# Patient Record
Sex: MALE | Race: BLACK OR AFRICAN AMERICAN | NOT HISPANIC OR LATINO | Employment: OTHER | URBAN - METROPOLITAN AREA
[De-identification: names, ages, dates, MRNs, and addresses within clinical notes are randomized per-mention and may not be internally consistent; named-entity substitution may affect disease eponyms.]

---

## 2017-04-18 ENCOUNTER — TRANSCRIBE ORDERS (OUTPATIENT)
Dept: ADMINISTRATIVE | Facility: HOSPITAL | Age: 77
End: 2017-04-18

## 2017-04-18 ENCOUNTER — HOSPITAL ENCOUNTER (OUTPATIENT)
Dept: GENERAL RADIOLOGY | Facility: HOSPITAL | Age: 77
Discharge: HOME | End: 2017-04-18
Attending: INTERNAL MEDICINE | Admitting: INTERNAL MEDICINE

## 2017-04-18 DIAGNOSIS — I10 ESSENTIAL (PRIMARY) HYPERTENSION: Primary | ICD-10-CM

## 2017-04-18 PROCEDURE — 71020 HC CHEST PA AND LATERAL: CPT

## 2017-06-17 ENCOUNTER — HOSPITAL ENCOUNTER (OUTPATIENT)
Facility: HOSPITAL | Age: 77
Setting detail: OBSERVATION
Discharge: HOME | End: 2017-06-19
Attending: EMERGENCY MEDICINE | Admitting: INTERNAL MEDICINE

## 2017-06-17 DIAGNOSIS — I48.91 ATRIAL FIBRILLATION WITH RVR (HCC): Primary | ICD-10-CM

## 2017-06-17 DIAGNOSIS — Z78.9 IMPAIRED MOBILITY AND ADLS: ICD-10-CM

## 2017-06-17 DIAGNOSIS — Z74.09 IMPAIRED MOBILITY AND ADLS: ICD-10-CM

## 2017-06-17 DIAGNOSIS — D72.829 LEUKOCYTOSIS, UNSPECIFIED TYPE: ICD-10-CM

## 2017-06-17 DIAGNOSIS — Z74.09 IMPAIRED FUNCTIONAL MOBILITY, BALANCE, GAIT, AND ENDURANCE: ICD-10-CM

## 2017-06-17 PROCEDURE — 93005 ELECTROCARDIOGRAM TRACING: CPT | Performed by: EMERGENCY MEDICINE

## 2017-06-17 PROCEDURE — 96365 THER/PROPH/DIAG IV INF INIT: CPT

## 2017-06-17 PROCEDURE — 99284 EMERGENCY DEPT VISIT MOD MDM: CPT

## 2017-06-17 RX ORDER — SODIUM CHLORIDE 0.9 % (FLUSH) 0.9 %
10 SYRINGE (ML) INJECTION AS NEEDED
Status: DISCONTINUED | OUTPATIENT
Start: 2017-06-17 | End: 2017-06-19 | Stop reason: HOSPADM

## 2017-06-17 RX ORDER — DILTIAZEM HYDROCHLORIDE 5 MG/ML
10 INJECTION INTRAVENOUS ONCE
Status: COMPLETED | OUTPATIENT
Start: 2017-06-17 | End: 2017-06-18

## 2017-06-17 RX ORDER — DILTIAZEM HCL IN NACL,ISO-OSM 125 MG/125
5-15 PLASTIC BAG, INJECTION (ML) INTRAVENOUS
Status: DISCONTINUED | OUTPATIENT
Start: 2017-06-17 | End: 2017-06-18

## 2017-06-18 ENCOUNTER — APPOINTMENT (OUTPATIENT)
Dept: CARDIOLOGY | Facility: HOSPITAL | Age: 77
End: 2017-06-18
Attending: INTERNAL MEDICINE

## 2017-06-18 ENCOUNTER — APPOINTMENT (OUTPATIENT)
Dept: GENERAL RADIOLOGY | Facility: HOSPITAL | Age: 77
End: 2017-06-18

## 2017-06-18 PROBLEM — I48.91 ATRIAL FIBRILLATION WITH RVR (HCC): Status: ACTIVE | Noted: 2017-06-18

## 2017-06-18 LAB
ALBUMIN SERPL-MCNC: 3.4 G/DL (ref 3.2–4.8)
ALBUMIN/GLOB SERPL: 1 G/DL (ref 1.5–2.5)
ALP SERPL-CCNC: 89 U/L (ref 25–100)
ALT SERPL W P-5'-P-CCNC: 18 U/L (ref 7–40)
ANION GAP SERPL CALCULATED.3IONS-SCNC: 9 MMOL/L (ref 3–11)
AST SERPL-CCNC: 22 U/L (ref 0–33)
BASOPHILS # BLD AUTO: 0.01 10*3/MM3 (ref 0–0.2)
BASOPHILS NFR BLD AUTO: 0.1 % (ref 0–1)
BH CV ECHO MEAS - AO MAX PG (FULL): 8.1 MMHG
BH CV ECHO MEAS - AO MAX PG: 12.4 MMHG
BH CV ECHO MEAS - AO MEAN PG (FULL): 3 MMHG
BH CV ECHO MEAS - AO MEAN PG: 5.1 MMHG
BH CV ECHO MEAS - AO ROOT AREA (BSA CORRECTED): 1.6
BH CV ECHO MEAS - AO ROOT AREA: 7.4 CM^2
BH CV ECHO MEAS - AO ROOT DIAM: 3.1 CM
BH CV ECHO MEAS - AO V2 MAX: 175.9 CM/SEC
BH CV ECHO MEAS - AO V2 MEAN: 100.6 CM/SEC
BH CV ECHO MEAS - AO V2 VTI: 34.4 CM
BH CV ECHO MEAS - AVA(I,A): 2 CM^2
BH CV ECHO MEAS - AVA(I,D): 2 CM^2
BH CV ECHO MEAS - AVA(V,A): 1.9 CM^2
BH CV ECHO MEAS - AVA(V,D): 1.9 CM^2
BH CV ECHO MEAS - BSA(HAYCOCK): 1.9 M^2
BH CV ECHO MEAS - BSA: 1.9 M^2
BH CV ECHO MEAS - BZI_BMI: 23 KILOGRAMS/M^2
BH CV ECHO MEAS - BZI_METRIC_HEIGHT: 180.3 CM
BH CV ECHO MEAS - BZI_METRIC_WEIGHT: 74.8 KG
BH CV ECHO MEAS - EDV(CUBED): 119 ML
BH CV ECHO MEAS - EDV(TEICH): 113.9 ML
BH CV ECHO MEAS - EF(CUBED): 60.2 %
BH CV ECHO MEAS - EF(TEICH): 51.6 %
BH CV ECHO MEAS - ESV(CUBED): 47.4 ML
BH CV ECHO MEAS - ESV(TEICH): 55.2 ML
BH CV ECHO MEAS - FS: 26.4 %
BH CV ECHO MEAS - IVS/LVPW: 1.2
BH CV ECHO MEAS - IVSD: 1.1 CM
BH CV ECHO MEAS - LA DIMENSION: 3.6 CM
BH CV ECHO MEAS - LA/AO: 1.2
BH CV ECHO MEAS - LV MASS(C)D: 177.5 GRAMS
BH CV ECHO MEAS - LV MASS(C)DI: 91.3 GRAMS/M^2
BH CV ECHO MEAS - LV MAX PG: 4.3 MMHG
BH CV ECHO MEAS - LV MEAN PG: 2.1 MMHG
BH CV ECHO MEAS - LV V1 MAX: 103.5 CM/SEC
BH CV ECHO MEAS - LV V1 MEAN: 64.8 CM/SEC
BH CV ECHO MEAS - LV V1 VTI: 20.8 CM
BH CV ECHO MEAS - LVIDD: 4.9 CM
BH CV ECHO MEAS - LVIDS: 3.6 CM
BH CV ECHO MEAS - LVOT AREA (M): 3.1 CM^2
BH CV ECHO MEAS - LVOT AREA: 3.3 CM^2
BH CV ECHO MEAS - LVOT DIAM: 2 CM
BH CV ECHO MEAS - LVPWD: 1.1 CM
BH CV ECHO MEAS - MV A MAX VEL: 88.4 CM/SEC
BH CV ECHO MEAS - MV DEC TIME: 0.17 SEC
BH CV ECHO MEAS - MV E MAX VEL: 98 CM/SEC
BH CV ECHO MEAS - MV E/A: 1.1
BH CV ECHO MEAS - PA ACC SLOPE: 702 CM/SEC^2
BH CV ECHO MEAS - PA ACC TIME: 0.11 SEC
BH CV ECHO MEAS - PA MAX PG: 4.2 MMHG
BH CV ECHO MEAS - PA PR(ACCEL): 27.5 MMHG
BH CV ECHO MEAS - PA V2 MAX: 101.9 CM/SEC
BH CV ECHO MEAS - RAP SYSTOLE: 3 MMHG
BH CV ECHO MEAS - RVDD: 2.3 CM
BH CV ECHO MEAS - RVSP: 29 MMHG
BH CV ECHO MEAS - SI(AO): 130.3 ML/M^2
BH CV ECHO MEAS - SI(CUBED): 36.8 ML/M^2
BH CV ECHO MEAS - SI(LVOT): 34.8 ML/M^2
BH CV ECHO MEAS - SI(TEICH): 30.2 ML/M^2
BH CV ECHO MEAS - SV(AO): 253.3 ML
BH CV ECHO MEAS - SV(CUBED): 71.6 ML
BH CV ECHO MEAS - SV(LVOT): 67.6 ML
BH CV ECHO MEAS - SV(TEICH): 58.7 ML
BH CV ECHO MEAS - TAPSE (>1.6): 2.3 CM2
BH CV ECHO MEAS - TR MAX VEL: 241.3 CM/SEC
BH CV ECHO MEAS - TV MAX PG: 0.86 MMHG
BH CV ECHO MEAS - TV V2 MAX: 46.4 CM/SEC
BH CV XLRA - RV BASE: 2.7 CM
BH CV XLRA - RV LENGTH: 5.3 CM
BH CV XLRA - RV MID: 2.5 CM
BH CV XLRA - TDI S': 19.7 CM/SEC
BILIRUB SERPL-MCNC: 0.3 MG/DL (ref 0.3–1.2)
BILIRUB UR QL STRIP: NEGATIVE
BNP SERPL-MCNC: 77 PG/ML (ref 0–100)
BUN BLD-MCNC: 25 MG/DL (ref 9–23)
BUN/CREAT SERPL: 31.3 (ref 7–25)
CALCIUM SPEC-SCNC: 9.4 MG/DL (ref 8.7–10.4)
CHLORIDE SERPL-SCNC: 106 MMOL/L (ref 99–109)
CLARITY UR: CLEAR
CO2 SERPL-SCNC: 30 MMOL/L (ref 20–31)
COLOR UR: YELLOW
CREAT BLD-MCNC: 0.8 MG/DL (ref 0.6–1.3)
DEPRECATED RDW RBC AUTO: 53.8 FL (ref 37–54)
EOSINOPHIL # BLD AUTO: 0 10*3/MM3 (ref 0.1–0.3)
EOSINOPHIL NFR BLD AUTO: 0 % (ref 0–3)
ERYTHROCYTE [DISTWIDTH] IN BLOOD BY AUTOMATED COUNT: 16 % (ref 11.3–14.5)
GFR SERPL CREATININE-BSD FRML MDRD: 113 ML/MIN/1.73
GLOBULIN UR ELPH-MCNC: 3.3 GM/DL
GLUCOSE BLD-MCNC: 129 MG/DL (ref 70–100)
GLUCOSE BLDC GLUCOMTR-MCNC: 134 MG/DL (ref 70–130)
GLUCOSE UR STRIP-MCNC: NEGATIVE MG/DL
HCT VFR BLD AUTO: 38.3 % (ref 38.9–50.9)
HGB BLD-MCNC: 11.2 G/DL (ref 13.1–17.5)
HGB UR QL STRIP.AUTO: NEGATIVE
IMM GRANULOCYTES # BLD: 0.04 10*3/MM3 (ref 0–0.03)
IMM GRANULOCYTES NFR BLD: 0.3 % (ref 0–0.6)
KETONES UR QL STRIP: NEGATIVE
LEUKOCYTE ESTERASE UR QL STRIP.AUTO: NEGATIVE
LV EF 2D ECHO EST: 58 %
LYMPHOCYTES # BLD AUTO: 0.64 10*3/MM3 (ref 0.6–4.8)
LYMPHOCYTES NFR BLD AUTO: 4.3 % (ref 24–44)
MCH RBC QN AUTO: 26.7 PG (ref 27–31)
MCHC RBC AUTO-ENTMCNC: 29.2 G/DL (ref 32–36)
MCV RBC AUTO: 91.2 FL (ref 80–99)
MONOCYTES # BLD AUTO: 0.74 10*3/MM3 (ref 0–1)
MONOCYTES NFR BLD AUTO: 4.9 % (ref 0–12)
NEUTROPHILS # BLD AUTO: 13.54 10*3/MM3 (ref 1.5–8.3)
NEUTROPHILS NFR BLD AUTO: 90.4 % (ref 41–71)
NITRITE UR QL STRIP: NEGATIVE
PH UR STRIP.AUTO: 7 [PH] (ref 5–8)
PLAT MORPH BLD: NORMAL
PLATELET # BLD AUTO: 200 10*3/MM3 (ref 150–450)
PMV BLD AUTO: 10.9 FL (ref 6–12)
POTASSIUM BLD-SCNC: 3.7 MMOL/L (ref 3.5–5.5)
PROCALCITONIN SERPL-MCNC: <0.05 NG/ML
PROT SERPL-MCNC: 6.7 G/DL (ref 5.7–8.2)
PROT UR QL STRIP: NEGATIVE
RBC # BLD AUTO: 4.2 10*6/MM3 (ref 4.2–5.76)
RBC MORPH BLD: NORMAL
SODIUM BLD-SCNC: 145 MMOL/L (ref 132–146)
SP GR UR STRIP: 1.02 (ref 1–1.03)
TROPONIN I SERPL-MCNC: 0 NG/ML (ref 0–0.07)
TROPONIN I SERPL-MCNC: 0 NG/ML (ref 0–0.07)
TSH SERPL DL<=0.05 MIU/L-ACNC: 0.74 MIU/ML (ref 0.35–5.35)
UROBILINOGEN UR QL STRIP: NORMAL
WBC MORPH BLD: NORMAL
WBC NRBC COR # BLD: 14.97 10*3/MM3 (ref 3.5–10.8)

## 2017-06-18 PROCEDURE — 83880 ASSAY OF NATRIURETIC PEPTIDE: CPT | Performed by: EMERGENCY MEDICINE

## 2017-06-18 PROCEDURE — 63710000001 PREDNISONE PER 5 MG: Performed by: INTERNAL MEDICINE

## 2017-06-18 PROCEDURE — 25010000002 HEPARIN (PORCINE) PER 1000 UNITS: Performed by: INTERNAL MEDICINE

## 2017-06-18 PROCEDURE — 85025 COMPLETE CBC W/AUTO DIFF WBC: CPT | Performed by: EMERGENCY MEDICINE

## 2017-06-18 PROCEDURE — 80053 COMPREHEN METABOLIC PANEL: CPT | Performed by: EMERGENCY MEDICINE

## 2017-06-18 PROCEDURE — G0378 HOSPITAL OBSERVATION PER HR: HCPCS

## 2017-06-18 PROCEDURE — 96365 THER/PROPH/DIAG IV INF INIT: CPT

## 2017-06-18 PROCEDURE — 96372 THER/PROPH/DIAG INJ SC/IM: CPT

## 2017-06-18 PROCEDURE — 71010 HC CHEST PA OR AP: CPT

## 2017-06-18 PROCEDURE — 25010000002 DIGOXIN PER 500 MCG: Performed by: EMERGENCY MEDICINE

## 2017-06-18 PROCEDURE — 84484 ASSAY OF TROPONIN QUANT: CPT

## 2017-06-18 PROCEDURE — 85007 BL SMEAR W/DIFF WBC COUNT: CPT | Performed by: EMERGENCY MEDICINE

## 2017-06-18 PROCEDURE — 93005 ELECTROCARDIOGRAM TRACING: CPT | Performed by: NURSE PRACTITIONER

## 2017-06-18 PROCEDURE — 81003 URINALYSIS AUTO W/O SCOPE: CPT | Performed by: INTERNAL MEDICINE

## 2017-06-18 PROCEDURE — 84443 ASSAY THYROID STIM HORMONE: CPT | Performed by: INTERNAL MEDICINE

## 2017-06-18 PROCEDURE — 93306 TTE W/DOPPLER COMPLETE: CPT

## 2017-06-18 PROCEDURE — 96375 TX/PRO/DX INJ NEW DRUG ADDON: CPT

## 2017-06-18 PROCEDURE — 93306 TTE W/DOPPLER COMPLETE: CPT | Performed by: INTERNAL MEDICINE

## 2017-06-18 PROCEDURE — 82962 GLUCOSE BLOOD TEST: CPT

## 2017-06-18 PROCEDURE — 84145 PROCALCITONIN (PCT): CPT | Performed by: INTERNAL MEDICINE

## 2017-06-18 PROCEDURE — 93005 ELECTROCARDIOGRAM TRACING: CPT | Performed by: EMERGENCY MEDICINE

## 2017-06-18 PROCEDURE — 93010 ELECTROCARDIOGRAM REPORT: CPT | Performed by: INTERNAL MEDICINE

## 2017-06-18 PROCEDURE — 99220 PR INITIAL OBSERVATION CARE/DAY 70 MINUTES: CPT | Performed by: INTERNAL MEDICINE

## 2017-06-18 PROCEDURE — 99214 OFFICE O/P EST MOD 30 MIN: CPT | Performed by: INTERNAL MEDICINE

## 2017-06-18 RX ORDER — MELATONIN
2000 DAILY
Status: DISCONTINUED | OUTPATIENT
Start: 2017-06-18 | End: 2017-06-19 | Stop reason: HOSPADM

## 2017-06-18 RX ORDER — SODIUM CHLORIDE 0.9 % (FLUSH) 0.9 %
1-10 SYRINGE (ML) INJECTION AS NEEDED
Status: DISCONTINUED | OUTPATIENT
Start: 2017-06-18 | End: 2017-06-19 | Stop reason: HOSPADM

## 2017-06-18 RX ORDER — ONDANSETRON 2 MG/ML
4 INJECTION INTRAMUSCULAR; INTRAVENOUS EVERY 6 HOURS PRN
Status: DISCONTINUED | OUTPATIENT
Start: 2017-06-18 | End: 2017-06-19 | Stop reason: HOSPADM

## 2017-06-18 RX ORDER — PREDNISONE 1 MG/1
15 TABLET ORAL 3 TIMES DAILY
Status: ON HOLD | COMMUNITY
End: 2017-06-19

## 2017-06-18 RX ORDER — FLUTICASONE PROPIONATE 50 MCG
2 SPRAY, SUSPENSION (ML) NASAL 2 TIMES DAILY PRN
COMMUNITY

## 2017-06-18 RX ORDER — ONDANSETRON 4 MG/1
4 TABLET, FILM COATED ORAL EVERY 6 HOURS PRN
Status: DISCONTINUED | OUTPATIENT
Start: 2017-06-18 | End: 2017-06-19 | Stop reason: HOSPADM

## 2017-06-18 RX ORDER — FINASTERIDE 5 MG/1
5 TABLET, FILM COATED ORAL DAILY
Status: DISCONTINUED | OUTPATIENT
Start: 2017-06-18 | End: 2017-06-19 | Stop reason: HOSPADM

## 2017-06-18 RX ORDER — DOCUSATE SODIUM 100 MG/1
100 CAPSULE, LIQUID FILLED ORAL 2 TIMES DAILY
Status: DISCONTINUED | OUTPATIENT
Start: 2017-06-18 | End: 2017-06-19 | Stop reason: HOSPADM

## 2017-06-18 RX ORDER — HYDROCODONE BITARTRATE AND ACETAMINOPHEN 10; 325 MG/1; MG/1
1 TABLET ORAL EVERY 6 HOURS PRN
COMMUNITY
End: 2019-01-15

## 2017-06-18 RX ORDER — DIGOXIN 0.25 MG/ML
125 INJECTION INTRAMUSCULAR; INTRAVENOUS ONCE
Status: COMPLETED | OUTPATIENT
Start: 2017-06-18 | End: 2017-06-18

## 2017-06-18 RX ORDER — DILTIAZEM HCL IN NACL,ISO-OSM 125 MG/125
5-15 PLASTIC BAG, INJECTION (ML) INTRAVENOUS
Status: DISCONTINUED | OUTPATIENT
Start: 2017-06-18 | End: 2017-06-18

## 2017-06-18 RX ORDER — ACETAMINOPHEN 325 MG/1
650 TABLET ORAL EVERY 4 HOURS PRN
Status: DISCONTINUED | OUTPATIENT
Start: 2017-06-18 | End: 2017-06-19 | Stop reason: HOSPADM

## 2017-06-18 RX ORDER — BISOPROLOL FUMARATE 5 MG/1
2.5 TABLET, FILM COATED ORAL
Status: DISCONTINUED | OUTPATIENT
Start: 2017-06-18 | End: 2017-06-19 | Stop reason: HOSPADM

## 2017-06-18 RX ORDER — MELATONIN
2000 DAILY
COMMUNITY

## 2017-06-18 RX ORDER — HEPARIN SODIUM 5000 [USP'U]/ML
5000 INJECTION, SOLUTION INTRAVENOUS; SUBCUTANEOUS EVERY 8 HOURS SCHEDULED
Status: DISCONTINUED | OUTPATIENT
Start: 2017-06-18 | End: 2017-06-18

## 2017-06-18 RX ORDER — FLUTICASONE PROPIONATE 50 MCG
2 SPRAY, SUSPENSION (ML) NASAL DAILY
Status: DISCONTINUED | OUTPATIENT
Start: 2017-06-18 | End: 2017-06-19 | Stop reason: HOSPADM

## 2017-06-18 RX ORDER — FINASTERIDE 5 MG/1
5 TABLET, FILM COATED ORAL DAILY
COMMUNITY
End: 2019-03-14 | Stop reason: SDUPTHER

## 2017-06-18 RX ORDER — DILTIAZEM HCL IN NACL,ISO-OSM 125 MG/125
5-15 PLASTIC BAG, INJECTION (ML) INTRAVENOUS
Status: DISCONTINUED | OUTPATIENT
Start: 2017-06-18 | End: 2017-06-18 | Stop reason: SDUPTHER

## 2017-06-18 RX ORDER — HYDROCODONE BITARTRATE AND ACETAMINOPHEN 10; 325 MG/1; MG/1
1 TABLET ORAL EVERY 6 HOURS PRN
Status: DISCONTINUED | OUTPATIENT
Start: 2017-06-18 | End: 2017-06-19 | Stop reason: HOSPADM

## 2017-06-18 RX ADMIN — Medication 5 MG/HR: at 00:40

## 2017-06-18 RX ADMIN — HEPARIN SODIUM 5000 UNITS: 5000 INJECTION, SOLUTION INTRAVENOUS; SUBCUTANEOUS at 14:36

## 2017-06-18 RX ADMIN — HYDROCODONE BITARTRATE AND ACETAMINOPHEN 1 TABLET: 10; 325 TABLET ORAL at 16:45

## 2017-06-18 RX ADMIN — PREDNISONE 15 MG: 5 TABLET ORAL at 11:23

## 2017-06-18 RX ADMIN — VITAMIN D, TAB 1000IU (100/BT) 2000 UNITS: 25 TAB at 11:23

## 2017-06-18 RX ADMIN — FINASTERIDE 5 MG: 5 TABLET, FILM COATED ORAL at 11:23

## 2017-06-18 RX ADMIN — DILTIAZEM HYDROCHLORIDE 10 MG: 5 INJECTION INTRAVENOUS at 00:38

## 2017-06-18 RX ADMIN — BISOPROLOL FUMARATE 2.5 MG: 5 TABLET ORAL at 17:40

## 2017-06-18 RX ADMIN — DIGOXIN 125 MCG: 0.25 INJECTION INTRAMUSCULAR; INTRAVENOUS at 05:45

## 2017-06-18 RX ADMIN — FLUTICASONE PROPIONATE 2 SPRAY: 50 SPRAY, METERED NASAL at 11:22

## 2017-06-18 NOTE — PLAN OF CARE
Problem: Patient Care Overview (Adult)  Goal: Plan of Care Review  Outcome: Ongoing (interventions implemented as appropriate)    17 1811   Coping/Psychosocial Response Interventions   Plan Of Care Reviewed With patient   Patient Care Overview   Progress no change   Outcome Evaluation   Outcome Summary/Follow up Plan VSS. woc and cardiology consulted. ekg ordered for am. nsr on monitor. pain med given once.         Problem: Cardiac Output, Decreased (Adult)  Goal: Identify Related Risk Factors and Signs and Symptoms  Outcome: Outcome(s) achieved Date Met:  17  Goal: Adequate Cardiac Output/Effective Tissue Perfusion  Outcome: Outcome(s) achieved Date Met:  17    Problem: Health Knowledge, Opportunity for Enhanced (Adult,NICU,,Obstetrics,Pediatric)  Goal: Identify Related Risk Factors and Signs and Symptoms  Outcome: Outcome(s) achieved Date Met:  17  Goal: Knowledgeable about Health Subject/Topic  Outcome: Outcome(s) achieved Date Met:  17    Problem: Arrhythmia/Dysrhythmia (Symptomatic) (Adult)  Goal: Signs and Symptoms of Listed Potential Problems Will be Absent or Manageable (Arrhythmia/Dysrhythmia)  Outcome: Ongoing (interventions implemented as appropriate)

## 2017-06-18 NOTE — H&P
[facility] Medicine Services  HISTORY AND PHYSICAL    Primary Care Physician: Michael Chavez MD    Subjective     Chief Complaint:  Atrial fibrillation    History of Present Illness:     75 yo with h/o RA and chronic venous stasis ulcerations presents with new onset atrial fibrillation with RVR. Mr Rojas experienced palpitations about 8 PM last night. He denies associated chest pain or shortness of breath. No prior events. The patient's son called EMS, and on arrival to the ED and EKG showed afib.      Review of Systems   Constitutional: Positive for unexpected weight change. Negative for chills and fever.   HENT: Negative.    Eyes: Negative.    Respiratory: Negative for shortness of breath.    Cardiovascular: Positive for palpitations. Negative for chest pain.   Gastrointestinal: Negative.    Endocrine: Negative.    Genitourinary: Negative.    Musculoskeletal: Positive for arthralgias and gait problem.   Skin: Positive for rash and wound.   Allergic/Immunologic: Negative.    Neurological: Positive for weakness.   Hematological: Negative.    Psychiatric/Behavioral: Negative.            Past Medical History:   Diagnosis Date   • Chronic cutaneous venous stasis ulcer    • Hypertension    • RA (rheumatoid arthritis)    • Rheumatoid arthritis    • Vertigo    • Vertigo        Past Surgical History:   Procedure Laterality Date   • JOINT MANIPULATION      left hip repair       History reviewed. No pertinent family history.    Social History     Social History   • Marital status:      Spouse name: N/A   • Number of children: N/A   • Years of education: N/A     Occupational History   • Not on file.     Social History Main Topics   • Smoking status: Never Smoker   • Smokeless tobacco: Never Used   • Alcohol use No   • Drug use: No   • Sexual activity: Defer     Other Topics Concern   • Not on file     Social History Narrative       Medications:  Prescriptions Prior to Admission  "  Medication Sig Dispense Refill Last Dose   • cholecalciferol (VITAMIN D3) 1000 UNITS tablet Take 2,000 Units by mouth Daily.      • docusate sodium (COLACE) 50 MG capsule Take  by mouth 2 (Two) Times a Day.      • finasteride (PROSCAR) 5 MG tablet Take 5 mg by mouth Daily.      • fluticasone (FLONASE) 50 MCG/ACT nasal spray 2 sprays into each nostril Daily.      • HYDROcodone-acetaminophen (NORCO)  MG per tablet Take 1 tablet by mouth Every 6 (Six) Hours As Needed for Moderate Pain (4-6).      • predniSONE (DELTASONE) 5 MG tablet Take 15 mg by mouth 3 (Three) Times a Day.      • amLODIPine (NORVASC) 5 MG tablet Take 5 mg by mouth daily.      • VALSARTAN-HYDROCHLOROTHIAZIDE PO Take 12.5 mg by mouth. UNABLE TO FIND OUT DOSE.           Allergies:  Allergies   Allergen Reactions   • Other      Relates all oral abx cause intol nausea/vomiting.         Objective     Physical Exam:  Vital Signs: /76 (BP Location: Left arm, Patient Position: Lying)  Pulse 98  Temp 97.4 °F (36.3 °C) (Oral)   Resp 16  Ht 71\" (180.3 cm)  Wt 165 lb 1.6 oz (74.9 kg)  SpO2 97%  BMI 23.03 kg/m2  Physical Exam    Gen-no acute distress, non toxic, lying flat in bed, somewhat frail male  CV-RRR, S1 S2 normal, no m/r/g  Resp-CTAB, no wheezes, rhonchi or rales  Abd-soft, Non tender, Non distended, normo active bowel sounds  Ext-No lower extremity cyanosis clubbing or edema bilaterlly  Neuro-speech clear, alert, answers questions appropriately; patient has difficulty moving and bending his legs  Psych-normal affect   Skin- 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 Several shallow but fairly large and weeping Lower extremity venous stasis ulcerations (including left ankle and right lower leg). Difficulty visualizing ulcer behind right knee.           Results Reviewed:    Results from last 7 days  Lab Units 06/18/17  0021   WBC 10*3/mm3 14.97*   HEMOGLOBIN g/dL 11.2*   PLATELETS 10*3/mm3 200       Results from " last 7 days  Lab Units 06/18/17  0021   SODIUM mmol/L 145   POTASSIUM mmol/L 3.7   TOTAL CO2 mmol/L 30.0   CREATININE mg/dL 0.80   GLUCOSE mg/dL 129*   CALCIUM mg/dL 9.4       I have personally reviewed and interpreted available lab data, radiology studies and ECG obtained at time of admission.     Assessment / Plan     Assessment/Problem List:   Hospital Problem List     Atrial fibrillation with RVR          cholecalciferol 2,000 Units Oral Daily   docusate sodium 100 mg Oral BID   finasteride 5 mg Oral Daily   fluticasone 2 spray Nasal Daily   heparin (porcine) 5,000 Units Subcutaneous Q8H   predniSONE 15 mg Oral Daily With Breakfast         Plan:    Atrial fibrillation with RVR  - new onset  - appears to have spontaneously converted to sinus rhythm  - TSH wnl  - check echo  - will ask cardiology to evaluate further.    Leukocytosis  - secondary to chronic steroids? Infection? -- IF infection this could certainly help precipitate atrial fibrillation.  - CXR no infiltrate  - Check UA  - Check Procalcitonin, ESR, CRP  - Venous stasis ulcers could certainly be a source of infection -- although they are extensive, both patient and family say the skin lesions are slowly improving.    Venous Stasis ulcers  - follows with [facility] with home health and family care  - would care nurse consulted    RA  - continue chronic prednisone  - poor mobility -- will ask PT/OT to evaluate    Gouty tophi  - check uric acid level    HTN  - hold norvasc and valsartan/hctz today -- in case diltiazem IV needed again for rate control. Also, patient may be candidate for b-blockade.    DVT prophylaxis: heparin  Code Status: Full  Admission Status: Patient will be admitted to OBSERVATION status, however if further evaluation or treatment plans warrant, status may change.  Based upon current information, I predict patient's care encounter to be less than or equal to 2 midnights.       Jaime Bangura MD 06/18/17 9:58 AM

## 2017-06-18 NOTE — ED PROVIDER NOTES
Subjective   HPI Comments: Michoacano Rojas is a 76 y.o. male with a hx of RA who presents to the ED w/ c/o palpitations. 10 minutes before EMS arrival, he suddenly felt his heart racing. He denies any CP, SOA, dizziness, lightheadedness, fever, cough, hematochezia, melena, or any other symptoms, but he does state that he has had similar sx in the past. He is not on any blood thinners or home O2, and has no hx of PE/DVT. He has been on a nasal spray for 1 month for seasonal allergies. He reports nothing else acute at this time.     Patient is a 76 y.o. male presenting with palpitations.   History provided by:  Patient  Palpitations   Palpitations quality:  Fast  Onset quality:  Sudden  Timing:  Constant  Progression:  Unchanged  Chronicity:  New  Relieved by:  None tried  Worsened by:  Nothing  Ineffective treatments:  None tried  Associated symptoms: no chest pain, no cough, no diaphoresis, no dizziness, no nausea, no shortness of breath and no vomiting        Review of Systems   Constitutional: Negative for diaphoresis.   Respiratory: Negative for cough and shortness of breath.    Cardiovascular: Positive for palpitations and leg swelling (chronic in BLE). Negative for chest pain.   Gastrointestinal: Negative for nausea and vomiting.   Neurological: Negative for dizziness.   All other systems reviewed and are negative.      Past Medical History:   Diagnosis Date   • Chronic cutaneous venous stasis ulcer    • Hypertension    • RA (rheumatoid arthritis)    • Rheumatoid arthritis    • Vertigo    • Vertigo        Allergies   Allergen Reactions   • Other      Relates all oral abx cause intol nausea/vomiting.       History reviewed. No pertinent surgical history.    History reviewed. No pertinent family history.    Social History     Social History   • Marital status:      Spouse name: N/A   • Number of children: N/A   • Years of education: N/A     Social History Main Topics   • Smoking status: Never Smoker   •  Smokeless tobacco: None   • Alcohol use No   • Drug use: No   • Sexual activity: Defer     Other Topics Concern   • None     Social History Narrative         Objective   Physical Exam   Constitutional: He is oriented to person, place, and time. He appears well-developed and well-nourished. No distress.   HENT:   Head: Normocephalic and atraumatic.   Nose: Nose normal.   Eyes: Conjunctivae and EOM are normal. No scleral icterus.   Neck: Normal range of motion. Neck supple.   Cardiovascular: Normal heart sounds.  An irregularly irregular rhythm present. Tachycardia present.    Pulmonary/Chest: Effort normal and breath sounds normal. No respiratory distress. He has no wheezes.   Appears to be breathing comfortably.   Abdominal: Soft. Bowel sounds are normal. He exhibits no distension. There is no tenderness.   Musculoskeletal: Normal range of motion. He exhibits edema (4+BLE edema to the knees.).   Dressing over the right leg,   Neurological: He is alert and oriented to person, place, and time.   Skin: Skin is warm and dry. He is not diaphoretic.   Psychiatric: He has a normal mood and affect. His behavior is normal.   Nursing note and vitals reviewed.      Critical Care  Performed by: CALLIE LI  Authorized by: CALLIE LI   Total critical care time: 35 minutes  Critical care time was exclusive of separately billable procedures and treating other patients.  Critical care was necessary to treat or prevent imminent or life-threatening deterioration of the following conditions: cardiac failure.  Critical care was time spent personally by me on the following activities: evaluation of patient's response to treatment, ordering and performing treatments and interventions, pulse oximetry, re-evaluation of patient's condition, ordering and review of laboratory studies, examination of patient, development of treatment plan with patient or surrogate, discussions with consultants, obtaining history from patient  or surrogate and ordering and review of radiographic studies.               ED Course  ED Course                 Recent Results (from the past 24 hour(s))   Comprehensive Metabolic Panel    Collection Time: 06/18/17 12:21 AM   Result Value Ref Range    Glucose 129 (H) 70 - 100 mg/dL    BUN 25 (H) 9 - 23 mg/dL    Creatinine 0.80 0.60 - 1.30 mg/dL    Sodium 145 132 - 146 mmol/L    Potassium 3.7 3.5 - 5.5 mmol/L    Chloride 106 99 - 109 mmol/L    CO2 30.0 20.0 - 31.0 mmol/L    Calcium 9.4 8.7 - 10.4 mg/dL    Total Protein 6.7 5.7 - 8.2 g/dL    Albumin 3.40 3.20 - 4.80 g/dL    ALT (SGPT) 18 7 - 40 U/L    AST (SGOT) 22 0 - 33 U/L    Alkaline Phosphatase 89 25 - 100 U/L    Total Bilirubin 0.3 0.3 - 1.2 mg/dL    eGFR  African Amer 113 >60 mL/min/1.73    Globulin 3.3 gm/dL    A/G Ratio 1.0 (L) 1.5 - 2.5 g/dL    BUN/Creatinine Ratio 31.3 (H) 7.0 - 25.0    Anion Gap 9.0 3.0 - 11.0 mmol/L   BNP    Collection Time: 06/18/17 12:21 AM   Result Value Ref Range    BNP 77.0 0.0 - 100.0 pg/mL   CBC Auto Differential    Collection Time: 06/18/17 12:21 AM   Result Value Ref Range    WBC 14.97 (H) 3.50 - 10.80 10*3/mm3    RBC 4.20 4.20 - 5.76 10*6/mm3    Hemoglobin 11.2 (L) 13.1 - 17.5 g/dL    Hematocrit 38.3 (L) 38.9 - 50.9 %    MCV 91.2 80.0 - 99.0 fL    MCH 26.7 (L) 27.0 - 31.0 pg    MCHC 29.2 (L) 32.0 - 36.0 g/dL    RDW 16.0 (H) 11.3 - 14.5 %    RDW-SD 53.8 37.0 - 54.0 fl    MPV 10.9 6.0 - 12.0 fL    Platelets 200 150 - 450 10*3/mm3    Neutrophil % 90.4 (H) 41.0 - 71.0 %    Lymphocyte % 4.3 (L) 24.0 - 44.0 %    Monocyte % 4.9 0.0 - 12.0 %    Eosinophil % 0.0 0.0 - 3.0 %    Basophil % 0.1 0.0 - 1.0 %    Immature Grans % 0.3 0.0 - 0.6 %    Neutrophils, Absolute 13.54 (H) 1.50 - 8.30 10*3/mm3    Lymphocytes, Absolute 0.64 0.60 - 4.80 10*3/mm3    Monocytes, Absolute 0.74 0.00 - 1.00 10*3/mm3    Eosinophils, Absolute 0.00 (L) 0.10 - 0.30 10*3/mm3    Basophils, Absolute 0.01 0.00 - 0.20 10*3/mm3    Immature Grans, Absolute 0.04 (H) 0.00 -  0.03 10*3/mm3   Scan Slide    Collection Time: 06/18/17 12:21 AM   Result Value Ref Range    RBC Morphology Normal Normal    WBC Morphology Normal Normal    Platelet Morphology Normal Normal   POC Troponin, Rapid    Collection Time: 06/18/17 12:30 AM   Result Value Ref Range    Troponin I 0.00 0.00 - 0.07 ng/mL   POC Troponin, Rapid    Collection Time: 06/18/17  2:32 AM   Result Value Ref Range    Troponin I 0.00 0.00 - 0.07 ng/mL     Note: In addition to lab results from this visit, the labs listed above may include labs taken at another facility or during a different encounter within the last 24 hours. Please correlate lab times with ED admission and discharge times for further clarification of the services performed during this visit.    XR Chest 1 View   Final Result   Abnormal   1.  No acute abnormality.   2.  COPD.      THIS DOCUMENT HAS BEEN ELECTRONICALLY SIGNED BY DANIELA DUNN MD        Vitals:    06/18/17 0153 06/18/17 0240 06/18/17 0250 06/18/17 0300   BP: 99/73 92/79 100/68 94/70   Pulse: (!) 123 (!) 145 (!) 151 (!) 141   Resp:       Temp:       TempSrc:       SpO2:       Weight:       Height:         Medications   sodium chloride 0.9 % flush 10 mL (not administered)   diltiaZEM (CARDIZEM) 125mg/125 mL infusion (0 mg/hr Intravenous Stopped 6/18/17 0156)   digoxin (LANOXIN) injection 125 mcg (not administered)   diltiaZEM (CARDIZEM) injection 10 mg (10 mg Intravenous Given 6/18/17 0038)     ECG/EMG Results (last 24 hours)     Procedure Component Value Units Date/Time    ECG 12 Lead [[id number]] Collected:  06/17/17 2315     Updated:  06/17/17 2317            MDM  Number of Diagnoses or Management Options  Atrial fibrillation with RVR: new and requires workup  Leukocytosis, unspecified type: new and requires workup  Diagnosis management comments: Attempted to perform diltiazem bolus and drip for rate control, but the patient became hypotensive, therefore it was discontinued.    No acute abdomen maladies and  the chest x-ray.    Other lab evaluation shows elevated white blood cell count, but no infectious source, no fever.    Jodee with hospitalist, Dr. Mendez, who suggested digoxin.    125 µg of digoxin will be ordered.    Admit to telemetry.    Patient has remained stable.       Amount and/or Complexity of Data Reviewed  Clinical lab tests: ordered and reviewed  Tests in the radiology section of CPT®: ordered and reviewed  Decide to obtain previous medical records or to obtain history from someone other than the patient: yes  Obtain history from someone other than the patient: yes  Review and summarize past medical records: yes  Discuss the patient with other providers: yes  Independent visualization of images, tracings, or specimens: yes    Risk of Complications, Morbidity, and/or Mortality  Presenting problems: high  Diagnostic procedures: high  Management options: high    Critical Care  Total time providing critical care: 30-74 minutes    Patient Progress  Patient progress: stable      Final diagnoses:   Atrial fibrillation with RVR   Leukocytosis, unspecified type       Documentation assistance provided by cass Tavares.  Information recorded by the scribe was done at my direction and has been verified and validated by me.     Alla Tavares  06/18/17 0159       Angelic Boyer MD  06/18/17 0325

## 2017-06-18 NOTE — PLAN OF CARE
Problem: Patient Care Overview (Adult)  Goal: Plan of Care Review  Outcome: Ongoing (interventions implemented as appropriate)    17 0544   Coping/Psychosocial Response Interventions   Plan Of Care Reviewed With patient   Patient Care Overview   Progress no change   Outcome Evaluation   Outcome Summary/Follow up Plan pt. arrived on floor, no acute changes, md to see          Problem: Cardiac Output, Decreased (Adult)  Goal: Identify Related Risk Factors and Signs and Symptoms  Outcome: Ongoing (interventions implemented as appropriate)  Goal: Adequate Cardiac Output/Effective Tissue Perfusion  Outcome: Ongoing (interventions implemented as appropriate)    Problem: Health Knowledge, Opportunity for Enhanced (Adult,NICU,[place],Obstetrics,Pediatric)  Goal: Identify Related Risk Factors and Signs and Symptoms  Outcome: Ongoing (interventions implemented as appropriate)  Goal: Knowledgeable about Health Subject/Topic  Outcome: Ongoing (interventions implemented as appropriate)

## 2017-06-19 VITALS
HEIGHT: 71 IN | SYSTOLIC BLOOD PRESSURE: 121 MMHG | WEIGHT: 165.1 LBS | RESPIRATION RATE: 18 BRPM | TEMPERATURE: 97.9 F | DIASTOLIC BLOOD PRESSURE: 64 MMHG | OXYGEN SATURATION: 79 % | HEART RATE: 66 BPM | BODY MASS INDEX: 23.11 KG/M2

## 2017-06-19 LAB
BASOPHILS # BLD AUTO: 0.01 10*3/MM3 (ref 0–0.2)
BASOPHILS NFR BLD AUTO: 0.1 % (ref 0–1)
DEPRECATED RDW RBC AUTO: 52.9 FL (ref 37–54)
EOSINOPHIL # BLD AUTO: 0.06 10*3/MM3 (ref 0.1–0.3)
EOSINOPHIL NFR BLD AUTO: 0.5 % (ref 0–3)
ERYTHROCYTE [DISTWIDTH] IN BLOOD BY AUTOMATED COUNT: 15.7 % (ref 11.3–14.5)
HCT VFR BLD AUTO: 39.7 % (ref 38.9–50.9)
HGB BLD-MCNC: 11.6 G/DL (ref 13.1–17.5)
IMM GRANULOCYTES # BLD: 0.05 10*3/MM3 (ref 0–0.03)
IMM GRANULOCYTES NFR BLD: 0.4 % (ref 0–0.6)
LYMPHOCYTES # BLD AUTO: 1.35 10*3/MM3 (ref 0.6–4.8)
LYMPHOCYTES NFR BLD AUTO: 11.7 % (ref 24–44)
MCH RBC QN AUTO: 26.8 PG (ref 27–31)
MCHC RBC AUTO-ENTMCNC: 29.2 G/DL (ref 32–36)
MCV RBC AUTO: 91.7 FL (ref 80–99)
MONOCYTES # BLD AUTO: 0.78 10*3/MM3 (ref 0–1)
MONOCYTES NFR BLD AUTO: 6.7 % (ref 0–12)
NEUTROPHILS # BLD AUTO: 9.31 10*3/MM3 (ref 1.5–8.3)
NEUTROPHILS NFR BLD AUTO: 80.6 % (ref 41–71)
PLATELET # BLD AUTO: 166 10*3/MM3 (ref 150–450)
PMV BLD AUTO: 10.4 FL (ref 6–12)
RBC # BLD AUTO: 4.33 10*6/MM3 (ref 4.2–5.76)
WBC NRBC COR # BLD: 11.56 10*3/MM3 (ref 3.5–10.8)

## 2017-06-19 PROCEDURE — 99213 OFFICE O/P EST LOW 20 MIN: CPT | Performed by: INTERNAL MEDICINE

## 2017-06-19 PROCEDURE — 97116 GAIT TRAINING THERAPY: CPT

## 2017-06-19 PROCEDURE — 97162 PT EVAL MOD COMPLEX 30 MIN: CPT

## 2017-06-19 PROCEDURE — G8987 SELF CARE CURRENT STATUS: HCPCS

## 2017-06-19 PROCEDURE — G8978 MOBILITY CURRENT STATUS: HCPCS

## 2017-06-19 PROCEDURE — G0378 HOSPITAL OBSERVATION PER HR: HCPCS

## 2017-06-19 PROCEDURE — 63710000001 PREDNISONE PER 5 MG: Performed by: INTERNAL MEDICINE

## 2017-06-19 PROCEDURE — 99217 PR OBSERVATION CARE DISCHARGE MANAGEMENT: CPT | Performed by: INTERNAL MEDICINE

## 2017-06-19 PROCEDURE — 93005 ELECTROCARDIOGRAM TRACING: CPT | Performed by: INTERNAL MEDICINE

## 2017-06-19 PROCEDURE — 97165 OT EVAL LOW COMPLEX 30 MIN: CPT

## 2017-06-19 PROCEDURE — G8979 MOBILITY GOAL STATUS: HCPCS

## 2017-06-19 PROCEDURE — G8988 SELF CARE GOAL STATUS: HCPCS

## 2017-06-19 PROCEDURE — 93010 ELECTROCARDIOGRAM REPORT: CPT | Performed by: INTERNAL MEDICINE

## 2017-06-19 PROCEDURE — 85025 COMPLETE CBC W/AUTO DIFF WBC: CPT | Performed by: INTERNAL MEDICINE

## 2017-06-19 RX ORDER — BISOPROLOL FUMARATE 5 MG/1
2.5 TABLET, FILM COATED ORAL
Qty: 30 TABLET | Refills: 2 | Status: SHIPPED | OUTPATIENT
Start: 2017-06-19 | End: 2019-07-03 | Stop reason: SDUPTHER

## 2017-06-19 RX ORDER — PREDNISONE 1 MG/1
15 TABLET ORAL DAILY
Start: 2017-06-19 | End: 2018-06-04

## 2017-06-19 RX ADMIN — BISOPROLOL FUMARATE 2.5 MG: 5 TABLET ORAL at 08:49

## 2017-06-19 RX ADMIN — PREDNISONE 15 MG: 5 TABLET ORAL at 08:49

## 2017-06-19 RX ADMIN — APIXABAN 5 MG: 5 TABLET, FILM COATED ORAL at 08:49

## 2017-06-19 RX ADMIN — VITAMIN D, TAB 1000IU (100/BT) 2000 UNITS: 25 TAB at 08:49

## 2017-06-19 RX ADMIN — FINASTERIDE 5 MG: 5 TABLET, FILM COATED ORAL at 08:50

## 2017-06-19 RX ADMIN — FLUTICASONE PROPIONATE 2 SPRAY: 50 SPRAY, METERED NASAL at 08:49

## 2017-06-19 NOTE — DISCHARGE SUMMARY
[facility] Medicine Services  DISCHARGE SUMMARY       Date of Admission: 6/17/2017  Date of Discharge:  6/19/2017  Primary Care Physician: Michael Chavez MD  Consulting Physician(s)     Provider Relationship    Petar Friedman MD Consulting Physician          Discharge Diagnoses:  Active Hospital Problems (** Indicates Principal Problem)    Diagnosis Date Noted   • **Atrial fibrillation with RVR [I48.91] 06/18/2017     · Chads Vasc= 4, currently no anticoagulant     • Large joint arthralgia of multiple sites [M25.50] 06/19/2016   • Essential hypertension [I10] 06/13/2016   • Rheumatoid arthritis [M06.9] 06/13/2016   • Chronic cutaneous venous stasis ulcer [I83.009]       Resolved Hospital Problems    Diagnosis Date Noted Date Resolved   • Vertigo [R42]  06/19/2017       Presenting Problem/History of Present Illness  Atrial fibrillation with RVR [I48.91]     Chief Complaint on Day of Discharge: atrial fibrillation    History of Present Illness on Day of Discharge: denies chest pain or palpitations. Strength good and near baseline. Eager to go home!    Hospital Course  77 yo with h/o RA and chronic venous stasis ulcerations presents with new onset atrial fibrillation with RVR. Mr Rojas experienced palpitations about 8 PM int he evening. He denied associated chest pain or shortness of breath. No prior events. The patient's son called EMS, and on arrival to the ED and EKG showed afib.  Mr Rojas was admitted to the hospitalist service with cardiology consultation.    Atrial fibrillation with RVR  - patient spontaneously converted back to sinus rhythm. Echo with EF of 58% and mild valvular disease. Bystolic and Eliquis started by Cardiology Dr Friedman. I did discuss the risk of bleeding (both GI and fall associated), and patient agreeable to proceed with the NOAC for stroke prophylaxis.     HTN  - home BP meds held (norvasc and valsartan/hctz), bystolic started as above  - current  "systolic bp in 130's. Patient and family to keep daily home bp log for PCP followup. They will call PCP office for systolic BP greater than 160    RA  - on chronic prednisone    Gouty tophi  - over left 3rd MCP  - uric acid level pending at time of discharge    Venous stasis ulcers  - multiple on lower extremities  - patient and family say they have seen slow but steady improvement over the past year. Follows at  wound care, has appt this Thursday.    Leukocytosis  - 14k at admit, 11K at time of discharge. Patient afebrile, UA bland, no infiltrate on CXR. May be steroid induced vs from chronic leg wounds. As patient non-toxic, and has robust followup plan with both wound care and PCP, will hold off antibiotics at this time.     Generalized weakness  - PT recommends home with home health PT - patient and family agreeable with plan.        Procedures Performed         Consults:   Consults     Date and Time Order Name Status Description    6/18/2017 0946 Inpatient Consult to Cardiology Completed           Pertinent Test Results:    Condition on Discharge:  good    Physical Exam on Discharge:/75 (BP Location: Left arm, Patient Position: Lying)  Pulse 57  Temp 97.7 °F (36.5 °C) (Oral)   Resp 18  Ht 71\" (180.3 cm)  Wt 165 lb 1.6 oz (74.9 kg)  SpO2 (!) 79%  BMI 23.03 kg/m2  Physical Exam  Gen-no acute distress, non toxic, sitting up in chair finishing lunch; somewhat frail gentleman  CV-RRR, S1 S2 normal, no m/r/g  Resp-CTAB, no wheezes, rhonchi or rales  Abd-soft, Non tender, Non distended, normo active bowel sounds  Ext-some muscle wasting lower ext bilat, with pedal edema  Neuro-LE weakness, but patient did ambulate to doorway with PT this morning.  Psych-normal affect   Skin- multiple ulcerations LE bilaterally with clean dressings. Wound care waiting behind me to re-dress wounds.      Discharge Disposition  Home or Self Care    Discharge Medications   Michoacano Rojas   Home Medication Instructions " [facility]:[id number]    Printed on:06/19/17 1300   Medication Information                      apixaban (ELIQUIS) 5 MG tablet tablet  Take 1 tablet by mouth Every 12 (Twelve) Hours.             bisoprolol (ZEBeta) 5 MG tablet  Take 0.5 tablets by mouth Daily.             cholecalciferol (VITAMIN D3) 1000 UNITS tablet  Take 2,000 Units by mouth Daily.             diclofenac (VOLTAREN) 3 % gel gel  Apply  topically 2 (Two) Times a Day As Needed. for 60 days.             docusate sodium (COLACE) 50 MG capsule  Take  by mouth 2 (Two) Times a Day.             finasteride (PROSCAR) 5 MG tablet  Take 5 mg by mouth Daily.             fluticasone (FLONASE) 50 MCG/ACT nasal spray  2 sprays into each nostril Daily.             HYDROcodone-acetaminophen (NORCO)  MG per tablet  Take 1 tablet by mouth Every 6 (Six) Hours As Needed for Moderate Pain (4-6).             predniSONE (DELTASONE) 5 MG tablet  Take 3 tablets by mouth Daily.                 Discharge Diet:     Discharge Care Plan / Instructions:    Activity at Discharge:     Follow-up Appointments  No future appointments.  Additional Instructions for the Follow-ups that You Need to Schedule     Discharge Follow-up with PCP    As directed    Follow Up Details:  Dr Chavez one Week                 Test Results Pending at Discharge       Jaime Bangura MD 06/19/17 1:00 PM    Time: Discharge 40 min    Please note that portions of this note may have been completed with a voice recognition program. Efforts were made to edit the dictations, but occasionally words are mistranscribed.

## 2017-06-19 NOTE — CONSULTS
"Adult Nutrition  Assessment/PES    Patient Name:  Michoacano Rojas  YOB: 1940  MRN: [id number]  Admit Date:  6/17/2017    Assessment Date:  6/18/2017        Reason for Assessment       06/18/17 2030    Reason for Assessment    Reason For Assessment/Visit physician consult    Time Spent (min) 45    Diagnosis Diagnosis    Cardiac HTN, AFIB    Immune Rheumatoid arthritis    Neurological Vertigo    Skin BLE venous stasis ulcers        Patient Active Problem List   Diagnosis   • Hematuria   • Prostate hypertrophy   • Rheumatoid arthritis   • Essential hypertension   • Rheumatoid arthritis   • Vertigo   • Chronic cutaneous venous stasis ulcer   • Very poor mobility   • Large joint arthralgia of multiple sites   • Arthralgia of hands, bilateral   • Generalized stiffness   • Atrial fibrillation with RVR             Anthropometrics       06/18/17 2031    Anthropometrics (Special Considerations)    Height Used for Calculations 1.803 m (5' 11\")    Weight Used for Calculations 74.8 kg (165 lb)    RD Calculated BMI (kg/m2) 23    Usual Body Weight (UBW)    Weight Loss 9.072 kg (20 lb)    % Weight Loss  11 %    Weight Loss Time Frame 1 year            Labs/Tests/Procedures/Meds       06/18/17 2032    Labs/Tests/Procedures/Meds    Labs/Tests Review Reviewed    Medication Review Reviewed, pertinent;Steroid        Results from last 7 days  Lab Units 06/18/17  0021   SODIUM mmol/L 145   POTASSIUM mmol/L 3.7   CHLORIDE mmol/L 106   TOTAL CO2 mmol/L 30.0   BUN mg/dL 25*   CREATININE mg/dL 0.80   CALCIUM mg/dL 9.4   BILIRUBIN mg/dL 0.3   ALK PHOS U/L 89   ALT (SGPT) U/L 18   AST (SGOT) U/L 22   GLUCOSE mg/dL 129*           Physical Findings       06/18/17 2032    Physical Findings/Assessment    Additional Documentation Physical Appearance (Group)   pt reports estimated 20lb weight loss over past year 2nd poor appetite, has been eating ~70% of meals at home, lactose intolerant    Physical Appearance    Skin other (see " comments)   BLE venous stasis ulcers              Nutrition Prescription Ordered       06/18/17 2034    Nutrition Prescription PO    Current PO Diet Regular    Common Modifiers Cardiac            Evaluation of Received Nutrient/Fluid Intake       06/18/17 2034    PO Evaluation    Number of Days PO Intake Evaluated Insufficient Data              Malnutrition Severity Assessment       06/18/17 2034    Malnutrition Severity Assessment    Malnutrition Type Chronic Illness Malnutrition    Weight Status (Chronic)    Weight Loss Mild (>10% / 1 yr)    Energy Intake Status (Chronic)    Energy Intake Mod (<75% / > or equal to 1 mo)    Criteria Met (Must meet criteria for severity in at least 2 of these categories: M Wasting, Fat Loss, Fluid, Secondary Signs, Wt. Status, Intake)    Patient meets criteria for  Mild malnutrition          Problem/Interventions:        Problem 1       06/18/17 2029    Nutrition Diagnoses Problem 1    Problem 1 Inadequate Nutrient Intake    Etiology (related to) --   per clinical status    Signs/Symptoms (evidenced by) Unintended Weight Change    Unintended Weight Change Loss    Number of Pounds Lost 20lb    Weight loss time period 1 year                    Intervention Goal       06/18/17 2035    Intervention Goal    General Nutrition support treatment    PO Establish PO            Nutrition Intervention       06/18/17 2035    Nutrition Intervention    RD/Tech Action Interview for preference;Menu provided;Menu adjusted;Supplement provided            Nutrition Prescription       06/18/17 2035    Nutrition Prescription PO    PO Prescription Begin/change diet;Begin/change supplement    Supplement PRO liquid    Supplement Frequency 3 times a day    Common Modifiers Cardiac;Lactose Restricted            Education/Evaluation       06/18/17 2036    Monitor/Evaluation    Monitor Per protocol;PO intake;Pertinent labs;Weight;Skin status;Symptoms          Electronically signed by:  Amaya Whitfield  RD  06/18/17 8:36 PM

## 2017-06-19 NOTE — PLAN OF CARE
Problem: Patient Care Overview (Adult)  Goal: Plan of Care Review  Outcome: Outcome(s) achieved Date Met:  06/19/17 06/19/17 1447   Coping/Psychosocial Response Interventions   Plan Of Care Reviewed With patient;spouse   Outcome Evaluation   Outcome Summary/Follow up Plan pt. being discharged home       Goal: Discharge Needs Assessment  Outcome: Outcome(s) achieved Date Met:  06/19/17    Problem: Arrhythmia/Dysrhythmia (Symptomatic) (Adult)  Goal: Signs and Symptoms of Listed Potential Problems Will be Absent or Manageable (Arrhythmia/Dysrhythmia)  Outcome: Outcome(s) achieved Date Met:  06/19/17

## 2017-06-19 NOTE — PLAN OF CARE
Problem: Patient Care Overview (Adult)  Goal: Plan of Care Review  Outcome: Ongoing (interventions implemented as appropriate)    06/19/17 1138   Coping/Psychosocial Response Interventions   Plan Of Care Reviewed With patient   Outcome Evaluation   Outcome Summary/Follow up Plan Pt presents with decreased functional mobility from baseline, recommend home with HHPT at d/c         Problem: Inpatient Physical Therapy  Goal: Bed Mobility Goal LTG- PT  Outcome: Ongoing (interventions implemented as appropriate)    06/19/17 1138   Bed Mobility PT LTG   Bed Mobility PT LTG, Date Established 06/19/17   Bed Mobility PT LTG, Time to Achieve 2 wks   Bed Mobility PT LTG, Activity Type all bed mobility   Bed Mobility PT LTG, [place] Level conditional independence       Goal: Transfer Training Goal 1 LTG- PT  Outcome: Ongoing (interventions implemented as appropriate)    06/19/17 1138   Transfer Training PT LTG   Transfer Training PT LTG, Date Established 06/19/17   Transfer Training PT LTG, Time to Achieve 2 wks   Transfer Training PT LTG, Activity Type all transfers   Transfer Training PT LTG, [place] Level conditional independence   Transfer Training PT LTG, Assist Device cane, straight;crutches, forearm       Goal: Gait Training Goal LTG- PT  Outcome: Ongoing (interventions implemented as appropriate)    06/19/17 1138   Gait Training PT LTG   Gait Training Goal PT LTG, [place] Level conditional independence   Gait Training Goal PT LTG, Assist Device cane, straight;crutches, forearm   Gait Training Goal PT LTG, Distance to A       06/19/17 1138   Gait Training PT LTG   Gait Training Goal PT LTG, Time to Achieve 2 wks   Gait Training Goal PT LTG, [place] Level conditional independence   Gait Training Goal PT LTG, Assist Device cane, straight;crutches, forearm   Gait Training Goal PT LTG, Distance to Achieve 25   chieve 25

## 2017-06-19 NOTE — PLAN OF CARE
Problem: Patient Care Overview (Adult)  Goal: Plan of Care Review  Outcome: Ongoing (interventions implemented as appropriate)    06/19/17 1113   Coping/Psychosocial Response Interventions   Plan Of Care Reviewed With patient   Outcome Evaluation   Outcome Summary/Follow up Plan OT initial eval completed. Pt presents with decreased strength and ROM and would benefit from continued skilled OT services to assist in returning pt to his PLOF. Recommend home with HH at d/c.         Problem: Inpatient Occupational Therapy  Goal: Bed Mobility Goal LTG- OT  Outcome: Ongoing (interventions implemented as appropriate)    06/19/17 1113   Bed Mobility OT LTG   Bed Mobility OT LTG, Date Established 06/19/17   Bed Mobility OT LTG, Time to Achieve 1 wk   Bed Mobility OT LTG, Activity Type supine to sit/sit to supine   Bed Mobility OT LTG, [place] Level conditional independence   Bed Mobility OT LTG, Assist Device bed rails       Goal: Transfer Training Goal 1 LTG- OT  Outcome: Ongoing (interventions implemented as appropriate)    06/19/17 1113   Transfer Training OT LTG   Transfer Training OT LTG, Date Established 06/19/17   Transfer Training OT LTG, Time to Achieve 1 wk   Transfer Training OT LTG, Activity Type sit to stand/stand to sit   Transfer Training OT LTG, [place] Level conditional independence   Transfer Training OT LTG, Assist Device cane, straight;other (see comments)   Transfer Training OT LTG, Additional Goal loftstrand crutch       Goal: Strength Goal LTG- OT  Outcome: Ongoing (interventions implemented as appropriate)    06/19/17 1113   Strength OT LTG   Strength Goal OT LTG, Date Established 06/19/17   Strength Goal OT LTG, Time to Achieve 1 wk   Strength Goal OT LTG, Functional Goal Pt to complete AROM 10 reps to support ADL independence.       Goal: Range of Motion Goal LTG- OT  Outcome: Ongoing (interventions implemented as appropriate)    06/19/17 1113   Range of Motion OT LTG   Range of Motion Goal  OT LTG, AROM Fxnal Goal Pt to maintain or increase B UE ROM through all digits by 5degrees to assist with ADL tasks.

## 2017-06-19 NOTE — PROGRESS NOTES
Continued Stay Note   Louisa     Patient Name: Michoacano Rojas  MRN: [id number]  Today's Date: 6/19/2017    Admit Date: 6/17/2017          Discharge Plan       06/19/17 1322    Case Management/Social Work Plan    Plan Home with [facility]    Patient/Family In Agreement With Plan yes    Additional Comments Pt is current with [facility].  XIMENA called a referral to [facility] to notify them that the MD has added PT as pt is currently only being seen for wound care (SN).  XIMENA gave this new referral to Mony who stated they would get pt's information from [facility] and call SW if there were any questions.  XIMENA also gave pt a free 30 day Eliquis card as this will be a new medication for him.  XIMENA spoke to pt's son, Yosvany, who stated he would be at the hospital to pick his dad up whenever his d/c is complete.  [facility], [phone]              Discharge Codes     None        Expected Discharge Date and Time     Expected Discharge Date Expected Discharge Time    Jun 19, 2017             CHINA Shaw

## 2017-06-19 NOTE — PLAN OF CARE
Problem: Patient Care Overview (Adult)  Goal: Plan of Care Review  Outcome: Ongoing (interventions implemented as appropriate)    06/19/17 0511   Coping/Psychosocial Response Interventions   Plan Of Care Reviewed With patient   Patient Care Overview   Progress no change   Outcome Evaluation   Outcome Summary/Follow up Plan Pt. denied having CP or SOB; did complain of some congestion; elected not to get up and walk until the morning; sinus bradycardic on the monitor; VSS.

## 2017-06-19 NOTE — PROGRESS NOTES
Discharge Planning Assessment  [facility]     Patient Name: Michoacano Rojas  MRN: [id number]  Today's Date: 6/19/2017    Admit Date: 6/17/2017          Discharge Needs Assessment       06/19/17 0938    Living Environment    Lives With spouse    Living Arrangements house    Home Accessibility no concerns    Stair Railings at Home none    Type of Financial/Environmental Concern none    Transportation Available car;family or friend will provide    Living Environment    Provides Primary Care For no one    Quality Of Family Relationships supportive    Able to Return to Prior Living Arrangements yes    Discharge Needs Assessment    Concerns To Be Addressed other (see comments)   Pt stated if PT/OT recommends rehab he would definitely consider this.    Readmission Within The Last 30 Days no previous admission in last 30 days    Anticipated Changes Related to Illness none    Equipment Currently Used at Home cane, straight;bath bench;crutches;wheelchair    Equipment Needed After Discharge none            Discharge Plan       06/19/17 0965    Case Management/Social Work Plan    Plan TBD    Patient/Family In Agreement With Plan yes    Additional Comments SW spoke to pt at bedside.  Pt stated he lives in a home with his wife, Chantel.  Pt stated his wife provides the majority of his care.  Pt stated he has a wheelchair, cane, crutches, bedside commode and a bath bench.  Pt stated his bed and bath are on the same level and he never has to take any steps to get into his home or around his home.  Pt stated the only thing he can do independently is feed himself.  Pt stated he has four children and 2 of them live in [place] and help as much as they can but the burden of responsibility for caring for him is put on his wife.  Pt was very tearful when SW was in the room and stated he wishes he could get his wife some more help.  Pt stated he thinks he has HH but cannot remember the agency and told SW she can call his wife to find  out more information.  Pt stated he would definitely be interested in rehab if this was something that was recommended.  SW will await recommendations from PT/OT.  Currently, pt is observation status and would only be eligible to go to acute rehab at [facility] if he was eligible.  SW will continue to follow.  [facility], [phone]        Discharge Placement     No information found                Demographic Summary       06/19/17 0937    Referral Information    Admission Type observation    Arrived From home or self-care    Referral Source admission list    Reason For Consult discharge planning    Contact Information    Permission Granted to Share Information With     Behavorial Health Release Obtained no    Primary Care Physician Information    Name Dr. Michael Chavez            Functional Status       06/19/17 0938    Functional Status Current    Ambulation 3-->assistive equipment and person    Transferring 3-->assistive equipment and person    Toileting 3-->assistive equipment and person    Bathing 3-->assistive equipment and person    Dressing 3-->assistive equipment and person    Eating 0-->independent    Communication 0-->understands/communicates without difficulty    Change in Functional Status Since Onset of Current Illness/Injury no    Functional Status Prior    Ambulation 3-->assistive equipment and person    Transferring 3-->assistive equipment and person    Toileting 3-->assistive equipment and person    Bathing 3-->assistive equipment and person    Dressing 3-->assistive equipment and person    Eating 0-->independent    Communication 0-->understands/communicates without difficulty    Activity Tolerance    Usual Activity Tolerance fair    Current Activity Tolerance fair    Cognitive/Perceptual/Developmental    Current Mental Status/Cognitive Functioning no deficits noted    Recent Changes in Mental Status/Cognitive Functioning no changes    Developmental Stage (Eriksson's Stages of Development) Stage 8  (65 years-death/Late Adulthood) Integrity vs. Despair            Psychosocial     None            Abuse/Neglect     None            Legal     None            Substance Abuse     None            Patient Forms     None          Neha Lovelace MSW

## 2017-06-19 NOTE — PROGRESS NOTES
Michoacano Rojas  [id number]  1940   LOS: 1 day   Patient Care Team:  Michael Chavez MD as PCP - General  Michael Chavez MD as PCP - Family Medicine    Chief Complaint:  PAF    Subjective      Patient denies chest pain, increased SOB, edema, palpitations. He will have sputum production in the mornings. A little nauseous at times but overall feels fine.    Objective     Vital Sign Min/Max for last 24 hours  Temp  Min: 97.1 °F (36.2 °C)  Max: 98.3 °F (36.8 °C)   BP  Min: 102/69  Max: 129/75   Pulse  Min: 63  Max: 98   Resp  Min: 14  Max: 16   SpO2  Min: 79 %  Max: 97 %   Flow (L/min)  Min: 2  Max: 2        Last 2 weights    06/17/17  2316 06/18/17  0540   Weight: 165 lb (74.8 kg) 165 lb 1.6 oz (74.9 kg)         Intake/Output Summary (Last 24 hours) at 06/19/17 0729  Last data filed at 06/19/17 0225   Gross per 24 hour   Intake              720 ml   Output              200 ml   Net              520 ml       Physical Exam:     General Appearance:    Alert, cooperative, in no acute distress   Lungs:     Diffuse rhoonchi to auscultation,respirations regular, even and unlabored    Heart:    Irregular and normal rate , normal S1 and S2, no            murmur, no gallop, no rub, no click   Abdomen:  Extremities:   Soft, non-tender        1+ edema             Pulses:   Pulses palpable and equal bilaterally      Results Review:     Results from last 7 days  Lab Units 06/18/17  0021   SODIUM mmol/L 145   POTASSIUM mmol/L 3.7   CHLORIDE mmol/L 106   TOTAL CO2 mmol/L 30.0   BUN mg/dL 25*   CREATININE mg/dL 0.80   GLUCOSE mg/dL 129*   CALCIUM mg/dL 9.4       Results from last 7 days  Lab Units 06/19/17  0628 06/18/17  0021   WBC 10*3/mm3 11.56* 14.97*   HEMOGLOBIN g/dL 11.6* 11.2*   HEMATOCRIT % 39.7 38.3*   PLATELETS 10*3/mm3 166 200         ·   EKG 6/19/17;Atrial fibrillation with a competing junctional pacemaker with premature  ventricular or aberrantly conducted complexes  Left axis deviation  Inferior infarct  (cited on or before 06-MAY-2016)  Cannot rule out Anterior infarct , age undetermined  Abnormal ECG  When compared with ECG of 18-JUN-2017 16:50, (Unconfirmed)  Atrial fibrillation has replaced Sinus rhythm  Inverted T waves have replaced nonspecific T wave abnormality in Inferior  Leads    · Chest x-ray 6/18/17;No acute abnormality. COPD    Echocardiogram 6/18/17:    · Left ventricular systolic function is normal. Estimated EF = 58%.  · Mild mitral valve regurgitation is present.  · Mild tricuspid valve regurgitation is present.  · Calculated right ventricular systolic pressure from tricuspid regurgitation is 29 mmHg.  · There is no evidence of pericardial effusion.  · No evidence of pulmonary hypertension is present.  · The aortic valve exhibits sclerosis.  · Normal right ventricular cavity size, wall thickness, systolic function and septal motion noted.           Medication Review: Reviewed    Assessment/Plan      Patient with paroxysmal atrial fibrillation and was started on Eliquis today.  Acceptable echocardiogram yesterday.  Would defer MPS/LHC at this time.  [place] [facility] [place] for his stasis ulcer. Continue current cardiac medications; current electrocardiogram demonstrates intermittent sinus beats with PACs including occasional atrial bigeminy with aberrancy.  Stable anemia with less notable leukocytosis on CBC today with no additional laboratory studies to review.  Would defer formal antiarrhythmic therapy such as sotalol at this time.    Principal Problem:    Atrial fibrillation with RVR  Active Problems:    Rheumatoid arthritis    Essential hypertension    Vertigo    Chronic cutaneous venous stasis ulcer    Large joint arthralgia of multiple sites    Scribed for Petar Friedman MD by REEMA Lund. 6/19/2017  7:30 AM     IPetar MD, [facility], personally performed the services described in this documentation as scribed by the above named individual in my  presence, and it is both accurate and complete.       06/19/17  7:29 AM

## 2017-06-19 NOTE — PLAN OF CARE
Problem: Patient Care Overview (Adult)  Goal: Plan of Care Review  Outcome: Ongoing (interventions implemented as appropriate)    06/19/17 1443   Coping/Psychosocial Response Interventions   Plan Of Care Reviewed With patient;spouse   Outcome Evaluation   Outcome Summary/Follow up Plan WOCN consulted for BLE chronic ulcerations. PT goes to [facility] outpatient wound care. He is to be discharged today and has a follow up on Thursday for outpatient care. Placed new dressings. Used Hydrofera blue for ulceration care. Dressing will be good till outpatient follow-up at [facility]. Please contact WOCN if needs arise prior to discharge. Patient to be discharged today. Thanks

## 2017-06-19 NOTE — THERAPY EVALUATION
Acute Care - Occupational Therapy Initial Evaluation  [facility]     Patient Name: Michoacano Rojas  : 1940  MRN: [id number]  Today's Date: 2017  Onset of Illness/Injury or Date of Surgery Date: 17  Date of Referral to OT: 17  Referring Physician: Dr Bangura    Admit Date: 2017       ICD-10-CM ICD-9-CM   1. Atrial fibrillation with RVR I48.91 427.31   2. Leukocytosis, unspecified type D72.829 288.60   3. Impaired mobility and ADLs Z74.09 799.89   4. Impaired functional mobility, balance, gait, and endurance Z74.09 V49.89     Patient Active Problem List   Diagnosis   • Hematuria   • Prostate hypertrophy   • Rheumatoid arthritis   • Essential hypertension   • Rheumatoid arthritis   • Chronic cutaneous venous stasis ulcer   • Very poor mobility   • Large joint arthralgia of multiple sites   • Arthralgia of hands, bilateral   • Generalized stiffness   • Atrial fibrillation with RVR     Past Medical History:   Diagnosis Date   • Chronic cutaneous venous stasis ulcer    • Hypertension    • RA (rheumatoid arthritis)    • Rheumatoid arthritis    • Vertigo    • Vertigo      Past Surgical History:   Procedure Laterality Date   • JOINT MANIPULATION      left hip repair          OT ASSESSMENT FLOWSHEET (last 72 hours)      OT Evaluation       17 1031 17 1029 17 0938 17 1616 17 0551    Rehab Evaluation    Document Type evaluation  -KM evaluation  -JR       Subjective Information no complaints  -KM no complaints;agree to therapy  -JR       Evaluation Not Performed    patient/family declined evaluation   had just gotten back in bed and multp. visitors presents.  Request to defer until tomorrow  -LF     Patient Effort, Rehab Treatment excellent  -KM excellent  -JR       Symptoms Noted During/After Treatment none  -KM none  -JR       General Information    Patient Profile Review yes  -KM yes  -JR       Onset of Illness/Injury or Date of Surgery Date 17  -KM 17   -JR       Referring Physician Dr Bangura  -KM Dr. Bangura  -JR       Pertinent History Of Current Problem Pt admitted with a fib and RVR. H/O long standing R.A., gout and joint deformities/fusion  -KM Pt admitted with new onset a-fib with RVR. Pt with longstanding h/o RA and gout with multiple joint deformities.  -JR       Precautions/Limitations fall precautions  -KM fall precautions  -JR       Prior Level of Function max assist:;ADL's;independent:;gait;transfer;bed mobility  -KM max assist:;ADL's;independent:;gait;transfer;bed mobility  -JR       Equipment Currently Used at Home cane, straight;crutches, forearm;hospital bed   lift chair  -KM cane, straight;commode;hospital bed   loftstrand crutches  -JR cane, straight;bath bench;crutches;wheelchair  -MB      Plans/Goals Discussed With patient;spouse/S.O.;agreed upon  -KM patient and family;agreed upon  -JR       Risks Reviewed patient:;spouse/S.O.:;increased discomfort  -KM patient and family:;increased discomfort  -JR       Benefits Reviewed patient:;spouse/S.O.:;improve function  -KM patient and family:;improve function;increase independence  -JR       Barriers to Rehab medically complex  -KM medically complex;previous functional deficit  -JR       Living Environment    Lives With spouse  -KM spouse  -JR spouse  -MB  spouse  -MOLLY    Living Arrangements house  -KM house  -JR house  -MB  house  -MOLLY    Home Accessibility ramps present at home  -KM ramps present at home  -JR no concerns  -MB  no concerns  -MOLLY    Stair Railings at Home   none  -MB  none  -MOLLY    Type of Financial/Environmental Concern   none  -MB  none  -MOLLY    Transportation Available   car;family or friend will provide  -MB  car;family or friend will provide  -MOLLY    Clinical Impression    Date of Referral to OT  06/18/17  -JR       OT Diagnosis  Decreased independence with ADL's and mobility  -JR       Patient/Family Goals Statement  Return home  -JR       Criteria for Skilled Therapeutic Interventions  Met  yes;treatment indicated  -       Rehab Potential  fair, will monitor progress closely  -JR       Therapy Frequency  daily  -JR       Anticipated Equipment Needs At Discharge  --   tba further  -JR       Anticipated Discharge Disposition  home with assist;home with home health  -       Functional Level Prior    Ambulation   3-->assistive equipment and person  -MB      Transferring   3-->assistive equipment and person  -MB      Toileting   3-->assistive equipment and person  -MB      Bathing   3-->assistive equipment and person  -MB      Dressing   3-->assistive equipment and person  -MB      Eating   0-->independent  -MB      Communication   0-->understands/communicates without difficulty  -MB      Pain Assessment    Pain Assessment No/denies pain  -KM 0-10  -       Pain Score  0  -JR       Post Pain Score  0  -       Cognitive Assessment/Intervention    Current Cognitive/Communication Assessment functional  -KM functional  -       Orientation Status oriented x 4  -KM oriented x 4  -       Follows Commands/Answers Questions 100% of the time;able to follow single-step instructions  -% of the time;able to follow single-step instructions  -JR       Personal Safety WNL/WFL  -KM WNL/WFL  -JR       ROM (Range of Motion)    General ROM lower extremity range of motion deficits identified   R knee fused in extension, L knee 20-40 flexion  -KM upper extremity range of motion deficits identified  -       General ROM Detail  B shoulder flexion limited to approx 90 degrees, pt limited to approx 20 degrees R elbow flexion, Pt with B digit PIP flexion contractures.  -       MMT (Manual Muscle Testing)    General MMT Assessment lower extremity strength deficits identified   functionally 3/5 , MMT deferred  -KM upper extremity strength deficits identified  -       General MMT Assessment Detail  Did not assess strength due to multiple joint deformities  -       Bed Mobility, Assessment/Treatment    Bed  Mobility, Assistive Device head of bed elevated;bed rails  -KM head of bed elevated;bed rails  -JR       Bed Mob, Supine to Sit, [place] supervision required   with effort  -KM supervision required  -       Bed Mobility, Comment --   Requires increased time and effort to complete  -KM Pt able to complete bed mobility with increased time and effort.  -       Transfer Assessment/Treatment    Transfers, Sit-Stand [place] set up required;stand by assist  -KM stand by assist  -       Transfers, Stand-Sit [place] moderate assist (50% patient effort);2 person assist required   due to low surface, has lift chair at home  -KM moderate assist (50% patient effort);2 person assist required   due to low surface, pt uses lift chair at home  -       Transfers, Sit-Stand-Sit, Assist Device forearm crutches;straight cane;elevated surface  -KM straight cane   lofstrand crutch  -       Transfer, Safety Issues balance decreased during turns;sequencing ability decreased;weight-shifting ability decreased  -KM balance decreased during turns;step length decreased;weight-shifting ability decreased  -       Transfer, Impairments strength decreased;impaired balance  -KM strength decreased;impaired balance;ROM decreased;decreased flexibility  -JR       Transfer, Comment  Pt able to complete sit to stand with bed elevated and increased time and effort.  -       Functional Mobility    Functional Mobility- Ind. Level  contact guard assist  -JR       Functional Mobility- Device  straight cane   loftstrand crutch  -       Functional Mobility-Distance (Feet)  --   in room  -JR       Functional Mobility- Safety Issues  balance decreased during turns;step length decreased;weight-shifting ability decreased  -       Functional Mobility- Comment  Pt required increased time to complete.  -JR       Lower Body Dressing Assessment/Training    LB Dressing Assess/Train, Clothing Type  donning:;shoes  -JR       LB Dressing  Assess/Train, Position  sitting  -JR       LB Dressing Assess/Train, [place]  dependent (less than 25% patient effort)  -JR       LB Dressing Assess/Train, Impairments  ROM decreased;decreased flexibility;strength decreased  -JR       Motor Skills/Interventions    Additional Documentation  Balance Skills Training (Group)  -JR       Balance Skills Training    Sitting-Level of Assistance Contact guard  -KM Contact guard  -JR       Sitting-Balance Support Feet supported  -KM Feet supported  -JR       Standing-Level of Assistance Contact guard  -KM Contact guard  -JR       Static Standing Balance Support assistive device  -KM assistive device  -JR       Gait Balance-Level of Assistance Contact guard  -KM Contact guard  -JR       Gait Balance Support assistive device  -KM assistive device  -JR       General Therapy Interventions    Planned Therapy Interventions  balance training;bed mobility training;strengthening;transfer training;ROM (Range of Motion)  -JR       Positioning and Restraints    Pre-Treatment Position in bed  -KM in bed  -JR       Post Treatment Position chair  -KM chair  -JR       In Chair reclined;call light within reach;encouraged to call for assist;with family/caregiver  -KM notified nsg;reclined;call light within reach;encouraged to call for assist;with family/caregiver;LUE elevated;RLE elevated;LLE elevated  -         06/18/17 0549                General Information    Equipment Currently Used at Home crutches;cane, straight  -MOLLY        Functional Level Prior    Ambulation 3-->assistive equipment and person  -MOLLY        Transferring 3-->assistive equipment and person  -MOLLY        Toileting 3-->assistive equipment and person  -MOLLY        Bathing 3-->assistive equipment and person  -MOLLY        Dressing 3-->assistive equipment and person  -MOLLY        Eating 0-->independent  -MOLLY        Communication 0-->understands/communicates without difficulty  -MOLLY        Swallowing 0-->swallows foods/liquids  without difficulty  -MOLLY          User Key  (r) = Recorded By, (t) = Taken By, (c) = Cosigned By    Initials Name Effective Dates    LF Rachele Prieto, PT 06/19/15 -     DARYL Estevez, PT 06/19/15 -     JR Kiley Gaines, OT 06/22/15 -     MOLLY Joseph, RN 06/16/16 -     DUSTY Lovelace, MSW 03/14/16 -            Occupational Therapy Education     Title: PT OT SLP Therapies (Active)     Topic: Occupational Therapy (Active)     Point: ADL training (Active)    Description: Instruct learner(s) on proper safety adaptation and remediation techniques during self care or transfers.   Instruct in proper use of assistive devices.    Learning Progress Summary    Learner Readiness Method Response Comment Documented by Status   Patient Acceptance E NR Educated pt and family regarding role of therapy and benefits of activity  06/19/17 1111 Active                      User Key     Initials Effective Dates Name Provider Type Discipline     06/22/15 -  Kiley KIM Eder, OT Occupational Therapist OT                  OT Recommendation and Plan  Anticipated Equipment Needs At Discharge:  (tba further)  Anticipated Discharge Disposition: home with assist, home with home health  Planned Therapy Interventions: balance training, bed mobility training, strengthening, transfer training, ROM (Range of Motion)  Therapy Frequency: daily  Plan of Care Review  Plan Of Care Reviewed With: patient  Outcome Summary/Follow up Plan: OT initial eval completed. Pt presents with decreased strength and ROM and would benefit from continued skilled OT services to assist in returning pt to his PLOF. Recommend home with HH at d/c.          OT Goals       06/19/17 1113          Bed Mobility OT LTG    Bed Mobility OT LTG, Date Established 06/19/17  -JR      Bed Mobility OT LTG, Time to Achieve 1 wk  -JR      Bed Mobility OT LTG, Activity Type supine to sit/sit to supine  -      Bed Mobility OT LTG, [place] Level conditional  independence  -JR      Bed Mobility OT LTG, Assist Device bed rails  -JR      Transfer Training OT LTG    Transfer Training OT LTG, Date Established 06/19/17  -JR      Transfer Training OT LTG, Time to Achieve 1 wk  -JR      Transfer Training OT LTG, Activity Type sit to stand/stand to sit  -JR      Transfer Training OT LTG, [place] Level conditional independence  -JR      Transfer Training OT LTG, Assist Device cane, straight;other (see comments)  -JR      Transfer Training OT LTG, Additional Goal loftstrand crutch  -JR      Strength OT LTG    Strength Goal OT LTG, Date Established 06/19/17  -JR      Strength Goal OT LTG, Time to Achieve 1 wk  -JR      Strength Goal OT LTG, Functional Goal Pt to complete AROM 10 reps to support ADL independence.  -JR      Range of Motion OT LTG    Range of Motion Goal OT LTG, AROM Fxnal Goal Pt to maintain or increase B UE ROM through all digits by 5degrees to assist with ADL tasks.  -JR        User Key  (r) = Recorded By, (t) = Taken By, (c) = Cosigned By    Initials Name Provider Type    JR Kiley Gaines, OT Occupational Therapist                Outcome Measures       06/19/17 1031 06/19/17 1029       How much help from another person do you currently need...    Turning from your back to your side while in flat bed without using bedrails? 4  -KM      Moving from lying on back to sitting on the side of a flat bed without bedrails? 4  -KM      Moving to and from a bed to a chair (including a wheelchair)? 3  -KM      Standing up from a chair using your arms (e.g., wheelchair, bedside chair)? 2  -KM      Climbing 3-5 steps with a railing? 1  -KM      To walk in hospital room? 3  -KM      AM-PAC 6 Clicks Score 17  -KM      How much help from another is currently needed...    Putting on and taking off regular lower body clothing?  1  -JR     Bathing (including washing, rinsing, and drying)  1  -JR     Toileting (which includes using toilet bed pan or urinal)  1  -JR     Putting  on and taking off regular upper body clothing  2  -JR     Taking care of personal grooming (such as brushing teeth)  2  -JR     Eating meals  3  -JR     Score  10  -JR     Functional Assessment    Outcome Measure Options AM-PAC 6 Clicks Basic Mobility (PT)  -KM AM-PAC 6 Clicks Daily Activity (OT)  -JR       User Key  (r) = Recorded By, (t) = Taken By, (c) = Cosigned By    Initials Name Provider Type     Serena Estevez, PT Physical Therapist    JR Kiley Gaines, OT Occupational Therapist          Time Calculation:   OT Start Time: 1029    Therapy Charges for Today     Code Description Service Date Service Provider Modifiers Qty    16161624372  OT EVAL LOW COMPLEXITY 4 6/19/2017 Kiley Gaines, OT GO 1    [id number]  OT SELFCARE CURRENT 6/19/2017 Kiley Gaines, OT GO, CL 1    [id number]  OT SELFCARE PROJECTED 6/19/2017 Kiley Gaines, OT GO, CK 1          OT G-codes  OT Functional Scales Options: AM-PAC 6 Clicks Daily Activity (OT)  Functional Limitation: Self care  Self Care Current Status (): At least 60 percent but less than 80 percent impaired, limited or restricted  Self Care Goal Status (): At least 40 percent but less than 60 percent impaired, limited or restricted    Kiley Gaines OT  6/19/2017

## 2017-06-19 NOTE — PROGRESS NOTES
Malnutrition Severity Assessment    Patient Name:  Michoacano Rojas  YOB: 1940  MRN: [id number]  Admit Date:  6/17/2017    Patient meets criteria for : Mild malnutrition       pt meets criteria for mild to moderate chronic  malnutrition with weight loss of 20lb (11% loss) over 1 year, po intake 70%      Malnutrition Type: Chronic Illness Malnutrition     Malnutrition Type (last 8 hours)      Malnutrition Severity Assessment       06/18/17 2034    Malnutrition Severity Assessment    Malnutrition Type Chronic Illness Malnutrition          Weight Status         Most Recent Value    Weight Loss  Mild (>10% / 1 yr)      Energy Intake Status         Most Recent Value    Energy Intake  Mod (<75% / > or equal to 1 mo)              Electronically signed by:  Amaya Whitfield RD  06/18/17 8:39 PM

## 2017-06-21 NOTE — THERAPY DISCHARGE NOTE
Acute Care - Physical Therapy Discharge Summary  [facility]       Patient Name: Michoacano Rojas  : 1940  MRN: [id number]    Today's Date: 2017  Onset of Illness/Injury or Date of Surgery Date: 17    Date of Referral to PT: 17  Referring Physician: Dr Bangura      Admit Date: 2017      PT Recommendation and Plan    Visit Dx:    ICD-10-CM ICD-9-CM   1. Atrial fibrillation with RVR I48.91 427.31   2. Leukocytosis, unspecified type D72.829 288.60   3. Impaired mobility and ADLs Z74.09 799.89   4. Impaired functional mobility, balance, gait, and endurance Z74.09 V49.89             Outcome Measures       17 1031 17 1029       How much help from another person do you currently need...    Turning from your back to your side while in flat bed without using bedrails? 4  -KM      Moving from lying on back to sitting on the side of a flat bed without bedrails? 4  -KM      Moving to and from a bed to a chair (including a wheelchair)? 3  -KM      Standing up from a chair using your arms (e.g., wheelchair, bedside chair)? 2  -KM      Climbing 3-5 steps with a railing? 1  -KM      To walk in hospital room? 3  -KM      AM-PAC 6 Clicks Score 17  -KM      How much help from another is currently needed...    Putting on and taking off regular lower body clothing?  1  -JR     Bathing (including washing, rinsing, and drying)  1  -JR     Toileting (which includes using toilet bed pan or urinal)  1  -JR     Putting on and taking off regular upper body clothing  2  -JR     Taking care of personal grooming (such as brushing teeth)  2  -JR     Eating meals  3  -JR     Score  10  -JR     Functional Assessment    Outcome Measure Options AM-PAC 6 Clicks Basic Mobility (PT)  -KM AM-PAC 6 Clicks Daily Activity (OT)  -JR       User Key  (r) = Recorded By, (t) = Taken By, (c) = Cosigned By    Initials Name Provider Type    DARYL Estevez, PT Physical Therapist    JR Kiley Gaines, OT Occupational  Therapist                      IP PT Goals       06/19/17 1138          Bed Mobility PT LTG    Bed Mobility PT LTG, Date Established 06/19/17  -KM      Bed Mobility PT LTG, Time to Achieve 2 wks  -KM      Bed Mobility PT LTG, Activity Type all bed mobility  -KM      Bed Mobility PT LTG, [place] Level conditional independence  -KM      Transfer Training PT LTG    Transfer Training PT LTG, Date Established 06/19/17  -KM      Transfer Training PT LTG, Time to Achieve 2 wks  -KM      Transfer Training PT LTG, Activity Type all transfers  -KM      Transfer Training PT LTG, [place] Level conditional independence  -KM      Transfer Training PT LTG, Assist Device cane, straight;crutches, forearm  -KM      Gait Training PT LTG    Gait Training Goal PT LTG, Time to Achieve 2 wks  -KM      Gait Training Goal PT LTG, [place] Level conditional independence  -KM      Gait Training Goal PT LTG, Assist Device cane, straight;crutches, forearm  -KM      Gait Training Goal PT LTG, Distance to Achieve 25  -KM        User Key  (r) = Recorded By, (t) = Taken By, (c) = Cosigned By    Initials Name Provider Type    DARYL Estevez, PT Physical Therapist            Goals Status: Treatment plan discontinued secondary to discharge from acute facility.  PT Discharge Summary  Reason for Discharge: Discharge from facility  Outcomes Achieved: Refer to plan of care for updates on goals achieved  Discharge Destination: Home with home health      Marielle Bird, PT   6/21/2017

## 2017-06-22 NOTE — THERAPY DISCHARGE NOTE
Acute Care - Occupational Therapy Discharge Summary  [facility]     Patient Name: Michoacano Rojas  : 1940  MRN: [id number]    Today's Date: 2017  Onset of Illness/Injury or Date of Surgery Date: 17    Date of Referral to OT: 17  Referring Physician: Dr Bangura      Admit Date: 2017        OT Recommendation and Plan    Visit Dx:    ICD-10-CM ICD-9-CM   1. Atrial fibrillation with RVR I48.91 427.31   2. Leukocytosis, unspecified type D72.829 288.60   3. Impaired mobility and ADLs Z74.09 799.89   4. Impaired functional mobility, balance, gait, and endurance Z74.09 V49.89                     OT Goals       17 1113          Bed Mobility OT LTG    Bed Mobility OT LTG, Date Established 17  -JR      Bed Mobility OT LTG, Time to Achieve 1 wk  -JR      Bed Mobility OT LTG, Activity Type supine to sit/sit to supine  -JR      Bed Mobility OT LTG, [place] Level conditional independence  -JR      Bed Mobility OT LTG, Assist Device bed rails  -JR      Transfer Training OT LTG    Transfer Training OT LTG, Date Established 17  -JR      Transfer Training OT LTG, Time to Achieve 1 wk  -JR      Transfer Training OT LTG, Activity Type sit to stand/stand to sit  -JR      Transfer Training OT LTG, [place] Level conditional independence  -JR      Transfer Training OT LTG, Assist Device cane, straight;other (see comments)  -JR      Transfer Training OT LTG, Additional Goal loftstrand crutch  -JR      Strength OT LTG    Strength Goal OT LTG, Date Established 17  -JR      Strength Goal OT LTG, Time to Achieve 1 wk  -JR      Strength Goal OT LTG, Functional Goal Pt to complete AROM 10 reps to support ADL independence.  -JR      Range of Motion OT LTG    Range of Motion Goal OT LTG, AROM Fxnal Goal Pt to maintain or increase B UE ROM through all digits by 5degrees to assist with ADL tasks.  -JR        User Key  (r) = Recorded By, (t) = Taken By, (c) = Cosigned By    Initials Name  Provider Type    JR Kiley Gaines, OT Occupational Therapist                  OT Discharge Summary  Anticipated Discharge Disposition: home with assist, home with home health  Reason for Discharge: Discharge from facility  Outcomes Achieved: Refer to plan of care for updates on goals achieved  Discharge Destination: Home      Kiley Gaines, OT  6/22/2017

## 2017-09-08 ENCOUNTER — HOSPITAL ENCOUNTER (OUTPATIENT)
Facility: HOSPITAL | Age: 77
Setting detail: OBSERVATION
Discharge: HOME | End: 2017-09-10
Attending: HOSPITALIST | Admitting: HOSPITALIST

## 2017-09-08 ENCOUNTER — APPOINTMENT (OUTPATIENT)
Dept: CT IMAGING | Facility: HOSPITAL | Age: 77
End: 2017-09-08

## 2017-09-08 PROBLEM — R60.1 ANASARCA: Status: ACTIVE | Noted: 2017-09-08

## 2017-09-08 PROBLEM — I48.91 A-FIB (HCC): Status: ACTIVE | Noted: 2017-09-08

## 2017-09-08 LAB
ALBUMIN SERPL-MCNC: 2.9 G/DL (ref 3.2–4.8)
ALBUMIN/GLOB SERPL: 0.9 G/DL (ref 1.5–2.5)
ALP SERPL-CCNC: 65 U/L (ref 25–100)
ALT SERPL W P-5'-P-CCNC: 5 U/L (ref 7–40)
ANION GAP SERPL CALCULATED.3IONS-SCNC: 5 MMOL/L (ref 3–11)
ARTERIAL PATENCY WRIST A: ABNORMAL
AST SERPL-CCNC: 12 U/L (ref 0–33)
ATMOSPHERIC PRESS: ABNORMAL MMHG
BASE EXCESS BLDA CALC-SCNC: 1.1 MMOL/L (ref 0–2)
BASOPHILS # BLD AUTO: 0.01 10*3/MM3 (ref 0–0.2)
BASOPHILS NFR BLD AUTO: 0.1 % (ref 0–1)
BDY SITE: ABNORMAL
BILIRUB SERPL-MCNC: 0.5 MG/DL (ref 0.3–1.2)
BNP SERPL-MCNC: 304 PG/ML (ref 0–100)
BNP SERPL-MCNC: 401 PG/ML (ref 0–100)
BUN BLD-MCNC: 14 MG/DL (ref 9–23)
BUN/CREAT SERPL: 23.3 (ref 7–25)
CALCIUM SPEC-SCNC: 8.6 MG/DL (ref 8.7–10.4)
CHLORIDE SERPL-SCNC: 109 MMOL/L (ref 99–109)
CO2 BLDA-SCNC: 28 MMOL/L (ref 22–33)
CO2 SERPL-SCNC: 27 MMOL/L (ref 20–31)
COHGB MFR BLD: 1.4 % (ref 0–2)
CREAT BLD-MCNC: 0.6 MG/DL (ref 0.6–1.3)
DEPRECATED RDW RBC AUTO: 49.4 FL (ref 37–54)
EOSINOPHIL # BLD AUTO: 0.09 10*3/MM3 (ref 0–0.3)
EOSINOPHIL NFR BLD AUTO: 1.1 % (ref 0–3)
ERYTHROCYTE [DISTWIDTH] IN BLOOD BY AUTOMATED COUNT: 15.3 % (ref 11.3–14.5)
GFR SERPL CREATININE-BSD FRML MDRD: >150 ML/MIN/1.73
GLOBULIN UR ELPH-MCNC: 3.1 GM/DL
GLUCOSE BLD-MCNC: 67 MG/DL (ref 70–100)
HCO3 BLDA-SCNC: 26.6 MMOL/L (ref 20–26)
HCT VFR BLD AUTO: 31.8 % (ref 38.9–50.9)
HCT VFR BLD CALC: 33.6 %
HGB BLD-MCNC: 9.4 G/DL (ref 13.1–17.5)
HGB BLDA-MCNC: 11 G/DL (ref 13.5–17.5)
HOROWITZ INDEX BLD+IHG-RTO: 21 %
HYPOCHROMIA BLD QL: NORMAL
IMM GRANULOCYTES # BLD: 0.04 10*3/MM3 (ref 0–0.03)
IMM GRANULOCYTES NFR BLD: 0.5 % (ref 0–0.6)
LYMPHOCYTES # BLD AUTO: 1.54 10*3/MM3 (ref 0.6–4.8)
LYMPHOCYTES NFR BLD AUTO: 18 % (ref 24–44)
MCH RBC QN AUTO: 26.1 PG (ref 27–31)
MCHC RBC AUTO-ENTMCNC: 29.6 G/DL (ref 32–36)
MCV RBC AUTO: 88.3 FL (ref 80–99)
METHGB BLD QL: 1 % (ref 0–1.5)
MODALITY: ABNORMAL
MONOCYTES # BLD AUTO: 0.66 10*3/MM3 (ref 0–1)
MONOCYTES NFR BLD AUTO: 7.7 % (ref 0–12)
NEUTROPHILS # BLD AUTO: 6.21 10*3/MM3 (ref 1.5–8.3)
NEUTROPHILS NFR BLD AUTO: 72.6 % (ref 41–71)
OXYHGB MFR BLDV: 93.6 % (ref 94–99)
PCO2 BLDA: 44.9 MM HG (ref 35–48)
PH BLDA: 7.38 PH UNITS (ref 7.35–7.45)
PLAT MORPH BLD: NORMAL
PLATELET # BLD AUTO: 154 10*3/MM3 (ref 150–450)
PMV BLD AUTO: 11.5 FL (ref 6–12)
PO2 BLDA: 78.5 MM HG (ref 83–108)
POTASSIUM BLD-SCNC: 4.3 MMOL/L (ref 3.5–5.5)
PROT SERPL-MCNC: 6 G/DL (ref 5.7–8.2)
RBC # BLD AUTO: 3.6 10*6/MM3 (ref 4.2–5.76)
SODIUM BLD-SCNC: 141 MMOL/L (ref 132–146)
TSH SERPL DL<=0.05 MIU/L-ACNC: 1.25 MIU/ML (ref 0.35–5.35)
WBC MORPH BLD: NORMAL
WBC NRBC COR # BLD: 8.55 10*3/MM3 (ref 3.5–10.8)

## 2017-09-08 PROCEDURE — 71275 CT ANGIOGRAPHY CHEST: CPT

## 2017-09-08 PROCEDURE — 70496 CT ANGIOGRAPHY HEAD: CPT

## 2017-09-08 PROCEDURE — 36600 WITHDRAWAL OF ARTERIAL BLOOD: CPT | Performed by: INTERNAL MEDICINE

## 2017-09-08 PROCEDURE — 94640 AIRWAY INHALATION TREATMENT: CPT

## 2017-09-08 PROCEDURE — 94760 N-INVAS EAR/PLS OXIMETRY 1: CPT

## 2017-09-08 PROCEDURE — G0378 HOSPITAL OBSERVATION PER HR: HCPCS

## 2017-09-08 PROCEDURE — 70498 CT ANGIOGRAPHY NECK: CPT

## 2017-09-08 PROCEDURE — 99220 PR INITIAL OBSERVATION CARE/DAY 70 MINUTES: CPT | Performed by: NURSE PRACTITIONER

## 2017-09-08 PROCEDURE — 94799 UNLISTED PULMONARY SVC/PX: CPT

## 2017-09-08 PROCEDURE — 84443 ASSAY THYROID STIM HORMONE: CPT | Performed by: NURSE PRACTITIONER

## 2017-09-08 PROCEDURE — 0 IOPAMIDOL PER 1 ML: Performed by: INTERNAL MEDICINE

## 2017-09-08 PROCEDURE — 80053 COMPREHEN METABOLIC PANEL: CPT | Performed by: NURSE PRACTITIONER

## 2017-09-08 PROCEDURE — 83880 ASSAY OF NATRIURETIC PEPTIDE: CPT | Performed by: PHYSICIAN ASSISTANT

## 2017-09-08 PROCEDURE — 83880 ASSAY OF NATRIURETIC PEPTIDE: CPT | Performed by: NURSE PRACTITIONER

## 2017-09-08 PROCEDURE — 82805 BLOOD GASES W/O2 SATURATION: CPT | Performed by: INTERNAL MEDICINE

## 2017-09-08 PROCEDURE — 85007 BL SMEAR W/DIFF WBC COUNT: CPT | Performed by: NURSE PRACTITIONER

## 2017-09-08 PROCEDURE — 85025 COMPLETE CBC W/AUTO DIFF WBC: CPT | Performed by: NURSE PRACTITIONER

## 2017-09-08 PROCEDURE — 63710000001 PREDNISONE PER 5 MG: Performed by: NURSE PRACTITIONER

## 2017-09-08 RX ORDER — SODIUM CHLORIDE 0.9 % (FLUSH) 0.9 %
1-10 SYRINGE (ML) INJECTION AS NEEDED
Status: DISCONTINUED | OUTPATIENT
Start: 2017-09-08 | End: 2017-09-10 | Stop reason: HOSPADM

## 2017-09-08 RX ORDER — DOCUSATE SODIUM 100 MG/1
100 CAPSULE, LIQUID FILLED ORAL 2 TIMES DAILY
Status: DISCONTINUED | OUTPATIENT
Start: 2017-09-08 | End: 2017-09-10 | Stop reason: HOSPADM

## 2017-09-08 RX ORDER — BISOPROLOL FUMARATE 5 MG/1
2.5 TABLET, FILM COATED ORAL
Status: DISCONTINUED | OUTPATIENT
Start: 2017-09-08 | End: 2017-09-10 | Stop reason: HOSPADM

## 2017-09-08 RX ORDER — HYDROCODONE BITARTRATE AND ACETAMINOPHEN 5; 325 MG/1; MG/1
1 TABLET ORAL EVERY 6 HOURS PRN
Status: DISCONTINUED | OUTPATIENT
Start: 2017-09-08 | End: 2017-09-10 | Stop reason: HOSPADM

## 2017-09-08 RX ORDER — ONDANSETRON 4 MG/1
4 TABLET, FILM COATED ORAL EVERY 6 HOURS PRN
Status: DISCONTINUED | OUTPATIENT
Start: 2017-09-08 | End: 2017-09-10 | Stop reason: HOSPADM

## 2017-09-08 RX ORDER — FAMOTIDINE 20 MG/1
40 TABLET, FILM COATED ORAL DAILY
Status: DISCONTINUED | OUTPATIENT
Start: 2017-09-08 | End: 2017-09-10 | Stop reason: HOSPADM

## 2017-09-08 RX ORDER — ONDANSETRON 2 MG/ML
4 INJECTION INTRAMUSCULAR; INTRAVENOUS EVERY 6 HOURS PRN
Status: DISCONTINUED | OUTPATIENT
Start: 2017-09-08 | End: 2017-09-10 | Stop reason: HOSPADM

## 2017-09-08 RX ORDER — FINASTERIDE 5 MG/1
5 TABLET, FILM COATED ORAL DAILY
Status: DISCONTINUED | OUTPATIENT
Start: 2017-09-08 | End: 2017-09-10 | Stop reason: HOSPADM

## 2017-09-08 RX ORDER — FLUTICASONE PROPIONATE 50 MCG
2 SPRAY, SUSPENSION (ML) NASAL DAILY
Status: DISCONTINUED | OUTPATIENT
Start: 2017-09-08 | End: 2017-09-10 | Stop reason: HOSPADM

## 2017-09-08 RX ORDER — IPRATROPIUM BROMIDE AND ALBUTEROL SULFATE 2.5; .5 MG/3ML; MG/3ML
3 SOLUTION RESPIRATORY (INHALATION) EVERY 4 HOURS PRN
Status: DISCONTINUED | OUTPATIENT
Start: 2017-09-08 | End: 2017-09-10 | Stop reason: HOSPADM

## 2017-09-08 RX ADMIN — IOPAMIDOL 150 ML: 755 INJECTION, SOLUTION INTRAVENOUS at 22:04

## 2017-09-08 RX ADMIN — FAMOTIDINE 40 MG: 20 TABLET ORAL at 18:38

## 2017-09-08 RX ADMIN — APIXABAN 5 MG: 5 TABLET, FILM COATED ORAL at 20:16

## 2017-09-08 RX ADMIN — BISOPROLOL FUMARATE 2.5 MG: 5 TABLET ORAL at 18:37

## 2017-09-08 RX ADMIN — IPRATROPIUM BROMIDE AND ALBUTEROL SULFATE 3 ML: .5; 3 SOLUTION RESPIRATORY (INHALATION) at 23:07

## 2017-09-08 RX ADMIN — FINASTERIDE 5 MG: 5 TABLET, FILM COATED ORAL at 18:38

## 2017-09-08 RX ADMIN — HYDROCODONE BITARTATE AND ACETAMINOPHEN 1 TABLET: 5; 325 TABLET ORAL at 18:38

## 2017-09-08 RX ADMIN — FLUTICASONE PROPIONATE 2 SPRAY: 50 SPRAY, METERED NASAL at 20:16

## 2017-09-08 RX ADMIN — PREDNISONE 15 MG: 5 TABLET ORAL at 18:38

## 2017-09-08 NOTE — H&P
[facility] Medicine Services  HISTORY AND PHYSICAL    Primary Care Physician: Michael Chavez MD    Subjective     Chief Complaint:  Facial and upper extremity swelling     History of Present Illness:     77 y.o.  male with PMH of HTN, RA, and chronic venous stasis was a direct admit to [facility] today from the office of Dr Chavez for anasarca. Patient was recently admitted to [facility] in June for new onset atrial fibrillation and was discharged on NOAC. Patient endorses that he has been experiencing intermittent facial and upper extremity swelling since his last admission. Swelling increased again three days ago and has remained. States he has had difficulty with SOA at night lying down with c/o PND. No new medications, no known allergies to medications, no trauma or injury. No recent illnesses. Has not had any palpitations or CP.       Review of Systems   Constitutional: Positive for activity change. Negative for appetite change, chills, diaphoresis and fatigue.   HENT: Positive for postnasal drip and rhinorrhea. Negative for trouble swallowing and voice change.    Eyes: Negative for photophobia and visual disturbance.   Respiratory: Positive for cough, choking and shortness of breath. Negative for wheezing and stridor.    Cardiovascular: Positive for leg swelling. Negative for chest pain and palpitations.   Gastrointestinal: Negative for abdominal distention, abdominal pain, blood in stool, constipation, diarrhea, nausea and vomiting.   Endocrine: Negative for cold intolerance and heat intolerance.   Genitourinary: Negative for difficulty urinating, dysuria, flank pain and hematuria.   Musculoskeletal: Positive for arthralgias, gait problem and joint swelling. Negative for back pain.   Skin: Positive for color change and wound. Negative for pallor and rash.   Neurological: Negative for dizziness, facial asymmetry, light-headedness, numbness and headaches.    Hematological: Negative for adenopathy. Bruises/bleeds easily.   Psychiatric/Behavioral: Negative for agitation, behavioral problems and confusion.      Otherwise complete ROS performed and negative except as mentioned in the HPI.    Past Medical History:   Diagnosis Date   • Chronic cutaneous venous stasis ulcer    • Hypertension    • RA (rheumatoid arthritis)    • Rheumatoid arthritis    • Vertigo    • Vertigo        Past Surgical History:   Procedure Laterality Date   • JOINT MANIPULATION      left hip repair       Family history reviewed and noncontributory    Social History     Social History   • Marital status:      Spouse name: N/A   • Number of children: N/A   • Years of education: N/A     Occupational History   • Not on file.     Social History Main Topics   • Smoking status: Never Smoker   • Smokeless tobacco: Never Used   • Alcohol use No   • Drug use: No   • Sexual activity: Defer     Other Topics Concern   • Not on file     Social History Narrative       Medications:  Prescriptions Prior to Admission   Medication Sig Dispense Refill Last Dose   • apixaban (ELIQUIS) 5 MG tablet tablet Take 1 tablet by mouth Every 12 (Twelve) Hours. 60 tablet 2 9/8/2017 at Unknown time   • bisoprolol (ZEBeta) 5 MG tablet Take 0.5 tablets by mouth Daily. 30 tablet 2    • cholecalciferol (VITAMIN D3) 1000 UNITS tablet Take 2,000 Units by mouth Daily.      • diclofenac (VOLTAREN) 3 % gel gel Apply  topically 2 (Two) Times a Day As Needed. for 60 days.      • docusate sodium (COLACE) 50 MG capsule Take  by mouth 2 (Two) Times a Day.      • finasteride (PROSCAR) 5 MG tablet Take 5 mg by mouth Daily.      • fluticasone (FLONASE) 50 MCG/ACT nasal spray 2 sprays into each nostril Daily.      • HYDROcodone-acetaminophen (NORCO)  MG per tablet Take 1 tablet by mouth Every 6 (Six) Hours As Needed for Moderate Pain (4-6).      • predniSONE (DELTASONE) 5 MG tablet Take 3 tablets by mouth Daily.     "      Allergies:  Allergies   Allergen Reactions   • Other      Relates all oral abx cause intol nausea/vomiting.         Objective     Physical Exam:  Vital Signs: BP (!) 168/103 (BP Location: Left arm, Patient Position: Lying)  Pulse 85  Temp 97.6 °F (36.4 °C) (Oral)   Resp 18  Ht 67\" (170.2 cm)  Wt 162 lb 9.6 oz (73.8 kg)  SpO2 97%  BMI 25.47 kg/m2  Physical Exam   Constitutional: He is oriented to person, place, and time. He appears well-developed. He is cooperative. He appears ill. No distress.   HENT:   Head: Normocephalic and atraumatic.   Mouth/Throat: Mucous membranes are normal.   Facial and periorbital edema    Eyes: Conjunctivae are normal. Pupils are equal, round, and reactive to light. Right eye exhibits discharge (clear). Left eye exhibits discharge (clear). No scleral icterus.   Neck: Neck supple. No thyromegaly present.   Cardiovascular: Normal rate, regular rhythm and intact distal pulses.  Exam reveals no gallop and no friction rub.    No murmur heard.  Pulmonary/Chest: Effort normal and breath sounds normal. No respiratory distress. He has no wheezes. He has no rales.   Abdominal: Soft. Bowel sounds are normal. He exhibits no distension and no mass. There is no tenderness. There is no rebound and no guarding. No hernia.   Musculoskeletal: He exhibits edema (3+ pitting edema in BLE ), tenderness and deformity (contracted fingers bilateral hands ).   BUE with moderate amount of non pitting edema   Limited ROM in upper and lower extremities due to RA deformity and pain    Lymphadenopathy:     He has no cervical adenopathy.   Neurological: He is alert and oriented to person, place, and time. No cranial nerve deficit.   Skin: Skin is warm and dry. No rash noted. No erythema. No pallor.   Chronic venous stasis changes on BLE with chronic wounds- covered with vasoline guaze, non draining.   Left buttock pressure ulcer    Psychiatric: He has a normal mood and affect. His speech is normal and " behavior is normal.   Vitals reviewed.      Results Reviewed:    Results from last 7 days  Lab Units 09/08/17  1703   WBC 10*3/mm3 8.55   HEMOGLOBIN g/dL 9.4*   PLATELETS 10*3/mm3 154           I have personally reviewed and interpreted available lab data, radiology studies and ECG obtained at time of admission.     Assessment / Plan     Problem List:   Hospital Problem List     Rheumatoid arthritis (Chronic)    Essential hypertension    Chronic cutaneous venous stasis ulcer    A-fib    Anasarca          Assessment:    Plan:  CTA head/ neck/ chest stat  CBC/ CMP/ TSH/ BNP STAT  ECHO  Restart home medications   NPO for now   WOC for chronic wounds  PT/OT            DVT prophylaxis: Eliquis   Code Status: FULL  Admission Status: Patient will be admitted to (LEXOBS or LEXINPT)     Juana Morales, APRN 09/08/17 5:51 PM      Brief Attending Admission Note     I have seen and examined the patient, performing an independent face-to-face diagnostic evaluation with plan of care reviewed and developed with Ms. Carmen.      Brief Summary Statement/HPI:   Mr. Rojas is a 78 yo M w/ PMH of RA solely on steroids and narcotics, gout and as above who presents from his PCP's office with complaints of upper extremity and facial swelling that has been occurring intermittently over the last month.  Family reports that these symptoms usually last for a day but this episode has lasted for three to four days. Pt also reports intermittent SOA mainly at night- wakes him at night.        Attending Physical Exam:  General- AOx3, NAD  HEENT- PERRLA, periorbital edema  CVS- RRR, no M/R/G  Pulm- CTAB  Abd- soft, NT, ND, (+) BS  Extr- bilateral upper extremity edema mainly of the forearms (2+ pitting) but extends to shoulders, lower extremities with chronic venous stasis  Neuro- no focal deficits  Psych- flat affect      Brief Assessment/Plan :  Mr. Rojas is a 78 yo M w/ PMH of RA on steroids, gout, Afib on Eliquis who presents from his PCP's  office with anasarca of unclear etiology. Concern for CHF vs nephrotic syndrome vs ? SVC syndrome (although low likelihood in setting of chronic anticoagulation).    Plan:  --send labs including BMP, CBC, BNP, TSH. Urine protein  --check CTA, TTE    See above for further detailed assessment and plan developed with Ms. Morales which I have reviewed and/or edited.      I believe this patient meets OBSERVATION status, however if further evaluation or treatment plans warrant, status may change.  Based upon current information, I predict patient's care encounter to be less than or equal to 2 midnights.      Erika Garcia MD  09/08/17  5:59 PM

## 2017-09-09 ENCOUNTER — APPOINTMENT (OUTPATIENT)
Dept: CARDIOLOGY | Facility: HOSPITAL | Age: 77
End: 2017-09-09

## 2017-09-09 LAB
BILIRUB UR QL STRIP: NEGATIVE
CLARITY UR: CLEAR
COLOR UR: YELLOW
GLUCOSE UR STRIP-MCNC: NEGATIVE MG/DL
HGB UR QL STRIP.AUTO: NEGATIVE
KETONES UR QL STRIP: ABNORMAL
LEUKOCYTE ESTERASE UR QL STRIP.AUTO: NEGATIVE
NITRITE UR QL STRIP: NEGATIVE
PH UR STRIP.AUTO: 6 [PH] (ref 5–8)
PROT UR QL STRIP: NEGATIVE
SP GR UR STRIP: 1.04 (ref 1–1.03)
UROBILINOGEN UR QL STRIP: ABNORMAL

## 2017-09-09 PROCEDURE — 81003 URINALYSIS AUTO W/O SCOPE: CPT | Performed by: NURSE PRACTITIONER

## 2017-09-09 PROCEDURE — 94760 N-INVAS EAR/PLS OXIMETRY 1: CPT

## 2017-09-09 PROCEDURE — G0378 HOSPITAL OBSERVATION PER HR: HCPCS

## 2017-09-09 PROCEDURE — 94640 AIRWAY INHALATION TREATMENT: CPT

## 2017-09-09 PROCEDURE — 63710000001 PREDNISONE PER 5 MG: Performed by: NURSE PRACTITIONER

## 2017-09-09 PROCEDURE — 94660 CPAP INITIATION&MGMT: CPT

## 2017-09-09 PROCEDURE — 94799 UNLISTED PULMONARY SVC/PX: CPT

## 2017-09-09 PROCEDURE — 99226 PR SBSQ OBSERVATION CARE/DAY 35 MINUTES: CPT | Performed by: HOSPITALIST

## 2017-09-09 RX ORDER — GUAIFENESIN/DEXTROMETHORPHAN 100-10MG/5
5 SYRUP ORAL EVERY 4 HOURS PRN
Status: DISCONTINUED | OUTPATIENT
Start: 2017-09-09 | End: 2017-09-10 | Stop reason: HOSPADM

## 2017-09-09 RX ORDER — IPRATROPIUM BROMIDE AND ALBUTEROL SULFATE 2.5; .5 MG/3ML; MG/3ML
3 SOLUTION RESPIRATORY (INHALATION)
Status: DISCONTINUED | OUTPATIENT
Start: 2017-09-09 | End: 2017-09-10 | Stop reason: HOSPADM

## 2017-09-09 RX ADMIN — IPRATROPIUM BROMIDE AND ALBUTEROL SULFATE 3 ML: .5; 3 SOLUTION RESPIRATORY (INHALATION) at 14:08

## 2017-09-09 RX ADMIN — HYDROCODONE BITARTATE AND ACETAMINOPHEN 1 TABLET: 5; 325 TABLET ORAL at 23:33

## 2017-09-09 RX ADMIN — FINASTERIDE 5 MG: 5 TABLET, FILM COATED ORAL at 08:46

## 2017-09-09 RX ADMIN — DOCUSATE SODIUM 100 MG: 100 CAPSULE, LIQUID FILLED ORAL at 08:46

## 2017-09-09 RX ADMIN — HYDROCODONE BITARTATE AND ACETAMINOPHEN 1 TABLET: 5; 325 TABLET ORAL at 14:23

## 2017-09-09 RX ADMIN — IPRATROPIUM BROMIDE AND ALBUTEROL SULFATE 3 ML: .5; 3 SOLUTION RESPIRATORY (INHALATION) at 19:05

## 2017-09-09 RX ADMIN — APIXABAN 5 MG: 5 TABLET, FILM COATED ORAL at 08:46

## 2017-09-09 RX ADMIN — PREDNISONE 15 MG: 5 TABLET ORAL at 08:46

## 2017-09-09 RX ADMIN — APIXABAN 5 MG: 5 TABLET, FILM COATED ORAL at 20:24

## 2017-09-09 RX ADMIN — FAMOTIDINE 40 MG: 20 TABLET ORAL at 08:46

## 2017-09-09 NOTE — PLAN OF CARE
Problem: Pressure Ulcer Risk (Jaun Scale) (Adult,Obstetrics,Pediatric)  Goal: Identify Related Risk Factors and Signs and Symptoms  Outcome: Ongoing (interventions implemented as appropriate)  Goal: Skin Integrity  Outcome: Unable to achieve outcome(s) by discharge Date Met:  09/09/17

## 2017-09-09 NOTE — PLAN OF CARE
Problem: Skin Integrity Impairment, Risk/Actual (Adult)  Goal: Identify Related Risk Factors and Signs and Symptoms  Outcome: Outcome(s) achieved Date Met:  09/09/17  Goal: Skin Integrity/Wound Healing  Outcome: Unable to achieve outcome(s) by discharge Date Met:  09/09/17

## 2017-09-09 NOTE — CONSULTS
General Surgery Consultation Note    Date of Service:  Michoacano CARDOZA Bob  [id number]  1940      Referring Provider: Yobani Bruner MD    Location of Consult: [facility]  Reason for Consultation: thyroid enlargement      History of Present Illness:  I am seeing, Michoacano Rojas, in consultation for Yobani Bruner MD regarding large multinodular goiter.  Pt has been having swelling of his face and bilateral upper extremities for months.  He has been taking lasix with no improvement.   Pt did not know he had an enlarged thyroid.  Last night he had dyspnea but now is breathing well on room air.     Past Medical History:   Diagnosis Date   • Chronic cutaneous venous stasis ulcer    • Hypertension    • RA (rheumatoid arthritis)    • Rheumatoid arthritis    • Vertigo    • Vertigo        Allergies   Allergen Reactions   • Other      Relates all oral abx cause intol nausea/vomiting.       No current facility-administered medications on file prior to encounter.      Current Outpatient Prescriptions on File Prior to Encounter   Medication Sig Dispense Refill   • apixaban (ELIQUIS) 5 MG tablet tablet Take 1 tablet by mouth Every 12 (Twelve) Hours. 60 tablet 2   • bisoprolol (ZEBeta) 5 MG tablet Take 0.5 tablets by mouth Daily. 30 tablet 2   • cholecalciferol (VITAMIN D3) 1000 UNITS tablet Take 2,000 Units by mouth Daily.     • diclofenac (VOLTAREN) 3 % gel gel Apply  topically 2 (Two) Times a Day As Needed. for 60 days.     • docusate sodium (COLACE) 50 MG capsule Take  by mouth 2 (Two) Times a Day.     • finasteride (PROSCAR) 5 MG tablet Take 5 mg by mouth Daily.     • fluticasone (FLONASE) 50 MCG/ACT nasal spray 2 sprays into each nostril Daily.     • HYDROcodone-acetaminophen (NORCO)  MG per tablet Take 1 tablet by mouth Every 6 (Six) Hours As Needed for Moderate Pain (4-6).     • predniSONE (DELTASONE) 5 MG tablet Take 3 tablets by mouth Daily.           Current Facility-Administered Medications:   •  apixaban (ELIQUIS)  tablet 5 mg, 5 mg, Oral, Q12H, Juana Morales APRN, 5 mg at 09/09/17 0846  •  bisoprolol (ZEBeta) tablet 2.5 mg, 2.5 mg, Oral, Q24H, Juana Morales APRN, 2.5 mg at 09/08/17 1837  •  docusate sodium (COLACE) capsule 100 mg, 100 mg, Oral, BID, Juana Morales APRN, 100 mg at 09/09/17 0846  •  famotidine (PEPCID) tablet 40 mg, 40 mg, Oral, Daily, Juana Morales APRN, 40 mg at 09/09/17 0846  •  finasteride (PROSCAR) tablet 5 mg, 5 mg, Oral, Daily, Juana Morales APRN, 5 mg at 09/09/17 0846  •  fluticasone (FLONASE) 50 MCG/ACT nasal spray 2 spray, 2 spray, Nasal, Daily, REEMA Stephens, 2 spray at 09/08/17 2016  •  guaifenesin-dextromethorphan (ROBITUSSIN DM) 100-10 MG/5ML syrup 5 mL, 5 mL, Oral, Q4H PRN, Millicent Mondragon PA-C  •  HYDROcodone-acetaminophen (NORCO) 5-325 MG per tablet 1 tablet, 1 tablet, Oral, Q6H PRN, Erika Garcia MD, 1 tablet at 09/08/17 1838  •  ipratropium-albuterol (DUO-NEB) nebulizer solution 3 mL, 3 mL, Nebulization, Q4H PRN, Millicent Mondragon PA-C, 3 mL at 09/08/17 2307  •  ondansetron (ZOFRAN) tablet 4 mg, 4 mg, Oral, Q6H PRN **OR** ondansetron (ZOFRAN) injection 4 mg, 4 mg, Intravenous, Q6H PRN, REEMA Stephens  •  predniSONE (DELTASONE) tablet 15 mg, 15 mg, Oral, Daily, Juana Morales APRN, 15 mg at 09/09/17 0846  •  sodium chloride 0.9 % flush 1-10 mL, 1-10 mL, Intravenous, PRN, Juana Morales, APRN    Past Surgical History:   Procedure   • JOINT MANIPULATION    left hip repair       No family history on file.  Social History     Social History   • Marital status:      Spouse name: N/A   • Number of children: N/A   • Years of education: N/A     Occupational History   • Not on file.     Social History Main Topics   • Smoking status: Never Smoker   • Smokeless tobacco: Never Used   • Alcohol use No   • Drug use: No   • Sexual activity: Defer     Other Topics Concern   • Not on file     Social History Narrative       Review of Systems:  Review of  "Systems  Otherwise the 12 point review of systems is negative.    /79 (BP Location: Left arm)  Pulse 67  Temp 97.5 °F (36.4 °C) (Axillary)   Resp 16  Ht 67\" (170.2 cm)  Wt 163 lb 6.4 oz (74.1 kg)  SpO2 100%  BMI 25.59 kg/m2  Body mass index is 25.59 kg/(m^2).    General: alert  HEENT: PER, no icterus, normal sclera, eyelids swollen, neck large and full  Cardiac: regular rhythm,  no audible rubs  Pulmonary: bilateral breath sounds, non labored  Abdominal: soft  Neurologic: awake, alert, no obvious focal deficits  Extremities: warm, bilateral upper ext edema  Skin: no obvious rashes nor worrisome lesions seen  CBC    Results from last 7 days  Lab Units 09/08/17  1703   WBC 10*3/mm3 8.55   HEMOGLOBIN g/dL 9.4*   HEMATOCRIT % 31.8*   PLATELETS 10*3/mm3 154       CMP    Results from last 7 days  Lab Units 09/08/17  1703   SODIUM mmol/L 141   POTASSIUM mmol/L 4.3   CHLORIDE mmol/L 109   CO2 mmol/L 27.0   BUN mg/dL 14   CREATININE mg/dL 0.60   CALCIUM mg/dL 8.6*   BILIRUBIN mg/dL 0.5   ALK PHOS U/L 65   ALT (SGPT) U/L 5*   AST (SGOT) U/L 12   GLUCOSE mg/dL 67*       Radiology  Imaging Results (last 72 hours)     Procedure Component Value Units Date/Time    CT Angiogram Chest With & Without Contrast [[id number]]  (Abnormal) Collected:  09/08/17 1706     Updated:  09/08/17 2256    Narrative:       EXAM:    CT Angiography Chest With Intravenous Contrast    CLINICAL HISTORY:    77 years old, male; Signs and symptoms; Other: See notes; Additional info:   SOA and upper extremity swelling    TECHNIQUE:    Axial computed tomographic angiography images of the chest with intravenous   contrast using pulmonary embolism protocol.  All CT scans at this facility use   one or more dose reduction techniques, viz.: automated exposure control; ma/kV   adjustment per patient size (including targeted exams where dose is matched to   indication; i.e. head); or iterative reconstruction technique.    MIP reconstructed images were " created and reviewed.    CONTRAST:    75 mL of isovue 370 administered intravenously.    COMPARISON:    CR - XR CHEST 1 VW 6/18/2017 12:11:55 AM    FINDINGS:    Pulmonary arteries:  No pulmonary embolus is identified.    Aorta:  No acute findings.  No thoracic aortic aneurysm.    Lungs:  Bandlike densities in the left lower lobe suggestive of subsegmental   atelectasis.  Mild biapical scarring, greater on the left.  No mass or   consolidation.    Pleural space:  Small left, trace right pleural effusions.    Heart:  Unremarkable.    Thyroid:  Markedly enlarged thyroid, greater on the left with areas of   central hypoattenuation a few calcifications with lobular retrosternal mass   measuring up to 7.5 x 5.4 cm in transverse dimension and near 7 cm CC extending   inferiorly to the level of the celina left which appears in continuity with the   thyroid    Bones/joints:  No acute fracture.  No dislocation.    Soft tissues:  Dependent subcutaneous edema.    Lymph nodes:  Unremarkable. No enlarged lymph nodes.    Upper abdomen:  Cholelithiasis.  Renal cysts measuring up to 1.4 cm on the   right and 4 cm on the left.      Impression:       1.  Small left pleural effusion and left basilar subsegmental atelectasis.  2.  Markedly enlarged and heterogeneous thyroid, greater on the left with   retrosternal extension.    THIS DOCUMENT HAS BEEN ELECTRONICALLY SIGNED BY DEXTER DURAN MD    CT Angiogram Head With & Without Contrast [[id number]]  (Abnormal) Collected:  09/08/17 1742     Updated:  09/08/17 6109    Narrative:       EXAM:    CT Angiography Head With Intravenous Contrast    CLINICAL HISTORY:    77 years old, male; Signs and symptoms; Other: See notes; Additional info:   Facial edema    TECHNIQUE:    Axial computed tomographic angiography images of the head with intravenous   contrast using CT angiography protocol.  All CT scans at this facility use one   or more dose reduction techniques, viz.: automated exposure control;  ma/kV   adjustment per patient size (including targeted exams where dose is matched to   indication; i.e. head); or iterative reconstruction technique.    MIP reconstructed images were created and reviewed.    CONTRAST:    75 mL of isovue 370 administered intravenously.    COMPARISON:    CT HEAD WO CONTRAST 5/6/2016 8:56:46 AM    FINDINGS:    Right internal carotid artery:  No significant stenosis.  No dissection or   occlusion.    Right anterior cerebral artery:  No occlusion or significant stenosis.  No   aneurysm.    Right middle cerebral artery:  No occlusion or significant stenosis.  No   aneurysm.    Right posterior cerebral artery:  No occlusion or significant stenosis.  No   aneurysm.    Right vertebral artery:  No significant stenosis.  No dissection or occlusion.      Left internal carotid artery:  No significant stenosis.  No dissection or   occlusion.    Left anterior cerebral artery:  No occlusion or significant stenosis.  No   aneurysm.    Left middle cerebral artery:  No occlusion or significant stenosis.  No   aneurysm.    Left posterior cerebral artery:  No occlusion or significant stenosis.  No   aneurysm.    Left vertebral artery:  No significant stenosis.  No dissection or occlusion.      Basilar artery:  No occlusion or significant stenosis.  No aneurysm.    Brain:  Diffuse atrophy.  Left frontal white matter calcification, unchanged.    No evidence of hemorrhage, mass effect or edema.    Sinuses:  Moderate mucosal thickening in the left maxillary sinus.    Mastoid air cells:  Increased partial opacification of the left mastoid air   cells.  Diffuse opacification of the right mastoid air cells, middle ear, and   pneumatized features apex, slightly increased.  Apparent component of erosive   change in the right mastoid air cells, similar to previous exam.      Impression:         No acute findings.    THIS DOCUMENT HAS BEEN ELECTRONICALLY SIGNED BY DEXTER DURAN MD    CT Angiogram Neck With &  Without Contrast [[id number]]  (Abnormal) Collected:  09/08/17 1742     Updated:  09/08/17 2331    Narrative:       EXAM:    CT Angiography Neck Without and With Intravenous Contrast    CLINICAL HISTORY:    77 years old, male; Signs and symptoms; Other: Facial and upper extremity   edema    TECHNIQUE:    Axial computed tomographic angiography images of the neck without and with   intravenous contrast using CT angiography protocol.  All CT scans at this   facility use one or more dose reduction techniques, viz.: automated exposure   control; ma/kV adjustment per patient size (including targeted exams where dose   is matched to indication; i.e. head); or iterative reconstruction technique.    MIP reconstructed images were created and reviewed.    CONTRAST:    75 mL of Isovue 370 administered intravenously.    COMPARISON:    No relevant prior studies available.    FINDINGS:     VASCULATURE:    Right common carotid artery:  No significant stenosis.  No dissection or   occlusion.    Right internal carotid artery:  No significant stenosis.  No dissection or   occlusion.    Right external carotid artery:  Unremarkable.  No occlusion.    Right vertebral artery:  No significant stenosis.  No dissection or occlusion.      Left common carotid artery:  No significant stenosis.  No dissection or   occlusion.    Left internal carotid artery:  No significant stenosis.  No dissection or   occlusion.    Left external carotid artery:  Unremarkable.  No occlusion.    Left vertebral artery:  No significant stenosis.  No dissection or occlusion.    Other vasculature:  The right subclavian vein is opacified by injected   contrast and appears narrowed at the level of the first rib with contrast   passing distally into the superior vena cava which is a opacified and   unremarkable.  Some contrast refluxes into the left brachiocephalic pain which   appears narrowed as it crosses the great vessels over to the level of the first   rib;  opacification of the subclavian vein is limited by phase of enhancement.    There is limited opacification of the internal jugular veins on these phases,   although the inferior aspects appear opacified bilaterally on the chest exam     NECK:    Bones/joints:  No acute fracture.  No dislocation.    Soft tissues:  Subcutaneous edema about the neck bilaterally.    Thyroid:  Markedly enlarged thyroid gland, greater on the left with   retrosternal extension.  On the right, the thyroid appears to extend superiorly   around the level of the tongue base, and slightly more superiorly on the left.    There is heterogeneous retropharyngeal density at this level extending to   around the level of the soft palate which may be thyroid tissue, although there   appear to be some component of ill-defined fluid, evaluation somewhat limited   by phase of enhancement and degree of definition of structures.    Other findings:  Bovine arch configuration.      Impression:       1.  Large thyroid goiter, greater on the left, with retrosternal and   retropharyngeal extension.  There may be some ill-defined retropharyngeal fluid   superiorly, nonspecific.  2.  Both brachiocephalic veins are narrowed as they cross first ribs with the   left brachiocephalic vein also narrowed as it crosses the great vessels.  No   thrombus is appreciated on the right or within the partially opacified portion   on the left    THIS DOCUMENT HAS BEEN ELECTRONICALLY SIGNED BY DEXTER DURAN MD   Reviewed scan with Dr. Parnell       [facility] Problem List     * (Principal)Anasarca    Rheumatoid arthritis (Chronic)    Essential hypertension    Hypertension is unchanged.  Continue current treatment regimen.        Chronic cutaneous venous stasis ulcer    A-fib          Assessment/plan  Large multinodular goiter with substernal extension and compression of neck veins and deviation of trachea.  Pt's upper extremity swelling and dyspnea are all likely secondary to  compression. Pt is currently in no distress. I explained that total thyroidectomy in him would be a large procedure and a sternotomy may be necessary.  Pt is very debilitated with severe RA and he does not want surgery and does not feel he could tolerate it.  I discussed with him possible medical options and Radioactive Iodine therapy may be an option for him to offer some relief of the obstructive symptoms. I discussed this with Dr. Avitia.        Tod Pop MD  09/09/17  1:07 PM

## 2017-09-09 NOTE — CONSULTS
Palliative Care Initial Consult   Attending Physician: Yobani Bruner MD  Referring Provider: Dr. Bruner    Reason for Referral:  assistance with clarification of goals of care    Code Status: Conditional Code   Advanced Directives: Advance Directive: Patient does not have advance directive   Family/Support: multiple family members at bedside   Goals of Care: TBD.    HPI: 77-year-old male with past medical history significant for hypertension, rheumatoid arthritis on steroid therapy, chronic cutaneous venous stasis ulcers, and history of vertigo.  Patient presented to [facility] from his primary care provider's office related to intermittent facial and upper extremity swelling and shortness of air.  Of note patient was recently admitted to [facility] in June for new onset atrial fibrillation and has been on eloquent since that time.  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  Patient was evaluated by surgery and patient and family declined surgical option as they do not feel he could tolerate it.  Recommendation for radioactive iodine therapy as alternative has been provided.  Patient is a conditional code, and reports that he did not tolerate BiPAP and does not wish to go back on it at this time.  Palliative care was consulted to assist with goals of care and advance care planning the context of complex medical decision making.    ROS:  Patient reports mild pain in bilateral lower extremities associated with venous stasis ulcers, describes pain as ache and reports it comes and goes.  Patient denies nausea at this time, however does report intermittent nausea at times.  Patient denies constipation last bowel movement 9/8/17.  Patient reports appetite has been good except for the past couple days related to increased facial and neck swelling.  Patient reports intermittent dyspnea and had episode of dyspnea  "overnight requiring BiPAP, which patient did not tolerate.  Patient currently denies dyspnea.  Patient denies headache, chest pain, dizziness at this time, does have history of vertigo.      Past Medical History:   Diagnosis Date   • Chronic cutaneous venous stasis ulcer    • Hypertension    • RA (rheumatoid arthritis)    • Rheumatoid arthritis    • Vertigo    • Vertigo      Past Surgical History:   Procedure Laterality Date   • JOINT MANIPULATION      left hip repair     Social History     Social History   • Marital status:      Spouse name: N/A   • Number of children: N/A   • Years of education: N/A     Occupational History   • Not on file.     Social History Main Topics   • Smoking status: Never Smoker   • Smokeless tobacco: Never Used   • Alcohol use No   • Drug use: No   • Sexual activity: Defer     Other Topics Concern   • Not on file     Social History Narrative       Allergies   Allergen Reactions   • Other      Relates all oral abx cause intol nausea/vomiting.       Current medication reviewed for route, type, dose and frequency and are current per MAR at time of dictation.    Palliative Performance Scale Score:     /79 (BP Location: Left arm)  Pulse 67  Temp 97.5 °F (36.4 °C) (Axillary)   Resp 16  Ht 67\" (170.2 cm)  Wt 163 lb 6.4 oz (74.1 kg)  SpO2 100%  BMI 25.59 kg/m2  No intake or output data in the 24 hours ending 09/09/17 1305    Physical Exam:  General Appearance:    Alert, cooperative, NAD   HEENT:    NC/AT, EOMI, anicteric, MMM, facial and periorbital edema noted    Neck:   supple, trachea midline, no JVD   Lungs:     + global wheezes bilaterally, coarse breath sounds bilaterally, respirations regular, even and unlabored    Heart:    RRR, normal S1 and S2, no M/R/G   Abdomen:     Normal bowel sounds, soft, non-tender, non-distended   Extremities:   Moves all extremities, +2-3 plus ble pitting edema, dressings to bilateral lower extremity venous stasis ulcers; fingers in both " hands contracted, trace bilateral upper extremity edema    Pulses:   Pulses palpable and equal bilaterally   Skin:   Warm, dry   Neurologic:   A/Ox3, cooperative   Psych:   Calm, appropriate, smiling        Labs:     Results from last 7 days  Lab Units 09/08/17  1703   WBC 10*3/mm3 8.55   HEMOGLOBIN g/dL 9.4*   HEMATOCRIT % 31.8*   PLATELETS 10*3/mm3 154       Results from last 7 days  Lab Units 09/08/17  1703   SODIUM mmol/L 141   POTASSIUM mmol/L 4.3   CHLORIDE mmol/L 109   CO2 mmol/L 27.0   BUN mg/dL 14   CREATININE mg/dL 0.60   GLUCOSE mg/dL 67*   CALCIUM mg/dL 8.6*       Results from last 7 days  Lab Units 09/08/17  1703   SODIUM mmol/L 141   POTASSIUM mmol/L 4.3   CHLORIDE mmol/L 109   CO2 mmol/L 27.0   BUN mg/dL 14   CREATININE mg/dL 0.60   CALCIUM mg/dL 8.6*   BILIRUBIN mg/dL 0.5   ALK PHOS U/L 65   ALT (SGPT) U/L 5*   AST (SGOT) U/L 12   GLUCOSE mg/dL 67*     Imaging Results (last 72 hours)     Procedure Component Value Units Date/Time    CT Angiogram Chest With & Without Contrast [[id number]]  (Abnormal) Collected:  09/08/17 1706     Updated:  09/08/17 2256    Narrative:       EXAM:    CT Angiography Chest With Intravenous Contrast    CLINICAL HISTORY:    77 years old, male; Signs and symptoms; Other: See notes; Additional info:   SOA and upper extremity swelling    TECHNIQUE:    Axial computed tomographic angiography images of the chest with intravenous   contrast using pulmonary embolism protocol.  All CT scans at this facility use   one or more dose reduction techniques, viz.: automated exposure control; ma/kV   adjustment per patient size (including targeted exams where dose is matched to   indication; i.e. head); or iterative reconstruction technique.    MIP reconstructed images were created and reviewed.    CONTRAST:    75 mL of isovue 370 administered intravenously.    COMPARISON:    CR - XR CHEST 1 VW 6/18/2017 12:11:55 AM    FINDINGS:    Pulmonary arteries:  No pulmonary embolus is identified.     Aorta:  No acute findings.  No thoracic aortic aneurysm.    Lungs:  Bandlike densities in the left lower lobe suggestive of subsegmental   atelectasis.  Mild biapical scarring, greater on the left.  No mass or   consolidation.    Pleural space:  Small left, trace right pleural effusions.    Heart:  Unremarkable.    Thyroid:  Markedly enlarged thyroid, greater on the left with areas of   central hypoattenuation a few calcifications with lobular retrosternal mass   measuring up to 7.5 x 5.4 cm in transverse dimension and near 7 cm CC extending   inferiorly to the level of the celina left which appears in continuity with the   thyroid    Bones/joints:  No acute fracture.  No dislocation.    Soft tissues:  Dependent subcutaneous edema.    Lymph nodes:  Unremarkable. No enlarged lymph nodes.    Upper abdomen:  Cholelithiasis.  Renal cysts measuring up to 1.4 cm on the   right and 4 cm on the left.      Impression:       1.  Small left pleural effusion and left basilar subsegmental atelectasis.  2.  Markedly enlarged and heterogeneous thyroid, greater on the left with   retrosternal extension.    THIS DOCUMENT HAS BEEN ELECTRONICALLY SIGNED BY DEXTER DURAN MD    CT Angiogram Head With & Without Contrast [[id number]]  (Abnormal) Collected:  09/08/17 1742     Updated:  09/08/17 5767    Narrative:       EXAM:    CT Angiography Head With Intravenous Contrast    CLINICAL HISTORY:    77 years old, male; Signs and symptoms; Other: See notes; Additional info:   Facial edema    TECHNIQUE:    Axial computed tomographic angiography images of the head with intravenous   contrast using CT angiography protocol.  All CT scans at this facility use one   or more dose reduction techniques, viz.: automated exposure control; ma/kV   adjustment per patient size (including targeted exams where dose is matched to   indication; i.e. head); or iterative reconstruction technique.    MIP reconstructed images were created and  reviewed.    CONTRAST:    75 mL of isovue 370 administered intravenously.    COMPARISON:    CT HEAD WO CONTRAST 5/6/2016 8:56:46 AM    FINDINGS:    Right internal carotid artery:  No significant stenosis.  No dissection or   occlusion.    Right anterior cerebral artery:  No occlusion or significant stenosis.  No   aneurysm.    Right middle cerebral artery:  No occlusion or significant stenosis.  No   aneurysm.    Right posterior cerebral artery:  No occlusion or significant stenosis.  No   aneurysm.    Right vertebral artery:  No significant stenosis.  No dissection or occlusion.      Left internal carotid artery:  No significant stenosis.  No dissection or   occlusion.    Left anterior cerebral artery:  No occlusion or significant stenosis.  No   aneurysm.    Left middle cerebral artery:  No occlusion or significant stenosis.  No   aneurysm.    Left posterior cerebral artery:  No occlusion or significant stenosis.  No   aneurysm.    Left vertebral artery:  No significant stenosis.  No dissection or occlusion.      Basilar artery:  No occlusion or significant stenosis.  No aneurysm.    Brain:  Diffuse atrophy.  Left frontal white matter calcification, unchanged.    No evidence of hemorrhage, mass effect or edema.    Sinuses:  Moderate mucosal thickening in the left maxillary sinus.    Mastoid air cells:  Increased partial opacification of the left mastoid air   cells.  Diffuse opacification of the right mastoid air cells, middle ear, and   pneumatized features apex, slightly increased.  Apparent component of erosive   change in the right mastoid air cells, similar to previous exam.      Impression:         No acute findings.    THIS DOCUMENT HAS BEEN ELECTRONICALLY SIGNED BY DEXTER DURAN MD    CT Angiogram Neck With & Without Contrast [[id number]]  (Abnormal) Collected:  09/08/17 1742     Updated:  09/08/17 2331    Narrative:       EXAM:    CT Angiography Neck Without and With Intravenous Contrast    CLINICAL  HISTORY:    77 years old, male; Signs and symptoms; Other: Facial and upper extremity   edema    TECHNIQUE:    Axial computed tomographic angiography images of the neck without and with   intravenous contrast using CT angiography protocol.  All CT scans at this   facility use one or more dose reduction techniques, viz.: automated exposure   control; ma/kV adjustment per patient size (including targeted exams where dose   is matched to indication; i.e. head); or iterative reconstruction technique.    MIP reconstructed images were created and reviewed.    CONTRAST:    75 mL of Isovue 370 administered intravenously.    COMPARISON:    No relevant prior studies available.    FINDINGS:     VASCULATURE:    Right common carotid artery:  No significant stenosis.  No dissection or   occlusion.    Right internal carotid artery:  No significant stenosis.  No dissection or   occlusion.    Right external carotid artery:  Unremarkable.  No occlusion.    Right vertebral artery:  No significant stenosis.  No dissection or occlusion.      Left common carotid artery:  No significant stenosis.  No dissection or   occlusion.    Left internal carotid artery:  No significant stenosis.  No dissection or   occlusion.    Left external carotid artery:  Unremarkable.  No occlusion.    Left vertebral artery:  No significant stenosis.  No dissection or occlusion.    Other vasculature:  The right subclavian vein is opacified by injected   contrast and appears narrowed at the level of the first rib with contrast   passing distally into the superior vena cava which is a opacified and   unremarkable.  Some contrast refluxes into the left brachiocephalic pain which   appears narrowed as it crosses the great vessels over to the level of the first   rib; opacification of the subclavian vein is limited by phase of enhancement.    There is limited opacification of the internal jugular veins on these phases,   although the inferior aspects appear  opacified bilaterally on the chest exam     NECK:    Bones/joints:  No acute fracture.  No dislocation.    Soft tissues:  Subcutaneous edema about the neck bilaterally.    Thyroid:  Markedly enlarged thyroid gland, greater on the left with   retrosternal extension.  On the right, the thyroid appears to extend superiorly   around the level of the tongue base, and slightly more superiorly on the left.    There is heterogeneous retropharyngeal density at this level extending to   around the level of the soft palate which may be thyroid tissue, although there   appear to be some component of ill-defined fluid, evaluation somewhat limited   by phase of enhancement and degree of definition of structures.    Other findings:  Bovine arch configuration.      Impression:       1.  Large thyroid goiter, greater on the left, with retrosternal and   retropharyngeal extension.  There may be some ill-defined retropharyngeal fluid   superiorly, nonspecific.  2.  Both brachiocephalic veins are narrowed as they cross first ribs with the   left brachiocephalic vein also narrowed as it crosses the great vessels.  No   thrombus is appreciated on the right or within the partially opacified portion   on the left    THIS DOCUMENT HAS BEEN ELECTRONICALLY SIGNED BY DEXTER DURAN MD          Diagnostics: Reviewed    A:   Patient Active Problem List   Diagnosis   • Hematuria   • Prostate hypertrophy   • Rheumatoid arthritis   • Essential hypertension   • Chronic cutaneous venous stasis ulcer   • Very poor mobility   • Large joint arthralgia of multiple sites   • Arthralgia of hands, bilateral   • Generalized stiffness   • Atrial fibrillation with RVR   • A-fib   • Anasarca   Large thyroid goiter, greater on the left, with retrosternal and retropharyngeal extension      S/S:   1. Dyspnea- sched duoneb  2. Dysphagia- tolerating regular diet  3. MSK/BLE ulcer Pain- PRN Lortab  4. Lactose intolerance-dietary consult     P:  Discussed patient  current condition and patient and family report patient may undergo radioactive iodine therapy for relief of current obstructive symptoms.  Patient is a conditional code.  Patient was on BiPAP last night and did not tolerate it, patient family reports that patient expressed that he did not want to go back on BiPAP this a.m. As he did not tolerate this.  Discussed breathing treatments and patient is agreeable to these.  Patient has when necessary Lortab available for pain and reports this is effective at home.  Scheduled DuoNeb's for current wheezes and dyspnea. Thank you for this consult and allowing us to participate in patient's plan of care. Palliative Care Team will continue to follow patient.   Please call for needs over the weekend.  Palliative RN will follow-up tomorrow and provider will follow-up Monday unless called.    Time spent:30 minutes spent reviewing medical and medication records, assessing and examining patient, discussing with family, answering questions, providing some guidance about a plan and documentation of care, and coordinating care with other healthcare members, with > 50% time spent face to face.     Fouzia Gillespie, APRN  9/9/2017      EMR Dragon/Transcription disclaimer:  Much of this encounter note is an electronic transcription/translation of spoken language to printed text. Electronic translation of spoken language may permit erroneous, or at times, nonsensical words or phrases to be inadvertently transcribed. Although I have reviewed the note for such errors, some may still exist.

## 2017-09-09 NOTE — PLAN OF CARE
Problem: Patient Care Overview (Adult)  Goal: Plan of Care Review  Outcome: Ongoing (interventions implemented as appropriate)  Goal: Adult Individualization and Mutuality  Outcome: Ongoing (interventions implemented as appropriate)  Goal: Discharge Needs Assessment  Outcome: Ongoing (interventions implemented as appropriate)    Problem: Fluid Volume Excess (Adult,Obstetrics,Pediatric)  Goal: Identify Related Risk Factors and Signs and Symptoms  Outcome: Ongoing (interventions implemented as appropriate)  Goal: Stable Weight  Outcome: Ongoing (interventions implemented as appropriate)  Goal: Balanced Intake/Output  Outcome: Ongoing (interventions implemented as appropriate)    Problem: Pain, Acute (Adult)  Goal: Identify Related Risk Factors and Signs and Symptoms  Outcome: Ongoing (interventions implemented as appropriate)  Goal: Acceptable Pain Control/Comfort Level  Outcome: Ongoing (interventions implemented as appropriate)

## 2017-09-09 NOTE — TREATMENT PLAN
"CT neck demonstrating enlarged thyroid with heterogenicity. May need to consult inpatient for further investigation or defer for follow up as outpatient.    Nurse reporting pauses while sleeping. Requesting to try CPAP. Will order as needed and let respiratory do settings.    Update; patient bradycardic down to low 40's and fighting CPAP. Nurse feels patient will \"code without the cpap\". States he \"had a long pause\" on telemetry but returned to baseline after a sternal rub.    Went to see patient. He was sleeping comfortably on CPAP. Vitals stable. Patient's family had been kicked out by nurse. Discussed goals of care with patient's family. They state he \"wouldnt want anything done\". Wish to change him from full code to conditional.      "

## 2017-09-09 NOTE — PLAN OF CARE
Problem: Patient Care Overview (Adult)  Goal: Plan of Care Review  Outcome: Ongoing (interventions implemented as appropriate)    09/09/17 1508   Coping/Psychosocial Response Interventions   Plan Of Care Reviewed With patient;spouse;family   Patient Care Overview   Progress improving   Outcome Evaluation   Outcome Summary/Follow up Plan Resp unlabored on RA today. Encourage use of prn Lortab for pain and/or dyspnea. Nebs added. Palliative following for sx mgt and ongoing GOC .

## 2017-09-09 NOTE — PROGRESS NOTES
Hospitalist Daily Progress Note    Date of Admission: 9/8/2017   LOS: 0 days   PCP: Michael Chavez MD    Chief Complaint: sent in as a direct admission for swelling unable to control in outpatient setting    Subjective      Called by nursing first thing this morning about respiratory issues from overnight.  Patient has been having swelling in face / arms and lasix not working.  Sent in from PCP office yesterday for evaluation of medical condition refractory to medication.  Patient had respiratory events overnight noted.  Family says they did not know about thyroid gland.  Asking about options    History of Present Illness    Review of Systems  General: no fevers, chills  Respiratory: no cough, dyspnea  Cardiovascular: no chest pain, palpitations  Abdomen: No abdominal pain, nausea, vomiting, or diarrhea  Neurologic: generalized weakness    Objective   Physical Exam:  I have reviewed the vital signs.  Temp:  [97.3 °F (36.3 °C)-98.5 °F (36.9 °C)] 98.5 °F (36.9 °C)  Heart Rate:  [62-85] 62  Resp:  [18-20] 18  BP: (127-173)/() 148/90    Objective  General Appearance:  Swollen, facial swelling, exopthalmos, arm swelling, rheumatoid nodules  Head:    Swollen, plethoric face  Eyes:    exopthalmos  Neck:   Thick neck, no evidence of JVD  Lungs: Clear to auscultation bilaterally, poor inspiratory effort  Heart: Regular rate and rhythm, S1 and S2 normal  Abdomen:  Soft, non-tender, bowel sounds active, nondistended  Extremities: swollen upper extremities, rheumatoid nodules on hands, chronic wounds legs  Pulses:  2+ and symmetric in distal lower extremities  Skin: chronic skin changes lower ext  Neurologic: Oriented x3, generalized weakness    Results Review:    I have reviewed the labs, radiology results and diagnostic studies.      Results from last 7 days  Lab Units 09/08/17  1703   WBC 10*3/mm3 8.55   HEMOGLOBIN g/dL 9.4*   PLATELETS 10*3/mm3 154       Results from last 7 days  Lab Units 09/08/17  1703    SODIUM mmol/L 141   POTASSIUM mmol/L 4.3   CO2 mmol/L 27.0   CREATININE mg/dL 0.60   GLUCOSE mg/dL 67*       I have reviewed the medications.    ---------------------------------------------------------------------------------------------  Assessment/Plan   Assessment & Plan  Assessment/Problem List    Principal Problem:    Anasarca  Active Problems:    Rheumatoid arthritis    Essential hypertension    Chronic cutaneous venous stasis ulcer    A-fib    77 year old male sent in from Dr. Chavez's office as direct admission for inability to control symptoms as outpatient and further evaluation.     Plan    Sent in from PCPs office for evaluation - inability to improve in outpatient setting    Facial and Upper Extremity Plethora  - Markedly Enlarged Thyroid Gland with Retrosternal Extension and possible vascular encroachment impeding vascular return  - Surgery Evaluation today  Probable Undiagnosed IZABELA  - CPAP at night as tolerated  Chronic Rheumatoid Arthritis  - on steroids  Atrial Fibrillation  - BB + apixaban  Chronic Cutaneous Ulcers Legs  - WOC  Poor Mobility  - PT evaluation    Long Discussion with patient and family in room today including, patient, wife, son, daughter in law at bedside  Clearly medical treatments have not been effective at controlling his symptoms.  He appears to have anatomical issues.  Discussed thyroid issues and marked narrowing of venous return likely not being treatable with medication alone  Patient and family would like an opinion on what can be offered at this stage  Respiratory Issues overnight prompting discussion about palliative type treatment overnight    DVT prophylaxis: on anticoagulation with apixaban  Discharge Planning: I expect patient to be discharged to be determined    Yobani Bruner MD 09/09/17 10:52 AM

## 2017-09-10 VITALS
DIASTOLIC BLOOD PRESSURE: 75 MMHG | RESPIRATION RATE: 20 BRPM | HEIGHT: 67 IN | OXYGEN SATURATION: 96 % | HEART RATE: 80 BPM | TEMPERATURE: 97.9 F | BODY MASS INDEX: 25.85 KG/M2 | WEIGHT: 164.68 LBS | SYSTOLIC BLOOD PRESSURE: 152 MMHG

## 2017-09-10 PROCEDURE — 99217 PR OBSERVATION CARE DISCHARGE MANAGEMENT: CPT | Performed by: HOSPITALIST

## 2017-09-10 PROCEDURE — 63710000001 PREDNISONE PER 5 MG: Performed by: NURSE PRACTITIONER

## 2017-09-10 PROCEDURE — 94799 UNLISTED PULMONARY SVC/PX: CPT

## 2017-09-10 PROCEDURE — 94640 AIRWAY INHALATION TREATMENT: CPT

## 2017-09-10 PROCEDURE — G0378 HOSPITAL OBSERVATION PER HR: HCPCS

## 2017-09-10 RX ORDER — PURIFIED WATER 99.05 ML/100ML
SOLUTION OPHTHALMIC
Status: DISCONTINUED | OUTPATIENT
Start: 2017-09-10 | End: 2017-09-10 | Stop reason: HOSPADM

## 2017-09-10 RX ORDER — CASTOR OIL AND BALSAM, PERU 788; 87 MG/G; MG/G
OINTMENT TOPICAL EVERY 12 HOURS SCHEDULED
Status: DISCONTINUED | OUTPATIENT
Start: 2017-09-10 | End: 2017-09-10 | Stop reason: HOSPADM

## 2017-09-10 RX ADMIN — IPRATROPIUM BROMIDE AND ALBUTEROL SULFATE 3 ML: .5; 3 SOLUTION RESPIRATORY (INHALATION) at 14:41

## 2017-09-10 RX ADMIN — PREDNISONE 15 MG: 5 TABLET ORAL at 10:03

## 2017-09-10 RX ADMIN — HYDROCODONE BITARTATE AND ACETAMINOPHEN 1 TABLET: 5; 325 TABLET ORAL at 10:06

## 2017-09-10 RX ADMIN — BISOPROLOL FUMARATE 2.5 MG: 5 TABLET ORAL at 10:03

## 2017-09-10 RX ADMIN — FINASTERIDE 5 MG: 5 TABLET, FILM COATED ORAL at 10:06

## 2017-09-10 RX ADMIN — IPRATROPIUM BROMIDE AND ALBUTEROL SULFATE 3 ML: .5; 3 SOLUTION RESPIRATORY (INHALATION) at 06:55

## 2017-09-10 RX ADMIN — APIXABAN 5 MG: 5 TABLET, FILM COATED ORAL at 10:06

## 2017-09-10 RX ADMIN — FLUTICASONE PROPIONATE 2 SPRAY: 50 SPRAY, METERED NASAL at 10:03

## 2017-09-10 RX ADMIN — FAMOTIDINE 40 MG: 20 TABLET ORAL at 10:03

## 2017-09-10 NOTE — PROGRESS NOTES
" LOS: 0 days   Patient Care Team:  Michael Chavez MD as PCP - General  Michael Chavez MD as PCP - Family Medicine      Subjective: Pt had a better night.  No shortness of breath.  Still c/o swelling around eyes.    Objective     Vital Signs  Blood pressure 135/74, pulse 68, temperature 97.6 °F (36.4 °C), temperature source Oral, resp. rate 20, height 67\" (170.2 cm), weight 164 lb 9.6 oz (74.7 kg), SpO2 96 %.    Physical Exam:   Edema of face and upper extremities, appears comfortable      Assessment/Plan    Large multinodular goiter causing compression.  Pt is a poor surgical candidate and does not want surgery.  Radioactive iodine may cause shrinkage and improve symptoms.    Principal Problem:    Anasarca  Active Problems:    Rheumatoid arthritis    Essential hypertension    Chronic cutaneous venous stasis ulcer    ASAD-gilberto Pop MD  09/10/17  8:49 AM      "

## 2017-09-10 NOTE — DISCHARGE SUMMARY
[facility] Medicine Services  DISCHARGE SUMMARY       Date of Admission: 9/8/2017  Date of Discharge:  9/10/2017  Primary Care Physician: Michael Chavez MD  Consulting Physician(s)     Provider Relationship    Tod Pop MD Consulting Physician    Satya Garcia DO Consulting Physician      General Surgery - Tod Pop MD    Discharge Diagnoses:  Active Hospital Problems (** Indicates Principal Problem)    Diagnosis Date Noted   • **Anasarca [R60.1] 09/08/2017   • A-fib [I48.91] 09/08/2017   • Rheumatoid arthritis [M06.9] 06/13/2016   • Essential hypertension [I10] 06/13/2016   • Chronic cutaneous venous stasis ulcer [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 male presented as a direct admission from Dr. Chavez's office for worsening swelling in the face and upper extremities refractory to outpatient mgmt.  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  The patient was seen by General Surgery, but does not want surgery.  He would consider non-surgical solutions for symptom mgmt.  He may benefit from radioactive iodine therapy which may shrink tissue and improve his current symptoms.  He was given the name of Dr. Lyman as a possible consideration for an Endocrinologist who may be able to help him in his current state.  Plan is for discharge today and follow up next week about non-surgical options.    Procedures Performed    CT Angio Head / Neck / Chest    Consults:   Consults     Date and Time Order Name Status Description    9/9/2017 1051 Inpatient Consult to General Surgery  "Completed     9/9/2017 0853 Inpatient Consult to Palliative Care MD Completed           Pertinent Test Results: CT imaging    Condition on Discharge: stable    Physical Exam on Discharge:/75  Pulse 60  Temp 97.9 °F (36.6 °C) (Oral)   Resp 20  Ht 67\" (170.2 cm)  Wt 164 lb 10.9 oz (74.7 kg)  SpO2 96%  BMI 25.79 kg/m2  Physical Exam  Gen:  Plethoric Face and Upper Extremities, NAD  HEENT:  Thick neck, No JVD appreciated  CVS:  RRR, s1 and s2  Lungs:  Poor inspiratory capacity, clear  Abdomen:  Soft, ND, ND  Ext:  Chronic lower ext ulcerations    Discharge Disposition  Home or Self Care    Discharge Medications   Michoacano Rojas   Home Medication Instructions RIZWANA:[id number]    Printed on:09/10/17 1215   Medication Information                      apixaban (ELIQUIS) 5 MG tablet tablet  Take 1 tablet by mouth Every 12 (Twelve) Hours.             bisoprolol (ZEBeta) 5 MG tablet  Take 0.5 tablets by mouth Daily.             cholecalciferol (VITAMIN D3) 1000 UNITS tablet  Take 2,000 Units by mouth Daily.             diclofenac (VOLTAREN) 3 % gel gel  Apply  topically 2 (Two) Times a Day As Needed. for 60 days.             docusate sodium (COLACE) 50 MG capsule  Take  by mouth 2 (Two) Times a Day.             finasteride (PROSCAR) 5 MG tablet  Take 5 mg by mouth Daily.             fluticasone (FLONASE) 50 MCG/ACT nasal spray  2 sprays into each nostril Daily.             HYDROcodone-acetaminophen (NORCO)  MG per tablet  Take 1 tablet by mouth Every 6 (Six) Hours As Needed for Moderate Pain (4-6).             predniSONE (DELTASONE) 5 MG tablet  Take 3 tablets by mouth Daily.                 Discharge Diet: as tolerated    Discharge Care Plan / Instructions:  Follow up with Endocrinology in Clinic about possible treatment options for enlarging thyroid mass with compressive symptoms    Activity at Discharge: as tolerated    Follow-up Appointments  No future appointments.  Additional Instructions for the " Follow-ups that You Need to Schedule     Discharge Follow-up with PCP    As directed    Follow Up Details:  PCP - 1 week       Discharge Follow-up with Specialty    As directed    Specialty:  Derek Lyman - Endocrinology   Follow Up:  1 Week   Follow Up Details:  may benefit from BONILLA to shrink thyroid mass effect                 CT showing enlarged thyroid gland with retrosternal extension and compressive effects.  Patient does not want surgery and would prefer another treatment modality if possible.    Patient may benefit from radioactive iodine therapy which may induce shrinkage and improvement in his symptoms.  He needs to follow up with and endocrinologist of his choice.  I informed him of Dr. Lyman and provided address and phone number and advised follow up next week.       Yobani Bruner MD 09/10/17 12:15 PM    Time: Discharge 41 min    Please note that portions of this note may have been completed with a voice recognition program. Efforts were made to edit the dictations, but occasionally words are mistranscribed.

## 2017-09-10 NOTE — CONSULTS
"Adult Nutrition  Assessment/PES    Patient Name:  Michoacano Rojas  YOB: 1940  MRN: [id number]  Admit Date:  9/8/2017    Assessment Date:  9/10/2017        Reason for Assessment       09/10/17 1019    Reason for Assessment    Reason For Assessment/Visit nurse/nurse practitioner consult    Identified At Risk By Screening Criteria MST SCORE 2+    Time Spent (min) 20    Diagnosis --    Cardiac HTN;PAF   Essential HTN    Fluid Status Other (comment)   Anasarca    Gastrointestinal Other (comment)   Ulcer    Immune Rheumatoid arthritis    Other diagnosis chronic cutaneous venous stasis              Nutrition/Diet History       09/10/17 1020    Nutrition/Diet History    Reported/Observed By Patient;Family    Other Spoke w/patient and he stated that his appetite has been \"fair\", he does not c/o any n/v.  Patient does c/o having trouble with chewing and swallowing on occassion, pt and family was unable to give more detailed information on this.  Patient stated \"sometimes everything goes fine and sometimes it doesn't\" when talking about chewing and swallowing.  He stated that he has had a 15 pound weight loss over the last 6 months, he c/o decreased appetite and getting full quickly.  Patient declined a supplement.            Anthropometrics       09/10/17 1022    Anthropometrics    Height 170.2 cm (67\")    Weight 74.7 kg (164 lb 10.9 oz)    Ideal Body Weight (IBW)    Ideal Body Weight (IBW), Male (kg) 68.1    % Ideal Body Weight 109.92    Usual Body Weight (UBW)    Weight Loss 6.804 kg (15 lb)    % Weight Loss  8.4 %    Weight Loss Time Frame 6 months    Body Mass Index (BMI)    BMI (kg/m2) 25.85            Labs/Tests/Procedures/Meds       09/10/17 1026    Labs/Tests/Procedures/Meds    Labs/Tests Review Reviewed                Nutrition Prescription Ordered       09/10/17 1026    Nutrition Prescription PO    Current PO Diet Regular    Common Modifiers Lactose Restricted            Evaluation of Received " Nutrient/Fluid Intake       09/10/17 1026    PO Evaluation    Number of Days PO Intake Evaluated Insufficient Data              Problem/Interventions:        Problem 1       09/10/17 1023    Nutrition Diagnoses Problem 1    Problem 1 Unintended Weight Loss    Etiology (related to) Factors Affecting Nutrition    Appetite Fair;Early Satiety    Signs/Symptoms (evidenced by) Unintended Weight Change    Unintended Weight Change Loss    Number of Pounds Lost 15 pounds    Weight loss time period 6 months                    Intervention Goal       09/10/17 1027    Intervention Goal    General Nutrition support treatment    Weight Maintain weight            Nutrition Intervention       09/10/17 1027    Nutrition Intervention    RD/Tech Action Interview for preference;Encourage intake;Menu provided;Care plan reviewd;Follow Tx progress;Advise alternate selection;Supplement offered/refused              Education/Evaluation       09/10/17 1027    Monitor/Evaluation    Monitor Per protocol;PO intake;Pertinent labs;Weight;Skin status        Comments:      Electronically signed by:  Chantel Clements RD  09/10/17 10:27 AM

## 2017-09-10 NOTE — PROGRESS NOTES
Continued Stay Note   [facility]     Patient Name: Michoacano Rojas  MRN: [id number]  Today's Date: 9/10/2017    Admit Date: 9/8/2017          Discharge Plan       09/10/17 1347    Case Management/Social Work Plan    Plan Home with home heat    Patient/Family In Agreement With Plan yes    Additional Comments CM consulted to resume home health for patient. Met with patient and his wife in the room, and they state he is current with [facility]. Notifed Ene with [facility], [phone], and faxed resume order and DC summary to [phone]. Patient and wife deny any other discharge needs. CM will continue to follow.       09/10/17 1346    Case Management/Social Work Plan    Plan Home               Discharge Codes       09/10/17 1349    Discharge Codes    Discharge Codes 06  Discharged/transferred to home under care of organized home health service organization in anticipation of skilled care   [facility]        Expected Discharge Date and Time     Expected Discharge Date Expected Discharge Time    Sep 10, 2017             Mary Osorio

## 2017-09-10 NOTE — PLAN OF CARE
Problem: Patient Care Overview (Adult)  Goal: Plan of Care Review    09/09/17 1622 09/09/17 2333 09/10/17 0303   Coping/Psychosocial Response Interventions   Plan Of Care Reviewed With --  patient;family --    Patient Care Overview   Progress progress toward functional goals is gradual --  --    Outcome Evaluation   Outcome Summary/Follow up Plan --  --  Unlabored breathing on room air until bedtime then I had to put 2L O2 oon the patient       Goal: Adult Individualization and Mutuality  Outcome: Ongoing (interventions implemented as appropriate)  Goal: Discharge Needs Assessment  Outcome: Ongoing (interventions implemented as appropriate)    09/08/17 1600 09/09/17 1622   Discharge Needs Assessment   Concerns To Be Addressed --  adjustment to diagnosis/illness concerns   Living Environment   Transportation Available car;family or friend will provide --    Self-Care   Equipment Currently Used at Home cane, straight;crutches, forearm;hospital bed --          Problem: Fluid Volume Excess (Adult,Obstetrics,Pediatric)  Goal: Identify Related Risk Factors and Signs and Symptoms  Outcome: Ongoing (interventions implemented as appropriate)    09/09/17 1622   Fluid Volume Excess   Fluid Volume Excess: Related Risk Factors disease process   Signs and Symptoms (Fluid Volume Excess) anasarca       Goal: Stable Weight  Outcome: Ongoing (interventions implemented as appropriate)    09/09/17 1622   Fluid Volume Excess (Adult,Obstetrics,Pediatric)   Stable Weight making progress toward outcome       Goal: Balanced Intake/Output  Outcome: Ongoing (interventions implemented as appropriate)    09/10/17 0303   Fluid Volume Excess (Adult,Obstetrics,Pediatric)   Balanced Intake/Output making progress toward outcome         Problem: Pain, Acute (Adult)  Goal: Identify Related Risk Factors and Signs and Symptoms  Outcome: Ongoing (interventions implemented as appropriate)    09/09/17 1622   Pain, Acute   Related Risk Factors (Acute Pain)  disease process   Signs and Symptoms (Acute Pain) verbalization of pain descriptors       Goal: Acceptable Pain Control/Comfort Level  Outcome: Ongoing (interventions implemented as appropriate)    09/10/17 0303   Pain, Acute (Adult)   Acceptable Pain Control/Comfort Level making progress toward outcome         Problem: Functional Deficit (Adult,Obstetrics,Pediatric)  Goal: Signs and Symptoms of Listed Potential Problems Will be Absent or Manageable (Functional Deficit)  Outcome: Ongoing (interventions implemented as appropriate)    09/10/17 0303   Functional Deficit   Problems Assessed (Functional Deficit) pain   Problems Present (Functional Deficit) pain;range of motion impairment         Problem: Fall Risk (Adult)  Goal: Identify Related Risk Factors and Signs and Symptoms  Outcome: Ongoing (interventions implemented as appropriate)    09/10/17 0303   Fall Risk   Fall Risk: Related Risk Factors age-related changes;gait/mobility problems   Fall Risk: Signs and Symptoms presence of risk factors       Goal: Absence of Falls  Outcome: Ongoing (interventions implemented as appropriate)    09/10/17 0303   Fall Risk (Adult)   Absence of Falls making progress toward outcome         Problem: Pressure Ulcer Risk (Jaun Scale) (Adult,Obstetrics,Pediatric)  Goal: Identify Related Risk Factors and Signs and Symptoms  Outcome: Ongoing (interventions implemented as appropriate)    09/10/17 0303   Pressure Ulcer Risk (Jaun Scale)   Related Risk Factors (Pressure Ulcer Risk (Jaun Scale)) mechanical forces;tissue perfusion altered

## 2017-09-10 NOTE — DISCHARGE PLACEMENT REQUEST
"Candi Rojas (77 y.o. Male)     From [place],   [phone]      Date of Birth Social Security Number Address Home Phone MRN    1940  [address]  [place] [address] [phone] [id number]    [employer] Marital Status          Faith        Admission Date Admission Type Admitting Provider Attending Provider Department, Room/Bed    17 Elective Yobani Bruner MD Schnell, Aaron, MD [address], [address]/    Discharge Date Discharge Disposition Discharge Destination         Home or Self Care             Attending Provider: Yobani Bruner MD     Allergies:  Other    Isolation:  None   Infection:  None   Code Status:  Conditional    Ht:  67\" (170.2 cm)   Wt:  164 lb 10.9 oz (74.7 kg)    Admission Cmt:  None   Principal Problem:  Anasarca [R60.1]                 Active Insurance as of 2017     Primary Coverage     Payor Plan Insurance Group Employer/Plan Group    HUMANA MEDICARE REPLACEMENT HUMANA MEDICARE REPL [id number]     Payor Plan Address Payor Plan Phone Number Effective From Effective To    [address] [address] [phone] 2014     [place] [address]       Subscriber Name Subscriber Birth Date Member ID       CANDI ROJAS 1940 [id number]                 Emergency Contacts      (Rel.) Home Phone Work Phone Mobile Phone    Yosvany Rojas (Son) [phone] -- --    Chantel Rojas (Spouse) -- -- [phone]          [address] [address]  Phone:  [phone]  Fax:          Patient:     Candi Rojas MRN:  [id number]   [address]  [place] [address] :  1940  SSN:    Phone: [phone] Sex:  M      INSURANCE PAYOR PLAN GROUP # SUBSCRIBER ID   Primary: HUMANA MEDICARE REPLACEMENT [id number]   Admitting Diagnosis: Anasarca [R60.1]  Order Date:  Sep 10, 2017               Inpatient Consult to Case Management       "  (Order ID: [id number])     Diagnosis:         Priority:  Routine Expected Date:   Expiration Date:        Interval:   Count:    Reason for Consult? re-establish home health     Specimen Type:   Specimen Source:   Specimen Taken Date:   Specimen Taken Time:                         Authorizing Provider:Yobani Bruner MD  Authorizing Provider's NPI: [id number]  Order Entered By: Yobani Bruner MD 9/10/2017 11:25 AM     Electronically signed by: Yobani Bruner MD 9/10/2017 11:25 AM                  Discharge Summary      Yobani Bruner MD at 9/10/2017 12:15 PM              [facility] Medicine Services  DISCHARGE SUMMARY       Date of Admission: 9/8/2017  Date of Discharge:  9/10/2017  Primary Care Physician: Michael Chavez MD  Consulting Physician(s)     Provider Relationship    Tod Pop MD Consulting Physician    Satya Garcia DO Consulting Physician      General Surgery - Tod Pop MD    Discharge Diagnoses:  Active Hospital Problems (** Indicates Principal Problem)    Diagnosis Date Noted   • **Anasarca [R60.1] 09/08/2017   • A-fib [I48.91] 09/08/2017   • Rheumatoid arthritis [M06.9] 06/13/2016   • Essential hypertension [I10] 06/13/2016   • Chronic cutaneous venous stasis ulcer [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 male presented as a direct admission from Dr. Chavez's office for worsening swelling in the face and upper extremities refractory to outpatient mgmt.  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  The patient was seen by General Surgery, but does not want surgery.  " He would consider non-surgical solutions for symptom mgmt.  He may benefit from radioactive iodine therapy which may shrink tissue and improve his current symptoms.  He was given the name of Dr. Lyman as a possible consideration for an Endocrinologist who may be able to help him in his current state.  Plan is for discharge today and follow up next week about non-surgical options.    Procedures Performed    CT Angio Head / Neck / Chest    Consults:   Consults     Date and Time Order Name Status Description    9/9/2017 1051 Inpatient Consult to General Surgery Completed     9/9/2017 0853 Inpatient Consult to Palliative Care MD Completed           Pertinent Test Results: CT imaging    Condition on Discharge: stable    Physical Exam on Discharge:/75  Pulse 60  Temp 97.9 °F (36.6 °C) (Oral)   Resp 20  Ht 67\" (170.2 cm)  Wt 164 lb 10.9 oz (74.7 kg)  SpO2 96%  BMI 25.79 kg/m2  Physical Exam  Gen:  Plethoric Face and Upper Extremities, NAD  HEENT:  Thick neck, No JVD appreciated  CVS:  RRR, s1 and s2  Lungs:  Poor inspiratory capacity, clear  Abdomen:  Soft, ND, ND  Ext:  Chronic lower ext ulcerations    Discharge Disposition  Home or Self Care    Discharge Medications   Michoacano Rojas   Home Medication Instructions RIZWANA:[id number]    Printed on:09/10/17 1215   Medication Information                      apixaban (ELIQUIS) 5 MG tablet tablet  Take 1 tablet by mouth Every 12 (Twelve) Hours.             bisoprolol (ZEBeta) 5 MG tablet  Take 0.5 tablets by mouth Daily.             cholecalciferol (VITAMIN D3) 1000 UNITS tablet  Take 2,000 Units by mouth Daily.             diclofenac (VOLTAREN) 3 % gel gel  Apply  topically 2 (Two) Times a Day As Needed. for 60 days.             docusate sodium (COLACE) 50 MG capsule  Take  by mouth 2 (Two) Times a Day.             finasteride (PROSCAR) 5 MG tablet  Take 5 mg by mouth Daily.             fluticasone (FLONASE) 50 MCG/ACT nasal spray  2 sprays into each nostril " Daily.             HYDROcodone-acetaminophen (NORCO)  MG per tablet  Take 1 tablet by mouth Every 6 (Six) Hours As Needed for Moderate Pain (4-6).             predniSONE (DELTASONE) 5 MG tablet  Take 3 tablets by mouth Daily.                 Discharge Diet: as tolerated    Discharge Care Plan / Instructions:  Follow up with Endocrinology in Clinic about possible treatment options for enlarging thyroid mass with compressive symptoms    Activity at Discharge: as tolerated    Follow-up Appointments  No future appointments.  Additional Instructions for the Follow-ups that You Need to Schedule     Discharge Follow-up with PCP    As directed    Follow Up Details:  PCP - 1 week       Discharge Follow-up with Specialty    As directed    Specialty:  Derek Lyman - Endocrinology   Follow Up:  1 Week   Follow Up Details:  may benefit from BONILLA to shrink thyroid mass effect                 CT showing enlarged thyroid gland with retrosternal extension and compressive effects.  Patient does not want surgery and would prefer another treatment modality if possible.    Patient may benefit from radioactive iodine therapy which may induce shrinkage and improvement in his symptoms.  He needs to follow up with and endocrinologist of his choice.  I informed him of Dr. Lyman and provided address and phone number and advised follow up next week.       Yobani Bruner MD 09/10/17 12:15 PM    Time: Discharge 41 min    Please note that portions of this note may have been completed with a voice recognition program. Efforts were made to edit the dictations, but occasionally words are mistranscribed.       Electronically signed by Yobani Bruner MD at 9/10/2017 12:36 PM        Discharge Order     Start     Ordered    09/10/17 1214  Discharge patient  Once     Expected Discharge Date:  09/10/17    Expected Discharge Time:  Midday    Discharge Disposition:  Home or Self Care        09/10/17 1216

## 2017-09-10 NOTE — PLAN OF CARE
Problem: Patient Care Overview (Adult)  Goal: Plan of Care Review  Outcome: Ongoing (interventions implemented as appropriate)    09/10/17 0850   Coping/Psychosocial Response Interventions   Plan Of Care Reviewed With patient;son   Patient Care Overview   Progress progress toward functional goals is gradual   Outcome Evaluation   Outcome Summary/Follow up Plan Pleasant patient seen for wounds on BLE, left ischial tub, and index fingers. Wounds on fingers and BLE to be treated with therahoney and hydrofera, and wound on left ischial tuberosity to be treated with venelex cream. Pt ordered a MAI mattress and frame and WOCN will f/u. Call with any questions.

## 2017-11-17 ENCOUNTER — APPOINTMENT (OUTPATIENT)
Dept: CT IMAGING | Facility: HOSPITAL | Age: 77
End: 2017-11-17

## 2017-11-17 ENCOUNTER — APPOINTMENT (OUTPATIENT)
Dept: GENERAL RADIOLOGY | Facility: HOSPITAL | Age: 77
End: 2017-11-17

## 2017-11-17 ENCOUNTER — HOSPITAL ENCOUNTER (INPATIENT)
Facility: HOSPITAL | Age: 77
LOS: 7 days | Discharge: HOME | End: 2017-11-24
Attending: EMERGENCY MEDICINE | Admitting: HOSPITALIST

## 2017-11-17 DIAGNOSIS — Z79.52 CURRENT CHRONIC USE OF SYSTEMIC STEROIDS: ICD-10-CM

## 2017-11-17 DIAGNOSIS — I87.2: ICD-10-CM

## 2017-11-17 DIAGNOSIS — A41.9 SEPSIS, DUE TO UNSPECIFIED ORGANISM: Primary | ICD-10-CM

## 2017-11-17 DIAGNOSIS — J39.8 TRACHEAL STENOSIS: ICD-10-CM

## 2017-11-17 DIAGNOSIS — Z78.9 IMPAIRED MOBILITY AND ADLS: ICD-10-CM

## 2017-11-17 DIAGNOSIS — Z74.09 IMPAIRED FUNCTIONAL MOBILITY, BALANCE, GAIT, AND ENDURANCE: ICD-10-CM

## 2017-11-17 DIAGNOSIS — Z74.09 IMPAIRED MOBILITY AND ADLS: ICD-10-CM

## 2017-11-17 DIAGNOSIS — L97.303: ICD-10-CM

## 2017-11-17 PROBLEM — T38.0X5A IMMUNOCOMPROMISED DUE TO CORTICOSTEROIDS (HCC): Status: ACTIVE | Noted: 2017-11-17

## 2017-11-17 PROBLEM — D84.821 IMMUNOCOMPROMISED DUE TO CORTICOSTEROIDS (HCC): Status: ACTIVE | Noted: 2017-11-17

## 2017-11-17 PROBLEM — L03.90 CELLULITIS: Status: ACTIVE | Noted: 2017-11-17

## 2017-11-17 PROBLEM — D64.9 NORMOCYTIC ANEMIA: Status: ACTIVE | Noted: 2017-11-17

## 2017-11-17 PROBLEM — I50.32 DIASTOLIC DYSFUNCTION WITH CHRONIC HEART FAILURE (HCC): Status: ACTIVE | Noted: 2017-11-17

## 2017-11-17 LAB
ALBUMIN SERPL-MCNC: 3.2 G/DL (ref 3.2–4.8)
ALBUMIN/GLOB SERPL: 1 G/DL (ref 1.5–2.5)
ALP SERPL-CCNC: 63 U/L (ref 25–100)
ALT SERPL W P-5'-P-CCNC: 5 U/L (ref 7–40)
ANION GAP SERPL CALCULATED.3IONS-SCNC: 8 MMOL/L (ref 3–11)
AST SERPL-CCNC: 14 U/L (ref 0–33)
BACTERIA UR QL AUTO: ABNORMAL /HPF
BASOPHILS # BLD AUTO: 0.01 10*3/MM3 (ref 0–0.2)
BASOPHILS NFR BLD AUTO: 0.1 % (ref 0–1)
BILIRUB SERPL-MCNC: 0.7 MG/DL (ref 0.3–1.2)
BILIRUB UR QL STRIP: NEGATIVE
BNP SERPL-MCNC: 432 PG/ML (ref 0–100)
BUN BLD-MCNC: 18 MG/DL (ref 9–23)
BUN/CREAT SERPL: 25.7 (ref 7–25)
CALCIUM SPEC-SCNC: 8.9 MG/DL (ref 8.7–10.4)
CHLORIDE SERPL-SCNC: 108 MMOL/L (ref 99–109)
CLARITY UR: CLEAR
CO2 SERPL-SCNC: 27 MMOL/L (ref 20–31)
COLOR UR: YELLOW
CREAT BLD-MCNC: 0.7 MG/DL (ref 0.6–1.3)
CRP SERPL-MCNC: 4.9 MG/DL (ref 0–1)
D-LACTATE SERPL-SCNC: 0.9 MMOL/L (ref 0.5–2)
DEPRECATED RDW RBC AUTO: 46.5 FL (ref 37–54)
EOSINOPHIL # BLD AUTO: 0 10*3/MM3 (ref 0–0.3)
EOSINOPHIL NFR BLD AUTO: 0 % (ref 0–3)
ERYTHROCYTE [DISTWIDTH] IN BLOOD BY AUTOMATED COUNT: 15.4 % (ref 11.3–14.5)
ERYTHROCYTE [SEDIMENTATION RATE] IN BLOOD: 62 MM/HR (ref 0–20)
FLUAV AG NPH QL: NEGATIVE
FLUBV AG NPH QL IA: NEGATIVE
GFR SERPL CREATININE-BSD FRML MDRD: 132 ML/MIN/1.73
GLOBULIN UR ELPH-MCNC: 3.1 GM/DL
GLUCOSE BLD-MCNC: 88 MG/DL (ref 70–100)
GLUCOSE UR STRIP-MCNC: NEGATIVE MG/DL
HCT VFR BLD AUTO: 31.8 % (ref 38.9–50.9)
HGB BLD-MCNC: 9.3 G/DL (ref 13.1–17.5)
HGB UR QL STRIP.AUTO: ABNORMAL
HOLD SPECIMEN: NORMAL
HOLD SPECIMEN: NORMAL
HYALINE CASTS UR QL AUTO: ABNORMAL /LPF
IMM GRANULOCYTES # BLD: 0.05 10*3/MM3 (ref 0–0.03)
IMM GRANULOCYTES NFR BLD: 0.3 % (ref 0–0.6)
KETONES UR QL STRIP: NEGATIVE
LEUKOCYTE ESTERASE UR QL STRIP.AUTO: NEGATIVE
LYMPHOCYTES # BLD AUTO: 0.68 10*3/MM3 (ref 0.6–4.8)
LYMPHOCYTES NFR BLD AUTO: 4.5 % (ref 24–44)
MCH RBC QN AUTO: 24 PG (ref 27–31)
MCHC RBC AUTO-ENTMCNC: 29.2 G/DL (ref 32–36)
MCV RBC AUTO: 82 FL (ref 80–99)
MONOCYTES # BLD AUTO: 0.81 10*3/MM3 (ref 0–1)
MONOCYTES NFR BLD AUTO: 5.4 % (ref 0–12)
NEUTROPHILS # BLD AUTO: 13.41 10*3/MM3 (ref 1.5–8.3)
NEUTROPHILS NFR BLD AUTO: 89.7 % (ref 41–71)
NITRITE UR QL STRIP: NEGATIVE
PH UR STRIP.AUTO: <=5 [PH] (ref 5–8)
PLATELET # BLD AUTO: 192 10*3/MM3 (ref 150–450)
PMV BLD AUTO: 11.9 FL (ref 6–12)
POTASSIUM BLD-SCNC: 3.5 MMOL/L (ref 3.5–5.5)
PROCALCITONIN SERPL-MCNC: 0.83 NG/ML
PROT SERPL-MCNC: 6.3 G/DL (ref 5.7–8.2)
PROT UR QL STRIP: NEGATIVE
RBC # BLD AUTO: 3.88 10*6/MM3 (ref 4.2–5.76)
RBC # UR: ABNORMAL /HPF
REF LAB TEST METHOD: ABNORMAL
SODIUM BLD-SCNC: 143 MMOL/L (ref 132–146)
SP GR UR STRIP: 1.02 (ref 1–1.03)
SQUAMOUS #/AREA URNS HPF: ABNORMAL /HPF
TROPONIN I SERPL-MCNC: 0 NG/ML (ref 0–0.07)
TSH SERPL DL<=0.05 MIU/L-ACNC: 0.37 MIU/ML (ref 0.35–5.35)
UROBILINOGEN UR QL STRIP: ABNORMAL
WBC NRBC COR # BLD: 14.96 10*3/MM3 (ref 3.5–10.8)
WBC UR QL AUTO: ABNORMAL /HPF
WHOLE BLOOD HOLD SPECIMEN: NORMAL
WHOLE BLOOD HOLD SPECIMEN: NORMAL

## 2017-11-17 PROCEDURE — 25010000002 ONDANSETRON PER 1 MG: Performed by: EMERGENCY MEDICINE

## 2017-11-17 PROCEDURE — 63710000001 BISOPROLOL 5 MG TABLET

## 2017-11-17 PROCEDURE — 94640 AIRWAY INHALATION TREATMENT: CPT

## 2017-11-17 PROCEDURE — 63710000001 APIXABAN 5 MG TABLET: Performed by: PHYSICIAN ASSISTANT

## 2017-11-17 PROCEDURE — 25010000002 FENTANYL CITRATE (PF) 100 MCG/2ML SOLUTION: Performed by: EMERGENCY MEDICINE

## 2017-11-17 PROCEDURE — 85025 COMPLETE CBC W/AUTO DIFF WBC: CPT | Performed by: EMERGENCY MEDICINE

## 2017-11-17 PROCEDURE — 87040 BLOOD CULTURE FOR BACTERIA: CPT | Performed by: EMERGENCY MEDICINE

## 2017-11-17 PROCEDURE — 25010000002 VANCOMYCIN HCL IN NACL 1.5-0.9 GM/500ML-% SOLUTION: Performed by: EMERGENCY MEDICINE

## 2017-11-17 PROCEDURE — 99223 1ST HOSP IP/OBS HIGH 75: CPT | Performed by: PHYSICIAN ASSISTANT

## 2017-11-17 PROCEDURE — 87077 CULTURE AEROBIC IDENTIFY: CPT | Performed by: EMERGENCY MEDICINE

## 2017-11-17 PROCEDURE — 80053 COMPREHEN METABOLIC PANEL: CPT | Performed by: EMERGENCY MEDICINE

## 2017-11-17 PROCEDURE — 63710000001 FINASTERIDE 5 MG TABLET

## 2017-11-17 PROCEDURE — 86140 C-REACTIVE PROTEIN: CPT | Performed by: EMERGENCY MEDICINE

## 2017-11-17 PROCEDURE — 94799 UNLISTED PULMONARY SVC/PX: CPT

## 2017-11-17 PROCEDURE — 99285 EMERGENCY DEPT VISIT HI MDM: CPT

## 2017-11-17 PROCEDURE — 81001 URINALYSIS AUTO W/SCOPE: CPT | Performed by: EMERGENCY MEDICINE

## 2017-11-17 PROCEDURE — 84145 PROCALCITONIN (PCT): CPT | Performed by: EMERGENCY MEDICINE

## 2017-11-17 PROCEDURE — 25010000002 PIPERACILLIN SOD-TAZOBACTAM PER 1 G: Performed by: EMERGENCY MEDICINE

## 2017-11-17 PROCEDURE — 87804 INFLUENZA ASSAY W/OPTIC: CPT | Performed by: EMERGENCY MEDICINE

## 2017-11-17 PROCEDURE — A9270 NON-COVERED ITEM OR SERVICE: HCPCS

## 2017-11-17 PROCEDURE — 87186 SC STD MICRODIL/AGAR DIL: CPT | Performed by: EMERGENCY MEDICINE

## 2017-11-17 PROCEDURE — A9270 NON-COVERED ITEM OR SERVICE: HCPCS | Performed by: PHYSICIAN ASSISTANT

## 2017-11-17 PROCEDURE — 25010000002 METHYLPREDNISOLONE PER 40 MG: Performed by: PHYSICIAN ASSISTANT

## 2017-11-17 PROCEDURE — 71250 CT THORAX DX C-: CPT

## 2017-11-17 PROCEDURE — 25010000002 DEXAMETHASONE PER 1 MG: Performed by: EMERGENCY MEDICINE

## 2017-11-17 PROCEDURE — 74176 CT ABD & PELVIS W/O CONTRAST: CPT

## 2017-11-17 PROCEDURE — 84443 ASSAY THYROID STIM HORMONE: CPT | Performed by: EMERGENCY MEDICINE

## 2017-11-17 PROCEDURE — 83880 ASSAY OF NATRIURETIC PEPTIDE: CPT | Performed by: EMERGENCY MEDICINE

## 2017-11-17 PROCEDURE — 70490 CT SOFT TISSUE NECK W/O DYE: CPT

## 2017-11-17 PROCEDURE — 93005 ELECTROCARDIOGRAM TRACING: CPT | Performed by: EMERGENCY MEDICINE

## 2017-11-17 PROCEDURE — 85652 RBC SED RATE AUTOMATED: CPT | Performed by: EMERGENCY MEDICINE

## 2017-11-17 PROCEDURE — 84484 ASSAY OF TROPONIN QUANT: CPT

## 2017-11-17 PROCEDURE — 71010 HC CHEST PA OR AP: CPT

## 2017-11-17 PROCEDURE — 87205 SMEAR GRAM STAIN: CPT | Performed by: EMERGENCY MEDICINE

## 2017-11-17 PROCEDURE — 83605 ASSAY OF LACTIC ACID: CPT | Performed by: EMERGENCY MEDICINE

## 2017-11-17 PROCEDURE — 63710000001 FLUTICASONE 50 MCG/ACT SUSPENSION 16 G BOTTLE

## 2017-11-17 PROCEDURE — 87070 CULTURE OTHR SPECIMN AEROBIC: CPT | Performed by: EMERGENCY MEDICINE

## 2017-11-17 RX ORDER — VANCOMYCIN HYDROCHLORIDE 1 G/200ML
15 INJECTION, SOLUTION INTRAVENOUS EVERY 12 HOURS
Status: DISCONTINUED | OUTPATIENT
Start: 2017-11-18 | End: 2017-11-18

## 2017-11-17 RX ORDER — ALBUTEROL SULFATE 2.5 MG/3ML
2.5 SOLUTION RESPIRATORY (INHALATION) ONCE
Status: COMPLETED | OUTPATIENT
Start: 2017-11-17 | End: 2017-11-17

## 2017-11-17 RX ORDER — ACETAMINOPHEN 325 MG/1
650 TABLET ORAL EVERY 4 HOURS PRN
Status: DISCONTINUED | OUTPATIENT
Start: 2017-11-17 | End: 2017-11-24 | Stop reason: HOSPADM

## 2017-11-17 RX ORDER — BISOPROLOL FUMARATE 5 MG/1
2.5 TABLET, FILM COATED ORAL ONCE
Status: COMPLETED | OUTPATIENT
Start: 2017-11-17 | End: 2017-11-17

## 2017-11-17 RX ORDER — ACETAMINOPHEN 500 MG
1000 TABLET ORAL ONCE
Status: COMPLETED | OUTPATIENT
Start: 2017-11-17 | End: 2017-11-17

## 2017-11-17 RX ORDER — METHYLPREDNISOLONE SODIUM SUCCINATE 40 MG/ML
40 INJECTION, POWDER, LYOPHILIZED, FOR SOLUTION INTRAMUSCULAR; INTRAVENOUS EVERY 6 HOURS
Status: COMPLETED | OUTPATIENT
Start: 2017-11-17 | End: 2017-11-19

## 2017-11-17 RX ORDER — DEXAMETHASONE SODIUM PHOSPHATE 4 MG/ML
10 INJECTION, SOLUTION INTRA-ARTICULAR; INTRALESIONAL; INTRAMUSCULAR; INTRAVENOUS; SOFT TISSUE ONCE
Status: COMPLETED | OUTPATIENT
Start: 2017-11-17 | End: 2017-11-17

## 2017-11-17 RX ORDER — BISOPROLOL FUMARATE 5 MG/1
2.5 TABLET, FILM COATED ORAL
Status: DISCONTINUED | OUTPATIENT
Start: 2017-11-18 | End: 2017-11-22

## 2017-11-17 RX ORDER — FINASTERIDE 5 MG/1
5 TABLET, FILM COATED ORAL ONCE
Status: COMPLETED | OUTPATIENT
Start: 2017-11-17 | End: 2017-11-17

## 2017-11-17 RX ORDER — FINASTERIDE 5 MG/1
5 TABLET, FILM COATED ORAL DAILY
Status: DISCONTINUED | OUTPATIENT
Start: 2017-11-18 | End: 2017-11-24 | Stop reason: HOSPADM

## 2017-11-17 RX ORDER — SODIUM CHLORIDE 0.9 % (FLUSH) 0.9 %
1-10 SYRINGE (ML) INJECTION AS NEEDED
Status: DISCONTINUED | OUTPATIENT
Start: 2017-11-17 | End: 2017-11-18

## 2017-11-17 RX ORDER — FENTANYL CITRATE 50 UG/ML
25 INJECTION, SOLUTION INTRAMUSCULAR; INTRAVENOUS ONCE
Status: COMPLETED | OUTPATIENT
Start: 2017-11-17 | End: 2017-11-17

## 2017-11-17 RX ORDER — PREDNISONE 10 MG/1
15 TABLET ORAL DAILY
Status: CANCELLED | OUTPATIENT
Start: 2017-11-17

## 2017-11-17 RX ORDER — VANCOMYCIN/0.9 % SOD CHLORIDE 1.5G/250ML
20 PLASTIC BAG, INJECTION (ML) INTRAVENOUS ONCE
Status: COMPLETED | OUTPATIENT
Start: 2017-11-17 | End: 2017-11-17

## 2017-11-17 RX ORDER — FLUTICASONE PROPIONATE 50 MCG
2 SPRAY, SUSPENSION (ML) NASAL ONCE
Status: COMPLETED | OUTPATIENT
Start: 2017-11-17 | End: 2017-11-17

## 2017-11-17 RX ORDER — FLUTICASONE PROPIONATE 50 MCG
2 SPRAY, SUSPENSION (ML) NASAL DAILY
Status: DISCONTINUED | OUTPATIENT
Start: 2017-11-18 | End: 2017-11-21 | Stop reason: SDUPTHER

## 2017-11-17 RX ORDER — DOCUSATE SODIUM 100 MG/1
100 CAPSULE, LIQUID FILLED ORAL DAILY PRN
Status: DISCONTINUED | OUTPATIENT
Start: 2017-11-17 | End: 2017-11-24 | Stop reason: HOSPADM

## 2017-11-17 RX ORDER — ONDANSETRON 2 MG/ML
4 INJECTION INTRAMUSCULAR; INTRAVENOUS ONCE
Status: COMPLETED | OUTPATIENT
Start: 2017-11-17 | End: 2017-11-17

## 2017-11-17 RX ORDER — IPRATROPIUM BROMIDE AND ALBUTEROL SULFATE 2.5; .5 MG/3ML; MG/3ML
3 SOLUTION RESPIRATORY (INHALATION)
Status: DISCONTINUED | OUTPATIENT
Start: 2017-11-17 | End: 2017-11-19

## 2017-11-17 RX ORDER — SODIUM CHLORIDE 9 MG/ML
100 INJECTION, SOLUTION INTRAVENOUS CONTINUOUS
Status: DISCONTINUED | OUTPATIENT
Start: 2017-11-17 | End: 2017-11-18

## 2017-11-17 RX ORDER — IPRATROPIUM BROMIDE AND ALBUTEROL SULFATE 2.5; .5 MG/3ML; MG/3ML
3 SOLUTION RESPIRATORY (INHALATION) ONCE
Status: COMPLETED | OUTPATIENT
Start: 2017-11-17 | End: 2017-11-17

## 2017-11-17 RX ADMIN — IPRATROPIUM BROMIDE AND ALBUTEROL SULFATE 3 ML: .5; 3 SOLUTION RESPIRATORY (INHALATION) at 15:05

## 2017-11-17 RX ADMIN — DEXAMETHASONE SODIUM PHOSPHATE 10 MG: 4 INJECTION, SOLUTION INTRAMUSCULAR; INTRAVENOUS at 18:07

## 2017-11-17 RX ADMIN — ACETAMINOPHEN 1000 MG: 500 TABLET ORAL at 15:47

## 2017-11-17 RX ADMIN — TAZOBACTAM SODIUM AND PIPERACILLIN SODIUM 4.5 G: 500; 4 INJECTION, SOLUTION INTRAVENOUS at 15:51

## 2017-11-17 RX ADMIN — FLUTICASONE PROPIONATE 2 SPRAY: 50 SPRAY, METERED NASAL at 23:28

## 2017-11-17 RX ADMIN — ALBUTEROL SULFATE 2.5 MG: 2.5 SOLUTION RESPIRATORY (INHALATION) at 18:25

## 2017-11-17 RX ADMIN — BISOPROLOL FUMARATE 2.5 MG: 5 TABLET, COATED ORAL at 23:28

## 2017-11-17 RX ADMIN — FENTANYL CITRATE 25 MCG: 50 INJECTION INTRAMUSCULAR; INTRAVENOUS at 15:46

## 2017-11-17 RX ADMIN — VANCOMYCIN HCL-SODIUM CHLORIDE IV SOLN 1.5 GM/250ML-0.9% 1500 MG: 1.5-0.9/25 SOLUTION at 16:46

## 2017-11-17 RX ADMIN — APIXABAN 5 MG: 5 TABLET, FILM COATED ORAL at 23:42

## 2017-11-17 RX ADMIN — IPRATROPIUM BROMIDE AND ALBUTEROL SULFATE 3 ML: .5; 3 SOLUTION RESPIRATORY (INHALATION) at 20:23

## 2017-11-17 RX ADMIN — SODIUM CHLORIDE 2244 ML: 9 INJECTION, SOLUTION INTRAVENOUS at 15:48

## 2017-11-17 RX ADMIN — FINASTERIDE 5 MG: 5 TABLET, FILM COATED ORAL at 23:29

## 2017-11-17 RX ADMIN — ONDANSETRON 4 MG: 2 INJECTION INTRAMUSCULAR; INTRAVENOUS at 15:44

## 2017-11-17 RX ADMIN — SODIUM CHLORIDE 100 ML/HR: 9 INJECTION, SOLUTION INTRAVENOUS at 23:42

## 2017-11-17 RX ADMIN — METHYLPREDNISOLONE SODIUM SUCCINATE 40 MG: 40 INJECTION, POWDER, LYOPHILIZED, FOR SOLUTION INTRAMUSCULAR; INTRAVENOUS at 23:30

## 2017-11-18 LAB
ANION GAP SERPL CALCULATED.3IONS-SCNC: 9 MMOL/L (ref 3–11)
BASOPHILS # BLD AUTO: 0.01 10*3/MM3 (ref 0–0.2)
BASOPHILS NFR BLD AUTO: 0.1 % (ref 0–1)
BUN BLD-MCNC: 19 MG/DL (ref 9–23)
BUN/CREAT SERPL: 27.1 (ref 7–25)
CALCIUM SPEC-SCNC: 8.3 MG/DL (ref 8.7–10.4)
CHLORIDE SERPL-SCNC: 110 MMOL/L (ref 99–109)
CO2 SERPL-SCNC: 24 MMOL/L (ref 20–31)
CREAT BLD-MCNC: 0.7 MG/DL (ref 0.6–1.3)
DEPRECATED RDW RBC AUTO: 47.9 FL (ref 37–54)
EOSINOPHIL # BLD AUTO: 0 10*3/MM3 (ref 0–0.3)
EOSINOPHIL NFR BLD AUTO: 0 % (ref 0–3)
ERYTHROCYTE [DISTWIDTH] IN BLOOD BY AUTOMATED COUNT: 15.7 % (ref 11.3–14.5)
FERRITIN SERPL-MCNC: 115 NG/ML (ref 22–322)
GFR SERPL CREATININE-BSD FRML MDRD: 132 ML/MIN/1.73
GLUCOSE BLD-MCNC: 142 MG/DL (ref 70–100)
HCT VFR BLD AUTO: 33.4 % (ref 38.9–50.9)
HGB BLD-MCNC: 9.7 G/DL (ref 13.1–17.5)
HYPOCHROMIA BLD QL: NORMAL
IMM GRANULOCYTES # BLD: 0.02 10*3/MM3 (ref 0–0.03)
IMM GRANULOCYTES NFR BLD: 0.2 % (ref 0–0.6)
IRON 24H UR-MRATE: 10 MCG/DL (ref 50–175)
IRON SATN MFR SERPL: 4 % (ref 20–50)
LYMPHOCYTES # BLD AUTO: 0.37 10*3/MM3 (ref 0.6–4.8)
LYMPHOCYTES NFR BLD AUTO: 3.7 % (ref 24–44)
MCH RBC QN AUTO: 24.1 PG (ref 27–31)
MCHC RBC AUTO-ENTMCNC: 29 G/DL (ref 32–36)
MCV RBC AUTO: 83.1 FL (ref 80–99)
MONOCYTES # BLD AUTO: 0.05 10*3/MM3 (ref 0–1)
MONOCYTES NFR BLD AUTO: 0.5 % (ref 0–12)
NEUTROPHILS # BLD AUTO: 9.68 10*3/MM3 (ref 1.5–8.3)
NEUTROPHILS NFR BLD AUTO: 95.5 % (ref 41–71)
PLAT MORPH BLD: NORMAL
PLATELET # BLD AUTO: 148 10*3/MM3 (ref 150–450)
PMV BLD AUTO: 12.1 FL (ref 6–12)
POTASSIUM BLD-SCNC: 4 MMOL/L (ref 3.5–5.5)
RBC # BLD AUTO: 4.02 10*6/MM3 (ref 4.2–5.76)
SODIUM BLD-SCNC: 143 MMOL/L (ref 132–146)
TIBC SERPL-MCNC: 240 MCG/DL (ref 250–450)
WBC MORPH BLD: NORMAL
WBC NRBC COR # BLD: 10.13 10*3/MM3 (ref 3.5–10.8)

## 2017-11-18 PROCEDURE — 83540 ASSAY OF IRON: CPT | Performed by: PHYSICIAN ASSISTANT

## 2017-11-18 PROCEDURE — 63710000001 HYDROCODONE-ACETAMINOPHEN 10-325 MG TABLET: Performed by: NURSE PRACTITIONER

## 2017-11-18 PROCEDURE — 25010000002 PIPERACILLIN SOD-TAZOBACTAM PER 1 G: Performed by: PHYSICIAN ASSISTANT

## 2017-11-18 PROCEDURE — 99232 SBSQ HOSP IP/OBS MODERATE 35: CPT | Performed by: INTERNAL MEDICINE

## 2017-11-18 PROCEDURE — 94799 UNLISTED PULMONARY SVC/PX: CPT

## 2017-11-18 PROCEDURE — 63710000001 APIXABAN 5 MG TABLET: Performed by: PHYSICIAN ASSISTANT

## 2017-11-18 PROCEDURE — 25010000002 METHYLPREDNISOLONE PER 40 MG: Performed by: PHYSICIAN ASSISTANT

## 2017-11-18 PROCEDURE — 25010000002 CEFEPIME PER 500 MG: Performed by: INTERNAL MEDICINE

## 2017-11-18 PROCEDURE — A9270 NON-COVERED ITEM OR SERVICE: HCPCS | Performed by: PHYSICIAN ASSISTANT

## 2017-11-18 PROCEDURE — 83550 IRON BINDING TEST: CPT | Performed by: PHYSICIAN ASSISTANT

## 2017-11-18 PROCEDURE — 94760 N-INVAS EAR/PLS OXIMETRY 1: CPT

## 2017-11-18 PROCEDURE — 80048 BASIC METABOLIC PNL TOTAL CA: CPT | Performed by: PHYSICIAN ASSISTANT

## 2017-11-18 PROCEDURE — 85007 BL SMEAR W/DIFF WBC COUNT: CPT | Performed by: PHYSICIAN ASSISTANT

## 2017-11-18 PROCEDURE — 63710000001 FLUTICASONE 50 MCG/ACT SUSPENSION 16 G BOTTLE: Performed by: PHYSICIAN ASSISTANT

## 2017-11-18 PROCEDURE — A9270 NON-COVERED ITEM OR SERVICE: HCPCS | Performed by: NURSE PRACTITIONER

## 2017-11-18 PROCEDURE — 63710000001 BISOPROLOL 5 MG TABLET: Performed by: PHYSICIAN ASSISTANT

## 2017-11-18 PROCEDURE — 94640 AIRWAY INHALATION TREATMENT: CPT

## 2017-11-18 PROCEDURE — 85025 COMPLETE CBC W/AUTO DIFF WBC: CPT | Performed by: PHYSICIAN ASSISTANT

## 2017-11-18 PROCEDURE — 25010000002 VANCOMYCIN PER 500 MG

## 2017-11-18 PROCEDURE — 82728 ASSAY OF FERRITIN: CPT | Performed by: PHYSICIAN ASSISTANT

## 2017-11-18 PROCEDURE — 63710000001 FINASTERIDE 5 MG TABLET: Performed by: PHYSICIAN ASSISTANT

## 2017-11-18 RX ORDER — HYDROCODONE BITARTRATE AND ACETAMINOPHEN 10; 325 MG/1; MG/1
1 TABLET ORAL EVERY 6 HOURS PRN
Status: DISCONTINUED | OUTPATIENT
Start: 2017-11-18 | End: 2017-11-24 | Stop reason: HOSPADM

## 2017-11-18 RX ORDER — POLYVINYL ALCOHOL 14 MG/ML
2 SOLUTION/ DROPS OPHTHALMIC
Status: DISCONTINUED | OUTPATIENT
Start: 2017-11-18 | End: 2017-11-24 | Stop reason: HOSPADM

## 2017-11-18 RX ORDER — KETOTIFEN FUMARATE 0.35 MG/ML
1 SOLUTION/ DROPS OPHTHALMIC 2 TIMES DAILY PRN
Status: DISCONTINUED | OUTPATIENT
Start: 2017-11-18 | End: 2017-11-24 | Stop reason: HOSPADM

## 2017-11-18 RX ORDER — CETIRIZINE HYDROCHLORIDE 10 MG/1
10 TABLET ORAL NIGHTLY PRN
Status: DISCONTINUED | OUTPATIENT
Start: 2017-11-18 | End: 2017-11-24 | Stop reason: HOSPADM

## 2017-11-18 RX ADMIN — IPRATROPIUM BROMIDE AND ALBUTEROL SULFATE 3 ML: .5; 3 SOLUTION RESPIRATORY (INHALATION) at 13:30

## 2017-11-18 RX ADMIN — DOXYCYCLINE 100 MG: 100 INJECTION, POWDER, LYOPHILIZED, FOR SOLUTION INTRAVENOUS at 12:18

## 2017-11-18 RX ADMIN — BISOPROLOL FUMARATE 2.5 MG: 5 TABLET, COATED ORAL at 08:56

## 2017-11-18 RX ADMIN — METHYLPREDNISOLONE SODIUM SUCCINATE 40 MG: 40 INJECTION, POWDER, LYOPHILIZED, FOR SOLUTION INTRAMUSCULAR; INTRAVENOUS at 20:49

## 2017-11-18 RX ADMIN — APIXABAN 5 MG: 5 TABLET, FILM COATED ORAL at 08:56

## 2017-11-18 RX ADMIN — IPRATROPIUM BROMIDE AND ALBUTEROL SULFATE 3 ML: .5; 3 SOLUTION RESPIRATORY (INHALATION) at 07:26

## 2017-11-18 RX ADMIN — METHYLPREDNISOLONE SODIUM SUCCINATE 40 MG: 40 INJECTION, POWDER, LYOPHILIZED, FOR SOLUTION INTRAMUSCULAR; INTRAVENOUS at 10:27

## 2017-11-18 RX ADMIN — METHYLPREDNISOLONE SODIUM SUCCINATE 40 MG: 40 INJECTION, POWDER, LYOPHILIZED, FOR SOLUTION INTRAMUSCULAR; INTRAVENOUS at 05:10

## 2017-11-18 RX ADMIN — APIXABAN 5 MG: 5 TABLET, FILM COATED ORAL at 20:48

## 2017-11-18 RX ADMIN — DOXYCYCLINE 100 MG: 100 INJECTION, POWDER, LYOPHILIZED, FOR SOLUTION INTRAVENOUS at 20:48

## 2017-11-18 RX ADMIN — WATER 1 G: 1 INJECTION INTRAMUSCULAR; INTRAVENOUS; SUBCUTANEOUS at 12:13

## 2017-11-18 RX ADMIN — TAZOBACTAM SODIUM AND PIPERACILLIN SODIUM 3.38 G: 375; 3 INJECTION, SOLUTION INTRAVENOUS at 06:32

## 2017-11-18 RX ADMIN — IPRATROPIUM BROMIDE AND ALBUTEROL SULFATE 3 ML: .5; 3 SOLUTION RESPIRATORY (INHALATION) at 19:23

## 2017-11-18 RX ADMIN — FLUTICASONE PROPIONATE 2 SPRAY: 50 SPRAY, METERED NASAL at 08:56

## 2017-11-18 RX ADMIN — VANCOMYCIN HYDROCHLORIDE 1000 MG: 1 INJECTION, SOLUTION INTRAVENOUS at 05:14

## 2017-11-18 RX ADMIN — WATER 1 G: 1 INJECTION INTRAMUSCULAR; INTRAVENOUS; SUBCUTANEOUS at 20:49

## 2017-11-18 RX ADMIN — HYDROCODONE BITARTRATE AND ACETAMINOPHEN 1 TABLET: 10; 325 TABLET ORAL at 22:04

## 2017-11-18 RX ADMIN — METHYLPREDNISOLONE SODIUM SUCCINATE 40 MG: 40 INJECTION, POWDER, LYOPHILIZED, FOR SOLUTION INTRAMUSCULAR; INTRAVENOUS at 16:00

## 2017-11-18 RX ADMIN — FINASTERIDE 5 MG: 5 TABLET, FILM COATED ORAL at 08:55

## 2017-11-19 ENCOUNTER — APPOINTMENT (OUTPATIENT)
Dept: CARDIOLOGY | Facility: HOSPITAL | Age: 77
End: 2017-11-19
Attending: INTERNAL MEDICINE

## 2017-11-19 LAB
BH CV UPPER VENOUS LEFT AXILLARY AUGMENT: NORMAL
BH CV UPPER VENOUS LEFT AXILLARY COMPRESS: NORMAL
BH CV UPPER VENOUS LEFT AXILLARY PHASIC: NORMAL
BH CV UPPER VENOUS LEFT AXILLARY SPONT: NORMAL
BH CV UPPER VENOUS LEFT BRACHIAL COMPRESS: NORMAL
BH CV UPPER VENOUS LEFT INTERNAL JUGULAR AUGMENT: NORMAL
BH CV UPPER VENOUS LEFT INTERNAL JUGULAR COMPRESS: NORMAL
BH CV UPPER VENOUS LEFT INTERNAL JUGULAR PHASIC: NORMAL
BH CV UPPER VENOUS LEFT INTERNAL JUGULAR SPONT: NORMAL
BH CV UPPER VENOUS LEFT RADIAL COMPRESS: NORMAL
BH CV UPPER VENOUS LEFT SUBCLAVIAN AUGMENT: NORMAL
BH CV UPPER VENOUS LEFT SUBCLAVIAN COMPRESS: NORMAL
BH CV UPPER VENOUS LEFT SUBCLAVIAN PHASIC: NORMAL
BH CV UPPER VENOUS LEFT SUBCLAVIAN SPONT: NORMAL
BH CV UPPER VENOUS LEFT ULNAR COMPRESS: NORMAL
BH CV UPPER VENOUS RIGHT SUBCLAVIAN AUGMENT: NORMAL
BH CV UPPER VENOUS RIGHT SUBCLAVIAN COMPRESS: NORMAL
BH CV UPPER VENOUS RIGHT SUBCLAVIAN PHASIC: NORMAL
BH CV UPPER VENOUS RIGHT SUBCLAVIAN SPONT: NORMAL
VANCOMYCIN TROUGH SERPL-MCNC: 13.2 MCG/ML (ref 10–20)

## 2017-11-19 PROCEDURE — A9270 NON-COVERED ITEM OR SERVICE: HCPCS | Performed by: PHYSICIAN ASSISTANT

## 2017-11-19 PROCEDURE — 25010000002 CEFEPIME PER 500 MG: Performed by: INTERNAL MEDICINE

## 2017-11-19 PROCEDURE — 99232 SBSQ HOSP IP/OBS MODERATE 35: CPT | Performed by: INTERNAL MEDICINE

## 2017-11-19 PROCEDURE — 63710000001 FLUTICASONE 50 MCG/ACT SUSPENSION 16 G BOTTLE: Performed by: PHYSICIAN ASSISTANT

## 2017-11-19 PROCEDURE — 63710000001 BISOPROLOL 5 MG TABLET: Performed by: PHYSICIAN ASSISTANT

## 2017-11-19 PROCEDURE — 63710000001 APIXABAN 5 MG TABLET: Performed by: PHYSICIAN ASSISTANT

## 2017-11-19 PROCEDURE — 25010000002 METHYLPREDNISOLONE PER 40 MG: Performed by: PHYSICIAN ASSISTANT

## 2017-11-19 PROCEDURE — 97162 PT EVAL MOD COMPLEX 30 MIN: CPT

## 2017-11-19 PROCEDURE — 80202 ASSAY OF VANCOMYCIN: CPT

## 2017-11-19 PROCEDURE — A9270 NON-COVERED ITEM OR SERVICE: HCPCS | Performed by: NURSE PRACTITIONER

## 2017-11-19 PROCEDURE — 29580 STRAPPING UNNA BOOT: CPT

## 2017-11-19 PROCEDURE — 97597 DBRDMT OPN WND 1ST 20 CM/<: CPT

## 2017-11-19 PROCEDURE — 93971 EXTREMITY STUDY: CPT

## 2017-11-19 PROCEDURE — 63710000001 FINASTERIDE 5 MG TABLET: Performed by: PHYSICIAN ASSISTANT

## 2017-11-19 PROCEDURE — 93971 EXTREMITY STUDY: CPT | Performed by: INTERNAL MEDICINE

## 2017-11-19 PROCEDURE — 63710000001 HYDROCODONE-ACETAMINOPHEN 10-325 MG TABLET: Performed by: NURSE PRACTITIONER

## 2017-11-19 PROCEDURE — 25010000002 METHYLPREDNISOLONE PER 40 MG: Performed by: INTERNAL MEDICINE

## 2017-11-19 RX ORDER — IPRATROPIUM BROMIDE AND ALBUTEROL SULFATE 2.5; .5 MG/3ML; MG/3ML
3 SOLUTION RESPIRATORY (INHALATION) EVERY 6 HOURS PRN
Status: DISCONTINUED | OUTPATIENT
Start: 2017-11-19 | End: 2017-11-24 | Stop reason: HOSPADM

## 2017-11-19 RX ADMIN — METHYLPREDNISOLONE SODIUM SUCCINATE 40 MG: 40 INJECTION, POWDER, LYOPHILIZED, FOR SOLUTION INTRAMUSCULAR; INTRAVENOUS at 21:54

## 2017-11-19 RX ADMIN — APIXABAN 5 MG: 5 TABLET, FILM COATED ORAL at 21:53

## 2017-11-19 RX ADMIN — METHYLPREDNISOLONE SODIUM SUCCINATE 40 MG: 40 INJECTION, POWDER, LYOPHILIZED, FOR SOLUTION INTRAMUSCULAR; INTRAVENOUS at 15:59

## 2017-11-19 RX ADMIN — DOXYCYCLINE 100 MG: 100 INJECTION, POWDER, LYOPHILIZED, FOR SOLUTION INTRAVENOUS at 08:41

## 2017-11-19 RX ADMIN — BISOPROLOL FUMARATE 2.5 MG: 5 TABLET, COATED ORAL at 08:41

## 2017-11-19 RX ADMIN — DOXYCYCLINE 100 MG: 100 INJECTION, POWDER, LYOPHILIZED, FOR SOLUTION INTRAVENOUS at 21:56

## 2017-11-19 RX ADMIN — FINASTERIDE 5 MG: 5 TABLET, FILM COATED ORAL at 08:41

## 2017-11-19 RX ADMIN — HYDROCODONE BITARTRATE AND ACETAMINOPHEN 1 TABLET: 10; 325 TABLET ORAL at 18:10

## 2017-11-19 RX ADMIN — METHYLPREDNISOLONE SODIUM SUCCINATE 40 MG: 40 INJECTION, POWDER, LYOPHILIZED, FOR SOLUTION INTRAMUSCULAR; INTRAVENOUS at 04:15

## 2017-11-19 RX ADMIN — METHYLPREDNISOLONE SODIUM SUCCINATE 40 MG: 40 INJECTION, POWDER, LYOPHILIZED, FOR SOLUTION INTRAMUSCULAR; INTRAVENOUS at 09:02

## 2017-11-19 RX ADMIN — APIXABAN 5 MG: 5 TABLET, FILM COATED ORAL at 08:41

## 2017-11-19 RX ADMIN — WATER 1 G: 1 INJECTION INTRAMUSCULAR; INTRAVENOUS; SUBCUTANEOUS at 21:56

## 2017-11-19 RX ADMIN — KETOTIFEN FUMARATE 1 DROP: 0.35 SOLUTION/ DROPS OPHTHALMIC at 16:04

## 2017-11-19 RX ADMIN — WATER 1 G: 1 INJECTION INTRAMUSCULAR; INTRAVENOUS; SUBCUTANEOUS at 08:41

## 2017-11-19 RX ADMIN — HYDROCODONE BITARTRATE AND ACETAMINOPHEN 1 TABLET: 10; 325 TABLET ORAL at 11:12

## 2017-11-19 RX ADMIN — FLUTICASONE PROPIONATE 2 SPRAY: 50 SPRAY, METERED NASAL at 08:41

## 2017-11-20 LAB
BACTERIA SPEC AEROBE CULT: ABNORMAL
GRAM STN SPEC: ABNORMAL

## 2017-11-20 PROCEDURE — 94760 N-INVAS EAR/PLS OXIMETRY 1: CPT

## 2017-11-20 PROCEDURE — 94640 AIRWAY INHALATION TREATMENT: CPT

## 2017-11-20 PROCEDURE — 94799 UNLISTED PULMONARY SVC/PX: CPT

## 2017-11-20 PROCEDURE — 97165 OT EVAL LOW COMPLEX 30 MIN: CPT

## 2017-11-20 PROCEDURE — 99232 SBSQ HOSP IP/OBS MODERATE 35: CPT | Performed by: INTERNAL MEDICINE

## 2017-11-20 PROCEDURE — 97110 THERAPEUTIC EXERCISES: CPT

## 2017-11-20 PROCEDURE — 63710000001 PREDNISONE PER 5 MG: Performed by: INTERNAL MEDICINE

## 2017-11-20 PROCEDURE — 25010000002 CEFEPIME PER 500 MG: Performed by: INTERNAL MEDICINE

## 2017-11-20 PROCEDURE — 97162 PT EVAL MOD COMPLEX 30 MIN: CPT

## 2017-11-20 PROCEDURE — 97530 THERAPEUTIC ACTIVITIES: CPT

## 2017-11-20 RX ORDER — DOXYCYCLINE HYCLATE 100 MG/1
100 CAPSULE ORAL EVERY 12 HOURS SCHEDULED
Status: DISCONTINUED | OUTPATIENT
Start: 2017-11-20 | End: 2017-11-21

## 2017-11-20 RX ORDER — SACCHAROMYCES BOULARDII 250 MG
250 CAPSULE ORAL 2 TIMES DAILY
Status: DISCONTINUED | OUTPATIENT
Start: 2017-11-20 | End: 2017-11-24 | Stop reason: HOSPADM

## 2017-11-20 RX ADMIN — BISOPROLOL FUMARATE 2.5 MG: 5 TABLET, COATED ORAL at 08:47

## 2017-11-20 RX ADMIN — IPRATROPIUM BROMIDE AND ALBUTEROL SULFATE 3 ML: .5; 3 SOLUTION RESPIRATORY (INHALATION) at 23:12

## 2017-11-20 RX ADMIN — DOXYCYCLINE HYCLATE 100 MG: 100 CAPSULE ORAL at 20:49

## 2017-11-20 RX ADMIN — PREDNISONE 15 MG: 5 TABLET ORAL at 08:48

## 2017-11-20 RX ADMIN — Medication 250 MG: at 10:07

## 2017-11-20 RX ADMIN — KETOTIFEN FUMARATE 1 DROP: 0.35 SOLUTION/ DROPS OPHTHALMIC at 08:48

## 2017-11-20 RX ADMIN — APIXABAN 5 MG: 5 TABLET, FILM COATED ORAL at 20:49

## 2017-11-20 RX ADMIN — APIXABAN 5 MG: 5 TABLET, FILM COATED ORAL at 08:48

## 2017-11-20 RX ADMIN — HYDROCODONE BITARTRATE AND ACETAMINOPHEN 1 TABLET: 10; 325 TABLET ORAL at 08:48

## 2017-11-20 RX ADMIN — HYDROCODONE BITARTRATE AND ACETAMINOPHEN 1 TABLET: 10; 325 TABLET ORAL at 17:08

## 2017-11-20 RX ADMIN — Medication 250 MG: at 17:08

## 2017-11-20 RX ADMIN — DOXYCYCLINE 100 MG: 100 INJECTION, POWDER, LYOPHILIZED, FOR SOLUTION INTRAVENOUS at 08:48

## 2017-11-20 RX ADMIN — FINASTERIDE 5 MG: 5 TABLET, FILM COATED ORAL at 08:48

## 2017-11-20 RX ADMIN — WATER 1 G: 1 INJECTION INTRAMUSCULAR; INTRAVENOUS; SUBCUTANEOUS at 20:48

## 2017-11-20 RX ADMIN — WATER 1 G: 1 INJECTION INTRAMUSCULAR; INTRAVENOUS; SUBCUTANEOUS at 08:48

## 2017-11-20 RX ADMIN — FLUTICASONE PROPIONATE 2 SPRAY: 50 SPRAY, METERED NASAL at 08:47

## 2017-11-21 PROCEDURE — 99232 SBSQ HOSP IP/OBS MODERATE 35: CPT | Performed by: INTERNAL MEDICINE

## 2017-11-21 PROCEDURE — 25010000002 LEVOFLOXACIN PER 250 MG: Performed by: INTERNAL MEDICINE

## 2017-11-21 PROCEDURE — 63710000001 PREDNISONE PER 5 MG: Performed by: INTERNAL MEDICINE

## 2017-11-21 PROCEDURE — 97110 THERAPEUTIC EXERCISES: CPT

## 2017-11-21 PROCEDURE — 97530 THERAPEUTIC ACTIVITIES: CPT

## 2017-11-21 PROCEDURE — 94660 CPAP INITIATION&MGMT: CPT

## 2017-11-21 PROCEDURE — 94799 UNLISTED PULMONARY SVC/PX: CPT

## 2017-11-21 RX ORDER — FLUTICASONE PROPIONATE 50 MCG
2 SPRAY, SUSPENSION (ML) NASAL DAILY
Status: DISCONTINUED | OUTPATIENT
Start: 2017-11-21 | End: 2017-11-24 | Stop reason: HOSPADM

## 2017-11-21 RX ORDER — GUAIFENESIN 600 MG/1
600 TABLET, EXTENDED RELEASE ORAL EVERY 12 HOURS SCHEDULED
Status: DISCONTINUED | OUTPATIENT
Start: 2017-11-21 | End: 2017-11-24 | Stop reason: HOSPADM

## 2017-11-21 RX ORDER — LEVOFLOXACIN 5 MG/ML
500 INJECTION, SOLUTION INTRAVENOUS EVERY 24 HOURS
Status: DISCONTINUED | OUTPATIENT
Start: 2017-11-21 | End: 2017-11-22

## 2017-11-21 RX ADMIN — CETIRIZINE HYDROCHLORIDE 10 MG: 10 TABLET, FILM COATED ORAL at 00:20

## 2017-11-21 RX ADMIN — HYDROCODONE BITARTRATE AND ACETAMINOPHEN 1 TABLET: 10; 325 TABLET ORAL at 09:47

## 2017-11-21 RX ADMIN — GUAIFENESIN 600 MG: 600 TABLET, EXTENDED RELEASE ORAL at 20:36

## 2017-11-21 RX ADMIN — PREDNISONE 15 MG: 5 TABLET ORAL at 09:47

## 2017-11-21 RX ADMIN — FINASTERIDE 5 MG: 5 TABLET, FILM COATED ORAL at 09:46

## 2017-11-21 RX ADMIN — LEVOFLOXACIN 500 MG: 5 INJECTION, SOLUTION INTRAVENOUS at 09:47

## 2017-11-21 RX ADMIN — Medication 250 MG: at 09:45

## 2017-11-21 RX ADMIN — GUAIFENESIN 600 MG: 600 TABLET, EXTENDED RELEASE ORAL at 09:47

## 2017-11-21 RX ADMIN — APIXABAN 5 MG: 5 TABLET, FILM COATED ORAL at 20:36

## 2017-11-21 RX ADMIN — HYDROCODONE BITARTRATE AND ACETAMINOPHEN 1 TABLET: 10; 325 TABLET ORAL at 23:52

## 2017-11-21 RX ADMIN — APIXABAN 5 MG: 5 TABLET, FILM COATED ORAL at 09:47

## 2017-11-21 RX ADMIN — BISOPROLOL FUMARATE 2.5 MG: 5 TABLET, COATED ORAL at 09:45

## 2017-11-21 RX ADMIN — Medication 250 MG: at 18:50

## 2017-11-22 PROBLEM — R33.9 URINARY RETENTION: Status: ACTIVE | Noted: 2017-11-22

## 2017-11-22 LAB
BACTERIA SPEC AEROBE CULT: NORMAL
BACTERIA SPEC AEROBE CULT: NORMAL
C DIFF TOX GENS STL QL NAA+PROBE: NOT DETECTED

## 2017-11-22 PROCEDURE — 29580 STRAPPING UNNA BOOT: CPT

## 2017-11-22 PROCEDURE — 83735 ASSAY OF MAGNESIUM: CPT | Performed by: NURSE PRACTITIONER

## 2017-11-22 PROCEDURE — 63710000001 PREDNISONE PER 5 MG: Performed by: INTERNAL MEDICINE

## 2017-11-22 PROCEDURE — 97597 DBRDMT OPN WND 1ST 20 CM/<: CPT

## 2017-11-22 PROCEDURE — 99233 SBSQ HOSP IP/OBS HIGH 50: CPT | Performed by: HOSPITALIST

## 2017-11-22 PROCEDURE — 87493 C DIFF AMPLIFIED PROBE: CPT | Performed by: INTERNAL MEDICINE

## 2017-11-22 RX ORDER — LEVOFLOXACIN 500 MG/1
500 TABLET, FILM COATED ORAL EVERY 24 HOURS
Status: DISCONTINUED | OUTPATIENT
Start: 2017-11-22 | End: 2017-11-24 | Stop reason: HOSPADM

## 2017-11-22 RX ORDER — BISOPROLOL FUMARATE 5 MG/1
5 TABLET, FILM COATED ORAL
Status: DISCONTINUED | OUTPATIENT
Start: 2017-11-23 | End: 2017-11-24 | Stop reason: HOSPADM

## 2017-11-22 RX ORDER — ENALAPRILAT 2.5 MG/2ML
1.25 INJECTION INTRAVENOUS EVERY 6 HOURS PRN
Status: DISCONTINUED | OUTPATIENT
Start: 2017-11-22 | End: 2017-11-24 | Stop reason: HOSPADM

## 2017-11-22 RX ORDER — LISINOPRIL 5 MG/1
5 TABLET ORAL
Status: DISCONTINUED | OUTPATIENT
Start: 2017-11-22 | End: 2017-11-24 | Stop reason: HOSPADM

## 2017-11-22 RX ORDER — AMLODIPINE BESYLATE 10 MG/1
10 TABLET ORAL
Status: DISCONTINUED | OUTPATIENT
Start: 2017-11-22 | End: 2017-11-24 | Stop reason: HOSPADM

## 2017-11-22 RX ADMIN — LEVOFLOXACIN 500 MG: 500 TABLET, FILM COATED ORAL at 10:10

## 2017-11-22 RX ADMIN — DOCUSATE SODIUM 100 MG: 100 CAPSULE, LIQUID FILLED ORAL at 10:10

## 2017-11-22 RX ADMIN — APIXABAN 5 MG: 5 TABLET, FILM COATED ORAL at 20:41

## 2017-11-22 RX ADMIN — GUAIFENESIN 600 MG: 600 TABLET, EXTENDED RELEASE ORAL at 20:41

## 2017-11-22 RX ADMIN — FLUTICASONE PROPIONATE 2 SPRAY: 50 SPRAY, METERED NASAL at 08:40

## 2017-11-22 RX ADMIN — GUAIFENESIN 600 MG: 600 TABLET, EXTENDED RELEASE ORAL at 08:37

## 2017-11-22 RX ADMIN — FINASTERIDE 5 MG: 5 TABLET, FILM COATED ORAL at 08:37

## 2017-11-22 RX ADMIN — LISINOPRIL 5 MG: 5 TABLET ORAL at 10:10

## 2017-11-22 RX ADMIN — Medication 250 MG: at 08:37

## 2017-11-22 RX ADMIN — APIXABAN 5 MG: 5 TABLET, FILM COATED ORAL at 08:37

## 2017-11-22 RX ADMIN — PREDNISONE 15 MG: 5 TABLET ORAL at 08:37

## 2017-11-22 RX ADMIN — Medication 250 MG: at 18:06

## 2017-11-22 RX ADMIN — BISOPROLOL FUMARATE 2.5 MG: 5 TABLET, COATED ORAL at 08:37

## 2017-11-22 RX ADMIN — HYDROCODONE BITARTRATE AND ACETAMINOPHEN 1 TABLET: 10; 325 TABLET ORAL at 08:42

## 2017-11-22 RX ADMIN — AMLODIPINE BESYLATE 10 MG: 10 TABLET ORAL at 14:59

## 2017-11-23 LAB
ANION GAP SERPL CALCULATED.3IONS-SCNC: 5 MMOL/L (ref 3–11)
BASOPHILS # BLD AUTO: 0 10*3/MM3 (ref 0–0.2)
BASOPHILS NFR BLD AUTO: 0 % (ref 0–1)
BUN BLD-MCNC: 18 MG/DL (ref 9–23)
BUN/CREAT SERPL: 25.7 (ref 7–25)
CALCIUM SPEC-SCNC: 8.4 MG/DL (ref 8.7–10.4)
CHLORIDE SERPL-SCNC: 108 MMOL/L (ref 99–109)
CO2 SERPL-SCNC: 30 MMOL/L (ref 20–31)
CREAT BLD-MCNC: 0.7 MG/DL (ref 0.6–1.3)
DEPRECATED RDW RBC AUTO: 46 FL (ref 37–54)
EOSINOPHIL # BLD AUTO: 0.1 10*3/MM3 (ref 0–0.3)
EOSINOPHIL NFR BLD AUTO: 0.9 % (ref 0–3)
ERYTHROCYTE [DISTWIDTH] IN BLOOD BY AUTOMATED COUNT: 15.5 % (ref 11.3–14.5)
GFR SERPL CREATININE-BSD FRML MDRD: 132 ML/MIN/1.73
GLUCOSE BLD-MCNC: 78 MG/DL (ref 70–100)
HCT VFR BLD AUTO: 35.9 % (ref 38.9–50.9)
HGB BLD-MCNC: 10.4 G/DL (ref 13.1–17.5)
IMM GRANULOCYTES # BLD: 0.06 10*3/MM3 (ref 0–0.03)
IMM GRANULOCYTES NFR BLD: 0.5 % (ref 0–0.6)
LYMPHOCYTES # BLD AUTO: 1.64 10*3/MM3 (ref 0.6–4.8)
LYMPHOCYTES NFR BLD AUTO: 14.9 % (ref 24–44)
MAGNESIUM SERPL-MCNC: 2 MG/DL (ref 1.3–2.7)
MCH RBC QN AUTO: 23.5 PG (ref 27–31)
MCHC RBC AUTO-ENTMCNC: 29 G/DL (ref 32–36)
MCV RBC AUTO: 81 FL (ref 80–99)
MONOCYTES # BLD AUTO: 0.68 10*3/MM3 (ref 0–1)
MONOCYTES NFR BLD AUTO: 6.2 % (ref 0–12)
NEUTROPHILS # BLD AUTO: 8.52 10*3/MM3 (ref 1.5–8.3)
NEUTROPHILS NFR BLD AUTO: 77.5 % (ref 41–71)
PLATELET # BLD AUTO: 178 10*3/MM3 (ref 150–450)
PMV BLD AUTO: 11.2 FL (ref 6–12)
POTASSIUM BLD-SCNC: 3.6 MMOL/L (ref 3.5–5.5)
RBC # BLD AUTO: 4.43 10*6/MM3 (ref 4.2–5.76)
SODIUM BLD-SCNC: 143 MMOL/L (ref 132–146)
WBC NRBC COR # BLD: 11 10*3/MM3 (ref 3.5–10.8)

## 2017-11-23 PROCEDURE — 99233 SBSQ HOSP IP/OBS HIGH 50: CPT | Performed by: HOSPITALIST

## 2017-11-23 PROCEDURE — 63710000001 PREDNISONE PER 5 MG: Performed by: INTERNAL MEDICINE

## 2017-11-23 PROCEDURE — 85025 COMPLETE CBC W/AUTO DIFF WBC: CPT | Performed by: NURSE PRACTITIONER

## 2017-11-23 PROCEDURE — 80048 BASIC METABOLIC PNL TOTAL CA: CPT | Performed by: HOSPITALIST

## 2017-11-23 RX ORDER — TAMSULOSIN HYDROCHLORIDE 0.4 MG/1
0.4 CAPSULE ORAL NIGHTLY
Status: DISCONTINUED | OUTPATIENT
Start: 2017-11-23 | End: 2017-11-24 | Stop reason: HOSPADM

## 2017-11-23 RX ADMIN — AMLODIPINE BESYLATE 10 MG: 10 TABLET ORAL at 08:43

## 2017-11-23 RX ADMIN — GUAIFENESIN 600 MG: 600 TABLET, EXTENDED RELEASE ORAL at 20:47

## 2017-11-23 RX ADMIN — PREDNISONE 15 MG: 5 TABLET ORAL at 08:43

## 2017-11-23 RX ADMIN — KETOTIFEN FUMARATE 1 DROP: 0.35 SOLUTION/ DROPS OPHTHALMIC at 08:44

## 2017-11-23 RX ADMIN — LISINOPRIL 5 MG: 5 TABLET ORAL at 08:44

## 2017-11-23 RX ADMIN — GUAIFENESIN 600 MG: 600 TABLET, EXTENDED RELEASE ORAL at 08:43

## 2017-11-23 RX ADMIN — FLUTICASONE PROPIONATE 2 SPRAY: 50 SPRAY, METERED NASAL at 08:44

## 2017-11-23 RX ADMIN — Medication 250 MG: at 17:12

## 2017-11-23 RX ADMIN — HYDROCODONE BITARTRATE AND ACETAMINOPHEN 1 TABLET: 10; 325 TABLET ORAL at 08:43

## 2017-11-23 RX ADMIN — LEVOFLOXACIN 500 MG: 500 TABLET, FILM COATED ORAL at 12:09

## 2017-11-23 RX ADMIN — BISOPROLOL FUMARATE 5 MG: 5 TABLET ORAL at 08:44

## 2017-11-23 RX ADMIN — FINASTERIDE 5 MG: 5 TABLET, FILM COATED ORAL at 08:43

## 2017-11-23 RX ADMIN — Medication 250 MG: at 08:43

## 2017-11-24 VITALS
BODY MASS INDEX: 25.82 KG/M2 | DIASTOLIC BLOOD PRESSURE: 72 MMHG | SYSTOLIC BLOOD PRESSURE: 124 MMHG | TEMPERATURE: 97.5 F | WEIGHT: 164.5 LBS | HEART RATE: 60 BPM | HEIGHT: 67 IN | RESPIRATION RATE: 18 BRPM | OXYGEN SATURATION: 96 %

## 2017-11-24 LAB
ANION GAP SERPL CALCULATED.3IONS-SCNC: 4 MMOL/L (ref 3–11)
BASOPHILS # BLD AUTO: 0 10*3/MM3 (ref 0–0.2)
BASOPHILS NFR BLD AUTO: 0 % (ref 0–1)
BUN BLD-MCNC: 18 MG/DL (ref 9–23)
BUN/CREAT SERPL: 30 (ref 7–25)
CALCIUM SPEC-SCNC: 8.5 MG/DL (ref 8.7–10.4)
CHLORIDE SERPL-SCNC: 107 MMOL/L (ref 99–109)
CO2 SERPL-SCNC: 30 MMOL/L (ref 20–31)
CREAT BLD-MCNC: 0.6 MG/DL (ref 0.6–1.3)
DEPRECATED RDW RBC AUTO: 47.5 FL (ref 37–54)
EOSINOPHIL # BLD AUTO: 0.12 10*3/MM3 (ref 0–0.3)
EOSINOPHIL NFR BLD AUTO: 1.1 % (ref 0–3)
ERYTHROCYTE [DISTWIDTH] IN BLOOD BY AUTOMATED COUNT: 15.7 % (ref 11.3–14.5)
GFR SERPL CREATININE-BSD FRML MDRD: >150 ML/MIN/1.73
GLUCOSE BLD-MCNC: 80 MG/DL (ref 70–100)
HCT VFR BLD AUTO: 34.8 % (ref 38.9–50.9)
HGB BLD-MCNC: 10 G/DL (ref 13.1–17.5)
IMM GRANULOCYTES # BLD: 0.05 10*3/MM3 (ref 0–0.03)
IMM GRANULOCYTES NFR BLD: 0.5 % (ref 0–0.6)
LYMPHOCYTES # BLD AUTO: 1.87 10*3/MM3 (ref 0.6–4.8)
LYMPHOCYTES NFR BLD AUTO: 17.2 % (ref 24–44)
MCH RBC QN AUTO: 23.5 PG (ref 27–31)
MCHC RBC AUTO-ENTMCNC: 28.7 G/DL (ref 32–36)
MCV RBC AUTO: 81.9 FL (ref 80–99)
MONOCYTES # BLD AUTO: 0.75 10*3/MM3 (ref 0–1)
MONOCYTES NFR BLD AUTO: 6.9 % (ref 0–12)
NEUTROPHILS # BLD AUTO: 8.08 10*3/MM3 (ref 1.5–8.3)
NEUTROPHILS NFR BLD AUTO: 74.3 % (ref 41–71)
PLATELET # BLD AUTO: 182 10*3/MM3 (ref 150–450)
PMV BLD AUTO: 12.3 FL (ref 6–12)
POTASSIUM BLD-SCNC: 3.6 MMOL/L (ref 3.5–5.5)
RBC # BLD AUTO: 4.25 10*6/MM3 (ref 4.2–5.76)
SODIUM BLD-SCNC: 141 MMOL/L (ref 132–146)
WBC NRBC COR # BLD: 10.87 10*3/MM3 (ref 3.5–10.8)

## 2017-11-24 PROCEDURE — 80048 BASIC METABOLIC PNL TOTAL CA: CPT | Performed by: HOSPITALIST

## 2017-11-24 PROCEDURE — 63710000001 PREDNISONE PER 5 MG: Performed by: INTERNAL MEDICINE

## 2017-11-24 PROCEDURE — 99239 HOSP IP/OBS DSCHRG MGMT >30: CPT | Performed by: HOSPITALIST

## 2017-11-24 PROCEDURE — 85025 COMPLETE CBC W/AUTO DIFF WBC: CPT | Performed by: HOSPITALIST

## 2017-11-24 RX ORDER — AMLODIPINE BESYLATE 10 MG/1
10 TABLET ORAL
Qty: 30 TABLET | Refills: 0 | Status: SHIPPED | OUTPATIENT
Start: 2017-11-25 | End: 2019-01-04 | Stop reason: SDUPTHER

## 2017-11-24 RX ORDER — LISINOPRIL 5 MG/1
5 TABLET ORAL
Qty: 30 TABLET | Refills: 0 | Status: SHIPPED | OUTPATIENT
Start: 2017-11-25 | End: 2018-06-04

## 2017-11-24 RX ORDER — TAMSULOSIN HYDROCHLORIDE 0.4 MG/1
0.4 CAPSULE ORAL NIGHTLY
Qty: 30 CAPSULE | Refills: 0 | Status: SHIPPED | OUTPATIENT
Start: 2017-11-24 | End: 2019-01-15

## 2017-11-24 RX ORDER — GUAIFENESIN 600 MG/1
600 TABLET, EXTENDED RELEASE ORAL EVERY 12 HOURS SCHEDULED
Qty: 14 TABLET | Refills: 0 | Status: SHIPPED | OUTPATIENT
Start: 2017-11-24 | End: 2019-06-09

## 2017-11-24 RX ORDER — LEVOFLOXACIN 500 MG/1
500 TABLET, FILM COATED ORAL EVERY 24 HOURS
Qty: 6 TABLET | Refills: 0 | Status: SHIPPED | OUTPATIENT
Start: 2017-11-25 | End: 2017-12-01

## 2017-11-24 RX ORDER — SACCHAROMYCES BOULARDII 250 MG
250 CAPSULE ORAL 2 TIMES DAILY
Qty: 30 CAPSULE | Refills: 0 | Status: SHIPPED | OUTPATIENT
Start: 2017-11-24 | End: 2018-06-04

## 2017-11-24 RX ADMIN — AMLODIPINE BESYLATE 10 MG: 10 TABLET ORAL at 08:36

## 2017-11-24 RX ADMIN — GUAIFENESIN 600 MG: 600 TABLET, EXTENDED RELEASE ORAL at 08:37

## 2017-11-24 RX ADMIN — APIXABAN 5 MG: 5 TABLET, FILM COATED ORAL at 08:36

## 2017-11-24 RX ADMIN — FLUTICASONE PROPIONATE 2 SPRAY: 50 SPRAY, METERED NASAL at 08:40

## 2017-11-24 RX ADMIN — LISINOPRIL 5 MG: 5 TABLET ORAL at 08:37

## 2017-11-24 RX ADMIN — FINASTERIDE 5 MG: 5 TABLET, FILM COATED ORAL at 08:37

## 2017-11-24 RX ADMIN — Medication 250 MG: at 08:37

## 2017-11-24 RX ADMIN — PREDNISONE 15 MG: 5 TABLET ORAL at 08:36

## 2017-11-24 RX ADMIN — BISOPROLOL FUMARATE 5 MG: 5 TABLET ORAL at 08:37

## 2017-11-24 RX ADMIN — HYDROCODONE BITARTRATE AND ACETAMINOPHEN 1 TABLET: 10; 325 TABLET ORAL at 08:36

## 2017-11-24 RX ADMIN — LEVOFLOXACIN 500 MG: 500 TABLET, FILM COATED ORAL at 11:46

## 2017-11-25 ENCOUNTER — HOSPITAL ENCOUNTER (EMERGENCY)
Facility: HOSPITAL | Age: 77
Discharge: HOME | End: 2017-11-25
Attending: EMERGENCY MEDICINE | Admitting: EMERGENCY MEDICINE

## 2017-11-25 VITALS
WEIGHT: 160 LBS | HEIGHT: 67 IN | RESPIRATION RATE: 20 BRPM | OXYGEN SATURATION: 97 % | SYSTOLIC BLOOD PRESSURE: 126 MMHG | DIASTOLIC BLOOD PRESSURE: 78 MMHG | HEART RATE: 82 BPM | BODY MASS INDEX: 25.11 KG/M2 | TEMPERATURE: 97.8 F

## 2017-11-25 DIAGNOSIS — R33.9 URINARY RETENTION: Primary | ICD-10-CM

## 2017-11-25 DIAGNOSIS — T83.9XXA COMPLICATION OF FOLEY CATHETER, INITIAL ENCOUNTER (HCC): ICD-10-CM

## 2017-11-25 PROCEDURE — 99283 EMERGENCY DEPT VISIT LOW MDM: CPT

## 2017-11-25 PROCEDURE — 51702 INSERT TEMP BLADDER CATH: CPT

## 2017-11-25 NOTE — DISCHARGE INSTRUCTIONS
Please review the medications you are supposed to be taking according to prior physician recommendations. I have not changed your home medications during this visit. If your discharge instructions indicate that I have changed your home medications, this is not the case, and you should disregard. If you have any questions about the medication you should be taking at home, please call your physician.     Drink 2 quarts of water daily.    Return to the ED if you develop any further problems with you graves catheter.

## 2017-11-25 NOTE — ED PROVIDER NOTES
Subjective   HPI Comments: Michoacano Rojas is a 77 y.o.male who presents to the ED with c/o difficulty urinating. He recently had a graves catheter placed 4 days ago for similar symptoms. He felt the urge to void his bladder this morning at 0900 but was unable to do alton which prompted presentation to the ED. He states that his cath was working normally last night and he last emptied his bag last night. He notes that he has had a normal appetite and thirst. He denies any history of kidney failure or related issues. He has no other complaints at this time.      Patient is a 77 y.o. male presenting with difficulty urinating.   History provided by:  Patient  Difficulty Urinating   Presenting symptoms: no penile pain and no scrotal pain    Context comment:  Urinary catheter  Relieved by:  None tried  Worsened by:  Nothing  Ineffective treatments:  None tried  Associated symptoms: urinary retention    Risk factors: urinary catheter        Review of Systems   Genitourinary: Positive for difficulty urinating. Negative for penile pain.       Past Medical History:   Diagnosis Date   • A-fib    • Chronic cutaneous venous stasis ulcer    • Hypertension    • RA (rheumatoid arthritis)    • Rheumatoid arthritis    • Vertigo    • Vertigo        Allergies   Allergen Reactions   • Other      Relates all oral abx cause intol nausea/vomiting.       Past Surgical History:   Procedure Laterality Date   • JOINT MANIPULATION      left hip repair       History reviewed. No pertinent family history.    Social History     Social History   • Marital status:      Spouse name: N/A   • Number of children: N/A   • Years of education: N/A     Social History Main Topics   • Smoking status: Never Smoker   • Smokeless tobacco: Never Used   • Alcohol use No   • Drug use: No   • Sexual activity: Defer     Other Topics Concern   • None     Social History Narrative         Objective   Physical Exam   Constitutional: He is oriented to person, place,  "and time. He appears well-developed and well-nourished. No distress.   HENT:   Head: Normocephalic and atraumatic.   Nose: Nose normal.   Airway patent.   Eyes: Conjunctivae are normal. No scleral icterus.   Neck: Normal range of motion and phonation normal. Neck supple.   Pulmonary/Chest: Effort normal. No respiratory distress.   Abdominal: Soft. He exhibits no distension. There is no tenderness.   Neurological: He is alert and oriented to person, place, and time.   Skin: Skin is warm and dry.   Psychiatric: He has a normal mood and affect. His behavior is normal.   Nursing note and vitals reviewed.      Procedures         ED Course  ED Course   Comment By Time   He has had his Albright cath replaced with good drainage of blood tinged urine.  No blood clots seen.  Will send home with instructions to drink plenty of fluids and return of cath clogs up again. Vikram Huber MD 11/25 1227     No results found for this or any previous visit (from the past 24 hour(s)).  Note: In addition to lab results from this visit, the labs listed above may include labs taken at another facility or during a different encounter within the last 24 hours. Please correlate lab times with ED admission and discharge times for further clarification of the services performed during this visit.    No orders to display     Vitals:    11/25/17 1028 11/25/17 1031 11/25/17 1152   BP:  130/93 135/76   BP Location:   Right arm   Patient Position:  Lying Lying   Pulse:  71 77   Resp:  16 18   Temp:  97.8 °F (36.6 °C)    TempSrc:  Oral    SpO2:  98% 95%   Weight: 160 lb (72.6 kg)     Height: 67\" (170.2 cm)       Medications - No data to display  ECG/EMG Results (last 24 hours)     ** No results found for the last 24 hours. **                          MDM    Final diagnoses:   Urinary retention   Complication of Albright catheter, initial encounter       Documentation assistance provided by cass Chris.  Information recorded by the scrguicho was " done at my direction and has been verified and validated by me.     Prasanth Chris  11/25/17 1135       Salud Jasso  11/25/17 1140       Salud Jasso  11/25/17 1248       Vikram Huber MD  11/25/17 0153

## 2017-11-26 ENCOUNTER — HOSPITAL ENCOUNTER (OUTPATIENT)
Dept: PHYSICAL THERAPY | Facility: HOSPITAL | Age: 77
Setting detail: THERAPIES SERIES
Discharge: HOME | End: 2017-11-26

## 2017-11-26 DIAGNOSIS — S81.801D MULTIPLE OPEN WOUNDS OF LOWER EXTREMITY, RIGHT, SUBSEQUENT ENCOUNTER: ICD-10-CM

## 2017-11-26 DIAGNOSIS — S81.802D MULTIPLE OPEN WOUNDS OF LOWER EXTREMITY, LEFT, SUBSEQUENT ENCOUNTER: Primary | ICD-10-CM

## 2017-11-26 PROCEDURE — 97598 DBRDMT OPN WND ADDL 20CM/<: CPT

## 2017-11-26 PROCEDURE — 97162 PT EVAL MOD COMPLEX 30 MIN: CPT

## 2017-11-26 PROCEDURE — 97597 DBRDMT OPN WND 1ST 20 CM/<: CPT

## 2017-11-26 PROCEDURE — G8991 OTHER PT/OT GOAL STATUS: HCPCS

## 2017-11-26 PROCEDURE — G8990 OTHER PT/OT CURRENT STATUS: HCPCS

## 2017-11-26 PROCEDURE — 29580 STRAPPING UNNA BOOT: CPT

## 2017-11-26 NOTE — THERAPY EVALUATION
Outpatient Rehabilitation - Wound/Debridement Initial Eval   [place]     Patient Name: Michoacano Rojas  : 1940  MRN: [id number]  Today's Date: 2017            R lateral ankle, posterior ankle, respectively      R posterior knee      L ankle (medial, lateral, respectively)      Admit Date: 2017    Visit Dx:    ICD-10-CM ICD-9-CM   1. Multiple open wounds of lower extremity, left, subsequent encounter S81.802D V58.89     894.0   2. 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   • Tracheal stenosis   • Normocytic anemia   • Diastolic dysfunction with chronic heart failure   • Urinary retention        Past Medical History:   Diagnosis Date   • A-fib    • Chronic cutaneous venous stasis ulcer    • Hypertension    • RA (rheumatoid arthritis)    • Rheumatoid arthritis    • Vertigo    • Vertigo         Past Surgical History:   Procedure Laterality Date   • JOINT MANIPULATION      left hip repair             Patient History       17 1000          History    Chief Complaint Ulcer, wound or other skin condition  -      Brief Description of Current Complaint Pt with chronic venous stasis ulcerations to the BLE. He has been treated by PT wound care at [facility] several years ago for the same issues. Pt with recent admission for worsening BLE symptoms as well as fever, SOA, etc.  -      Previous treatment for THIS PROBLEM Medication;Other (comment)   wound care  -      Patient/Caregiver Goals Heal wound  -       Patient's Rating of General Health Fair  -      Patient seeing anyone else for problem(s)? No  -      How has patient tried to help current problem? Family has been changing the bandage to the R posterior knee and B index fingers  -      Related/Recent Hospitalizations Yes  -      Date of Hospitalization 11/17/17  -      Pain     Pain Location Leg   bilateral  -      Pain at Present 3  -      Pain at Best 3  -      Pain at Worst 7  -      Services    Are you currently receiving Home Health services No  -      Do you plan to receive Home Health services in the near future No  -      Daily Activities    Primary Language English  -      How does patient learn best? Listening;Demonstration  -      Teaching needs identified Management of Condition  -      Patient is concerned about/has problems with Difficulty with self care (i.e. bathing, dressing, toileting:;Bed Mobility;Grasping objects lifting;Performing home management (household chores, shopping, care of dependents);Performing job responsibilities/community activities (work, school,;Reaching over head;Repetitive movements of the hand, arm, shoulder;Sitting;Standing;Transfers (getting out of a chair, bed);Walking  -      Does patient have problems with the following? None  -      Barriers to learning None  -      Pt Participated in POC and Goals Yes  -      Safety    Are you being hurt, hit, or frightened by anyone at home or in your life? No  -      Are you being neglected by a caregiver No  -        User Key  (r) = Recorded By, (t) = Taken By, (c) = Cosigned By    Initials Name Provider Type     Mary De Paz, PT Physical Therapist          EVALUATION            LDA Wound       11/26/17 1000          Wound 11/17/17 2200 Right posterior thigh ulceration, venous    Wound - Properties Group Date first assessed: 11/17/17  -PE Time first assessed: 2200 -PE Side: Right  -PE Orientation: posterior  -PE Location: thigh  -PE  "Type: ulceration, venous  -PE    Wound WDL ex  -MC      Dressing Appearance moist drainage;intact  -MC      Base moist;pink;yellow;slough;reddened  -MC      Periwound Area intact;dry  -MC      Edges open  -MC      Length (cm) 2  -MC      Width (cm) 3.8  -MC      Depth (cm) 0.1  -MC      Drainage Characteristics/Odor serosanguineous;malodorous  -MC      Drainage Amount small  -MC      Picture taken yes  -MC      Wound Cleaning cleansed with;other (see comments)   phase one  -MC      Wound Interventions debrided  -MC      Dressing Dressing applied;low-adherent;foam   4\" optifoam gentle border  -MC      Periwound Care cleansed with pH balanced cleanser;dry periwound area maintained  -MC      Wound 11/17/17 2200 Right lower leg ulceration, venous    Wound - Properties Group Date first assessed: 11/17/17 -PE Time first assessed: 2200 -PE Side: Right  -PE Orientation: lower  -PE Location: leg  -PE Type: ulceration, venous  -PE    Wound WDL ex  -MC      Dressing Appearance moist drainage;intact  -MC      Base clean;moist;pink;reddened  -MC      Periwound Area intact;moist;macerated  -MC      Edges open  -MC      Length (cm) 4.7  -MC      Width (cm) 3.5  -MC      Depth (cm) 0.1  -MC      Drainage Characteristics/Odor serosanguineous;malodorous  -MC      Drainage Amount moderate  -MC      Picture taken yes  -MC      Wound Cleaning cleansed with;other (see comments)   phase one  -MC      Wound Interventions debrided  -MC      Dressing Dressing applied;other (see comments)   HFB ready, unna boot  -MC      Periwound Care cleansed with pH balanced cleanser;dry periwound area maintained  -MC      Wound 11/17/17 2200 Left lateral ankle ulceration, venous    Wound - Properties Group Date first assessed: 11/17/17 -PE Time first assessed: 2200 -PE Side: Left  -PE Orientation: lateral  -PE Location: ankle  -PE Type: ulceration, venous  -PE    Wound WDL ex  -MC      Dressing Appearance moist drainage;intact  -MC      Base " clean;moist;pink;reddened  -MC      Periwound Area intact;macerated;moist  -MC      Edges open  -MC      Length (cm) 9  -MC      Width (cm) 4.9  -MC      Depth (cm) 0.1  -MC      Drainage Characteristics/Odor serosanguineous;malodorous  -MC      Drainage Amount moderate  -MC      Picture taken yes  -MC      Wound Cleaning cleansed with;other (see comments)   phase one  -MC      Wound Interventions debrided  -MC      Dressing Dressing applied;other (see comments)   HFB ready, unna boot  -MC      Periwound Care cleansed with pH balanced cleanser;dry periwound area maintained  -MC      Wound 11/17/17 2200 Left medial ankle ulceration, venous    Wound - Properties Group Date first assessed: 11/17/17  -PE Time first assessed: 2200  -PE Side: Left  -PE Orientation: medial  -PE Location: ankle  -PE Type: ulceration, venous  -PE    Wound WDL ex  -MC      Dressing Appearance moist drainage;intact  -MC      Base clean;moist;pink;reddened  -MC      Periwound Area intact;moist;macerated  -MC      Edges open;irregular  -MC      Length (cm) 1.5   anterior. Midline: 1.5 x 0.6 x 0.1cm  -MC      Width (cm) 1.3   posterior: 1.9 x 1.4 x 0.1cm  -MC      Depth (cm) 0.1  -MC      Drainage Characteristics/Odor serosanguineous;malodorous  -MC      Drainage Amount small  -MC      Picture taken yes  -MC      Wound Cleaning cleansed with;other (see comments)   phase one  -MC      Wound Interventions debrided  -MC      Dressing Dressing applied;other (see comments)   HFB ready, unna boot  -MC      Periwound Care cleansed with pH balanced cleanser;dry periwound area maintained  -MC      Wound 11/26/17 1000 Right posterior leg ulceration, venous    Wound - Properties Group Date first assessed: 11/26/17  -MC Time first assessed: 1000  -MC Side: Right  -MC Orientation: posterior  -MC Location: leg  -MC Type: ulceration, venous  -MC    Wound WDL ex  -MC      Dressing Appearance moist drainage;intact  -MC      Base clean;moist;pink;reddened  -MC    "   Periwound Area intact;moist;macerated  -MC      Edges open;irregular  -MC      Length (cm) 8.3  -MC      Width (cm) 5.2  -MC      Depth (cm) 0.1  -MC      Drainage Characteristics/Odor serosanguineous;malodorous  -MC      Drainage Amount moderate  -MC      Picture taken yes  -MC      Wound Cleaning cleansed with;other (see comments)   phase one  -      Wound Interventions debrided  -      Dressing Dressing applied;other (see comments)   HFB ready, unna boot  -      Periwound Care cleansed with pH balanced cleanser;dry periwound area maintained  -        User Key  (r) = Recorded By, (t) = Taken By, (c) = Cosigned By    Initials Name Provider Type    JEANIE De Paz, JOSE MANUEL Physical Therapist    AGNIESZKA Hurley, RN Registered Nurse              Lymphedema       11/26/17 1000          Lymphedema Edema Assessment    Edema Assessment Comment No edema in lower legs at this time.  -      Skin Changes/Observations    Location/Assessment Lower Extremity  -      Lower Extremity Conditions bilateral:;dry;hairless;scaly;crust;fragile  -      Lower Extremity Color/Pigment bilateral:;hyperpigmented  -      Lymphedema Pulses/Capillary Refill    Lymphedema Pulses/Capillary Refill lower extremity pulses;capillary refill  -      Dorsalis Pedis Pulse right:;left:;+1 diminished  -      Posterior Tibialis Pulse right:;left:;+1 diminished  -      Capillary Refill lower extremity capillary refill  -      Lower Extremity Capillary Refill right:;left:;less than 3 seconds  -      Compression/Skin Care    Compression/Skin Care skin care;wrapping location;bandaging  -      Skin Care washed/dried  -      Wrapping Location lower extremity  -      Wrapping Location LE bilateral:;foot to knee  -      Wrapping Comments unna boot, 4\" coban, size 5 spandage  -        User Key  (r) = Recorded By, (t) = Taken By, (c) = Cosigned By    Initials Name Provider Type    JEANIE De Paz, JOSE MANUEL Physical " Therapist          WOUND DEBRIDEMENT  Debridement Site 1  Location- Site 1: B ankle wounds  Selective Debridement- Site 1: Wound Surface <20cmsq  Instruments- Site 1: tweezers  Excised Tissue Description- Site 1: minimum, slough  Bleeding- Site 1: seeping, held pressure, 1 minute   Debridement Site 2  Location- Site 2: R posterior knee  Selective Debridement- Site 2: Wound Surface <20cmsq  Instruments- Site 2: tweezers  Excised Tissue Description- Site 2: maximum, slough (biofilm)  Bleeding- Site 2: seeping, held pressure, 1 minute   Debridement Site 3  Location- Site 3: BLE  Selective Debridement- Site 3: Wound Surface >20cmsq  Instruments- Site 3: tweezers  Excised Tissue Description- Site 3: maximum, other (comment) (nonviable skin crusts/scales over intact, dry skin)  Bleeding- Site 3: none           Therapy Education       11/26/17 1000          Therapy Education    Education Details Reviewed s/sx of infection with pt and family. Reviewed current POC. Will attempt to keep dressings in place for about 1 week, and decide at his next appt if more than once a week is warranted at this time. Issued extra supplies in case these dressings are disrupted. Pt's family to maintain dressing to posterior R knee and B index fingers.   -MC      Given Symptoms/condition management;Bandaging/dressing change  -MC      Program New  -MC      How Provided Verbal;Demonstration  -MC      Provided to Patient;Caregiver  -MC      Level of Understanding Verbalized  -MC        User Key  (r) = Recorded By, (t) = Taken By, (c) = Cosigned By    Initials Name Provider Type    JEANIE De Paz, PT Physical Therapist          Recommendation and Plan        PT Assessment/Plan       11/26/17 1000       PT Assessment    Functional Limitations Decreased safety during functional activities;Impaired gait;Impaired locomotion;Limitation in home management;Limitations in community activities;Limitations in functional capacity and  performance;Performance in leisure activities;Performance in self-care ADL;Other (comment)   wound mgmt  -     Impairments Integumentary integrity   many impairments, only integumentary integrity related to this current POC.  -     Assessment Comments Pt presents with multiple, large, open wounds to the BLE that area chronic in nature, with a recent worsening. After debridement the wounds are clean, moist, and reddened, with min-mod exudate that is slightly odorous. Pt with fragile BLE/periwound skin and extensive hypertrophic changes. D/t the pt's evolving wound symptoms, pt will benefit from skilled PT wound care for debridement, light compression, and other appropriate interventions to promote healing.  -     Rehab Potential Fair  -     Patient/caregiver participated in establishment of treatment plan and goals Yes  -     Patient would benefit from skilled therapy intervention Yes  -     PT Plan    PT Frequency 1x/week;2x/week   pt/family prefers once/week if possible  -     Predicted Duration of Therapy Intervention (days/wks) 16 visits, 8 weeks  -     Planned CPT's? PT EVAL MOD COMPLELITY: 83894;PT HERNAN DEBRIDE OPEN WOUND UP TO 20 CM: 38335;PT HERNAN DEBRIDE OPEN WOUND EA ADD 20 CM: 64186;PT NONSELECT DEBRIDE 15 MIN: 28161;PT NLFU MIST: 09143;PT UNNA BOOT: 94239;PT MULTI LAYER COMP SYS LE;PT THER PROC EA 15 MIN: 28489  -     Physical Therapy Interventions (Optional Details) patient/family education;wound care  -     PT Plan Comments BLE ulcers - debridement, ?endoform since they're chronic, unna boots. R posterior knee wound debridement prn.  -       User Key  (r) = Recorded By, (t) = Taken By, (c) = Cosigned By    Initials Name Provider Type    JEANIE De Paz, PT Physical Therapist            Goals      First Last   PT Goal Re-Cert Due Date: 18  PT Goal Re-Cert Due Date: 18   PT Short Term Goals  STG 1: Pt and/or caregiver will verbalize s/sx of infection.  STG 2: Pt  will demonstrate 25% reduction in wound dimensions to indicate healing progress.  STG 3: Pt will demonstrate only minimal exudate to indicate progress. PT Short Term Goals  STG 1: Pt and/or caregiver will verbalize s/sx of infection.  STG 2: Pt will demonstrate 25% reduction in wound dimensions to indicate healing progress.  STG 3: Pt will demonstrate only minimal exudate to indicate progress.   Long Term Goals  LTG 1: Pt and/or caregivers will be independent with clean home dressing changes to continue progress upon d/c.  LTG 2: Pt will demonstrate 75% reduction in wound dimensions to indicate healing progress.  LTG 3: Pt will demonstrate scant wound exudate to indicate healing progress.  Long Term Goals  LTG 1: Pt and/or caregivers will be independent with clean home dressing changes to continue progress upon d/c.  LTG 2: Pt will demonstrate 75% reduction in wound dimensions to indicate healing progress.  LTG 3: Pt will demonstrate scant wound exudate to indicate healing progress.                   PT OP Goals       11/26/17 1000       PT Short Term Goals    STG 1 Pt and/or caregiver will verbalize s/sx of infection.  -     STG 2 Pt will demonstrate 25% reduction in wound dimensions to indicate healing progress.  -     STG 3 Pt will demonstrate only minimal exudate to indicate progress.  -     Long Term Goals    LTG 1 Pt and/or caregivers will be independent with clean home dressing changes to continue progress upon d/c.  -     LTG 2 Pt will demonstrate 75% reduction in wound dimensions to indicate healing progress.  -     LTG 3 Pt will demonstrate scant wound exudate to indicate healing progress.  -     Time Calculation    PT Goal Re-Cert Due Date 02/24/18  -       User Key  (r) = Recorded By, (t) = Taken By, (c) = Cosigned By    Initials Name Provider Type     Mary De Paz, PT Physical Therapist          Time Calculation: Start Time: 1000    Therapy Charges for Today     Code Description  Service Date Service Provider Modifiers Qty    18133137435 HC PT OTHER PRIME FUNCT CURRENT 11/26/2017 Mary De Paz, PT GP, CN 1    89582051236 HC PT OTHER PRIME FUNCT PROJECTED 11/26/2017 Mary De Pza, PT GP, CM 1    21538900815 HC PT EVAL MOD COMPLEXITY 4 11/26/2017 Mary De Paz, PT GP 1    75601687049 HC PT STAPPING UNNA BOOT 11/26/2017 Mary De Paz, PT GP 1    [id number] HC HERNAN DEBRIDE OPEN WOUND UP TO 20CM 11/26/2017 Mary De Paz, PT GP 1    [id number] HC PT HERNAN DEBRIDE OPEN WOUND EA ADD 20CM 11/26/2017 Mary De Paz, PT GP 3          PT G-Codes  PT Professional Judgement Used?: Yes  Outcome Measure Options: BWAT (Harmon-Dang Wound Assess Tool), Lower Extremity Functional Scale (LEFS)  Score: BWAT 35, LEFS 7. 100% limited with wound management.  Functional Limitation: Other PT primary  Other PT Primary Current Status (): 100 percent impaired, limited or restricted  Other PT Primary Goal Status (): At least 80 percent but less than 100 percent impaired, limited or restricted     Mary De Paz, PT  11/26/2017

## 2017-12-02 ENCOUNTER — HOSPITAL ENCOUNTER (OUTPATIENT)
Dept: PHYSICAL THERAPY | Facility: HOSPITAL | Age: 77
Setting detail: THERAPIES SERIES
End: 2017-12-02

## 2018-01-04 ENCOUNTER — DOCUMENTATION (OUTPATIENT)
Dept: PHYSICAL THERAPY | Facility: HOSPITAL | Age: 78
End: 2018-01-04

## 2018-01-04 NOTE — THERAPY DISCHARGE NOTE
Outpatient Rehabilitation - Wound/Debridement Discharge Summary        Patient Name: Michoacano Rojas  : 1940  MRN: [id number]  Today's Date: 2018                Admit Date: (Not on file)    Visit Dx:  No diagnosis found.    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   • Tracheal stenosis   • Normocytic anemia   • Diastolic dysfunction with chronic heart failure   • Urinary retention        Past Medical History:   Diagnosis Date   • A-fib    • Chronic cutaneous venous stasis ulcer    • Hypertension    • RA (rheumatoid arthritis)    • Rheumatoid arthritis    • Vertigo    • Vertigo         Past Surgical History:   Procedure Laterality Date   • JOINT MANIPULATION      left hip repair         EVALUATION                LDA Wound                  Wound 17 Right posterior thigh ulceration, venous    Wound - Properties Group Date first assessed: 17  -PE Time first assessed: 0  -PE Side: Right  -PE Orientation: posterior  -PE Location: thigh  -PE Type: ulceration, venous  -PE    Wound 17 Right lower leg ulceration, venous    Wound - Properties Group Date first assessed: 17  -PE Time first assessed: 2200  -PE Side: Right  -PE Orientation: lower  -PE Location: leg  -PE Type: ulceration, venous  -PE    Wound 17 Left lateral ankle ulceration, venous    Wound - Properties Group Date first assessed: 17  -PE Time first assessed: 0  -PE Side: Left  -PE Orientation: lateral  -PE Location: ankle  -PE Type: ulceration, venous  -PE    Wound 17 Left medial ankle ulceration,  venous    Wound - Properties Group Date first assessed: 11/17/17  -PE Time first assessed: 2200  -PE Side: Left  -PE Orientation: medial  -PE Location: ankle  -PE Type: ulceration, venous  -PE    Wound 11/26/17 1000 Right posterior leg ulceration, venous    Wound - Properties Group Date first assessed: 11/26/17  -MC Time first assessed: 1000  -MC Side: Right  -MC Orientation: posterior  -MC Location: leg  -MC Type: ulceration, venous  -MC      User Key  (r) = Recorded By, (t) = Taken By, (c) = Cosigned By    Initials Name Provider Type    JEANIE De Paz, PT Physical Therapist    PE Tana Hurley, RN Registered Nurse            WOUND DEBRIDEMENT                         Recommendation and Plan      Goals        PT OP Goals       01/04/18 1134       PT Short Term Goals    STG 1 Pt and/or caregiver will verbalize s/sx of infection.  -     STG 1 Progress Not Met  -     STG 2 Pt will demonstrate 25% reduction in wound dimensions to indicate healing progress.  -     STG 2 Progress Not Met  -     STG 3 Pt will demonstrate only minimal exudate to indicate progress.  -     STG 3 Progress Not Met  -     Long Term Goals    LTG 1 Pt and/or caregivers will be independent with clean home dressing changes to continue progress upon d/c.  -     LTG 1 Progress Not Met  -     LTG 2 Pt will demonstrate 75% reduction in wound dimensions to indicate healing progress.  -     LTG 2 Progress Not Met  -     LTG 3 Pt will demonstrate scant wound exudate to indicate healing progress.  -     LTG 3 Progress Not Met  -       User Key  (r) = Recorded By, (t) = Taken By, (c) = Cosigned By    Initials Name Provider Type    JEANIE De Paz, PT Physical Therapist          Time Calculation:          PT G-Codes  PT Professional Judgement Used?: Yes (based on initial assessment only)  Functional Limitation: Other PT primary  Other PT Primary Goal Status (): At least 80 percent but less than 100 percent  "impaired, limited or restricted  Other PT Primary Discharge Status (): 100 percent impaired, limited or restricted           OP Discharge Summary       01/04/18 1135          OP PT Discharge Summary    Date of Discharge 01/04/18  -MC      Reason for Discharge other (comment)   pt canceled his follow up appt after his initial evaluation, and has not followed up for additional appointments. Pt wound now require a new MD order to continue with PT wound care.  -MC      Outcomes Achieved Unable to make functional progress toward goals at this time   assume all goals \"not met,\" pt did not return for goals reassessment.  -MC      Discharge Destination Unknown  -        User Key  (r) = Recorded By, (t) = Taken By, (c) = Cosigned By    Initials Name Provider Type    JEANIE De Paz, PT Physical Therapist          Mary De Paz, PT  1/4/2018       "

## 2018-06-04 ENCOUNTER — APPOINTMENT (OUTPATIENT)
Dept: GENERAL RADIOLOGY | Facility: HOSPITAL | Age: 78
End: 2018-06-04

## 2018-06-04 ENCOUNTER — APPOINTMENT (OUTPATIENT)
Dept: CT IMAGING | Facility: HOSPITAL | Age: 78
End: 2018-06-04

## 2018-06-04 ENCOUNTER — APPOINTMENT (OUTPATIENT)
Dept: CARDIOLOGY | Facility: HOSPITAL | Age: 78
End: 2018-06-04
Attending: INTERNAL MEDICINE

## 2018-06-04 ENCOUNTER — HOSPITAL ENCOUNTER (INPATIENT)
Facility: HOSPITAL | Age: 78
LOS: 2 days | Discharge: HOME HEALTH | End: 2018-06-06
Attending: EMERGENCY MEDICINE | Admitting: HOSPITALIST

## 2018-06-04 DIAGNOSIS — Z78.9 IMPAIRED MOBILITY AND ADLS: ICD-10-CM

## 2018-06-04 DIAGNOSIS — G47.8: ICD-10-CM

## 2018-06-04 DIAGNOSIS — J96.22 ACUTE ON CHRONIC RESPIRATORY FAILURE WITH HYPOXIA AND HYPERCAPNIA (HCC): Primary | ICD-10-CM

## 2018-06-04 DIAGNOSIS — M06.9 RHEUMATOID ARTHRITIS INVOLVING BOTH HIPS, UNSPECIFIED RHEUMATOID FACTOR PRESENCE: Chronic | ICD-10-CM

## 2018-06-04 DIAGNOSIS — R41.0 DELIRIUM: ICD-10-CM

## 2018-06-04 DIAGNOSIS — J96.21 ACUTE ON CHRONIC RESPIRATORY FAILURE WITH HYPOXIA AND HYPERCAPNIA (HCC): Primary | ICD-10-CM

## 2018-06-04 DIAGNOSIS — Z74.09 IMPAIRED MOBILITY AND ADLS: ICD-10-CM

## 2018-06-04 DIAGNOSIS — I87.2 VENOUS STASIS DERMATITIS OF BOTH LOWER EXTREMITIES: ICD-10-CM

## 2018-06-04 DIAGNOSIS — Z74.09 IMPAIRED FUNCTIONAL MOBILITY, BALANCE, GAIT, AND ENDURANCE: ICD-10-CM

## 2018-06-04 LAB
ALBUMIN SERPL-MCNC: 3.5 G/DL (ref 3.2–4.8)
ALBUMIN/GLOB SERPL: 1.1 G/DL (ref 1.5–2.5)
ALP SERPL-CCNC: 67 U/L (ref 25–100)
ALT SERPL W P-5'-P-CCNC: 11 U/L (ref 7–40)
ANION GAP SERPL CALCULATED.3IONS-SCNC: 8 MMOL/L (ref 3–11)
ANION GAP SERPL CALCULATED.3IONS-SCNC: 8 MMOL/L (ref 3–11)
ARTERIAL PATENCY WRIST A: ABNORMAL
AST SERPL-CCNC: 14 U/L (ref 0–33)
ATMOSPHERIC PRESS: ABNORMAL MMHG
BACTERIA UR QL AUTO: ABNORMAL /HPF
BASE EXCESS BLDA CALC-SCNC: 6.6 MMOL/L (ref 0–2)
BASOPHILS # BLD AUTO: 0.02 10*3/MM3 (ref 0–0.2)
BASOPHILS NFR BLD AUTO: 0.2 % (ref 0–1)
BDY SITE: ABNORMAL
BILIRUB SERPL-MCNC: 0.6 MG/DL (ref 0.3–1.2)
BILIRUB UR QL STRIP: NEGATIVE
BNP SERPL-MCNC: 196 PG/ML (ref 0–100)
BUN BLD-MCNC: 25 MG/DL (ref 9–23)
BUN BLD-MCNC: 26 MG/DL (ref 9–23)
BUN/CREAT SERPL: 28.9 (ref 7–25)
BUN/CREAT SERPL: 31.3 (ref 7–25)
CALCIUM SPEC-SCNC: 8.8 MG/DL (ref 8.7–10.4)
CALCIUM SPEC-SCNC: 8.9 MG/DL (ref 8.7–10.4)
CHLORIDE SERPL-SCNC: 104 MMOL/L (ref 99–109)
CHLORIDE SERPL-SCNC: 106 MMOL/L (ref 99–109)
CLARITY UR: CLEAR
CO2 BLDA-SCNC: 33.2 MMOL/L (ref 22–33)
CO2 SERPL-SCNC: 26 MMOL/L (ref 20–31)
CO2 SERPL-SCNC: 30 MMOL/L (ref 20–31)
COARSE GRAN CASTS URNS QL MICRO: ABNORMAL /LPF
COHGB MFR BLD: 1.6 % (ref 0–2)
COLOR UR: YELLOW
CREAT BLD-MCNC: 0.8 MG/DL (ref 0.6–1.3)
CREAT BLD-MCNC: 0.9 MG/DL (ref 0.6–1.3)
DEPRECATED RDW RBC AUTO: 51.5 FL (ref 37–54)
DEPRECATED RDW RBC AUTO: 51.9 FL (ref 37–54)
EOSINOPHIL # BLD AUTO: 0.12 10*3/MM3 (ref 0–0.3)
EOSINOPHIL NFR BLD AUTO: 1.2 % (ref 0–3)
ERYTHROCYTE [DISTWIDTH] IN BLOOD BY AUTOMATED COUNT: 16.1 % (ref 11.3–14.5)
ERYTHROCYTE [DISTWIDTH] IN BLOOD BY AUTOMATED COUNT: 16.2 % (ref 11.3–14.5)
GFR SERPL CREATININE-BSD FRML MDRD: 113 ML/MIN/1.73
GFR SERPL CREATININE-BSD FRML MDRD: 99 ML/MIN/1.73
GLOBULIN UR ELPH-MCNC: 3.2 GM/DL
GLUCOSE BLD-MCNC: 83 MG/DL (ref 70–100)
GLUCOSE BLD-MCNC: 84 MG/DL (ref 70–100)
GLUCOSE UR STRIP-MCNC: NEGATIVE MG/DL
HCO3 BLDA-SCNC: 31.8 MMOL/L (ref 20–26)
HCT VFR BLD AUTO: 34.8 % (ref 38.9–50.9)
HCT VFR BLD AUTO: 36.1 % (ref 38.9–50.9)
HCT VFR BLD CALC: 30.1 %
HGB BLD-MCNC: 10.1 G/DL (ref 13.1–17.5)
HGB BLD-MCNC: 10.5 G/DL (ref 13.1–17.5)
HGB BLDA-MCNC: 9.8 G/DL (ref 13.5–17.5)
HGB UR QL STRIP.AUTO: NEGATIVE
HOLD SPECIMEN: NORMAL
HOLD SPECIMEN: NORMAL
HOROWITZ INDEX BLD+IHG-RTO: 21 %
HYALINE CASTS UR QL AUTO: ABNORMAL /LPF
IMM GRANULOCYTES # BLD: 0.02 10*3/MM3 (ref 0–0.03)
IMM GRANULOCYTES NFR BLD: 0.2 % (ref 0–0.6)
KETONES UR QL STRIP: NEGATIVE
LEUKOCYTE ESTERASE UR QL STRIP.AUTO: ABNORMAL
LYMPHOCYTES # BLD AUTO: 1.68 10*3/MM3 (ref 0.6–4.8)
LYMPHOCYTES NFR BLD AUTO: 16.8 % (ref 24–44)
MCH RBC QN AUTO: 25.4 PG (ref 27–31)
MCH RBC QN AUTO: 25.4 PG (ref 27–31)
MCHC RBC AUTO-ENTMCNC: 29 G/DL (ref 32–36)
MCHC RBC AUTO-ENTMCNC: 29.1 G/DL (ref 32–36)
MCV RBC AUTO: 87.4 FL (ref 80–99)
MCV RBC AUTO: 87.7 FL (ref 80–99)
METHGB BLD QL: 1 % (ref 0–1.5)
MODALITY: ABNORMAL
MONOCYTES # BLD AUTO: 1.06 10*3/MM3 (ref 0–1)
MONOCYTES NFR BLD AUTO: 10.6 % (ref 0–12)
NEUTROPHILS # BLD AUTO: 7.1 10*3/MM3 (ref 1.5–8.3)
NEUTROPHILS NFR BLD AUTO: 71.2 % (ref 41–71)
NITRITE UR QL STRIP: NEGATIVE
OXYHGB MFR BLDV: 76.5 % (ref 94–99)
PCO2 BLDA: 48.1 MM HG (ref 35–48)
PH BLDA: 7.43 PH UNITS (ref 7.35–7.45)
PH UR STRIP.AUTO: 6.5 [PH] (ref 5–8)
PLATELET # BLD AUTO: 188 10*3/MM3 (ref 150–450)
PLATELET # BLD AUTO: 193 10*3/MM3 (ref 150–450)
PMV BLD AUTO: 10.3 FL (ref 6–12)
PMV BLD AUTO: 10.4 FL (ref 6–12)
PO2 BLDA: 44.3 MM HG (ref 83–108)
POTASSIUM BLD-SCNC: 3.8 MMOL/L (ref 3.5–5.5)
POTASSIUM BLD-SCNC: 3.9 MMOL/L (ref 3.5–5.5)
PROT SERPL-MCNC: 6.7 G/DL (ref 5.7–8.2)
PROT UR QL STRIP: NEGATIVE
RBC # BLD AUTO: 3.97 10*6/MM3 (ref 4.2–5.76)
RBC # BLD AUTO: 4.13 10*6/MM3 (ref 4.2–5.76)
RBC # UR: ABNORMAL /HPF
REF LAB TEST METHOD: ABNORMAL
SODIUM BLD-SCNC: 140 MMOL/L (ref 132–146)
SODIUM BLD-SCNC: 142 MMOL/L (ref 132–146)
SP GR UR STRIP: 1.02 (ref 1–1.03)
SQUAMOUS #/AREA URNS HPF: ABNORMAL /HPF
TROPONIN I SERPL-MCNC: 0.01 NG/ML
TROPONIN I SERPL-MCNC: <0.006 NG/ML
UROBILINOGEN UR QL STRIP: ABNORMAL
WBC NRBC COR # BLD: 8.65 10*3/MM3 (ref 3.5–10.8)
WBC NRBC COR # BLD: 9.98 10*3/MM3 (ref 3.5–10.8)
WBC UR QL AUTO: ABNORMAL /HPF
WHOLE BLOOD HOLD SPECIMEN: NORMAL
WHOLE BLOOD HOLD SPECIMEN: NORMAL
YEAST URNS QL MICRO: ABNORMAL /HPF

## 2018-06-04 PROCEDURE — 99285 EMERGENCY DEPT VISIT HI MDM: CPT

## 2018-06-04 PROCEDURE — 71045 X-RAY EXAM CHEST 1 VIEW: CPT

## 2018-06-04 PROCEDURE — 25010000002 HEPARIN (PORCINE) PER 1000 UNITS: Performed by: INTERNAL MEDICINE

## 2018-06-04 PROCEDURE — 84484 ASSAY OF TROPONIN QUANT: CPT | Performed by: EMERGENCY MEDICINE

## 2018-06-04 PROCEDURE — 80053 COMPREHEN METABOLIC PANEL: CPT | Performed by: EMERGENCY MEDICINE

## 2018-06-04 PROCEDURE — 70450 CT HEAD/BRAIN W/O DYE: CPT

## 2018-06-04 PROCEDURE — 63710000001 PREDNISONE PER 5 MG: Performed by: INTERNAL MEDICINE

## 2018-06-04 PROCEDURE — 83880 ASSAY OF NATRIURETIC PEPTIDE: CPT | Performed by: EMERGENCY MEDICINE

## 2018-06-04 PROCEDURE — 99223 1ST HOSP IP/OBS HIGH 75: CPT | Performed by: INTERNAL MEDICINE

## 2018-06-04 PROCEDURE — 93306 TTE W/DOPPLER COMPLETE: CPT

## 2018-06-04 PROCEDURE — 93306 TTE W/DOPPLER COMPLETE: CPT | Performed by: INTERNAL MEDICINE

## 2018-06-04 PROCEDURE — P9612 CATHETERIZE FOR URINE SPEC: HCPCS

## 2018-06-04 PROCEDURE — 82805 BLOOD GASES W/O2 SATURATION: CPT | Performed by: EMERGENCY MEDICINE

## 2018-06-04 PROCEDURE — 93005 ELECTROCARDIOGRAM TRACING: CPT | Performed by: EMERGENCY MEDICINE

## 2018-06-04 PROCEDURE — 81001 URINALYSIS AUTO W/SCOPE: CPT | Performed by: EMERGENCY MEDICINE

## 2018-06-04 PROCEDURE — 36600 WITHDRAWAL OF ARTERIAL BLOOD: CPT | Performed by: EMERGENCY MEDICINE

## 2018-06-04 PROCEDURE — 25010000002 FUROSEMIDE PER 20 MG: Performed by: INTERNAL MEDICINE

## 2018-06-04 PROCEDURE — 85025 COMPLETE CBC W/AUTO DIFF WBC: CPT | Performed by: EMERGENCY MEDICINE

## 2018-06-04 PROCEDURE — 93005 ELECTROCARDIOGRAM TRACING: CPT

## 2018-06-04 PROCEDURE — 85027 COMPLETE CBC AUTOMATED: CPT | Performed by: INTERNAL MEDICINE

## 2018-06-04 RX ORDER — FINASTERIDE 5 MG/1
5 TABLET, FILM COATED ORAL DAILY
Status: DISCONTINUED | OUTPATIENT
Start: 2018-06-04 | End: 2018-06-06 | Stop reason: HOSPADM

## 2018-06-04 RX ORDER — HEPARIN SODIUM 5000 [USP'U]/ML
5000 INJECTION, SOLUTION INTRAVENOUS; SUBCUTANEOUS EVERY 8 HOURS SCHEDULED
Status: DISCONTINUED | OUTPATIENT
Start: 2018-06-04 | End: 2018-06-06 | Stop reason: HOSPADM

## 2018-06-04 RX ORDER — FLUTICASONE PROPIONATE 50 MCG
2 SPRAY, SUSPENSION (ML) NASAL DAILY
Status: DISCONTINUED | OUTPATIENT
Start: 2018-06-04 | End: 2018-06-06 | Stop reason: HOSPADM

## 2018-06-04 RX ORDER — SODIUM CHLORIDE 0.9 % (FLUSH) 0.9 %
10 SYRINGE (ML) INJECTION AS NEEDED
Status: DISCONTINUED | OUTPATIENT
Start: 2018-06-04 | End: 2018-06-06 | Stop reason: HOSPADM

## 2018-06-04 RX ORDER — ONDANSETRON 4 MG/1
4 TABLET, FILM COATED ORAL EVERY 6 HOURS PRN
Status: DISCONTINUED | OUTPATIENT
Start: 2018-06-04 | End: 2018-06-06 | Stop reason: HOSPADM

## 2018-06-04 RX ORDER — AMLODIPINE BESYLATE 10 MG/1
10 TABLET ORAL
Status: DISCONTINUED | OUTPATIENT
Start: 2018-06-04 | End: 2018-06-06 | Stop reason: HOSPADM

## 2018-06-04 RX ORDER — HYDROCODONE BITARTRATE AND ACETAMINOPHEN 10; 325 MG/1; MG/1
1 TABLET ORAL EVERY 6 HOURS PRN
Status: DISCONTINUED | OUTPATIENT
Start: 2018-06-04 | End: 2018-06-06 | Stop reason: HOSPADM

## 2018-06-04 RX ORDER — MELATONIN
2000 DAILY
Status: DISCONTINUED | OUTPATIENT
Start: 2018-06-04 | End: 2018-06-06 | Stop reason: HOSPADM

## 2018-06-04 RX ORDER — PREDNISONE 10 MG/1
10 TABLET ORAL DAILY
COMMUNITY
End: 2019-06-20 | Stop reason: SDUPTHER

## 2018-06-04 RX ORDER — SACCHAROMYCES BOULARDII 250 MG
250 CAPSULE ORAL 2 TIMES DAILY
Status: DISCONTINUED | OUTPATIENT
Start: 2018-06-04 | End: 2018-06-06 | Stop reason: HOSPADM

## 2018-06-04 RX ORDER — ACETAMINOPHEN 325 MG/1
650 TABLET ORAL EVERY 4 HOURS PRN
Status: DISCONTINUED | OUTPATIENT
Start: 2018-06-04 | End: 2018-06-06 | Stop reason: HOSPADM

## 2018-06-04 RX ORDER — TAMSULOSIN HYDROCHLORIDE 0.4 MG/1
0.4 CAPSULE ORAL NIGHTLY
Status: DISCONTINUED | OUTPATIENT
Start: 2018-06-04 | End: 2018-06-06 | Stop reason: HOSPADM

## 2018-06-04 RX ORDER — FUROSEMIDE 10 MG/ML
40 INJECTION INTRAMUSCULAR; INTRAVENOUS ONCE
Status: COMPLETED | OUTPATIENT
Start: 2018-06-04 | End: 2018-06-04

## 2018-06-04 RX ORDER — BISACODYL 5 MG/1
5 TABLET, DELAYED RELEASE ORAL DAILY PRN
Status: DISCONTINUED | OUTPATIENT
Start: 2018-06-04 | End: 2018-06-06 | Stop reason: HOSPADM

## 2018-06-04 RX ORDER — LISINOPRIL 5 MG/1
5 TABLET ORAL
Status: DISCONTINUED | OUTPATIENT
Start: 2018-06-04 | End: 2018-06-06 | Stop reason: HOSPADM

## 2018-06-04 RX ORDER — TORSEMIDE 10 MG/1
10 TABLET ORAL DAILY
COMMUNITY
End: 2019-01-15

## 2018-06-04 RX ORDER — SODIUM CHLORIDE 0.9 % (FLUSH) 0.9 %
1-10 SYRINGE (ML) INJECTION AS NEEDED
Status: DISCONTINUED | OUTPATIENT
Start: 2018-06-04 | End: 2018-06-06 | Stop reason: HOSPADM

## 2018-06-04 RX ORDER — ONDANSETRON 2 MG/ML
4 INJECTION INTRAMUSCULAR; INTRAVENOUS EVERY 6 HOURS PRN
Status: DISCONTINUED | OUTPATIENT
Start: 2018-06-04 | End: 2018-06-06 | Stop reason: HOSPADM

## 2018-06-04 RX ORDER — BISOPROLOL FUMARATE 5 MG/1
2.5 TABLET, FILM COATED ORAL
Status: DISCONTINUED | OUTPATIENT
Start: 2018-06-04 | End: 2018-06-06 | Stop reason: HOSPADM

## 2018-06-04 RX ADMIN — VITAMIN D, TAB 1000IU (100/BT) 2000 UNITS: 25 TAB at 18:52

## 2018-06-04 RX ADMIN — DICLOFENAC SODIUM 2 G: 10 GEL TOPICAL at 21:00

## 2018-06-04 RX ADMIN — HYDROCODONE BITARTRATE AND ACETAMINOPHEN 1 TABLET: 10; 325 TABLET ORAL at 19:05

## 2018-06-04 RX ADMIN — HEPARIN SODIUM 5000 UNITS: 5000 INJECTION, SOLUTION INTRAVENOUS; SUBCUTANEOUS at 21:00

## 2018-06-04 RX ADMIN — FUROSEMIDE 40 MG: 10 INJECTION, SOLUTION INTRAMUSCULAR; INTRAVENOUS at 18:52

## 2018-06-04 RX ADMIN — Medication 250 MG: at 21:00

## 2018-06-04 RX ADMIN — BISOPROLOL FUMARATE 2.5 MG: 5 TABLET ORAL at 18:53

## 2018-06-04 RX ADMIN — FINASTERIDE 5 MG: 5 TABLET, FILM COATED ORAL at 18:52

## 2018-06-04 RX ADMIN — TAMSULOSIN HYDROCHLORIDE 0.4 MG: 0.4 CAPSULE ORAL at 21:00

## 2018-06-04 RX ADMIN — FLUTICASONE PROPIONATE 2 SPRAY: 50 SPRAY, METERED NASAL at 18:52

## 2018-06-04 RX ADMIN — PREDNISONE 15 MG: 5 TABLET ORAL at 18:52

## 2018-06-04 NOTE — ED NOTES
Got patient a pillow and placed it between patient and railing, patient is leaning for comfort     Claude Shell  06/04/18 2442

## 2018-06-04 NOTE — ED PROVIDER NOTES
"Subjective   Michoacano Rojas is a 77 y.o. male with PMHx of recent URI and A-fib who presents to the ED with his family who c/o shortness of breath (onset around 0200 today) along with AMS. The patient describes his shortness of breath as a \"choking feeling\" and his wife notes that his symptoms are exacerbated when lying down. She mentions that he can typically resolve these symptoms through sitting up.     As for the complaint of AMS, Mr. Rojas himself speaks of having some sort of bad dream, an occurrence he states is unusual for him despite PMHx of sleep apnea. The patient's wife states that this morning upon waking the patient was displaying symptoms of confusion and hallucinations. She notes that while he will experience hallucinations on a day to day basis, they have never been as severe or behavior altering as those this morning.     The patient also complains of congestion but denies dysuria or any other acute complaints at this time. It is noted that the patient is not on home oxygen and does not have a CPAP machine. Mr. Rojas mentions that he has been taking Robitussin for his URI symptoms. The patient is generally ambulatory in his home with the assistance of a cane.          History provided by:  Patient  Shortness of Breath   Severity:  Moderate  Onset quality:  Sudden  Duration:  9 hours  Progression:  Unable to specify  Chronicity:  Chronic  Context: URI (Recent)    Exacerbated by: Lying down.      Review of Systems   HENT: Positive for congestion.    Respiratory: Positive for shortness of breath.    Genitourinary: Negative for dysuria.   Psychiatric/Behavioral: Positive for confusion and hallucinations.   All other systems reviewed and are negative.      Date of Admission: 11/17/2017  Date of Discharge:    Length of Stay: 7  Primary Care Physician: Michael Chavez MD     Consults      Date and Time Order Name Status Description     11/23/2017 0131 Inpatient Consult to Urology Completed   "     11/17/2017 2147 Inpatient Consult to Infectious Diseases Completed            Hospital Course      Presenting Problem:   Sepsis, due to unspecified organism [A41.9]           Active Hospital Problems (** Indicates Principal Problem)     Diagnosis Date Noted   • **Sepsis [A41.9] 11/17/2017   • Urinary retention [R33.9] 11/22/2017   • Cellulitis [L03.90] 11/17/2017   • 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            Hospital Course:  Michoacano Rojas is a 77 y.o. male with extensive past medical history including chronic afib, anticoagulated on Eliquis, relative immunosuppression secondary to rheumatoid arthritis on chronic steroids, hypertension, tracheal stenosis secondary to recent diagnosis of thyroid goiter, and chronic diastolic dysfunction who presents with 2-3 days of spiking fevers, T-max at home 102.0, as well as bilateral LE edema. Workup in the edema revealed concerns for sepsis with LE cellulitis as source, as well as noted to be in afib with RVR. Patient was admitted and broad spectrum abx initiated, as well as ID consultation. He also had diarrhea that resolved after only 1 days, but it was tested for C diff toxins and found to be positive. This was thought to be colonization and noted treated. His LE cellulitis/edema was treated by WOC and PT wound with compression wrapping. During the hospital stay, he cont to complain of uncontrolled pain in his LLQ, found to have urinary retention(600-700cc), so Albright was placed  without any difficulties. The following day he was noted to have gross hematuria, so his Eliquis was held. Urology was consulted and pt was evaluated by Dr Pope, who recommended keeping Albright intact until he can F/U in office for voiding trial. H/H remained relatively stable and his gross hematuria resolved. He was advised to cont to hold Eliquis until further instructed by PCP/urologist after re-evaluation in OP. He will be following with [facility] for his LE cellulitis/edema. He will also F/U with Dr Young from endocrinology for a thyroid goiter, diagnosed recently and declined surgery.He's otherwise relatively well and stable at time of discharge.    Interpretation Summary     · Left ventricular systolic function is normal. Estimated EF = 58%.  · Mild mitral valve regurgitation is present.  · Mild tricuspid valve regurgitation is present.  · Calculated right ventricular systolic pressure from tricuspid regurgitation is 29 mmHg.  · There is no evidence of pericardial effusion.  · No evidence of pulmonary hypertension is present.  · The aortic valve exhibits sclerosis.  · Normal right ventricular cavity size, wall thickness, systolic function and septal motion noted.            Past Medical History:   Diagnosis Date   • A-fib    • Atrial fibrillation    • Chronic cutaneous venous stasis ulcer    • Hypertension    • RA (rheumatoid arthritis)    • Rheumatoid arthritis    • Vertigo    • Vertigo        Allergies   Allergen Reactions   • Other      Relates all oral abx cause intol nausea/vomiting.       Past Surgical History:   Procedure Laterality Date   • JOINT MANIPULATION      left hip repair       History reviewed. No pertinent family history.    Social History     Social History   • Marital status:      Social History Main Topics   • Smoking status: Never Smoker   • Smokeless tobacco: Never Used   • Alcohol use No   • Drug use: No   • Sexual activity: Defer     Other Topics Concern   • Not on  file         Objective   Physical Exam   Constitutional: He is oriented to person, place, and time. He appears well-developed and well-nourished. No distress.   Patient is alert and oriented, in no acute distress. Able to give a fairly coherent history.    HENT:   Head: Normocephalic and atraumatic.   Right Ear: External ear normal.   Left Ear: External ear normal.   Nose: Nose normal.   Mouth/Throat: Oropharynx is clear and moist.   Cushingoid face with orbital edema.  Chemosis which is better than baseline per family. Nasopharynx unremarkable.   Eyes: Conjunctivae and EOM are normal. Pupils are equal, round, and reactive to light. No scleral icterus.   Neck: Normal range of motion. Neck supple. No JVD present. No thyromegaly present.   Neck supple with a large goiter with no stridor. No bruits.   Cardiovascular: Normal rate, regular rhythm and normal heart sounds.  Exam reveals no gallop and no friction rub.    No murmur heard.  Heart is regular but distant. No heaves. 1/4 pulses in the feet.    Pulmonary/Chest: Effort normal. No respiratory distress. He has decreased breath sounds (Bilaterally). He has no wheezes. He has no rales.   Abdominal: Soft. Bowel sounds are normal. He exhibits no mass. There is no tenderness. There is no rebound and no guarding.   Musculoskeletal: Normal range of motion. He exhibits edema. He exhibits no deformity.   Severe edema of both upper extremities to the elbows which was chronic with severe RA changes. Left index finger partial amputation. BLE severe vena stases changes.    Lymphadenopathy:     He has no cervical adenopathy.   Neurological: He is alert and oriented to person, place, and time.   Skin: Skin is warm and dry. No rash noted.   BLE with ulcers which are wrapped.   Psychiatric: He has a normal mood and affect. His behavior is normal.   Nursing note and vitals reviewed.      Procedures         ED Course       Recent Results (from the past 24 hour(s))   Urinalysis With /  Microscopic If Indicated (No Culture) - Urine, Catheter    Collection Time: 06/04/18  1:03 PM   Result Value Ref Range    Color, UA Yellow Yellow, Straw    Appearance, UA Clear Clear    pH, UA 6.5 5.0 - 8.0    Specific Gravity, UA 1.020 1.001 - 1.030    Glucose, UA Negative Negative    Ketones, UA Negative Negative    Bilirubin, UA Negative Negative    Blood, UA Negative Negative    Protein, UA Negative Negative    Leuk Esterase, UA Trace (A) Negative    Nitrite, UA Negative Negative    Urobilinogen, UA 1.0 E.U./dL 0.2 - 1.0 E.U./dL   Urinalysis, Microscopic Only - Urine, Clean Catch    Collection Time: 06/04/18  1:03 PM   Result Value Ref Range    RBC, UA 0-2 None Seen, 0-2 /HPF    WBC, UA 6-12 (A) None Seen, 0-2 /HPF    Bacteria, UA None Seen None Seen, Trace /HPF    Squamous Epithelial Cells, UA 0-2 None Seen, 0-2 /HPF    Yeast, UA Large/3+ Yeast None Seen /HPF    Hyaline Casts, UA 0-6 0 - 6 /LPF    Coarse Granular Casts, UA 0-2 None Seen /LPF    Methodology Manual Light Microscopy    Troponin    Collection Time: 06/04/18  1:12 PM   Result Value Ref Range    Troponin I 0.006 <=0.039 ng/mL   Blood Gas, Arterial    Collection Time: 06/04/18  1:12 PM   Result Value Ref Range    Site Arterial: right radial     Albert's Test N/A     pH, Arterial 7.429 7.350 - 7.450 pH units    pCO2, Arterial 48.1 (H) 35.0 - 48.0 mm Hg    pO2, Arterial 44.3 (L) 83.0 - 108.0 mm Hg    HCO3, Arterial 31.8 (H) 20.0 - 26.0 mmol/L    Base Excess, Arterial 6.6 (H) 0.0 - 2.0 mmol/L    Hemoglobin, Blood Gas 9.8 (L) 13.5 - 17.5 g/dL    Hematocrit, Blood Gas 30.1 %    Oxyhemoglobin 76.5 (L) 94 - 99 %    Methemoglobin 1.00 0.00 - 1.50 %    Carboxyhemoglobin 1.6 0 - 2 %    CO2 Content 33.2 (H) 22 - 33    Barometric Pressure for Blood Gas  mmHg    Modality Room Air     FIO2 21 %   Basic Metabolic Panel    Collection Time: 06/04/18  1:12 PM   Result Value Ref Range    Glucose 83 70 - 100 mg/dL    BUN 26 (H) 9 - 23 mg/dL    Creatinine 0.90 0.60 - 1.30  mg/dL    Sodium 140 132 - 146 mmol/L    Potassium 3.8 3.5 - 5.5 mmol/L    Chloride 106 99 - 109 mmol/L    CO2 26.0 20.0 - 31.0 mmol/L    Calcium 8.8 8.7 - 10.4 mg/dL    eGFR  African Amer 99 >60 mL/min/1.73    BUN/Creatinine Ratio 28.9 (H) 7.0 - 25.0    Anion Gap 8.0 3.0 - 11.0 mmol/L   CBC (No Diff)    Collection Time: 06/04/18  6:29 PM   Result Value Ref Range    WBC 8.65 3.50 - 10.80 10*3/mm3    RBC 3.97 (L) 4.20 - 5.76 10*6/mm3    Hemoglobin 10.1 (L) 13.1 - 17.5 g/dL    Hematocrit 34.8 (L) 38.9 - 50.9 %    MCV 87.7 80.0 - 99.0 fL    MCH 25.4 (L) 27.0 - 31.0 pg    MCHC 29.0 (L) 32.0 - 36.0 g/dL    RDW 16.1 (H) 11.3 - 14.5 %    RDW-SD 51.9 37.0 - 54.0 fl    MPV 10.3 6.0 - 12.0 fL    Platelets 188 150 - 450 10*3/mm3   Adult Transthoracic Echo Complete W/ Cont if Necessary Per Protocol    Collection Time: 06/04/18  7:27 PM   Result Value Ref Range    BSA 1.9 m^2    IVSd 0.94 cm    LVIDd 4.9 cm    LVIDs 3.4 cm    LVPWd 0.97 cm    IVS/LVPW 0.97     FS 32.2 %    EDV(Teich) 115.2 ml    ESV(Teich) 45.8 ml    EF(Teich) 60.3 %    EDV(cubed) 120.8 ml    ESV(cubed) 37.6 ml    EF(cubed) 68.9 %    LV mass(C)d 167.7 grams    LV mass(C)dI 87.9 grams/m^2    SV(Teich) 69.4 ml    SI(Teich) 36.4 ml/m^2    SV(cubed) 83.2 ml    SI(cubed) 43.6 ml/m^2    Ao root diam 3.0 cm    Ao root area 7.0 cm^2    asc Aorta Diam 2.5 cm    LVOT diam 2.0 cm    LVOT area 3.3 cm^2    LVOT area(traced) 3.1 cm^2    LVLd ap4 8.3 cm    EDV(MOD-sp4) 85.0 ml    LVLs ap4 5.8 cm    ESV(MOD-sp4) 21.0 ml    EF(MOD-sp4) 75.3 %    LVLd ap2 8.1 cm    EDV(MOD-sp2) 73.0 ml    LVLs ap2 6.9 cm    ESV(MOD-sp2) 35.0 ml    EF(MOD-sp2) 52.1 %    SV(MOD-sp4) 64.0 ml    SI(MOD-sp4) 33.6 ml/m^2    SV(MOD-sp2) 38.0 ml    SI(MOD-sp2) 19.9 ml/m^2    Ao root area (BSA corrected) 1.6     Ao root area (BSA corrected) 52.1 ml/m^2    CONTRAST EF 4CH 75.3 ml/m^2    LV Diastolic corrected for BSA 44.6 ml/m^2    LV Systolic corrected for BSA 11.0 ml/m^2    MV E max slick 138.8 cm/sec    MV  A max loki 99.3 cm/sec    MV E/A 1.4     MV dec time 0.22 sec    PA V2 max 110.4 cm/sec    PA max PG 4.9 mmHg    PA acc slope 591.8 cm/sec^2    PA acc time 0.14 sec    TR max loki 279.7 cm/sec    RVSP(TR) 37.0 mmHg    RAP systole 5.0 mmHg    PA pr(Accel) 15.4 mmHg     CV ECHO TRUE - BZI_BMI 25.8 kilograms/m^2     CV ECHO TRUE - BSA(HAYCOCK) 1.9 m^2     CV ECHO TRUE - BZI_METRIC_WEIGHT 77.1 kg     CV ECHO TRUE - BZI_METRIC_HEIGHT 172.7 cm    TDI S' 17.00 cm/sec    RV Base 4.20 cm    RV Length 6.20 cm    RV Mid 3.30 cm    LA Volume Index 29.8 mL/m2    Avg E/e' ratio 10.84     Lat Peak E' Loki 14.4 cm/sec    Med Peak E' Loki 11.20 cm/sec    TAPSE (>1.6) 2.66 cm2    Target HR (85%) 120 bpm    Max. Pred. HR (100%) 141 bpm   Hemoglobin A1c    Collection Time: 06/05/18  5:02 AM   Result Value Ref Range    Hemoglobin A1C 5.50 4.80 - 5.60 %   TSH    Collection Time: 06/05/18  5:02 AM   Result Value Ref Range    TSH 0.590 0.350 - 5.350 mIU/mL   Basic Metabolic Panel    Collection Time: 06/05/18  5:02 AM   Result Value Ref Range    Glucose 109 (H) 70 - 100 mg/dL    BUN 20 9 - 23 mg/dL    Creatinine 0.60 0.60 - 1.30 mg/dL    Sodium 141 132 - 146 mmol/L    Potassium 3.6 3.5 - 5.5 mmol/L    Chloride 108 99 - 109 mmol/L    CO2 31.0 20.0 - 31.0 mmol/L    Calcium 8.6 (L) 8.7 - 10.4 mg/dL    eGFR  African Amer >150 >60 mL/min/1.73    BUN/Creatinine Ratio 33.3 (H) 7.0 - 25.0    Anion Gap 2.0 (L) 3.0 - 11.0 mmol/L     Note: In addition to lab results from this visit, the labs listed above may include labs taken at another facility or during a different encounter within the last 24 hours. Please correlate lab times with ED admission and discharge times for further clarification of the services performed during this visit.    CT Head Without Contrast   Final Result   Fluid in the mastoid air cells bilaterally with complete   opacification seen of the left maxillary sinus. Atrophy identified of   the brain with chronic changes and no  evidence of acute intracranial   abnormality.       D:  06/04/2018   E:  06/04/2018           This report was finalized on 6/4/2018 4:17 PM by Dr. Janell Mariee MD.          XR Chest 1 View   Final Result   Chronic and emphysematous changes seen within the lung   fields with extensive degenerative changes seen within the spine and   shoulders bilaterally. No evidence of acute parenchymal disease. There   is prominence seen in the mediastinum which is stable and unchanged in   the interval.       D:  06/04/2018   E:  06/04/2018       This report was finalized on 6/4/2018 3:25 PM by Dr. Janell Mariee MD.          CT Angiogram Chest With Contrast    (Results Pending)   CT Soft Tissue Neck With Contrast    (Results Pending)     Vitals:    06/05/18 0424 06/05/18 0500 06/05/18 0735 06/05/18 0810   BP: 129/69  132/71    BP Location: Left arm  Left arm    Patient Position: Lying  Lying    Pulse: 65  66    Resp: 20  17    Temp: 97.5 °F (36.4 °C)  97.6 °F (36.4 °C)    TempSrc: Oral  Oral    SpO2:   100% 96%   Weight:  80.1 kg (176 lb 9.6 oz)     Height:         Medications   sodium chloride 0.9 % flush 10 mL (not administered)   amLODIPine (NORVASC) tablet 10 mg (10 mg Oral Given 6/5/18 0801)   bisoprolol (ZEBeta) tablet 2.5 mg (2.5 mg Oral Not Given 6/5/18 0806)   cholecalciferol (VITAMIN D3) tablet 2,000 Units (2,000 Units Oral Not Given 6/5/18 0806)   finasteride (PROSCAR) tablet 5 mg (5 mg Oral Given 6/5/18 0801)   fluticasone (FLONASE) 50 MCG/ACT nasal spray 2 spray (2 sprays Nasal Given 6/5/18 0802)   HYDROcodone-acetaminophen (NORCO)  MG per tablet 1 tablet (1 tablet Oral Given 6/4/18 1905)   lisinopril (PRINIVIL,ZESTRIL) tablet 5 mg (5 mg Oral Given 6/5/18 0809)   predniSONE (DELTASONE) tablet 15 mg (15 mg Oral Given 6/5/18 0801)   saccharomyces boulardii (FLORASTOR) capsule 250 mg (250 mg Oral Given 6/5/18 0801)   tamsulosin (FLOMAX) 24 hr capsule 0.4 mg (0.4 mg Oral Given 6/4/18 2100)   sodium  chloride 0.9 % flush 1-10 mL (not administered)   acetaminophen (TYLENOL) tablet 650 mg (not administered)   bisacodyl (DULCOLAX) EC tablet 5 mg (not administered)   heparin (porcine) 5000 UNIT/ML injection 5,000 Units (5,000 Units Subcutaneous Given 6/5/18 0615)   ondansetron (ZOFRAN) tablet 4 mg (not administered)     Or   ondansetron (ZOFRAN) injection 4 mg (not administered)   diclofenac (VOLTAREN) 1 % gel 2 g (2 g Topical Given 6/4/18 2100)   furosemide (LASIX) injection 40 mg (40 mg Intravenous Given 6/4/18 1852)     ECG/EMG Results (last 24 hours)     Procedure Component Value Units Date/Time    ECG 12 Lead [[id number]] Collected:  06/04/18 1029     Updated:  06/04/18 1234    ECG 12 Lead [[id number]] Collected:  06/04/18 1309     Updated:  06/04/18 1308                      MDM  Number of Diagnoses or Management Options  Acute on chronic respiratory failure with hypoxia and hypercapnia:   Delirium:   Terrifying hypnagogic hallucinations:   Venous stasis dermatitis of both lower extremities:   Diagnosis management comments:       I reviewed all available studies at the bedside with the patient and his family.    This gentleman is significantly hypoxic.  I think this is multifactorial probably from both goiter, rheumatoid lung, and probably some form of obstructive sleep apnea as well.  He is not on home oxygen or positive pressure ventilation his PaO2 was 44 and his PCO2 48.  In speaking with the rest for therapist she believe this to be an accurate blood gas.    I've started the patient on supplemental oxygen is sat is 94% on 4 L.    I wanted do a CTA of his chest to rule out pulmonary embolus also CT of the neck to evaluate for goiter causing tracheal stenosis.    He also had these hypnagogic hallucinations good been due to hypoxia and perhaps hypercarbia and perhaps disorder sleep cycle.  Probably needs be referred for sleep study.    He has severe venous stasis dermatitis and wounds on his hands and legs.   Think he needs to be admitted the hospital for serial exams and ongoing evaluation.    I spoke Dr. Baig, on-call hospital medicine, she will admit the patient.     all are agreeable with the plan       Amount and/or Complexity of Data Reviewed  Clinical lab tests: reviewed  Tests in the radiology section of CPT®: reviewed  Tests in the medicine section of CPT®: reviewed  Decide to obtain previous medical records or to obtain history from someone other than the patient: yes        Final diagnoses:   Acute on chronic respiratory failure with hypoxia and hypercapnia   Delirium   Terrifying hypnagogic hallucinations   Venous stasis dermatitis of both lower extremities       Documentation assistance provided by cass Lin.  Information recorded by the cass was done at my direction and has been verified and validated by me.     Jaun Lin  06/04/18 1108       Jaun Lin  06/04/18 1217       Jaun Lin  06/04/18 1317       Laci Lopez MD  06/05/18 1104

## 2018-06-04 NOTE — ED NOTES
Patient is sitting on the edge of the bed eating.  Gave patient three pillows to lean back against.     Claude Shell  06/04/18 1524

## 2018-06-04 NOTE — PROGRESS NOTES
Discharge Planning Assessment  [facility]     Patient Name: Michoacano Rojas  MRN: [id number]  Today's Date: 6/4/2018    Admit Date: 6/4/2018          Discharge Needs Assessment     Row Name 06/04/18 0783       Living Environment    Lives With spouse    Name(s) of Who Lives With Patient Chantel, spouse, [phone]    Current Living Arrangements home/apartment/condo    Primary Care Provided by spouse/significant other    Provides Primary Care For no one    Quality of Family Relationships helpful;involved;supportive    Able to Return to Prior Arrangements yes       Resource/Environmental Concerns    Resource/Environmental Concerns none       Transition Planning    Patient/Family Anticipates Transition to home with family    Transportation Anticipated family or friend will provide       Discharge Needs Assessment    Readmission Within the Last 30 Days no previous admission in last 30 days    Concerns to be Addressed denies needs/concerns at this time    Equipment Currently Used at Home crutches, axillary    Anticipated Changes Related to Illness inability to care for self    Offered/Gave Vendor List no            Discharge Plan     Row Name 06/04/18 6181       Plan    Plan initial    Plan Comments Pt lives with his wife in [place]. His legs are wrapped and swollen. He uses crutches for ambulation- CM inquired if he would be interested in walker and he adamantly answered no. He has no O2 or CPAP. There is a handicapped van for transport home PCP is Michael Ramoster        Destination     No service coordination in this encounter.      Durable Medical Equipment     No service coordination in this encounter.      Dialysis/Infusion     No service coordination in this encounter.      Home Medical Care     No service coordination in this encounter.      Social Care     No service coordination in this encounter.                Demographic Summary    No documentation.           Functional Status     Row Name 06/04/18  1557       Functional Status    Usual Activity Tolerance moderate    Current Activity Tolerance fair       Functional Status, IADL    Medications assistive person    Meal Preparation assistive person    Housekeeping assistive person    Laundry assistive person    Shopping assistive person       Mental Status    General Appearance WDL WDL       Mental Status Summary    Recent Changes in Mental Status/Cognitive Functioning no changes       Employment/    Employment Status retired            Psychosocial    No documentation.           Abuse/Neglect    No documentation.           Legal    No documentation.           Substance Abuse    No documentation.           Patient Forms    No documentation.         Leticia Gallagher

## 2018-06-05 ENCOUNTER — APPOINTMENT (OUTPATIENT)
Dept: CARDIOLOGY | Facility: HOSPITAL | Age: 78
End: 2018-06-05
Attending: HOSPITALIST

## 2018-06-05 LAB
ANION GAP SERPL CALCULATED.3IONS-SCNC: 2 MMOL/L (ref 3–11)
BH CV ECHO MEAS - AO ROOT AREA (BSA CORRECTED): 1.6
BH CV ECHO MEAS - AO ROOT AREA: 7 CM^2
BH CV ECHO MEAS - AO ROOT DIAM: 3 CM
BH CV ECHO MEAS - ASC AORTA: 2.5 CM
BH CV ECHO MEAS - BSA(HAYCOCK): 1.9 M^2
BH CV ECHO MEAS - BSA: 1.9 M^2
BH CV ECHO MEAS - BZI_BMI: 25.8 KILOGRAMS/M^2
BH CV ECHO MEAS - BZI_METRIC_HEIGHT: 172.7 CM
BH CV ECHO MEAS - BZI_METRIC_WEIGHT: 77.1 KG
BH CV ECHO MEAS - CONTRAST EF (2CH): 52.1 ML/M^2
BH CV ECHO MEAS - CONTRAST EF 4CH: 75.3 ML/M^2
BH CV ECHO MEAS - EDV(CUBED): 120.8 ML
BH CV ECHO MEAS - EDV(MOD-SP2): 73 ML
BH CV ECHO MEAS - EDV(MOD-SP4): 85 ML
BH CV ECHO MEAS - EDV(TEICH): 115.2 ML
BH CV ECHO MEAS - EF(CUBED): 68.9 %
BH CV ECHO MEAS - EF(MOD-SP2): 52.1 %
BH CV ECHO MEAS - EF(MOD-SP4): 75.3 %
BH CV ECHO MEAS - EF(TEICH): 60.3 %
BH CV ECHO MEAS - ESV(CUBED): 37.6 ML
BH CV ECHO MEAS - ESV(MOD-SP2): 35 ML
BH CV ECHO MEAS - ESV(MOD-SP4): 21 ML
BH CV ECHO MEAS - ESV(TEICH): 45.8 ML
BH CV ECHO MEAS - FS: 32.2 %
BH CV ECHO MEAS - IVS/LVPW: 0.97
BH CV ECHO MEAS - IVSD: 0.94 CM
BH CV ECHO MEAS - LAT PEAK E' VEL: 14.4 CM/SEC
BH CV ECHO MEAS - LV DIASTOLIC VOL/BSA (35-75): 44.6 ML/M^2
BH CV ECHO MEAS - LV MASS(C)D: 167.7 GRAMS
BH CV ECHO MEAS - LV MASS(C)DI: 87.9 GRAMS/M^2
BH CV ECHO MEAS - LV SYSTOLIC VOL/BSA (12-30): 11 ML/M^2
BH CV ECHO MEAS - LVIDD: 4.9 CM
BH CV ECHO MEAS - LVIDS: 3.4 CM
BH CV ECHO MEAS - LVLD AP2: 8.1 CM
BH CV ECHO MEAS - LVLD AP4: 8.3 CM
BH CV ECHO MEAS - LVLS AP2: 6.9 CM
BH CV ECHO MEAS - LVLS AP4: 5.8 CM
BH CV ECHO MEAS - LVOT AREA (M): 3.1 CM^2
BH CV ECHO MEAS - LVOT AREA: 3.3 CM^2
BH CV ECHO MEAS - LVOT DIAM: 2 CM
BH CV ECHO MEAS - LVPWD: 0.97 CM
BH CV ECHO MEAS - MED PEAK E' VEL: 11.2 CM/SEC
BH CV ECHO MEAS - MV A MAX VEL: 99.3 CM/SEC
BH CV ECHO MEAS - MV DEC TIME: 0.22 SEC
BH CV ECHO MEAS - MV E MAX VEL: 138.8 CM/SEC
BH CV ECHO MEAS - MV E/A: 1.4
BH CV ECHO MEAS - PA ACC SLOPE: 591.8 CM/SEC^2
BH CV ECHO MEAS - PA ACC TIME: 0.14 SEC
BH CV ECHO MEAS - PA MAX PG: 4.9 MMHG
BH CV ECHO MEAS - PA PR(ACCEL): 15.4 MMHG
BH CV ECHO MEAS - PA V2 MAX: 110.4 CM/SEC
BH CV ECHO MEAS - RAP SYSTOLE: 5 MMHG
BH CV ECHO MEAS - RVSP: 37 MMHG
BH CV ECHO MEAS - SI(CUBED): 43.6 ML/M^2
BH CV ECHO MEAS - SI(MOD-SP2): 19.9 ML/M^2
BH CV ECHO MEAS - SI(MOD-SP4): 33.6 ML/M^2
BH CV ECHO MEAS - SI(TEICH): 36.4 ML/M^2
BH CV ECHO MEAS - SV(CUBED): 83.2 ML
BH CV ECHO MEAS - SV(MOD-SP2): 38 ML
BH CV ECHO MEAS - SV(MOD-SP4): 64 ML
BH CV ECHO MEAS - SV(TEICH): 69.4 ML
BH CV ECHO MEAS - TAPSE (>1.6): 2.66 CM2
BH CV ECHO MEAS - TR MAX VEL: 279.7 CM/SEC
BH CV ECHO MEASUREMENTS AVERAGE E/E' RATIO: 10.84
BH CV LOWER VASCULAR LEFT COMMON FEMORAL AUGMENT: NORMAL
BH CV LOWER VASCULAR LEFT COMMON FEMORAL COMPRESS: NORMAL
BH CV LOWER VASCULAR LEFT COMMON FEMORAL PHASIC: NORMAL
BH CV LOWER VASCULAR LEFT COMMON FEMORAL SPONT: NORMAL
BH CV LOWER VASCULAR LEFT DISTAL FEMORAL AUGMENT: NORMAL
BH CV LOWER VASCULAR LEFT DISTAL FEMORAL COMPRESS: NORMAL
BH CV LOWER VASCULAR LEFT GASTRONEMIUS COMPRESS: NORMAL
BH CV LOWER VASCULAR LEFT GREATER SAPH AK COMPRESS: NORMAL
BH CV LOWER VASCULAR LEFT GREATER SAPH BK COMPRESS: NORMAL
BH CV LOWER VASCULAR LEFT LESSER SAPH COMPRESS: NORMAL
BH CV LOWER VASCULAR LEFT MID FEMORAL AUGMENT: NORMAL
BH CV LOWER VASCULAR LEFT MID FEMORAL COMPRESS: NORMAL
BH CV LOWER VASCULAR LEFT MID FEMORAL PHASIC: NORMAL
BH CV LOWER VASCULAR LEFT MID FEMORAL SPONT: NORMAL
BH CV LOWER VASCULAR LEFT PERONEAL COMPRESS: NORMAL
BH CV LOWER VASCULAR LEFT POPLITEAL AUGMENT: NORMAL
BH CV LOWER VASCULAR LEFT POPLITEAL COMPRESS: NORMAL
BH CV LOWER VASCULAR LEFT POPLITEAL PHASIC: NORMAL
BH CV LOWER VASCULAR LEFT POPLITEAL SPONT: NORMAL
BH CV LOWER VASCULAR LEFT POSTERIOR TIBIAL COMPRESS: NORMAL
BH CV LOWER VASCULAR LEFT PROFUNDA FEMORAL AUGMENT: NORMAL
BH CV LOWER VASCULAR LEFT PROFUNDA FEMORAL COMPRESS: NORMAL
BH CV LOWER VASCULAR LEFT PROFUNDA FEMORAL PHASIC: NORMAL
BH CV LOWER VASCULAR LEFT PROFUNDA FEMORAL SPONT: NORMAL
BH CV LOWER VASCULAR LEFT PROXIMAL FEMORAL AUGMENT: NORMAL
BH CV LOWER VASCULAR LEFT PROXIMAL FEMORAL COMPRESS: NORMAL
BH CV LOWER VASCULAR LEFT SAPHENOFEMORAL JUNCTION AUGMENT: NORMAL
BH CV LOWER VASCULAR LEFT SAPHENOFEMORAL JUNCTION COMPRESS: NORMAL
BH CV LOWER VASCULAR LEFT SAPHENOFEMORAL JUNCTION PHASIC: NORMAL
BH CV LOWER VASCULAR LEFT SAPHENOFEMORAL JUNCTION SPONT: NORMAL
BH CV LOWER VASCULAR RIGHT COMMON FEMORAL AUGMENT: NORMAL
BH CV LOWER VASCULAR RIGHT COMMON FEMORAL COMPRESS: NORMAL
BH CV LOWER VASCULAR RIGHT COMMON FEMORAL PHASIC: NORMAL
BH CV LOWER VASCULAR RIGHT COMMON FEMORAL SPONT: NORMAL
BH CV LOWER VASCULAR RIGHT DISTAL FEMORAL AUGMENT: NORMAL
BH CV LOWER VASCULAR RIGHT DISTAL FEMORAL COMPRESS: NORMAL
BH CV LOWER VASCULAR RIGHT GASTRONEMIUS COMPRESS: NORMAL
BH CV LOWER VASCULAR RIGHT GREATER SAPH AK COMPRESS: NORMAL
BH CV LOWER VASCULAR RIGHT GREATER SAPH BK COMPRESS: NORMAL
BH CV LOWER VASCULAR RIGHT LESSER SAPH COMPRESS: NORMAL
BH CV LOWER VASCULAR RIGHT MID FEMORAL AUGMENT: NORMAL
BH CV LOWER VASCULAR RIGHT MID FEMORAL COMPRESS: NORMAL
BH CV LOWER VASCULAR RIGHT MID FEMORAL PHASIC: NORMAL
BH CV LOWER VASCULAR RIGHT MID FEMORAL SPONT: NORMAL
BH CV LOWER VASCULAR RIGHT PERONEAL COMPRESS: NORMAL
BH CV LOWER VASCULAR RIGHT POPLITEAL AUGMENT: NORMAL
BH CV LOWER VASCULAR RIGHT POPLITEAL COMPRESS: NORMAL
BH CV LOWER VASCULAR RIGHT POPLITEAL PHASIC: NORMAL
BH CV LOWER VASCULAR RIGHT POPLITEAL SPONT: NORMAL
BH CV LOWER VASCULAR RIGHT POSTERIOR TIBIAL COMPRESS: NORMAL
BH CV LOWER VASCULAR RIGHT PROFUNDA FEMORAL COMPRESS: NORMAL
BH CV LOWER VASCULAR RIGHT PROXIMAL FEMORAL AUGMENT: NORMAL
BH CV LOWER VASCULAR RIGHT PROXIMAL FEMORAL COMPRESS: NORMAL
BH CV LOWER VASCULAR RIGHT SAPHENOFEMORAL JUNCTION AUGMENT: NORMAL
BH CV LOWER VASCULAR RIGHT SAPHENOFEMORAL JUNCTION COMPRESS: NORMAL
BH CV LOWER VASCULAR RIGHT SAPHENOFEMORAL JUNCTION PHASIC: NORMAL
BH CV LOWER VASCULAR RIGHT SAPHENOFEMORAL JUNCTION SPONT: NORMAL
BH CV XLRA - RV BASE: 4.2 CM
BH CV XLRA - RV LENGTH: 6.2 CM
BH CV XLRA - RV MID: 3.3 CM
BH CV XLRA - TDI S': 17 CM/SEC
BUN BLD-MCNC: 20 MG/DL (ref 9–23)
BUN/CREAT SERPL: 33.3 (ref 7–25)
CALCIUM SPEC-SCNC: 8.6 MG/DL (ref 8.7–10.4)
CHLORIDE SERPL-SCNC: 108 MMOL/L (ref 99–109)
CO2 SERPL-SCNC: 31 MMOL/L (ref 20–31)
CREAT BLD-MCNC: 0.6 MG/DL (ref 0.6–1.3)
GFR SERPL CREATININE-BSD FRML MDRD: >150 ML/MIN/1.73
GLUCOSE BLD-MCNC: 109 MG/DL (ref 70–100)
HBA1C MFR BLD: 5.5 % (ref 4.8–5.6)
LEFT ATRIUM VOLUME INDEX: 29.8 ML/M2
MAXIMAL PREDICTED HEART RATE: 141 BPM
POTASSIUM BLD-SCNC: 3.6 MMOL/L (ref 3.5–5.5)
SODIUM BLD-SCNC: 141 MMOL/L (ref 132–146)
STRESS TARGET HR: 120 BPM
TSH SERPL DL<=0.05 MIU/L-ACNC: 0.59 MIU/ML (ref 0.35–5.35)

## 2018-06-05 PROCEDURE — 97166 OT EVAL MOD COMPLEX 45 MIN: CPT

## 2018-06-05 PROCEDURE — 83036 HEMOGLOBIN GLYCOSYLATED A1C: CPT | Performed by: INTERNAL MEDICINE

## 2018-06-05 PROCEDURE — 25010000002 HEPARIN (PORCINE) PER 1000 UNITS: Performed by: INTERNAL MEDICINE

## 2018-06-05 PROCEDURE — 93970 EXTREMITY STUDY: CPT

## 2018-06-05 PROCEDURE — 80048 BASIC METABOLIC PNL TOTAL CA: CPT | Performed by: INTERNAL MEDICINE

## 2018-06-05 PROCEDURE — 63710000001 PREDNISONE PER 5 MG: Performed by: INTERNAL MEDICINE

## 2018-06-05 PROCEDURE — 84443 ASSAY THYROID STIM HORMONE: CPT | Performed by: INTERNAL MEDICINE

## 2018-06-05 PROCEDURE — 93970 EXTREMITY STUDY: CPT | Performed by: INTERNAL MEDICINE

## 2018-06-05 PROCEDURE — 99232 SBSQ HOSP IP/OBS MODERATE 35: CPT | Performed by: HOSPITALIST

## 2018-06-05 RX ORDER — POLYVINYL ALCOHOL 14 MG/ML
2 SOLUTION/ DROPS OPHTHALMIC
Status: DISCONTINUED | OUTPATIENT
Start: 2018-06-05 | End: 2018-06-06 | Stop reason: HOSPADM

## 2018-06-05 RX ORDER — MINERAL OIL AND WHITE PETROLATUM 150; 830 MG/G; MG/G
OINTMENT OPHTHALMIC
Status: DISCONTINUED | OUTPATIENT
Start: 2018-06-05 | End: 2018-06-06 | Stop reason: HOSPADM

## 2018-06-05 RX ORDER — CASTOR OIL AND BALSAM, PERU 788; 87 MG/G; MG/G
OINTMENT TOPICAL EVERY 12 HOURS SCHEDULED
Status: DISCONTINUED | OUTPATIENT
Start: 2018-06-05 | End: 2018-06-06 | Stop reason: HOSPADM

## 2018-06-05 RX ADMIN — Medication 250 MG: at 20:42

## 2018-06-05 RX ADMIN — VITAMIN D, TAB 1000IU (100/BT) 2000 UNITS: 25 TAB at 07:56

## 2018-06-05 RX ADMIN — HEPARIN SODIUM 5000 UNITS: 5000 INJECTION, SOLUTION INTRAVENOUS; SUBCUTANEOUS at 06:15

## 2018-06-05 RX ADMIN — PREDNISONE 15 MG: 5 TABLET ORAL at 08:01

## 2018-06-05 RX ADMIN — TAMSULOSIN HYDROCHLORIDE 0.4 MG: 0.4 CAPSULE ORAL at 20:42

## 2018-06-05 RX ADMIN — LISINOPRIL 5 MG: 5 TABLET ORAL at 08:09

## 2018-06-05 RX ADMIN — FLUTICASONE PROPIONATE 2 SPRAY: 50 SPRAY, METERED NASAL at 08:02

## 2018-06-05 RX ADMIN — CASTOR OIL AND BALSAM, PERU: 788; 87 OINTMENT TOPICAL at 20:42

## 2018-06-05 RX ADMIN — FINASTERIDE 5 MG: 5 TABLET, FILM COATED ORAL at 08:01

## 2018-06-05 RX ADMIN — DICLOFENAC SODIUM 2 G: 10 GEL TOPICAL at 20:42

## 2018-06-05 RX ADMIN — HEPARIN SODIUM 5000 UNITS: 5000 INJECTION, SOLUTION INTRAVENOUS; SUBCUTANEOUS at 20:42

## 2018-06-05 RX ADMIN — BISOPROLOL FUMARATE 2.5 MG: 5 TABLET ORAL at 08:00

## 2018-06-05 RX ADMIN — AMLODIPINE BESYLATE 10 MG: 10 TABLET ORAL at 08:01

## 2018-06-05 RX ADMIN — Medication 250 MG: at 08:01

## 2018-06-05 RX ADMIN — DICLOFENAC SODIUM 2 G: 10 GEL TOPICAL at 17:34

## 2018-06-05 RX ADMIN — HEPARIN SODIUM 5000 UNITS: 5000 INJECTION, SOLUTION INTRAVENOUS; SUBCUTANEOUS at 14:51

## 2018-06-05 NOTE — THERAPY EVALUATION
Acute Care - Occupational Therapy Initial Evaluation  [facility]     Patient Name: Michoacano Rojas  : 1940  MRN: [id number]  Today's Date: 2018  Onset of Illness/Injury or Date of Surgery: 18  Date of Referral to OT: 18  Referring Physician: MD Brian     Admit Date: 2018       ICD-10-CM ICD-9-CM   1. Acute on chronic respiratory failure with hypoxia and hypercapnia J96.21 518.84    J96.22 786.09     799.02   2. Delirium R41.0 780.09   3. Terrifying hypnagogic hallucinations G47.8 307.47   4. Venous stasis dermatitis of both lower extremities I87.2 454.1   5. 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   • Tracheal stenosis   • Normocytic anemia   • Diastolic dysfunction with chronic heart failure   • Urinary retention   • Acute on chronic respiratory failure with hypoxia and hypercapnia     Past Medical History:   Diagnosis Date   • A-fib    • Atrial fibrillation    • Chronic cutaneous venous stasis ulcer    • Hypertension    • RA (rheumatoid arthritis)    • Rheumatoid arthritis    • Vertigo    • Vertigo      Past Surgical History:   Procedure Laterality Date   • JOINT MANIPULATION      left hip repair          OT ASSESSMENT FLOWSHEET (last 72 hours)      Occupational Therapy Evaluation     Row Name 18 0825                   OT Evaluation Time/Intention    Subjective Information complains of;weakness;fatigue  -HK        Document Type evaluation  -HK        Mode of Treatment individual therapy;occupational therapy  -HK        Patient Effort good  -HK         Comment Pt and family have specific ways they complete transfers and assist the pt.   -HK           General Information    Patient Profile Reviewed? yes  -HK        Onset of Illness/Injury or Date of Surgery 06/04/18  -HK        Referring Physician MD Brian   -HK        Patient Observations alert;cooperative;agree to therapy  -HK        Patient/Family Observations Son at bedside; wife and other son enterted during session.   -HK        General Observations of Patient Pt received sitting EOB.   -HK        Prior Level of Function min assist:;all household mobility;community mobility;gait;transfer;bed mobility;mod assist:;ADL's;dependent:;shopping;driving;cleaning;cooking  -HK        Equipment Currently Used at Home crutches, forearm;hospital bed;commode, bedside;cane, straight;walker, rolling;ramp;raised toilet;shower chair  -HK        Pertinent History of Current Functional Problem Pt is a 77 YOM who presents with complaints of confusion and several weeks of progressive SOA and orthopnea. Family reports he has been getting up at night with SOA and feels better when sitting up.    -HK        Existing Precautions/Restrictions fall  -HK        Risks Reviewed patient:;LOB;nausea/vomiting;dizziness;change in vital signs;increased drainage;lines disloged;increased discomfort  -HK        Benefits Reviewed patient:;improve function;increase independence;increase strength;increase balance;decrease risk of DVT;decrease pain;improve skin integrity;increase knowledge  -HK        Barriers to Rehab medically complex;previous functional deficit  -HK           Relationship/Environment    Primary Source of Support/Comfort child(gema);spouse  -HK        Lives With spouse   sons are present   -HK        Family Caregiver if Needed spouse;child(gema), adult  -HK           Resource/Environmental Concerns    Current Living Arrangements home/apartment/condo  -HK           Cognitive Assessment/Intervention- PT/OT    Orientation Status  (Cognition) oriented x 4  -HK        Follows Commands (Cognition) WFL  -HK           Safety Issues, Functional Mobility    Impairments Affecting Function (Mobility) strength;balance  -HK           Bed Mobility Assessment/Treatment    Bed Mobility Assessment/Treatment sit-supine;scooting/bridging  -HK        Scooting/Bridging [place] (Bed Mobility) maximum assist (25% patient effort)  -HK        Sit-Supine [place] (Bed Mobility) maximum assist (25% patient effort)  -HK        Bed Mobility, Safety Issues decreased use of arms for pushing/pulling;decreased use of legs for bridging/pushing  -HK        Assistive Device (Bed Mobility) draw sheet  -HK           Functional Mobility    Functional Mobility- Comment deffered. Pt does not have forearm crutches here and is not comfortable using platform walker   -HK           Transfer Assessment/Treatment    Transfer Assessment/Treatment sit-stand transfer;stand-sit transfer  -HK        Comment (Transfers) Pts son assists to get feet in correct position and stand patient up with use of SPC    -HK        Sit-Stand [place] (Transfers) moderate assist (50% patient effort);2 person assist;verbal cues  -HK        Stand-Sit [place] (Transfers) moderate assist (50% patient effort);2 person assist;verbal cues  -HK           Sit-Stand Transfer    Assistive Device (Sit-Stand Transfers) cane, straight  -HK           Stand-Sit Transfer    Assistive Device (Stand-Sit Transfers) cane, straight  -HK           ADL Assessment/Intervention    BADL Assessment/Intervention lower body dressing;upper body dressing;feeding  -HK           Lower Body Dressing Assessment/Training    Comment (Lower Body Dressing) OT assisted pt to pull pants up upon standing. Pt required maxA.   -HK           Self-Feeding Assessment/Training    [place] Level (Feeding) feeding skills  -HK        Assistive Devices (Feeding) built-up handle utensils  -HK        Comment (Feeding) OT issued built up  utensils for feeding due to pts decreased use of both hands.   -HK           BADL Safety/Performance    Impairments, BADL Safety/Performance strength;balance;endurance/activity tolerance  -HK           General ROM    RT Upper Ext Rt Shoulder Flexion  -HK        LT Upper Ext Lt Shoulder Flexion  -HK           Right Upper Ext    Rt Shoulder Flexion AROM approximately 90 degrees AROM  -HK           Left Upper Ext    Lt Shoulder Flexion AROM approximately 90 degrees AROM  -HK           MMT (Manual Muscle Testing)    Additional Documentation General Assessment (Manual Muscle Testing) (Group)  -HK           General Assessment (Manual Muscle Testing)    General Manual Muscle Testing (MMT) Assessment upper extremity strength deficits identified  -HK           Upper Extremity (Manual Muscle Testing)    Upper Extremity: Manual Muscle Testing (MMT) left shoulder strength deficit;right shoulder strength deficit  -HK           Left Shoulder (Manual Muscle Testing)    Left Shoulder Manual Muscle Testing (MMT) flexion  -HK        MMT: Flexion, Left Shoulder flexion  -HK           Right Shoulder (Manual Muscle Testing)    Right Shoulder Manual Muscle Testing (MMT) flexion  -HK        MMT: Flexion, Right Shoulder flexion  -HK        Comment, MMT: Right Shoulder Pts arms strength is 4+/5 however he is unable to compelte full ROM   -HK           Sensory Assessment/Intervention    Sensory General Assessment no sensation deficits identified  -HK           Positioning and Restraints    Pre-Treatment Position in bed  -HK        Post Treatment Position bed  -HK        In Bed notified nsg;side lying right;call light within reach;encouraged to call for assist;patient within staff view;with family/caregiver;with nsg  -HK           [REMOVED] Wound 06/04/18 1700 Left hip pressure injury    Wound - Properties Group Date first assessed: 06/04/18  -AB Time first assessed: 1700  -AB Present On Admission : yes  -AB Side: Left  -AB Location: hip  -AB  Type: pressure injury  -AB Stage, Pressure Injury: Stage 2  -AB Resolution Date: 06/05/18  -CP Resolution Time: 0915  -CP       [REMOVED] Wound 06/04/18 1700 Left lower gluteal pressure injury    Wound - Properties Group Date first assessed: 06/04/18  -AB Time first assessed: 1700  -AB Present On Admission : yes  -AB Side: Left  -AB Orientation: lower  -AB Location: gluteal  -AB Type: pressure injury  -AB Stage, Pressure Injury: Stage 2  -AB Resolution Date: 06/05/18  -CP Resolution Time: 0915  -CP       [REMOVED] Wound 06/04/18 1700 Left lower;anterior;lateral leg ulceration, venous    Wound - Properties Group Date first assessed: 06/04/18  -AB Time first assessed: 1700  -AB Present On Admission : yes  -AB Side: Left  -AB Orientation: lower;anterior;lateral  -AB Location: leg  -AB Type: ulceration, venous  -AB Stage, Pressure Injury: Stage 3  -AB Resolution Date: 06/05/18  -CP Resolution Time: 0915  -CP       [REMOVED] Wound 06/04/18 1700 Right lower;anterior;posterior;lateral leg ulceration, venous    Wound - Properties Group Date first assessed: 06/04/18  -AB Time first assessed: 1700  -AB Present On Admission : yes  -AB Side: Right  -AB Orientation: lower;anterior;posterior;lateral  -AB Location: leg  -AB Type: ulceration, venous  -AB Stage, Pressure Injury: Stage 3  -AB Resolution Date: 06/05/18  -CP Resolution Time: 0915  -CP       Wound 06/05/18 0915 Left lateral greater trochanter other (see comments)    Wound - Properties Group Date first assessed: 06/05/18  -CP Time first assessed: 0915  -CP Present On Admission : yes;picture taken  -CP Side: Left  -CP Orientation: lateral  -CP Location: greater trochanter  -CP Type: other (see comments)  -CP Additional Comments: friction  -CP       Wound 06/05/18 0915 Left upper thigh skin tear    Wound - Properties Group Date first assessed: 06/05/18  -CP Time first assessed: 0915  -CP Present On Admission : yes;picture taken  -CP Side: Left  -CP Orientation: upper  -CP  Location: thigh  -CP Type: skin tear  -CP Additional Comments: shearing; possible healing PI  -CP       Wound 06/05/18 0915 Left medial;lower leg ulceration, venous    Wound - Properties Group Date first assessed: 06/05/18  -CP Time first assessed: 0915  -CP Present On Admission : yes;picture taken  -CP Side: Left  -CP Orientation: medial;lower  -CP Location: leg  -CP Type: ulceration, venous  -CP       Wound 06/05/18 0915 Left lateral;lower leg ulceration, venous    Wound - Properties Group Date first assessed: 06/05/18  -CP Time first assessed: 0915  -CP Present On Admission : yes;picture taken  -CP Side: Left  -CP Orientation: lateral;lower  -CP Location: leg  -CP Type: ulceration, venous  -CP       Wound 06/05/18 0915 Right lower;anterior leg ulceration, venous    Wound - Properties Group Date first assessed: 06/05/18  -CP Time first assessed: 0915  -CP Present On Admission : yes;picture taken  -CP Side: Right  -CP Orientation: lower;anterior  -CP Location: leg  -CP Type: ulceration, venous  -CP       Wound 06/05/18 0915 Right lateral;lower leg ulceration, venous    Wound - Properties Group Date first assessed: 06/05/18  -CP Time first assessed: 0915  -CP Present On Admission : yes;picture taken  -CP Side: Right  -CP Orientation: lateral;lower  -CP Location: leg  -CP Type: ulceration, venous  -CP       Wound 06/05/18 0915 Right posterior;lower leg ulceration, venous    Wound - Properties Group Date first assessed: 06/05/18  -CP Time first assessed: 0915  -CP Present On Admission : yes  -CP Side: Right  -CP Orientation: posterior;lower  -CP Location: leg  -CP Type: ulceration, venous  -CP       Wound 06/05/18 0915 Right medial heel    Wound - Properties Group Date first assessed: 06/05/18  -CP Time first assessed: 0915  -CP Present On Admission : yes;picture taken  -CP Side: Right  -CP Orientation: medial  -CP Location: heel  -CP Stage, Pressure Injury: unstageable  -CP       Plan of Care Review    Plan of Care  Reviewed With patient;son;spouse;family  -HK           Clinical Impression (OT)    Date of Referral to OT 06/04/18  -HK        OT Diagnosis Decreased independence with ADLs and Mobility   -HK        Patient/Family Goals Statement (OT Eval) Pt would like to improve and return home   -HK        Criteria for Skilled Therapeutic Interventions Met (OT Eval) yes;treatment indicated  -HK        Rehab Potential (OT Eval) good, to achieve stated therapy goals  -HK        Therapy Frequency (OT Eval) daily  -HK        Care Plan Review (OT) evaluation/treatment results reviewed;care plan/treatment goals reviewed;patient/other agree to care plan  -HK        Care Plan Review, Other Participant (OT Eval) spouse;son;family  -HK        Anticipated Discharge Disposition (OT) inpatient rehabilitation facility   pending progress pt may need IPR prior to returning home   -HK           Vital Signs    Pre Systolic BP Rehab --   RN cleared for tx  -HK        Pre Patient Position Sitting  -HK        Intra Patient Position Standing  -HK        Post Patient Position Side Lying  -HK           OT Goals    Bed Mobility Goal Selection (OT) bed mobility, OT goal 1  -HK        Transfer Goal Selection (OT) transfer, OT goal 1;transfer, OT goal 2  -HK           Bed Mobility Goal 1 (OT)    Activity/Assistive Device (Bed Mobility Goal 1, OT) scooting;sit to supine/supine to sit  -HK        [place] Level/Cues Needed (Bed Mobility Goal 1, OT) minimum assist (75% or more patient effort);verbal cues required  -HK        Time Frame (Bed Mobility Goal 1, OT) 5 days  -HK        Progress/Outcomes (Bed Mobility Goal 1, OT) goal ongoing  -HK           Transfer Goal 1 (OT)    Activity/Assistive Device (Transfer Goal 1, OT) sit-to-stand/stand-to-sit  -HK        [place] Level/Cues Needed (Transfer Goal 1, OT) minimum assist (75% or more patient effort);verbal cues required  -HK        Time Frame (Transfer Goal 1, OT) 5 days  -HK        Progress/Outcome  (Transfer Goal 1, OT) goal ongoing  -HK           Transfer Goal 2 (OT)    Activity/Assistive Device (Transfer Goal 2, OT) toilet  -HK        [place] Level/Cues Needed (Transfer Goal 2, OT) moderate assist (50-74% patient effort);verbal cues required  -HK        Time Frame (Transfer Goal 2, OT) 5 days  -HK        Progress/Outcome (Transfer Goal 2, OT) goal ongoing  -HK           Living Environment    Home Accessibility --   pt has ramp and walk in shower   -HK          User Key  (r) = Recorded By, (t) = Taken By, (c) = Cosigned By    Initials Name Effective Dates    CP Angeline Aguirre, APRN 04/06/17 -     AB Kathie Lindsay, RN 04/23/18 -     HK Maribeth Keenan, OT 03/07/18 -            Occupational Therapy Education     Title: PT OT SLP Therapies (Active)     Topic: Occupational Therapy (Active)     Point: ADL training (Done)     Description: Instruct learner(s) on proper safety adaptation and remediation techniques during self care or transfers.   Instruct in proper use of assistive devices.   Learning Progress Summary     Learner Status Readiness Method Response Comment Documented by    Patient Done Acceptance E VU Pt and family educated on ADL retraining with transfers and self feeding, safety precautions, and appropriate body mechanics.  06/05/18 1244          Point: Precautions (Done)     Description: Instruct learner(s) on prescribed precautions during self-care and functional transfers.   Learning Progress Summary     Learner Status Readiness Method Response Comment Documented by    Patient Done Acceptance E VU Pt and family educated on ADL retraining with transfers and self feeding, safety precautions, and appropriate body mechanics.  06/05/18 1244          Point: Body mechanics (Done)     Description: Instruct learner(s) on proper positioning and spine alignment during self-care, functional mobility activities and/or exercises.   Learning Progress Summary     Learner Status Readiness Method  Response Comment Documented by    Patient Done Acceptance E VU Pt and family educated on ADL retraining with transfers and self feeding, safety precautions, and appropriate body mechanics.  06/05/18 1244                      User Key     Initials Effective Dates Name Provider Type Discipline     03/07/18 -  Maribeth Keenan OT Occupational Therapist OT                  OT Recommendation and Plan  Outcome Summary/Treatment Plan (OT)  Anticipated Discharge Disposition (OT): inpatient rehabilitation facility (pending progress pt may need IPR prior to returning home )  Therapy Frequency (OT Eval): daily  Plan of Care Review  Plan of Care Reviewed With: patient  Plan of Care Reviewed With: patient  Outcome Summary: Pt completed sit to stand with modAx2 and declined to take steps due to no having his forearm crutches. Pt and family report pt requires modA for all ADLS at baseline. OT issued built up utensils to increase independence with self feeding. Recommend discharge to IPR pending progress.           Outcome Measures     Row Name 06/05/18 0825             How much help from another is currently needed...    Putting on and taking off regular lower body clothing? 2  -HK      Bathing (including washing, rinsing, and drying) 2  -HK      Toileting (which includes using toilet bed pan or urinal) 2  -HK      Putting on and taking off regular upper body clothing 2  -HK      Taking care of personal grooming (such as brushing teeth) 3  -HK      Eating meals 3  -HK      Score 14  -HK         Functional Assessment    Outcome Measure Options AM-PAC 6 Clicks Daily Activity (OT)  -HK        User Key  (r) = Recorded By, (t) = Taken By, (c) = Cosigned By    Initials Name Provider Type     Maribeth Keenan OT Occupational Therapist          Time Calculation:   OT Start Time: 0825    Therapy Charges for Today     Code Description Service Date Service Provider Modifiers Qty    28420641244  OT EVAL MOD COMPLEXITY 4 6/5/2018 Maribeth ALLEN  CHAVO Keenan GO 1    [id number]  OT THER SUPP EA 15 MIN 6/5/2018 Maribeth Keenan OT GO 2               Maribeth Keenan OT  6/5/2018

## 2018-06-05 NOTE — PROGRESS NOTES
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   PSxH: He  has a past surgical history that includes Joint Manipulation.             Reported/Observed/Food/Nutrition Related History:     Patient was sleeping. Talked with pt's wife via phone. She stated his appetite at home waxes and wanes. She stated he need soft foods and tender meats. He dislikes broccoli. She is receptive to chocolate boost at meals.      Anthropometrics     Height: 172.7 cm (67.99\")  Last filed wt: Weight: 80.1 kg (176 lb 9.6 oz) (06/05/18 0500)  Weight Method: Bed scale    BMI: BMI (Calculated): 25.9  Overweight: 25.0-29.9kg/m2     Ideal Body Weight (IBW) (kg): 70.87        Weight Change   UBW:155-160 lbs stated per pt.'s wife via phone:     Labs reviewed     )  Results from last 7 days  Lab Units 06/05/18  0502  06/04/18  1058   SODIUM mmol/L 141  < > 142   POTASSIUM mmol/L 3.6  < > 3.9   CHLORIDE mmol/L 108  < > 104   CO2 mmol/L 31.0  < > 30.0   BUN mg/dL 20  < > 25*   CREATININE mg/dL 0.60  < > 0.80   CALCIUM mg/dL 8.6*  < > 8.9   BILIRUBIN mg/dL  --   --  0.6   ALK PHOS U/L  --   --  67   ALT (SGPT) U/L  --   --  11   AST (SGOT) U/L  --   --  14 "   GLUCOSE mg/dL 109*  < > 84   < > = values in this interval not displayed.          Lab Results  Lab Value Date/Time   HGBA1C 5.50 06/05/2018 0502         Medications reviewed     Intake/Ouptut 24 hrs (7:00AM - 6:59 AM)     Intake & Output (last day)       06/04 0701 - 06/05 0700 06/05 0701 - 06/06 0700    Urine (mL/kg/hr) 1450 250 (0.4)    Stool 0     Total Output 1450 250    Net -1450 -250          Unmeasured Stool Occurrence 3 x               Current Nutrition Prescription     PO: Diet Regular; Cardiac, Consistent Carbohydrate    Active Supplement Orders      Dietary Nutrition Supplements Ensure HP      DIET MESSAGE For dinner tonight offer #1 with green beans in place of broccoli and cauliflower, other items and lemonade to drink. No milk or yogurt. For breakfast tomorrow offer standard options and add turkey sausage, decaf. Hot tea.sawyer.      DIET MESSAGE Patient needs soft foods, easy to chew foods and tender meats.    Intake:Insufficient p.o. intake per nursing flowsheet.           Nutrition Diagnosis       Problem No nutrition diagnosis at this time     Nutrition Intervention   1.  Follow treatment progress, Care plan reviewed, Advise alternate selection, Interview for preferences, Menu provided, Menu adjusted, Supplement provided   All discussed with pt's wife via phone.Initiate chocolate ensure HP every meal.        Goal:   General: Nutrition support treatment  PO: Establish PO      Monitoring/Evaluation:   Per protocol      Will Continue to follow per protocol      Bettie Stovall RD  Time Spent: 20 minutes

## 2018-06-05 NOTE — PLAN OF CARE
Problem: Fall Risk (Adult)  Goal: Identify Related Risk Factors and Signs and Symptoms  Outcome: Ongoing (interventions implemented as appropriate)   06/04/18 1959   Fall Risk (Adult)   Related Risk Factors (Fall Risk) fatigue/slow reaction;gait/mobility problems   Signs and Symptoms (Fall Risk) presence of risk factors     Goal: Absence of Fall  Outcome: Ongoing (interventions implemented as appropriate)      Problem: Skin Injury Risk (Adult)  Goal: Identify Related Risk Factors and Signs and Symptoms  Outcome: Ongoing (interventions implemented as appropriate)    Goal: Skin Health and Integrity  Outcome: Ongoing (interventions implemented as appropriate)

## 2018-06-05 NOTE — PROGRESS NOTES
[facility] Medicine Services  PROGRESS NOTE    Patient Name: Michoacano Rojas  : 1940  MRN: [id number]    Date of Admission: 2018  Length of Stay: 1  Primary Care Physician: Michael Chavez MD    Subjective   Subjective     CC:  Confusion    HPI:  Lying in bed, son at bedside. Denies f/c. Denies cough/congestion. No n/v. Denies confusion. Knows he's at hospital and why he is here. No dysuria. Chronic RA with stiffness/limited mobility.    Review of Systems    Otherwise ROS is negative except as mentioned in the HPI.    Objective   Objective     Vital Signs:   Temp:  [97.5 °F (36.4 °C)-97.8 °F (36.6 °C)] 97.6 °F (36.4 °C)  Heart Rate:  [57-90] 66  Resp:  [14-20] 17  BP: (110-132)/(59-88) 132/71        Physical Exam:  NAD, alert and oriented, but chronically ill appearing  OP clear, dry MM  No conjunctival injection  Neck supple, No LAD  RRR with 1/6 systolic murmur  CTAB  +BS, ND, NT  ARMANDO, but weak  B LE edema, R greater than L with chronic venous stasis ulcers with no increased warmth/redness  B hand deformities c/w RA  Normal affect, alert and oriented x 3 and answers appropriately    Results Reviewed:  I have personally reviewed current lab, radiology, and data and agree.      Results from last 7 days  Lab Units 18  1829 18  1058   WBC 10*3/mm3 8.65 9.98   HEMOGLOBIN g/dL 10.1* 10.5*   HEMATOCRIT % 34.8* 36.1*   PLATELETS 10*3/mm3 188 193       Results from last 7 days  Lab Units 18  0502 18  1312 18  1058   SODIUM mmol/L 141 140 142   POTASSIUM mmol/L 3.6 3.8 3.9   CHLORIDE mmol/L 108 106 104   CO2 mmol/L 31.0 26.0 30.0   BUN mg/dL 20 26* 25*   CREATININE mg/dL 0.60 0.90 0.80   GLUCOSE mg/dL 109* 83 84   CALCIUM mg/dL 8.6* 8.8 8.9   ALT (SGPT) U/L  --   --  11   AST (SGOT) U/L  --   --  14   TROPONIN I ng/mL  --  0.006 <0.006     Estimated Creatinine Clearance: 84.8 mL/min (by C-G formula based on SCr of 0.6 mg/dL).  BNP   Date Value Ref  Range Status   06/04/2018 196.0 (H) 0.0 - 100.0 pg/mL Final     Comment:     Results may be falsely decreased if patient taking Biotin.     pH, Arterial   Date Value Ref Range Status   06/04/2018 7.429 7.350 - 7.450 pH units Final       Microbiology Results Abnormal     None          Imaging Results (last 24 hours)     Procedure Component Value Units Date/Time    CT Angiogram Chest With Contrast [[id number]] Updated:  06/04/18 1952    CT Head Without Contrast [[id number]] Collected:  06/04/18 1559     Updated:  06/04/18 1619    Narrative:       EXAMINATION: CT HEAD WO CONTRAST- 06/04/2018     INDICATION: mental status change     TECHNIQUE: Multiple axial CT imaging was obtained of the head from skull  base to skull vertex without the administration of intravenous contrast.     The radiation dose reduction device was turned on for each scan per the  ALARA (As Low as Reasonably Achievable) protocol.     COMPARISON: NONE     FINDINGS: There is atrophy identified of the brain with some low-density  area seen in the periventricular white matter suggesting chronic small  vessel ischemic change. No hemorrhage or hydrocephalus. No mass, mass  effect, or midline shift. No abnormal extra-axial fluid collections  identified. Bony structures reveal no evidence of osseous abnormality.  There is complete opacification seen of the left maxillary sinus with  some wall thickening identified of the ethmoid air cells. The globes and  orbits are intact. Fluid seen within the mastoid air cells bilaterally.       Impression:       Fluid in the mastoid air cells bilaterally with complete  opacification seen of the left maxillary sinus. Atrophy identified of  the brain with chronic changes and no evidence of acute intracranial  abnormality.     D:  06/04/2018  E:  06/04/2018        This report was finalized on 6/4/2018 4:17 PM by Dr. Janell Mariee MD.       XR Chest 1 View [[id number]] Collected:  06/04/18 1300     Updated:  06/04/18  1527    Narrative:       EXAMINATION: XR CHEST 1 VW- 06/04/2018     INDICATION: SOA triage protocol      COMPARISON: 11/17/2017     FINDINGS: Portable chest reveals heart to be enlarged. Degenerative  changes seen within the spine with slight curvature with convexity to  the right. Degenerative changes seen within the shoulders bilaterally.  Vascular calcifications seen within the thoracic aorta. No pleural  effusion or pneumothorax. Pulmonary vascularity is within normal limits.            Impression:       Chronic and emphysematous changes seen within the lung  fields with extensive degenerative changes seen within the spine and  shoulders bilaterally. No evidence of acute parenchymal disease. There  is prominence seen in the mediastinum which is stable and unchanged in  the interval.     D:  06/04/2018  E:  06/04/2018     This report was finalized on 6/4/2018 3:25 PM by Dr. Janell Mariee MD.       CT Soft Tissue Neck With Contrast [[id number]] Updated:  06/04/18 1434        Results for orders placed during the hospital encounter of 06/17/17   Adult Transthoracic Echo Complete    Narrative · Left ventricular systolic function is normal. Estimated EF = 58%.  · Mild mitral valve regurgitation is present.  · Mild tricuspid valve regurgitation is present.  · Calculated right ventricular systolic pressure from tricuspid   regurgitation is 29 mmHg.  · There is no evidence of pericardial effusion.  · No evidence of pulmonary hypertension is present.  · The aortic valve exhibits sclerosis.  · Normal right ventricular cavity size, wall thickness, systolic function   and septal motion noted.          I have reviewed the medications.    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 male here with confusion.    Assessment & Plan:    Confusion  --resolved, monitor  LE edema/asymmetric  --prior duplex negative  --CT angiography chest pending  Hx of RA  --on chronic steroids  HTN  B Chronic venous stasis ulcers  Debilitated  Hx of DHF/VHD  --s/p lasix      DVT Prophylaxis:  KANCHAN    CODE STATUS: Conditional Code    Disposition: I expect the patient to be discharged today or tomorrow.      Electronically signed by Jem Torres MD, 06/05/18, 7:54 AM.

## 2018-06-05 NOTE — PLAN OF CARE
Problem: Patient Care Overview  Goal: Plan of Care Review  Outcome: Ongoing (interventions implemented as appropriate)   06/05/18 5579   Coping/Psychosocial   Plan of Care Reviewed With patient   Plan of Care Review   Progress improving   OTHER   Outcome Summary Pt completed sit to stand with modAx2 and declined to take steps due to no having his forearm crutches. Pt and family report pt requires modA for all ADLS at baseline. OT issued built up utensils to increase independence with self feeding. Recommend discharge to [facility] pending progress.

## 2018-06-05 NOTE — PLAN OF CARE
Problem: Fall Risk (Adult)  Intervention: Monitor/Assist with Self Care   18   Activity   Activity Assistance Provided assistance, 2 people   Daily Care Interventions   Self-Care Promotion independence encouraged;BADL personal objects within reach     Intervention: Reduce Risk/Promote Restraint Free Environment   18 1500   Safety Management   Environmental Safety Modification assistive device/personal items within reach;clutter free environment maintained;room organization consistent   Safety Promotion/Fall Prevention nonskid shoes/slippers when out of bed;safety round/check completed   Prevent [place] Drop/Fall   Safety/Security Measures chair alarm set;family to remain at bedside     Intervention: Review Medications/Identify Contributors to Fall Risk   18   Safety Management   Medication Review/Management medications reviewed       Goal: Identify Related Risk Factors and Signs and Symptoms  Outcome: Ongoing (interventions implemented as appropriate)   18   Fall Risk (Adult)   Related Risk Factors (Fall Risk) fatigue/slow reaction;gait/mobility problems   Signs and Symptoms (Fall Risk) presence of risk factors     Goal: Absence of Fall  Outcome: Ongoing (interventions implemented as appropriate)   18   Fall Risk (Adult)   Absence of Fall making progress toward outcome       Problem: Patient Care Overview  Goal: Plan of Care Review  Outcome: Ongoing (interventions implemented as appropriate)   18   Coping/Psychosocial   Plan of Care Reviewed With patient   Plan of Care Review   Progress improving   OTHER   Outcome Summary Pt able to work with therapy today. Pt able to be in chair today with assist from staffx2. Alert and oriented. with some confusion at times. Patient able to rest during shift. VSS. No new concerns at this time.     Goal: Individualization and Mutuality  Outcome: Ongoing (interventions implemented as appropriate)   18    Individualization   Patient Specific Preferences door open, family at bedside, oob to chair   Patient Specific Goals (Include Timeframe) enc oob, possible rehab at d/c    Patient Specific Interventions enc oob, enc to chair, enc therapy   Mutuality/Individual Preferences   What Anxieties, Fears, Concerns, or Questions Do You Have About Your Care? none   What Information Would Help Us Give You More Personalized Care? none   How Would You and/or Your Support Person Like to Participate in Your Care? none   Mutuality/Individual Preferences   How to Address Anxieties/Fears none       Problem: Skin Injury Risk (Adult)  Intervention: Promote/Optimize Nutrition   06/05/18 1647   Nutrition Interventions   Oral Nutrition Promotion physical activity promoted;rest periods promoted     Intervention: Prevent/Manage Excess Moisture   06/05/18 1200 06/05/18 1647   Hygiene Care   Perineal Care --  absorbent pad changed   Skin Interventions   Skin Protection adhesive use limited;incontinence pads utilized;tubing/devices free from skin contact --      Intervention: Maintain Head of Bed Elevation Less Than 30 Degrees as Tolerated   06/05/18 1647   Positioning   Head of Bed (HOB) HOB elevated     Intervention: Prevent/Minimize Shear/Friction Injuries   06/05/18 1030 06/05/18 1200   Positioning   Positioning/Transfer Devices pillows;in use --    Skin Interventions   Pressure Reduction Devices --  pressure-redistributing mattress utilized;other (see comments)  (awaiting specialty bed)     Intervention: Prevent or Minimize Pressure   06/05/18 1200   Positioning   Body Position legs elevated;other (see comments)  (up in chair)   Skin Interventions   Pressure Reduction Techniques frequent weight shift encouraged;heels elevated off bed;weight shift assistance provided       Goal: Identify Related Risk Factors and Signs and Symptoms  Outcome: Ongoing (interventions implemented as appropriate)   06/04/18 1959   Skin Injury Risk (Adult)    Related Risk Factors (Skin Injury Risk) edema;tissue perfusion altered;mobility impaired     Goal: Skin Health and Integrity  Outcome: Ongoing (interventions implemented as appropriate)   06/05/18 4016   Skin Injury Risk (Adult)   Skin Health and Integrity making progress toward outcome

## 2018-06-06 VITALS
RESPIRATION RATE: 20 BRPM | HEART RATE: 61 BPM | WEIGHT: 180.78 LBS | HEIGHT: 68 IN | DIASTOLIC BLOOD PRESSURE: 63 MMHG | TEMPERATURE: 97.7 F | BODY MASS INDEX: 27.4 KG/M2 | SYSTOLIC BLOOD PRESSURE: 109 MMHG | OXYGEN SATURATION: 96 %

## 2018-06-06 PROCEDURE — 25010000002 HEPARIN (PORCINE) PER 1000 UNITS: Performed by: INTERNAL MEDICINE

## 2018-06-06 PROCEDURE — 97110 THERAPEUTIC EXERCISES: CPT

## 2018-06-06 PROCEDURE — 63710000001 PREDNISONE PER 5 MG: Performed by: INTERNAL MEDICINE

## 2018-06-06 PROCEDURE — 97163 PT EVAL HIGH COMPLEX 45 MIN: CPT

## 2018-06-06 PROCEDURE — 97116 GAIT TRAINING THERAPY: CPT

## 2018-06-06 PROCEDURE — 99239 HOSP IP/OBS DSCHRG MGMT >30: CPT | Performed by: HOSPITALIST

## 2018-06-06 RX ADMIN — PREDNISONE 15 MG: 5 TABLET ORAL at 09:29

## 2018-06-06 RX ADMIN — BISOPROLOL FUMARATE 2.5 MG: 5 TABLET ORAL at 09:29

## 2018-06-06 RX ADMIN — CASTOR OIL AND BALSAM, PERU: 788; 87 OINTMENT TOPICAL at 09:38

## 2018-06-06 RX ADMIN — FINASTERIDE 5 MG: 5 TABLET, FILM COATED ORAL at 09:33

## 2018-06-06 RX ADMIN — VITAMIN D, TAB 1000IU (100/BT) 2000 UNITS: 25 TAB at 09:28

## 2018-06-06 RX ADMIN — POLYVINYL ALCOHOL 2 DROP: 14 SOLUTION/ DROPS OPHTHALMIC at 00:26

## 2018-06-06 RX ADMIN — AMLODIPINE BESYLATE 10 MG: 10 TABLET ORAL at 09:29

## 2018-06-06 RX ADMIN — LISINOPRIL 5 MG: 5 TABLET ORAL at 09:28

## 2018-06-06 RX ADMIN — HYDROCODONE BITARTRATE AND ACETAMINOPHEN 1 TABLET: 10; 325 TABLET ORAL at 00:26

## 2018-06-06 RX ADMIN — HEPARIN SODIUM 5000 UNITS: 5000 INJECTION, SOLUTION INTRAVENOUS; SUBCUTANEOUS at 06:30

## 2018-06-06 RX ADMIN — Medication 250 MG: at 09:28

## 2018-06-06 RX ADMIN — FLUTICASONE PROPIONATE 2 SPRAY: 50 SPRAY, METERED NASAL at 09:37

## 2018-06-06 NOTE — PROGRESS NOTES
Case Management Discharge Note    Final Note: I spoke with the pt. Arranged HH thru [facility] per pt choice. I called the referral to Mraie at [facility]. No other needs per pt.    Destination     No service coordination in this encounter.      Durable Medical Equipment     No service coordination in this encounter.      Dialysis/Infusion     No service coordination in this encounter.      Home Medical Care - Selection Complete     Service Request Status Selected Specialties Address Phone Number Fax Number    [facility] [address] [address] [phone]      Social Care     No service coordination in this encounter.             Final Discharge Disposition Code: 06 - home with home health care

## 2018-06-06 NOTE — PLAN OF CARE
Problem: Patient Care Overview  Goal: Plan of Care Review  Outcome: Ongoing (interventions implemented as appropriate)   06/06/18 2253   Coping/Psychosocial   Plan of Care Reviewed With patient   OTHER   Outcome Summary PT eval completed. Pt presents with decreased independence with mobility per PLOF. Pt req CGA to Naina for all bed mobility and t/f's and has preferred method of performing mobility tasks. Pt dem good safety awareness and is able to talk staff through mobility preferences. Pt ambulated 30ft in room with forearm crutches and CGA, no LOB. PT recommends d/c home with family assist.

## 2018-06-06 NOTE — DISCHARGE SUMMARY
[facility] Medicine Services  DISCHARGE SUMMARY    Patient Name: Michoacano Rojas  : 1940  MRN: [id number]    Date of Admission: 2018  Date of Discharge:  2018  Primary Care Physician: Michael Chavez MD    Consults     No orders found from 2018 to 2018.        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          Hospital Course:  Michoacano Rojas is a 77 y.o. male that presented here with confusion and dyspnea. He was treated for A/C DHF with diuretic and his dyspnea improved. His hypoxia improved. He is no longer requiring oxygen therapy. He did have increased B LE edema that was worse on the right, but duplex testing revealed no DVT.     He does have severe venous stasis ulcers, however these do not appear infected. He has no fevers. He has no increased LE warmth or redness.    His confusion quickly resolved. No etiology was ascertained. He remained at baseline during his hospitalization per his family.     He will need follow up with his PCP.  He denies/declines any rehab or HH needs at this time.         Day of Discharge     HPI:   He  has no complaints. He denies dyspnea or chest pain. He denies pleurisy. Wife states that his LE edema is better.    Review of Systems      Otherwise ROS is negative except as mentioned in the HPI.    Vital Signs:   Temp:  [97.7 °F (36.5 °C)] 97.7 °F (36.5 °C)  Heart Rate:  [39-74] (P) 61  Resp:  [17-20] 20  BP: (108-116)/(54-64) (P) 109/63     Physical Exam:  NAD, alert and oriented  OP Clear, MMM  PERRL  Neck supple  RRR 1/6 systolic murmur  CTAB  +BS, ND, NT  B LE edema, worse on R, chronic per patient, better per wife  B LE ulcer/venous stasis changes, wrapped, no redness, or increased warmth  Normal affect    Pertinent  and/or Most Recent Results       Results from last 7 days  Lab Units 06/05/18  0502 06/04/18  1829 06/04/18  1312 06/04/18  1058   WBC 10*3/mm3  --  8.65  --  9.98   HEMOGLOBIN g/dL  --  10.1*  --  10.5*   HEMATOCRIT %  --  34.8*  --  36.1*   PLATELETS 10*3/mm3  --  188  --  193   SODIUM mmol/L 141  --  140 142   POTASSIUM mmol/L 3.6  --  3.8 3.9   CHLORIDE mmol/L 108  --  106 104   CO2 mmol/L 31.0  --  26.0 30.0   BUN mg/dL 20  --  26* 25*   CREATININE mg/dL 0.60  --  0.90 0.80   GLUCOSE mg/dL 109*  --  83 84   CALCIUM mg/dL 8.6*  --  8.8 8.9       Results from last 7 days  Lab Units 06/04/18  1058   BILIRUBIN mg/dL 0.6   ALK PHOS U/L 67   ALT (SGPT) U/L 11   AST (SGOT) U/L 14           Invalid input(s): TG, LDLCALC, LDLREALC    Results from last 7 days  Lab Units 06/05/18  0502 06/04/18  1312 06/04/18  1058   TSH mIU/mL 0.590  --   --    HEMOGLOBIN A1C % 5.50  --   --    BNP pg/mL  --   --  196.0*   TROPONIN I ng/mL  --  0.006 <0.006     Brief Urine Lab Results  (Last result in the past 365 days)      Color   Clarity   Blood   Leuk Est   Nitrite   Protein   CREAT   Urine HCG        06/04/18 1303 Yellow Clear Negative Trace(A) Negative Negative               Microbiology Results Abnormal     None          Imaging Results (all)     Procedure Component Value Units Date/Time    CT Angiogram Chest  With Contrast [[id number]] Updated:  06/04/18 1952    CT Head Without Contrast [[id number]] Collected:  06/04/18 1559     Updated:  06/04/18 1619    Narrative:       EXAMINATION: CT HEAD WO CONTRAST- 06/04/2018     INDICATION: mental status change     TECHNIQUE: Multiple axial CT imaging was obtained of the head from skull  base to skull vertex without the administration of intravenous contrast.     The radiation dose reduction device was turned on for each scan per the  ALARA (As Low as Reasonably Achievable) protocol.     COMPARISON: NONE     FINDINGS: There is atrophy identified of the brain with some low-density  area seen in the periventricular white matter suggesting chronic small  vessel ischemic change. No hemorrhage or hydrocephalus. No mass, mass  effect, or midline shift. No abnormal extra-axial fluid collections  identified. Bony structures reveal no evidence of osseous abnormality.  There is complete opacification seen of the left maxillary sinus with  some wall thickening identified of the ethmoid air cells. The globes and  orbits are intact. Fluid seen within the mastoid air cells bilaterally.       Impression:       Fluid in the mastoid air cells bilaterally with complete  opacification seen of the left maxillary sinus. Atrophy identified of  the brain with chronic changes and no evidence of acute intracranial  abnormality.     D:  06/04/2018  E:  06/04/2018        This report was finalized on 6/4/2018 4:17 PM by Dr. Janell Mariee MD.       XR Chest 1 View [[id number]] Collected:  06/04/18 1300     Updated:  06/04/18 1527    Narrative:       EXAMINATION: XR CHEST 1 VW- 06/04/2018     INDICATION: SOA triage protocol      COMPARISON: 11/17/2017     FINDINGS: Portable chest reveals heart to be enlarged. Degenerative  changes seen within the spine with slight curvature with convexity to  the right. Degenerative changes seen within the shoulders bilaterally.  Vascular calcifications seen within the  thoracic aorta. No pleural  effusion or pneumothorax. Pulmonary vascularity is within normal limits.            Impression:       Chronic and emphysematous changes seen within the lung  fields with extensive degenerative changes seen within the spine and  shoulders bilaterally. No evidence of acute parenchymal disease. There  is prominence seen in the mediastinum which is stable and unchanged in  the interval.     D:  06/04/2018  E:  06/04/2018     This report was finalized on 6/4/2018 3:25 PM by Dr. Janell Mariee MD.       CT Soft Tissue Neck With Contrast [[id number]] Updated:  06/04/18 1434          Results for orders placed during the hospital encounter of 06/04/18   Duplex Venous Lower Extremity - Bilateral CAR    Narrative · Normal bilateral lower extremity venous duplex scan.     All veins are compressible that are visualized.          Results for orders placed during the hospital encounter of 06/04/18   Duplex Venous Lower Extremity - Bilateral CAR    Narrative · Normal bilateral lower extremity venous duplex scan.     All veins are compressible that are visualized.          Results for orders placed during the hospital encounter of 06/04/18   Adult Transthoracic Echo Complete W/ Cont if Necessary Per Protocol    Narrative · Left ventricular systolic function is normal.  · Estimated EF appears to be in the range of 56 - 60%.  · Mild tricuspid valve regurgitation is present.  · Estimated right ventricular systolic pressure from tricuspid   regurgitation is mildly elevated (35-45 mmHg).            Discharge Details      Michoacano Rojas   Home Medication Instructions RIZWANA:[id number]    Printed on:06/06/18 1009   Medication Information                      amLODIPine (NORVASC) 10 MG tablet  Take 1 tablet by mouth Daily.             bisoprolol (ZEBeta) 5 MG tablet  Take 0.5 tablets by mouth Daily.             cholecalciferol (VITAMIN D3) 1000 UNITS tablet  Take 2,000 Units by mouth Daily.              diclofenac (VOLTAREN) 3 % gel gel  Apply 2 g topically 2 (Two) Times a Day As Needed. for 60 days.              docusate sodium (COLACE) 50 MG capsule  Take 100 mg by mouth Daily As Needed.             finasteride (PROSCAR) 5 MG tablet  Take 5 mg by mouth Daily.             fluticasone (FLONASE) 50 MCG/ACT nasal spray  2 sprays into each nostril Daily.             guaiFENesin (MUCINEX) 600 MG 12 hr tablet  Take 1 tablet by mouth Every 12 (Twelve) Hours.             HYDROcodone-acetaminophen (NORCO)  MG per tablet  Take 1 tablet by mouth Every 6 (Six) Hours As Needed for Moderate Pain (4-6).             predniSONE (DELTASONE) 10 MG tablet  Take 10 mg by mouth Daily.             tamsulosin (FLOMAX) 0.4 MG capsule 24 hr capsule  Take 1 capsule by mouth Every Night.             torsemide (DEMADEX) 10 MG tablet  Take 10 mg by mouth Daily.                   Discharge Disposition:  Home or Self Care    Discharge Diet:  Diet Instructions     Advance Diet As Tolerated             Discharge Activity:   Activity Instructions     Activity as Tolerated                 No future appointments.    Additional Instructions for the Follow-ups that You Need to Schedule     Discharge Follow-up with PCP    As directed      Follow Up Details:  Uriel Savage in 3-4 days         Referral to Home Health    As directed      Face to Face Visit Date:  6/6/2018    Follow-up Provider for Plan of Care?:  I treated the patient in an acute care facility and will not continue treatment after discharge.    Follow-up Provider:  SETH SAVAGE [[id number]]    Reason/Clinical Findings:  RA, debility, weakness    Describe mobility limitations that make leaving home difficult:  Severe RA    Nursing/Therapeutic Services Requested:  Physical Therapy Skilled Nursing    Skilled nursing orders:  Other (Wound monitoring)    PT orders:  Strengthening    Frequency:  1 Week 1               Time Spent on Discharge:  32 minutes    Electronically signed by  Jem Torres MD, 06/06/18, 10:09 AM.

## 2018-06-06 NOTE — PLAN OF CARE
Problem: Fall Risk (Adult)  Goal: Identify Related Risk Factors and Signs and Symptoms  Outcome: Outcome(s) achieved Date Met: 06/06/18 06/06/18 0401   Fall Risk (Adult)   Related Risk Factors (Fall Risk) age-related changes;bladder function altered;fatigue/slow reaction;gait/mobility problems;sensory deficits;environment unfamiliar   Signs and Symptoms (Fall Risk) presence of risk factors     Goal: Absence of Fall  Outcome: Ongoing (interventions implemented as appropriate)   06/06/18 0401   Fall Risk (Adult)   Absence of Fall making progress toward outcome       Problem: Patient Care Overview  Goal: Plan of Care Review  Outcome: Ongoing (interventions implemented as appropriate)   06/06/18 0401   Coping/Psychosocial   Plan of Care Reviewed With patient   Plan of Care Review   Progress improving   OTHER   Outcome Summary had two episodes of loose stool, received order for eye drops, VSS. No other complaints voiced. Received one dose of Flom at HS.     Goal: Individualization and Mutuality  Outcome: Outcome(s) achieved Date Met: 06/06/18 06/05/18 1647   Individualization   Patient Specific Preferences door open, family at bedside, oob to chair   Patient Specific Goals (Include Timeframe) enc oob, possible rehab at d/c    Patient Specific Interventions enc oob, enc to chair, enc therapy     Goal: Discharge Needs Assessment  Outcome: Ongoing (interventions implemented as appropriate)   06/04/18 1557 06/04/18 1700 06/06/18 0401   Discharge Needs Assessment   Readmission Within the Last 30 Days no previous admission in last 30 days --  --    Concerns to be Addressed denies needs/concerns at this time --  --    Patient/Family Anticipates Transition to --  home with family --    Patient/Family Anticipated Services at Transition --  home health care --    Transportation Concerns --  car, none --    Transportation Anticipated --  family or friend will provide --    Anticipated Changes Related to Illness inability to  care for self --  --    Equipment Needed After Discharge --  --  bipap/cpap;oxygen   Discharge Facility/Level of Care Needs --  --  other (see comments)  (home or rehab?)   Offered/Gave Vendor List no --  --    Current Discharge Risk --  --  other (see comments)  (family provides 24/7 care for pt)   Disability   Equipment Currently Used at Home --  crutches, axillary --        Problem: Skin Injury Risk (Adult)  Goal: Identify Related Risk Factors and Signs and Symptoms  Outcome: Outcome(s) achieved Date Met: 06/06/18 06/06/18 0401   Skin Injury Risk (Adult)   Related Risk Factors (Skin Injury Risk) advanced age;edema;mobility impaired;tissue perfusion altered;other (see comments)  (pressure ulcers and chronic venous stasis)     Goal: Skin Health and Integrity  Outcome: Ongoing (interventions implemented as appropriate)   06/06/18 0401   Skin Injury Risk (Adult)   Skin Health and Integrity making progress toward outcome       Problem: Fluid Volume Excess (Adult)  Goal: Identify Related Risk Factors and Signs and Symptoms  Outcome: Outcome(s) achieved Date Met: 06/06/18 06/06/18 0401   Fluid Volume Excess (Adult)   Related Risk Factors (Fluid Volume Excess) organ failure   Signs and Symptoms (Fluid Volume Excess) edema;blood pressure/heart rate changes;lab value changes;respiratory pattern changes;acute weight gain     Goal: Optimal Fluid Balance  Outcome: Ongoing (interventions implemented as appropriate)   06/06/18 0401   Fluid Volume Excess (Adult)   Optimal Fluid Balance making progress toward outcome

## 2018-06-06 NOTE — THERAPY EVALUATION
Acute Care - Physical Therapy Initial Evaluation  [facility]     Patient Name: Michoacano Rojas  : 1940  MRN: [id number]  Today's Date: 2018   Onset of Illness/Injury or Date of Surgery: 18  Date of Referral to PT: 08/15/18  Referring Physician: Jose      Admit Date: 2018    Visit Dx:     ICD-10-CM ICD-9-CM   1. Acute on chronic respiratory failure with hypoxia and hypercapnia J96.21 518.84    J96.22 786.09     799.02   2. Delirium R41.0 780.09   3. Terrifying hypnagogic hallucinations G47.8 307.47   4. Venous stasis dermatitis of both lower extremities I87.2 454.1   5. Impaired mobility and ADLs Z74.09 799.89   6. 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   • Tracheal stenosis   • Normocytic anemia   • Diastolic dysfunction with chronic heart failure   • Urinary retention   • Acute on chronic respiratory failure with hypoxia and hypercapnia     Past Medical History:   Diagnosis Date   • A-fib    • Atrial fibrillation    • Chronic cutaneous venous stasis ulcer    • Hypertension    • RA (rheumatoid arthritis)    • Rheumatoid arthritis    • Vertigo    • Vertigo      Past Surgical History:   Procedure Laterality Date   • JOINT MANIPULATION      left hip repair        PT ASSESSMENT (last 12 hours)      Physical Therapy Evaluation     Row Name 18 0815          PT Evaluation Time/Intention    Subjective Information no complaints  -SJ     Document Type evaluation  -SJ     Mode of Treatment individual therapy  -SJ     Patient Effort  excellent  -SJ     Symptoms Noted During/After Treatment none  -SJ     Comment Pt requires extra time for mobility. Pt and family have specific way they prefer to perform t/f's   -SJ     Row Name 06/06/18 0815          General Information    Patient Profile Reviewed? yes  -SJ     Onset of Illness/Injury or Date of Surgery 06/04/18  -SJ     Referring Physician Jose  -     Patient Observations alert;cooperative;agree to therapy  -SJ     Patient/Family Observations wife present  -SJ     General Observations of Patient Pt supine in bed  -SJ     Prior Level of Function independent:;all household mobility;gait;transfer;min assist:  -SJ     Equipment Currently Used at Home crutches, forearm;hospital bed;commode, bedside;cane, straight;walker, rolling;ramp;raised toilet;shower chair  -SJ     Pertinent History of Current Functional Problem 77 y.o.M presented 6/4 with c/o confusion, SOA, orthopnea  -SJ     Existing Precautions/Restrictions fall  -SJ     Limitations/Impairments other (see comments)   decreased ROM  -SJ     Risks Reviewed patient:;spouse/S.O.:;LOB;increased discomfort  -SJ     Benefits Reviewed patient:;spouse/S.O.:;improve function;increase independence;increase strength;increase balance;decrease pain;increase knowledge;improve skin integrity  -SJ     Barriers to Rehab medically complex;previous functional deficit;contractures;environmental barriers  -SJ     Row Name 06/06/18 0815          Relationship/Environment    Primary Source of Support/Comfort child(gema);significant other  -SJ     Lives With spouse  -SJ     Row Name 06/06/18 0815          Resource/Environmental Concerns    Current Living Arrangements home/apartment/condo  -SJ     Row Name 06/06/18 0815          Cognitive Assessment/Intervention- PT/OT    Orientation Status (Cognition) oriented x 4  -SJ     Follows Commands (Cognition) WFL  -SJ     Row Name 06/06/18 0815          Safety Issues, Functional Mobility    Impairments Affecting Function  (Mobility) balance;range of motion (ROM);strength  -SJ     Comment, Safety Issues/Impairments (Mobility) joint deformities impacting mobility  -SJ     Row Name 06/06/18 0815          Bed Mobility Assessment/Treatment    Sit-Supine [place] (Bed Mobility) contact guard;verbal cues  -SJ     Bed Mobility, Safety Issues decreased use of arms for pushing/pulling;decreased use of legs for bridging/pushing  -     Assistive Device (Bed Mobility) bed rails  -     Comment (Bed Mobility) specialty mattress side bolsters deflated and mattress max inflated to aid in bed mobility  -SJ     Row Name 06/06/18 0815          Transfer Assessment/Treatment    Transfer Assessment/Treatment stand pivot/stand step transfer  -     Comment (Transfers) Pt has particular way he prefers to t/f and requires extra time to complete. Bed surface elevated very high to compensate for almost no R knee flex. Pt t/f with use of SPC and forearm crutches and is able to walk staff through his preferred method.  -SJ     Sit-Stand [place] (Transfers) contact guard;verbal cues  -SJ     Stand-Sit [place] (Transfers) minimum assist (75% patient effort);2 person assist;verbal cues  -SJ     Row Name 06/06/18 0815          Sit-Stand Transfer    Assistive Device (Sit-Stand Transfers) cane, straight  -SJ     Row Name 06/06/18 0815          Stand-Sit Transfer    Assistive Device (Stand-Sit Transfers) crutches, forearm  -     Row Name 06/06/18 0815          Stand Pivot/Stand Step Transfer    Stand Pivot/Stand Step [place] contact guard;2 person assist;verbal cues  -SJ     Assistive Device (Stand Pivot Stand Step Transfer) crutches, forearm;cane, straight  -SJ     Row Name 06/06/18 0815          Gait/Stairs Assessment/Training    Gait/Stairs Assessment/Training gait/ambulation independence  -     [place] Level (Gait) contact guard  -     Assistive Device (Gait) crutches, forearm  -SJ     Distance in Feet (Gait) 30  -SJ     Pattern  (Gait) step-to  -SJ     Deviations/Abnormal Patterns (Gait) base of support, narrow;avril decreased;gait speed decreased  -     Comment (Gait/Stairs) Pt requires extra time for mobility, but had no LOB amb with forearm crutches   -     Row Name 06/06/18 0815          General ROM    RT Lower Ext Rt Knee Extension/Flexion  -     GENERAL ROM COMMENTS LLE WFL, RLE significantly limted due to arthritis joint deformities  -     Row Name 06/06/18 0815          Right Lower Ext    Rt Knee Extension/Flexion AROM 0 degrees flex  -[facility] Name 06/06/18 0815          Motor Assessment/Intervention    Additional Documentation Balance (Group)  -[facility] Name 06/06/18 0815          Balance    Balance static sitting balance;static standing balance  -[facility] Name 06/06/18 0815          Static Sitting Balance    Level of [place] (Unsupported Sitting, Static Balance) supervision  -     Sitting Position (Unsupported Sitting, Static Balance) sitting on edge of bed   with bed significantly elevated  -     Time Able to Maintain Position (Unsupported Sitting, Static Balance) more than 5 minutes  -SJ     Comment (Unsupported Sitting, Static Balance) bed elevated to allow feet to touch to compensate for decreased LE ROM  -[facility] Name 06/06/18 0815          Static Standing Balance    Level of [place] (Supported Standing, Static Balance) contact guard assist  -     Time Able to Maintain Position (Supported Standing, Static Balance) 3 to 4 minutes  -     Assistive Device Utilized (Supported Standing, Static Balance) forearm crutches  -SJ     Row Name 06/06/18 0815          Pain Assessment    Additional Documentation Pain Scale: Numbers Pre/Post-Treatment (Group)  -SJ     Row Name 06/06/18 0815          Pain Scale: Numbers Pre/Post-Treatment    Pain Scale: Numbers, Pretreatment 0/10 - no pain  -     Pain Scale: Numbers, Post-Treatment 0/10 - no pain  -     Row Name             Wound 06/05/18 0915 Left  lateral greater trochanter other (see comments)    Wound - Properties Group Date first assessed: 06/05/18  -CP Time first assessed: 0915  -CP Present On Admission : yes;picture taken  -CP Side: Left  -CP Orientation: lateral  -CP Location: greater trochanter  -CP Type: other (see comments)  -CP Additional Comments: friction  -CP    Row Name             Wound 06/05/18 0915 Left upper thigh skin tear    Wound - Properties Group Date first assessed: 06/05/18  -CP Time first assessed: 0915  -CP Present On Admission : yes;picture taken  -CP Side: Left  -CP Orientation: upper  -CP Location: thigh  -CP Type: skin tear  -CP Additional Comments: shearing; possible healing PI  -CP    Row Name             Wound 06/05/18 0915 Left medial;lower leg ulceration, venous    Wound - Properties Group Date first assessed: 06/05/18  -CP Time first assessed: 0915  -CP Present On Admission : yes;picture taken  -CP Side: Left  -CP Orientation: medial;lower  -CP Location: leg  -CP Type: ulceration, venous  -CP    Row Name             Wound 06/05/18 0915 Left lateral;lower leg ulceration, venous    Wound - Properties Group Date first assessed: 06/05/18  -CP Time first assessed: 0915  -CP Present On Admission : yes;picture taken  -CP Side: Left  -CP Orientation: lateral;lower  -CP Location: leg  -CP Type: ulceration, venous  -CP    Row Name             Wound 06/05/18 0915 Right lower;anterior leg ulceration, venous    Wound - Properties Group Date first assessed: 06/05/18  -CP Time first assessed: 0915  -CP Present On Admission : yes;picture taken  -CP Side: Right  -CP Orientation: lower;anterior  -CP Location: leg  -CP Type: ulceration, venous  -CP    Row Name             Wound 06/05/18 0915 Right lateral;lower leg ulceration, venous    Wound - Properties Group Date first assessed: 06/05/18  -CP Time first assessed: 0915  -CP Present On Admission : yes;picture taken  -CP Side: Right  -CP Orientation: lateral;lower  -CP Location: leg  -CP  Type: ulceration, venous  -CP    Row Name             Wound 06/05/18 0915 Right posterior;lower leg ulceration, venous    Wound - Properties Group Date first assessed: 06/05/18  -CP Time first assessed: 0915  -CP Present On Admission : yes  -CP Side: Right  -CP Orientation: posterior;lower  -CP Location: leg  -CP Type: ulceration, venous  -CP    Row Name             Wound 06/05/18 0915 Right medial heel    Wound - Properties Group Date first assessed: 06/05/18  -CP Time first assessed: 0915  -CP Present On Admission : yes;picture taken  -CP Side: Right  -CP Orientation: medial  -CP Location: heel  -CP Stage, Pressure Injury: unstageable  -CP    Row Name 06/06/18 0815          Coping    Observed Emotional State calm;cooperative;happy;pleasant  -SJ     Verbalized Emotional State acceptance  -SJ     Row Name 06/06/18 0815          Plan of Care Review    Plan of Care Reviewed With patient;spouse  -SJ     Row Name 06/06/18 0815          Physical Therapy Clinical Impression    Date of Referral to PT 08/15/18  -SJ     PT Diagnosis (PT Clinical Impression) impaired mobility  -SJ     Patient/Family Goals Statement (PT Clinical Impression) return to home  -SJ     Criteria for Skilled Interventions Met (PT Clinical Impression) yes;treatment indicated  -SJ     Pathology/Pathophysiology Noted (Describe Specifically for Each System) musculoskeletal;integumentary  -SJ     Impairments Found (describe specific impairments) gait, locomotion, and balance;joint integrity and mobility;ROM  -SJ     Functional Limitations in Following Categories (Describe Specific Limitations) self-care;home management;community/leisure  -SJ     Rehab Potential (PT Clinical Summary) good, to achieve stated therapy goals  -SJ     Predicted Duration of Therapy (PT) 2wks  -SJ     Care Plan Review (PT) evaluation/treatment results reviewed;care plan/treatment goals reviewed;risks/benefits reviewed;current/potential barriers reviewed;patient/other agree to  care plan  -     Care Plan Review, Other Participant (PT Clinical Impression) spouse  -     Row Name 06/06/18 0815          Vital Signs    Pre Systolic BP Rehab 116  -SJ     Pre Treatment Diastolic BP 64  -SJ     Pretreatment Heart Rate (beats/min) 82  -SJ     Pre SpO2 (%) 94  -SJ     O2 Delivery Pre Treatment room air  -     Row Name 06/06/18 0815          Physical Therapy Goals    Bed Mobility Goal Selection (PT) bed mobility, PT goal 1  -SJ     Transfer Goal Selection (PT) transfer, PT goal 1  -SJ     Gait Training Goal Selection (PT) gait training, PT goal 1  -[facility] Name 06/06/18 0815          Bed Mobility Goal 1 (PT)    Activity/Assistive Device (Bed Mobility Goal 1, PT) bed mobility activities, all  -SJ     Roberts Level/Cues Needed (Bed Mobility Goal 1, PT) conditional independence  -SJ     Time Frame (Bed Mobility Goal 1, PT) 2 weeks;long term goal (LTG)  -[facility] Name 06/06/18 0815          Transfer Goal 1 (PT)    Activity/Assistive Device (Transfer Goal 1, PT) sit-to-stand/stand-to-sit;bed-to-chair/chair-to-bed;crutches, forearm  -SJ     [place]ts Level/Cues Needed (Transfer Goal 1, PT) minimum assist (75% or more patient effort);set-up required  -SJ     Time Frame (Transfer Goal 1, PT) 2 weeks;long term goal (LTG)  -[facility] Name 06/06/18 0815          Gait Training Goal 1 (PT)    Activity/Assistive Device (Gait Training Goal 1, PT) crutches, forearm  -SJ     [place]ts Level (Gait Training Goal 1, PT) supervision required  -SJ     Distance (Gait Goal 1, PT) 100  -SJ     Time Frame (Gait Training Goal 1, PT) 2 weeks;long term goal (LTG)  -     Row Name 06/06/18 0815          Positioning and Restraints    Pre-Treatment Position in bed  -SJ     Post Treatment Position chair  -SJ     In Chair notified nsg;reclined;call light within reach;encouraged to call for assist;exit alarm on;with family/caregiver;waffle cushion;heels elevated  -     Row Name 06/06/18 0815          Living  Environment    Home Accessibility --   pt has ramp  -       User Key  (r) = Recorded By, (t) = Taken By, (c) = Cosigned By    Initials Name Provider Type    CAYETANO Thompson PT Physical Therapist    REEMA Aleman Nurse Practitioner          Physical Therapy Education     Title: PT OT SLP Therapies (Active)     Topic: Physical Therapy (Active)     Point: Mobility training (Done)    Learning Progress Summary     Learner Status Readiness Method Response Comment Documented by    Patient Done Acceptance TIFFANY WATKINS DU   06/06/18 0944    Significant Other Done Acceptance TIFFANY WATKINS DU   06/06/18 0944          Point: Body mechanics (Done)    Learning Progress Summary     Learner Status Readiness Method Response Comment Documented by    Patient Done Acceptance TIFFANY WATKINS DU   06/06/18 0944    Significant Other Done Acceptance TIFFANY WATKINS DU   06/06/18 0944                      User Key     Initials Effective Dates Name Provider Type [facility] 06/19/15 -  Noris Thompson, PT Physical Therapist PT                PT Recommendation and Plan  Anticipated Discharge Disposition (PT): home with assist  Planned Therapy Interventions (PT Eval): bed mobility training, balance training, gait training, home exercise program, patient/family education, strengthening, transfer training  Therapy Frequency (PT Clinical Impression): daily  Outcome Summary/Treatment Plan (PT)  Anticipated Discharge Disposition (PT): home with assist  Plan of Care Reviewed With: patient  Outcome Summary: PT eval completed. Pt presents with decreased independence with mobility per PLOF. Pt req CGA to Naina for all bed mobility and t/f's and has preferred method of performing mobility tasks. Pt dem good safety awareness and is able to talk staff through mobility preferences. Pt ambulated 30ft in room with forearm crutches and CGA, no LOB. PT recommends d/c home with family assist.          Outcome Measures     Row Name 06/06/18 0815 06/05/18 4561           How much help from another person do you currently need...    Turning from your back to your side while in flat bed without using bedrails? 3  -SJ  --     Moving from lying on back to sitting on the side of a flat bed without bedrails? 4  -SJ  --     Moving to and from a bed to a chair (including a wheelchair)? 3  -SJ  --     Standing up from a chair using your arms (e.g., wheelchair, bedside chair)? 3  -SJ  --     Climbing 3-5 steps with a railing? 1  -SJ  --     To walk in hospital room? 3  -SJ  --     AM-PAC 6 Clicks Score 17  -SJ  --        How much help from another is currently needed...    Putting on and taking off regular lower body clothing?  -- 2  -HK     Bathing (including washing, rinsing, and drying)  -- 2  -HK     Toileting (which includes using toilet bed pan or urinal)  -- 2  -HK     Putting on and taking off regular upper body clothing  -- 2  -HK     Taking care of personal grooming (such as brushing teeth)  -- 3  -HK     Eating meals  -- 3  -HK     Score  -- 14  -HK        Functional Assessment    Outcome Measure Options AM-PAC 6 Clicks Basic Mobility (PT)  - AM-PAC 6 Clicks Daily Activity (OT)  -       User Key  (r) = Recorded By, (t) = Taken By, (c) = Cosigned By    Initials Name Provider Type    CAYETANO Thompson PT Physical Therapist    HK Maribeth Keenan OT Occupational Therapist           Time Calculation:         PT Charges     Row Name 06/06/18 0944             Time Calculation    Start Time 0815  -      PT Received On 06/06/18  [facility]      PT Goal Re-Cert Due Date 06/16/18  -         Time Calculation- PT    Total Timed Code Minutes- PT 23 minute(s)  -        User Key  (r) = Recorded By, (t) = Taken By, (c) = Cosigned By    Initials Name Provider Type     Noris Thompson PT Physical Therapist          Therapy Charges for Today     Code Description Service Date Service Provider Modifiers Qty    48761777859  PT EVAL HIGH COMPLEXITY 4 6/6/2018 Noris Thompson PT GP 1     [id number]  GAIT TRAINING EA 15 MIN 6/6/2018 Noris Thompson, PT GP 1    18375959369 HC PT THER PROC EA 15 MIN 6/6/2018 Noris Thompson, PT GP 1    60808661258 HC PT THER SUPP EA 15 MIN 6/6/2018 Noris Thompson, PT GP 2          PT G-Codes  Outcome Measure Options: AM-PAC 6 Clicks Basic Mobility (PT)      Noris Thompson, PT  6/6/2018

## 2019-01-04 RX ORDER — AMLODIPINE BESYLATE 5 MG/1
5 TABLET ORAL
Qty: 90 TABLET | Refills: 3 | Status: SHIPPED | OUTPATIENT
Start: 2019-01-04 | End: 2019-01-09 | Stop reason: SDUPTHER

## 2019-01-09 RX ORDER — AMLODIPINE BESYLATE 5 MG/1
5 TABLET ORAL
Qty: 90 TABLET | Refills: 3 | Status: SHIPPED | OUTPATIENT
Start: 2019-01-09 | End: 2019-10-29 | Stop reason: HOSPADM

## 2019-01-10 ENCOUNTER — TELEPHONE (OUTPATIENT)
Dept: INTERNAL MEDICINE | Facility: CLINIC | Age: 79
End: 2019-01-10

## 2019-01-14 ENCOUNTER — TELEPHONE (OUTPATIENT)
Dept: INTERNAL MEDICINE | Facility: CLINIC | Age: 79
End: 2019-01-14

## 2019-01-14 NOTE — TELEPHONE ENCOUNTER
Pt verbalized understanding and wants to know if it can be increased. Stated it hurts so bad he can hardly sit. Since it has been examined it has hurt and has been swollen like a knot.

## 2019-01-15 ENCOUNTER — APPOINTMENT (OUTPATIENT)
Dept: CT IMAGING | Facility: HOSPITAL | Age: 79
End: 2019-01-15

## 2019-01-15 ENCOUNTER — HOSPITAL ENCOUNTER (OUTPATIENT)
Facility: HOSPITAL | Age: 79
Setting detail: OBSERVATION
Discharge: HOME | End: 2019-01-16
Attending: EMERGENCY MEDICINE | Admitting: EMERGENCY MEDICINE

## 2019-01-15 DIAGNOSIS — K40.30 INCARCERATED RIGHT INGUINAL HERNIA: Primary | ICD-10-CM

## 2019-01-15 DIAGNOSIS — Z86.79 HISTORY OF HYPERTENSION: ICD-10-CM

## 2019-01-15 DIAGNOSIS — M06.9 RHEUMATOID ARTHRITIS INVOLVING MULTIPLE SITES, UNSPECIFIED RHEUMATOID FACTOR PRESENCE: ICD-10-CM

## 2019-01-15 DIAGNOSIS — Z86.79 HISTORY OF ATRIAL FIBRILLATION: ICD-10-CM

## 2019-01-15 LAB
ALBUMIN SERPL-MCNC: 3.62 G/DL (ref 3.2–4.8)
ALBUMIN/GLOB SERPL: 1.3 G/DL (ref 1.5–2.5)
ALP SERPL-CCNC: 73 U/L (ref 25–100)
ALT SERPL W P-5'-P-CCNC: 10 U/L (ref 7–40)
ANION GAP SERPL CALCULATED.3IONS-SCNC: 7 MMOL/L (ref 3–11)
APTT PPP: 26.6 SECONDS (ref 24–37)
AST SERPL-CCNC: 18 U/L (ref 0–33)
BASOPHILS # BLD AUTO: 0.01 10*3/MM3 (ref 0–0.2)
BASOPHILS NFR BLD AUTO: 0.1 % (ref 0–1)
BILIRUB SERPL-MCNC: 0.4 MG/DL (ref 0.3–1.2)
BUN BLD-MCNC: 25 MG/DL (ref 9–23)
BUN/CREAT SERPL: 37.9 (ref 7–25)
CALCIUM SPEC-SCNC: 9 MG/DL (ref 8.7–10.4)
CHLORIDE SERPL-SCNC: 108 MMOL/L (ref 99–109)
CO2 SERPL-SCNC: 28 MMOL/L (ref 20–31)
CREAT BLD-MCNC: 0.66 MG/DL (ref 0.6–1.3)
D-LACTATE SERPL-SCNC: 1.6 MMOL/L (ref 0.5–2)
DEPRECATED RDW RBC AUTO: 52 FL (ref 37–54)
EOSINOPHIL # BLD AUTO: 0.01 10*3/MM3 (ref 0–0.3)
EOSINOPHIL NFR BLD AUTO: 0.1 % (ref 0–3)
ERYTHROCYTE [DISTWIDTH] IN BLOOD BY AUTOMATED COUNT: 15.6 % (ref 11.3–14.5)
GFR SERPL CREATININE-BSD FRML MDRD: 141 ML/MIN/1.73
GLOBULIN UR ELPH-MCNC: 2.8 GM/DL
GLUCOSE BLD-MCNC: 98 MG/DL (ref 70–100)
HCT VFR BLD AUTO: 40.1 % (ref 38.9–50.9)
HGB BLD-MCNC: 11.5 G/DL (ref 13.1–17.5)
IMM GRANULOCYTES # BLD AUTO: 0.03 10*3/MM3 (ref 0–0.03)
IMM GRANULOCYTES NFR BLD AUTO: 0.4 % (ref 0–0.6)
INR PPP: 1.08 (ref 0.85–1.16)
LYMPHOCYTES # BLD AUTO: 0.68 10*3/MM3 (ref 0.6–4.8)
LYMPHOCYTES NFR BLD AUTO: 8.3 % (ref 24–44)
MCH RBC QN AUTO: 26.1 PG (ref 27–31)
MCHC RBC AUTO-ENTMCNC: 28.7 G/DL (ref 32–36)
MCV RBC AUTO: 91.1 FL (ref 80–99)
MONOCYTES # BLD AUTO: 0.48 10*3/MM3 (ref 0–1)
MONOCYTES NFR BLD AUTO: 5.8 % (ref 0–12)
NEUTROPHILS # BLD AUTO: 7.04 10*3/MM3 (ref 1.5–8.3)
NEUTROPHILS NFR BLD AUTO: 85.7 % (ref 41–71)
PLATELET # BLD AUTO: 191 10*3/MM3 (ref 150–450)
PMV BLD AUTO: 11.8 FL (ref 6–12)
POTASSIUM BLD-SCNC: 4.6 MMOL/L (ref 3.5–5.5)
PROT SERPL-MCNC: 6.4 G/DL (ref 5.7–8.2)
PROTHROMBIN TIME: 13.5 SECONDS (ref 11.2–14.3)
RBC # BLD AUTO: 4.4 10*6/MM3 (ref 4.2–5.76)
SODIUM BLD-SCNC: 143 MMOL/L (ref 132–146)
WBC NRBC COR # BLD: 8.22 10*3/MM3 (ref 3.5–10.8)

## 2019-01-15 PROCEDURE — 81001 URINALYSIS AUTO W/SCOPE: CPT | Performed by: EMERGENCY MEDICINE

## 2019-01-15 PROCEDURE — 74176 CT ABD & PELVIS W/O CONTRAST: CPT

## 2019-01-15 PROCEDURE — 85730 THROMBOPLASTIN TIME PARTIAL: CPT | Performed by: EMERGENCY MEDICINE

## 2019-01-15 PROCEDURE — 85025 COMPLETE CBC W/AUTO DIFF WBC: CPT | Performed by: EMERGENCY MEDICINE

## 2019-01-15 PROCEDURE — 85610 PROTHROMBIN TIME: CPT | Performed by: EMERGENCY MEDICINE

## 2019-01-15 PROCEDURE — 83880 ASSAY OF NATRIURETIC PEPTIDE: CPT | Performed by: PHYSICIAN ASSISTANT

## 2019-01-15 PROCEDURE — 80053 COMPREHEN METABOLIC PANEL: CPT | Performed by: EMERGENCY MEDICINE

## 2019-01-15 PROCEDURE — 83605 ASSAY OF LACTIC ACID: CPT | Performed by: EMERGENCY MEDICINE

## 2019-01-15 PROCEDURE — 99285 EMERGENCY DEPT VISIT HI MDM: CPT

## 2019-01-15 RX ORDER — SODIUM CHLORIDE 9 MG/ML
125 INJECTION, SOLUTION INTRAVENOUS CONTINUOUS
Status: DISCONTINUED | OUTPATIENT
Start: 2019-01-16 | End: 2019-01-16

## 2019-01-15 RX ORDER — HYDROCODONE BITARTRATE AND ACETAMINOPHEN 10; 325 MG/1; MG/1
1 TABLET ORAL ONCE
Status: COMPLETED | OUTPATIENT
Start: 2019-01-15 | End: 2019-01-16

## 2019-01-15 RX ORDER — SODIUM CHLORIDE 0.9 % (FLUSH) 0.9 %
10 SYRINGE (ML) INJECTION AS NEEDED
Status: DISCONTINUED | OUTPATIENT
Start: 2019-01-15 | End: 2019-01-16 | Stop reason: HOSPADM

## 2019-01-15 NOTE — TELEPHONE ENCOUNTER
Called and spoke with patient. He said he is having a hard time understanding me will have wife call back.

## 2019-01-15 NOTE — TELEPHONE ENCOUNTER
Patients wife called back. Advised her of below. She stated at this time they do not want to schedule an appointment

## 2019-01-15 NOTE — TELEPHONE ENCOUNTER
No, 5 mg is the dose.  Usually the problem caused by a swollen prostate is slow urine flow, not pain when sitting, so I'm not sure what he's describing.  Does he want me to look at him?

## 2019-01-16 ENCOUNTER — TELEPHONE (OUTPATIENT)
Dept: INTERNAL MEDICINE | Facility: CLINIC | Age: 79
End: 2019-01-16

## 2019-01-16 VITALS
OXYGEN SATURATION: 98 % | RESPIRATION RATE: 18 BRPM | WEIGHT: 168.13 LBS | BODY MASS INDEX: 24.07 KG/M2 | HEIGHT: 70 IN | SYSTOLIC BLOOD PRESSURE: 144 MMHG | HEART RATE: 63 BPM | DIASTOLIC BLOOD PRESSURE: 75 MMHG | TEMPERATURE: 97.6 F

## 2019-01-16 PROBLEM — B37.49 YEAST UTI: Status: ACTIVE | Noted: 2019-01-16

## 2019-01-16 PROBLEM — N39.0 UTI (URINARY TRACT INFECTION) DUE TO URINARY INDWELLING CATHETER (HCC): Status: ACTIVE | Noted: 2019-01-16

## 2019-01-16 PROBLEM — K40.30 INCARCERATED RIGHT INGUINAL HERNIA: Status: ACTIVE | Noted: 2019-01-16

## 2019-01-16 PROBLEM — T83.511A UTI (URINARY TRACT INFECTION) DUE TO URINARY INDWELLING CATHETER (HCC): Status: ACTIVE | Noted: 2019-01-16

## 2019-01-16 LAB
ANION GAP SERPL CALCULATED.3IONS-SCNC: 2.8 MMOL/L (ref 3–11)
BACTERIA UR QL AUTO: ABNORMAL /HPF
BILIRUB UR QL STRIP: NEGATIVE
BNP SERPL-MCNC: 157 PG/ML (ref 0–100)
BUN BLD-MCNC: 23 MG/DL (ref 9–23)
BUN/CREAT SERPL: 43.4 (ref 7–25)
CALCIUM SPEC-SCNC: 8.8 MG/DL (ref 8.7–10.4)
CHLORIDE SERPL-SCNC: 108 MMOL/L (ref 99–109)
CLARITY UR: ABNORMAL
CO2 SERPL-SCNC: 30.2 MMOL/L (ref 20–31)
COLOR UR: YELLOW
CREAT BLD-MCNC: 0.53 MG/DL (ref 0.6–1.3)
DEPRECATED RDW RBC AUTO: 52.2 FL (ref 37–54)
ERYTHROCYTE [DISTWIDTH] IN BLOOD BY AUTOMATED COUNT: 15.6 % (ref 11.3–14.5)
GFR SERPL CREATININE-BSD FRML MDRD: >150 ML/MIN/1.73
GLUCOSE BLD-MCNC: 77 MG/DL (ref 70–100)
GLUCOSE UR STRIP-MCNC: NEGATIVE MG/DL
HCT VFR BLD AUTO: 37.1 % (ref 38.9–50.9)
HGB BLD-MCNC: 10.7 G/DL (ref 13.1–17.5)
HGB UR QL STRIP.AUTO: NEGATIVE
HYALINE CASTS UR QL AUTO: ABNORMAL /LPF
KETONES UR QL STRIP: NEGATIVE
LEUKOCYTE ESTERASE UR QL STRIP.AUTO: ABNORMAL
MCH RBC QN AUTO: 26.2 PG (ref 27–31)
MCHC RBC AUTO-ENTMCNC: 28.8 G/DL (ref 32–36)
MCV RBC AUTO: 90.9 FL (ref 80–99)
NITRITE UR QL STRIP: NEGATIVE
PH UR STRIP.AUTO: 7.5 [PH] (ref 5–8)
PLATELET # BLD AUTO: 162 10*3/MM3 (ref 150–450)
PMV BLD AUTO: 11.9 FL (ref 6–12)
POTASSIUM BLD-SCNC: 3.6 MMOL/L (ref 3.5–5.5)
PROT UR QL STRIP: NEGATIVE
RBC # BLD AUTO: 4.08 10*6/MM3 (ref 4.2–5.76)
RBC # UR: ABNORMAL /HPF
REF LAB TEST METHOD: ABNORMAL
SODIUM BLD-SCNC: 141 MMOL/L (ref 132–146)
SP GR UR STRIP: 1.02 (ref 1–1.03)
SQUAMOUS #/AREA URNS HPF: ABNORMAL /HPF
UROBILINOGEN UR QL STRIP: ABNORMAL
WBC NRBC COR # BLD: 7.39 10*3/MM3 (ref 3.5–10.8)
WBC UR QL AUTO: ABNORMAL /HPF
YEAST URNS QL MICRO: ABNORMAL /HPF

## 2019-01-16 PROCEDURE — 97162 PT EVAL MOD COMPLEX 30 MIN: CPT

## 2019-01-16 PROCEDURE — 29580 STRAPPING UNNA BOOT: CPT

## 2019-01-16 PROCEDURE — G0378 HOSPITAL OBSERVATION PER HR: HCPCS

## 2019-01-16 PROCEDURE — 85027 COMPLETE CBC AUTOMATED: CPT | Performed by: PHYSICIAN ASSISTANT

## 2019-01-16 PROCEDURE — 99220 PR INITIAL OBSERVATION CARE/DAY 70 MINUTES: CPT | Performed by: FAMILY MEDICINE

## 2019-01-16 PROCEDURE — 63710000001 PREDNISONE PER 5 MG: Performed by: PHYSICIAN ASSISTANT

## 2019-01-16 PROCEDURE — 97602 WOUND(S) CARE NON-SELECTIVE: CPT

## 2019-01-16 PROCEDURE — 80048 BASIC METABOLIC PNL TOTAL CA: CPT | Performed by: PHYSICIAN ASSISTANT

## 2019-01-16 RX ORDER — SODIUM CHLORIDE 0.9 % (FLUSH) 0.9 %
3-10 SYRINGE (ML) INJECTION AS NEEDED
Status: DISCONTINUED | OUTPATIENT
Start: 2019-01-16 | End: 2019-01-16 | Stop reason: HOSPADM

## 2019-01-16 RX ORDER — FLUCONAZOLE, SODIUM CHLORIDE 2 MG/ML
100 INJECTION INTRAVENOUS DAILY
Status: DISCONTINUED | OUTPATIENT
Start: 2019-01-16 | End: 2019-01-16 | Stop reason: HOSPADM

## 2019-01-16 RX ORDER — PREDNISONE 10 MG/1
10 TABLET ORAL DAILY
Status: DISCONTINUED | OUTPATIENT
Start: 2019-01-16 | End: 2019-01-16 | Stop reason: HOSPADM

## 2019-01-16 RX ORDER — POLYETHYLENE GLYCOL 3350 17 G/17G
17 POWDER, FOR SOLUTION ORAL DAILY
Start: 2019-01-16 | End: 2019-06-09

## 2019-01-16 RX ORDER — HYDROCODONE BITARTRATE AND ACETAMINOPHEN 10; 325 MG/1; MG/1
TABLET ORAL
Status: COMPLETED
Start: 2019-01-16 | End: 2019-01-16

## 2019-01-16 RX ORDER — SODIUM CHLORIDE 0.9 % (FLUSH) 0.9 %
3 SYRINGE (ML) INJECTION EVERY 12 HOURS SCHEDULED
Status: DISCONTINUED | OUTPATIENT
Start: 2019-01-16 | End: 2019-01-16 | Stop reason: HOSPADM

## 2019-01-16 RX ORDER — FINASTERIDE 5 MG/1
5 TABLET, FILM COATED ORAL DAILY
Status: DISCONTINUED | OUTPATIENT
Start: 2019-01-16 | End: 2019-01-16 | Stop reason: HOSPADM

## 2019-01-16 RX ORDER — CEFTRIAXONE SODIUM 1 G/50ML
1 INJECTION, SOLUTION INTRAVENOUS EVERY 24 HOURS
Status: DISCONTINUED | OUTPATIENT
Start: 2019-01-16 | End: 2019-01-16 | Stop reason: HOSPADM

## 2019-01-16 RX ORDER — ACETAMINOPHEN 325 MG/1
650 TABLET ORAL EVERY 4 HOURS PRN
Status: DISCONTINUED | OUTPATIENT
Start: 2019-01-16 | End: 2019-01-16 | Stop reason: HOSPADM

## 2019-01-16 RX ORDER — AMLODIPINE BESYLATE 5 MG/1
5 TABLET ORAL
Status: DISCONTINUED | OUTPATIENT
Start: 2019-01-16 | End: 2019-01-16 | Stop reason: HOSPADM

## 2019-01-16 RX ORDER — BISOPROLOL FUMARATE 5 MG/1
2.5 TABLET, FILM COATED ORAL
Status: DISCONTINUED | OUTPATIENT
Start: 2019-01-16 | End: 2019-01-16 | Stop reason: HOSPADM

## 2019-01-16 RX ADMIN — HYDROCODONE BITARTRATE AND ACETAMINOPHEN 1 TABLET: 10; 325 TABLET ORAL at 00:03

## 2019-01-16 RX ADMIN — PREDNISONE 10 MG: 10 TABLET ORAL at 08:51

## 2019-01-16 RX ADMIN — BISOPROLOL FUMARATE 2.5 MG: 5 TABLET ORAL at 08:51

## 2019-01-16 RX ADMIN — FINASTERIDE 5 MG: 5 TABLET, FILM COATED ORAL at 08:51

## 2019-01-16 RX ADMIN — AMLODIPINE BESYLATE 5 MG: 5 TABLET ORAL at 08:51

## 2019-01-16 NOTE — ED PROVIDER NOTES
Subjective   Mr. Michoacano Rojas is an 78-year-old male presenting to the emergency department with complaints of groin swelling. The patient states that he first noticed an abnormally enlarged bump to the right inguinal region, and it has continued to swell since that time. Today, the pain associated with this region significantly worsened, prompting a visit to the ED. His pain is stabbing, intermittent, and worsened by movement and palpation. He denies dysuria, hematuria, frequency, urgency, nausea, vomiting, fevers, or SOA. He denies a history of similar symptoms. He notes that he was diagnosed with a right eye infection three weeks ago and that it has not entirely healed. He has had watery discharged from the bilateral eyes, as well as rhinorrhea. He has a history of rheumatoid arthritis, afib, and HTN. There are no other acute complaints at this time.        History provided by:  Patient  Male  Problem   Presenting symptoms: swelling    Context: spontaneously    Relieved by:  None tried  Worsened by:  Nothing  Ineffective treatments:  None tried  Associated symptoms: abdominal pain and groin pain    Associated symptoms: no fever, no hematuria, no nausea, no urinary frequency and no vomiting        Review of Systems   Constitutional: Negative.  Negative for fever.   HENT: Positive for rhinorrhea.    Eyes: Positive for discharge.   Respiratory: Negative.  Negative for shortness of breath.    Cardiovascular: Negative.  Negative for chest pain.   Gastrointestinal: Positive for abdominal pain. Negative for nausea and vomiting.   Genitourinary: Negative for difficulty urinating, frequency, hematuria and urgency.        Groin swelling   Musculoskeletal: Negative.    Skin: Negative.    Neurological: Negative.    Psychiatric/Behavioral: Negative.    All other systems reviewed and are negative.      Past Medical History:   Diagnosis Date   • A-fib (CMS/HCC)    • Atrial fibrillation (CMS/HCC)    • Chronic cutaneous  "venous stasis ulcer (CMS/HCC)    • Hypertension    • RA (rheumatoid arthritis) (CMS/HCC)    • Rheumatoid arthritis (CMS/HCC)    • Vertigo    • Vertigo        Allergies   Allergen Reactions   • Other GI Intolerance     Relates all oral abx cause intol nausea/vomiting. 1/2019 PT STATES \"LATELY THIS HASN'T HAPPENED\"       Past Surgical History:   Procedure Laterality Date   • JOINT MANIPULATION      left hip repair       No family history on file.    Social History     Socioeconomic History   • Marital status:      Spouse name: Not on file   • Number of children: Not on file   • Years of education: Not on file   • Highest education level: Not on file   Tobacco Use   • Smoking status: Never Smoker   • Smokeless tobacco: Never Used   Substance and Sexual Activity   • Alcohol use: No   • Drug use: No   • Sexual activity: Defer         Objective   Physical Exam   Constitutional: He is oriented to person, place, and time. He appears well-developed and well-nourished. No distress.   Chronically ill-appearing.   HENT:   Head: Normocephalic and atraumatic.   Nose: Nose normal.   Eyes: Conjunctivae are normal. No scleral icterus.   Neck: Normal range of motion. Neck supple.   Cardiovascular: Normal rate, regular rhythm, normal heart sounds and intact distal pulses.   No murmur heard.  Pulmonary/Chest: Effort normal and breath sounds normal. No respiratory distress.   Abdominal: Soft. Bowel sounds are normal. There is tenderness in the right lower quadrant. A hernia is present. Hernia confirmed positive in the right inguinal area (Marked swelling and tenderness c/w incarcerated right inguinal hernia. ).   Musculoskeletal: Normal range of motion.   Chronic changes consistent with rheumatoid arthritis especially to the hands. Large and TTP mass to the right inguinal region consistent with incarcerated inguinal hernia. Skin is intact, no skin changes to the area.   Neurological: He is alert and oriented to person, place, and " time.   Skin: Skin is warm and dry.   Psychiatric: He has a normal mood and affect. His behavior is normal.   Nursing note and vitals reviewed.      Procedures         ED Course  ED Course as of Phil 16 0010   Tue Phil 15, 2019   2246 Patient has evidence of a large right inguinal hernia.  I spent approximately 15 minutes attempting to reduce the hernia.  Patient is a combination of pressure and traction with no resolution.  At this point, symptoms represent an incarcerated inguinal hernia.  [RS]   2315 After reviewing the CT scan it is clear that this was an inguinal hernia that was incarcerated from the lateral aspect of the inguinal canal which appears to be containing areas of the cecum.  I went back in and placed the patient in Trendelenburg position and used traction and some pressure to successfully reduce the inguinal hernia.  Patient had significant relief in his symptoms.  We will give the patient standing and see if the hernia returns at this point.  [RS]   2340 The patient was able to stand up and sit on the side of the bed without recurrence of the hernia.  At this point, the patient is not a good candidate for surgical intervention.  We will attempt conservative management with follow-up with general surgery with return to the ER for any concerns.  He voices understanding and agrees.  [RS]   2351 The patient continues to feel well.  The CT scan per radiology shows some edema associated with a bowel that was incarcerated.  I advised the patient on the concern that this could demonstrate enough ischemia that could perforate or require surgical intervention.  Patient reports he wants to avoid that if at all possible.  I suggested admission to the hospital for observation and recheck.  I discussed the case with Dr. Polk with general surgery who agrees with the plan for overnight observation and will see the patient in the morning.  Hospitalist page for admission.  [RS]   Wed Jan 16, 2019   0006 Case  discussed with Dr. Beard who agrees with the plan for admission.  [RS]      ED Course User Index  [RS] Fransico Fontenot MD     Recent Results (from the past 24 hour(s))   Comprehensive Metabolic Panel    Collection Time: 01/15/19 10:53 PM   Result Value Ref Range    Glucose 98 70 - 100 mg/dL    BUN 25 (H) 9 - 23 mg/dL    Creatinine 0.66 0.60 - 1.30 mg/dL    Sodium 143 132 - 146 mmol/L    Potassium 4.6 3.5 - 5.5 mmol/L    Chloride 108 99 - 109 mmol/L    CO2 28.0 20.0 - 31.0 mmol/L    Calcium 9.0 8.7 - 10.4 mg/dL    Total Protein 6.4 5.7 - 8.2 g/dL    Albumin 3.62 3.20 - 4.80 g/dL    ALT (SGPT) 10 7 - 40 U/L    AST (SGOT) 18 0 - 33 U/L    Alkaline Phosphatase 73 25 - 100 U/L    Total Bilirubin 0.4 0.3 - 1.2 mg/dL    eGFR  African Amer 141 >60 mL/min/1.73    Globulin 2.8 gm/dL    A/G Ratio 1.3 (L) 1.5 - 2.5 g/dL    BUN/Creatinine Ratio 37.9 (H) 7.0 - 25.0    Anion Gap 7.0 3.0 - 11.0 mmol/L   Protime-INR    Collection Time: 01/15/19 10:53 PM   Result Value Ref Range    Protime 13.5 11.2 - 14.3 Seconds    INR 1.08 0.85 - 1.16   aPTT    Collection Time: 01/15/19 10:53 PM   Result Value Ref Range    PTT 26.6 24.0 - 37.0 seconds   Lactic Acid, Plasma    Collection Time: 01/15/19 10:53 PM   Result Value Ref Range    Lactate 1.6 0.5 - 2.0 mmol/L   CBC Auto Differential    Collection Time: 01/15/19 10:53 PM   Result Value Ref Range    WBC 8.22 3.50 - 10.80 10*3/mm3    RBC 4.40 4.20 - 5.76 10*6/mm3    Hemoglobin 11.5 (L) 13.1 - 17.5 g/dL    Hematocrit 40.1 38.9 - 50.9 %    MCV 91.1 80.0 - 99.0 fL    MCH 26.1 (L) 27.0 - 31.0 pg    MCHC 28.7 (L) 32.0 - 36.0 g/dL    RDW 15.6 (H) 11.3 - 14.5 %    RDW-SD 52.0 37.0 - 54.0 fl    MPV 11.8 6.0 - 12.0 fL    Platelets 191 150 - 450 10*3/mm3    Neutrophil % 85.7 (H) 41.0 - 71.0 %    Lymphocyte % 8.3 (L) 24.0 - 44.0 %    Monocyte % 5.8 0.0 - 12.0 %    Eosinophil % 0.1 0.0 - 3.0 %    Basophil % 0.1 0.0 - 1.0 %    Immature Grans % 0.4 0.0 - 0.6 %    Neutrophils, Absolute 7.04 1.50 - 8.30  "10*3/mm3    Lymphocytes, Absolute 0.68 0.60 - 4.80 10*3/mm3    Monocytes, Absolute 0.48 0.00 - 1.00 10*3/mm3    Eosinophils, Absolute 0.01 0.00 - 0.30 10*3/mm3    Basophils, Absolute 0.01 0.00 - 0.20 10*3/mm3    Immature Grans, Absolute 0.03 0.00 - 0.03 10*3/mm3     Note: In addition to lab results from this visit, the labs listed above may include labs taken at another facility or during a different encounter within the last 24 hours. Please correlate lab times with ED admission and discharge times for further clarification of the services performed during this visit.    CT Abdomen Pelvis Without Contrast   Final Result   Small fat and bowel containing right inguinal hernia, with minimal wall    thickening of the herniated bowel loop and adjacent fat stranding, suggesting    edema/strangulation. No pneumatosis. No associated bowel obstruction. Recommend    surgical evaluation.       THIS DOCUMENT HAS BEEN ELECTRONICALLY SIGNED BY PETTY NGUYEN MD        Vitals:    01/15/19 2159 01/15/19 2230   BP: 162/73 114/64   BP Location: Left arm    Patient Position: Lying    Pulse: 66 68   Resp: 14 14   Temp: 98.7 °F (37.1 °C)    TempSrc: Oral    SpO2: 92% 96%   Weight: 77.1 kg (170 lb)    Height: 177.8 cm (70\")      Medications   sodium chloride 0.9 % flush 10 mL (not administered)   Sodium chloride 0.9 % infusion (not administered)   HYDROcodone-acetaminophen (NORCO)  MG per tablet 1 tablet (1 tablet Oral Given 1/16/19 0003)     ECG/EMG Results (last 24 hours)     ** No results found for the last 24 hours. **                        MDM  Number of Diagnoses or Management Options  History of atrial fibrillation:   History of hypertension:   Incarcerated right inguinal hernia:   Rheumatoid arthritis involving multiple sites, unspecified rheumatoid factor presence (CMS/[facility]):      Amount and/or Complexity of Data Reviewed  Clinical lab tests: reviewed  Tests in the radiology section of CPT®: reviewed  Discuss the patient " with other providers: yes  Independent visualization of images, tracings, or specimens: yes        Final diagnoses:   Incarcerated right inguinal hernia   Rheumatoid arthritis involving multiple sites, unspecified rheumatoid factor presence (CMS/HCC)   History of hypertension   History of atrial fibrillation       Documentation assistance provided by cass Cedeño.  Information recorded by the scribe was done at my direction and has been verified and validated by me.     Eros Cedeño  01/15/19 8409       Fransico Fontenot MD  01/16/19 0010

## 2019-01-16 NOTE — PROGRESS NOTES
Case Management Discharge Note    Final Note: Pt. wants to be discharged home today.  Discharge orders have been written.  Pt.'s wife, son and sister are present and very supportive.  Family will provide transportation home.  Pt. has sufficient DME.  Marie with [employer] was notified that pt was returning home today.   has arranged a hospital follow-up appointment with Dr. Chavez on 1/30/19 at 12:45 pm.  No further needs were reported prior to discharge.      Destination      No service has been selected for the patient.      Durable Medical Equipment      No service has been selected for the patient.      Dialysis/Infusion      No service has been selected for the patient.      Home Medical Care - Selection Complete      Service Provider Request Status Selected Services Address Phone Number Fax Number    [facility] Health Services [address] [address] [phone]      Community Resources      No service has been selected for the patient.             Final Discharge Disposition Code: 06 - home with home health care

## 2019-01-16 NOTE — THERAPY DISCHARGE NOTE
Acute Care - Wound/Debridement Initial Eval  [facility]     Patient Name: Michoacano Rojas  : 1940  MRN: [id number]  Today's Date: 2019   Onset of Illness/Injury or Date of Surgery: 19  Date of Referral to PT: 19  Referring Physician: Glenys Beard DO          Admit Date: 1/15/2019    Visit Dx:    ICD-10-CM ICD-9-CM   1. Incarcerated right inguinal hernia K40.30 550.10   2. Rheumatoid arthritis involving multiple sites, unspecified rheumatoid factor presence (CMS/HCC) M06.9 714.0   3. History of hypertension Z86.79 V12.59   4. 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)   • Tracheal stenosis   • Normocytic anemia   • Diastolic dysfunction with chronic heart failure (CMS/HCC)   • Urinary retention   • Acute on chronic respiratory failure with hypoxia and hypercapnia (CMS/HCC)   • Incarcerated right inguinal hernia   • UTI (urinary tract infection) due to urinary indwelling catheter (CMS/HCC)   • Yeast UTI        Past Medical History:   Diagnosis Date   • A-fib (CMS/HCC)    • Atrial fibrillation (CMS/HCC)    • Chronic cutaneous venous stasis ulcer (CMS/HCC)    • Hypertension    • RA (rheumatoid arthritis) (CMS/HCC)    • Rheumatoid arthritis (CMS/HCC)    • Vertigo    • Vertigo         Past Surgical History:   Procedure Laterality Date   • JOINT MANIPULATION      left hip repair           Lymphedema     Row Name 19 1330             Skin Changes/Observations     "Location/Assessment  Lower Extremity  -      Lower Extremity Conditions  bilateral:;scaly;crust;fragile  -      Lower Extremity Color/Pigment  bilateral:;erythema;hyperpigmented;brawny;woody  -         Lymphedema Measurements    Measurement Type(s)  Quick Girth  -      Quick Girth Areas  Lower extremities  -         LLE Quick Girth (cm)    Met-heads  24.7 cm  -JM      Mid foot  24.8 cm  -JM      Smallest ankle  26 cm  -JM      Largest calf  31.5 cm  -JM         RLE Quick Girth (cm)    Met-heads  24.5 cm  -JM      Mid foot  24.5 cm  -JM      Smallest ankle  25.2 cm  -JM      Largest calf  31.5 cm  -JM      RLE Quick Girth Total  105.7  -JM         Compression/Skin Care    Compression/Skin Care  skin care;wrapping location;bandaging  -      Skin Care  washed/dried  -      Wrapping Location  lower extremity  -JM      Wrapping Location LE  bilateral:;foot to knee  -      Wrapping Comments  unna boot, 4\" coban, size 5 spandage  -      Bandaging Technique  figure-eight;light compression  -        User Key  (r) = Recorded By, (t) = Taken By, (c) = Cosigned By    Initials Name Provider Type    Kiley Damon, PT Physical Therapist          WOUND DEBRIDEMENT  Total area of Debridement: nonselective  Debridement Site 1  Location- Site 1: BLE  Instruments- Site 1: other (comment)(blue wipes)  Excised Tissue Description- Site 1: moderate, other (comment)(hypertrohpic crusts, slough)  Bleeding- Site 1: none                  PT ASSESSMENT (last 12 hours)      Physical Therapy Evaluation     Row Name 01/16/19 1330          PT Evaluation Time/Intention    Subjective Information  complains of;weakness;fatigue;pain  -     Document Type  wound care;discharge evaluation/summary  -     Mode of Treatment  individual therapy  -     Patient Effort  good  -     Symptoms Noted During/After Treatment  none  -     Row Name 01/16/19 1330          General Information    Patient Profile Reviewed?  yes  -JM     " Onset of Illness/Injury or Date of Surgery  01/16/19  -     Referring Physician  Glenys Beard DO  -     Patient Observations  cooperative;agree to therapy;lethargic  -     Patient/Family Observations  Spouse and son present at bedside  -     General Observations of Patient  Pt supine, no wraps/unna boots in place, still with opticell dressings on ankle ulcers, posterior R calf ulcer DWAINE  -     Prior Level of Function  dependent:;ADL's;all household mobility  -     Pertinent History of Current Functional Problem  Pt with h/o chronic venous stasis, currently receiving HH for unna boots to BLE.  Pt admitted for observation after presenting to ED with incarcerated hernia reduced at bedside.  Plan to continue with HH at d/c.  -     Existing Precautions/Restrictions  fall  -     Risks Reviewed  patient and family:;increased discomfort  -     Benefits Reviewed  patient and family:;improve skin integrity  -     Barriers to Rehab  medically complex;previous functional deficit  -     Row Name 01/16/19 1330          Cognitive Assessment/Interventions    Additional Documentation  Cognitive Assessment/Intervention (Group)  -     Row Name 01/16/19 1330          Cognitive Assessment/Intervention- PT/OT    Orientation Status (Cognition)  oriented x 4  -     Follows Commands (Cognition)  WFL  -     Row Name 01/16/19 1330          Pain Assessment    Additional Documentation  Pain Scale: FACES Pre/Post-Treatment (Group)  -     Row Name 01/16/19 1330          Pain Scale: Numbers Pre/Post-Treatment    Pain Location - Side  Bilateral  -     Pain Location - Orientation  lower  -     Pain Location  extremity  -     Pain Intervention(s)  Repositioned  -     Row Name 01/16/19 1330          Pain Scale: FACES Pre/Post-Treatment    Pain: FACES Scale, Pretreatment  2-->hurts little bit  -     Pain: FACES Scale, Post-Treatment  2-->hurts little bit  -     Row Name             Wound 01/16/19 0400 Left  gluteal laceration    Wound - Properties Group Date first assessed: 01/16/19  - Time first assessed: 0400  -MC Present On Admission : yes  -MC Side: Left  -MC Location: gluteal  -MC Type: laceration  -MC    Row Name             Wound 06/05/18 0915 Right lower;anterior leg ulceration, venous    Wound - Properties Group Date first assessed: 06/05/18  -CP Time first assessed: 0915  -CP Present On Admission : yes;picture taken  -CP Side: Right  -CP Orientation: lower;anterior  -CP Location: leg  -CP Type: ulceration, venous  -CP    Row Name 01/16/19 1330          Wound 06/05/18 0915 Left medial;lower leg ulceration, venous    Wound - Properties Group Date first assessed: 06/05/18  -CP Time first assessed: 0915  -CP Present On Admission : yes;picture taken  -CP Side: Left  -CP Orientation: medial;lower  -CP Location: leg  -CP Type: ulceration, venous  -CP    Wound Image  Images linked: 3  -JM     Dressing Appearance  other (see comments);dried drainage opticell ag  -JM     Base  moist;red/granulating  -     Edges  irregular  -JM     Wound Length (cm)  2 cm  -JM     Wound Width (cm)  2 cm  -JM     Wound Depth (cm)  0.1 cm  -JM     Drainage Characteristics/Odor  serosanguineous;malodorous  -JM     Drainage Amount  scant  -JM     Care, Wound  cleansed with;wound cleanser;ivy boot  -     Dressing Care, Wound  dressing applied;silver impregnated;hydrofiber  -     Periwound Care, Wound  cleansed with pH balanced cleanser;dry periwound area maintained  -     Row Name 01/16/19 1330          Wound 06/05/18 0915 Right posterior;lower leg ulceration, venous    Wound - Properties Group Date first assessed: 06/05/18  -CP Time first assessed: 0915  -CP Present On Admission : yes  -CP Side: Right  -CP Orientation: posterior;lower  -CP Location: leg  -CP Type: ulceration, venous  -CP    Dressing Appearance  open to air  -JM     Base  dry;red/granulating;pink  -JM     Periwound Temperature  warm  -JM     Periwound Skin Turgor   soft  -     Edges  irregular  -     Wound Length (cm)  2.5 cm  -     Wound Width (cm)  2.5 cm  -     Wound Depth (cm)  0.1 cm  -     Drainage Characteristics/Odor  serosanguineous  -     Drainage Amount  scant  -     Care, Wound  cleansed with;wound cleanser  -     Dressing Care, Wound  dressing applied;silver impregnated;hydrofiber;low-adherent;foam;border dressing  -     Row Name             Wound 06/05/18 0915 Right medial heel    Wound - Properties Group Date first assessed: 06/05/18  -CP Time first assessed: 0915  -CP Present On Admission : yes;picture taken  -CP Side: Right  -CP Orientation: medial  -CP Location: heel  - Stage, Pressure Injury: unstageable  -    Row Name 01/16/19 5260          Plan of Care Review    Plan of Care Reviewed With  patient;spouse;family  -     Row Name 01/16/19 7924          Physical Therapy Clinical Impression    Date of Referral to PT  01/16/19  -     PT Diagnosis (PT Clinical Impression)  open wounds BLE, impaired skin integrity  -     Criteria for Skilled Interventions Met (PT Clinical Impression)  yes;treatment indicated  -     Care Plan Review (PT)  evaluation/treatment results reviewed;care plan/treatment goals reviewed;risks/benefits reviewed;current/potential barriers reviewed;patient/other agree to care plan  -     Patient/Family Concerns, Anticipated Discharge Disposition (PT)  Family reports HH is to resume on Friday or Saturday.  -     Row Name 01/16/19 2590          Positioning and Restraints    Pre-Treatment Position  in bed  -     Post Treatment Position  bed  -     In Bed  notified nsg;supine;call light within reach;encouraged to call for assist;with family/caregiver;heels elevated  -     Row Name 01/16/19 5228          Physical Therapy Discharge Summary    Additional Documentation  Discharge Summary, PT Eval (Group)  -     Row Name 01/16/19 8110          Discharge Summary, PT Eval    Reason for Discharge (PT Discharge  Summary)  patient discharged from this facility  -     Outcomes Achieved Upon Discharge (PT Discharge Summary)  evaluation only  -     Transfer to Another Level of Care or Facility (PT Discharge Summary)  patient to receive therapy via home health  -       User Key  (r) = Recorded By, (t) = Taken By, (c) = Cosigned By    Initials Name Provider Type    Angeline Patel APRN Nurse Practitioner    Kiley Damon, PT Physical Therapist    Neha Nelson RN Registered Nurse              Recommendation and Plan  Anticipated Discharge Disposition (PT): home with home health    Plan of Care Reviewed With: patient, spouse, family       Outcome Summary/Treatment Plan (PT)  Anticipated Discharge Disposition (PT): home with home health  Patient/Family Concerns, Anticipated Discharge Disposition (PT): Family reports HH is to resume on Friday or Saturday.  Reason for Discharge (PT Discharge Summary): patient discharged from this facility              Time Calculation  PT Charges     Row Name 01/16/19 1330             Time Calculation    Start Time  1330  -JM      PT Received On  01/16/19  -        User Key  (r) = Recorded By, (t) = Taken By, (c) = Cosigned By    Initials Name Provider Type    Kiley Damon, PT Physical Therapist          Therapy Suggested Charges     Code   Minutes Charges    None           Therapy Charges for Today     Code Description Service Date Service Provider Modifiers Qty    90910767328 HC PT STAPPING UNNA BOOT 1/16/2019 Kiley Chinchilla, PT GP 1    29586289285 HC NONSELECTIVE DEBRIDEMENT 1/16/2019 Kiley Chinchilla, PT GP 1    [id number] HC PT EVAL MOD COMPLEXITY 3 1/16/2019 Kiley Chinchilla, PT GP 1            PT G-Codes  AM-PAC 6 Clicks Score: 11       PT Discharge Summary  Anticipated Discharge Disposition (PT): home with home health        Kiley Chinchilla PT  1/16/2019

## 2019-01-16 NOTE — CONSULTS
"General Surgery Consultation Note    Date of Service: 1/16/2019    Michoacano Rojas  [id number]  1940      Referring Provider: Cristela Marin II, DO    Location of Consult: [id number]     Reason for Consultation: Right inguinal hernia       History of Present Illness:  I am seeing, Michoacano Rojas, in consultation for Cristela Marin II, DO regarding right inguinal hernia.  The patient is an 78-year-old male with a history of congestive heart failure, atrial fibrillation with RVR (not on anticoagulation), and rheumatoid arthritis on steroids who presented to the emergency department last evening with complaints of right groin pain.  The patient states that he first developed groin pain and swelling over one month ago.  He has had mild pain since that time but over the past few days, the pain became more intense prompting his presentation to the emergency department last evening.  He has no prior history of inguinal or abdominal hernia.  He denies any recent nausea, emesis, or other obstructive symptoms.  In the emergency department, the hernia was reduced with an immediate improvement in the patient's pain.  Given the finding of inflammatory stranding in the hernia sac, the patient was admitted overnight for observation due to a concern for potential ischemic bowel.        Past Medical History:   Diagnosis Date   • A-fib (CMS/HCC)    • Atrial fibrillation (CMS/HCC)    • Chronic cutaneous venous stasis ulcer (CMS/HCC)    • Hypertension    • RA (rheumatoid arthritis) (CMS/HCC)    • Rheumatoid arthritis (CMS/HCC)    • Vertigo    • Vertigo        Past Surgical History:   Procedure Laterality Date   • JOINT MANIPULATION      left hip repair       Allergies   Allergen Reactions   • Other GI Intolerance     Relates all oral abx cause intol nausea/vomiting. 1/2019 PT STATES \"LATELY THIS HASN'T HAPPENED\"       No current facility-administered medications on file prior to encounter.      Current Outpatient Medications on " File Prior to Encounter   Medication Sig Dispense Refill   • amLODIPine (NORVASC) 5 MG tablet Take 1 tablet by mouth Daily. 90 tablet 3   • bisoprolol (ZEBeta) 5 MG tablet Take 0.5 tablets by mouth Daily. 30 tablet 2   • cholecalciferol (VITAMIN D3) 1000 UNITS tablet Take 2,000 Units by mouth Daily.     • diclofenac (VOLTAREN) 3 % gel gel Apply 2 g topically 2 (Two) Times a Day As Needed. for 60 days.      • docusate sodium (COLACE) 50 MG capsule Take 100 mg by mouth Daily As Needed.     • finasteride (PROSCAR) 5 MG tablet Take 5 mg by mouth Daily.     • fluticasone (FLONASE) 50 MCG/ACT nasal spray 2 sprays into each nostril Daily.     • guaiFENesin (MUCINEX) 600 MG 12 hr tablet Take 1 tablet by mouth Every 12 (Twelve) Hours. (Patient taking differently: Take 600 mg by mouth As Needed.) 14 tablet 0   • predniSONE (DELTASONE) 10 MG tablet Take 10 mg by mouth Daily.           Current Facility-Administered Medications:   •  acetaminophen (TYLENOL) tablet 650 mg, 650 mg, Oral, Q4H PRN, Millicent Mondragon PA-C  •  amLODIPine (NORVASC) tablet 5 mg, 5 mg, Oral, Q24H, Millicent Mondragon PA-C  •  bisoprolol (ZEBeta) tablet 2.5 mg, 2.5 mg, Oral, Q24H, Millicent Mondragon PA-C  •  cefTRIAXone (ROCEPHIN) IVPB 1 g, 1 g, Intravenous, Q24H, Glenys Beard, DO  •  finasteride (PROSCAR) tablet 5 mg, 5 mg, Oral, Daily, Millicent Mondragon PA-C  •  fluconazole (DIFLUCAN) in sodium chloride 0.9% IVBP 100 mg, 100 mg, Intravenous, Daily, Glenys Beard, DO  •  predniSONE (DELTASONE) tablet 10 mg, 10 mg, Oral, Daily, Millicent Mondragon PA-C  •  [COMPLETED] Insert peripheral IV, , , Once **AND** sodium chloride 0.9 % flush 10 mL, 10 mL, Intravenous, PRN, Fransico Fontenot MD  •  sodium chloride 0.9 % flush 3 mL, 3 mL, Intravenous, Q12H, Millicent Mondragon PA-C  •  sodium chloride 0.9 % flush 3-10 mL, 3-10 mL, Intravenous, PRN, Millicent Mondragon PA-C  •  Sodium chloride 0.9 % infusion, 125 mL/hr, Intravenous, Continuous, Fransico Fontenot  "MD Kathie    Family History  History reviewed. No pertinent family history.    Social History     Socioeconomic History   • Marital status:      Spouse name: Not on file   • Number of children: Not on file   • Years of education: Not on file   • Highest education level: Not on file   Social Needs   • Financial resource strain: Not on file   • Food insecurity - worry: Not on file   • Food insecurity - inability: Not on file   • Transportation needs - medical: Not on file   • Transportation needs - non-medical: Not on file   Occupational History   • Not on file   Tobacco Use   • Smoking status: Never Smoker   • Smokeless tobacco: Never Used   Substance and Sexual Activity   • Alcohol use: No   • Drug use: No   • Sexual activity: Defer   Other Topics Concern   • Not on file   Social History Narrative   • Not on file       Review of Systems:  Review of Systems   Constitutional: Negative for chills and fever.   HENT: Positive for congestion.    Eyes: Positive for discharge.   Respiratory: Positive for apnea and shortness of breath. Negative for chest tightness.    Cardiovascular: Positive for palpitations and leg swelling.   Gastrointestinal: Negative for abdominal distention, abdominal pain, nausea and vomiting.   Endocrine: Negative.    Genitourinary: Negative.    Musculoskeletal: Positive for arthralgias, gait problem and joint swelling.   Skin: Positive for color change and wound.   Hematological: Negative.    Psychiatric/Behavioral: Negative.        /75 (BP Location: Left arm, Patient Position: Lying)   Pulse 66   Temp 97.6 °F (36.4 °C) (Oral)   Resp 18   Ht 177.8 cm (70\")   Wt 76.3 kg (168 lb 2 oz)   SpO2 98%   BMI 24.12 kg/m²   Body mass index is 24.12 kg/m².    General: NAD, AAO x 3   Eyes:  PER, no icterus, and normal sclera.  Ears, Nose, Mouth, & Throat:  Normal appearance of ears.  Nares patent.  Nasal mucosa, septum, and turbinates normal.  Mucous membranes moist without exudate.  Neck " supple without adenopathy.   Cardiovascular: Regular rate and irregular rhythm without murmurs, rubs, or gallops.  Extremities warm and well perfused.  There is bilateral lower extremity edema with brawny discoloration of the lower extremities and open wounds consistent with venous stasis ulcers.  Respiratory: Coarse breath sounds bilaterally.   Gastrointestinal: Soft, NT, ND.  Neurologic: CN II - XII grossly intact.  No focal neuro deficits.  :  Normal appearing external genitalia.  There is a right inguinal hernia that is completely reducible.  There is no left inguinal hernia appreciated.  Skin: There is bilateral lower extremity edema with brawny discoloration of the lower extremities and open wounds consistent with venous stasis ulcers.    CBC  Results from last 7 days   Lab Units 01/16/19  0506   WBC 10*3/mm3 7.39   HEMOGLOBIN g/dL 10.7*   HEMATOCRIT % 37.1*   PLATELETS 10*3/mm3 162       CMP  Results from last 7 days   Lab Units 01/16/19  0506 01/15/19  2253   SODIUM mmol/L 141 143   POTASSIUM mmol/L 3.6 4.6   CHLORIDE mmol/L 108 108   CO2 mmol/L 30.2 28.0   BUN mg/dL 23 25*   CREATININE mg/dL 0.53* 0.66   CALCIUM mg/dL 8.8 9.0   BILIRUBIN mg/dL  --  0.4   ALK PHOS U/L  --  73   ALT (SGPT) U/L  --  10   AST (SGOT) U/L  --  18   GLUCOSE mg/dL 77 98       Radiology  Imaging Results (last 72 hours)     Procedure Component Value Units Date/Time    CT Abdomen Pelvis Without Contrast [[id number]] Collected:  01/15/19 2258     Updated:  01/15/19 2343    Narrative:       EXAM:    CT Abdomen and Pelvis Without Contrast     EXAM DATE/TIME:    1/15/2019 10:58 PM     CLINICAL HISTORY:    78 years old, male; Pain; Abdominal pain; Localized; Right lower quadrant   (rlq); Additional info: Incarcerated right inguinal hernia- worsening x2 weeks     TECHNIQUE:    Axial computed tomography images of the abdomen and pelvis without contrast.    All CT scans at this facility use at least one of these dose optimization    techniques: automated exposure control; mA and/or kV adjustment per patient   size (includes targeted exams where dose is matched to clinical indication); or   iterative reconstruction.    Coronal reformatted images were created and reviewed.     COMPARISON:    CT ABDOMEN PELVIS WO CONTRAST 11/17/2017 4:19 PM     FINDINGS:    Lower thorax:  Minimal bibasilar atelectasis and/or scarring.     ABDOMEN:    Liver:  Mild hepatomegaly.    Gallbladder and bile ducts:  Cholelithiasis, without CT evidence of acute   cholecystitis.    Pancreas: Normal.    Spleen: Normal.     Adrenals: Normal.     Kidneys and ureters:  Bilateral nonobstructive nephrolithiasis. 4.2 cm simple   left renal cyst, for which a further evaluation is necessary.    Stomach and bowel:  Colonic diverticulosis. Small fat and bowel containing   right inguinal hernia, with minimal wall thickening of the herniated bowel loop   and adjacent fat stranding, suggesting edema/strangulation. No pneumatosis. No   associated bowel obstruction.    Appendix: No evidence of appendicitis.     PELVIS:    Bladder:  Small amount of nonobstructive calculi within the posterior left   urinary bladder.    Reproductive:  Prostate gland is enlarged, measuring 4.3 cm in AP diameter by   5.2 cm in transverse dimension.     ABDOMEN and PELVIS:    Intraperitoneal space: Normal. No free air. No significant fluid collection.    Bones/joints:  Degenerative changes of the hips and sacroiliac joints.   Multilevel thoracolumbar spine degenerative changes.    Soft tissues: Normal.    Vasculature:  Moderate three-vessel coronary artery atherosclerotic   calcification. Phleboliths within the pelvis.    Lymph nodes: Normal. No enlarged lymph nodes.       Impression:       Small fat and bowel containing right inguinal hernia, with minimal wall   thickening of the herniated bowel loop and adjacent fat stranding, suggesting   edema/strangulation. No pneumatosis. No associated bowel obstruction.  Recommend   surgical evaluation.     THIS DOCUMENT HAS BEEN ELECTRONICALLY SIGNED BY PETTY NGUYEN MD          Assessment:  Mr. Rojas is a 78-year-old male with a history of congestive heart failure, atrial fibrillation, and rheumatoid arthritis with chronic steroid use who presented to the emergency department last evening with worsening pain and a right inguinal hernia.  The hernia was reduced in the emergency department.  The patient was admitted, however, as there was a concern for inflammatory stranding in the hernia sac which may have suggested of ischemic of intestinal contents.  The patient has remained afebrile with a normal white blood cell count.  On examination, there is a reducible right inguinal hernia.  I have reviewed the patient's CT scan which demonstrates anasarca, including of the scrotum, along with incarcerated colon within a right inguinal hernia.  I do not believe there is any concern for ongoing intestinal ischemia given the patient's physical exam.    I discussed the natural history of inguinal hernias along with the treatment options.  We discussed surgery including the attendant benefits and risks of the procedure along with observation.  I informed the patient that I would recommend surgical repair given that his hernia is symptomatic; however, he is at a markedly high risk of complications due to his underlying comorbidities.  These complications include cardiac, respiratory, and wound complications.  I did suggest cardiology consultation prior to proceeding with surgical repair to assess his risk for general anesthesia, the need for alternate anesthetic options, and the safety of proceeding with surgical repair.  After this discussion, the patient decided that he would like to forego surgical repair, as he is not comfortable with incurring any additional complications from a surgical procedure given his concomitant medical issues.  For this reason, surgical repair will not be pursued  at this time.  The patient may have a diet.  I will order him a hernia belt.  I have counseled both the patient and his wife on the reasons to return to the emergency department, including worsening pain related to the hernia with an inability to reduce the hernia.  The patient and his wife are in agreement with this plan.  If the patient tolerates a diet, he may be discharged today with surgical follow-up at his discretion.    Plan:  - Will forego surgery due to patient's wishes related to high risk of complications  - Diet today  - Hernia belt  - May be discharged if tolerates a diet      Yvonne Polk MD  01/16/19  7:47 AM

## 2019-01-16 NOTE — DISCHARGE SUMMARY
[facility] Medicine Services  DISCHARGE SUMMARY    Patient Name: Michoacano Rojas  : 1940  MRN: [id number]    Date of Admission: 1/15/2019  Date of Discharge: 2019  Primary Care Physician: Michael Chavez MD    Consults     Date and Time Order Name Status Description    2019 0442 Inpatient General Surgery Consult Completed     2019 0030 Inpatient General Surgery Consult Completed           Hospital Course     Presenting Problem:   Incarcerated right inguinal hernia [K40.30]    Active Hospital Problems    Diagnosis Date Noted   • **Incarcerated right inguinal hernia [K40.30] 2019   • UTI (urinary tract infection) due to urinary indwelling catheter (CMS/[facility]) [T83.511A, N39.0] 2019   • Yeast UTI [B37.49] 2019   • Diastolic dysfunction with chronic heart failure (CMS/[facility]) [I50.32] 2017   • Normocytic anemia [D64.9] 2017   • 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 (CMS/[facility]) [D84.8, T38.0X5A] 2017   • Atrial fibrillation with RVR (CMS/[facility]) [I48.91] 2017   • Very poor mobility [Z74.09] 2016   • Large joint arthralgia of multiple sites [M25.50] 2016   • Essential hypertension [I10] 2016   • Rheumatoid arthritis (CMS/[facility]) [M06.9] 2016      Resolved Hospital Problems   No resolved problems to display.          Hospital Course:  Michoacano Rojas is a 78 y.o. male admitted with incarcerated right inguinal hernia which was easily reduced by ED physician.    Inguinal hernia, reduced: 77 y/o male admitted with above. Hernia was reduced by ED physician and patient was admitted for unclear reason as the patient adamantly refused any surgical intervention. Patient was seen by general surgery who recommended hernia belt and d/c if patient tolerates diet. He will continue bowel regimen at home and can follow up with general surgery if needed. Otherwise can f/u with his pcp in 1 week.    Discharge Follow Up  Recommendations for labs/diagnostics:   PCP in 1-2 weeks   General surgery prn    Day of Discharge     HPI: Doing better. Pain free. Wants to go home.    Review of Systems  Gen- No fevers, chills  CV- No chest pain, palpitations  Resp- No cough, dyspnea  GI- No N/V/D, abd pain    Otherwise ROS is negative except as mentioned in the HPI.    Vital Signs:   Temp:  [97.6 °F (36.4 °C)-98.7 °F (37.1 °C)] 97.6 °F (36.4 °C)  Heart Rate:  [63-73] 63  Resp:  [14-20] 18  BP: (114-162)/(64-91) 144/75     Physical Exam:  Constitutional: No acute distress, awake, alert  HENT: NCAT, mucous membranes moist  Respiratory: Clear to auscultation bilaterally, respiratory effort normal   Cardiovascular: RRR, no murmurs, rubs, or gallops, palpable pedal pulses bilaterally  Gastrointestinal: Positive bowel sounds, soft, nontender, nondistended  Musculoskeletal: Upper extremity hand deformity c/w known RA  Psychiatric: Appropriate affect, cooperative  Neurologic: Oriented x 3, strength symmetric in all extremities, Cranial Nerves grossly intact to confrontation, speech clear  Skin: Bilateral LE changes c/w chronic venous stasis.    Pertinent  and/or Most Recent Results     Results from last 7 days   Lab Units 01/16/19  0506 01/15/19  2253   WBC 10*3/mm3 7.39 8.22   HEMOGLOBIN g/dL 10.7* 11.5*   HEMATOCRIT % 37.1* 40.1   PLATELETS 10*3/mm3 162 191   SODIUM mmol/L 141 143   POTASSIUM mmol/L 3.6 4.6   CHLORIDE mmol/L 108 108   CO2 mmol/L 30.2 28.0   BUN mg/dL 23 25*   CREATININE mg/dL 0.53* 0.66   GLUCOSE mg/dL 77 98   CALCIUM mg/dL 8.8 9.0     Results from last 7 days   Lab Units 01/15/19  2253   BILIRUBIN mg/dL 0.4   ALK PHOS U/L 73   ALT (SGPT) U/L 10   AST (SGOT) U/L 18   PROTIME Seconds 13.5   INR  1.08   APTT seconds 26.6           Invalid input(s): TG, LDLCALC, LDLREALC  Results from last 7 days   Lab Units 01/15/19  2253   BNP pg/mL 157.0*       Brief Urine Lab Results  (Last result in the past 365 days)      Color   Clarity   Blood    Leuk Est   Nitrite   Protein   CREAT   Urine HCG        01/15/19 2347 Yellow Cloudy Negative Moderate (2+) Negative Negative               Microbiology Results Abnormal     None          Imaging Results (all)     Procedure Component Value Units Date/Time    CT Abdomen Pelvis Without Contrast [[id number]] Collected:  01/15/19 2258     Updated:  01/15/19 2343    Narrative:       EXAM:    CT Abdomen and Pelvis Without Contrast     EXAM DATE/TIME:    1/15/2019 10:58 PM     CLINICAL HISTORY:    78 years old, male; Pain; Abdominal pain; Localized; Right lower quadrant   (rlq); Additional info: Incarcerated right inguinal hernia- worsening x2 weeks     TECHNIQUE:    Axial computed tomography images of the abdomen and pelvis without contrast.    All CT scans at this facility use at least one of these dose optimization   techniques: automated exposure control; mA and/or kV adjustment per patient   size (includes targeted exams where dose is matched to clinical indication); or   iterative reconstruction.    Coronal reformatted images were created and reviewed.     COMPARISON:    CT ABDOMEN PELVIS WO CONTRAST 11/17/2017 4:19 PM     FINDINGS:    Lower thorax:  Minimal bibasilar atelectasis and/or scarring.     ABDOMEN:    Liver:  Mild hepatomegaly.    Gallbladder and bile ducts:  Cholelithiasis, without CT evidence of acute   cholecystitis.    Pancreas: Normal.    Spleen: Normal.     Adrenals: Normal.     Kidneys and ureters:  Bilateral nonobstructive nephrolithiasis. 4.2 cm simple   left renal cyst, for which a further evaluation is necessary.    Stomach and bowel:  Colonic diverticulosis. Small fat and bowel containing   right inguinal hernia, with minimal wall thickening of the herniated bowel loop   and adjacent fat stranding, suggesting edema/strangulation. No pneumatosis. No   associated bowel obstruction.    Appendix: No evidence of appendicitis.     PELVIS:    Bladder:  Small amount of nonobstructive calculi within the  posterior left   urinary bladder.    Reproductive:  Prostate gland is enlarged, measuring 4.3 cm in AP diameter by   5.2 cm in transverse dimension.     ABDOMEN and PELVIS:    Intraperitoneal space: Normal. No free air. No significant fluid collection.    Bones/joints:  Degenerative changes of the hips and sacroiliac joints.   Multilevel thoracolumbar spine degenerative changes.    Soft tissues: Normal.    Vasculature:  Moderate three-vessel coronary artery atherosclerotic   calcification. Phleboliths within the pelvis.    Lymph nodes: Normal. No enlarged lymph nodes.       Impression:       Small fat and bowel containing right inguinal hernia, with minimal wall   thickening of the herniated bowel loop and adjacent fat stranding, suggesting   edema/strangulation. No pneumatosis. No associated bowel obstruction. Recommend   surgical evaluation.     THIS DOCUMENT HAS BEEN ELECTRONICALLY SIGNED BY PETTY NGUYEN MD          Results for orders placed during the hospital encounter of 06/04/18   Duplex Venous Lower Extremity - Bilateral CAR    Narrative · Normal bilateral lower extremity venous duplex scan.     All veins are compressible that are visualized.          Results for orders placed during the hospital encounter of 06/04/18   Duplex Venous Lower Extremity - Bilateral CAR    Narrative · Normal bilateral lower extremity venous duplex scan.     All veins are compressible that are visualized.          Results for orders placed during the hospital encounter of 06/04/18   Adult Transthoracic Echo Complete W/ Cont if Necessary Per Protocol    Narrative · Left ventricular systolic function is normal.  · Estimated EF appears to be in the range of 56 - 60%.  · Mild tricuspid valve regurgitation is present.  · Estimated right ventricular systolic pressure from tricuspid   regurgitation is mildly elevated (35-45 mmHg).            Discharge Details        Discharge Medications      New Medications      Instructions Start Date  "  polyethylene glycol packet  Commonly known as:  MIRALAX   17 g, Oral, Daily         Changes to Medications      Instructions Start Date   MUCINEX 600 MG 12 hr tablet  Generic drug:  guaiFENesin  What changed:    · when to take this  · reasons to take this   600 mg, Oral, Every 12 Hours Scheduled         Continue These Medications      Instructions Start Date   amLODIPine 5 MG tablet  Commonly known as:  NORVASC   5 mg, Oral, Every 24 Hours Scheduled      bisoprolol 5 MG tablet  Commonly known as:  ZEBeta   2.5 mg, Oral, Every 24 Hours Scheduled      cholecalciferol 1000 units tablet  Commonly known as:  VITAMIN D3   2,000 Units, Oral, Daily      diclofenac 3 % gel gel  Commonly known as:  VOLTAREN   2 g, Topical, 2 Times Daily PRN, for 60 days.      docusate sodium 50 MG capsule  Commonly known as:  COLACE   100 mg, Oral, Daily PRN      finasteride 5 MG tablet  Commonly known as:  PROSCAR   5 mg, Oral, Daily      fluticasone 50 MCG/ACT nasal spray  Commonly known as:  FLONASE   2 sprays, Nasal, Daily      predniSONE 10 MG tablet  Commonly known as:  DELTASONE   10 mg, Oral, Daily         Stop These Medications    HYDROcodone-acetaminophen  MG per tablet  Commonly known as:  NORCO     tamsulosin 0.4 MG capsule 24 hr capsule  Commonly known as:  FLOMAX     torsemide 10 MG tablet  Commonly known as:  DEMADEX            Allergies   Allergen Reactions   • Other GI Intolerance     Relates all oral abx cause intol nausea/vomiting. 1/2019 PT STATES \"LATELY THIS HASN'T HAPPENED\"         Discharge Disposition:  Home or Self Care    Discharge Diet:  Diet Order   Procedures   • Diet Regular; Cardiac         Discharge Activity: GI soft, low residue      CODE STATUS:    Code Status and Medical Interventions:   Ordered at: 01/16/19 0107     Limited Support to NOT Include:    Intubation     Level Of Support Discussed With:    Patient     Code Status:    No CPR     Medical Interventions (Level of Support Prior to Arrest):    " Limited         Future Appointments   Date Time Provider Department Center   1/30/2019 12:45 PM Michael Chavez MD [facility]       Additional Instructions for the Follow-ups that You Need to Schedule     Discharge Follow-up with PCP   As directed       Currently Documented PCP:    Michael Chavez MD    PCP Phone Number:    [phone]     Follow Up Details:  1 week hospital follow up               Time Spent on Discharge:  37 minutes    Electronically signed by Cristela Marin II, DO, 01/16/19, 12:30 PM.

## 2019-01-16 NOTE — H&P
[facility] Medicine Services  HISTORY AND PHYSICAL    Patient Name: Michoacano Rojas  : 1940  MRN: [id number]  Primary Care Physician: Michael Chavez MD  Date of admission: 1/15/2019      Subjective   Subjective     Chief Complaint:  Right groin pain    HPI:  Michoacano Rojas is an 78 y.o. male with a past medical history significant for PAF, HTN, RA on chronic steroids, and chronic diastolic dysfunction who presents with complaints of right groin pain and swelling. States this has been going on for at least two months. Now to the point where it is interfering with already limited mobility. Patient volunteers that he first noticed a lump in the right groin, and symptoms progressed from then on. Pain worse with movement and palpation. There has been associated urinary retention and difficulty with bowel movements. No other complaints at this time. No dysuria, constipation, fever, cough, congestion, or chest pain. Will admit for further evaluation and treatment.    Emergency Department Evaluation; chemistry and hematology favorable. UA negative. CT abdomen demonstrates an inguinal hernia that was incarcerated from the lateral aspect of the inguinal canal which appears to be containing areas of the cecum.    Review of Systems   Constitutional: Negative for chills and fever.   HENT: Negative for congestion and trouble swallowing.    Eyes: Negative for photophobia and visual disturbance.   Respiratory: Negative for cough and shortness of breath.    Cardiovascular: Negative for chest pain and leg swelling.   Gastrointestinal: Negative for abdominal pain, diarrhea, nausea and vomiting.   Endocrine: Negative for cold intolerance.   Genitourinary: Positive for difficulty urinating. Negative for dysuria, flank pain and hematuria.   Musculoskeletal: Positive for arthralgias and joint swelling. Negative for back pain.   Skin: Negative for pallor and rash.   Allergic/Immunologic:  Negative for immunocompromised state.   Neurological: Negative for dizziness, weakness and headaches.   Psychiatric/Behavioral: Negative for agitation and confusion.          Otherwise 10-system ROS reviewed and is negative except as mentioned in the HPI.    Personal History     Past Medical History:   Diagnosis Date   • A-fib (CMS/HCC)    • Atrial fibrillation (CMS/HCC)    • Chronic cutaneous venous stasis ulcer (CMS/HCC)    • Hypertension    • RA (rheumatoid arthritis) (CMS/HCC)    • Rheumatoid arthritis (CMS/HCC)    • Vertigo    • Vertigo        Past Surgical History:   Procedure Laterality Date   • JOINT MANIPULATION      left hip repair       Family History: family history is not on file. Otherwise pertinent FHx was reviewed and unremarkable.     Social History:  reports that  has never smoked. he has never used smokeless tobacco. He reports that he does not drink alcohol or use drugs.  Social History     Social History Narrative   • Not on file       Medications:    Available home medication information reviewed.  Medications Prior to Admission   Medication Sig Dispense Refill Last Dose   • amLODIPine (NORVASC) 5 MG tablet Take 1 tablet by mouth Daily. 90 tablet 3    • bisoprolol (ZEBeta) 5 MG tablet Take 0.5 tablets by mouth Daily. 30 tablet 2    • cholecalciferol (VITAMIN D3) 1000 UNITS tablet Take 2,000 Units by mouth Daily.      • diclofenac (VOLTAREN) 3 % gel gel Apply 2 g topically 2 (Two) Times a Day As Needed. for 60 days.       • docusate sodium (COLACE) 50 MG capsule Take 100 mg by mouth Daily As Needed.      • finasteride (PROSCAR) 5 MG tablet Take 5 mg by mouth Daily.      • fluticasone (FLONASE) 50 MCG/ACT nasal spray 2 sprays into each nostril Daily.      • guaiFENesin (MUCINEX) 600 MG 12 hr tablet Take 1 tablet by mouth Every 12 (Twelve) Hours. (Patient taking differently: Take 600 mg by mouth As Needed.) 14 tablet 0    • predniSONE (DELTASONE) 10 MG tablet Take 10 mg by mouth Daily.     "      Allergies   Allergen Reactions   • Other GI Intolerance     Relates all oral abx cause intol nausea/vomiting. 1/2019 PT STATES \"LATELY THIS HASN'T HAPPENED\"       Objective   Objective     Vital Signs:   Temp:  [98.7 °F (37.1 °C)] 98.7 °F (37.1 °C)  Heart Rate:  [66-68] 66  Resp:  [14-20] 20  BP: (114-162)/(64-80) 153/80        Physical Exam   Constitutional: No acute distress, awake, alert  Eyes: PERRLA, sclerae anicteric, no conjunctival injection  HENT: NCAT, mucous membranes moist  Neck: Supple, no thyromegaly, no lymphadenopathy, trachea midline  Respiratory: Clear to auscultation bilaterally, nonlabored respirations   Cardiovascular: RRR, no murmurs, rubs, or gallops, palpable pedal pulses bilaterally  Gastrointestinal: Positive bowel sounds, soft, nontender, nondistended  Hernia reduced  Musculoskeletal: no clubbing or cyanosis to extremities  BLE edema with venous stasis ulcers, compression wrapping  joint swelling of multiple joints bilaterally  Psychiatric: Appropriate affect, cooperative  Neurologic: Oriented x 3, strength symmetric in all extremities, Cranial Nerves grossly intact to confrontation, speech clear  Skin: lower extremity ulcers      Results Reviewed:  I have personally reviewed current lab, radiology, and data and agree.    Results from last 7 days   Lab Units  01/15/19   2253   WBC 10*3/mm3  8.22   HEMOGLOBIN g/dL  11.5*   HEMATOCRIT %  40.1   PLATELETS 10*3/mm3  191   INR   1.08     Results from last 7 days   Lab Units  01/15/19   2253   SODIUM mmol/L  143   POTASSIUM mmol/L  4.6   CHLORIDE mmol/L  108   CO2 mmol/L  28.0   BUN mg/dL  25*   CREATININE mg/dL  0.66   GLUCOSE mg/dL  98   CALCIUM mg/dL  9.0   ALT (SGPT) U/L  10   AST (SGOT) U/L  18     Estimated Creatinine Clearance: 80.3 mL/min (by C-G formula based on SCr of 0.66 mg/dL).  Brief Urine Lab Results  (Last result in the past 365 days)      Color   Clarity   Blood   Leuk Est   Nitrite   Protein   CREAT   Urine HCG        " 01/15/19 2347 Yellow Cloudy Negative Moderate (2+) Negative Negative             No results found for: BNP  Imaging Results (last 24 hours)     Procedure Component Value Units Date/Time    CT Abdomen Pelvis Without Contrast [[id number]] Collected:  01/15/19 2258     Updated:  01/15/19 2343    Narrative:       EXAM:    CT Abdomen and Pelvis Without Contrast     EXAM DATE/TIME:    1/15/2019 10:58 PM     CLINICAL HISTORY:    78 years old, male; Pain; Abdominal pain; Localized; Right lower quadrant   (rlq); Additional info: Incarcerated right inguinal hernia- worsening x2 weeks     TECHNIQUE:    Axial computed tomography images of the abdomen and pelvis without contrast.    All CT scans at this facility use at least one of these dose optimization   techniques: automated exposure control; mA and/or kV adjustment per patient   size (includes targeted exams where dose is matched to clinical indication); or   iterative reconstruction.    Coronal reformatted images were created and reviewed.     COMPARISON:    CT ABDOMEN PELVIS WO CONTRAST 11/17/2017 4:19 PM     FINDINGS:    Lower thorax:  Minimal bibasilar atelectasis and/or scarring.     ABDOMEN:    Liver:  Mild hepatomegaly.    Gallbladder and bile ducts:  Cholelithiasis, without CT evidence of acute   cholecystitis.    Pancreas: Normal.    Spleen: Normal.     Adrenals: Normal.     Kidneys and ureters:  Bilateral nonobstructive nephrolithiasis. 4.2 cm simple   left renal cyst, for which a further evaluation is necessary.    Stomach and bowel:  Colonic diverticulosis. Small fat and bowel containing   right inguinal hernia, with minimal wall thickening of the herniated bowel loop   and adjacent fat stranding, suggesting edema/strangulation. No pneumatosis. No   associated bowel obstruction.    Appendix: No evidence of appendicitis.     PELVIS:    Bladder:  Small amount of nonobstructive calculi within the posterior left   urinary bladder.    Reproductive:  Prostate gland is  enlarged, measuring 4.3 cm in AP diameter by   5.2 cm in transverse dimension.     ABDOMEN and PELVIS:    Intraperitoneal space: Normal. No free air. No significant fluid collection.    Bones/joints:  Degenerative changes of the hips and sacroiliac joints.   Multilevel thoracolumbar spine degenerative changes.    Soft tissues: Normal.    Vasculature:  Moderate three-vessel coronary artery atherosclerotic   calcification. Phleboliths within the pelvis.    Lymph nodes: Normal. No enlarged lymph nodes.       Impression:       Small fat and bowel containing right inguinal hernia, with minimal wall   thickening of the herniated bowel loop and adjacent fat stranding, suggesting   edema/strangulation. No pneumatosis. No associated bowel obstruction. Recommend   surgical evaluation.     THIS DOCUMENT HAS BEEN ELECTRONICALLY SIGNED BY PETTY NGUYEN MD        Results for orders placed during the hospital encounter of 06/04/18   Adult Transthoracic Echo Complete W/ Cont if Necessary Per Protocol    Narrative · Left ventricular systolic function is normal.  · Estimated EF appears to be in the range of 56 - 60%.  · Mild tricuspid valve regurgitation is present.  · Estimated right ventricular systolic pressure from tricuspid   regurgitation is mildly elevated (35-45 mmHg).          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 Incarcerated Right inguinal hernia:  - successfully reduced, manually in ED  - paint like to avoid surgical intervention. Discussed with Dr. Polk. She will see in consult  - pain control with additional supportive care as needed    2. UTI, and YEAST IN UTI   - start rocephin and give dose of diflucan. Send urine for culture    3. Atrial fibrillation:  - stable and rate controlled  - Was on Eliquis. [id number] = 4. Unclear why patient current not on anticoagulation  - followed by Dr. Friedman.     4. RA, immunocompromised on steroids:  - on prednisone 10 mg daily    5. Chronic diastolic dysfunction:  - appears stable and compensated. Monitor. Continue torsemide    6. Venous stasis ulcers   - multiple on lower extremities  - patient and family say they have seen slow but steady improvement over the past year. Follows with [facility] continue wound care as inpatient for compression wraps, debridement as needed  - add PT wound care, uses unna boots    DVT prophylaxis:  mechanical    CODE STATUS:  DNR  Code Status and Medical Interventions:   Ordered at: 01/16/19 0107     Limited Support to NOT Include:    Intubation     Level Of Support Discussed With:    Patient     Code Status:    No CPR     Medical Interventions (Level of Support Prior to Arrest):    Limited       Admission Status:  I believe this patient meets OBSERVATION status, however if further evaluation or treatment plans warrant, status may change.  Based upon current information, I predict patient's care encounter to be less than or equal to 2 midnights.        Electronically signed by Millicent Mondragon PA-C, 01/16/19, 1:10 AM.        Brief Attending Admission Attestation     I have seen and examined the patient, performing an independent face-to-face diagnostic  "evaluation with plan of care reviewed and developed with the advanced practice clinician (APC).      Brief Summary Statement/HPI:   Michoacano Rojas is a 78 y.o. male with hx of rheumatoid arthritis with severe joint damage, hx venous stasis ulcers, followed by wound care as an outpt. Presents to [facility] ED with severe right inguinal region. He states he was trying to turn over and pulling on side of bed a few weeks ago and noted increase pain in the right groin. Pt states he noted a knot or and \"egg\" in his groin and it was getting larger. He noted increased gas as well. The pain became more severe so he came to [facility] ED. Denies blood in stools. No N/V/C/D. No dysuria, no hematuria. Pt will be admitted and Gen surgery to see in the am. We will also consult wound care.     Attending Physical Exam:  Vitals reviewed  Gen-NAD  HEENT-atraumatic, normocephalic, PERRLA, EOMI, moist mucous membranes   CV-irregular irregularly, No Murmur  Resp-CTAB, no rales, no rhonchi, no wheezing  Abd-normal BS, soft, ND, NT   Ext-1 plus edema, with plaques and excoriation and multiple venous stasis ulcer, extensive joint deformity   Skin-decubitus on left buttock  Neuro-A&Ox3, CN II-XII grossly intact  Psych-appropriate mood and behavior, pleasant          Brief Assessment/Plan :  See above for further detailed assessment and plan developed with APC which I have reviewed and/or edited.      Electronically signed by Glenys Beard DO, 01/16/19, 1:52 AM.         "

## 2019-01-16 NOTE — PROGRESS NOTES
Discharge Planning Assessment  [facility]     Patient Name: Michoacano Rojas  MRN: [id number]  Today's Date: 1/16/2019    Admit Date: 1/15/2019    Discharge Needs Assessment     Row Name 01/16/19 1201       Living Environment    Lives With  spouse    Name(s) of Who Lives With Patient  Wife- Chantel Rojas    Current Living Arrangements  home/apartment/condo    Primary Care Provided by  spouse/significant other    Provides Primary Care For  no one, unable/limited ability to care for self    Family Caregiver if Needed  spouse;child(gema), adult    Quality of Family Relationships  helpful;involved;supportive    Able to Return to Prior Arrangements  yes       Transition Planning    Patient/Family Anticipates Transition to  home with family;home with help/services    Patient/Family Anticipated Services at Transition  home health care    Transportation Anticipated  family or friend will provide       Discharge Needs Assessment    Readmission Within the Last 30 Days  no previous admission in last 30 days    Concerns to be Addressed  discharge planning    Equipment Currently Used at Home  crutches, forarm;wheelchair;ramp;other (see comments) urinals    Equipment Needed After Discharge  none    Outpatient/Agency/Support Group Needs  homecare agency    Discharge Facility/Level of Care Needs  home with home health    Offered/Gave Vendor List  no    Patient's Choice of Community Agency(s)  Pt. was active with [facility] prior to admission.    Current Discharge Risk  physical impairment;dependent with mobility/activities of daily living    Discharge Coordination/Progress  Pt. lives with his wife in [place]. Pt. has supportive extended family as well.  Pt. has DME at that he uses at home and also is active with [facility].  Pt's family will provide transportation at discharge.        Discharge Plan     Row Name 01/16/19 6197       Plan    Plan  home with family and home health    Patient/Family in Agreement with  Plan  yes    Plan Comments  SW met with pt, as well as pt's spouse, son, and sister.  Pt. has lots of family support and plans to be discharged home.  Pt. is mobile and ambulatory in his home and uses a wheelchair for longer distances.  Pt. requested SW contact [facility]  as well as Dr. Chavez's office to notify them of pt's overnight observation at the hospital.    Final Discharge Disposition Code  06 - home with home health care        Destination      No service coordination in this encounter.      Durable Medical Equipment      No service coordination in this encounter.      Dialysis/Infusion      No service coordination in this encounter.      Home Medical Care      No service coordination in this encounter.      Community Resources      No service coordination in this encounter.          Demographic Summary     Row Name 01/16/19 1158       General Information    Admission Type  observation    Arrived From  home    Referral Source  physician    Reason for Consult  discharge planning    Preferred Language  English     Used During This Interaction  no    General Information Comments  PCP is Michael Chavez MD        Functional Status     Row Name 01/16/19 1200       Employment/    Employment/ Comments  Pt. has insurance through [employer].        Psychosocial    No documentation.       Abuse/Neglect    No documentation.       Legal    No documentation.       Substance Abuse    No documentation.       Patient Forms    No documentation.           CHINA Talley

## 2019-01-17 ENCOUNTER — READMISSION MANAGEMENT (OUTPATIENT)
Dept: CALL CENTER | Facility: HOSPITAL | Age: 79
End: 2019-01-17

## 2019-01-17 ENCOUNTER — TRANSITIONAL CARE MANAGEMENT TELEPHONE ENCOUNTER (OUTPATIENT)
Dept: INTERNAL MEDICINE | Facility: CLINIC | Age: 79
End: 2019-01-17

## 2019-01-17 NOTE — OUTREACH NOTE
Prep Survey      Responses   Facility patient discharged from?  [place]   Is patient eligible?  Yes   Discharge diagnosis  Incarcerated right inguinal hernia, UTI due to indwelling catheter, Yeast UTI, diastolic dysfunction with chronic HF, normocytic anemia, immunocompromised due to steroids, Afib with RVR, very poor mobility, large joint arthalgia of multiple sites, essential HTN, rheumatoid arthritis   Does the patient have one of the following disease processes/diagnoses(primary or secondary)?  CHF   Does the patient have Home health ordered?  Yes   What is the Home health agency?   [facility]    Is there a DME ordered?  No   What DME was ordered?  Pt. has wheelchair at home   Comments regarding appointments  See AVS   Prep survey completed?  Yes          Serenity Hernandez RN

## 2019-01-18 ENCOUNTER — READMISSION MANAGEMENT (OUTPATIENT)
Dept: CALL CENTER | Facility: HOSPITAL | Age: 79
End: 2019-01-18

## 2019-01-18 NOTE — OUTREACH NOTE
CHF Week 1 Survey      Responses   Facility patient discharged from?  [place]   Does the patient have one of the following disease processes/diagnoses(primary or secondary)?  CHF   Is there a successful TCM telephone encounter documented?  Yes          Kiley Jones RN

## 2019-01-23 ENCOUNTER — READMISSION MANAGEMENT (OUTPATIENT)
Dept: CALL CENTER | Facility: HOSPITAL | Age: 79
End: 2019-01-23

## 2019-01-23 NOTE — OUTREACH NOTE
CHF Week 2 Survey      Responses   Facility patient discharged from?  [facility]   Does the patient have one of the following disease processes/diagnoses(primary or secondary)?  CHF   Week 2 attempt successful?  Yes   Call start time  1535   Call end time  1546   Discharge diagnosis  Incarcerated right inguinal hernia, UTI due to indwelling catheter, Yeast UTI, diastolic dysfunction with chronic HF, normocytic anemia, immunocompromised due to steroids, Afib with RVR, very poor mobility, large joint arthalgia of multiple sites, essential HTN, rheumatoid arthritis   Is patient permission given to speak with other caregiver?  Yes   List who call center can speak with  wife or son   Meds reviewed with patient/caregiver?  Yes   Is the patient having any side effects they believe may be caused by any medication additions or changes?  No   Is the patient taking all medications as directed (includes completed medication regime)?  Yes   Does the patient have a primary care provider?   Yes   Does the patient have an appointment with their PCP within 7 days of discharge?  No   What is preventing the patient from scheduling follow up appointments within 7 days of discharge?  Unsure of when or with whom   Nursing Interventions  Educated patient on importance of making appointment   Has the patient kept scheduled appointments due by today?  N/A   What is the Home health agency?   [facility]    Has home health visited the patient within 72 hours of discharge?  Yes   What DME was ordered?  Pt. has wheelchair at home   Psychosocial issues?  No   Comments  Pt having difficulty finding hernia belt that was recommended by surgeon. Gave a couple of DME # to try to find and told him to have son look online to order.    Did the patient receive a copy of their discharge instructions?  Yes   Nursing interventions  Reviewed instructions with patient   What is the patient's perception of their health status since discharge?  Improving   Is  the patient weighing daily?  No   Does the patient have scales?  Yes   Daily weight interventions  Education provided on importance of daily weight   Is the patient able to teach back Heart Failure Zones?  Yes   Is the patient able to teach back signs and symptoms of worsening condition? (i.e. weight gain, shortness of air, etc.)  Yes   Is the patient/caregiver able to teach back the hierarchy of who to call/visit for symptoms/problems? PCP, Specialist, Home health nurse, Urgent Care, ED, 911  Yes   CHF Week 2 call completed?  Yes          Lorna Domínguez RN

## 2019-01-28 ENCOUNTER — TELEPHONE (OUTPATIENT)
Dept: INTERNAL MEDICINE | Facility: CLINIC | Age: 79
End: 2019-01-28

## 2019-01-28 NOTE — TELEPHONE ENCOUNTER
I don't know about coconut oil, but it's ok for him to try hemp oil.  I have several patients who use it and say it helps their pain.

## 2019-01-29 PROBLEM — R60.0 LOWER EXTREMITY EDEMA: Status: ACTIVE | Noted: 2019-01-29

## 2019-01-29 PROBLEM — N40.0 BPH WITHOUT OBSTRUCTION/LOWER URINARY TRACT SYMPTOMS: Status: ACTIVE | Noted: 2019-01-29

## 2019-01-29 PROBLEM — L97.929 NON-PRESSURE CHRONIC ULCER OF LEFT LOWER LEG, WITH UNSPECIFIED SEVERITY (HCC): Status: ACTIVE | Noted: 2019-01-29

## 2019-01-29 PROBLEM — I87.2 VENOUS INSUFFICIENCY (CHRONIC) (PERIPHERAL): Status: ACTIVE | Noted: 2019-01-29

## 2019-01-29 PROBLEM — I50.32 CHRONIC DIASTOLIC HEART FAILURE (HCC): Status: ACTIVE | Noted: 2019-01-29

## 2019-01-29 PROBLEM — I10 BENIGN ESSENTIAL HYPERTENSION: Status: ACTIVE | Noted: 2019-01-29

## 2019-01-29 PROBLEM — F09 COGNITIVE DISORDER: Status: ACTIVE | Noted: 2019-01-29

## 2019-01-30 ENCOUNTER — READMISSION MANAGEMENT (OUTPATIENT)
Dept: CALL CENTER | Facility: HOSPITAL | Age: 79
End: 2019-01-30

## 2019-01-30 NOTE — OUTREACH NOTE
CHF Week 3 Survey      Responses   Facility patient discharged from?  [place]   Does the patient have one of the following disease processes/diagnoses(primary or secondary)?  CHF   Week 3 attempt successful?  No   Unsuccessful attempts  Attempt 1          Serenity Abad RN

## 2019-01-31 ENCOUNTER — READMISSION MANAGEMENT (OUTPATIENT)
Dept: CALL CENTER | Facility: HOSPITAL | Age: 79
End: 2019-01-31

## 2019-01-31 NOTE — OUTREACH NOTE
CHF Week 3 Survey      Responses   Facility patient discharged from?  [place]   Does the patient have one of the following disease processes/diagnoses(primary or secondary)?  CHF   Week 3 attempt successful?  Yes   Call start time  1250   Call end time  1252   Discharge diagnosis  Incarcerated right inguinal hernia, UTI due to indwelling catheter, Yeast UTI, diastolic dysfunction with chronic HF, normocytic anemia, immunocompromised due to steroids, Afib with RVR, very poor mobility, large joint arthalgia of multiple sites, essential HTN, rheumatoid arthritis   Is patient permission given to speak with other caregiver?  Yes   List who call center can speak with  wife or son   Meds reviewed with patient/caregiver?  Yes   Is the patient having any side effects they believe may be caused by any medication additions or changes?  No   Does the patient have all medications ordered at discharge?  Yes   Is the patient taking all medications as directed (includes completed medication regime)?  Yes   Does the patient have a primary care provider?   Yes   Has the patient kept scheduled appointments due by today?  Yes   What is the Home health agency?   [facility]   Psychosocial issues?  No   Comments  Pt having difficulty finding hernia belt that was recommended by surgeon. Gave a couple of DME # to try to find and told him to have son look online to order.    Did the patient receive a copy of their discharge instructions?  Yes   Nursing interventions  Reviewed instructions with patient   What is the patient's perception of their health status since discharge?  Improving   Is the patient weighing daily?  No   Does the patient have scales?  Yes   Is the patient able to teach back Heart Failure Zones?  Yes   Is the patient able to teach back signs and symptoms of worsening condition? (i.e. weight gain, shortness of air, etc.)  Yes   Is the patient/caregiver able to teach back the hierarchy of who to call/visit for  symptoms/problems? PCP, Specialist, Home health nurse, Urgent Care, ED, 911  Yes   CHF Week 3 call completed?  Yes          Laila Jones RN

## 2019-02-01 ENCOUNTER — OFFICE VISIT (OUTPATIENT)
Dept: INTERNAL MEDICINE | Facility: CLINIC | Age: 79
End: 2019-02-01

## 2019-02-01 VITALS
DIASTOLIC BLOOD PRESSURE: 72 MMHG | HEART RATE: 68 BPM | SYSTOLIC BLOOD PRESSURE: 148 MMHG | HEIGHT: 70 IN | TEMPERATURE: 99.3 F | BODY MASS INDEX: 24.12 KG/M2

## 2019-02-01 DIAGNOSIS — I87.2 VENOUS INSUFFICIENCY (CHRONIC) (PERIPHERAL): ICD-10-CM

## 2019-02-01 DIAGNOSIS — I10 BENIGN ESSENTIAL HYPERTENSION: ICD-10-CM

## 2019-02-01 DIAGNOSIS — I50.32 CHRONIC DIASTOLIC HEART FAILURE (HCC): ICD-10-CM

## 2019-02-01 DIAGNOSIS — I50.32 DIASTOLIC DYSFUNCTION WITH CHRONIC HEART FAILURE (HCC): ICD-10-CM

## 2019-02-01 DIAGNOSIS — M05.742 RHEUMATOID ARTHRITIS INVOLVING BOTH HANDS WITH POSITIVE RHEUMATOID FACTOR (HCC): Chronic | ICD-10-CM

## 2019-02-01 DIAGNOSIS — I10 ESSENTIAL HYPERTENSION: Primary | ICD-10-CM

## 2019-02-01 DIAGNOSIS — M05.741 RHEUMATOID ARTHRITIS INVOLVING BOTH HANDS WITH POSITIVE RHEUMATOID FACTOR (HCC): Chronic | ICD-10-CM

## 2019-02-01 PROCEDURE — 99214 OFFICE O/P EST MOD 30 MIN: CPT | Performed by: INTERNAL MEDICINE

## 2019-02-01 RX ORDER — HYDROCODONE BITARTRATE AND ACETAMINOPHEN 10; 325 MG/1; MG/1
1 TABLET ORAL
COMMUNITY
End: 2021-01-01 | Stop reason: HOSPADM

## 2019-02-01 RX ORDER — AZELASTINE HYDROCHLORIDE 137 UG/1
2 SPRAY, METERED NASAL EVERY 12 HOURS SCHEDULED
COMMUNITY
Start: 2018-11-14 | End: 2020-01-01 | Stop reason: SDUPTHER

## 2019-02-01 NOTE — PROGRESS NOTES
[facility]     Michoacano Rojas  1940   [id number]      Patient Care Team:  Michael Chavez MD as PCP - General  Michael Chavez MD as PCP - Family Medicine    Chief Complaint::   Chief Complaint   Patient presents with   • Hernia     hospital f/u         HPI  Mister Rojas presented to the hospital acutely on the 15th, and was found to have an incarcerated inguinal hernia.  He was admitted, but refused surgery.  He is diet was advanced and he was discharged home on the 16th.  He is wearing a hernia support belt which has helped.  Earlier this week he began using CBD oil twice a day.  His wife and son state that since then, he has slept better, he has less swelling and inflammation in his hands, and his overall pain is a bit less.  They were instructed to increase the dose to 4 drops twice a day after one week and then 5 drops twice a day after 2 weeks.  He continues to take hydrocodone which does give him 4-5 hours of relief.  His leg ulcers are almost healed.  Currently he has Unna boots on both lower extremities.  His arthritic pain persists and multiple joints.    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)   • Tracheal stenosis   • Normocytic anemia   • Diastolic dysfunction with chronic heart failure (CMS/HCC)   • Urinary retention   • Acute on chronic respiratory failure with hypoxia and hypercapnia (CMS/HCC)   • Incarcerated right inguinal hernia   • UTI (urinary tract infection) due to urinary indwelling catheter  (CMS/HCC)   • Yeast UTI   • Venous insufficiency (chronic) (peripheral)   • Benign essential hypertension   • Non-pressure chronic ulcer of left lower leg, with unspecified severity (CMS/HCC)   • Lower extremity edema   • Chronic diastolic heart failure (CMS/HCC)   • Cognitive disorder   • BPH without obstruction/lower urinary tract symptoms        Past Medical History:   Diagnosis Date   • A-fib (CMS/HCC)    • Atrial fibrillation (CMS/HCC)    • Chronic cutaneous venous stasis ulcer (CMS/HCC)    • Hypertension    • Peptic ulcer disease    • RA (rheumatoid arthritis) (CMS/HCC)    • Rheumatic fever    • Rheumatoid arthritis (CMS/HCC)    • Sepsis (CMS/HCC) 11/17/2017    from cellulitis due to leg ulcer, hospitalization, antibiotics, wound care   • Vertigo    • Vertigo        Past Surgical History:   Procedure Laterality Date   • JOINT MANIPULATION      left hip repair       Family History   Problem Relation Age of Onset   • Hypertension Mother    • Alzheimer's disease Mother    • Alzheimer's disease Father    • Hypertension Sister    • Lung cancer Brother    • Diabetes Brother        Social History     Socioeconomic History   • Marital status:      Spouse name: Not on file   • Number of children: Not on file   • Years of education: Not on file   • Highest education level: Not on file   Social Needs   • Financial resource strain: Not on file   • Food insecurity - worry: Not on file   • Food insecurity - inability: Not on file   • Transportation needs - medical: Not on file   • Transportation needs - non-medical: Not on file   Occupational History   • Not on file   Tobacco Use   • Smoking status: Never Smoker   • Smokeless tobacco: Never Used   Substance and Sexual Activity   • Alcohol use: No   • Drug use: No   • Sexual activity: Defer   Other Topics Concern   • Not on file   Social History Narrative   • Not on file       Allergies   Allergen Reactions   • Diclofenac Other (See Comments)     Skin peels off   •  "Naproxen Sodium Other (See Comments)     Increased heart rate   • Other GI Intolerance     Relates all oral abx cause intol nausea/vomiting. 1/2019 PT STATES \"LATELY THIS HASN'T HAPPENED\"         Current Outpatient Medications:   •  amLODIPine (NORVASC) 5 MG tablet, Take 1 tablet by mouth Daily., Disp: 90 tablet, Rfl: 3  •  Azelastine HCl 137 MCG/SPRAY solution, 2 sprays into the nostril(s) as directed by provider Every 12 (Twelve) Hours., Disp: , Rfl:   •  bisoprolol (ZEBeta) 5 MG tablet, Take 0.5 tablets by mouth Daily., Disp: 30 tablet, Rfl: 2  •  Camphor-Menthol-Methyl Sal (HM SALONPAS PAIN RELIEF) 1.2-5.7-6.3 % patch, Apply  topically to the appropriate area as directed As Needed., Disp: , Rfl:   •  CBD (cannabidiol) oral oil, Take  by mouth. 1 dropper full  BID, Disp: , Rfl:   •  cholecalciferol (VITAMIN D3) 1000 UNITS tablet, Take 2,000 Units by mouth Daily., Disp: , Rfl:   •  diclofenac (VOLTAREN) 1 % gel gel, As Needed., Disp: , Rfl:   •  diclofenac (VOLTAREN) 3 % gel gel, Apply 2 g topically 2 (Two) Times a Day As Needed. for 60 days. , Disp: , Rfl:   •  docusate sodium (COLACE) 50 MG capsule, Take 100 mg by mouth Daily As Needed., Disp: , Rfl:   •  finasteride (PROSCAR) 5 MG tablet, Take 5 mg by mouth Daily., Disp: , Rfl:   •  fluticasone (FLONASE) 50 MCG/ACT nasal spray, 2 sprays into each nostril Daily., Disp: , Rfl:   •  guaiFENesin (MUCINEX) 600 MG 12 hr tablet, Take 1 tablet by mouth Every 12 (Twelve) Hours. (Patient taking differently: Take 600 mg by mouth As Needed.), Disp: 14 tablet, Rfl: 0  •  HYDROcodone-acetaminophen (NORCO)  MG per tablet, 1 tablet. Can take up to 4 daily, Disp: , Rfl:   •  polyethylene glycol (MIRALAX) packet, Take 17 g by mouth Daily., Disp: , Rfl:   •  predniSONE (DELTASONE) 10 MG tablet, Take 10 mg by mouth Daily., Disp: , Rfl:     Review of Systems   Constitutional: Negative.  Negative for chills, fatigue and fever.   HENT: Negative for congestion, ear pain and " "sinus pressure.    Respiratory: Negative.  Negative for cough, chest tightness, shortness of breath and wheezing.    Cardiovascular: Negative.  Negative for chest pain and palpitations.   Gastrointestinal: Negative for abdominal pain, blood in stool, constipation and diarrhea.   Musculoskeletal: Positive for arthralgias and joint swelling.   Skin: Negative for color change.   Allergic/Immunologic: Negative for environmental allergies.   Neurological: Positive for dizziness. Negative for speech difficulty and headache.   Psychiatric/Behavioral: Positive for decreased concentration. The patient is nervous/anxious.         Vital Signs  Vitals:    02/01/19 0958   BP: 148/72   BP Location: Left arm   Patient Position: Sitting   Cuff Size: Adult   Pulse: 68   Temp: 99.3 °F (37.4 °C)   TempSrc: Temporal   Weight: Comment: unobtainable   Height: 177.8 cm (70\")   PainSc:   5   PainLoc: Groin  Comment: all over       Physical Exam   Constitutional: He is oriented to person, place, and time. He appears well-nourished.   Chronically ill appearing, in wheelchair.    HENT:   Head: Normocephalic and atraumatic.   Cardiovascular: Normal rate, regular rhythm and normal heart sounds.   No murmur heard.  Pulmonary/Chest: Effort normal and breath sounds normal.   Musculoskeletal:   Severe changes of RA of both hands, with rheumatoid nodules bilaterally. LE's wrapped with Unna boots, no edema of left.     Neurological: He is alert and oriented to person, place, and time.   Psychiatric: He has a normal mood and affect.   Vitals reviewed.     Procedures      Assessment/Plan:    #1 right inguinal hernia on the managed successfully with hernia belt.  No further vibration or treatment.  #2 rheumatoid arthritis, with chronic joint pain in the hands feet back and neck.  The addition of CBD oil has apparently made a big difference in some of the swelling and also his insomnia.  As planned, he will increase to 4 drops twice a day in 1 week, and " in 2 weeks to 5 drops twice a day.  # 3 hypertension controlled  #4 venous insufficiency with venous stasis ulcers per home health nursing.  #5 chronic diastolic, heart failure compensated  #6 atrial fibrillation, rate controlled, anticoagulation risk outweighs benefit in this patient.    Plan of care reviewed with patient at the conclusion of today's visit. Education was provided regarding diagnosis, management, and any prescribed or recommended OTC medications.Patient verbalizes understanding of and agreement with management plan.         Michael Chavez MD

## 2019-02-08 ENCOUNTER — READMISSION MANAGEMENT (OUTPATIENT)
Dept: CALL CENTER | Facility: HOSPITAL | Age: 79
End: 2019-02-08

## 2019-02-08 NOTE — OUTREACH NOTE
CHF Week 4 Survey      Responses   Facility patient discharged from?  [facility]   Does the patient have one of the following disease processes/diagnoses(primary or secondary)?  CHF   Week 4 attempt successful?  No          Danae Cruz RN

## 2019-02-14 ENCOUNTER — TELEPHONE (OUTPATIENT)
Dept: INTERNAL MEDICINE | Facility: CLINIC | Age: 79
End: 2019-02-14

## 2019-02-14 NOTE — TELEPHONE ENCOUNTER
Patient called and wanted to see any eye specialist that sees patient's on Saturdays because he has transportation problems.  Complains of eyes runny and blurred vision. Needs ASAP

## 2019-02-14 NOTE — TELEPHONE ENCOUNTER
Unless he sees an optometrist somewhere like [facility], I don't know of any eye doctors open on saturdays.

## 2019-02-15 RX ORDER — PROMETHAZINE HYDROCHLORIDE 25 MG/1
TABLET ORAL
Qty: 30 TABLET | Refills: 0 | Status: SHIPPED | OUTPATIENT
Start: 2019-02-15 | End: 2019-06-09

## 2019-02-19 ENCOUNTER — TELEPHONE (OUTPATIENT)
Dept: INTERNAL MEDICINE | Facility: CLINIC | Age: 79
End: 2019-02-19

## 2019-02-19 NOTE — TELEPHONE ENCOUNTER
Patient reverification for wound care.   He has a total of 7 wounds  Katie boots to bilateral extremities and wrapping and applying cream to other wounds.

## 2019-02-20 NOTE — TELEPHONE ENCOUNTER
Rec'd fax from [facility] for clarification on rx for diclofenac gel. Correct dose 3% [facility]  [phone]

## 2019-02-21 ENCOUNTER — TELEPHONE (OUTPATIENT)
Dept: INTERNAL MEDICINE | Facility: CLINIC | Age: 79
End: 2019-02-21

## 2019-02-28 ENCOUNTER — OUTSIDE FACILITY SERVICE (OUTPATIENT)
Dept: INTERNAL MEDICINE | Facility: CLINIC | Age: 79
End: 2019-02-28

## 2019-02-28 PROCEDURE — G0179 MD RECERTIFICATION HHA PT: HCPCS | Performed by: INTERNAL MEDICINE

## 2019-03-07 ENCOUNTER — TELEPHONE (OUTPATIENT)
Dept: INTERNAL MEDICINE | Facility: CLINIC | Age: 79
End: 2019-03-07

## 2019-03-07 NOTE — TELEPHONE ENCOUNTER
CALLED ABOUT A REFERRAL THEY RECEIVED FOR PT AND THEY NEED TO KNOW IF YOU WANT TO FOLLOW AS ATTENDING PHYSICIAN ALONG WITH HOSPICE AND CERTIFY THAT HE IS TERMALLY ILL    HE STATED HE WAS CALLING ABOUT A DR. RODRIGUEZ PT  I CALLED BACK TO LET THEM KNOW HE WAS YOUR PT  AND THAT I WAS SENDING THIS TO YOU

## 2019-03-11 ENCOUNTER — TELEPHONE (OUTPATIENT)
Dept: INTERNAL MEDICINE | Facility: CLINIC | Age: 79
End: 2019-03-11

## 2019-03-11 NOTE — TELEPHONE ENCOUNTER
Current dose: Prednisone 5mg  2 tablets daily    Patient wants to know if he can increase the dose.  His eyes are watery and red.

## 2019-03-11 NOTE — TELEPHONE ENCOUNTER
Called and advise patient.  He will try to OTC eye drop and call back if he needs further assistance.

## 2019-03-14 RX ORDER — FINASTERIDE 5 MG/1
TABLET, FILM COATED ORAL
Qty: 90 TABLET | Refills: 1 | Status: SHIPPED | OUTPATIENT
Start: 2019-03-14 | End: 2019-08-07 | Stop reason: SDUPTHER

## 2019-03-19 ENCOUNTER — TELEPHONE (OUTPATIENT)
Dept: INTERNAL MEDICINE | Facility: CLINIC | Age: 79
End: 2019-03-19

## 2019-04-01 ENCOUNTER — TELEPHONE (OUTPATIENT)
Dept: INTERNAL MEDICINE | Facility: CLINIC | Age: 79
End: 2019-04-01

## 2019-04-01 NOTE — TELEPHONE ENCOUNTER
Reason for Call:        PT IS USING MORE OF THE VOLTERAN GEL THAN 4 TIMES A DAY, WHICH IS  WHAT  LABEL STATES.   PT HAS MULTIPULE SITES AND USING MORE THAN EXPECTED.   INSURANCE DENIED COVERAGE AND WILL NEED AN APPEAL FROM DR. SAVAGE .   DAUGHTER IN LAW, BRY OSMAN CALLED ON [employer], PLEASE CONTACT HER [phone]

## 2019-04-01 NOTE — TELEPHONE ENCOUNTER
Called and spoke to Ofelia, daughter-in-law. Advised we had a letter 12/22/18 stating it did not require a PA but was available to pt in the amt listed in his plan. She states pt got a letter today that we could appeal a denial that was rendered recently. She will bring letter to office

## 2019-04-03 NOTE — TELEPHONE ENCOUNTER
Ofelia dropped off letter. Still denied. Per Dr. Chavez we have samples of Pennsaid. Called Ofelia and she will  samples.

## 2019-04-18 ENCOUNTER — TELEPHONE (OUTPATIENT)
Dept: INTERNAL MEDICINE | Facility: CLINIC | Age: 79
End: 2019-04-18

## 2019-04-18 NOTE — TELEPHONE ENCOUNTER
[facility] called to notify office of recertification of weekly nursing for lower extremities for a total of 7 wounds using the ivy boots.

## 2019-04-22 PROBLEM — E04.9 GOITER: Status: ACTIVE | Noted: 2019-04-22

## 2019-04-22 PROBLEM — L97.511 SKIN ULCER OF RIGHT FOOT, LIMITED TO BREAKDOWN OF SKIN (HCC): Status: ACTIVE | Noted: 2019-04-22

## 2019-04-22 PROBLEM — F32.0 MAJOR DEPRESSIVE DISORDER, SINGLE EPISODE, MILD (HCC): Status: ACTIVE | Noted: 2019-04-22

## 2019-04-22 PROBLEM — H10.403 CHRONIC CONJUNCTIVITIS OF BOTH EYES: Status: ACTIVE | Noted: 2019-04-22

## 2019-04-22 PROBLEM — E66.01 MORBID OBESITY (HCC): Status: ACTIVE | Noted: 2019-04-22

## 2019-04-22 PROBLEM — R22.0 FACIAL SWELLING: Status: ACTIVE | Noted: 2019-04-22

## 2019-04-22 PROBLEM — M1A.09X1 IDIOPATHIC CHRONIC GOUT OF MULTIPLE SITES WITH TOPHUS: Status: ACTIVE | Noted: 2019-04-22

## 2019-04-22 PROBLEM — Z00.00 MEDICARE ANNUAL WELLNESS VISIT, SUBSEQUENT: Status: ACTIVE | Noted: 2019-04-22

## 2019-04-22 PROBLEM — C34.90: Status: ACTIVE | Noted: 2019-04-22

## 2019-04-22 PROBLEM — R31.0 GROSS HEMATURIA: Status: ACTIVE | Noted: 2019-04-22

## 2019-06-04 ENCOUNTER — TELEPHONE (OUTPATIENT)
Dept: INTERNAL MEDICINE | Facility: CLINIC | Age: 79
End: 2019-06-04

## 2019-06-04 NOTE — TELEPHONE ENCOUNTER
Patient complains of extreme weakness all the time.  Offered appointment but has no transportation.    Please advise.  Says he is in pain all the time.

## 2019-06-05 NOTE — TELEPHONE ENCOUNTER
Called and talked to pt. He wants to know if increasing prednisone would help - he is currently taking 10 mg qd

## 2019-06-05 NOTE — TELEPHONE ENCOUNTER
Called and discussed with pt - he voices understanding. He is also going to try a multiple vitamin

## 2019-06-05 NOTE — TELEPHONE ENCOUNTER
If it does it would be temporary, so I don't think it is a good idea. A lot of my patients who have chronic pain have found that CBD, or hemp oil helps, he could try that.

## 2019-06-05 NOTE — TELEPHONE ENCOUNTER
I think he is doing what can be done for the pain.  Weakness could be due to pain, pain meds, muscle weakness, poor diet.

## 2019-06-09 ENCOUNTER — APPOINTMENT (OUTPATIENT)
Dept: GENERAL RADIOLOGY | Facility: HOSPITAL | Age: 79
End: 2019-06-09

## 2019-06-09 ENCOUNTER — HOSPITAL ENCOUNTER (INPATIENT)
Facility: HOSPITAL | Age: 79
LOS: 4 days | Discharge: HOME HEALTH | End: 2019-06-13
Attending: EMERGENCY MEDICINE | Admitting: INTERNAL MEDICINE

## 2019-06-09 DIAGNOSIS — I83.019 VENOUS ULCERS OF BOTH LOWER EXTREMITIES (HCC): ICD-10-CM

## 2019-06-09 DIAGNOSIS — L97.929 VENOUS ULCERS OF BOTH LOWER EXTREMITIES (HCC): ICD-10-CM

## 2019-06-09 DIAGNOSIS — E46 MALNUTRITION, UNSPECIFIED TYPE (HCC): ICD-10-CM

## 2019-06-09 DIAGNOSIS — Z74.09 IMPAIRED MOBILITY AND ADLS: ICD-10-CM

## 2019-06-09 DIAGNOSIS — Z78.9 IMPAIRED MOBILITY AND ADLS: ICD-10-CM

## 2019-06-09 DIAGNOSIS — R50.9 ACUTE FEBRILE ILLNESS: Primary | ICD-10-CM

## 2019-06-09 DIAGNOSIS — I83.029 VENOUS ULCERS OF BOTH LOWER EXTREMITIES (HCC): ICD-10-CM

## 2019-06-09 DIAGNOSIS — D72.829 LEUKOCYTOSIS, UNSPECIFIED TYPE: ICD-10-CM

## 2019-06-09 DIAGNOSIS — L97.919 VENOUS ULCERS OF BOTH LOWER EXTREMITIES (HCC): ICD-10-CM

## 2019-06-09 DIAGNOSIS — N39.0 URINARY TRACT INFECTION WITH HEMATURIA, SITE UNSPECIFIED: ICD-10-CM

## 2019-06-09 DIAGNOSIS — R31.9 URINARY TRACT INFECTION WITH HEMATURIA, SITE UNSPECIFIED: ICD-10-CM

## 2019-06-09 DIAGNOSIS — Z74.09 IMPAIRED FUNCTIONAL MOBILITY, BALANCE, GAIT, AND ENDURANCE: ICD-10-CM

## 2019-06-09 PROBLEM — G93.41 ACUTE METABOLIC ENCEPHALOPATHY: Status: ACTIVE | Noted: 2019-06-09

## 2019-06-09 PROBLEM — J44.9 COPD (CHRONIC OBSTRUCTIVE PULMONARY DISEASE) (HCC): Status: ACTIVE | Noted: 2019-06-09

## 2019-06-09 LAB
ALBUMIN SERPL-MCNC: 3.4 G/DL (ref 3.5–5.2)
ALBUMIN/GLOB SERPL: 0.9 G/DL
ALP SERPL-CCNC: 89 U/L (ref 39–117)
ALT SERPL W P-5'-P-CCNC: 7 U/L (ref 1–41)
ANION GAP SERPL CALCULATED.3IONS-SCNC: 14 MMOL/L
AST SERPL-CCNC: 14 U/L (ref 1–40)
BACTERIA UR QL AUTO: ABNORMAL /HPF
BASOPHILS # BLD AUTO: 0.04 10*3/MM3 (ref 0–0.2)
BASOPHILS NFR BLD AUTO: 0.2 % (ref 0–1.5)
BILIRUB SERPL-MCNC: 0.7 MG/DL (ref 0.2–1.2)
BILIRUB UR QL STRIP: NEGATIVE
BUN BLD-MCNC: 25 MG/DL (ref 8–23)
BUN/CREAT SERPL: 37.3 (ref 7–25)
CALCIUM SPEC-SCNC: 9.2 MG/DL (ref 8.6–10.5)
CHLORIDE SERPL-SCNC: 103 MMOL/L (ref 98–107)
CLARITY UR: CLEAR
CO2 SERPL-SCNC: 27 MMOL/L (ref 22–29)
COLOR UR: YELLOW
CREAT BLD-MCNC: 0.67 MG/DL (ref 0.76–1.27)
D-LACTATE SERPL-SCNC: 2.6 MMOL/L (ref 0.5–2)
DEPRECATED RDW RBC AUTO: 48.8 FL (ref 37–54)
EOSINOPHIL # BLD AUTO: 0.01 10*3/MM3 (ref 0–0.4)
EOSINOPHIL NFR BLD AUTO: 0.1 % (ref 0.3–6.2)
ERYTHROCYTE [DISTWIDTH] IN BLOOD BY AUTOMATED COUNT: 14.4 % (ref 12.3–15.4)
GFR SERPL CREATININE-BSD FRML MDRD: 138 ML/MIN/1.73
GLOBULIN UR ELPH-MCNC: 3.8 GM/DL
GLUCOSE BLD-MCNC: 87 MG/DL (ref 65–99)
GLUCOSE UR STRIP-MCNC: NEGATIVE MG/DL
HCT VFR BLD AUTO: 40.7 % (ref 37.5–51)
HGB BLD-MCNC: 12 G/DL (ref 13–17.7)
HGB UR QL STRIP.AUTO: ABNORMAL
HOLD SPECIMEN: NORMAL
HOLD SPECIMEN: NORMAL
HYALINE CASTS UR QL AUTO: ABNORMAL /LPF
IMM GRANULOCYTES # BLD AUTO: 0.11 10*3/MM3 (ref 0–0.05)
IMM GRANULOCYTES NFR BLD AUTO: 0.6 % (ref 0–0.5)
KETONES UR QL STRIP: NEGATIVE
LEUKOCYTE ESTERASE UR QL STRIP.AUTO: ABNORMAL
LYMPHOCYTES # BLD AUTO: 0.43 10*3/MM3 (ref 0.7–3.1)
LYMPHOCYTES NFR BLD AUTO: 2.2 % (ref 19.6–45.3)
MCH RBC QN AUTO: 27.3 PG (ref 26.6–33)
MCHC RBC AUTO-ENTMCNC: 29.5 G/DL (ref 31.5–35.7)
MCV RBC AUTO: 92.5 FL (ref 79–97)
MONOCYTES # BLD AUTO: 0.76 10*3/MM3 (ref 0.1–0.9)
MONOCYTES NFR BLD AUTO: 3.8 % (ref 5–12)
NEUTROPHILS # BLD AUTO: 18.53 10*3/MM3 (ref 1.7–7)
NEUTROPHILS NFR BLD AUTO: 93.1 % (ref 42.7–76)
NITRITE UR QL STRIP: NEGATIVE
NRBC BLD AUTO-RTO: 0 /100 WBC (ref 0–0.2)
PH UR STRIP.AUTO: <=5 [PH] (ref 5–8)
PLATELET # BLD AUTO: 186 10*3/MM3 (ref 140–450)
PMV BLD AUTO: 10.7 FL (ref 6–12)
POTASSIUM BLD-SCNC: 3.8 MMOL/L (ref 3.5–5.2)
PROT SERPL-MCNC: 7.2 G/DL (ref 6–8.5)
PROT UR QL STRIP: NEGATIVE
RBC # BLD AUTO: 4.4 10*6/MM3 (ref 4.14–5.8)
RBC # UR: ABNORMAL /HPF
REF LAB TEST METHOD: ABNORMAL
SODIUM BLD-SCNC: 144 MMOL/L (ref 136–145)
SP GR UR STRIP: 1.02 (ref 1–1.03)
SQUAMOUS #/AREA URNS HPF: ABNORMAL /HPF
UROBILINOGEN UR QL STRIP: ABNORMAL
WBC NRBC COR # BLD: 19.88 10*3/MM3 (ref 3.4–10.8)
WBC UR QL AUTO: ABNORMAL /HPF
WHOLE BLOOD HOLD SPECIMEN: NORMAL
WHOLE BLOOD HOLD SPECIMEN: NORMAL
YEAST URNS QL MICRO: ABNORMAL /HPF

## 2019-06-09 PROCEDURE — 87040 BLOOD CULTURE FOR BACTERIA: CPT | Performed by: EMERGENCY MEDICINE

## 2019-06-09 PROCEDURE — 25010000002 VANCOMYCIN 10 G RECONSTITUTED SOLUTION: Performed by: EMERGENCY MEDICINE

## 2019-06-09 PROCEDURE — 87070 CULTURE OTHR SPECIMN AEROBIC: CPT | Performed by: EMERGENCY MEDICINE

## 2019-06-09 PROCEDURE — 87086 URINE CULTURE/COLONY COUNT: CPT

## 2019-06-09 PROCEDURE — 80053 COMPREHEN METABOLIC PANEL: CPT | Performed by: EMERGENCY MEDICINE

## 2019-06-09 PROCEDURE — 87077 CULTURE AEROBIC IDENTIFY: CPT | Performed by: EMERGENCY MEDICINE

## 2019-06-09 PROCEDURE — 93005 ELECTROCARDIOGRAM TRACING: CPT | Performed by: PHYSICIAN ASSISTANT

## 2019-06-09 PROCEDURE — 85025 COMPLETE CBC W/AUTO DIFF WBC: CPT | Performed by: EMERGENCY MEDICINE

## 2019-06-09 PROCEDURE — 25010000002 PIPERACILLIN SOD-TAZOBACTAM PER 1 G: Performed by: EMERGENCY MEDICINE

## 2019-06-09 PROCEDURE — 71045 X-RAY EXAM CHEST 1 VIEW: CPT

## 2019-06-09 PROCEDURE — 87205 SMEAR GRAM STAIN: CPT | Performed by: EMERGENCY MEDICINE

## 2019-06-09 PROCEDURE — 81001 URINALYSIS AUTO W/SCOPE: CPT

## 2019-06-09 PROCEDURE — 93010 ELECTROCARDIOGRAM REPORT: CPT | Performed by: INTERNAL MEDICINE

## 2019-06-09 PROCEDURE — 87147 CULTURE TYPE IMMUNOLOGIC: CPT | Performed by: EMERGENCY MEDICINE

## 2019-06-09 PROCEDURE — 83605 ASSAY OF LACTIC ACID: CPT | Performed by: EMERGENCY MEDICINE

## 2019-06-09 PROCEDURE — 99223 1ST HOSP IP/OBS HIGH 75: CPT | Performed by: INTERNAL MEDICINE

## 2019-06-09 PROCEDURE — 99285 EMERGENCY DEPT VISIT HI MDM: CPT

## 2019-06-09 PROCEDURE — 87186 SC STD MICRODIL/AGAR DIL: CPT | Performed by: EMERGENCY MEDICINE

## 2019-06-09 RX ORDER — SODIUM CHLORIDE 9 MG/ML
125 INJECTION, SOLUTION INTRAVENOUS CONTINUOUS
Status: DISCONTINUED | OUTPATIENT
Start: 2019-06-09 | End: 2019-06-10 | Stop reason: HOSPADM

## 2019-06-09 RX ORDER — ACETAMINOPHEN 500 MG
1000 TABLET ORAL ONCE
Status: COMPLETED | OUTPATIENT
Start: 2019-06-09 | End: 2019-06-09

## 2019-06-09 RX ORDER — SODIUM CHLORIDE 0.9 % (FLUSH) 0.9 %
10 SYRINGE (ML) INJECTION AS NEEDED
Status: DISCONTINUED | OUTPATIENT
Start: 2019-06-09 | End: 2019-06-13 | Stop reason: HOSPADM

## 2019-06-09 RX ADMIN — TAZOBACTAM SODIUM AND PIPERACILLIN SODIUM 4.5 G: 500; 4 INJECTION, SOLUTION INTRAVENOUS at 21:44

## 2019-06-09 RX ADMIN — SODIUM CHLORIDE 2040 ML: 9 INJECTION, SOLUTION INTRAVENOUS at 21:07

## 2019-06-09 RX ADMIN — SODIUM CHLORIDE 125 ML/HR: 9 INJECTION, SOLUTION INTRAVENOUS at 21:08

## 2019-06-09 RX ADMIN — ACETAMINOPHEN 1000 MG: 500 TABLET, FILM COATED ORAL at 21:00

## 2019-06-09 RX ADMIN — VANCOMYCIN HYDROCHLORIDE 1250 MG: 10 INJECTION, POWDER, LYOPHILIZED, FOR SOLUTION INTRAVENOUS at 22:23

## 2019-06-10 LAB
ANION GAP SERPL CALCULATED.3IONS-SCNC: 12 MMOL/L
BACTERIA SPEC AEROBE CULT: ABNORMAL
BUN BLD-MCNC: 22 MG/DL (ref 8–23)
BUN/CREAT SERPL: 40.7 (ref 7–25)
CALCIUM SPEC-SCNC: 8.1 MG/DL (ref 8.6–10.5)
CHLORIDE SERPL-SCNC: 106 MMOL/L (ref 98–107)
CO2 SERPL-SCNC: 25 MMOL/L (ref 22–29)
CREAT BLD-MCNC: 0.54 MG/DL (ref 0.76–1.27)
D-LACTATE SERPL-SCNC: 1.1 MMOL/L (ref 0.5–2)
DEPRECATED RDW RBC AUTO: 49.9 FL (ref 37–54)
ERYTHROCYTE [DISTWIDTH] IN BLOOD BY AUTOMATED COUNT: 14.4 % (ref 12.3–15.4)
GFR SERPL CREATININE-BSD FRML MDRD: >150 ML/MIN/1.73
GLUCOSE BLD-MCNC: 87 MG/DL (ref 65–99)
HCT VFR BLD AUTO: 35.6 % (ref 37.5–51)
HGB BLD-MCNC: 10.2 G/DL (ref 13–17.7)
HOLD SPECIMEN: NORMAL
MCH RBC QN AUTO: 27.1 PG (ref 26.6–33)
MCHC RBC AUTO-ENTMCNC: 28.7 G/DL (ref 31.5–35.7)
MCV RBC AUTO: 94.7 FL (ref 79–97)
PLATELET # BLD AUTO: 128 10*3/MM3 (ref 140–450)
PMV BLD AUTO: 11.4 FL (ref 6–12)
POTASSIUM BLD-SCNC: 3.8 MMOL/L (ref 3.5–5.2)
RBC # BLD AUTO: 3.76 10*6/MM3 (ref 4.14–5.8)
SODIUM BLD-SCNC: 143 MMOL/L (ref 136–145)
WBC NRBC COR # BLD: 18.65 10*3/MM3 (ref 3.4–10.8)

## 2019-06-10 PROCEDURE — 94640 AIRWAY INHALATION TREATMENT: CPT

## 2019-06-10 PROCEDURE — 85027 COMPLETE CBC AUTOMATED: CPT | Performed by: PHYSICIAN ASSISTANT

## 2019-06-10 PROCEDURE — 97162 PT EVAL MOD COMPLEX 30 MIN: CPT

## 2019-06-10 PROCEDURE — 99233 SBSQ HOSP IP/OBS HIGH 50: CPT | Performed by: HOSPITALIST

## 2019-06-10 PROCEDURE — 25010000002 PIPERACILLIN SOD-TAZOBACTAM PER 1 G: Performed by: PHYSICIAN ASSISTANT

## 2019-06-10 PROCEDURE — 25010000002 VANCOMYCIN PER 500 MG

## 2019-06-10 PROCEDURE — 94799 UNLISTED PULMONARY SVC/PX: CPT

## 2019-06-10 PROCEDURE — 25010000002 HEPARIN (PORCINE) PER 1000 UNITS: Performed by: PHYSICIAN ASSISTANT

## 2019-06-10 PROCEDURE — 63710000001 PREDNISONE PER 5 MG: Performed by: PHYSICIAN ASSISTANT

## 2019-06-10 PROCEDURE — 80048 BASIC METABOLIC PNL TOTAL CA: CPT | Performed by: PHYSICIAN ASSISTANT

## 2019-06-10 PROCEDURE — 83605 ASSAY OF LACTIC ACID: CPT | Performed by: EMERGENCY MEDICINE

## 2019-06-10 RX ORDER — SODIUM CHLORIDE 0.9 % (FLUSH) 0.9 %
3 SYRINGE (ML) INJECTION EVERY 12 HOURS SCHEDULED
Status: DISCONTINUED | OUTPATIENT
Start: 2019-06-10 | End: 2019-06-13 | Stop reason: HOSPADM

## 2019-06-10 RX ORDER — VANCOMYCIN HYDROCHLORIDE 1 G/200ML
15 INJECTION, SOLUTION INTRAVENOUS EVERY 12 HOURS
Status: DISCONTINUED | OUTPATIENT
Start: 2019-06-10 | End: 2019-06-11

## 2019-06-10 RX ORDER — DOCUSATE SODIUM 100 MG/1
100 CAPSULE, LIQUID FILLED ORAL DAILY PRN
Status: DISCONTINUED | OUTPATIENT
Start: 2019-06-10 | End: 2019-06-13 | Stop reason: HOSPADM

## 2019-06-10 RX ORDER — PREDNISONE 10 MG/1
10 TABLET ORAL DAILY
Status: DISCONTINUED | OUTPATIENT
Start: 2019-06-10 | End: 2019-06-13 | Stop reason: HOSPADM

## 2019-06-10 RX ORDER — ACETAMINOPHEN 500 MG
500 TABLET ORAL EVERY 6 HOURS PRN
Status: DISCONTINUED | OUTPATIENT
Start: 2019-06-10 | End: 2019-06-13 | Stop reason: HOSPADM

## 2019-06-10 RX ORDER — SODIUM CHLORIDE 9 MG/ML
100 INJECTION, SOLUTION INTRAVENOUS CONTINUOUS
Status: DISCONTINUED | OUTPATIENT
Start: 2019-06-10 | End: 2019-06-10

## 2019-06-10 RX ORDER — VANCOMYCIN HYDROCHLORIDE 1 G/200ML
15 INJECTION, SOLUTION INTRAVENOUS EVERY 12 HOURS
Status: DISCONTINUED | OUTPATIENT
Start: 2019-06-10 | End: 2019-06-10

## 2019-06-10 RX ORDER — FINASTERIDE 5 MG/1
5 TABLET, FILM COATED ORAL DAILY
Status: DISCONTINUED | OUTPATIENT
Start: 2019-06-10 | End: 2019-06-13 | Stop reason: HOSPADM

## 2019-06-10 RX ORDER — ONDANSETRON 2 MG/ML
4 INJECTION INTRAMUSCULAR; INTRAVENOUS EVERY 6 HOURS PRN
Status: DISCONTINUED | OUTPATIENT
Start: 2019-06-10 | End: 2019-06-13 | Stop reason: HOSPADM

## 2019-06-10 RX ORDER — SODIUM CHLORIDE 450 MG/100ML
75 INJECTION, SOLUTION INTRAVENOUS CONTINUOUS
Status: DISCONTINUED | OUTPATIENT
Start: 2019-06-10 | End: 2019-06-13 | Stop reason: HOSPADM

## 2019-06-10 RX ORDER — HYDROCODONE BITARTRATE AND ACETAMINOPHEN 10; 325 MG/1; MG/1
1 TABLET ORAL ONCE AS NEEDED
Status: COMPLETED | OUTPATIENT
Start: 2019-06-10 | End: 2019-06-10

## 2019-06-10 RX ORDER — CHOLECALCIFEROL (VITAMIN D3) 125 MCG
5 CAPSULE ORAL NIGHTLY PRN
Status: DISCONTINUED | OUTPATIENT
Start: 2019-06-10 | End: 2019-06-13 | Stop reason: HOSPADM

## 2019-06-10 RX ORDER — L.ACID,PARA/B.BIFIDUM/S.THERM 8B CELL
1 CAPSULE ORAL DAILY
Status: DISCONTINUED | OUTPATIENT
Start: 2019-06-10 | End: 2019-06-13 | Stop reason: HOSPADM

## 2019-06-10 RX ORDER — IPRATROPIUM BROMIDE AND ALBUTEROL SULFATE 2.5; .5 MG/3ML; MG/3ML
3 SOLUTION RESPIRATORY (INHALATION) EVERY 4 HOURS PRN
Status: DISCONTINUED | OUTPATIENT
Start: 2019-06-10 | End: 2019-06-13 | Stop reason: HOSPADM

## 2019-06-10 RX ORDER — ACETAMINOPHEN 325 MG/1
650 TABLET ORAL EVERY 4 HOURS PRN
Status: DISCONTINUED | OUTPATIENT
Start: 2019-06-10 | End: 2019-06-10

## 2019-06-10 RX ORDER — SODIUM CHLORIDE 0.9 % (FLUSH) 0.9 %
3-10 SYRINGE (ML) INJECTION AS NEEDED
Status: DISCONTINUED | OUTPATIENT
Start: 2019-06-10 | End: 2019-06-13 | Stop reason: HOSPADM

## 2019-06-10 RX ORDER — HEPARIN SODIUM 5000 [USP'U]/ML
5000 INJECTION, SOLUTION INTRAVENOUS; SUBCUTANEOUS EVERY 8 HOURS SCHEDULED
Status: DISCONTINUED | OUTPATIENT
Start: 2019-06-10 | End: 2019-06-11

## 2019-06-10 RX ORDER — AMLODIPINE BESYLATE 5 MG/1
5 TABLET ORAL
Status: DISCONTINUED | OUTPATIENT
Start: 2019-06-10 | End: 2019-06-13 | Stop reason: HOSPADM

## 2019-06-10 RX ORDER — BISOPROLOL FUMARATE 5 MG/1
2.5 TABLET, FILM COATED ORAL
Status: DISCONTINUED | OUTPATIENT
Start: 2019-06-10 | End: 2019-06-13 | Stop reason: HOSPADM

## 2019-06-10 RX ORDER — FLUTICASONE PROPIONATE 50 MCG
2 SPRAY, SUSPENSION (ML) NASAL DAILY
Status: DISCONTINUED | OUTPATIENT
Start: 2019-06-10 | End: 2019-06-13 | Stop reason: HOSPADM

## 2019-06-10 RX ORDER — HYDROCODONE BITARTRATE AND ACETAMINOPHEN 5; 325 MG/1; MG/1
1 TABLET ORAL EVERY 6 HOURS PRN
Status: DISCONTINUED | OUTPATIENT
Start: 2019-06-10 | End: 2019-06-13 | Stop reason: HOSPADM

## 2019-06-10 RX ADMIN — HYDROCODONE BITARTRATE AND ACETAMINOPHEN 1 TABLET: 10; 325 TABLET ORAL at 08:09

## 2019-06-10 RX ADMIN — VANCOMYCIN HYDROCHLORIDE 1000 MG: 1 INJECTION, SOLUTION INTRAVENOUS at 20:15

## 2019-06-10 RX ADMIN — TAZOBACTAM SODIUM AND PIPERACILLIN SODIUM 3.38 G: 375; 3 INJECTION, SOLUTION INTRAVENOUS at 05:32

## 2019-06-10 RX ADMIN — HEPARIN SODIUM 5000 UNITS: 5000 INJECTION INTRAVENOUS; SUBCUTANEOUS at 20:16

## 2019-06-10 RX ADMIN — PREDNISONE 10 MG: 10 TABLET ORAL at 08:09

## 2019-06-10 RX ADMIN — SODIUM CHLORIDE 100 ML/HR: 9 INJECTION, SOLUTION INTRAVENOUS at 00:14

## 2019-06-10 RX ADMIN — DICLOFENAC SODIUM 2 G: 10 GEL TOPICAL at 02:38

## 2019-06-10 RX ADMIN — POLYETHYLENE GLYCOL 3350 17 G: 17 POWDER, FOR SOLUTION ORAL at 08:07

## 2019-06-10 RX ADMIN — AMLODIPINE BESYLATE 5 MG: 5 TABLET ORAL at 08:09

## 2019-06-10 RX ADMIN — SODIUM CHLORIDE, PRESERVATIVE FREE 3 ML: 5 INJECTION INTRAVENOUS at 00:14

## 2019-06-10 RX ADMIN — FINASTERIDE 5 MG: 5 TABLET, FILM COATED ORAL at 08:30

## 2019-06-10 RX ADMIN — BISOPROLOL FUMARATE 2.5 MG: 5 TABLET ORAL at 08:09

## 2019-06-10 RX ADMIN — DICLOFENAC SODIUM 2 G: 10 GEL TOPICAL at 08:10

## 2019-06-10 RX ADMIN — HYDROCODONE BITARTRATE AND ACETAMINOPHEN 1 TABLET: 5; 325 TABLET ORAL at 17:48

## 2019-06-10 RX ADMIN — TAZOBACTAM SODIUM AND PIPERACILLIN SODIUM 3.38 G: 375; 3 INJECTION, SOLUTION INTRAVENOUS at 11:59

## 2019-06-10 RX ADMIN — DICLOFENAC SODIUM 2 G: 10 GEL TOPICAL at 20:17

## 2019-06-10 RX ADMIN — SODIUM CHLORIDE 75 ML/HR: 4.5 INJECTION, SOLUTION INTRAVENOUS at 17:00

## 2019-06-10 RX ADMIN — VANCOMYCIN HYDROCHLORIDE 1000 MG: 1 INJECTION, SOLUTION INTRAVENOUS at 08:09

## 2019-06-10 RX ADMIN — FLUTICASONE PROPIONATE 2 SPRAY: 50 SPRAY, METERED NASAL at 08:30

## 2019-06-10 RX ADMIN — IPRATROPIUM BROMIDE AND ALBUTEROL SULFATE 3 ML: 2.5; .5 SOLUTION RESPIRATORY (INHALATION) at 01:17

## 2019-06-10 RX ADMIN — SODIUM CHLORIDE, PRESERVATIVE FREE 3 ML: 5 INJECTION INTRAVENOUS at 10:08

## 2019-06-10 RX ADMIN — Medication 1 CAPSULE: at 08:09

## 2019-06-10 RX ADMIN — TAZOBACTAM SODIUM AND PIPERACILLIN SODIUM 3.38 G: 375; 3 INJECTION, SOLUTION INTRAVENOUS at 21:28

## 2019-06-10 NOTE — CONSULTS
Michoacano Rojas  1940  [id number]  6/10/2019      Referring Provider: Kareen Bruner MD hospital medicine   reason for Consultation: Sepsis with bilateral lower extremity soft tissue infections      Subjective   History of present illness: 78-year-old gentleman.  Long-standing rheumatoid arthritis with deformans joint changes.  Immunocompromised host on chronic corticosteroids.  History of chronic obstructive pulmonary disease, paroxysmal atrial fibrillation, and diastolic dysfunction.  Venous stasis disease with long-standing lower extremity lymphedema and associated ulcerations.  Previous episodes of cellulitis/soft tissue infection.  Brought to the emergency department with altered mental status.  Lower extremities noted by family to have increased warmth and expanding zone of erythema.  Febrile in the emergency department 102.5 degrees.  Resting tachycardia at 112 bpm.  Lactic acidosis at 2.6 with a peripheral leukocyte count of 19,900.  Started on vancomycin and piperacillin tazobactam.  Wound Gram stain with gram-positive cocci in pairs and gram-negative rods.  6/9 blood cultures x2- at 24 hours of incubation.  Chest x-ray with cardiomegaly, scoliotic changes, and interstitial edema.  There is a prominent right paratracheal stripe; however, this may represent rotation.  Asked to assist with antimicrobial therapy in this context.    Past Medical History:   Diagnosis Date   • A-fib (CMS/HCC)    • Atrial fibrillation (CMS/HCC)    • Chronic cutaneous venous stasis ulcer (CMS/HCC)    • Hypertension    • Penile abnormality    • Peptic ulcer disease    • Primary malignant neoplasm of bronchus with metastasisto other site, unspecified laterality (CMS/HCC) 4/22/2019   • RA (rheumatoid arthritis) (CMS/HCC)    • Rheumatic fever    • Rheumatoid arthritis (CMS/HCC)    • Sepsis (CMS/HCC) 11/17/2017    from cellulitis due to leg ulcer, hospitalization, antibiotics, wound care   • Vertigo    • Vertigo        Past Surgical  "History:   Procedure Laterality Date   • JOINT MANIPULATION      left hip repair       Pediatric History   Patient Guardian Status   • Not on file     Other Topics Concern   • Not on file   Social History Narrative   • Not on file       family history includes Alzheimer's disease in his father and mother; Diabetes in his brother; Hypertension in his mother and sister; Lung cancer in his brother.    Allergies   Allergen Reactions   • Naproxen Sodium Other (See Comments)     Increased heart rate  Aleve   • Other GI Intolerance     Relates all oral abx cause intol nausea/vomiting. 1/2019 PT STATES \"LATELY THIS HASN'T HAPPENED\"       Medication: MAR reviewed.  Antibiotics: See below.  Anti-Infectives (From admission, onward)    Ordered     Dose/Rate Route Frequency Start Stop    06/10/19 0023  vancomycin (VANCOCIN) in iso-osmotic dextrose IVPB 1 g (premix) 200 mL     Ordering Provider:  Arsalan Good RPH    15 mg/kg × 67.6 kg  over 60 Minutes Intravenous Every 12 Hours 06/10/19 1000 06/17/19 0959    06/10/19 0003  piperacillin-tazobactam (ZOSYN) 3.375 g in iso-osmotic dextrose 50 ml (premix)     Ordering Provider:  Millicent Mondragon PA-C    3.375 g  over 4 Hours Intravenous Every 8 Hours 06/10/19 0400 06/17/19 0359    06/10/19 0003  Pharmacy to dose vancomycin     Ordering Provider:  Millicent Mondragon PA-C     Does not apply Continuous PRN 06/10/19 0003 06/17/19 0002    06/09/19 2040  vancomycin 1250 mg/250 mL 0.9% NS IVPB ([facility])     Ordering Provider:  Laci Lopez MD    20 mg/kg × 68 kg Intravenous Once 06/09/19 2055 06/09/19 2223    06/09/19 2040  piperacillin-tazobactam (ZOSYN) 4.5 g in iso-osmotic dextrose 100 mL IVPB (premix)     Ordering Provider:  Laci Lopez MD    4.5 g Intravenous Once 06/09/19 2050 06/09/19 2217          Please refer to the medical record for a full medication list    Review of Systems  Pertinent items are noted in HPI, all other systems reviewed and negative    Objective " "    Physical Exam: See below  Vital Signs   Temp:  [98.5 °F (36.9 °C)-102.5 °F (39.2 °C)] 99.8 °F (37.7 °C)  Heart Rate:  [] 82  Resp:  [17-24] 17  BP: (102-141)/(49-82) 127/77    Blood pressure 127/77, pulse 82, temperature 99.8 °F (37.7 °C), temperature source Oral, resp. rate 17, height 170.2 cm (67\"), weight 67.6 kg (149 lb), SpO2 96 %.  GENERAL: Elderly gentleman.  Awake and alert, in no acute distress.  Mental status reasonably lucid.  3 family members at bedside.  HEENT: Oropharynx without thrush. Sinuses nontender. No cervical adenopathy. No carotid bruits/ jugular venous distention.  Periodontal disease.  EYES: PERRL.  No icterus. EOMI. Exophthalmic globes, right greater than left.  Purulent conjunctivitis O.  D.  LYMPHATICS: No lymphadenopathy of the neck or axillary or inguinal regions.   HEART: No murmur, gallop, or pericardial friction rub.   LUNGS: Clear to auscultation anteriorly. No percussion dullness.   ABDOMEN: Soft, nontender, nondistended. No appreciable HSM.    SKIN: Warm and dry without cutaneous eruptions. No embolic stigmata.  Deformans RA changes in the hands with prominent synovitis involving the MCP and PIP joints.  Ulnar deviation noted.  Multiple subcutaneous nodules/ulcerations evident.  Bilateral lower extremities with severe venous stasis changes.  Acute cellulitis w/ blanching erythema and palpable warmth.  PSYCHIATRIC: Mental status improved over emergency department presentation.. Cranial nerve function intact.       Results Review:   I reviewed the patient's new clinical results.  I reviewed the patient's new imaging results and agree with the interpretation.    Lab Results   Component Value Date    WBC 18.65 (H) 06/10/2019    HGB 10.2 (L) 06/10/2019    HCT 35.6 (L) 06/10/2019    MCV 94.7 06/10/2019     (L) 06/10/2019       Lab Results   Component Value Date    GLUCOSE 87 06/10/2019    BUN 22 06/10/2019    CREATININE 0.54 (L) 06/10/2019    EGFRIFAFRI >150 06/10/2019 "    BCR 40.7 (H) 06/10/2019    CO2 25.0 06/10/2019    CALCIUM 8.1 (L) 06/10/2019    ALBUMIN 3.40 (L) 06/09/2019    AST 14 06/09/2019    ALT 7 06/09/2019       Estimated Creatinine Clearance: 70.4 mL/min (A) (by C-G formula based on SCr of 0.54 mg/dL (L)).      Microbiology: Blood cultures x2 -24 hours of incubation.  Urine culture likewise negative at 24 hours.  Wound Gram stain bilateral lower extremity stasis ulcers with gram-positive cocci in pairs and gram-negative rods.  Pending speciation and susceptibility data.      Radiology:  Imaging Results (last 72 hours)     Procedure Component Value Units Date/Time    XR Chest 1 View [[id number]] Collected:  06/09/19 2150     Updated:  06/09/19 2152    Narrative:       CR Chest 1 Vw 6/9/2019    INDICATION:   Fever of unknown origin today. Hematuria. Elevated white blood cell count. Body and chills.     COMPARISON:    6/4/2018    FINDINGS:  Single portable AP view of the chest.    No visible support lines.    The heart is enlarged but stable. There is dextroscoliosis of the thoracic spine. The lungs are hyperinflated with emphysematous and fibrotic changes characteristic of COPD. No airspace consolidation is seen. There are no pleural effusions.       Impression:       Cardiomegaly and COPD. No active pulmonary disease.    Signer Name: Petar Hayes MD   Signed: 6/9/2019 9:50 PM   Workstation Name: [email or web address]             ASSESSMENT AND PLAN: Altered mental status.  Septic encephalopathy.  Chronic venous stasis disease.  Partial-thickness ulcerations in bilateral lower extremities.  Bilateral lower extremity cellulitis.  Hectic fever to 102.5.  Resting tachycardia.  Lactic acidosis.  Leukocytosis to 19,900.  Maximum temperature over 24 hours 102.5.  Currently 99.6.  Pulse 82, respiratory 17, blood pressure 127/77 mmHg.  O2 saturation 96% on room air.  Blood and urine cultures negative to date.  Wound cultures are incubating.  Polymicrobial Gram stain with streptococcal and  gram-negative enteric morphology.  Continue vancomycin and piperacillin-tazobactam.  De-escalate when cultures allow.  May require PICC catheter and outpatient infusion therapy.       I discussed the patients findings and my recommendations with patient    Thank you for this consult.  Our group would be pleased to follow this patient over the course of their hospitalization and assist with outpatient antimicrobial therapy, as indicated.     Dale Jones MD  6/10/2019

## 2019-06-10 NOTE — ED PROVIDER NOTES
"Subjective   Mr. Michoacano Rojas is an 78 year old male who presents to the ED with c/o chills and confusion.  Patient is quiet and mumbling, and although he attempts to speak, it is difficult to understand him.  His family supplements all information.  They state that he was doing well until today, when he suddenly became acutely confused, started sweating and chilling, and began shaking.  Given his history of sepsis, they immediately brought him to the ED for evaluation.  In the emergency room, patient has a considerable fever.  Family states he's had a runny nose recently but denies any other recent symptoms.  No bowel or bladder changes.  No cough.  No known chest pain or abdominal pain.  No nausea or vomiting.  Patient's family cannot recall any recent sick contacts.  Past medical history includes atrial fibrillation, chronic venostasis ulcerations of the bilateral lower extremities, malignant cancer, and rheumatoid arthritis.        History provided by:  Patient, relative and spouse  Chills   Location:  Generalized  Quality:  Chills, diaphoresis, rigors, and confusion  Severity:  Severe  Onset quality:  Sudden  Duration: \"since today\"  Associated symptoms: rhinorrhea    Associated symptoms: no abdominal pain, no chest pain, no cough, no diarrhea, no nausea and no vomiting        Review of Systems   Constitutional: Positive for appetite change (decreased) and chills.   HENT: Positive for rhinorrhea.    Respiratory: Negative for cough.    Cardiovascular: Negative for chest pain.   Gastrointestinal: Negative for abdominal pain, blood in stool, constipation, diarrhea, nausea and vomiting.   Genitourinary: Negative.  Negative for decreased urine volume, difficulty urinating, dysuria, frequency, hematuria and urgency.   Psychiatric/Behavioral: Positive for confusion.   All other systems reviewed and are negative.      Past Medical History:   Diagnosis Date   • A-fib (CMS/HCC)    • Atrial fibrillation (CMS/HCC)    • " Chronic cutaneous venous stasis ulcer (CMS/[facility])    • Hypertension    • Penile abnormality    • Peptic ulcer disease    • Primary malignant neoplasm of bronchus with metastasisto other site, unspecified laterality (CMS/[facility]) 4/22/2019   • RA (rheumatoid arthritis) (CMS/[facility])    • Rheumatic fever    • Rheumatoid arthritis (CMS/[facility])    • Sepsis (CMS/[facility]) 11/17/2017    from cellulitis due to leg ulcer, hospitalization, antibiotics, wound care   • Vertigo    • Vertigo      · I reviewed the Discharge Summary from 1/16/2019 by Cristela Marin II, D.O.:    Date of Admission: 1/15/2019  Date of Discharge: 1/16/2019  Primary Care Physician: Michael Chavez MD            Consults      Date and Time Order Name Status Description     1/16/2019 0442 Inpatient General Surgery Consult Completed       1/16/2019 0030 Inpatient General Surgery Consult Completed               Hospital Course      Presenting Problem:   Incarcerated right inguinal hernia [K40.30]          Active Hospital Problems     Diagnosis Date Noted   • **Incarcerated right inguinal hernia [K40.30] 01/16/2019   • UTI (urinary tract infection) due to urinary indwelling catheter (CMS/[facility]) [T83.511A, N39.0] 01/16/2019   • Yeast UTI [B37.49] 01/16/2019   • Diastolic dysfunction with chronic heart failure (CMS/[facility]) [I50.32] 11/17/2017   • Normocytic anemia [D64.9] 11/17/2017   • 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 (CMS/[facility]) [D84.8, T38.0X5A] 11/17/2017   • Atrial fibrillation with RVR (CMS/[facility]) [I48.91] 06/18/2017   • Very poor mobility [Z74.09] 06/19/2016   • Large joint arthralgia of multiple sites [M25.50] 06/19/2016   • Essential hypertension [I10] 06/13/2016   • Rheumatoid arthritis (CMS/[facility]) [M06.9] 06/13/2016       Resolved Hospital Problems   No resolved problems to display.            Hospital Course:  Michoacano Rojas is a 78 y.o. male admitted with incarcerated right inguinal hernia which was easily reduced by ED physician.     Inguinal hernia,  "reduced: 77 y/o male admitted with above. Hernia was reduced by ED physician and patient was admitted for unclear reason as the patient adamantly refused any surgical intervention. Patient was seen by general surgery who recommended hernia belt and d/c if patient tolerates diet. He will continue bowel regimen at home and can follow up with general surgery if needed. Otherwise can f/u with his pcp in 1 week.     Discharge Follow Up Recommendations for labs/diagnostics:   PCP in 1-2 weeks   General surgery prn        Allergies   Allergen Reactions   • Naproxen Sodium Other (See Comments)     Increased heart rate  Aleve   • Other GI Intolerance     Relates all oral abx cause intol nausea/vomiting. 1/2019 PT STATES \"LATELY THIS HASN'T HAPPENED\"       Past Surgical History:   Procedure Laterality Date   • JOINT MANIPULATION      left hip repair       Family History   Problem Relation Age of Onset   • Hypertension Mother    • Alzheimer's disease Mother    • Alzheimer's disease Father    • Hypertension Sister    • Lung cancer Brother    • Diabetes Brother        Social History     Socioeconomic History   • Marital status:      Spouse name: Not on file   • Number of children: Not on file   • Years of education: Not on file   • Highest education level: Not on file   Tobacco Use   • Smoking status: Never Smoker   • Smokeless tobacco: Never Used   • Tobacco comment: Quit 01/01/1968   Substance and Sexual Activity   • Alcohol use: No   • Drug use: No   • Sexual activity: Defer         Objective   Physical Exam   Constitutional: He appears well-developed and well-nourished. No distress.   Patient is alert but mumbling and difficult to understand.  He does attempt to interact.   HENT:   Head: Normocephalic and atraumatic.   Nose: Nose normal.   Mouth/Throat: Oropharynx is clear and moist.   Nasopharynx and oropharynx benign. MM are moist.   Eyes: Conjunctivae and EOM are normal. Pupils are equal, round, and reactive to " light.   Periorbital edema mildly worse than baseline per family.   Neck: Normal range of motion. Neck supple. No JVD present. Carotid bruit is not present. No thyromegaly present.   Neck is supple with normal ROM. No thyromegaly, JVD, or bruits.   Cardiovascular: Regular rhythm and intact distal pulses. Tachycardia present. Exam reveals distant heart sounds. Exam reveals no gallop and no friction rub.   No murmur heard.  Tachycardic rate. Distant heart sounds but no murmurs, rubs, gallops, or heaves.   Pulmonary/Chest: Effort normal. No respiratory distress. He has decreased breath sounds. He has no wheezes. He has no rales.   No increased work of breathing. Decreased breath sounds bilaterally.   Abdominal: Soft. Bowel sounds are normal. There is no tenderness. There is no guarding.   Active bowel sounds. Abdomen is soft and non-tender. No organomegaly.   Musculoskeletal: He exhibits edema and deformity.   Upper extremities: Severe deformity with chronic rheumatoid arthritic changes.  Chronic-appearing synovitis in the hands and nodules in the elbows.  Chronic joint stiffness.  The skin is warm but without redness or signs of acute infection.  Lower extremities: There is edema in the bilateral lower extremities (esentially anasarca from the waist down) with the right side being worse than the left.  He has wraps on his lower legs which I cut away.  This reveals chronic venostasis changes with several open sores on both legs.  Per the family, these actually look relatively pretty good and indeed there was no malodor or signs of acute infection.  I swabbed behind the right popliteal fossa and over the left ankle, both representing the largest areas of breakdown.   Lymphadenopathy:     He has no cervical adenopathy.   Neurological: He is alert. He has normal reflexes. He displays normal reflexes. No sensory deficit.   Modest generalized weakness.   Skin: Skin is warm and dry. Capillary refill takes less than 2  seconds. No rash noted. He is not diaphoretic.   Psychiatric: He has a normal mood and affect. His behavior is normal.   Nursing note and vitals reviewed.      Procedures         ED Course  ED Course as of Zachary 11 0023   Sun Jun 09, 2019   2124 Paged on-call hospitalist, who will admit. -ER  [MU]   2136 Spoke with Dr. Lima, on-rowan hospitalist, who will admit. -ER  [MU]      ED Course User Index  [MU] Prasanth Miranda       Recent Results (from the past 24 hour(s))   Lactic Acid, Reflex    Collection Time: 06/10/19  4:25 AM   Result Value Ref Range    Lactate 1.1 0.5 - 2.0 mmol/L   Basic Metabolic Panel    Collection Time: 06/10/19  4:25 AM   Result Value Ref Range    Glucose 87 65 - 99 mg/dL    BUN 22 8 - 23 mg/dL    Creatinine 0.54 (L) 0.76 - 1.27 mg/dL    Sodium 143 136 - 145 mmol/L    Potassium 3.8 3.5 - 5.2 mmol/L    Chloride 106 98 - 107 mmol/L    CO2 25.0 22.0 - 29.0 mmol/L    Calcium 8.1 (L) 8.6 - 10.5 mg/dL    eGFR  African Amer >150 >60 mL/min/1.73    BUN/Creatinine Ratio 40.7 (H) 7.0 - 25.0    Anion Gap 12.0 mmol/L   CBC (No Diff)    Collection Time: 06/10/19  4:25 AM   Result Value Ref Range    WBC 18.65 (H) 3.40 - 10.80 10*3/mm3    RBC 3.76 (L) 4.14 - 5.80 10*6/mm3    Hemoglobin 10.2 (L) 13.0 - 17.7 g/dL    Hematocrit 35.6 (L) 37.5 - 51.0 %    MCV 94.7 79.0 - 97.0 fL    MCH 27.1 26.6 - 33.0 pg    MCHC 28.7 (L) 31.5 - 35.7 g/dL    RDW 14.4 12.3 - 15.4 %    RDW-SD 49.9 37.0 - 54.0 fl    MPV 11.4 6.0 - 12.0 fL    Platelets 128 (L) 140 - 450 10*3/mm3     Note: In addition to lab results from this visit, the labs listed above may include labs taken at another facility or during a different encounter within the last 24 hours. Please correlate lab times with ED admission and discharge times for further clarification of the services performed during this visit.    XR Chest 1 View   Final Result   Cardiomegaly and COPD. No active pulmonary disease.      Signer Name: Petar Hayes MD    Signed: 6/9/2019 9:50 PM     Workstation Name: [id number]            Vitals:    06/10/19 0117 06/10/19 0325 06/10/19 0831 06/10/19 1916   BP:  111/54 127/77 119/60   BP Location:  Left arm Left arm    Patient Position:  Lying Lying    Pulse: 81 82  65   Resp: 22 20 17 18   Temp:  98.5 °F (36.9 °C) 99.8 °F (37.7 °C) 98.1 °F (36.7 °C)   TempSrc:  Oral Oral Oral   SpO2: 94% 96%     Weight:       Height:         Medications   sodium chloride 0.9 % flush 10 mL (not administered)   amLODIPine (NORVASC) tablet 5 mg (5 mg Oral Given 6/10/19 0809)   bisoprolol (ZEBeta) tablet 2.5 mg (2.5 mg Oral Given 6/10/19 0809)   docusate sodium (COLACE) capsule 100 mg (not administered)   finasteride (PROSCAR) tablet 5 mg (5 mg Oral Given 6/10/19 0830)   polyethylene glycol 3350 powder (packet) (17 g Oral Given 6/10/19 0807)   predniSONE (DELTASONE) tablet 10 mg (10 mg Oral Given 6/10/19 0809)   fluticasone (FLONASE) 50 MCG/ACT nasal spray 2 spray (2 sprays Each Nare Given 6/10/19 0830)   sodium chloride 0.9 % flush 3 mL ( Intravenous Canceled Entry 6/10/19 2018)   sodium chloride 0.9 % flush 3-10 mL (not administered)   melatonin tablet 5 mg (not administered)   ondansetron (ZOFRAN) injection 4 mg (not administered)   lactobacillus acidophilus (RISAQUAD) capsule 1 capsule (1 capsule Oral Given 6/10/19 0809)   Pharmacy to dose vancomycin (not administered)   piperacillin-tazobactam (ZOSYN) 3.375 g in iso-osmotic dextrose 50 ml (premix) (3.375 g Intravenous New Bag 6/10/19 2128)   ipratropium-albuterol (DUO-NEB) nebulizer solution 3 mL (3 mL Nebulization Given 6/10/19 0117)   ! Vancomycin Trough before 10:00 dose on Tuesday 6/11/19; Hold dose until level evaluated (not administered)   vancomycin (VANCOCIN) in iso-osmotic dextrose IVPB 1 g (premix) 200 mL ( Intravenous Canceled Entry 6/10/19 2200)   diclofenac (VOLTAREN) 1 % gel 2 g (2 g Topical Given 6/10/19 2017)   HYDROcodone-acetaminophen (NORCO) 5-325 MG per tablet 1 tablet (1 tablet Oral Given 6/10/19 1138)    acetaminophen (TYLENOL) tablet 500 mg (not administered)   sodium chloride 0.45 % infusion (75 mL/hr Intravenous New Bag 6/10/19 1700)   sodium chloride 0.9 % bolus 2,040 mL (0 mL/kg × 68 kg Intravenous Stopped 6/9/19 2224)   vancomycin 1250 mg/250 mL 0.9% NS IVPB ([facility]) (1,250 mg Intravenous New Bag 6/9/19 2223)   piperacillin-tazobactam (ZOSYN) 4.5 g in iso-osmotic dextrose 100 mL IVPB (premix) (0 g Intravenous Stopped 6/9/19 2217)   acetaminophen (TYLENOL) tablet 1,000 mg (1,000 mg Oral Given 6/9/19 2100)   HYDROcodone-acetaminophen (NORCO)  MG per tablet 1 tablet (1 tablet Oral Given 6/10/19 0809)     ECG/EMG Results (last 24 hours)     ** No results found for the last 24 hours. **        ECG 12 Lead   Final Result   Test Reason : tachycardia   Blood Pressure : **/** mmHG   Vent. Rate : 104 BPM     Atrial Rate : 105 BPM      P-R Int : 216 ms          QRS Dur : 102 ms       QT Int : 362 ms       P-R-T Axes : 000 -50 006 degrees      QTc Int : 476 ms      Atrial fibrillation with rapid ventricular response   Left axis deviation   Inferior infarct , age undetermined   Abnormal ECG   When compared with ECG of 04-JUN-2018 13:09,   Vent. rate has increased BY  38 BPM   Inferior infarct is now present   Confirmed by SIRISHA SIDHU MD ([id number]) on 6/10/2019 4:46:52 PM      Referred By:  EDMD           Confirmed By:SIRISHA SIDHU MD                        MDM  Number of Diagnoses or Management Options  Acute febrile illness:   Leukocytosis, unspecified type:   Malnutrition, unspecified type (CMS/HCC):   Urinary tract infection with hematuria, site unspecified:   Venous ulcers of both lower extremities (CMS/HCC):   Diagnosis management comments:       I reviewed all available studies at the bedside with the patient and his family.  This is a gentleman who is actually quite tough and resilient.  The fact that he can walk at all given his deformities of rheumatoid arthritis is nothing short of miraculous.  He presents with  acute febrile illness.  His legs are a possible source and I have done cultures of both legs and they are pending.  His urine is also a possible source and he does have some white blood cells in the urine.  We have cultured it and his blood.  Last time I admitted him in 2017 the source of his febrile illness and sepsis was unclear and it may remain a bit cryptic again.  He does not have a heart murmur.    I started the patient on IV fluid bolus and broad-spectrum antibiotics.  I spoke with Dr. Lima, on-call hospital medicine doctor, and he will admit the patient.    The patient also has some degree of malnutrition.  He will also need wound care I believe for his legs.      All are agreeable with the plan.       Amount and/or Complexity of Data Reviewed  Clinical lab tests: reviewed  Tests in the radiology section of CPT®: reviewed        Final diagnoses:   Acute febrile illness   Urinary tract infection with hematuria, site unspecified   Venous ulcers of both lower extremities (CMS/HCC)   Leukocytosis, unspecified type   Malnutrition, unspecified type (CMS/HCC)       Documentation assistance provided by cass Miranda.  Information recorded by the scribe was done at my direction and has been verified and validated by me.     Prasanth Miranda  06/09/19 2208       Prasanth Miranda  06/09/19 2242       Laci Lopez MD  06/11/19 0023

## 2019-06-10 NOTE — PROGRESS NOTES
[facility] Medicine Services  PROGRESS NOTE    Patient Name: Michoacano Rojas  : 1940  MRN: [id number]    Date of Admission: 2019  Length of Stay: 1  Primary Care Physician: Michael Chavez MD    Subjective   Subjective     CC:  ams / encephalopathy    HPI:   Wife in room today.  Says he has sleep apnea.  Son in room.  Says he has been on pain medication for at least 2 yrs.  Patient looks good and feels good today.  Says he is receptive to sleep machine tonight    Review of Systems    Gen- No fevers, chills  CV- No chest pain, palpitations  Resp- No cough, dyspnea  GI- No N/V/D, abd pain    Otherwise ROS is negative except as mentioned in the HPI.    Objective   Objective     Vital Signs:   Temp:  [98.5 °F (36.9 °C)-102.5 °F (39.2 °C)] 99.8 °F (37.7 °C)  Heart Rate:  [] 82  Resp:  [17-24] 17  BP: (102-141)/(49-82) 127/77        Physical Exam:    Constitutional: No acute distress, awake, alert  HENT: NCAT, mucous membranes moist  Respiratory: Clear to auscultation bilaterally, respiratory effort normal   Cardiovascular: RRR, no murmurs, rubs, or gallops, palpable pedal pulses bilaterally  Gastrointestinal: Positive bowel sounds, soft, nontender, nondistended  Musculoskeletal: rheumatoid arthritis on both upper and lower extremities.  Multiple rheumatoid nodules at various joints.  Both lower extremities are warm and very tender to touch.  There are foul-smelling ulcers in the distal part of both legs/feet.  Psychiatric: Appropriate affect, cooperative  Neurologic: Oriented x 3, strength symmetric in all extremities, Cranial Nerves grossly intact to confrontation, speech clear  Skin: chronic venous stasis ulcers    Results Reviewed:  I have personally reviewed current lab, radiology, and data and agree.    Results from last 7 days   Lab Units 06/10/19  0425 06/09/19  2019   WBC 10*3/mm3 18.65* 19.88*   HEMOGLOBIN g/dL 10.2* 12.0*   HEMATOCRIT % 35.6* 40.7   PLATELETS  10*3/mm3 128* 186     Results from last 7 days   Lab Units 06/10/19  0425 06/09/19 2019   SODIUM mmol/L 143 144   POTASSIUM mmol/L 3.8 3.8   CHLORIDE mmol/L 106 103   CO2 mmol/L 25.0 27.0   BUN mg/dL 22 25*   CREATININE mg/dL 0.54* 0.67*   GLUCOSE mg/dL 87 87   CALCIUM mg/dL 8.1* 9.2   ALT (SGPT) U/L  --  7   AST (SGOT) U/L  --  14     Estimated Creatinine Clearance: 70.4 mL/min (A) (by C-G formula based on SCr of 0.54 mg/dL (L)).    Microbiology Results Abnormal     Procedure Component Value - Date/Time    Urine Culture - Urine, Urine, Catheter In/Out [[id number]]  (Normal) Collected:  06/09/19 2008    Lab Status:  Preliminary result Specimen:  Urine, Catheter In/Out Updated:  06/10/19 1213     Urine Culture Culture in progress    Wound Culture - Wound, Leg, Right [[id number]] Collected:  06/09/19 2042    Lab Status:  Preliminary result Specimen:  Wound from Leg, Right Updated:  06/10/19 0947     Wound Culture Growth present, too young to evaluate     Gram Stain Many (4+) WBCs seen      Few (2+) Gram positive cocci in pairs      Occasional Gram negative bacilli    Wound Culture - Wound, Leg, Left [[id number]] Collected:  06/09/19 2042    Lab Status:  Preliminary result Specimen:  Wound from Leg, Left Updated:  06/10/19 0944     Wound Culture Growth present, too young to evaluate     Gram Stain Few (2+) Epithelial cells seen      No WBCs or organisms seen        Imaging Results (last 24 hours)     Procedure Component Value Units Date/Time    XR Chest 1 View [[id number]] Collected:  06/09/19 2150     Updated:  06/09/19 2152    Narrative:       CR Chest 1 Vw 6/9/2019    INDICATION:   Fever of unknown origin today. Hematuria. Elevated white blood cell count. Body and chills.     COMPARISON:    6/4/2018    FINDINGS:  Single portable AP view of the chest.    No visible support lines.    The heart is enlarged but stable. There is dextroscoliosis of the thoracic spine. The lungs are hyperinflated with emphysematous and  fibrotic changes characteristic of COPD. No airspace consolidation is seen. There are no pleural effusions.       Impression:       Cardiomegaly and COPD. No active pulmonary disease.    Signer Name: Petar Hayes MD   Signed: 6/9/2019 9:50 PM   Workstation Name: [id number]           Results for orders placed during the hospital encounter of 06/04/18   Adult Transthoracic Echo Complete W/ Cont if Necessary Per Protocol    Narrative · Left ventricular systolic function is normal.  · Estimated EF appears to be in the range of 56 - 60%.  · Mild tricuspid valve regurgitation is present.  · Estimated right ventricular systolic pressure from tricuspid   regurgitation is mildly elevated (35-45 mmHg).        I have reviewed the medications:  Scheduled Meds:  [START ON 6/11/2019] Pharmacy Consult  Does not apply Once   amLODIPine 5 mg Oral Q24H   bisoprolol 2.5 mg Oral Q24H   diclofenac 2 g Topical BID   finasteride 5 mg Oral Daily   fluticasone 2 spray Each Nare Daily   heparin (porcine) 5,000 Units Subcutaneous Q8H   lactobacillus acidophilus 1 capsule Oral Daily   piperacillin-tazobactam 3.375 g Intravenous Q8H   polyethylene glycol 17 g Oral Daily   predniSONE 10 mg Oral Daily   sodium chloride 3 mL Intravenous Q12H   vancomycin 15 mg/kg Intravenous Q12H     Continuous Infusions:  Pharmacy to dose vancomycin    sodium chloride 75 mL/hr     PRN Meds:.acetaminophen  •  docusate sodium  •  HYDROcodone-acetaminophen  •  ipratropium-albuterol  •  melatonin  •  ondansetron  •  Pharmacy to dose vancomycin  •  sodium chloride  •  sodium chloride    Assessment/Plan   Assessment / Plan     Active Hospital Problems    Diagnosis POA   • **Acute metabolic encephalopathy [G93.41] Yes   • Acute UTI (urinary tract infection) [N39.0] Yes   • COPD (chronic obstructive pulmonary disease) (CMS/HCC) [J44.9] Yes   • BPH without obstruction/lower urinary tract symptoms [N40.0] Yes   • Diastolic dysfunction with chronic heart failure (CMS/HCC)  [I50.32] Yes   • 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 (CMS/HCC) [D84.8, T38.0X5A] Yes   • Normocytic anemia [D64.9] Yes   • Cellulitis [L03.90] Yes   • Sepsis (CMS/HCC) [A41.9] Yes   • Atrial fibrillation with RVR (CMS/HCC) [I48.91] Yes     · Chads Vasc= 4, currently no anticoagulant     • Essential hypertension [I10] Yes   • Rheumatoid arthritis (CMS/HCC) [M06.9] Yes   • Chronic cutaneous venous stasis ulcer (CMS/HCC) [I83.009, L97.909] Yes     Brief Hospital Course to date:  Michoacano Rojas is a 78 y.o. male who presented with AMS and Sepsis POA.    Suspected Early Sepsis POA  - probable source cellulitis  - immunosuppressed  - on prednisone for RA and not sleeping well  Encephalopathy  - likely multifactorial issue  - complicated by untreated sleep apnea and narcotics  Untreated Sleep Apnea  - on chronic pain medications  - AutoCPAP at night with nasal interface  Chronic Atrial Fibrillation  - on Eliquis  - a fib associated with sleep apnea  RA  - possible immunosuppression on prednisone  Chronic Pain  - has been on Norco for several years  - complicates and worsens apnea by adding central component    Called by nursing to restart chronic pain medication today  Continue broad spectrum IV abx  Trial of CPAP at night    DVT Prophylaxis:  Heparin SC    Disposition: I expect the patient to be discharged TBD    CODE STATUS:   Code Status and Medical Interventions:   Ordered at: 06/09/19 2156     Limited Support to NOT Include:    Intubation     Level Of Support Discussed With:    Patient     Code Status:    No CPR     Medical Interventions (Level of Support Prior to Arrest):    Limited         Electronically signed by Yobani Bruner MD, 06/10/19, 4:30 PM.

## 2019-06-10 NOTE — PROGRESS NOTES
"Pharmacy Consult-Vancomycin Dosing  Michoacano Rojas is a  78 y.o. male receiving vancomycin therapy.     Indication: Sepsis  Consulting Provider: Dr. Janie TORRES Consult: Yes    Goal Trough: 15 - 20 mcg/mL    Current Antimicrobial Therapy  Anti-Infectives (From admission, onward)      Ordered     Dose/Rate Route Frequency Start Stop    06/10/19 0017  vancomycin (VANCOCIN) in iso-osmotic dextrose IVPB 1 g (premix) 200 mL     Ordering Provider:  Arsalan Good RP    15 mg/kg × 67.6 kg  over 60 Minutes Intravenous Every 12 Hours 06/10/19 1000 06/13/19 2159    06/10/19 0003  piperacillin-tazobactam (ZOSYN) 3.375 g in iso-osmotic dextrose 50 ml (premix)     Ordering Provider:  Millicent Mondragon PA-C    3.375 g  over 4 Hours Intravenous Every 8 Hours 06/10/19 0400 06/17/19 0359    06/10/19 0003  Pharmacy to dose vancomycin     Ordering Provider:  Millicent Mondragon PA-C     Does not apply Continuous PRN 06/10/19 0003 06/17/19 0002    06/09/19 2040  vancomycin 1250 mg/250 mL 0.9% NS IVPB ([facility])     Ordering Provider:  Laci Lopez MD    20 mg/kg × 68 kg Intravenous Once 06/09/19 2055 06/09/19 2223 06/09/19 2040  piperacillin-tazobactam (ZOSYN) 4.5 g in iso-osmotic dextrose 100 mL IVPB (premix)     Ordering Provider:  Laci Lopez MD    4.5 g Intravenous Once 06/09/19 2050 06/09/19 2217            Allergies  Allergies as of 06/09/2019 - Reviewed 06/09/2019   Allergen Reaction Noted   • Diclofenac Other (See Comments) 01/29/2019   • Naproxen sodium Other (See Comments) 01/29/2019   • Other GI Intolerance 06/13/2016       Labs    Results from last 7 days   Lab Units 06/09/19 2019   BUN mg/dL 25*   CREATININE mg/dL 0.67*       Results from last 7 days   Lab Units 06/09/19 2019   WBC 10*3/mm3 19.88*       Evaluation of Dosing     Last Dose Received in the ED/Outside Facility: Vancomycin 1250 mg IV x 1 (22:23 6/9/19)  Is Patient on Dialysis or Renal Replacement:     Ht - 170.2 cm (67\")  Wt - 67.6 kg (149 " lb)    Estimated Creatinine Clearance: 70.4 mL/min (A) (by C-G formula based on SCr of 0.67 mg/dL (L)).    Intake & Output (last 3 days)         06/07 0701 - 06/08 0700 06/08 0701 - 06/09 0700 06/09 0701 - 06/10 0700    IV Piggyback   2140    Total Intake(mL/kg)   2140 (31.7)    Net   +2140                   Microbiology and Radiology  Microbiology Results (last 10 days)       Procedure Component Value - Date/Time    Wound Culture - Wound, Leg, Right [[id number]] Collected:  06/09/19 2042    Lab Status:  Preliminary result Specimen:  Wound from Leg, Right Updated:  06/09/19 2128     Gram Stain Many (4+) WBCs seen      Few (2+) Gram positive cocci in pairs      Occasional Gram negative bacilli    Wound Culture - Wound, Leg, Left [[id number]] Collected:  06/09/19 2042    Lab Status:  Preliminary result Specimen:  Wound from Leg, Left Updated:  06/09/19 2136     Gram Stain Few (2+) Epithelial cells seen      No WBCs or organisms seen            Evaluation of Level                           Assessment/Plan:  Vancomycin 1250 mg IV x 1 (given), then Vancomycin 1000 mg IV Q12H (15 mg/kg/dose)  Vancomycin trough level before 4th dose (10:00 dose on Tuesday 6/11/19)  Pharmacy to follow; ID Consult ordered    Arsalan Good [facility]  6/10/19 00:20

## 2019-06-10 NOTE — PROGRESS NOTES
Discharge Planning Assessment  [facility]     Patient Name: Michoacano Rojas  MRN: [id number]  Today's Date: 6/10/2019    Admit Date: 6/9/2019    Discharge Needs Assessment     Row Name 06/10/19 1103       Living Environment    Lives With  spouse;child(gema), adult    Current Living Arrangements  home/apartment/condo    Primary Care Provided by  spouse/significant other;homecare agency;child(gema)    Provides Primary Care For  no one, unable/limited ability to care for self    Family Caregiver if Needed  child(gema), adult;spouse    Quality of Family Relationships  involved;helpful    Able to Return to Prior Arrangements  yes       Transition Planning    Patient/Family Anticipates Transition to  home with help/services    Patient/Family Anticipated Services at Transition  none    Transportation Anticipated  family or friend will provide       Discharge Needs Assessment    Readmission Within the Last 30 Days  no previous admission in last 30 days    Concerns to be Addressed  discharge planning    Equipment Currently Used at Home  crutches;ramp;wheelchair    Anticipated Changes Related to Illness  none    Equipment Needed After Discharge  none    Outpatient/Agency/Support Group Needs  homecare agency    Discharge Facility/Level of Care Needs  home with home health    Patient's Choice of Community Agency(s)  [facility]         Discharge Plan     Row Name 06/10/19 1108       Plan    Plan  home with     Patient/Family in Agreement with Plan  yes    Plan Comments  Pt lives with wife and adult son in [place] [facility]. He reports he can do some of his ADLs but his family is there to assist him in whatever he can't do. He has crutches but currently uses a wheelchair for mobility. Pt is followed by [facility] for skilled nursing with a dx of right leg skin breakdown and ulcer on right heel. He is followed by his PCP and his medications are covered by insurance. At this time his plan for discharge is to return home, he will  need a resume HH order at discharge.         Destination      No service coordination in this encounter.      Durable Medical Equipment      No service coordination in this encounter.      Dialysis/Infusion      No service coordination in this encounter.      Home Medical Care      Service Provider Request Status Selected Services Address Phone Number Fax Number    [facility] Pending - No Request Sent N/A [address] [facility] [id number] [phone]      Therapy      No service coordination in this encounter.      Community Resources      No service coordination in this encounter.          Demographic Summary     Row Name 06/10/19 1106       General Information    Admission Type  inpatient    Referral Source  physician    Reason for Consult  discharge planning    General Information Comments  PCP Michael Chavez        Contact Information    Permission Granted to Share Info With  ;family/designee    Contact Information Comments  Chantel Bob  [phone]        Functional Status     Row Name 06/10/19 1106       Functional Status, IADL    Medications  assistive person    Meal Preparation  assistive person    Housekeeping  assistive person    Laundry  assistive person    Shopping  assistive person       Mental Status    General Appearance WDL  WDL       Mental Status Summary    Recent Changes in Mental Status/Cognitive Functioning  no changes        Psychosocial    No documentation.       Abuse/Neglect    No documentation.       Legal    No documentation.       Substance Abuse    No documentation.       Patient Forms    No documentation.           Dina Palmer, RN

## 2019-06-10 NOTE — PLAN OF CARE
Problem: Patient Care Overview  Goal: Plan of Care Review  Outcome: Ongoing (interventions implemented as appropriate)   06/10/19 0245   OTHER   Outcome Summary PT APPEARS CLOSE TO BASELINE WITH FUNCTIONAL MOBILITY BUT WILL BENEFIT FROM P.T. TO ASSURE RETURN TO PLOF PRIOR TO D/C HOME. PT REQUIRES MIN ASSIST SUPINE TO SIT, MOD ASSIST X2 SIT TO SUPINE AND CGA OF 2 FOR GAIT X 70 FEET WITH FOREARM CRUTCHES. PT IS LIMITED BY MULTIPLE RA DEFORMITIES BUT IS HIGHLY MOTIVATED TO MAINTAIN FUNCTIONAL MOBILITY. PT NEEDS EXTRA TIME TO COMPLETE TASKS AND DOES WELL GIVING CUES TO THOSE WHO ARE ASSISTING HIM. RECOMMEND D/C HOME WITH FAMILY ASSIST AND HH FOR WOUND CARE. PT/FAMILY DECLINE HHPT AT THIS TIME. PT HAS A VERY SUPPORTIVE FAMILY.    Coping/Psychosocial   Plan of Care Reviewed With patient

## 2019-06-10 NOTE — H&P
"    [facility] Medicine Services  HISTORY AND PHYSICAL    Patient Name: Michoacano Rojas  : 1940  MRN: [id number]  Primary Care Physician: Michael Chavez MD  Date of admission: 2019      Subjective   Subjective     Chief Complaint:  AMS    HPI:  Michoacano Rojas is an 78 y.o. male with a past medical history significant for PAF, diastolic dysfunction, RA on steroids, COPD, BPH, and chronic venous stasis ulcers bilaterally for which he is followed by wound care outpatient. Presents today acute change in mental status at around 1300 this afternoon. Last known normal earlier this morning. Family member at bedside reports the patient \"acting confused and talking out of his head\". She looked at his chronic wounds and noticed they were warm and tender to palpation. She notes a history of chronic recurrent infectious with lower extremity ulcers. Was concerned and brought him in for evaluation. There are no complaints of cough, congestion, chest pain, SOB, dysuria, or N/V/D. No headache or focal weakness/parathesias. Will admit for further evaluation and treatment.    Emergency Department evaluation; febrile to 102.5. . RR 24. /73. satting 97% on room air. Lactic acid 2.6. WBC 19.88. Urine possible infected. CXR clear. EKG without acute changes    Review of Systems   Unable to perform ROS: Mental status change          Otherwise complete ROS reviewed and is negative except as mentioned in the HPI.    Personal History     Past Medical History:   Diagnosis Date   • A-fib (CMS/HCC)    • Atrial fibrillation (CMS/HCC)    • Chronic cutaneous venous stasis ulcer (CMS/HCC)    • Hypertension    • Penile abnormality    • Peptic ulcer disease    • Primary malignant neoplasm of bronchus with metastasisto other site, unspecified laterality (CMS/HCC) 2019   • RA (rheumatoid arthritis) (CMS/HCC)    • Rheumatic fever    • Rheumatoid arthritis (CMS/HCC)    • Sepsis (CMS/HCC) " "11/17/2017    from cellulitis due to leg ulcer, hospitalization, antibiotics, wound care   • Vertigo    • Vertigo        Past Surgical History:   Procedure Laterality Date   • JOINT MANIPULATION      left hip repair       Family History: family history includes Alzheimer's disease in his father and mother; Diabetes in his brother; Hypertension in his mother and sister; Lung cancer in his brother. Otherwise pertinent FHx was reviewed and unremarkable.     Social History:  reports that he has never smoked. He has never used smokeless tobacco. He reports that he does not drink alcohol or use drugs.  Social History     Social History Narrative   • Not on file       Medications:    Available home medication information reviewed.    (Not in a hospital admission)    Allergies   Allergen Reactions   • Diclofenac Other (See Comments)     Skin peels off   • Naproxen Sodium Other (See Comments)     Increased heart rate  Aleve   • Other GI Intolerance     Relates all oral abx cause intol nausea/vomiting. 1/2019 PT STATES \"LATELY THIS HASN'T HAPPENED\"       Objective   Objective     Vital Signs:   Temp:  [101.8 °F (38.8 °C)-102.5 °F (39.2 °C)] 101.8 °F (38.8 °C)  Heart Rate:  [100-112] 112  Resp:  [24] 24  BP: (129-141)/(73-82) 138/73        Physical Exam   Constitutional: Awake, alert, pleasantly confused but improving  Eyes: PERRLA, sclerae anicteric, no conjunctival injection  HENT: NCAT, mucous membranes moist  Neck: Supple, no thyromegaly, no lymphadenopathy, trachea midline  Respiratory: Clear to auscultation bilaterally, nonlabored respirations   Cardiovascular: RRR, no murmurs, rubs, or gallops, palpable pedal pulses bilaterally  Gastrointestinal: Positive bowel sounds, soft, nontender, nondistended  Musculoskeletal: No bilateral ankle edema, no clubbing or cyanosis to extremities  severely deformed joints related to RA  Psychiatric: Appropriate affect, cooperative  Neurologic: Oriented x 3, strength symmetric in all " extremities, Cranial Nerves grossly intact to confrontation, speech clear  Skin: extensive venous stasis changes to lower extremities bilaterally. Warm and tender to palpation. Foul smelling.      Results Reviewed:  I have personally reviewed current lab, radiology, and data and agree.    Results from last 7 days   Lab Units 06/09/19 2019   WBC 10*3/mm3 19.88*   HEMOGLOBIN g/dL 12.0*   HEMATOCRIT % 40.7   PLATELETS 10*3/mm3 186     Results from last 7 days   Lab Units 06/09/19 2019   SODIUM mmol/L 144   POTASSIUM mmol/L 3.8   CHLORIDE mmol/L 103   CO2 mmol/L 27.0   BUN mg/dL 25*   CREATININE mg/dL 0.67*   GLUCOSE mg/dL 87   CALCIUM mg/dL 9.2   ALT (SGPT) U/L 7   AST (SGOT) U/L 14   LACTATE mmol/L 2.6*     Estimated Creatinine Clearance: 70.8 mL/min (A) (by C-G formula based on SCr of 0.67 mg/dL (L)).  Brief Urine Lab Results  (Last result in the past 365 days)      Color   Clarity   Blood   Leuk Est   Nitrite   Protein   CREAT   Urine HCG        06/09/19 2008 Yellow Clear Moderate (2+) Moderate (2+) Negative Negative             Imaging Results (last 24 hours)     Procedure Component Value Units Date/Time    XR Chest 1 View [[id number]] Collected:  06/09/19 2150     Updated:  06/09/19 2152    Narrative:       CR Chest 1 Vw 6/9/2019    INDICATION:   Fever of unknown origin today. Hematuria. Elevated white blood cell count. Body and chills.     COMPARISON:    6/4/2018    FINDINGS:  Single portable AP view of the chest.    No visible support lines.    The heart is enlarged but stable. There is dextroscoliosis of the thoracic spine. The lungs are hyperinflated with emphysematous and fibrotic changes characteristic of COPD. No airspace consolidation is seen. There are no pleural effusions.       Impression:       Cardiomegaly and COPD. No active pulmonary disease.    Signer Name: Petar Hayes MD   Signed: 6/9/2019 9:50 PM   Workstation Name: [email or web address]           Results for orders placed during the hospital encounter  of 06/04/18   Adult Transthoracic Echo Complete W/ Cont if Necessary Per Protocol    Narrative · Left ventricular systolic function is normal.  · Estimated EF appears to be in the range of 56 - 60%.  · Mild tricuspid valve regurgitation is present.  · Estimated right ventricular systolic pressure from tricuspid   regurgitation is mildly elevated (35-45 mmHg).          Assessment/Plan   Assessment / Plan     Active Hospital Problems    Diagnosis POA   • **Acute metabolic encephalopathy [G93.41] Yes     Priority: High   • Acute UTI (urinary tract infection) [N39.0] Yes     Priority: High   • Cellulitis [L03.90] Yes     Priority: High   • Sepsis (CMS/HCC) [A41.9] Yes     Priority: High   • Atrial fibrillation with RVR (CMS/HCC) [I48.91] Yes     Priority: High     · Chads Vasc= 4, currently no anticoagulant     • Chronic cutaneous venous stasis ulcer (CMS/HCC) [I83.009, L97.909] Yes     Priority: High   • COPD (chronic obstructive pulmonary disease) (CMS/HCC) [J44.9] Yes     Priority: Medium   • Diastolic dysfunction with chronic heart failure (CMS/HCC) [I50.32] Yes     Priority: Medium   • 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 Acute metabolic encephalopathy secondary to sepsis related to cellulitis and possible UTI:  - mentation improving. Stable and non toxic appearing. Will obtain urine culture. Blood culture pending.  - immunocompromised on chronic steroids  - administered vancomycin and zosyn in ED. Continue for now. ID consult in am   - saline 100 cc/hr for now. De-escalate as tolerated  - wound care consult, additional supportive care as needed      2. Chronic atrial fibrillation:  -  stable and rate controlled. Enrhk0fjul = 4.  - was on Eliquis. No longer anticoagulated due to falls and briusing  - followed by Dr. Friedman in the past    3. COPD:  - non smoker. Not on supplemental oxygen  - stable. Demonstrated on CXR  - duo nebs with additional pulmonary toilet as needed    4. Diastolic Dysfunction:  - appears stable and compensated. Monitor in the setting of IVF  - last echocardiogram 6/2018 with EF 56 to 60%    5. RA:  - continue PO prednisone  - PT/OT    6. BPH:  - continue proscar      DVT prophylaxis: KANCHAN    CODE STATUS:  No CPR  Code Status and Medical Interventions:   Ordered at: 06/09/19 2159     Limited Support to NOT Include:    Intubation     Level Of Support Discussed With:    Patient     Code Status:    No CPR     Medical Interventions (Level of Support Prior to Arrest):    Limited       Admission Status:  I believe this patient meets INPATIENT status due to the need for IV ABX and IV fluids which can only be reasonably provided in an hospital setting.  In such, I feel patient’s risk for adverse outcomes and need for care warrant INPATIENT evaluation and predict the patient’s care encounter to likely last beyond 2 midnights.    Electronically signed by Millicent Mondragon PA-C, 06/09/19, 10:01 PM.      Brief Attending Admission Attestation     I have seen and examined the patient, performing an independent face-to-face diagnostic evaluation with plan of care reviewed and developed with the advanced practice clinician (APC).      Brief Summary Statement:   Michoacano Rojas is a 78 y.o. male with history of chronic diastolic congestive heart failure, debilitating rheumatoid arthritis on chronic steroids therapy, COPD, paroxysmal atrial fibrillation not anticoagulated, chronic stasis dermatitis and chronic lower extremity ulcers.  Patient is bedbound due to his debilitating rheumatoid arthritis.  He lives at home with his family and was brought to the ER with change in mental status,  fever and lethargy.  Symptoms reportedly started earlier today.  Family reported that patient was talking out of his head and acting confused.  In the ER, patient was febrile with temperature of 102.5 °F, tachycardic at 1 1 2/min and tachypneic with respiratory rate of 24.  Blood pressure is normal and oxygen saturation is normal.  However lactic acid level was 2.6 with leukocytosis of 20,000.  There are chronic foul-smelling ulcers in both lower extremities and legs are warm to the touch and very tender.  Patient has ongoing wound care at home according to family.  No report of chest pain, shortness of breath, abdominal pain, nausea, vomiting or diarrhea.  Patient started on IV antibiotics for sepsis.  Will consult ID and wound care in the morning.  Further evaluation and treatment plan as noted above.    Remainder of detailed HPI is as noted above and has been reviewed and/or edited by me for completeness.      Attending Physical Exam:  Constitutional: Awake, alert  Eyes: PERRLA, sclerae anicteric, no conjunctival injection  HENT: NCAT, mucous membranes moist  Neck: Supple, no thyromegaly, no lymphadenopathy, trachea midline  Respiratory: Clear to auscultation bilaterally, nonlabored respirations   Cardiovascular: RRR, no murmurs, rubs, or gallops, palpable pedal pulses bilaterally  Gastrointestinal: Positive bowel sounds, soft, nontender, nondistended  Musculoskeletal: Evidence of chronic debilitating/destructive rheumatoid arthritis on both upper and lower extremities.  Multiple rheumatoid nodules at various joints.  Both lower extremities are warm and very tender to touch.  There are foul-smelling ulcers in the distal part of both legs/feet.  Psychiatric: Appropriate affect, cooperative  Neurologic: Oriented x 3, no acute focal neurologic deficits.  Speech is clear.  Skin: Chronic venous ulcers and stasis dermatitis.    Brief Assessment/Plan :  See above for further detailed assessment and plan developed with  APC which I have reviewed and/or edited for completeness.      Electronically signed by Silas Lima MD, 06/09/19, 11:42 PM.

## 2019-06-10 NOTE — NURSING NOTE
Patient presents with chronic BLE venous ulcerations, edema, and cellulitis. Xeroform and Kerlix wraps in place. Patient has seen outpatient wound care in the past. Will consult PT wound care for compression wrap therapy evaluation. Thanks

## 2019-06-10 NOTE — PLAN OF CARE
Problem: Patient Care Overview  Goal: Plan of Care Review  Outcome: Ongoing (interventions implemented as appropriate)   06/09/19 2310 06/10/19 0628   Plan of Care Review   Progress --  no change   OTHER   Outcome Summary --  pt up from ED last night with sepsis. Awaiting results of patient cultures. pt has no c/o pain or nausea. SOA complaints with no oxygenization decreases. PRN duoneb as ordered, pt states he feels better. pt able to rest after breathing treatment. VSS. No new concerns at this time.    Coping/Psychosocial   Plan of Care Reviewed With patient;spouse;son --        Problem: Pain, Chronic (Adult)  Goal: Acceptable Pain/Comfort Level and Functional Ability  Outcome: Ongoing (interventions implemented as appropriate)   06/10/19 0628   Pain, Chronic (Adult)   Acceptable Pain/Comfort Level and Functional Ability making progress toward outcome       Problem: Skin Injury Risk (Adult)  Goal: Identify Related Risk Factors and Signs and Symptoms  Outcome: Ongoing (interventions implemented as appropriate)   06/10/19 0628   Skin Injury Risk (Adult)   Related Risk Factors (Skin Injury Risk) advanced age;mobility impaired;skeletal deformities     Goal: Skin Health and Integrity  Outcome: Ongoing (interventions implemented as appropriate)   06/10/19 0628   Skin Injury Risk (Adult)   Skin Health and Integrity making progress toward outcome

## 2019-06-10 NOTE — THERAPY EVALUATION
Acute Care - Physical Therapy Initial Evaluation  [facility]     Patient Name: Michoacano Rojas  : 1940  MRN: [id number]  Today's Date: 6/10/2019   Onset of Illness/Injury or Date of Surgery: 19  Date of Referral to PT: 19  Referring Physician: EARL STOVALL       Admit Date: 2019    Visit Dx:     ICD-10-CM ICD-9-CM   1. Acute febrile illness R50.9 780.60   2. Urinary tract infection with hematuria, site unspecified N39.0 599.0    R31.9 599.70   3. Venous ulcers of both lower extremities (CMS/[facility]) I87.2 459.81    L97.919 707.10    L97.929    4. Leukocytosis, unspecified type D72.829 288.60   5. Malnutrition, unspecified type (CMS/[facility]) E46 263.9   6. Impaired functional mobility, balance, gait, and endurance Z74.09 V49.89     Patient Active Problem List   Diagnosis   • Hematuria   • Prostate hypertrophy   • Rheumatoid arthritis (CMS/[facility])   • Essential hypertension   • Chronic cutaneous venous stasis ulcer (CMS/[facility])   • Very poor mobility   • Large joint arthralgia of multiple sites   • Arthralgia of hands, bilateral   • Generalized stiffness   • Atrial fibrillation with RVR (CMS/[facility])   • A-fib (CMS/[facility])   • Anasarca   • Sepsis (CMS/[facility])   • Cellulitis   • 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 (CMS/[facility])   • Tracheal stenosis   • Normocytic anemia   • Diastolic dysfunction with chronic heart failure (CMS/[facility])   • Urinary retention   • Acute on chronic respiratory failure with hypoxia and hypercapnia (CMS/[facility])   • Incarcerated right inguinal hernia   • UTI (urinary tract infection) due to urinary indwelling catheter (CMS/[facility])   • Yeast UTI   • Venous insufficiency (chronic) (peripheral)   • Benign essential hypertension   • Non-pressure chronic ulcer of left lower leg, with unspecified severity (CMS/[facility])   • Lower extremity edema   • Chronic diastolic heart failure (CMS/[facility])   • Cognitive disorder   • BPH without obstruction/lower urinary tract symptoms   • Gross hematuria   • Chronic  conjunctivitis of both eyes   • Facial swelling   • Goiter   • Idiopathic chronic gout of multiple sites with tophus   • Medicare annual wellness visit, subsequent   • Morbid obesity (CMS/[facility])   • Major depressive disorder, single episode, mild (CMS/[facility])   • Skin ulcer of right foot, limited to breakdown of skin (CMS/[facility])   • Acute metabolic encephalopathy   • Acute UTI (urinary tract infection)   • COPD (chronic obstructive pulmonary disease) (CMS/[facility])     Past Medical History:   Diagnosis Date   • A-fib (CMS/[facility])    • Atrial fibrillation (CMS/[facility])    • Chronic cutaneous venous stasis ulcer (CMS/[facility])    • Hypertension    • Penile abnormality    • Peptic ulcer disease    • Primary malignant neoplasm of bronchus with metastasisto other site, unspecified laterality (CMS/[facility]) 4/22/2019   • RA (rheumatoid arthritis) (CMS/[facility])    • Rheumatic fever    • Rheumatoid arthritis (CMS/[facility])    • Sepsis (CMS/[facility]) 11/17/2017    from cellulitis due to leg ulcer, hospitalization, antibiotics, wound care   • Vertigo    • Vertigo      Past Surgical History:   Procedure Laterality Date   • JOINT MANIPULATION      left hip repair        PT ASSESSMENT (last 12 hours)      Physical Therapy Evaluation     Row Name 06/10/19 1542          PT Evaluation Time/Intention    Subjective Information  complains of EYES AND NOSE RUNNING. WANTS TO GET UP AND WALK.   -CD     Document Type  evaluation  -CD     Mode of Treatment  physical therapy  -CD     Patient Effort  excellent  -CD     Row Name 06/10/19 1549          General Information    Patient Profile Reviewed?  yes  -CD     Onset of Illness/Injury or Date of Surgery  06/09/19  -CD     Referring Physician  EARL STOVALL   -CD     Patient Observations  alert;cooperative;agree to therapy  -CD     Patient/Family Observations  WIFE AND SON PRESENT AND VERY SUPPORTIVE.   -CD     General Observations of Patient  PT SUPINE UPON ARRIVAL WITH IV AND ON RA. EYES RED AND RUNNING, B LE WRAPS WITH DRAINAGE  THROUGH BANDAGES. PT HAS MULTIPLE DEFORMITIES FROM RA.   -CD     Prior Level of Function  min assist:;transfer;independent:;gait;max assist:;ADL's;dependent:;home management USES LOFSTRAND CRUTCHES, SITTS ON TALL KITCHEN CHAIR ONLY.   -CD     Equipment Currently Used at Home  crutches, forearm;hospital bed;ramp HAS A W/C, UNABLE TO USE REG R WALKER DUE TO DEFORMITIES.   -CD     Pertinent History of Current Functional Problem  PT TO ED WITH CONFUSION, FEVER. PT HAS CHRONIC VENOUS STASUS ULCERS. PT DIAGNOSED WITH B LE CELLULITIS, LACTIC ACIDOSIS, LEUKOCYTOSIS. HAS ORDER FOR WOUND CARE P.T.   -CD     Existing Precautions/Restrictions  fall SEVERE RA- MULTIPLE DEFORMITIES.   -CD     Risks Reviewed  patient and family:;LOB;increased discomfort;lines disloged  -CD     Benefits Reviewed  patient and family:;improve function;increase independence;increase strength;increase balance;increase knowledge  -CD     Barriers to Rehab  previous functional deficit;medically complex;physical barrier PT IS VERY MOTIVATED TO WORK WITH THERAPY.   -CD     Row Name 06/10/19 1542          Relationship/Environment    Primary Source of Support/Comfort  child(gema);spouse  -CD     Lives With  spouse WIFE ASSISTS WITH BATHING/DRESSING.   -CD     Name(s) of Who Lives With Patient  --  -CD     Family Caregiver if Needed  child(gema), adult SON VISITS DAILY- ASSISTS PT IN/OUT OF BED.   -CD     Row Name 06/10/19 1542          Resource/Environmental Concerns    Current Living Arrangements  home/apartment/condo  -CD     Row Name 06/10/19 1542          Cognitive Assessment/Intervention- PT/OT    Orientation Status (Cognition)  oriented x 3  -CD     Follows Commands (Cognition)  follows one step commands;over 90% accuracy;repetition of directions required;verbal cues/prompting required  -CD     Row Name 06/10/19 1542          Safety Issues, Functional Mobility    Safety Issues Affecting Function (Mobility)  other (see comments) PT HAS OWN WAY OF MOVING  THAT WORKS FOR HIM. SON ASSISTS.   -CD     Impairments Affecting Function (Mobility)  balance;range of motion (ROM);strength SEVERE RA DEFORMITIES. PT NEEDS TO SEQUENCE HIS MOBILITY.   -CD     Row Name 06/10/19 1542          Bed Mobility Assessment/Treatment    Bed Mobility Assessment/Treatment  supine-sit;sit-supine  -CD     Supine-Sit [place] (Bed Mobility)  minimum assist (75% patient effort)  -CD     Sit-Supine [place] (Bed Mobility)  moderate assist (50% patient effort);2 person assist  -CD     Bed Mobility, Safety Issues  decreased use of arms for pushing/pulling;decreased use of legs for bridging/pushing  -CD     Assistive Device (Bed Mobility)  bed rails FROM FLAT BED. SON ASSISTED WITH LE'S   -CD     Row Name 06/10/19 1542          Transfer Assessment/Treatment    Transfer Assessment/Treatment  sit-stand transfer;stand-sit transfer  -CD     Comment (Transfers)  PT PERFORMS FROM ELEVATED SURFACE. AT BASELINE, PT  UNABLE TO BEND AT R KNEE, AND UNABLE TO STRAIGHTEN L KNEE.   -CD     Sit-Stand [place] (Transfers)  contact guard;verbal cues ASSIST FOR PLACEMENT OF FEET. PT GUIDES CARE.  -CD     Stand-Sit [place] (Transfers)  contact guard;verbal cues  -CD     Row Name 06/10/19 1542          Sit-Stand Transfer    Assistive Device (Sit-Stand Transfers)  crutches, forearm ELEVATED BED.   -CD     Row Name 06/10/19 1542          Stand-Sit Transfer    Assistive Device (Stand-Sit Transfers)  crutches, forearm ELEVATED BED.   -CD     Row Name 06/10/19 1542          Gait/Stairs Assessment/Training    Gait/Stairs Assessment/Training  gait/ambulation independence;gait/ambulation assistive device;gait pattern;gait deviations  -CD     [place] Level (Gait)  contact guard;2 person assist  -CD     Assistive Device (Gait)  crutches, forearm  -CD     Distance in Feet (Gait)  70  -CD     Pattern (Gait)  4-point  -CD     Deviations/Abnormal Patterns (Gait)  bilateral deviations;base of support,  narrow;avril decreased;gait speed decreased;stride length decreased  -CD     Bilateral Gait Deviations  heel strike decreased;weight shift ability decreased;forward flexed posture  -CD     Comment (Gait/Stairs)  VERY SLOW AND GUARDED, SEVERAL STANDING REST BREAKS.   -CD     Row Name 06/10/19 1542          General ROM    GENERAL ROM COMMENTS  MULTIPLE RA DEFORMITIES: FEET, KNEES, HANDS.   -CD     Row Name 06/10/19 1542          MMT (Manual Muscle Testing)    General MMT Comments  GROSSLY 2-3/5 WITHIN LIMITED ROM.   -CD     Row Name 06/10/19 1542          Vision Assessment/Intervention    Vision Assessment Comment  WEARS GLASSES.   -CD     Row Name 06/10/19 1542          Pain Assessment    Additional Documentation  Pain Scale: FACES Pre/Post-Treatment (Group)  -CD     Row Name 06/10/19 1542          Pain Scale: Numbers Pre/Post-Treatment    Pain Location - Orientation  generalized CHRONIC   -CD     Row Name 06/10/19 1542          Pain Scale: FACES Pre/Post-Treatment    Pain: FACES Scale, Pretreatment  2-->hurts little bit  -CD     Pain: FACES Scale, Post-Treatment  2-->hurts little bit  -CD     Pre/Post Treatment Pain Comment  TOLERATED   -CD     Row Name             Wound 06/05/18 0915 Left medial;lower leg ulceration, venous    Wound - Properties Group Date first assessed: 06/05/18  -CP Time first assessed: 0915  -CP Present On Admission : yes;picture taken  -CP Side: Left  -CP Orientation: medial;lower  -CP Location: leg  -CP Type: ulceration, venous  -CP    Row Name             Wound 06/05/18 0915 Left lateral;lower leg ulceration, venous    Wound - Properties Group Date first assessed: 06/05/18  -CP Time first assessed: 0915  -CP Present On Admission : yes;picture taken  -CP Side: Left  -CP Orientation: lateral;lower  -CP Location: leg  -CP Type: ulceration, venous  -CP    Row Name             Wound 06/05/18 0915 Right lower;anterior leg ulceration, venous    Wound - Properties Group Date first assessed:  06/05/18  -CP Time first assessed: 0915  -CP Present On Admission : yes;picture taken  -CP Side: Right  -CP Orientation: lower;anterior  -CP Location: leg  -CP Type: ulceration, venous  -CP    Row Name             Wound 06/05/18 0915 Right lateral;lower leg ulceration, venous    Wound - Properties Group Date first assessed: 06/05/18  -CP Time first assessed: 0915  -CP Present On Admission : yes;picture taken  -CP Side: Right  -CP Orientation: lateral;lower  -CP Location: leg  -CP Type: ulceration, venous  -CP    Row Name             Wound 01/16/19 0400 Left gluteal laceration    Wound - Properties Group Date first assessed: 01/16/19  -MC Time first assessed: 0400  -MC Present On Admission : yes  -MC Side: Left  -MC Location: gluteal  -MC Type: laceration  -MC    Row Name             Wound 06/05/18 0915 Right posterior;lower leg ulceration, venous    Wound - Properties Group Date first assessed: 06/05/18  -CP Time first assessed: 0915  -CP Present On Admission : yes  -CP Side: Right  -CP Orientation: posterior;lower  -CP Location: leg  -CP Type: ulceration, venous  -CP    Row Name             Wound 06/05/18 0915 Right medial heel    Wound - Properties Group Date first assessed: 06/05/18  -CP Time first assessed: 0915  -CP Present On Admission : yes;picture taken  -CP Side: Right  -CP Orientation: medial  -CP Location: heel  -CP Stage, Pressure Injury: unstageable  -CP    Row Name 06/10/19 1542          Plan of Care Review    Plan of Care Reviewed With  patient;spouse;son  -CD     Row Name 06/10/19 1542          Physical Therapy Clinical Impression    Date of Referral to PT  06/09/19  -CD     PT Diagnosis (PT Clinical Impression)  IMPAIRED FUNCTIONAL MOBILITY.   -CD     Patient/Family Goals Statement (PT Clinical Impression)  TO GO HOME AND RESUME HOME HEALTH FOR WOUND CARE. DECLINES HHPT FOR MOBILITY. PER PT/SON/WIFE IS VERY CLOSE TO BASELINE WITH FUNCTIONAL MOBILITY.   -CD     Criteria for Skilled Interventions Met  (PT Clinical Impression)  yes  -CD     Rehab Potential (PT Clinical Summary)  good, to achieve stated therapy goals  -CD     Care Plan Review (PT)  evaluation/treatment results reviewed;care plan/treatment goals reviewed;risks/benefits reviewed;current/potential barriers reviewed;patient/other agree to care plan  -CD     Care Plan Review, Other Participant (PT Clinical Impression)  son;spouse  -CD     Row Name 06/10/19 1542          Vital Signs    Pre Systolic BP Rehab  -- VSS. NSG CLEARED FOR TREATMENT.   -CD     Row Name 06/10/19 1542          Physical Therapy Goals    Bed Mobility Goal Selection (PT)  bed mobility, PT goal 1  -CD     Transfer Goal Selection (PT)  transfer, PT goal 1  -CD     Gait Training Goal Selection (PT)  gait training, PT goal 1  -CD     Row Name 06/10/19 1542          Bed Mobility Goal 1 (PT)    Activity/Assistive Device (Bed Mobility Goal 1, PT)  supine to sit  -CD     [place] Level/Cues Needed (Bed Mobility Goal 1, PT)  contact guard assist  -CD     Time Frame (Bed Mobility Goal 1, PT)  long term goal (LTG);2 weeks  -CD     Row Name 06/10/19 1542          Transfer Goal 1 (PT)    Activity/Assistive Device (Transfer Goal 1, PT)  sit-to-stand/stand-to-sit  -CD     [place] Level/Cues Needed (Transfer Goal 1, PT)  contact guard assist  -CD     Time Frame (Transfer Goal 1, PT)  long term goal (LTG);2 weeks  -CD     Row Name 06/10/19 1542          Gait Training Goal 1 (PT)    Activity/Assistive Device (Gait Training Goal 1, PT)  gait (walking locomotion);assistive device use;crutches, forearm  -CD     [place] Level (Gait Training Goal 1, PT)  contact guard assist  -CD     Distance (Gait Goal 1, PT)  150  -CD     Time Frame (Gait Training Goal 1, PT)  long term goal (LTG);2 weeks  -CD     Row Name 06/10/19 1544          Positioning and Restraints    Pre-Treatment Position  in bed  -CD     Post Treatment Position  bed  -CD     In Bed  supine;call light within reach;encouraged to  call for assist;with family/caregiver;notified nsg HOB ELEVATED.   -CD     Row Name 06/10/19 1542          Living Environment    Home Accessibility  other (see comments)  SITS 90%OF THE TIME IN TALL CHAIR WITH FEET DOWN DUE TO RA.  -CD       User Key  (r) = Recorded By, (t) = Taken By, (c) = Cosigned By    Initials Name Provider Type    CD Katrin Espinal, PT Physical Therapist    Angeline Patel APRN Nurse Practitioner    Neha Nelson RN Registered Nurse        Physical Therapy Education     Title: PT OT SLP Therapies (Done)     Topic: Physical Therapy (Done)     Point: Mobility training (Done)     Learning Progress Summary           Patient Acceptance, E, VU,NR by CD at 6/10/2019  5:38 PM    Comment:  BENEFITS OF OOB ACTIVITY, SAFETY WITH MOBILITY, PROGRESSION OF POC, D/C PLANNING,   Family Acceptance, E, VU,NR by CD at 6/10/2019  5:38 PM    Comment:  BENEFITS OF OOB ACTIVITY, SAFETY WITH MOBILITY, PROGRESSION OF POC, D/C PLANNING,                   Point: Home exercise program (Done)     Learning Progress Summary           Patient Acceptance, E, VU,NR by CD at 6/10/2019  5:38 PM    Comment:  BENEFITS OF OOB ACTIVITY, SAFETY WITH MOBILITY, PROGRESSION OF POC, D/C PLANNING,   Family Acceptance, E, VU,NR by CD at 6/10/2019  5:38 PM    Comment:  BENEFITS OF OOB ACTIVITY, SAFETY WITH MOBILITY, PROGRESSION OF POC, D/C PLANNING,                   Point: Body mechanics (Done)     Learning Progress Summary           Patient Acceptance, E, VU,NR by CD at 6/10/2019  5:38 PM    Comment:  BENEFITS OF OOB ACTIVITY, SAFETY WITH MOBILITY, PROGRESSION OF POC, D/C PLANNING,   Family Acceptance, E, VU,NR by CD at 6/10/2019  5:38 PM    Comment:  BENEFITS OF OOB ACTIVITY, SAFETY WITH MOBILITY, PROGRESSION OF POC, D/C PLANNING,                   Point: Precautions (Done)     Learning Progress Summary           Patient Acceptance, E, VU,NR by CD at 6/10/2019  5:38 PM    Comment:  BENEFITS OF OOB ACTIVITY, SAFETY WITH  MOBILITY, PROGRESSION OF POC, D/C PLANNING,   Family Acceptance, E, [place],NR by CD at 6/10/2019  5:38 PM    Comment:  BENEFITS OF OOB ACTIVITY, SAFETY WITH MOBILITY, PROGRESSION OF POC, D/C PLANNING,                               User Key     Initials Effective Dates Name Provider Type Discipline    CD 06/19/15 -  Katrin Espinal, PT Physical Therapist PT              PT Recommendation and Plan  Planned Therapy Interventions (PT Eval): balance training, bed mobility training, gait training, home exercise program, patient/family education, strengthening, transfer training  Therapy Frequency (PT Clinical Impression): daily  Plan of Care Reviewed With: patient  Outcome Summary: PT APPEARS CLOSE TO BASELINE WITH FUNCTIONAL MOBILITY BUT WILL BENEFIT FROM P.T. TO ASSURE RETURN TO PLOF PRIOR TO D/C HOME. PT REQUIRES MIN ASSIST SUPINE TO SIT, MOD ASSIST X2 SIT TO SUPINE AND CGA OF 2 FOR GAIT X 70 FEET WITH FOREARM CRUTCHES. PT IS LIMITED BY MULTIPLE RA DEFORMITIES BUT IS HIGHLY MOTIVATED TO MAINTAIN FUNCTIONAL MOBILITY. PT NEEDS EXTRA TIME TO COMPLETE TASKS AND DOES WELL GIVING CUES THOSE WHO ARE ASSISTING HIM. RECOMMEND D/C HOME WITH FAMILY ASSIST AND HH FOR WOUND CARE. PT/FAMILY DECLINE HHPT AT THIS TIME.   Outcome Measures     Row Name 06/10/19 1556             How much help from another person do you currently need...    Turning from your back to your side while in flat bed without using bedrails?  2  -CD      Moving from lying on back to sitting on the side of a flat bed without bedrails?  3  -CD      Moving to and from a bed to a chair (including a wheelchair)?  2  -CD      Standing up from a chair using your arms (e.g., wheelchair, bedside chair)?  3  -CD      Climbing 3-5 steps with a railing?  1  -CD      To walk in hospital room?  3  -CD      AM-PAC 6 Clicks Score  14  -CD         Functional Assessment    Outcome Measure Options  AM-PAC 6 Clicks Basic Mobility (PT)  -CD        User Key  (r) = Recorded By, (t) = Taken  By, (c) = Cosigned By    Initials Name Provider Type    Katrin Younger, PT Physical Therapist         Time Calculation:   PT Charges     Row Name 06/10/19 1744             Time Calculation    Start Time  1552  -CD      PT Received On  06/10/19  -CD      PT Goal Re-Cert Due Date  06/20/19  -CD         Timed Charges    05004 - PT Therapeutic Exercise Minutes  30  -CD      25741 - PT Therapeutic Activity Minutes  30  -CD        User Key  (r) = Recorded By, (t) = Taken By, (c) = Cosigned By    Initials Name Provider Type    Katrin Younger, PT Physical Therapist        Therapy Charges for Today     Code Description Service Date Service Provider Modifiers Qty    15242809427 HC PT EVAL MOD COMPLEXITY 4 6/10/2019 Katrin Espinal, PT GP 1          PT G-Codes  Outcome Measure Options: AM-PAC 6 Clicks Basic Mobility (PT)  AM-PAC 6 Clicks Score: 14      Katrin Espinal, PT  6/10/2019

## 2019-06-11 LAB — VANCOMYCIN TROUGH SERPL-MCNC: 19.4 MCG/ML (ref 5–20)

## 2019-06-11 PROCEDURE — 25010000002 PIPERACILLIN SOD-TAZOBACTAM PER 1 G: Performed by: PHYSICIAN ASSISTANT

## 2019-06-11 PROCEDURE — 29580 STRAPPING UNNA BOOT: CPT

## 2019-06-11 PROCEDURE — 80202 ASSAY OF VANCOMYCIN: CPT

## 2019-06-11 PROCEDURE — 25010000002 VANCOMYCIN 10 G RECONSTITUTED SOLUTION

## 2019-06-11 PROCEDURE — 99232 SBSQ HOSP IP/OBS MODERATE 35: CPT | Performed by: HOSPITALIST

## 2019-06-11 PROCEDURE — 97597 DBRDMT OPN WND 1ST 20 CM/<: CPT

## 2019-06-11 PROCEDURE — 97164 PT RE-EVAL EST PLAN CARE: CPT

## 2019-06-11 PROCEDURE — 97166 OT EVAL MOD COMPLEX 45 MIN: CPT

## 2019-06-11 PROCEDURE — 63710000001 PREDNISONE PER 5 MG: Performed by: PHYSICIAN ASSISTANT

## 2019-06-11 RX ORDER — FLUCONAZOLE 200 MG/1
200 TABLET ORAL EVERY 24 HOURS
Status: DISCONTINUED | OUTPATIENT
Start: 2019-06-11 | End: 2019-06-13 | Stop reason: HOSPADM

## 2019-06-11 RX ORDER — POLYVINYL ALCOHOL 14 MG/ML
2 SOLUTION/ DROPS OPHTHALMIC
Status: DISCONTINUED | OUTPATIENT
Start: 2019-06-11 | End: 2019-06-13 | Stop reason: HOSPADM

## 2019-06-11 RX ADMIN — SODIUM CHLORIDE, PRESERVATIVE FREE 3 ML: 5 INJECTION INTRAVENOUS at 08:35

## 2019-06-11 RX ADMIN — SODIUM CHLORIDE, PRESERVATIVE FREE 3 ML: 5 INJECTION INTRAVENOUS at 20:12

## 2019-06-11 RX ADMIN — Medication 1 CAPSULE: at 08:34

## 2019-06-11 RX ADMIN — POLYVINYL ALCOHOL 2 DROP: 14 SOLUTION/ DROPS OPHTHALMIC at 19:10

## 2019-06-11 RX ADMIN — BISOPROLOL FUMARATE 2.5 MG: 5 TABLET ORAL at 08:34

## 2019-06-11 RX ADMIN — FINASTERIDE 5 MG: 5 TABLET, FILM COATED ORAL at 08:34

## 2019-06-11 RX ADMIN — TAZOBACTAM SODIUM AND PIPERACILLIN SODIUM 3.38 G: 375; 3 INJECTION, SOLUTION INTRAVENOUS at 20:11

## 2019-06-11 RX ADMIN — Medication: at 08:36

## 2019-06-11 RX ADMIN — TAZOBACTAM SODIUM AND PIPERACILLIN SODIUM 3.38 G: 375; 3 INJECTION, SOLUTION INTRAVENOUS at 13:12

## 2019-06-11 RX ADMIN — VANCOMYCIN HYDROCHLORIDE 1500 MG: 10 INJECTION, POWDER, LYOPHILIZED, FOR SOLUTION INTRAVENOUS at 13:12

## 2019-06-11 RX ADMIN — AMLODIPINE BESYLATE 5 MG: 5 TABLET ORAL at 08:35

## 2019-06-11 RX ADMIN — FLUTICASONE PROPIONATE 2 SPRAY: 50 SPRAY, METERED NASAL at 08:34

## 2019-06-11 RX ADMIN — TAZOBACTAM SODIUM AND PIPERACILLIN SODIUM 3.38 G: 375; 3 INJECTION, SOLUTION INTRAVENOUS at 05:10

## 2019-06-11 RX ADMIN — HYDROCODONE BITARTRATE AND ACETAMINOPHEN 1 TABLET: 5; 325 TABLET ORAL at 15:00

## 2019-06-11 RX ADMIN — HYDROCODONE BITARTRATE AND ACETAMINOPHEN 1 TABLET: 5; 325 TABLET ORAL at 22:51

## 2019-06-11 RX ADMIN — DICLOFENAC SODIUM 2 G: 10 GEL TOPICAL at 20:12

## 2019-06-11 RX ADMIN — FLUCONAZOLE 200 MG: 200 TABLET ORAL at 11:32

## 2019-06-11 RX ADMIN — DICLOFENAC SODIUM 2 G: 10 GEL TOPICAL at 08:34

## 2019-06-11 RX ADMIN — PREDNISONE 10 MG: 10 TABLET ORAL at 08:34

## 2019-06-11 NOTE — PROGRESS NOTES
[facility] Medicine Services  PROGRESS NOTE    Patient Name: Michoacano Rojas  : 1940  MRN: [id number]    Date of Admission: 2019  Length of Stay: 2  Primary Care Physician: Michael Chavez MD    Subjective   Subjective     CC:  ams / encephalopathy    HPI:   Wife in room again today.  Seems better.  Afebrile in the last 24 hours but temp of 99.8 F at 8:30am yesterday.   Was not able to use machine for sleep even though needed.    Review of Systems    Gen- No fevers, chills  CV- No chest pain, palpitations  Resp- No cough, dyspnea  GI- No N/V/D, abd pain    Otherwise ROS is negative except as mentioned in the HPI.    Objective   Objective     Vital Signs:   Temp:  [98 °F (36.7 °C)-98.1 °F (36.7 °C)] 98 °F (36.7 °C)  Heart Rate:  [62-65] 62  Resp:  [16-18] 16  BP: (119-133)/(60-65) 133/65        Physical Exam:    Constitutional: No acute distress, awake, alert  HENT: NCAT, mucous membranes moist  Respiratory: Clear to auscultation bilaterally, respiratory effort normal   Cardiovascular: RRR, no murmurs, rubs, or gallops, palpable pedal pulses bilaterally  Gastrointestinal: Positive bowel sounds, soft, nontender, nondistended  Musculoskeletal: rheumatoid arthritis on both upper and lower extremities.  Multiple rheumatoid nodules at various joints.  Both lower extremities are warm and very tender to touch.  There are foul-smelling ulcers in the distal part of both legs/feet.  Psychiatric: Appropriate affect, cooperative  Neurologic: Oriented x 3, strength symmetric in all extremities, Cranial Nerves grossly intact to confrontation, speech clear  Skin: chronic venous stasis ulcers    Results Reviewed:  I have personally reviewed current lab, radiology, and data and agree.    Results from last 7 days   Lab Units 06/10/19  0425 19   WBC 10*3/mm3 18.65* 19.88*   HEMOGLOBIN g/dL 10.2* 12.0*   HEMATOCRIT % 35.6* 40.7   PLATELETS 10*3/mm3 128* 186     Results from last  7 days   Lab Units 06/10/19  0425 06/09/19 2019   SODIUM mmol/L 143 144   POTASSIUM mmol/L 3.8 3.8   CHLORIDE mmol/L 106 103   CO2 mmol/L 25.0 27.0   BUN mg/dL 22 25*   CREATININE mg/dL 0.54* 0.67*   GLUCOSE mg/dL 87 87   CALCIUM mg/dL 8.1* 9.2   ALT (SGPT) U/L  --  7   AST (SGOT) U/L  --  14     Estimated Creatinine Clearance: 70.4 mL/min (A) (by C-G formula based on SCr of 0.54 mg/dL (L)).    Microbiology Results Abnormal     Procedure Component Value - Date/Time    Wound Culture - Wound, Leg, Right [[id number]]  (Abnormal) Collected:  06/09/19 2042    Lab Status:  Preliminary result Specimen:  Wound from Leg, Right Updated:  06/11/19 0938     Wound Culture Heavy growth (4+) Pseudomonas species      Scant growth (1+) Gram Positive Cocci     Gram Stain Many (4+) WBCs seen      Few (2+) Gram positive cocci in pairs      Occasional Gram negative bacilli    Wound Culture - Wound, Leg, Left [[id number]]  (Abnormal) Collected:  06/09/19 2042    Lab Status:  Preliminary result Specimen:  Wound from Leg, Left Updated:  06/11/19 0929     Wound Culture Moderate growth (3+) Staphylococcus aureus      Rare Gram Negative Bacilli      Moderate growth (3+) Normal Skin Sigrid     Gram Stain Few (2+) Epithelial cells seen      No WBCs or organisms seen    Blood Culture - Blood, Hand, Right [[id number]] Collected:  06/09/19 2012    Lab Status:  Preliminary result Specimen:  Blood from Hand, Right Updated:  06/10/19 2031     Blood Culture No growth at 24 hours    Blood Culture - Blood, Hand, Left [[id number]] Collected:  06/09/19 2018    Lab Status:  Preliminary result Specimen:  Blood from Hand, Left Updated:  06/10/19 2031     Blood Culture No growth at 24 hours    Urine Culture - Urine, Urine, Catheter In/Out [[id number]]  (Abnormal) Collected:  06/09/19 2008    Lab Status:  Final result Specimen:  Urine, Catheter In/Out Updated:  06/10/19 1821     Urine Culture >100,000 CFU/mL Candida albicans        Imaging Results (last 24 hours)      ** No results found for the last 24 hours. **        Results for orders placed during the hospital encounter of 06/04/18   Adult Transthoracic Echo Complete W/ Cont if Necessary Per Protocol    Narrative · Left ventricular systolic function is normal.  · Estimated EF appears to be in the range of 56 - 60%.  · Mild tricuspid valve regurgitation is present.  · Estimated right ventricular systolic pressure from tricuspid   regurgitation is mildly elevated (35-45 mmHg).        I have reviewed the medications:  Scheduled Meds:    [START ON 6/13/2019] Pharmacy Consult  Does not apply Once   amLODIPine 5 mg Oral Q24H   bisoprolol 2.5 mg Oral Q24H   diclofenac 2 g Topical BID   finasteride 5 mg Oral Daily   fluconazole 200 mg Oral Q24H   fluticasone 2 spray Each Nare Daily   lactobacillus acidophilus 1 capsule Oral Daily   piperacillin-tazobactam 3.375 g Intravenous Q8H   polyethylene glycol 17 g Oral Daily   predniSONE 10 mg Oral Daily   sodium chloride 3 mL Intravenous Q12H   vancomycin 1,500 mg Intravenous Q24H     Continuous Infusions:    Pharmacy to dose vancomycin     sodium chloride 75 mL/hr Last Rate: 75 mL/hr (06/10/19 1700)     PRN Meds:.acetaminophen  •  docusate sodium  •  HYDROcodone-acetaminophen  •  ipratropium-albuterol  •  melatonin  •  ondansetron  •  Pharmacy to dose vancomycin  •  polyvinyl alcohol  •  sodium chloride  •  sodium chloride    Assessment/Plan   Assessment / Plan     Active Hospital Problems    Diagnosis POA   • **Acute metabolic encephalopathy [G93.41] Yes   • Acute UTI (urinary tract infection) [N39.0] Yes   • COPD (chronic obstructive pulmonary disease) (CMS/HCC) [J44.9] Yes   • BPH without obstruction/lower urinary tract symptoms [N40.0] Yes   • Diastolic dysfunction with chronic heart failure (CMS/HCC) [I50.32] Yes   • 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 male who presented with AMS and Sepsis POA.    Suspected Early Sepsis POA  - probable source cellulitis  - immunosuppressed  - on prednisone for RA and not sleeping well  Encephalopathy  - likely multifactorial issue  - complicated by untreated sleep apnea and narcotics  Untreated Sleep Apnea  - on chronic pain medications  - AutoCPAP at night with nasal interface  Chronic Atrial Fibrillation  - on Eliquis  - a fib associated with sleep apnea  RA  - possible immunosuppression on prednisone  Chronic Pain  - has been on Norco for several years  - complicates and worsens apnea by adding central component    Started on oral diflucan today  Add eye drops today    DVT Prophylaxis:  Heparin SC    Disposition: I expect the patient to be discharged TBD    CODE STATUS:   Code Status and Medical Interventions:   Ordered at: 06/09/19 5343     Limited Support to NOT Include:    Intubation     Level Of Support Discussed With:    Patient     Code Status:    No CPR     Medical Interventions (Level of Support Prior to Arrest):    Limited         Electronically signed by Yobani Bruner MD, 06/11/19, 6:03 PM.

## 2019-06-11 NOTE — PLAN OF CARE
Problem: Patient Care Overview  Goal: Plan of Care Review  Outcome: Ongoing (interventions implemented as appropriate)   06/11/19 0252   Plan of Care Review   Progress no change   Coping/Psychosocial   Plan of Care Reviewed With patient       Problem: Pain, Chronic (Adult)  Goal: Acceptable Pain/Comfort Level and Functional Ability  Outcome: Ongoing (interventions implemented as appropriate)   06/11/19 0252   Pain, Chronic (Adult)   Acceptable Pain/Comfort Level and Functional Ability making progress toward outcome       Problem: Skin Injury Risk (Adult)  Goal: Skin Health and Integrity  Outcome: Ongoing (interventions implemented as appropriate)   06/11/19 0252   Skin Injury Risk (Adult)   Skin Health and Integrity making progress toward outcome

## 2019-06-11 NOTE — PLAN OF CARE
Problem: Patient Care Overview  Goal: Plan of Care Review  Outcome: Ongoing (interventions implemented as appropriate)   06/11/19 2123   Plan of Care Review   Progress no change   OTHER   Outcome Summary OT eval completed Pt presents with pain, limited AROM, and activity tolerance for ADL completion recom IPOT d/c HHOT and home with 24/7 assist pending progress   Coping/Psychosocial   Plan of Care Reviewed With patient

## 2019-06-11 NOTE — THERAPY EVALUATION
Acute Care - Occupational Therapy Initial Evaluation  [facility]     Patient Name: Michoacano Rojas  : 1940  MRN: [id number]  Today's Date: 2019  Onset of Illness/Injury or Date of Surgery: 19  Date of Referral to OT: 06/10/19  Referring Physician: EARL Mondragon    Admit Date: 2019       ICD-10-CM ICD-9-CM   1. Acute febrile illness R50.9 780.60   2. Urinary tract infection with hematuria, site unspecified N39.0 599.0    R31.9 599.70   3. Venous ulcers of both lower extremities (CMS/[facility]) I87.2 459.81    L97.919 707.10    L97.929    4. Leukocytosis, unspecified type D72.829 288.60   5. Malnutrition, unspecified type (CMS/[facility]) E46 263.9   6. Impaired functional mobility, balance, gait, and endurance Z74.09 V49.89   7. Impaired mobility and ADLs Z74.09 799.89     Patient Active Problem List   Diagnosis   • Hematuria   • Prostate hypertrophy   • Rheumatoid arthritis (CMS/[facility])   • Essential hypertension   • Chronic cutaneous venous stasis ulcer (CMS/[facility])   • Very poor mobility   • Large joint arthralgia of multiple sites   • Arthralgia of hands, bilateral   • Generalized stiffness   • Atrial fibrillation with RVR (CMS/[facility])   • A-fib (CMS/[facility])   • Anasarca   • Sepsis (CMS/[facility])   • Cellulitis   • 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 (CMS/[facility])   • Tracheal stenosis   • Normocytic anemia   • Diastolic dysfunction with chronic heart failure (CMS/[facility])   • Urinary retention   • Acute on chronic respiratory failure with hypoxia and hypercapnia (CMS/[facility])   • Incarcerated right inguinal hernia   • UTI (urinary tract infection) due to urinary indwelling catheter (CMS/[facility])   • Yeast UTI   • Venous insufficiency (chronic) (peripheral)   • Benign essential hypertension   • Non-pressure chronic ulcer of left lower leg, with unspecified severity (CMS/[facility])   • Lower extremity edema   • Chronic diastolic heart failure (CMS/[facility])   • Cognitive disorder   • BPH without obstruction/lower urinary tract symptoms   •  Gross hematuria   • Chronic conjunctivitis of both eyes   • Facial swelling   • Goiter   • Idiopathic chronic gout of multiple sites with tophus   • Medicare annual wellness visit, subsequent   • Morbid obesity (CMS/[facility])   • Major depressive disorder, single episode, mild (CMS/[facility])   • Skin ulcer of right foot, limited to breakdown of skin (CMS/[facility])   • Acute metabolic encephalopathy   • Acute UTI (urinary tract infection)   • COPD (chronic obstructive pulmonary disease) (CMS/[facility])     Past Medical History:   Diagnosis Date   • A-fib (CMS/[facility])    • Atrial fibrillation (CMS/[facility])    • Chronic cutaneous venous stasis ulcer (CMS/[facility])    • Hypertension    • Penile abnormality    • Peptic ulcer disease    • Primary malignant neoplasm of bronchus with metastasisto other site, unspecified laterality (CMS/[facility]) 4/22/2019   • RA (rheumatoid arthritis) (CMS/[facility])    • Rheumatic fever    • Rheumatoid arthritis (CMS/[facility])    • Sepsis (CMS/[facility]) 11/17/2017    from cellulitis due to leg ulcer, hospitalization, antibiotics, wound care   • Vertigo    • Vertigo      Past Surgical History:   Procedure Laterality Date   • JOINT MANIPULATION      left hip repair          OT ASSESSMENT FLOWSHEET (last 12 hours)      Occupational Therapy Evaluation     Row Name 06/11/19 1358                   OT Evaluation Time/Intention    Subjective Information  complains of;weakness;fatigue;pain  -KF        Document Type  evaluation  -KF        Mode of Treatment  individual therapy;occupational therapy  -KF        Patient Effort  adequate  -KF        Symptoms Noted During/After Treatment  none  -KF           General Information    Patient Profile Reviewed?  yes  -KF        Onset of Illness/Injury or Date of Surgery  06/09/19  -KF        Referring Physician  EARL Mondragon  -KF        Patient Observations  alert;cooperative;agree to therapy  -KF        Patient/Family Observations  family present  -KF        General Observations of Patient  supine, tele, RA,  declined OOB or EOB stating just returned to supine  -KF        Prior Level of Function  min assist:;ADL's;independent:;bed mobility;transfer;all household mobility  -KF        Equipment Currently Used at Home  crutches, forearm;hospital bed;ramp;wheelchair;shower chair;raised toilet  -KF        Pertinent History of Current Functional Problem  Pt presents with AMS hx chronic LE wounds, RA, COPD and PAF  -KF        Existing Precautions/Restrictions  fall;other (see comments) RA, severe hand deformities  -KF        Equipment Issued to Patient  adaptive cup  -KF        Risks Reviewed  patient and family:;LOB;nausea/vomiting;dizziness;increased discomfort;change in vital signs  -KF        Benefits Reviewed  patient and family:;improve function;increase independence;increase strength;increase balance;decrease pain;improve skin integrity;increase knowledge  -KF        Barriers to Rehab  previous functional deficit;medically complex  -KF           Relationship/Environment    Primary Source of Support/Comfort  spouse  -KF        Lives With  spouse  -KF        Family Caregiver if Needed  spouse  -KF           Resource/Environmental Concerns    Current Living Arrangements  home/apartment/condo  -KF           Cognitive Assessment/Interventions    Additional Documentation  Cognitive Assessment/Intervention (Group)  -KF           Cognitive Assessment/Intervention- PT/OT    Affect/Mental Status (Cognitive)  WFL  -KF        Orientation Status (Cognition)  oriented x 3  -KF        Follows Commands (Cognition)  WNL;follows one step commands;over 90% accuracy  -KF        Cognitive Function (Cognitive)  safety deficit;executive function deficit  -KF        Executive Function Deficit (Cognition)  mild deficit;insight/awareness of deficits  -KF        Safety Deficit (Cognitive)  moderate deficit;awareness of need for assistance;insight into deficits/self awareness;judgment;safety precautions awareness  -KF        Cognitive Interventions  (Cognitive)  occupation/activity based interventions;process/task specific training  -        Personal Safety Interventions  fall prevention program maintained;muscle strengthening facilitated;nonskid shoes/slippers when out of bed  -KF           Safety Issues, Functional Mobility    Safety Issues Affecting Function (Mobility)  friction/shear risk;awareness of need for assistance;judgment;insight into deficits/self awareness;safety precaution awareness  -        Impairments Affecting Function (Mobility)  balance;range of motion (ROM);strength;grasp;pain  -KF        Comment, Safety Issues/Impairments (Mobility)  skin integrity concerns   -KF           Bed Mobility Assessment/Treatment    Bed Mobility Assessment/Treatment  rolling left;rolling right  -KF        Rolling Left [place] (Bed Mobility)  verbal cues;moderate assist (50% patient effort)  -KF        Rolling Right [place] (Bed Mobility)  moderate assist (50% patient effort);verbal cues  -        Bed Mobility, Safety Issues  decreased use of arms for pushing/pulling;decreased use of legs for bridging/pushing  -        Assistive Device (Bed Mobility)  bed rails;head of bed elevated  -           Functional Mobility    Functional Mobility- Ind. Level  not tested  -KF        Functional Mobility- Comment  deferred to PT  -KF           Transfer Assessment/Treatment    Comment (Transfers)  Pt declined  -           ADL Assessment/Intervention    BADL Assessment/Intervention  grooming  -           Grooming Assessment/Training    [place] Level (Grooming)  hair care, combing/brushing;dependent (less than 25% patient effort);oral care regimen;moderate assist (50% patient effort);wash face, hands;minimum assist (75% patient effort)  -        Grooming Position  sitting up in bed  -        Comment (Grooming)  washed hands for completion req min A, unable to reach behind head BUE for grooming tasks unable to bring RUE to mouth, uses L UE  primarily now as dominant   -KF           BADL Safety/Performance    Impairments, BADL Safety/Performance  endurance/activity tolerance;strength;range of motion;pain  -KF        Skilled BADL Treatment/Intervention  adaptive equipment training  -KF           General ROM    GENERAL ROM COMMENTS  severe AROM deficts B hand contractures and wrist in flexion, functional use with digits 3-4 to provide comp pinch; BUE limited shoulder rotation approx  50%, shoulder flexion beyond 90 degrees B  -KF           MMT (Manual Muscle Testing)    General MMT Comments  B pinch digit 3-4 4/5; B elbow flex/ext 4/5  -KF           Motor Assessment/Interventions    Additional Documentation  Fine Motor Testing & Training (Group);Gross Motor Coordination (Group)  -KF           Gross Motor Coordination    Gross Motor Impairments  AROM (active range of motion)  -KF        Gross Motor Skill, Impairments Detail  severe deficts due to limited AROM, BUE  -KF           Fine Motor Testing & Training    Training Activity, Fine Motor Coordination  manipulation of eating utensils  -KF        Comment, Fine Motor Coordination  severe deficits B hands  -KF           Sensory Assessment/Intervention    Sensory General Assessment  no sensation deficits identified  -KF           Positioning and Restraints    Pre-Treatment Position  in bed  -KF        Post Treatment Position  bed  -KF        In Bed  notified nsg;supine;fowlers;call light within reach;encouraged to call for assist;exit alarm on;with family/caregiver;side rails up x2;legs elevated  -KF           Pain Assessment    Additional Documentation  Pain Scale: Numbers Pre/Post-Treatment (Group)  -KF           Pain Scale: Numbers Pre/Post-Treatment    Pain Scale: Numbers, Pretreatment  7/10  -KF        Pain Scale: Numbers, Post-Treatment  7/10  -KF        Pain Location - Side  Left  -KF        Pain Location - Orientation  generalized  -KF        Pain Location  knee  -KF        Pre/Post Treatment Pain  Comment  tolerated  -KF        Pain Intervention(s)  Repositioned  -KF           Wound 06/05/18 0915 Left medial;lower leg ulceration, venous    Wound - Properties Group Date first assessed: 06/05/18  -CP Time first assessed: 0915  -CP Present On Admission : yes;picture taken  -CP Side: Left  -CP Orientation: medial;lower  -CP Location: leg  -CP Type: ulceration, venous  -CP       Wound 06/05/18 0915 Left lateral;lower leg ulceration, venous    Wound - Properties Group Date first assessed: 06/05/18  -CP Time first assessed: 0915  -CP Present On Admission : yes;picture taken  -CP Side: Left  -CP Orientation: lateral;lower  -CP Location: leg  -CP Type: ulceration, venous  -CP       Wound 06/05/18 0915 Right lower;anterior leg ulceration, venous    Wound - Properties Group Date first assessed: 06/05/18  -CP Time first assessed: 0915  -CP Present On Admission : yes;picture taken  -CP Side: Right  -CP Orientation: lower;anterior  -CP Location: leg  -CP Type: ulceration, venous  -CP       Wound 06/05/18 0915 Right lateral;lower leg ulceration, venous    Wound - Properties Group Date first assessed: 06/05/18  -CP Time first assessed: 0915  -CP Present On Admission : yes;picture taken  -CP Side: Right  -CP Orientation: lateral;lower  -CP Location: leg  -CP Type: ulceration, venous  -CP       Wound 01/16/19 0400 Left gluteal laceration    Wound - Properties Group Date first assessed: 01/16/19  -MC Time first assessed: 0400  -MC Present On Admission : yes  -MC Side: Left  -MC Location: gluteal  -MC Type: laceration  -MC       Wound 06/05/18 0915 Right posterior;lower leg ulceration, venous    Wound - Properties Group Date first assessed: 06/05/18  -CP Time first assessed: 0915  -CP Present On Admission : yes  -CP Side: Right  -CP Orientation: posterior;lower  -CP Location: leg  -CP Type: ulceration, venous  -CP       Wound 06/05/18 0915 Right medial heel    Wound - Properties Group Date first assessed: 06/05/18  -CP Time  first assessed: 0915  -CP Present On Admission : yes;picture taken  -CP Side: Right  -CP Orientation: medial  -CP Location: heel  -CP Stage, Pressure Injury: unstageable  -CP       Plan of Care Review    Plan of Care Reviewed With  patient;spouse;sibling  -KF           Clinical Impression (OT)    Date of Referral to OT  06/10/19  -KF        OT Diagnosis  ADL decline  -KF        Patient/Family Goals Statement (OT Eval)  PLOF  -KF        Criteria for Skilled Therapeutic Interventions Met (OT Eval)  yes;treatment indicated  -KF        Rehab Potential (OT Eval)  fair, will monitor progress closely  -KF        Therapy Frequency (OT Eval)  3 times/wk  -KF        Care Plan Review (OT)  evaluation/treatment results reviewed;care plan/treatment goals reviewed;risks/benefits reviewed;current/potential barriers reviewed;patient/other agree to care plan  -KF        Care Plan Review, Other Participant (OT Eval)  spouse  -KF        Anticipated Equipment Needs at Discharge (OT)  -- TBD  -KF        Anticipated Discharge Disposition (OT)  home with home health;home with 24/7 care  -KF           Vital Signs    Pre Systolic BP Rehab  -- RN cleared vitals stable  -KF        Pre SpO2 (%)  96  -KF        O2 Delivery Pre Treatment  room air  -KF        Pre Patient Position  Supine  -KF        Intra Patient Position  Side Lying  -KF        Post Patient Position  Supine  -KF           Planned OT Interventions    Planned Therapy Interventions (OT Eval)  activity tolerance training;adaptive equipment training;BADL retraining;cognitive/visual perception retraining;edema control/reduction;functional balance retraining;neuromuscular control/coordination retraining;occupation/activity based interventions;passive ROM/stretching;patient/caregiver education/training;ROM/therapeutic exercise;strengthening exercise;transfer/mobility retraining  -KF           OT Goals    Bed Mobility Goal Selection (OT)  bed mobility, OT goal 1  -KF        Transfer  Goal Selection (OT)  transfer, OT goal 1  -KF        Dressing Goal Selection (OT)  dressing, OT goal 1  -KF        Balance Goal Selection (OT)  balance, OT goal 1  -KF        Additional Documentation  Balance Goal Selection (OT) (Row)  -KF           Bed Mobility Goal 1 (OT)    Activity/Assistive Device (Bed Mobility Goal 1, OT)  sit to supine/supine to sit  -KF        [place] Level/Cues Needed (Bed Mobility Goal 1, OT)  minimum assist (75% or more patient effort);verbal cues required  -KF        Time Frame (Bed Mobility Goal 1, OT)  long term goal (LTG);by discharge  -KF        Progress/Outcomes (Bed Mobility Goal 1, OT)  goal ongoing  -KF           Transfer Goal 1 (OT)    Activity/Assistive Device (Transfer Goal 1, OT)  bed-to-chair/chair-to-bed;crutches, forearm  -KF        [place] Level/Cues Needed (Transfer Goal 1, OT)  verbal cues required;contact guard assist  -KF        Time Frame (Transfer Goal 1, OT)  long term goal (LTG);by discharge  -KF        Progress/Outcome (Transfer Goal 1, OT)  goal ongoing  -KF           Dressing Goal 1 (OT)    Activity/Assistive Device (Dressing Goal 1, OT)  upper body dressing;lower body dressing AE prn  -KF        [place]/Cues Needed (Dressing Goal 1, OT)  minimum assist (75% or more patient effort);verbal cues required  -KF        Time Frame (Dressing Goal 1, OT)  long term goal (LTG);by discharge  -KF        Progress/Outcome (Dressing Goal 1, OT)  goal ongoing  -KF           Balance Goal 1 (OT)    Activity/Assistive Device (Balance Goal 1, OT)  sitting, dynamic  -KF        [place] Level/Cues Needed (Balance Goal 1, OT)  supervision required  -KF        Time Frame (Balance Goal 1, OT)  long term goal (LTG);by discharge  -KF        Progress/Outcomes (Balance Goal 1, OT)  goal ongoing  -KF           Living Environment    Home Accessibility  wheelchair accessible walk in shower  -KF          User Key  (r) = Recorded By, (t) = Taken By, (c) = Cosigned By     Initials Name Effective Dates    CP TimothyAngeline khalil SONY APRN 04/06/17 - 06/07/18    Neha Nelson RN 08/22/16 -     KF Lilliam Redd, OT 04/03/18 -          Occupational Therapy Education     Title: PT OT SLP Therapies (In Progress)     Topic: Occupational Therapy (In Progress)     Point: ADL training (Done)     Description: Instruct learner(s) on proper safety adaptation and remediation techniques during self care or transfers.   Instruct in proper use of assistive devices.    Learning Progress Summary           Patient Acceptance, E,TB,D, VU,DU by  at 6/11/2019  1:58 PM    Comment:  Purpose of skilled OT services, AE of adapted cup to prevent spills   Family Acceptance, E,TB,D, VU,DU by  at 6/11/2019  1:58 PM    Comment:  Purpose of skilled OT services, AE of adapted cup to prevent spills                   Point: Precautions (Done)     Description: Instruct learner(s) on prescribed precautions during self-care and functional transfers.    Learning Progress Summary           Patient Acceptance, E,TB,D, VU,DU by  at 6/11/2019  1:58 PM    Comment:  Purpose of skilled OT services, AE of adapted cup to prevent spills   Family Acceptance, E,TB,D, VU,DU by KF at 6/11/2019  1:58 PM    Comment:  Purpose of skilled OT services, AE of adapted cup to prevent spills                   Point: Body mechanics (Done)     Description: Instruct learner(s) on proper positioning and spine alignment during self-care, functional mobility activities and/or exercises.    Learning Progress Summary           Patient Acceptance, E,TB,D, VU,DU by  at 6/11/2019  1:58 PM    Comment:  Purpose of skilled OT services, AE of adapted cup to prevent spills   Family Acceptance, E,TB,D, VU,DU by  at 6/11/2019  1:58 PM    Comment:  Purpose of skilled OT services, AE of adapted cup to prevent spills                               User Key     Initials Effective Dates Name Provider Type Discipline     04/03/18 -  Lilliam Redd, OT  Occupational Therapist OT                  OT Recommendation and Plan  Outcome Summary/Treatment Plan (OT)  Anticipated Equipment Needs at Discharge (OT): (TBD)  Anticipated Discharge Disposition (OT): home with home health, home with 24/7 care  Planned Therapy Interventions (OT Eval): activity tolerance training, adaptive equipment training, BADL retraining, cognitive/visual perception retraining, edema control/reduction, functional balance retraining, neuromuscular control/coordination retraining, occupation/activity based interventions, passive ROM/stretching, patient/caregiver education/training, ROM/therapeutic exercise, strengthening exercise, transfer/mobility retraining  Therapy Frequency (OT Eval): 3 times/wk  Plan of Care Review  Plan of Care Reviewed With: patient  Plan of Care Reviewed With: patient  Outcome Summary: OT eval completed Pt presents with pain, limited AROM, and activity tolerance for ADL completion recom IPOT d/c HHOT and home with 24/7 assist pending progress    Outcome Measures     Row Name 06/11/19 1358 06/10/19 4895          How much help from another person do you currently need...    Turning from your back to your side while in flat bed without using bedrails?  --  2  -CD     Moving from lying on back to sitting on the side of a flat bed without bedrails?  --  3  -CD     Moving to and from a bed to a chair (including a wheelchair)?  --  2  -CD     Standing up from a chair using your arms (e.g., wheelchair, bedside chair)?  --  3  -CD     Climbing 3-5 steps with a railing?  --  1  -CD     To walk in hospital room?  --  3  -CD     AM-PAC 6 Clicks Score  --  14  -CD        How much help from another is currently needed...    Putting on and taking off regular lower body clothing?  1  -KF  --     Bathing (including washing, rinsing, and drying)  2  -KF  --     Toileting (which includes using toilet bed pan or urinal)  2  -KF  --     Putting on and taking off regular upper body clothing  2   -KF  --     Taking care of personal grooming (such as brushing teeth)  2  -KF  --     Eating meals  2  -KF  --     Score  11  -KF  --        Functional Assessment    Outcome Measure Options  AM-PAC 6 Clicks Daily Activity (OT)  -KF  AM-PAC 6 Clicks Basic Mobility (PT)  -CD       User Key  (r) = Recorded By, (t) = Taken By, (c) = Cosigned By    Initials Name Provider Type     Katrin Espinal, PT Physical Therapist    Lilliam Breaux, OT Occupational Therapist          Time Calculation:   Time Calculation- OT     Row Name 06/11/19 1358             Time Calculation- OT    OT Start Time  1358  -KF      Total Timed Code Minutes- OT  0 minute(s)  -KF      OT Received On  06/11/19  -KF      OT Goal Re-Cert Due Date  06/21/19  -KF        User Key  (r) = Recorded By, (t) = Taken By, (c) = Cosigned By    Initials Name Provider Type    Lilliam Breaux, OT Occupational Therapist        Therapy Charges for Today     Code Description Service Date Service Provider Modifiers Qty    [id number]  OT EVAL MOD COMPLEXITY 4 6/11/2019 Lilliam Redd OT GO 1               Lilliam Redd OT  6/11/2019

## 2019-06-11 NOTE — THERAPY EVALUATION
Acute Care - Wound/Debridement Initial Evaluation  [facility]     Patient Name: Michoacano Rojas  : 1940  MRN: [id number]  Today's Date: 2019  Onset of Illness/Injury or Date of Surgery: 19  Date of Referral to PT: 06/10/19   Referring Physician: hospitalist      Admit Date: 2019    Visit Dx:    ICD-10-CM ICD-9-CM   1. Acute febrile illness R50.9 780.60   2. Urinary tract infection with hematuria, site unspecified N39.0 599.0    R31.9 599.70   3. Venous ulcers of both lower extremities (CMS/[facility]) I87.2 459.81    L97.919 707.10    L97.929    4. Leukocytosis, unspecified type D72.829 288.60   5. Malnutrition, unspecified type (CMS/[facility]) E46 263.9   6. Impaired functional mobility, balance, gait, and endurance Z74.09 V49.89       Patient Active Problem List   Diagnosis   • Hematuria   • Prostate hypertrophy   • Rheumatoid arthritis (CMS/[facility])   • Essential hypertension   • Chronic cutaneous venous stasis ulcer (CMS/[facility])   • Very poor mobility   • Large joint arthralgia of multiple sites   • Arthralgia of hands, bilateral   • Generalized stiffness   • Atrial fibrillation with RVR (CMS/[facility])   • A-fib (CMS/[facility])   • Anasarca   • Sepsis (CMS/[facility])   • Cellulitis   • 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 (CMS/[facility])   • Tracheal stenosis   • Normocytic anemia   • Diastolic dysfunction with chronic heart failure (CMS/[facility])   • Urinary retention   • Acute on chronic respiratory failure with hypoxia and hypercapnia (CMS/[facility])   • Incarcerated right inguinal hernia   • UTI (urinary tract infection) due to urinary indwelling catheter (CMS/[facility])   • Yeast UTI   • Venous insufficiency (chronic) (peripheral)   • Benign essential hypertension   • Non-pressure chronic ulcer of left lower leg, with unspecified severity (CMS/[facility])   • Lower extremity edema   • Chronic diastolic heart failure (CMS/[facility])   • Cognitive disorder   • BPH without obstruction/lower urinary tract symptoms   • Gross hematuria   • Chronic  conjunctivitis of both eyes   • Facial swelling   • Goiter   • Idiopathic chronic gout of multiple sites with tophus   • Medicare annual wellness visit, subsequent   • Morbid obesity (CMS/[facility])   • Major depressive disorder, single episode, mild (CMS/[facility])   • Skin ulcer of right foot, limited to breakdown of skin (CMS/[facility])   • Acute metabolic encephalopathy   • Acute UTI (urinary tract infection)   • COPD (chronic obstructive pulmonary disease) (CMS/[facility])        Past Medical History:   Diagnosis Date   • A-fib (CMS/[facility])    • Atrial fibrillation (CMS/[facility])    • Chronic cutaneous venous stasis ulcer (CMS/[facility])    • Hypertension    • Penile abnormality    • Peptic ulcer disease    • Primary malignant neoplasm of bronchus with metastasisto other site, unspecified laterality (CMS/HCC) 4/22/2019   • RA (rheumatoid arthritis) (CMS/[facility])    • Rheumatic fever    • Rheumatoid arthritis (CMS/[facility])    • Sepsis (CMS/HCC) 11/17/2017    from cellulitis due to leg ulcer, hospitalization, antibiotics, wound care   • Vertigo    • Vertigo         Past Surgical History:   Procedure Laterality Date   • JOINT MANIPULATION      left hip repair           Wound 06/05/18 0915 Left medial;lower leg ulceration, venous (Active)   Wound Image   6/11/2019  8:15 AM   Dressing Appearance dry;intact 6/11/2019  8:15 AM   Closure None 6/11/2019  7:44 AM   Base red;slough;yellow 6/11/2019  8:15 AM   Periwound moist;pink 6/11/2019  8:15 AM   Periwound Temperature warm 6/11/2019  8:15 AM   Periwound Skin Turgor soft 6/11/2019  8:15 AM   Edges open 6/11/2019  8:15 AM   Wound Length (cm) 5 cm 6/11/2019  8:15 AM   Wound Width (cm) 3 cm 6/11/2019  8:15 AM   Wound Depth (cm) 0.1 cm 6/11/2019  8:15 AM   Drainage Characteristics/Odor serosanguineous 6/11/2019  8:15 AM   Drainage Amount moderate 6/11/2019  8:15 AM   Care, Wound irrigated with;sterile normal saline;debrided 6/11/2019  8:15 AM   Dressing Care, Wound foam;low-adherent;silver impregnated 6/11/2019  8:15  AM   Periwound Care, Wound dry periwound area maintained 6/11/2019  7:44 AM       Wound 06/05/18 0915 Left lateral;lower leg ulceration, venous (Active)   Wound Image   6/11/2019  8:15 AM   Dressing Appearance dry;intact 6/11/2019  8:15 AM   Closure None 6/11/2019  7:44 AM   Base moist;pink;yellow 6/11/2019  8:15 AM   Periwound intact;pink 6/11/2019  8:15 AM   Periwound Temperature cool 6/11/2019  8:15 AM   Periwound Skin Turgor soft 6/11/2019  8:15 AM   Edges open 6/11/2019  8:15 AM   Wound Length (cm) 5 cm 6/11/2019  8:15 AM   Wound Width (cm) 3 cm 6/11/2019  8:15 AM   Wound Depth (cm) 0.1 cm 6/11/2019  8:15 AM   Drainage Characteristics/Odor serosanguineous 6/11/2019  8:15 AM   Drainage Amount moderate 6/11/2019  8:15 AM   Care, Wound irrigated with;sterile normal saline;debrided 6/11/2019  8:15 AM   Dressing Care, Wound foam;low-adherent;silver impregnated 6/11/2019  8:15 AM   Periwound Care, Wound dry periwound area maintained 6/10/2019  8:00 PM       Wound 06/05/18 0915 Right lower;anterior leg ulceration, venous (Active)   Dressing Appearance dry;intact 6/11/2019  8:15 AM   Closure None 6/11/2019  7:44 AM   Base moist;pink 6/11/2019  8:15 AM   Periwound intact;moist 6/11/2019  8:15 AM   Drainage Amount scant 6/11/2019  7:44 AM   Periwound Care, Wound dry periwound area maintained 6/11/2019  7:44 AM       Wound 06/05/18 0915 Right lateral;lower leg ulceration, venous (Active)   Dressing Appearance dry;intact 6/11/2019  7:44 AM   Closure None 6/11/2019  7:44 AM   Periwound intact;pink 6/11/2019  7:44 AM   Periwound Temperature warm 6/11/2019  7:44 AM   Drainage Characteristics/Odor serosanguineous 6/11/2019  7:44 AM   Drainage Amount none 6/11/2019  7:44 AM   Periwound Care, Wound dry periwound area maintained 6/10/2019  8:00 PM       Wound 06/05/18 0915 Right posterior;lower leg ulceration, venous (Active)   Wound Image   6/11/2019  8:15 AM   Dressing Appearance dry;intact 6/11/2019  8:15 AM   Closure None  6/11/2019  7:44 AM   Base moist;red;pink;slough 6/11/2019  8:15 AM   Periwound pink;moist 6/11/2019  8:15 AM   Periwound Temperature warm 6/11/2019  8:15 AM   Periwound Skin Turgor soft 6/11/2019  8:15 AM   Edges open 6/11/2019  8:15 AM   Wound Length (cm) 4 cm 6/11/2019  8:15 AM   Wound Width (cm) 3.5 cm 6/11/2019  8:15 AM   Wound Depth (cm) 0.1 cm 6/11/2019  8:15 AM   Care, Wound irrigated with;sterile normal saline;debrided 6/11/2019  8:15 AM   Dressing Care, Wound foam;low-adherent;silver impregnated 6/11/2019  8:15 AM   Periwound Care, Wound dry periwound area maintained 6/11/2019  7:44 AM       Wound 06/05/18 0915 Right medial heel (Active)   Dressing Appearance dry 6/11/2019  7:44 AM   Closure Adhesive bandage 6/11/2019  7:44 AM   Base moist 6/10/2019  8:00 PM   Periwound dry 6/11/2019  7:44 AM   Periwound Temperature warm 6/11/2019  7:44 AM   Periwound Skin Turgor firm 6/11/2019  7:44 AM   Drainage Amount scant 6/11/2019  7:44 AM   Periwound Care, Wound dry periwound area maintained 6/11/2019  7:44 AM       Wound 01/16/19 0400 Left gluteal laceration (Active)   Dressing Appearance intact;dry 6/11/2019  7:44 AM   Closure None 6/11/2019  7:44 AM   Periwound pink;moist 6/11/2019  7:44 AM   Drainage Amount scant 6/11/2019  7:44 AM   Care, Wound cleansed with;soap and water 6/11/2019  7:44 AM   Periwound Care, Wound dry periwound area maintained 6/11/2019  7:44 AM       Wound Left heel pressure injury (Active)   Dressing Appearance intact;dry 6/11/2019  7:44 AM   Closure None 6/11/2019  7:44 AM   Periwound intact;pink;moist 6/11/2019  7:44 AM   Drainage Amount none 6/11/2019  7:44 AM   Periwound Care, Wound dry periwound area maintained 6/10/2019  8:00 PM     Lymphedema     Row Name 06/11/19 0815             Lymphedema Edema Assessment    Ptting Edema Category  By severity  -MF      Pitting Edema  Moderate  -MF         Skin Changes/Observations    Location/Assessment  Lower Extremity  -MF      Lower Extremity  Conditions  bilateral:;scaly;scab(s);inflamed  -      Lower Extremity Color/Pigment  bilateral:;erythema;brawny;hyperpigmented significant hypertrophic crust to BLEs.   -         Lymphedema Pulses/Capillary Refill    Lymphedema Pulses/Capillary Refill  -- unable to assess due to deformities and hypertrophic crust  -         Compression/Skin Care    Compression/Skin Care  skin care;wrapping location;bandaging;compression garment  -      Skin Care  washed/dried  -      Wrapping Location  lower extremity  -      Wrapping Location LE  bilateral:;foot to knee  -      Wrapping Comments  unna boot with coban to secure  -        User Key  (r) = Recorded By, (t) = Taken By, (c) = Cosigned By    Initials Name Provider Type     Arsalan Langford, PT Physical Therapist          WOUND DEBRIDEMENT  Total area of Debridement: ~15cm2  Debridement Site 1  Location- Site 1: BLE ulcerations and periwound areas  Selective Debridement- Site 1: Wound Surface <20cmsq  Instruments- Site 1: tweezers  Excised Tissue Description- Site 1: moderate, slough, other (comment)(periwound hypertrophic crust and nonviable tissue)  Bleeding- Site 1: none                  PT ASSESSMENT (last 12 hours)      Physical Therapy Evaluation     Row Name 06/11/19 0815          PT Evaluation Time/Intention    Subjective Information  complains of;weakness;fatigue;pain  -     Document Type  evaluation;therapy note (daily note);wound care  -     Mode of Treatment  physical therapy  -     Row Name 06/11/19 0815          General Information    Patient Profile Reviewed?  yes  -     Onset of Illness/Injury or Date of Surgery  06/09/19  -     Referring Physician  hospitalist  -     Patient Observations  alert;cooperative;agree to therapy  -     Pertinent History of Current Functional Problem  Pt with chronic BLE ulcerations and LE edema with increased drainage and worsening of wounds.   -     Risks Reviewed  patient:;increased  discomfort  -MF     Benefits Reviewed  patient:;improve skin integrity  -     Barriers to Rehab  medically complex;previous functional deficit;physical barrier  -     Row Name 06/11/19 0815          Cognitive Assessment/Intervention- PT/OT    Orientation Status (Cognition)  oriented x 3  -     Row Name 06/11/19 0815          Pain Assessment    Additional Documentation  Pain Scale: FACES Pre/Post-Treatment (Group)  -     Row Name 06/11/19 0815          Pain Scale: Numbers Pre/Post-Treatment    Pain Location - Orientation  generalized  -MF     Pain Intervention(s)  Repositioned  -     Row Name 06/11/19 0815          Pain Scale: FACES Pre/Post-Treatment    Pain: FACES Scale, Pretreatment  2-->hurts little bit  -MF     Pain: FACES Scale, Post-Treatment  2-->hurts little bit  -     Row Name 06/11/19 0815          Wound 06/05/18 0915 Left medial;lower leg ulceration, venous    Wound - Properties Group Date first assessed: 06/05/18  -CP Time first assessed: 0915  -CP Present On Admission : yes;picture taken  -CP Side: Left  -CP Orientation: medial;lower  -CP Location: leg  -CP Type: ulceration, venous  -CP    Wound Image  Images linked: 1  -MF     Dressing Appearance  dry;intact  -MF     Base  red;slough;yellow  -MF     Periwound  moist;pink  -MF     Periwound Temperature  warm  -MF     Periwound Skin Turgor  soft  -MF     Edges  open  -MF     Wound Length (cm)  5 cm several scattered areas  -MF     Wound Width (cm)  3 cm  -MF     Wound Depth (cm)  0.1 cm  -MF     Drainage Characteristics/Odor  serosanguineous  -MF     Drainage Amount  moderate  -MF     Care, Wound  irrigated with;sterile normal saline;debrided  -MF     Dressing Care, Wound  foam;low-adherent;silver impregnated endoform and Ag foam  -     Row Name 06/11/19 0815          Wound 06/05/18 0915 Left lateral;lower leg ulceration, venous    Wound - Properties Group Date first assessed: 06/05/18  -CP Time first assessed: 0915  -CP Present On  Admission : yes;picture taken  -CP Side: Left  -CP Orientation: lateral;lower  -CP Location: leg  -CP Type: ulceration, venous  -CP    Wound Image  Images linked: 1  -MF     Dressing Appearance  dry;intact  -MF     Base  moist;pink;yellow  -MF     Periwound  intact;pink  -MF     Periwound Temperature  cool  -MF     Periwound Skin Turgor  soft  -MF     Edges  open  -MF     Wound Length (cm)  5 cm  -MF     Wound Width (cm)  3 cm  -MF     Wound Depth (cm)  0.1 cm  -MF     Drainage Characteristics/Odor  serosanguineous  -MF     Drainage Amount  moderate  -MF     Care, Wound  irrigated with;sterile normal saline;debrided  -MF     Dressing Care, Wound  foam;low-adherent;silver impregnated endoform and Ag foam  -MF     Row Name 06/11/19 0815          Wound 06/05/18 0915 Right lower;anterior leg ulceration, venous    Wound - Properties Group Date first assessed: 06/05/18  -CP Time first assessed: 0915  -CP Present On Admission : yes;picture taken  -CP Side: Right  -CP Orientation: lower;anterior  -CP Location: leg  -CP Type: ulceration, venous  -CP    Dressing Appearance  dry;intact  -MF     Base  moist;pink  -MF     Periwound  intact;moist  -MF     Row Name             Wound 06/05/18 0915 Right lateral;lower leg ulceration, venous    Wound - Properties Group Date first assessed: 06/05/18  -CP Time first assessed: 0915  -CP Present On Admission : yes;picture taken  -CP Side: Right  -CP Orientation: lateral;lower  -CP Location: leg  -CP Type: ulceration, venous  -CP    Row Name             Wound 01/16/19 0400 Left gluteal laceration    Wound - Properties Group Date first assessed: 01/16/19  - Time first assessed: 0400  -MC Present On Admission : yes  -MC Side: Left  -MC Location: gluteal  -MC Type: laceration  -MC    Row Name 06/11/19 0815          Wound 06/05/18 0915 Right posterior;lower leg ulceration, venous    Wound - Properties Group Date first assessed: 06/05/18  -CP Time first assessed: 0915  -CP Present On  Admission : yes  -CP Side: Right  -CP Orientation: posterior;lower  -CP Location: leg  -CP Type: ulceration, venous  -CP    Wound Image  Images linked: 1  -MF     Dressing Appearance  dry;intact  -MF     Base  moist;red;pink;slough  -MF     Periwound  pink;moist  -MF     Periwound Temperature  warm  -MF     Periwound Skin Turgor  soft  -MF     Edges  open  -MF     Wound Length (cm)  4 cm  -MF     Wound Width (cm)  3.5 cm  -MF     Wound Depth (cm)  0.1 cm  -MF     Care, Wound  irrigated with;sterile normal saline;debrided  -MF     Dressing Care, Wound  foam;low-adherent;silver impregnated endoform and Ag foam  -     Row Name             Wound 06/05/18 0915 Right medial heel    Wound - Properties Group Date first assessed: 06/05/18  -CP Time first assessed: 0915  -CP Present On Admission : yes;picture taken  -CP Side: Right  -CP Orientation: medial  -CP Location: heel  -CP Stage, Pressure Injury: unstageable  -CP    Row Name 06/11/19 0815          Coping    Observed Emotional State  accepting;calm;cooperative  -     Verbalized Emotional State  acceptance  -     Row Name 06/11/19 0815          Plan of Care Review    Plan of Care Reviewed With  patient;family  -     Row Name 06/11/19 0815          Physical Therapy Clinical Impression    Date of Referral to PT  06/10/19  -     PT Diagnosis (PT Clinical Impression)  BLE ulcerations with increased s/s of infection   -     Patient/Family Goals Statement (PT Clinical Impression)  go home wiht family.   -     Criteria for Skilled Interventions Met (PT Clinical Impression)  yes;treatment indicated  -     Pathology/Pathophysiology Noted (Describe Specifically for Each System)  integumentary  -     Rehab Potential (PT Clinical Summary)  good, to achieve stated therapy goals  -     Care Plan Review (PT)  evaluation/treatment results reviewed;care plan/treatment goals reviewed;risks/benefits reviewed;current/potential barriers reviewed;patient/other agree to  care plan  -     Row Name 06/11/19 0815          Physical Therapy Goals    Wound Care Goal Selection (PT)  wound care, PT goal 1  -     Additional Documentation  Wound Care Goal Selection (PT) (Row)  -     Row Name 06/11/19 0815          Wound Care Goal 1 (PT)    Wound Care Goal 1 (PT)  Decrease drainage and s/s of infection to none.   -     Time Frame (Wound Care Goal 1, PT)  10 days  -     Row Name 06/11/19 0815          Positioning and Restraints    Pre-Treatment Position  in bed  -     Post Treatment Position  bed  -MF     In Bed  supine;call light within reach;with family/caregiver  -       User Key  (r) = Recorded By, (t) = Taken By, (c) = Cosigned By    Initials Name Provider Type    MF Arsalan Langford, PT Physical Therapist    Angeline Patel, APRN Nurse Practitioner    Neha Nelson, RN Registered Nurse        Physical Therapy Education     Title: PT OT SLP Therapies (Done)     Topic: Physical Therapy (Done)     Point: Mobility training (Done)     Learning Progress Summary           Patient Acceptance, E, VU,NR by CD at 6/10/2019  5:38 PM    Comment:  BENEFITS OF OOB ACTIVITY, SAFETY WITH MOBILITY, PROGRESSION OF POC, D/C PLANNING,   Family Acceptance, E, VU,NR by CD at 6/10/2019  5:38 PM    Comment:  BENEFITS OF OOB ACTIVITY, SAFETY WITH MOBILITY, PROGRESSION OF POC, D/C PLANNING,                   Point: Home exercise program (Done)     Learning Progress Summary           Patient Acceptance, E, VU,NR by CD at 6/10/2019  5:38 PM    Comment:  BENEFITS OF OOB ACTIVITY, SAFETY WITH MOBILITY, PROGRESSION OF POC, D/C PLANNING,   Family Acceptance, E, VU,NR by CD at 6/10/2019  5:38 PM    Comment:  BENEFITS OF OOB ACTIVITY, SAFETY WITH MOBILITY, PROGRESSION OF POC, D/C PLANNING,                   Point: Body mechanics (Done)     Learning Progress Summary           Patient Acceptance, E, VU,NR by CD at 6/10/2019  5:38 PM    Comment:  BENEFITS OF OOB ACTIVITY, SAFETY WITH MOBILITY,  PROGRESSION OF POC, D/C PLANNING,   Family Acceptance, E, VU,NR by CD at 6/10/2019  5:38 PM    Comment:  BENEFITS OF OOB ACTIVITY, SAFETY WITH MOBILITY, PROGRESSION OF POC, D/C PLANNING,                   Point: Precautions (Done)     Learning Progress Summary           Patient Acceptance, E, VU,NR by CD at 6/10/2019  5:38 PM    Comment:  BENEFITS OF OOB ACTIVITY, SAFETY WITH MOBILITY, PROGRESSION OF POC, D/C PLANNING,   Family Acceptance, E, VU,NR by CD at 6/10/2019  5:38 PM    Comment:  BENEFITS OF OOB ACTIVITY, SAFETY WITH MOBILITY, PROGRESSION OF POC, D/C PLANNING,                               User Key     Initials Effective Dates Name Provider Type Discipline    CD 06/19/15 -  Katrin Espinal, PT Physical Therapist PT                Recommendation and Plan  Planned Therapy Interventions (PT Eval): wound care, patient/family education  Therapy Frequency (PT Clinical Impression): daily  Plan of Care Reviewed With: patient, family           Outcome Summary: Pt presents with chronic BLE ulcerations with worsening of wounds noted by family.  PT placed endoform and Ag foam to wounds to help manage exudate and improve healing potential.  Unna boots applied to increase venous return to decrease edema and improve skin integrity.   Plan of Care Reviewed With: patient, family      Outcome Measures     Row Name 06/10/19 6514             How much help from another person do you currently need...    Turning from your back to your side while in flat bed without using bedrails?  2  -CD      Moving from lying on back to sitting on the side of a flat bed without bedrails?  3  -CD      Moving to and from a bed to a chair (including a wheelchair)?  2  -CD      Standing up from a chair using your arms (e.g., wheelchair, bedside chair)?  3  -CD      Climbing 3-5 steps with a railing?  1  -CD      To walk in hospital room?  3  -CD      AM-PAC 6 Clicks Score  14  -CD         Functional Assessment    Outcome Measure Options  AM-PAC 6  Clicks Basic Mobility (PT)  -CD        User Key  (r) = Recorded By, (t) = Taken By, (c) = Cosigned By    Initials Name Provider Type    Katrin Younger, PT Physical Therapist            Time Calculation  PT Charges     Row Name 06/11/19 0815             Time Calculation    Start Time  0815  -      PT Goal Re-Cert Due Date  06/20/19  -        User Key  (r) = Recorded By, (t) = Taken By, (c) = Cosigned By    Initials Name Provider Type     Arsalan Langford, PT Physical Therapist            Therapy Charges for Today     Code Description Service Date Service Provider Modifiers Qty    [id number]  PT STAPPING UNNA BOOT 6/11/2019 Arsalan Langford, PT GP 1    [id number] HC HERNAN DEBRIDE OPEN WOUND UP TO 20CM 6/11/2019 Arsalan Langford, PT GP 1    [id number]  PT RE-EVAL ESTABLISHED PLAN 2 6/11/2019 Arsalan Langford, PT GP 1            PT G-Codes  Outcome Measure Options: AM-PAC 6 Clicks Basic Mobility (PT)  AM-PAC 6 Clicks Score: 14       Arsalan Langford, PT  6/11/2019

## 2019-06-11 NOTE — PROGRESS NOTES
1       Michoacano Rojas  1940  [id number]  6/11/2019    CC: Sepsis    Michoacano Rojas is a 78 y.o. male here for venous stasis disease with bilateral lower extremity ulcerations and cellulitis.  Immunocompromised host with advanced RA.      Past medical history:  Past Medical History:   Diagnosis Date   • A-fib (CMS/[facility])    • Atrial fibrillation (CMS/HCC)    • Chronic cutaneous venous stasis ulcer (CMS/[facility])    • Hypertension    • Penile abnormality    • Peptic ulcer disease    • Primary malignant neoplasm of bronchus with metastasisto other site, unspecified laterality (CMS/HCC) 4/22/2019   • RA (rheumatoid arthritis) (CMS/[facility])    • Rheumatic fever    • Rheumatoid arthritis (CMS/HCC)    • Sepsis (CMS/[facility]) 11/17/2017    from cellulitis due to leg ulcer, hospitalization, antibiotics, wound care   • Vertigo    • Vertigo        Medications:   Current Facility-Administered Medications:   •  ! Vancomycin Trough before 10:00 dose on Tuesday 6/11/19; Hold dose until level evaluated, , Does not apply, Once, Arsalan Good [facility]  •  acetaminophen (TYLENOL) tablet 500 mg, 500 mg, Oral, Q6H PRN, Yobani Bruner MD  •  amLODIPine (NORVASC) tablet 5 mg, 5 mg, Oral, Q24H, Millicent Mondragon PA-C, 5 mg at 06/10/19 0809  •  bisoprolol (ZEBeta) tablet 2.5 mg, 2.5 mg, Oral, Q24H, Millicent Mondragon PA-C, 2.5 mg at 06/10/19 0809  •  diclofenac (VOLTAREN) 1 % gel 2 g, 2 g, Topical, BID, Millicent Mondragon PA-C, 2 g at 06/10/19 2017  •  docusate sodium (COLACE) capsule 100 mg, 100 mg, Oral, Daily PRN, Millicent Mondragon PA-C  •  finasteride (PROSCAR) tablet 5 mg, 5 mg, Oral, Daily, Millicent Mondragon PA-KATHERINE, 5 mg at 06/10/19 0830  •  fluticasone (FLONASE) 50 MCG/ACT nasal spray 2 spray, 2 spray, Each Nare, Daily, Millicent Mondragon PA-C, 2 spray at 06/10/19 0830  •  HYDROcodone-acetaminophen (NORCO) 5-325 MG per tablet 1 tablet, 1 tablet, Oral, Q6H PRN, Yobani Bruner MD, 1 tablet at 06/10/19 1372  •  ipratropium-albuterol (DUO-NEB)  nebulizer solution 3 mL, 3 mL, Nebulization, Q4H PRN, Giancarlo, Johnia L, PA-C, 3 mL at 06/10/19 0117  •  lactobacillus acidophilus (RISAQUAD) capsule 1 capsule, 1 capsule, Oral, Daily, John Mondragonia L, PA-C, 1 capsule at 06/10/19 0809  •  melatonin tablet 5 mg, 5 mg, Oral, Nightly PRN, John Mondragonia SONY, PA-C  •  ondansetron (ZOFRAN) injection 4 mg, 4 mg, Intravenous, Q6H PRN, John Mondragonia SONY, PA-C  •  Pharmacy to dose vancomycin, , Does not apply, Continuous PRN, Millicent Mondragon PA-C  •  piperacillin-tazobactam (ZOSYN) 3.375 g in iso-osmotic dextrose 50 ml (premix), 3.375 g, Intravenous, Q8H, John Mondragonia SONY, PA-C, 3.375 g at 06/11/19 0510  •  polyethylene glycol 3350 powder (packet), 17 g, Oral, Daily, Wander Mondragonnicia L, PA-C, 17 g at 06/10/19 0807  •  predniSONE (DELTASONE) tablet 10 mg, 10 mg, Oral, Daily, Millicent Mondragon, PA-C, 10 mg at 06/10/19 0809  •  sodium chloride 0.45 % infusion, 75 mL/hr, Intravenous, Continuous, Yobani Bruner MD, Last Rate: 75 mL/hr at 06/10/19 1700, 75 mL/hr at 06/10/19 1700  •  sodium chloride 0.9 % flush 10 mL, 10 mL, Intravenous, PRN, Laci Lopez MD  •  sodium chloride 0.9 % flush 3 mL, 3 mL, Intravenous, Q12H, John Mondragonia SONY, PA-C, 3 mL at 06/10/19 1008  •  sodium chloride 0.9 % flush 3-10 mL, 3-10 mL, Intravenous, PRN, Millicent Mondragon, PA-C  •  vancomycin (VANCOCIN) in iso-osmotic dextrose IVPB 1 g (premix) 200 mL, 15 mg/kg, Intravenous, Q12H, BoxArsalan RPH, 1,000 mg at 06/10/19 2015  Antibiotics:  Anti-Infectives (From admission, onward)    Ordered     Dose/Rate Route Frequency Start Stop    06/10/19 0023  vancomycin (VANCOCIN) in iso-osmotic dextrose IVPB 1 g (premix) 200 mL     Ordering Provider:  Arsalan Good RPH    15 mg/kg × 67.6 kg  over 60 Minutes Intravenous Every 12 Hours 06/10/19 1000 06/17/19 0959    06/10/19 0003  piperacillin-tazobactam (ZOSYN) 3.375 g in iso-osmotic dextrose 50 ml (premix)     Ordering Provider:  Millicent Mondragon  "EARL    3.375 g  over 4 Hours Intravenous Every 8 Hours 06/10/19 0400 06/17/19 0359    06/10/19 0003  Pharmacy to dose vancomycin     Ordering Provider:  Millicent Mondragon PA-C     Does not apply Continuous PRN 06/10/19 0003 06/17/19 0002    06/09/19 2040  vancomycin 1250 mg/250 mL 0.9% NS IVPB ([facility])     Ordering Provider:  Laci Lopez MD    20 mg/kg × 68 kg Intravenous Once 06/09/19 2055 06/09/19 2223 06/09/19 2040  piperacillin-tazobactam (ZOSYN) 4.5 g in iso-osmotic dextrose 100 mL IVPB (premix)     Ordering Provider:  Laci Lopez MD    4.5 g Intravenous Once 06/09/19 2050 06/09/19 2217          Allergies:  is allergic to naproxen sodium and other.    Review of Systems: All other reviewed and negative except as per HPI    Blood pressure 133/65, pulse 62, temperature 98 °F (36.7 °C), temperature source Oral, resp. rate 16, height 170.2 cm (67\"), weight 67.6 kg (149 lb), SpO2 96 %.  GENERAL: Awakens to name.  Denies pain.  Requests \"pray for me\".  HEENT: Oropharynx without thrush. Sinuses nontender. Dentition in modest repair. No cervical adenopathy. No carotid bruits/ jugular venous distention.   EYES: PERRL. No conjunctival injection. No icterus. EOMI.  LYMPHATICS: No lymphadenopathy of the neck or axillary or inguinal regions.   HEART: No murmur, gallop, or pericardial friction rub.   LUNGS: Clear to auscultation anteriorly. No percussion dullness.   ABDOMEN: Soft, nontender, nondistended. No appreciable HSM.    SKIN: Warm and dry without cutaneous eruptions. No embolic stigmata.  Lower extremity lymphedema.  Partial-thickness ulcerations with cellulitis.  No gross purulence.  No malodorous changes.  Hands with extensive changes of deformans RA.  Rheumatoid nodules noted.  PSYCHIATRIC: Mental status lucid. Cranial nerve function intact.       DIAGNOSTICS:  Lab Results   Component Value Date    WBC 18.65 (H) 06/10/2019    HGB 10.2 (L) 06/10/2019    HCT 35.6 (L) 06/10/2019     (L) 06/10/2019 "     Lab Results   Component Value Date    CRP 4.90 (H) 11/17/2017     Lab Results   Component Value Date    SEDRATE 62 (H) 11/17/2017     Lab Results   Component Value Date    GLUCOSE 87 06/10/2019    BUN 22 06/10/2019    CREATININE 0.54 (L) 06/10/2019    EGFRIFAFRI >150 06/10/2019    BCR 40.7 (H) 06/10/2019    CO2 25.0 06/10/2019    CALCIUM 8.1 (L) 06/10/2019    ALBUMIN 3.40 (L) 06/09/2019    AST 14 06/09/2019    ALT 7 06/09/2019       Microbiology: Admission blood cultures x2-.  Urine culture with significant colony counts of Candida albicans.  Wound cultures pending.  4+ WBCs with gram-positive cocci and occasional gram-negative rods from right lower extremity ulceration.    RADIOLOGY:  Imaging Results (last 72 hours)     Procedure Component Value Units Date/Time    XR Chest 1 View [[id number]] Collected:  06/09/19 2150     Updated:  06/09/19 2152    Narrative:       CR Chest 1 Vw 6/9/2019    INDICATION:   Fever of unknown origin today. Hematuria. Elevated white blood cell count. Body and chills.     COMPARISON:    6/4/2018    FINDINGS:  Single portable AP view of the chest.    No visible support lines.    The heart is enlarged but stable. There is dextroscoliosis of the thoracic spine. The lungs are hyperinflated with emphysematous and fibrotic changes characteristic of COPD. No airspace consolidation is seen. There are no pleural effusions.       Impression:       Cardiomegaly and COPD. No active pulmonary disease.    Signer Name: Petar Hayes MD   Signed: 6/9/2019 9:50 PM   Workstation Name: [id number]             Assessment and Plan: Sepsis.  Marked leukocytosis.  Venous stasis disease.  Bilateral lower extremity ulcerations.  Polymicrobial soft tissue infection with gram-positive cocci and gram-negative bacilli.  Candida urinary tract infection.  Deformans rheumatoid arthritis.  Immunocompromised host.  Maximum temperature 99.8.  Currently 98.0.  Pulse 62 respiratory rate 16 blood pressure 133/65.  Last  peripheral leukocyte count 18.6.  Candida albicans at significant colony counts and urine culture.  Admission blood cultures x2-.  Await wound cultures with gram-positive cocci and gram-negative rods on Gram stain.  Continues on vancomycin and piperacillin tazobactam.  Add oral Diflucan 200 mg p.o. every 24 hours.    Dale Jones MD  6/11/2019

## 2019-06-11 NOTE — PROGRESS NOTES
Pharmacy Consult-Vancomycin Dosing  Michoacano Rojas is a  78 y.o. male receiving vancomycin therapy.     Indication: Sepsis  Consulting Provider: Dr. Janie TORRES Consult: Yes    Goal Trough: 15 - 20 mcg/mL    Current Antimicrobial Therapy  Anti-Infectives (From admission, onward)      Ordered     Dose/Rate Route Frequency Start Stop    06/11/19 1140  vancomycin 1500 mg/500 mL 0.9% NS IVPB ([facility])     Ordering Provider:  Maryse Robison RPH    1,500 mg  over 90 Minutes Intravenous Every 24 Hours 06/11/19 1400 06/16/19 1359    06/11/19 0738  fluconazole (DIFLUCAN) tablet 200 mg     Maryse Robison RPH reviewed the order on 06/11/19 1141.   Ordering Provider:  Dale Jones MD    200 mg Oral Every 24 Hours 06/11/19 0900 06/18/19 0859    06/10/19 0003  piperacillin-tazobactam (ZOSYN) 3.375 g in iso-osmotic dextrose 50 ml (premix)     Ordering Provider:  Millicent Mondragon PA-C    3.375 g  over 4 Hours Intravenous Every 8 Hours 06/10/19 0400 06/17/19 0359    06/10/19 0003  Pharmacy to dose vancomycin     Maryse Robison RPH reviewed the order on 06/11/19 1141.   Ordering Provider:  Millicent Mondragon PA-C     Does not apply Continuous PRN 06/10/19 0003 06/17/19 0002    06/09/19 2040  vancomycin 1250 mg/250 mL 0.9% NS IVPB ([facility])     Ordering Provider:  Laci Lopez MD    20 mg/kg × 68 kg Intravenous Once 06/09/19 2055 06/09/19 2223    06/09/19 2040  piperacillin-tazobactam (ZOSYN) 4.5 g in iso-osmotic dextrose 100 mL IVPB (premix)     Ordering Provider:  Laci Lopez MD    4.5 g Intravenous Once 06/09/19 2050 06/09/19 2217            Allergies  Allergies as of 06/09/2019 - Reviewed 06/09/2019   Allergen Reaction Noted   • Diclofenac Other (See Comments) 01/29/2019   • Naproxen sodium Other (See Comments) 01/29/2019   • Other GI Intolerance 06/13/2016       Labs    Results from last 7 days   Lab Units 06/10/19  0425 06/09/19 2019   BUN mg/dL 22 25*   CREATININE mg/dL 0.54* 0.67*       Results from last 7  "days   Lab Units 06/10/19  0425 06/09/19 2019   WBC 10*3/mm3 18.65* 19.88*       Evaluation of Dosing     Last Dose Received in the ED/Outside Facility: Vancomycin 1250 mg IV x 1 (22:23 6/9/19)  Is Patient on Dialysis or Renal Replacement:     Ht - 170.2 cm (67\")  Wt - 67.6 kg (149 lb)    Estimated Creatinine Clearance: 70.4 mL/min (A) (by C-G formula based on SCr of 0.54 mg/dL (L)).    Intake & Output (last 3 days)         06/08 0701 - 06/09 0700 06/09 0701 - 06/10 0700 06/10 0701 - 06/11 0700 06/11 0701 - 06/12 0700    P.O.   120     I.V. (mL/kg)   2978 (44.1)     IV Piggyback  2190 300     Total Intake(mL/kg)  2190 (32.4) 3398 (50.3)     Urine (mL/kg/hr)  450 1100 (0.7)     Stool   1     Total Output  450 1101     Net  +1740 +2297             Urine Unmeasured Occurrence   3 x             Microbiology and Radiology  Microbiology Results (last 10 days)       Procedure Component Value - Date/Time    Wound Culture - Wound, Leg, Right [[id number]]  (Abnormal) Collected:  06/09/19 2042    Lab Status:  Preliminary result Specimen:  Wound from Leg, Right Updated:  06/11/19 0938     Wound Culture Heavy growth (4+) Pseudomonas species      Scant growth (1+) Gram Positive Cocci     Gram Stain Many (4+) WBCs seen      Few (2+) Gram positive cocci in pairs      Occasional Gram negative bacilli    Wound Culture - Wound, Leg, Left [[id number]]  (Abnormal) Collected:  06/09/19 2042    Lab Status:  Preliminary result Specimen:  Wound from Leg, Left Updated:  06/11/19 0929     Wound Culture Moderate growth (3+) Staphylococcus aureus      Rare Gram Negative Bacilli      Moderate growth (3+) Normal Skin Sigrid     Gram Stain Few (2+) Epithelial cells seen      No WBCs or organisms seen    Blood Culture - Blood, Hand, Left [[id number]] Collected:  06/09/19 2018    Lab Status:  Preliminary result Specimen:  Blood from Hand, Left Updated:  06/10/19 2031     Blood Culture No growth at 24 hours    Blood Culture - Blood, Hand, Right " [[id number]] Collected:  06/09/19 2012    Lab Status:  Preliminary result Specimen:  Blood from Hand, Right Updated:  06/10/19 2031     Blood Culture No growth at 24 hours    Urine Culture - Urine, Urine, Catheter In/Out [[id number]]  (Abnormal) Collected:  06/09/19 2008    Lab Status:  Final result Specimen:  Urine, Catheter In/Out Updated:  06/10/19 1821     Urine Culture >100,000 CFU/mL Candida albicans            Evaluation of Level                  Results from last 7 days   Lab Units 06/11/19  0902   VANCOMYCIN TR mcg/mL 19.40   This is ~13 hour level, drawn ~ 1 hour late       Assessment/Plan:  Patient received vancomycin 1250 mg IV x 1 dose, then was started on vancomycin 1000 mg IV q12h.  A trough was obtained today prior to 4th total dose of vancomycin.    This level resulted therapeutic, but likely would be closer to ~21 mcg/ml if true 11-12 hour level.  Also expect that vancomycin will continue to accumulate as approaches steady state.  For these reasons, will change to vancomycin 1500 mg IV q24h and reassess level on 6/13 if remains on therapy.    Pharmacy will continue to follow and adjust plan as needed.    Thank you for this consult,  Maryse Robison, PharmD  06/11/19  12:45 PM

## 2019-06-11 NOTE — PLAN OF CARE
Problem: Patient Care Overview  Goal: Plan of Care Review  Outcome: Ongoing (interventions implemented as appropriate)   06/11/19 0337   OTHER   Outcome Summary Pt presents with chronic BLE ulcerations with worsening of wounds noted by family. PT placed endoform and Ag foam to wounds to help manage exudate and improve healing potential. Unna boots applied to increase venous return to decrease edema and improve skin integrity.    Coping/Psychosocial   Plan of Care Reviewed With patient;family

## 2019-06-12 LAB
ANION GAP SERPL CALCULATED.3IONS-SCNC: 12 MMOL/L
BACTERIA SPEC AEROBE CULT: ABNORMAL
BASOPHILS # BLD AUTO: 0.02 10*3/MM3 (ref 0–0.2)
BASOPHILS NFR BLD AUTO: 0.2 % (ref 0–1.5)
BUN BLD-MCNC: 13 MG/DL (ref 8–23)
BUN/CREAT SERPL: 22.8 (ref 7–25)
C DIFF TOX GENS STL QL NAA+PROBE: NOT DETECTED
CALCIUM SPEC-SCNC: 8.2 MG/DL (ref 8.6–10.5)
CHLORIDE SERPL-SCNC: 108 MMOL/L (ref 98–107)
CO2 SERPL-SCNC: 22 MMOL/L (ref 22–29)
CREAT BLD-MCNC: 0.57 MG/DL (ref 0.76–1.27)
D-LACTATE SERPL-SCNC: 1 MMOL/L (ref 0.5–2)
DEPRECATED RDW RBC AUTO: 47.8 FL (ref 37–54)
EOSINOPHIL # BLD AUTO: 0.07 10*3/MM3 (ref 0–0.4)
EOSINOPHIL NFR BLD AUTO: 0.8 % (ref 0.3–6.2)
ERYTHROCYTE [DISTWIDTH] IN BLOOD BY AUTOMATED COUNT: 14.3 % (ref 12.3–15.4)
GFR SERPL CREATININE-BSD FRML MDRD: >150 ML/MIN/1.73
GLUCOSE BLD-MCNC: 71 MG/DL (ref 65–99)
GRAM STN SPEC: ABNORMAL
HCT VFR BLD AUTO: 35.7 % (ref 37.5–51)
HGB BLD-MCNC: 10.5 G/DL (ref 13–17.7)
IMM GRANULOCYTES # BLD AUTO: 0.03 10*3/MM3 (ref 0–0.05)
IMM GRANULOCYTES NFR BLD AUTO: 0.4 % (ref 0–0.5)
LYMPHOCYTES # BLD AUTO: 1.25 10*3/MM3 (ref 0.7–3.1)
LYMPHOCYTES NFR BLD AUTO: 15.1 % (ref 19.6–45.3)
MCH RBC QN AUTO: 26.8 PG (ref 26.6–33)
MCHC RBC AUTO-ENTMCNC: 29.4 G/DL (ref 31.5–35.7)
MCV RBC AUTO: 91.1 FL (ref 79–97)
MONOCYTES # BLD AUTO: 0.52 10*3/MM3 (ref 0.1–0.9)
MONOCYTES NFR BLD AUTO: 6.3 % (ref 5–12)
NEUTROPHILS # BLD AUTO: 6.39 10*3/MM3 (ref 1.7–7)
NEUTROPHILS NFR BLD AUTO: 77.2 % (ref 42.7–76)
NRBC BLD AUTO-RTO: 0 /100 WBC (ref 0–0.2)
PLATELET # BLD AUTO: 126 10*3/MM3 (ref 140–450)
PMV BLD AUTO: 11.5 FL (ref 6–12)
POTASSIUM BLD-SCNC: 3.3 MMOL/L (ref 3.5–5.2)
RBC # BLD AUTO: 3.92 10*6/MM3 (ref 4.14–5.8)
SODIUM BLD-SCNC: 142 MMOL/L (ref 136–145)
WBC NRBC COR # BLD: 8.28 10*3/MM3 (ref 3.4–10.8)

## 2019-06-12 PROCEDURE — 25010000002 VANCOMYCIN 10 G RECONSTITUTED SOLUTION

## 2019-06-12 PROCEDURE — 85025 COMPLETE CBC W/AUTO DIFF WBC: CPT | Performed by: HOSPITALIST

## 2019-06-12 PROCEDURE — 63710000001 PREDNISONE PER 5 MG: Performed by: PHYSICIAN ASSISTANT

## 2019-06-12 PROCEDURE — 80048 BASIC METABOLIC PNL TOTAL CA: CPT | Performed by: HOSPITALIST

## 2019-06-12 PROCEDURE — 87493 C DIFF AMPLIFIED PROBE: CPT | Performed by: INTERNAL MEDICINE

## 2019-06-12 PROCEDURE — 25010000002 PIPERACILLIN SOD-TAZOBACTAM PER 1 G: Performed by: PHYSICIAN ASSISTANT

## 2019-06-12 PROCEDURE — 99233 SBSQ HOSP IP/OBS HIGH 50: CPT | Performed by: INTERNAL MEDICINE

## 2019-06-12 PROCEDURE — 83605 ASSAY OF LACTIC ACID: CPT | Performed by: HOSPITALIST

## 2019-06-12 RX ORDER — POTASSIUM CHLORIDE 750 MG/1
40 CAPSULE, EXTENDED RELEASE ORAL AS NEEDED
Status: DISCONTINUED | OUTPATIENT
Start: 2019-06-12 | End: 2019-06-13 | Stop reason: HOSPADM

## 2019-06-12 RX ORDER — POTASSIUM CHLORIDE 7.45 MG/ML
10 INJECTION INTRAVENOUS
Status: DISCONTINUED | OUTPATIENT
Start: 2019-06-12 | End: 2019-06-13 | Stop reason: HOSPADM

## 2019-06-12 RX ORDER — POTASSIUM CHLORIDE 1.5 G/1.77G
40 POWDER, FOR SOLUTION ORAL AS NEEDED
Status: DISCONTINUED | OUTPATIENT
Start: 2019-06-12 | End: 2019-06-13 | Stop reason: HOSPADM

## 2019-06-12 RX ORDER — CIPROFLOXACIN HYDROCHLORIDE 3.5 MG/ML
1 SOLUTION/ DROPS TOPICAL
Status: DISCONTINUED | OUTPATIENT
Start: 2019-06-12 | End: 2019-06-13 | Stop reason: HOSPADM

## 2019-06-12 RX ADMIN — AMLODIPINE BESYLATE 5 MG: 5 TABLET ORAL at 10:01

## 2019-06-12 RX ADMIN — DICLOFENAC SODIUM 2 G: 10 GEL TOPICAL at 10:01

## 2019-06-12 RX ADMIN — BISOPROLOL FUMARATE 2.5 MG: 5 TABLET ORAL at 10:00

## 2019-06-12 RX ADMIN — CIPROFLOXACIN HYDROCHLORIDE 1 DROP: 3 SOLUTION/ DROPS OPHTHALMIC at 14:28

## 2019-06-12 RX ADMIN — FINASTERIDE 5 MG: 5 TABLET, FILM COATED ORAL at 10:01

## 2019-06-12 RX ADMIN — TAZOBACTAM SODIUM AND PIPERACILLIN SODIUM 3.38 G: 375; 3 INJECTION, SOLUTION INTRAVENOUS at 20:37

## 2019-06-12 RX ADMIN — CIPROFLOXACIN HYDROCHLORIDE 1 DROP: 3 SOLUTION/ DROPS OPHTHALMIC at 16:57

## 2019-06-12 RX ADMIN — PREDNISONE 10 MG: 10 TABLET ORAL at 10:08

## 2019-06-12 RX ADMIN — VANCOMYCIN HYDROCHLORIDE 1500 MG: 10 INJECTION, POWDER, LYOPHILIZED, FOR SOLUTION INTRAVENOUS at 17:00

## 2019-06-12 RX ADMIN — POTASSIUM CHLORIDE 40 MEQ: 750 CAPSULE, EXTENDED RELEASE ORAL at 20:35

## 2019-06-12 RX ADMIN — Medication 1 CAPSULE: at 10:01

## 2019-06-12 RX ADMIN — FLUCONAZOLE 200 MG: 200 TABLET ORAL at 10:01

## 2019-06-12 RX ADMIN — CIPROFLOXACIN HYDROCHLORIDE 1 DROP: 3 SOLUTION/ DROPS OPHTHALMIC at 20:39

## 2019-06-12 RX ADMIN — POLYVINYL ALCOHOL 2 DROP: 14 SOLUTION/ DROPS OPHTHALMIC at 10:02

## 2019-06-12 RX ADMIN — FLUTICASONE PROPIONATE 2 SPRAY: 50 SPRAY, METERED NASAL at 10:01

## 2019-06-12 RX ADMIN — TAZOBACTAM SODIUM AND PIPERACILLIN SODIUM 3.38 G: 375; 3 INJECTION, SOLUTION INTRAVENOUS at 12:59

## 2019-06-12 RX ADMIN — DICLOFENAC SODIUM 2 G: 10 GEL TOPICAL at 20:39

## 2019-06-12 RX ADMIN — TAZOBACTAM SODIUM AND PIPERACILLIN SODIUM 3.38 G: 375; 3 INJECTION, SOLUTION INTRAVENOUS at 03:49

## 2019-06-12 RX ADMIN — HYDROCODONE BITARTRATE AND ACETAMINOPHEN 1 TABLET: 5; 325 TABLET ORAL at 13:01

## 2019-06-12 RX ADMIN — SODIUM CHLORIDE 75 ML/HR: 4.5 INJECTION, SOLUTION INTRAVENOUS at 00:25

## 2019-06-12 NOTE — PLAN OF CARE
Problem: Pain, Chronic (Adult)  Goal: Acceptable Pain/Comfort Level and Functional Ability  Outcome: Ongoing (interventions implemented as appropriate)   06/12/19 0503   Pain, Chronic (Adult)   Acceptable Pain/Comfort Level and Functional Ability making progress toward outcome       Problem: Skin Injury Risk (Adult)  Goal: Skin Health and Integrity  Outcome: Ongoing (interventions implemented as appropriate)   06/12/19 0503   Skin Injury Risk (Adult)   Skin Health and Integrity making progress toward outcome

## 2019-06-12 NOTE — PROGRESS NOTES
[facility] Medicine Services  PROGRESS NOTE    Patient Name: Michoacano Rojas  : 1940  MRN: [id number]    Date of Admission: 2019  Length of Stay: 3  Primary Care Physician: Michael Chavez MD    Subjective   Subjective     CC:  ams / encephalopathy    HPI:   Pt c/o diarrhea/ loose stool every time he feels like voiding.  Also c/o right eye irritation.   Review of Systems    Gen- No fevers, chills  CV- No chest pain, palpitations  Resp- No cough, dyspnea  GI- as above    Otherwise ROS is negative except as mentioned in the HPI.    Objective   Objective     Vital Signs:   Temp:  [97.7 °F (36.5 °C)-98.7 °F (37.1 °C)] 98.7 °F (37.1 °C)  Heart Rate:  [63] 63  Resp:  [16] 16  BP: (130-156)/(75-97) 156/97        Physical Exam:    Constitutional: No acute distress, awake, alert  HENT: NCAT, mucous membranes moist, right eye conjunctival irritation, no purulent drainage noted  Respiratory: Clear to auscultation bilaterally, respiratory effort normal   Cardiovascular: RRR, no murmurs, rubs, or gallops, palpable pedal pulses bilaterally  Gastrointestinal: Positive bowel sounds, soft, nontender, nondistended  Musculoskeletal: severe joint deformities c/w RA, bilateral LE ulcers covered with dressings c/d/i  Psychiatric: Appropriate affect, cooperative  Neurologic: Oriented x 3, strength symmetric in all extremities, Cranial Nerves grossly intact to confrontation, speech clear  Skin: chronic venous stasis ulcers    Results Reviewed:  I have personally reviewed current lab, radiology, and data and agree.    Results from last 7 days   Lab Units 06/12/19  0403 06/10/19  0425 06/09/19  2019   WBC 10*3/mm3 8.28 18.65* 19.88*   HEMOGLOBIN g/dL 10.5* 10.2* 12.0*   HEMATOCRIT % 35.7* 35.6* 40.7   PLATELETS 10*3/mm3 126* 128* 186     Results from last 7 days   Lab Units 06/12/19  0403 06/10/19  0425 06/09/19  2019   SODIUM mmol/L 142 143 144   POTASSIUM mmol/L 3.3* 3.8 3.8   CHLORIDE mmol/L  108* 106 103   CO2 mmol/L 22.0 25.0 27.0   BUN mg/dL 13 22 25*   CREATININE mg/dL 0.57* 0.54* 0.67*   GLUCOSE mg/dL 71 87 87   CALCIUM mg/dL 8.2* 8.1* 9.2   ALT (SGPT) U/L  --   --  7   AST (SGOT) U/L  --   --  14     Estimated Creatinine Clearance: 71 mL/min (A) (by C-G formula based on SCr of 0.57 mg/dL (L)).    Microbiology Results Abnormal     Procedure Component Value - Date/Time    Wound Culture - Wound, Leg, Left [[id number]]  (Abnormal)  (Susceptibility) Collected:  06/09/19 2042    Lab Status:  Final result Specimen:  Wound from Leg, Left Updated:  06/12/19 1153     Wound Culture Moderate growth (3+) Staphylococcus aureus      Rare Pseudomonas aeruginosa      Moderate growth (3+) Normal Skin Sigrid     Gram Stain Few (2+) Epithelial cells seen      No WBCs or organisms seen    Susceptibility      Staphylococcus aureus     ARACELI     Clindamycin Susceptible     Erythromycin Susceptible     Inducible Clindamycin Resistance Negative     Oxacillin Susceptible     Rifampin Susceptible     Tetracycline Resistant     Trimethoprim + Sulfamethoxazole Susceptible     Vancomycin Susceptible                Susceptibility      Pseudomonas aeruginosa     ARACELI     Cefepime Intermediate     Ceftazidime Intermediate     Ciprofloxacin Susceptible     Gentamicin Susceptible     Levofloxacin Susceptible     Piperacillin + Tazobactam Susceptible                Susceptibility Comments     Staphylococcus aureus    This isolate does not demonstrate inducible clindamycin resistance in vitro.    This isolate does not demonstrate inducible clindamycin resistance in vitro.               Blood Culture - Blood, Hand, Right [[id number]] Collected:  06/09/19 2012    Lab Status:  Preliminary result Specimen:  Blood from Hand, Right Updated:  06/11/19 2031     Blood Culture No growth at 2 days    Blood Culture - Blood, Hand, Left [[id number]] Collected:  06/09/19 2018    Lab Status:  Preliminary result Specimen:  Blood from Hand, Left Updated:   06/11/19 2031     Blood Culture No growth at 2 days    Wound Culture - Wound, Leg, Right [[id number]]  (Abnormal) Collected:  06/09/19 2042    Lab Status:  Preliminary result Specimen:  Wound from Leg, Right Updated:  06/11/19 0938     Wound Culture Heavy growth (4+) Pseudomonas species      Scant growth (1+) Gram Positive Cocci     Gram Stain Many (4+) WBCs seen      Few (2+) Gram positive cocci in pairs      Occasional Gram negative bacilli    Urine Culture - Urine, Urine, Catheter In/Out [[id number]]  (Abnormal) Collected:  06/09/19 2008    Lab Status:  Final result Specimen:  Urine, Catheter In/Out Updated:  06/10/19 1821     Urine Culture >100,000 CFU/mL Candida albicans        Imaging Results (last 24 hours)     ** No results found for the last 24 hours. **        Results for orders placed during the hospital encounter of 06/04/18   Adult Transthoracic Echo Complete W/ Cont if Necessary Per Protocol    Narrative · Left ventricular systolic function is normal.  · Estimated EF appears to be in the range of 56 - 60%.  · Mild tricuspid valve regurgitation is present.  · Estimated right ventricular systolic pressure from tricuspid   regurgitation is mildly elevated (35-45 mmHg).        I have reviewed the medications:  Scheduled Meds:    [START ON 6/13/2019] Pharmacy Consult  Does not apply Once   amLODIPine 5 mg Oral Q24H   bisoprolol 2.5 mg Oral Q24H   ciprofloxacin 1 drop Right Eye Q4H While Awake   diclofenac 2 g Topical BID   finasteride 5 mg Oral Daily   fluconazole 200 mg Oral Q24H   fluticasone 2 spray Each Nare Daily   lactobacillus acidophilus 1 capsule Oral Daily   piperacillin-tazobactam 3.375 g Intravenous Q8H   polyethylene glycol 17 g Oral Daily   predniSONE 10 mg Oral Daily   sodium chloride 3 mL Intravenous Q12H   vancomycin 1,500 mg Intravenous Q24H     Continuous Infusions:    Pharmacy to dose vancomycin     sodium chloride 75 mL/hr Last Rate: 75 mL/hr (06/12/19 0025)     PRN Meds:.acetaminophen  •   docusate sodium  •  HYDROcodone-acetaminophen  •  ipratropium-albuterol  •  melatonin  •  ondansetron  •  Pharmacy to dose vancomycin  •  polyvinyl alcohol  •  potassium chloride **OR** potassium chloride **OR** potassium chloride  •  sodium chloride  •  sodium chloride    Assessment/Plan   Assessment / Plan     Active Hospital Problems    Diagnosis POA   • **Acute metabolic encephalopathy [G93.41] Yes   • Diastolic dysfunction with chronic heart failure (CMS/[facility]) [I50.32] Yes     Priority: Medium   • 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 (CMS/[facility]) [D84.8, T38.0X5A] Yes     Priority: Medium   • Rheumatoid arthritis (CMS/[facility]) [M06.9] Yes     Priority: Medium   • Atrial fibrillation with RVR (CMS/[facility]) [I48.91] Yes     Priority: Low     · Chads Vasc= 4, currently no anticoagulant     • Essential hypertension [I10] Yes     Priority: Low   • Chronic cutaneous venous stasis ulcer (CMS/[facility]) [I83.009, L97.909] Yes     Priority: Low   • Acute UTI (urinary tract infection) [N39.0] Yes   • COPD (chronic obstructive pulmonary disease) (CMS/[facility]) [J44.9] Yes   • BPH without obstruction/lower urinary tract symptoms [N40.0] Yes   • Normocytic anemia [D64.9] Yes   • Cellulitis [L03.90] Yes   • Sepsis (CMS/[facility]) [A41.9] Yes     Brief Hospital Course to date:  Michoacano Rojas is a 78 y.o. male who presented with AMS and Sepsis POA.    Sepsis (leukocytosis, fever of 102.5F) due to cellulitis and Candida UTI  - probable source cellulitis  - immunosuppressed  -- ABX as per Dr. Jones   --Oral Diflucan added d2    Encephalopathy  - likely due to above, improving    Chronic Atrial Fibrillation  - no longer on Eliquis due to falls/bruising in the past. Continue rate control.     RA  -on chronic PDS    Chronic Pain  - has been on Norco for several years        DVT Prophylaxis:  Heparin SC    Disposition: I expect the patient to be discharged TBD    CODE STATUS:   Code Status and Medical Interventions:   Ordered at: 06/09/19 9618      Limited Support to NOT Include:    Intubation     Level Of Support Discussed With:    Patient     Code Status:    No CPR     Medical Interventions (Level of Support Prior to Arrest):    Limited         Electronically signed by Erika Garcia MD, 06/12/19, 11:59 AM.

## 2019-06-12 NOTE — PLAN OF CARE
Problem: Patient Care Overview  Goal: Plan of Care Review  Outcome: Ongoing (interventions implemented as appropriate)   06/12/19 1508   Plan of Care Review   Progress no change   Coping/Psychosocial   Plan of Care Reviewed With patient;spouse       Problem: Pain, Chronic (Adult)  Goal: Identify Related Risk Factors and Signs and Symptoms  Outcome: Ongoing (interventions implemented as appropriate)    Goal: Acceptable Pain/Comfort Level and Functional Ability  Outcome: Ongoing (interventions implemented as appropriate)   06/12/19 1508   Pain, Chronic (Adult)   Acceptable Pain/Comfort Level and Functional Ability making progress toward outcome       Problem: Skin Injury Risk (Adult)  Goal: Identify Related Risk Factors and Signs and Symptoms  Outcome: Ongoing (interventions implemented as appropriate)   06/12/19 1508   Skin Injury Risk (Adult)   Related Risk Factors (Skin Injury Risk) advanced age;mobility impaired;moisture;skeletal deformities     Goal: Skin Health and Integrity  Outcome: Ongoing (interventions implemented as appropriate)   06/12/19 1508   Skin Injury Risk (Adult)   Skin Health and Integrity making progress toward outcome

## 2019-06-12 NOTE — PROGRESS NOTES
Continued Stay Note  [facility]     Patient Name: Michoacano Rojas  MRN: [id number]  Today's Date: 6/12/2019    Admit Date: 6/9/2019    Discharge Plan     Row Name 06/12/19 1534       Plan    Plan  discharge plan    Patient/Family in Agreement with Plan  yes    Plan Comments  Spoke with pt and pt's spouse in room regarding discharge plan. Plan is home with spouse and resume HH with [facility].  CM will notify [facility] at discharge. Pt may need transportation home. CM will cont to follow        Discharge Codes    No documentation.       Expected Discharge Date and Time     Expected Discharge Date Expected Discharge Time    Zachary 15, 2019             Danae Mg RN

## 2019-06-12 NOTE — PROGRESS NOTES
1       Michoacano Rojas  1940  [id number]  6/12/2019    CC: Altered mental status and fever    Michoacano Rojas is a 78 y.o. male here for sepsis, stasis ulcerations in bilateral lower extremities, severe cellulitis, and marked leukocytosis.      Past medical history:  Past Medical History:   Diagnosis Date   • A-fib (CMS/HCC)    • Atrial fibrillation (CMS/HCC)    • Chronic cutaneous venous stasis ulcer (CMS/HCC)    • Hypertension    • Penile abnormality    • Peptic ulcer disease    • Primary malignant neoplasm of bronchus with metastasisto other site, unspecified laterality (CMS/HCC) 4/22/2019   • RA (rheumatoid arthritis) (CMS/HCC)    • Rheumatic fever    • Rheumatoid arthritis (CMS/HCC)    • Sepsis (CMS/HCC) 11/17/2017    from cellulitis due to leg ulcer, hospitalization, antibiotics, wound care   • Vertigo    • Vertigo        Medications:   Current Facility-Administered Medications:   •  [START ON 6/13/2019] ! vancomycin trough 6/13 @ 1330 - hold 1400 dose for level > 20 mcg/ml, , Does not apply, Once, Maryse Robison, [facility]  •  acetaminophen (TYLENOL) tablet 500 mg, 500 mg, Oral, Q6H PRN, Yobani Bruner MD  •  amLODIPine (NORVASC) tablet 5 mg, 5 mg, Oral, Q24H, Millicent Mondragon PA-C, 5 mg at 06/11/19 0835  •  bisoprolol (ZEBeta) tablet 2.5 mg, 2.5 mg, Oral, Q24H, Millicent Mondragon PA-C, 2.5 mg at 06/11/19 0834  •  diclofenac (VOLTAREN) 1 % gel 2 g, 2 g, Topical, BID, Millicent Mondragon PA-C, 2 g at 06/11/19 2012  •  docusate sodium (COLACE) capsule 100 mg, 100 mg, Oral, Daily PRN, Millicent Mondragon PA-C  •  finasteride (PROSCAR) tablet 5 mg, 5 mg, Oral, Daily, Millicent Mondragon PA-C, 5 mg at 06/11/19 0834  •  fluconazole (DIFLUCAN) tablet 200 mg, 200 mg, Oral, Q24H, Dale Jones MD, 200 mg at 06/11/19 1132  •  fluticasone (FLONASE) 50 MCG/ACT nasal spray 2 spray, 2 spray, Each Nare, Daily, Millicent Mondragon PA-C, 2 spray at 06/11/19 0834  •  HYDROcodone-acetaminophen (NORCO) 5-325 MG per  tablet 1 tablet, 1 tablet, Oral, Q6H PRN, Yobani Bruner MD, 1 tablet at 06/11/19 2251  •  ipratropium-albuterol (DUO-NEB) nebulizer solution 3 mL, 3 mL, Nebulization, Q4H PRN, Millicent Mondragon PA-KATHERINE, 3 mL at 06/10/19 0117  •  lactobacillus acidophilus (RISAQUAD) capsule 1 capsule, 1 capsule, Oral, Daily, Millicent Mondragon PA-KATHERINE, 1 capsule at 06/11/19 0834  •  melatonin tablet 5 mg, 5 mg, Oral, Nightly PRN, Millicent Mondragon PA-KATHERINE  •  ondansetron (ZOFRAN) injection 4 mg, 4 mg, Intravenous, Q6H PRN, Millicent Mondragon PA-C  •  Pharmacy to dose vancomycin, , Does not apply, Continuous PRN, Millicent Mondragon PA-C  •  piperacillin-tazobactam (ZOSYN) 3.375 g in iso-osmotic dextrose 50 ml (premix), 3.375 g, Intravenous, Q8H, Millicent Mondragon PA-C, 3.375 g at 06/12/19 0349  •  polyethylene glycol 3350 powder (packet), 17 g, Oral, Daily, Millicent Mondragon PA-C, 17 g at 06/10/19 0807  •  polyvinyl alcohol (LIQUIFILM) 1.4 % ophthalmic solution 2 drop, 2 drop, Both Eyes, Q1H PRN, Yobani Bruner MD, 2 drop at 06/11/19 1910  •  predniSONE (DELTASONE) tablet 10 mg, 10 mg, Oral, Daily, Millicent Mondragon PA-C, 10 mg at 06/11/19 0834  •  sodium chloride 0.45 % infusion, 75 mL/hr, Intravenous, Continuous, Yobani Bruner MD, Last Rate: 75 mL/hr at 06/12/19 0025, 75 mL/hr at 06/12/19 0025  •  sodium chloride 0.9 % flush 10 mL, 10 mL, Intravenous, PRN, Laci Lopez MD  •  sodium chloride 0.9 % flush 3 mL, 3 mL, Intravenous, Q12H, Millicent Mondragon PA-C, 3 mL at 06/11/19 2012  •  sodium chloride 0.9 % flush 3-10 mL, 3-10 mL, Intravenous, PRN, Millicent Mondragon PA-C  •  vancomycin 1500 mg/500 mL 0.9% NS IVPB ([facility]), 1,500 mg, Intravenous, Q24H, Maryse Robison RPH, 1,500 mg at 06/11/19 1312  Antibiotics:  Anti-Infectives (From admission, onward)    Ordered     Dose/Rate Route Frequency Start Stop    06/11/19 1140  vancomycin 1500 mg/500 mL 0.9% NS IVPB ([facility])     Ordering Provider:  Maryse Robison RPH    1,500 mg  over  "90 Minutes Intravenous Every 24 Hours 06/11/19 1400 06/16/19 1359    06/11/19 0738  fluconazole (DIFLUCAN) tablet 200 mg     Maryse Robison [facility] reviewed the order on 06/11/19 1141.   Ordering Provider:  Dale Jones MD    200 mg Oral Every 24 Hours 06/11/19 0900 06/18/19 0859    06/10/19 0003  piperacillin-tazobactam (ZOSYN) 3.375 g in iso-osmotic dextrose 50 ml (premix)     Ordering Provider:  Millicent Mondragon PA-C    3.375 g  over 4 Hours Intravenous Every 8 Hours 06/10/19 0400 06/17/19 0359    06/10/19 0003  Pharmacy to dose vancomycin     Maryse Robison RPH reviewed the order on 06/11/19 1141.   Ordering Provider:  Millicent Mondragon PA-C     Does not apply Continuous PRN 06/10/19 0003 06/17/19 0002    06/09/19 2040  vancomycin 1250 mg/250 mL 0.9% NS IVPB ([facility])     Ordering Provider:  Laci Lopez MD    20 mg/kg × 68 kg Intravenous Once 06/09/19 2055 06/09/19 2223    06/09/19 2040  piperacillin-tazobactam (ZOSYN) 4.5 g in iso-osmotic dextrose 100 mL IVPB (premix)     Ordering Provider:  Laci Lopez MD    4.5 g Intravenous Once 06/09/19 2050 06/09/19 2217          Allergies:  is allergic to naproxen sodium and other.    Review of Systems: All other reviewed and negative except as per HPI    Blood pressure 130/75, pulse 62, temperature 97.7 °F (36.5 °C), temperature source Oral, resp. rate 16, height 170.2 cm (67\"), weight 68.2 kg (150 lb 6.4 oz), SpO2 96 %.  GENERAL: Awake and alert, in no acute distress.  Jovial.  Does not appear to be clinically toxic.  Mental status lucid.  He describes a \"bad night\".  He apparently has experienced some gross hematuria in association with diarrhea.  HEENT: Oropharynx without thrush. Sinuses nontender. Dentition in poor repair. No cervical adenopathy. No carotid bruits/ jugular venous distention.   EYES: PERRL.  Worsening conjunctival injection.  Sub-conjunctival hemorrhage noted with some suffusion. No icterus. EOMI.  LYMPHATICS: No lymphadenopathy of " the neck or axillary or inguinal regions.   HEART: No murmur, gallop, or pericardial friction rub.   LUNGS: Clear to auscultation anteriorly. No percussion dullness.   ABDOMEN: Soft, nontender, nondistended. No appreciable HSM.    SKIN: Warm and dry without cutaneous eruptions. No embolic stigmata.  Lower extremity stasis ulcerations noted.  Currently without purulent drainage or malodorous changes.  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  PSYCHIATRIC: Mental status lucid. Cranial nerve function intact.       DIAGNOSTICS:  Lab Results   Component Value Date    WBC 8.28 06/12/2019    HGB 10.5 (L) 06/12/2019    HCT 35.7 (L) 06/12/2019     (L) 06/12/2019     Lab Results   Component Value Date    CRP 4.90 (H) 11/17/2017     Lab Results   Component Value Date    SEDRATE 62 (H) 11/17/2017     Lab Results   Component Value Date    GLUCOSE 71 06/12/2019    BUN 13 06/12/2019    CREATININE 0.57 (L) 06/12/2019    EGFRIFAFRI >150 06/12/2019    BCR 22.8 06/12/2019    CO2 22.0 06/12/2019    CALCIUM 8.2 (L) 06/12/2019    ALBUMIN 3.40 (L) 06/09/2019    AST 14 06/09/2019    ALT 7 06/09/2019       Microbiology: Staph aureus and pseudomonas aeruginosa in wound cultures.  10 to the fifth colony-forming units of Candida albicans in urine.    RADIOLOGY:  Imaging Results (last 72 hours)     Procedure Component Value Units Date/Time    XR Chest 1 View [[id number]] Collected:  06/09/19 2150     Updated:  06/09/19 2152    Narrative:       CR Chest 1 Vw 6/9/2019    INDICATION:   Fever of unknown origin today. Hematuria. Elevated white blood cell count. Body and chills.     COMPARISON:    6/4/2018    FINDINGS:  Single portable AP view of the chest.    No visible support lines.    The heart is enlarged but stable. There is dextroscoliosis of the thoracic spine. The lungs are hyperinflated with emphysematous and fibrotic changes characteristic of  COPD. No airspace consolidation is seen. There are no pleural effusions.       Impression:       Cardiomegaly and COPD. No active pulmonary disease.    Signer Name: Petar Hayes MD   Signed: 6/9/2019 9:50 PM   Workstation Name: [id number]PC             Assessment and Plan: Sepsis syndrome.  Hectic fever.  Altered mental status.  Candida urinary tract infection.  Polymicrobial soft tissue infection with staph aureus and pseudomonas aeruginosa pending susceptibility data.  Long-standing rheumatoid arthritis/deformans changes/immunocompromised host.  Maximum temperature over 24 hours 97.7.  Currently 98.0.  Pulse 62 respiratory rate 16 blood pressure 133/65.  Staph aureus and Pseudomonas recovered from soft tissue cultures.  We will check a C. difficile toxin assay on stool and begin ophthalmic Ciloxan for the sub-conjunctival hemorrhage with suffusion.  Utilization management: Not acceptable for discharge home.  Suggest home health follow-up for dressing changes to bilateral lower extremity ulcerations.  Plan doxycycline 100 mg p.o. twice daily and levofloxacin 500 mg 1 p.o. daily for a 10-day treatment course.  Follow-up with me in 10 days.  Likely to remain in the hospital for a couple of more days.      Dale Jones MD  6/12/2019

## 2019-06-13 VITALS
RESPIRATION RATE: 16 BRPM | DIASTOLIC BLOOD PRESSURE: 86 MMHG | HEIGHT: 67 IN | WEIGHT: 151.6 LBS | OXYGEN SATURATION: 96 % | TEMPERATURE: 97.8 F | HEART RATE: 67 BPM | BODY MASS INDEX: 23.79 KG/M2 | SYSTOLIC BLOOD PRESSURE: 150 MMHG

## 2019-06-13 PROBLEM — N39.0 ACUTE UTI (URINARY TRACT INFECTION): Status: RESOLVED | Noted: 2019-06-09 | Resolved: 2019-06-13

## 2019-06-13 PROBLEM — G93.41 ACUTE METABOLIC ENCEPHALOPATHY: Status: RESOLVED | Noted: 2019-06-09 | Resolved: 2019-06-13

## 2019-06-13 LAB
ANION GAP SERPL CALCULATED.3IONS-SCNC: 10 MMOL/L
BUN BLD-MCNC: 10 MG/DL (ref 8–23)
BUN/CREAT SERPL: 17.9 (ref 7–25)
CALCIUM SPEC-SCNC: 8.2 MG/DL (ref 8.6–10.5)
CHLORIDE SERPL-SCNC: 108 MMOL/L (ref 98–107)
CO2 SERPL-SCNC: 23 MMOL/L (ref 22–29)
CREAT BLD-MCNC: 0.56 MG/DL (ref 0.76–1.27)
DEPRECATED RDW RBC AUTO: 48.5 FL (ref 37–54)
ERYTHROCYTE [DISTWIDTH] IN BLOOD BY AUTOMATED COUNT: 14.3 % (ref 12.3–15.4)
GFR SERPL CREATININE-BSD FRML MDRD: >150 ML/MIN/1.73
GLUCOSE BLD-MCNC: 71 MG/DL (ref 65–99)
HCT VFR BLD AUTO: 37.7 % (ref 37.5–51)
HGB BLD-MCNC: 10.9 G/DL (ref 13–17.7)
MCH RBC QN AUTO: 26.5 PG (ref 26.6–33)
MCHC RBC AUTO-ENTMCNC: 28.9 G/DL (ref 31.5–35.7)
MCV RBC AUTO: 91.7 FL (ref 79–97)
PLATELET # BLD AUTO: 142 10*3/MM3 (ref 140–450)
PMV BLD AUTO: 10.7 FL (ref 6–12)
POTASSIUM BLD-SCNC: 4.1 MMOL/L (ref 3.5–5.2)
RBC # BLD AUTO: 4.11 10*6/MM3 (ref 4.14–5.8)
SODIUM BLD-SCNC: 141 MMOL/L (ref 136–145)
WBC NRBC COR # BLD: 9.03 10*3/MM3 (ref 3.4–10.8)

## 2019-06-13 PROCEDURE — 25010000002 PIPERACILLIN SOD-TAZOBACTAM PER 1 G: Performed by: PHYSICIAN ASSISTANT

## 2019-06-13 PROCEDURE — 85027 COMPLETE CBC AUTOMATED: CPT | Performed by: INTERNAL MEDICINE

## 2019-06-13 PROCEDURE — 63710000001 PREDNISONE PER 5 MG: Performed by: PHYSICIAN ASSISTANT

## 2019-06-13 PROCEDURE — 80048 BASIC METABOLIC PNL TOTAL CA: CPT | Performed by: INTERNAL MEDICINE

## 2019-06-13 PROCEDURE — 99239 HOSP IP/OBS DSCHRG MGMT >30: CPT | Performed by: INTERNAL MEDICINE

## 2019-06-13 RX ORDER — FLUCONAZOLE 200 MG/1
200 TABLET ORAL EVERY 24 HOURS
Qty: 4 TABLET | Refills: 0 | Status: SHIPPED | OUTPATIENT
Start: 2019-06-14 | End: 2019-06-18

## 2019-06-13 RX ORDER — CIPROFLOXACIN 500 MG/1
500 TABLET, FILM COATED ORAL EVERY 12 HOURS SCHEDULED
Qty: 14 TABLET | Refills: 0 | Status: SHIPPED | OUTPATIENT
Start: 2019-06-13 | End: 2019-06-20

## 2019-06-13 RX ORDER — SACCHAROMYCES BOULARDII 250 MG
250 CAPSULE ORAL 2 TIMES DAILY
Qty: 20 CAPSULE | Refills: 0 | Status: SHIPPED | OUTPATIENT
Start: 2019-06-13 | End: 2019-06-23

## 2019-06-13 RX ORDER — CEPHALEXIN 250 MG/1
500 CAPSULE ORAL 3 TIMES DAILY
Status: DISCONTINUED | OUTPATIENT
Start: 2019-06-13 | End: 2019-06-13 | Stop reason: HOSPADM

## 2019-06-13 RX ORDER — SACCHAROMYCES BOULARDII 250 MG
250 CAPSULE ORAL 2 TIMES DAILY
Status: DISCONTINUED | OUTPATIENT
Start: 2019-06-13 | End: 2019-06-13 | Stop reason: HOSPADM

## 2019-06-13 RX ORDER — CEPHALEXIN 500 MG/1
500 CAPSULE ORAL 3 TIMES DAILY
Qty: 21 CAPSULE | Refills: 0 | Status: SHIPPED | OUTPATIENT
Start: 2019-06-13 | End: 2019-06-20

## 2019-06-13 RX ORDER — CIPROFLOXACIN HYDROCHLORIDE 3.5 MG/ML
1 SOLUTION/ DROPS TOPICAL
Qty: 2.5 ML | Refills: 0 | Status: SHIPPED | OUTPATIENT
Start: 2019-06-13 | End: 2019-10-29 | Stop reason: HOSPADM

## 2019-06-13 RX ORDER — CIPROFLOXACIN 250 MG/1
500 TABLET, FILM COATED ORAL EVERY 12 HOURS SCHEDULED
Status: DISCONTINUED | OUTPATIENT
Start: 2019-06-13 | End: 2019-06-13 | Stop reason: HOSPADM

## 2019-06-13 RX ORDER — CHOLESTYRAMINE LIGHT 4 G/5.7G
1 POWDER, FOR SUSPENSION ORAL DAILY
Status: DISCONTINUED | OUTPATIENT
Start: 2019-06-13 | End: 2019-06-13 | Stop reason: HOSPADM

## 2019-06-13 RX ORDER — CHOLESTYRAMINE LIGHT 4 G/5.7G
1 POWDER, FOR SUSPENSION ORAL DAILY
Qty: 42 EACH | Refills: 0 | Status: ON HOLD | OUTPATIENT
Start: 2019-06-14 | End: 2019-10-23

## 2019-06-13 RX ORDER — POLYVINYL ALCOHOL 14 MG/ML
2 SOLUTION/ DROPS OPHTHALMIC
Qty: 15 ML | Refills: 0 | Status: ON HOLD | OUTPATIENT
Start: 2019-06-13 | End: 2019-10-23

## 2019-06-13 RX ADMIN — SODIUM CHLORIDE 75 ML/HR: 4.5 INJECTION, SOLUTION INTRAVENOUS at 04:02

## 2019-06-13 RX ADMIN — CIPROFLOXACIN HYDROCHLORIDE 500 MG: 250 TABLET, FILM COATED ORAL at 12:06

## 2019-06-13 RX ADMIN — FLUTICASONE PROPIONATE 2 SPRAY: 50 SPRAY, METERED NASAL at 08:05

## 2019-06-13 RX ADMIN — CIPROFLOXACIN HYDROCHLORIDE 1 DROP: 3 SOLUTION/ DROPS OPHTHALMIC at 09:08

## 2019-06-13 RX ADMIN — TAZOBACTAM SODIUM AND PIPERACILLIN SODIUM 3.38 G: 375; 3 INJECTION, SOLUTION INTRAVENOUS at 04:01

## 2019-06-13 RX ADMIN — BISOPROLOL FUMARATE 2.5 MG: 5 TABLET ORAL at 08:05

## 2019-06-13 RX ADMIN — CHOLESTYRAMINE 4 G: 4 POWDER, FOR SUSPENSION ORAL at 09:07

## 2019-06-13 RX ADMIN — PREDNISONE 10 MG: 10 TABLET ORAL at 08:04

## 2019-06-13 RX ADMIN — Medication 1 CAPSULE: at 08:04

## 2019-06-13 RX ADMIN — Medication 250 MG: at 12:06

## 2019-06-13 RX ADMIN — POTASSIUM CHLORIDE 40 MEQ: 750 CAPSULE, EXTENDED RELEASE ORAL at 00:32

## 2019-06-13 RX ADMIN — SODIUM CHLORIDE, PRESERVATIVE FREE 3 ML: 5 INJECTION INTRAVENOUS at 08:05

## 2019-06-13 RX ADMIN — AMLODIPINE BESYLATE 5 MG: 5 TABLET ORAL at 08:04

## 2019-06-13 RX ADMIN — DICLOFENAC SODIUM 2 G: 10 GEL TOPICAL at 08:05

## 2019-06-13 RX ADMIN — FLUCONAZOLE 200 MG: 200 TABLET ORAL at 08:05

## 2019-06-13 RX ADMIN — CIPROFLOXACIN HYDROCHLORIDE 1 DROP: 3 SOLUTION/ DROPS OPHTHALMIC at 04:09

## 2019-06-13 RX ADMIN — FINASTERIDE 5 MG: 5 TABLET, FILM COATED ORAL at 08:05

## 2019-06-13 NOTE — PLAN OF CARE
Problem: Patient Care Overview  Goal: Plan of Care Review  Outcome: Ongoing (interventions implemented as appropriate)   06/13/19 5068   Plan of Care Review   Progress improving   OTHER   Outcome Summary VSS, no complaints of pain noted through the night. No concerns noted, son at BS.    Coping/Psychosocial   Plan of Care Reviewed With patient

## 2019-06-13 NOTE — PROGRESS NOTES
1       Michoacano Rojas  1940  [id number]  6/13/2019    CC: Altered mental status and fever    Michoacano Rojas is a 78 y.o. male here for deformans rheumatoid arthritis/immunocompromised host, polymicrobial soft tissue ulcerations with MRSA and Pseudomonas.      Past medical history:  Past Medical History:   Diagnosis Date   • A-fib (CMS/[facility])    • Atrial fibrillation (CMS/HCC)    • Chronic cutaneous venous stasis ulcer (CMS/[facility])    • Hypertension    • Penile abnormality    • Peptic ulcer disease    • Primary malignant neoplasm of bronchus with metastasisto other site, unspecified laterality (CMS/HCC) 4/22/2019   • RA (rheumatoid arthritis) (CMS/HCC)    • Rheumatic fever    • Rheumatoid arthritis (CMS/HCC)    • Sepsis (CMS/[facility]) 11/17/2017    from cellulitis due to leg ulcer, hospitalization, antibiotics, wound care   • Vertigo    • Vertigo        Medications:   Current Facility-Administered Medications:   •  ! vancomycin trough 6/13 @ 1330 - hold 1400 dose for level > 20 mcg/ml, , Does not apply, Once, Maryse Robison, [facility]  •  acetaminophen (TYLENOL) tablet 500 mg, 500 mg, Oral, Q6H PRN, Yobani Bruner MD  •  amLODIPine (NORVASC) tablet 5 mg, 5 mg, Oral, Q24H, Millicent Mondragon PA-C, 5 mg at 06/12/19 1001  •  bisoprolol (ZEBeta) tablet 2.5 mg, 2.5 mg, Oral, Q24H, Millicent Mondraogn PA-C, 2.5 mg at 06/12/19 1000  •  ciprofloxacin (CILOXAN) 0.3 % ophthalmic solution 1 drop, 1 drop, Right Eye, Q4H While Awake, Dale Jones MD, 1 drop at 06/13/19 0409  •  diclofenac (VOLTAREN) 1 % gel 2 g, 2 g, Topical, BID, Millicent Mondragon PA-C, 2 g at 06/12/19 2039  •  docusate sodium (COLACE) capsule 100 mg, 100 mg, Oral, Daily PRN, Millicent Mondragon PA-C  •  finasteride (PROSCAR) tablet 5 mg, 5 mg, Oral, Daily, Millicent Mondragon PA-C, 5 mg at 06/12/19 1001  •  fluconazole (DIFLUCAN) tablet 200 mg, 200 mg, Oral, Q24H, Dale Jones MD, 200 mg at 06/12/19 1001  •  fluticasone (FLONASE) 50 MCG/ACT nasal spray  2 spray, 2 spray, Each Nare, Daily, Millicent Mondragon PA-C, 2 spray at 06/12/19 1001  •  HYDROcodone-acetaminophen (NORCO) 5-325 MG per tablet 1 tablet, 1 tablet, Oral, Q6H PRN, Yobani Bruner MD, 1 tablet at 06/12/19 1301  •  ipratropium-albuterol (DUO-NEB) nebulizer solution 3 mL, 3 mL, Nebulization, Q4H PRN, Millicent Mondragon PA-C, 3 mL at 06/10/19 0117  •  lactobacillus acidophilus (RISAQUAD) capsule 1 capsule, 1 capsule, Oral, Daily, Millicent Mondragon PA-C, 1 capsule at 06/12/19 1001  •  melatonin tablet 5 mg, 5 mg, Oral, Nightly PRN, Millicent Mondragon PA-C  •  ondansetron (ZOFRAN) injection 4 mg, 4 mg, Intravenous, Q6H PRN, Millicent Mondragon PA-C  •  Pharmacy to dose vancomycin, , Does not apply, Continuous PRN, Millicent Mondragon PA-C  •  piperacillin-tazobactam (ZOSYN) 3.375 g in iso-osmotic dextrose 50 ml (premix), 3.375 g, Intravenous, Q8H, Millicent Mondragon PA-C, 3.375 g at 06/13/19 0401  •  polyethylene glycol 3350 powder (packet), 17 g, Oral, Daily, Millicent Mondragon PA-C, 17 g at 06/10/19 0807  •  polyvinyl alcohol (LIQUIFILM) 1.4 % ophthalmic solution 2 drop, 2 drop, Both Eyes, Q1H PRN, Yobani Bruner MD, 2 drop at 06/12/19 1002  •  potassium chloride (MICRO-K) CR capsule 40 mEq, 40 mEq, Oral, PRN, 40 mEq at 06/13/19 0032 **OR** potassium chloride (KLOR-CON) packet 40 mEq, 40 mEq, Oral, PRN **OR** potassium chloride 10 mEq in 100 mL IVPB, 10 mEq, Intravenous, Q1H PRN, Erika Garcia MD  •  predniSONE (DELTASONE) tablet 10 mg, 10 mg, Oral, Daily, Millicent Mondragon PA-C, 10 mg at 06/12/19 1008  •  sodium chloride 0.45 % infusion, 75 mL/hr, Intravenous, Continuous, Yobani Bruner MD, Last Rate: 75 mL/hr at 06/13/19 0402, 75 mL/hr at 06/13/19 0402  •  sodium chloride 0.9 % flush 10 mL, 10 mL, Intravenous, PRN, Laci Lopez MD  •  sodium chloride 0.9 % flush 3 mL, 3 mL, Intravenous, Q12H, Millicent Mondragon PA-C, 3 mL at 06/11/19 2012  •  sodium chloride 0.9 % flush 3-10 mL, 3-10  "mL, Intravenous, PRN, Millicent Mondragon PA-C  •  vancomycin 1500 mg/500 mL 0.9% NS IVPB ([facility]), 1,500 mg, Intravenous, Q24H, Maryse Robison RPH, 1,500 mg at 06/12/19 1700  Antibiotics:  Anti-Infectives (From admission, onward)    Ordered     Dose/Rate Route Frequency Start Stop    06/11/19 1140  vancomycin 1500 mg/500 mL 0.9% NS IVPB ([facility])     Ordering Provider:  Maryse Robison RPH    1,500 mg  over 90 Minutes Intravenous Every 24 Hours 06/11/19 1400 06/16/19 1359    06/11/19 0738  fluconazole (DIFLUCAN) tablet 200 mg     Maryse Robison RPH reviewed the order on 06/11/19 1141.   Ordering Provider:  Dale Jones MD    200 mg Oral Every 24 Hours 06/11/19 0900 06/18/19 0859    06/10/19 0003  piperacillin-tazobactam (ZOSYN) 3.375 g in iso-osmotic dextrose 50 ml (premix)     Ordering Provider:  Millicent Mondragon PA-C    3.375 g  over 4 Hours Intravenous Every 8 Hours 06/10/19 0400 06/17/19 0359    06/10/19 0003  Pharmacy to dose vancomycin     Maryse Robison RPH reviewed the order on 06/11/19 1141.   Ordering Provider:  Millicent Mondragon PA-C     Does not apply Continuous PRN 06/10/19 0003 06/17/19 0002    06/09/19 2040  vancomycin 1250 mg/250 mL 0.9% NS IVPB ([facility])     Ordering Provider:  Laci Lopez MD    20 mg/kg × 68 kg Intravenous Once 06/09/19 2055 06/09/19 2223    06/09/19 2040  piperacillin-tazobactam (ZOSYN) 4.5 g in iso-osmotic dextrose 100 mL IVPB (premix)     Ordering Provider:  Laci Lopez MD    4.5 g Intravenous Once 06/09/19 2050 06/09/19 2217          Allergies:  is allergic to naproxen sodium and other.    Review of Systems: All other reviewed and negative except as per HPI    Blood pressure 137/74, pulse 65, temperature 97.8 °F (36.6 °C), temperature source Oral, resp. rate 16, height 170.2 cm (67\"), weight 68.8 kg (151 lb 9.6 oz), SpO2 96 %.  GENERAL: Awake and alert, in no acute distress.  Complaints of diarrhea.  When he goes to urinate, and increases his " intra-abdominal pressure, he is experiencing some fecal incontinence.  This is troubling to him.  He denies fever chills or night sweats.  He has no abdominal pain.  There is been no passage of blood or mucus.  HEENT: Oropharynx without thrush. Sinuses nontender. Dentition in poor repair. No cervical adenopathy. No carotid bruits/ jugular venous distention.   EYES: PERRL.  Sub-conjunctival hemorrhage O.  D.  Marked conjunctival injection.  Some matter.. No icterus. EOMI.  LYMPHATICS: No lymphadenopathy of the neck or axillary or inguinal regions.   HEART: No murmur, gallop, or pericardial friction rub.   LUNGS: Clear to auscultation anteriorly. No percussion dullness.   ABDOMEN: Soft, nontender, nondistended. No appreciable HSM.    SKIN: Warm and dry without cutaneous eruptions. No embolic stigmata.  Bilateral lower extremity ulcerations.  No gross purulence.  No malodorous changes.  Surrounding cellulitis.  PSYCHIATRIC: Mental status lucid. Cranial nerve function intact.       DIAGNOSTICS:  Lab Results   Component Value Date    WBC 9.03 06/13/2019    HGB 10.9 (L) 06/13/2019    HCT 37.7 06/13/2019     06/13/2019     Lab Results   Component Value Date    CRP 4.90 (H) 11/17/2017     Lab Results   Component Value Date    SEDRATE 62 (H) 11/17/2017     Lab Results   Component Value Date    GLUCOSE 71 06/13/2019    BUN 10 06/13/2019    CREATININE 0.56 (L) 06/13/2019    EGFRIFAFRI >150 06/13/2019    BCR 17.9 06/13/2019    CO2 23.0 06/13/2019    CALCIUM 8.2 (L) 06/13/2019    ALBUMIN 3.40 (L) 06/09/2019    AST 14 06/09/2019    ALT 7 06/09/2019       Microbiology: C. difficile toxin assay negative.  Right leg wound with pseudomonas aeruginosa, group B strep, and oxacillin susceptible staph aureus.    RADIOLOGY:  Imaging Results (last 72 hours)     ** No results found for the last 72 hours. **          Assessment and Plan: Deformans rheumatoid arthritis.  Immunocompromised host.  Lower extremity stasis ulcerations with  surrounding cellulitis.  Polymicrobial soft tissue infection with oxacillin susceptible staph aureus, group B streptococci, and pseudomonas aeruginosa.  Toxin negative diarrhea.  Maximum temperature over 24 hours 98.7.  Currently 97.8.  Pulse 65 respiratory 16 blood pressure 137/74.  C. difficile toxin assay is negative.  Peripheral leukocyte count is 9000.  Soft tissue cultures polymicrobial.  Utilization management: Suggest Keflex 500 mg 1 p.o. 3 times daily in combination with Cipro 500 mg 1 p.o. twice daily for the next week.  Follow-up with me in 7 days if discharged.  Please add probiotic therapy with Florastor 250 mg p.o. twice daily.  Will add Questran in an attempt to bulk up his stools and minimize fecal incontinence with micturition.  Discussed with multiple family members.  Complex medical decision-making.      Dale Jones MD  6/13/2019

## 2019-06-13 NOTE — PROGRESS NOTES
Case Management Discharge Note    Final Note: Per provider, pt medically ready for discharge today and plan is home with spouse and resume HH with [facility].  Spoke with Meli at [facility] and she is aware pt medically ready for discharge today and will be in contact with pt to resume HH visits. Family will transport pt home. No further discharge needs.     Destination      No service has been selected for the patient.      Durable Medical Equipment      No service has been selected for the patient.      Dialysis/Infusion      No service has been selected for the patient.      Home Medical Care - Selection Complete      Service Provider Request Status Selected Services Address Phone Number Fax Number    [facility] [address] [phone]      Therapy      No service has been selected for the patient.      Community Resources      No service has been selected for the patient.             Final Discharge Disposition Code: 06 - home with home health care

## 2019-06-13 NOTE — DISCHARGE SUMMARY
[facility] Medicine Services  DISCHARGE SUMMARY    Patient Name: Michoacano Rojas  : 1940  MRN: [id number]    Date of Admission: 2019  Date of Discharge:  2019  Primary Care Physician: Michael Chavez MD    Consults     Date and Time Order Name Status Description    2019 2338 Inpatient Infectious Diseases Consult Completed           Hospital Course     Presenting Problem:   Sepsis (CMS/[facility]) [A41.9]    Active Hospital Problems    Diagnosis  POA   • Cellulitis [L03.90]  Yes     Priority: High   • Sepsis (CMS/HCC) [A41.9]  Yes     Priority: High   • Diastolic dysfunction with chronic heart failure (CMS/[facility]) [I50.32]  Yes     Priority: Medium   • 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 (CMS/[facility]) [D84.8, T38.0X5A]  Yes     Priority: Medium   • Rheumatoid arthritis (CMS/[facility]) [M06.9]  Yes     Priority: Medium   • COPD (chronic obstructive pulmonary disease) (CMS/[facility]) [J44.9]  Yes     Priority: Low   • BPH without obstruction/lower urinary tract symptoms [N40.0]  Yes     Priority: Low   • Normocytic anemia [D64.9]  Yes     Priority: Low   • Atrial fibrillation with RVR (CMS/[facility]) [I48.91]  Yes     Priority: Low   • Essential hypertension [I10]  Yes     Priority: Low   • Chronic cutaneous venous stasis ulcer (CMS/[facility]) [I83.009, L97.909]  Yes     Priority: Low      Resolved Hospital Problems    Diagnosis Date Resolved POA   • **Acute metabolic encephalopathy [G93.41] 2019 Yes     Priority: High   • Acute UTI (urinary tract infection) [N39.0] 2019 Yes          Hospital Course:  Michoacano Rojas is a 78 y.o. male with PMH of RA on chronic PDS, Afib no longer on Eliquis, and chronic pain on Norco presented with metabolic encephalopathy and sepsis due to cellulitis and UTI. He was admitted to our service and ID was consulted given his immunosuppression and risk of MDR organisms. Pt met sepsis criteria with fever of 102.5F and leukocytosis with source(s).  His wound  cultures eventually grew Pseudomonas (both legs), Strep agalactiae (right leg wound) and Staph aureus (left leg wound). His UCx grew >100K Candida. His antibiotics have been transitioned to PO- he will continue with Cipro and Keflex for seven days upon discharge. He will complete a 7 day course of Diflucan at discharge as well.  He is doing well and his encephalopathy is improving.  He will follow up with his PCP within one week.    Of note, pt is no longer on Eliquis due to falls and bruising in the past. He will continue with his rate control/Bisoprolol upon d/c.    He will also continue his PDS for RA and his prior home dose narcotics for chronic pain.       Discharge Follow Up Recommendations for labs/diagnostics:  With PCP in one week, with Dr. Jones as per his instructions  Day of Discharge     HPI:   Doing well this am, denies any issues overnight.     Review of Systems  Gen- No fevers, chills  CV- No chest pain, palpitations  Resp- No cough, dyspnea  GI- No N/V/D, abd pain    Otherwise ROS is negative except as mentioned in the HPI.    Vital Signs:   Temp:  [97.8 °F (36.6 °C)-98.1 °F (36.7 °C)] 97.8 °F (36.6 °C)  Heart Rate:  [65-67] 67  Resp:  [16] 16  BP: (137-156)/(74-86) 150/86     Physical Exam:  Constitutional: No acute distress, awake, alert  HENT: NCAT, mucous membranes moist, right eye conjunctival irritation, no purulent drainage noted  Respiratory: Clear to auscultation bilaterally, respiratory effort normal   Cardiovascular: RRR, no murmurs, rubs, or gallops, palpable pedal pulses bilaterally  Gastrointestinal: Positive bowel sounds, soft, nontender, nondistended  Musculoskeletal: severe joint deformities c/w RA, bilateral LE ulcers covered with dressings c/d/i  Psychiatric: Appropriate affect, cooperative  Neurologic: Oriented x 3, strength symmetric in all extremities, Cranial Nerves grossly intact to confrontation, speech clear  Skin: chronic venous stasis ulcers             Pertinent   and/or Most Recent Results     Results from last 7 days   Lab Units 06/13/19  0439 06/12/19  0403 06/10/19  0425 06/09/19  2019   WBC 10*3/mm3 9.03 8.28 18.65* 19.88*   HEMOGLOBIN g/dL 10.9* 10.5* 10.2* 12.0*   HEMATOCRIT % 37.7 35.7* 35.6* 40.7   PLATELETS 10*3/mm3 142 126* 128* 186   SODIUM mmol/L 141 142 143 144   POTASSIUM mmol/L 4.1 3.3* 3.8 3.8   CHLORIDE mmol/L 108* 108* 106 103   CO2 mmol/L 23.0 22.0 25.0 27.0   BUN mg/dL 10 13 22 25*   CREATININE mg/dL 0.56* 0.57* 0.54* 0.67*   GLUCOSE mg/dL 71 71 87 87   CALCIUM mg/dL 8.2* 8.2* 8.1* 9.2     Results from last 7 days   Lab Units 06/09/19 2019   BILIRUBIN mg/dL 0.7   ALK PHOS U/L 89   ALT (SGPT) U/L 7   AST (SGOT) U/L 14           Invalid input(s): TG, LDLCALC, LDLREALC  Results from last 7 days   Lab Units 06/12/19  0403 06/10/19  0425 06/09/19  2019   LACTATE mmol/L 1.0 1.1 2.6*       Brief Urine Lab Results  (Last result in the past 365 days)      Color   Clarity   Blood   Leuk Est   Nitrite   Protein   CREAT   Urine HCG        06/09/19 2008 Yellow Clear Moderate (2+) Moderate (2+) Negative Negative               Microbiology Results Abnormal     Procedure Component Value - Date/Time    Blood Culture - Blood, Hand, Right [[id number]] Collected:  06/09/19 2012    Lab Status:  Preliminary result Specimen:  Blood from Hand, Right Updated:  06/12/19 2030     Blood Culture No growth at 3 days    Blood Culture - Blood, Hand, Left [[id number]] Collected:  06/09/19 2018    Lab Status:  Preliminary result Specimen:  Blood from Hand, Left Updated:  06/12/19 2030     Blood Culture No growth at 3 days    Clostridium Difficile Toxin - Stool, Per Rectum [[id number]] Collected:  06/12/19 1142    Lab Status:  Final result Specimen:  Stool from Per Rectum Updated:  06/12/19 1601    Narrative:       The following orders were created for panel order Clostridium Difficile Toxin - Stool, Per Rectum.  Procedure                               Abnormality         Status                      ---------                               -----------         ------                     Clostridium Difficile To...[[id number]]  Normal              Final result                 Please view results for these tests on the individual orders.    Clostridium Difficile Toxin, PCR - Stool, Per Rectum [[id number]]  (Normal) Collected:  06/12/19 1142    Lab Status:  Final result Specimen:  Stool from Per Rectum Updated:  06/12/19 1601     C. Difficile Toxins by PCR Not Detected    Narrative:       Performance characteristics of test not established for patients <2 years of age.  Negative for Toxigenic C. Difficile    Wound Culture - Wound, Leg, Right [[id number]]  (Abnormal)  (Susceptibility) Collected:  06/09/19 2042    Lab Status:  Final result Specimen:  Wound from Leg, Right Updated:  06/12/19 1437     Wound Culture Heavy growth (4+) Pseudomonas aeruginosa      Scant growth (1+) Streptococcus agalactiae (Group B)     Comment: This organism is considered to be universally susceptible to penicillin.  No further antibiotic testing will be performed.         Scant growth (1+) Normal Skin Sigrid     Gram Stain Many (4+) WBCs seen      Few (2+) Gram positive cocci in pairs      Occasional Gram negative bacilli    Susceptibility      Pseudomonas aeruginosa     ARACELI     Cefepime Intermediate     Ceftazidime Resistant     Ciprofloxacin Susceptible     Gentamicin Susceptible     Levofloxacin Susceptible     Piperacillin + Tazobactam Susceptible                    Wound Culture - Wound, Leg, Left [[id number]]  (Abnormal)  (Susceptibility) Collected:  06/09/19 2042    Lab Status:  Final result Specimen:  Wound from Leg, Left Updated:  06/12/19 1153     Wound Culture Moderate growth (3+) Staphylococcus aureus      Rare Pseudomonas aeruginosa      Moderate growth (3+) Normal Skin Sigrid     Gram Stain Few (2+) Epithelial cells seen      No WBCs or organisms seen    Susceptibility      Staphylococcus aureus     ARACELI     Clindamycin  Susceptible     Erythromycin Susceptible     Inducible Clindamycin Resistance Negative     Oxacillin Susceptible     Rifampin Susceptible     Tetracycline Resistant     Trimethoprim + Sulfamethoxazole Susceptible     Vancomycin Susceptible                Susceptibility      Pseudomonas aeruginosa     ARACELI     Cefepime Intermediate     Ceftazidime Intermediate     Ciprofloxacin Susceptible     Gentamicin Susceptible     Levofloxacin Susceptible     Piperacillin + Tazobactam Susceptible                Susceptibility Comments     Staphylococcus aureus    This isolate does not demonstrate inducible clindamycin resistance in vitro.    This isolate does not demonstrate inducible clindamycin resistance in vitro.               Urine Culture - Urine, Urine, Catheter In/Out [[id number]]  (Abnormal) Collected:  06/09/19 2008    Lab Status:  Final result Specimen:  Urine, Catheter In/Out Updated:  06/10/19 1821     Urine Culture >100,000 CFU/mL Candida albicans          Imaging Results (all)     Procedure Component Value Units Date/Time    XR Chest 1 View [[id number]] Collected:  06/09/19 2150     Updated:  06/09/19 2152    Narrative:       CR Chest 1 Vw 6/9/2019    INDICATION:   Fever of unknown origin today. Hematuria. Elevated white blood cell count. Body and chills.     COMPARISON:    6/4/2018    FINDINGS:  Single portable AP view of the chest.    No visible support lines.    The heart is enlarged but stable. There is dextroscoliosis of the thoracic spine. The lungs are hyperinflated with emphysematous and fibrotic changes characteristic of COPD. No airspace consolidation is seen. There are no pleural effusions.       Impression:       Cardiomegaly and COPD. No active pulmonary disease.    Signer Name: Petar Hayes MD   Signed: 6/9/2019 9:50 PM   Workstation Name: [id number]             Results for orders placed during the hospital encounter of 06/04/18   Duplex Venous Lower Extremity - Bilateral CAR    Narrative · Normal  bilateral lower extremity venous duplex scan.     All veins are compressible that are visualized.          Results for orders placed during the hospital encounter of 06/04/18   Duplex Venous Lower Extremity - Bilateral CAR    Narrative · Normal bilateral lower extremity venous duplex scan.     All veins are compressible that are visualized.          Results for orders placed during the hospital encounter of 06/04/18   Adult Transthoracic Echo Complete W/ Cont if Necessary Per Protocol    Narrative · Left ventricular systolic function is normal.  · Estimated EF appears to be in the range of 56 - 60%.  · Mild tricuspid valve regurgitation is present.  · Estimated right ventricular systolic pressure from tricuspid   regurgitation is mildly elevated (35-45 mmHg).           Order Current Status    Blood Culture - Blood, Hand, Left Preliminary result    Blood Culture - Blood, Hand, Right Preliminary result        Discharge Details        Discharge Medications      New Medications      Instructions Start Date   cephalexin 500 MG capsule  Commonly known as:  KEFLEX   500 mg, Oral, 3 Times Daily      cholestyramine light 4 g packet   1 packet, Oral, Daily   Start Date:  6/14/2019     ciprofloxacin 0.3 % ophthalmic solution  Commonly known as:  CILOXAN   1 drop, Right Eye, Every 4 Hours While Awake      ciprofloxacin 500 MG tablet  Commonly known as:  CIPRO   500 mg, Oral, Every 12 Hours Scheduled      fluconazole 200 MG tablet  Commonly known as:  DIFLUCAN   200 mg, Oral, Every 24 Hours   Start Date:  6/14/2019     polyvinyl alcohol 1.4 % ophthalmic solution  Commonly known as:  LIQUIFILM   2 drops, Both Eyes, Every 1 Hour PRN      saccharomyces boulardii 250 MG capsule  Commonly known as:  FLORASTOR   250 mg, Oral, 2 Times Daily         Continue These Medications      Instructions Start Date   amLODIPine 5 MG tablet  Commonly known as:  NORVASC   5 mg, Oral, Every 24 Hours Scheduled      Azelastine HCl 137 MCG/SPRAY  "solution   2 sprays, Nasal, Every 12 Hours Scheduled      bisoprolol 5 MG tablet  Commonly known as:  ZEBeta   2.5 mg, Oral, Every 24 Hours Scheduled      CBD oral oil  Commonly known as:  cannabidiol   Oral, 1 dropper full  BID       cholecalciferol 1000 units tablet  Commonly known as:  VITAMIN D3   2,000 Units, Oral, Daily      docusate sodium 50 MG capsule  Commonly known as:  COLACE   100 mg, Oral, Daily PRN      finasteride 5 MG tablet  Commonly known as:  PROSCAR   TAKE 1 TABLET EVERY DAY      fluticasone 50 MCG/ACT nasal spray  Commonly known as:  FLONASE   2 sprays, Nasal, Daily      HM SALONPAS PAIN RELIEF 1.2-5.7-6.3 % patch  Generic drug:  Camphor-Menthol-Methyl Sal   Topical, As Needed      HYDROcodone-acetaminophen  MG per tablet  Commonly known as:  NORCO   1 tablet, Can take up to 4 daily       predniSONE 10 MG tablet  Commonly known as:  DELTASONE   10 mg, Oral, Daily         Stop These Medications    diclofenac 3 % gel gel  Commonly known as:  VOLTAREN     Diclofenac Sodium 2 % solution  Commonly known as:  PENNSAID            Allergies   Allergen Reactions   • Naproxen Sodium Other (See Comments)     Increased heart rate  Aleve   • Other GI Intolerance     Relates all oral abx cause intol nausea/vomiting. 1/2019 PT STATES \"LATELY THIS HASN'T HAPPENED\"         Discharge Disposition:  Home or Self Care    Discharge Diet:  Diet Order   Procedures   • Diet Regular         Discharge Activity:         CODE STATUS:    Code Status and Medical Interventions:   Ordered at: 06/09/19 2892     Limited Support to NOT Include:    Intubation     Level Of Support Discussed With:    Patient     Code Status:    No CPR     Medical Interventions (Level of Support Prior to Arrest):    Limited         No future appointments.    Additional Instructions for the Follow-ups that You Need to Schedule     Discharge Follow-up with PCP   As directed       Currently Documented PCP:    Michael Chavez MD    PCP Phone " Number:    [phone]     Follow Up Details:  in one week with PCP         Discharge Follow-up with Specified Provider: Dr. Jones; 1 Week   As directed      To:  Dr. Jones    Follow Up:  1 Week               Time Spent on Discharge: 35 minutes    Electronically signed by Erika Garcia MD, 06/13/19, 11:39 AM.

## 2019-06-14 ENCOUNTER — READMISSION MANAGEMENT (OUTPATIENT)
Dept: CALL CENTER | Facility: HOSPITAL | Age: 79
End: 2019-06-14

## 2019-06-14 ENCOUNTER — TRANSITIONAL CARE MANAGEMENT TELEPHONE ENCOUNTER (OUTPATIENT)
Dept: INTERNAL MEDICINE | Facility: CLINIC | Age: 79
End: 2019-06-14

## 2019-06-14 LAB
BACTERIA SPEC AEROBE CULT: NORMAL
BACTERIA SPEC AEROBE CULT: NORMAL

## 2019-06-14 NOTE — OUTREACH NOTE
LUISA call completed.  Please refer to TCM call flowsheet for call documentation.  Patient reports that he is feeling much better.  He rates the pain in BLE 1/10.  He denies n/v/d/c.  He would like to know how long he should take the cholestyramine.  He states it was prescribed for diarrhea and that has resolved.  Please advise patient.  Appointment confirmed.  He would like [facility] for .  Thank you.

## 2019-06-14 NOTE — OUTREACH NOTE
Prep Survey      Responses   Facility patient discharged from?  [place]   Is patient eligible?  Yes   Discharge diagnosis  Metabolic encephalopathy and sepsis due to cellulitis and UTI   Does the patient have one of the following disease processes/diagnoses(primary or secondary)?  Sepsis   Does the patient have Home health ordered?  Yes   What is the Home health agency?   [facility]   Is there a DME ordered?  No   Comments regarding appointments  See AVS   Prep survey completed?  Yes          Cristela Maravilla RN

## 2019-06-17 ENCOUNTER — READMISSION MANAGEMENT (OUTPATIENT)
Dept: CALL CENTER | Facility: HOSPITAL | Age: 79
End: 2019-06-17

## 2019-06-17 NOTE — OUTREACH NOTE
Sepsis Week 1 Survey      Responses   Facility patient discharged from?  [place]   Does the patient have one of the following disease processes/diagnoses(primary or secondary)?  Sepsis   Is there a successful TCM telephone encounter documented?  Yes          Isaac Rojas RN

## 2019-06-20 ENCOUNTER — OFFICE VISIT (OUTPATIENT)
Dept: INTERNAL MEDICINE | Facility: CLINIC | Age: 79
End: 2019-06-20

## 2019-06-20 VITALS — HEART RATE: 68 BPM | DIASTOLIC BLOOD PRESSURE: 62 MMHG | SYSTOLIC BLOOD PRESSURE: 120 MMHG

## 2019-06-20 DIAGNOSIS — M05.742 RHEUMATOID ARTHRITIS INVOLVING BOTH HANDS WITH POSITIVE RHEUMATOID FACTOR (HCC): Chronic | ICD-10-CM

## 2019-06-20 DIAGNOSIS — L97.909 CHRONIC CUTANEOUS VENOUS STASIS ULCER (HCC): ICD-10-CM

## 2019-06-20 DIAGNOSIS — I10 ESSENTIAL HYPERTENSION: ICD-10-CM

## 2019-06-20 DIAGNOSIS — Z09 HOSPITAL DISCHARGE FOLLOW-UP: Primary | ICD-10-CM

## 2019-06-20 DIAGNOSIS — I50.32 CHRONIC DIASTOLIC HEART FAILURE (HCC): ICD-10-CM

## 2019-06-20 DIAGNOSIS — B37.49 YEAST UTI: ICD-10-CM

## 2019-06-20 DIAGNOSIS — I83.009 CHRONIC CUTANEOUS VENOUS STASIS ULCER (HCC): ICD-10-CM

## 2019-06-20 DIAGNOSIS — A41.9 SEPSIS, DUE TO UNSPECIFIED ORGANISM: ICD-10-CM

## 2019-06-20 DIAGNOSIS — M05.741 RHEUMATOID ARTHRITIS INVOLVING BOTH HANDS WITH POSITIVE RHEUMATOID FACTOR (HCC): Chronic | ICD-10-CM

## 2019-06-20 PROCEDURE — 99496 TRANSJ CARE MGMT HIGH F2F 7D: CPT | Performed by: PHYSICIAN ASSISTANT

## 2019-06-20 RX ORDER — PREDNISONE 10 MG/1
10 TABLET ORAL DAILY
Qty: 90 TABLET | Refills: 3 | Status: SHIPPED | OUTPATIENT
Start: 2019-06-20 | End: 2020-01-01 | Stop reason: SDUPTHER

## 2019-06-20 NOTE — PROGRESS NOTES
Transitional Care Follow Up Visit  Subjective     Michoacano Rojas is a 78 y.o. male who presents for a transitional care management visit.    Within 48 business hours after discharge our office contacted him via telephone to coordinate his care and needs.      I reviewed and discussed the details of that call along with the discharge summary, hospital problems, inpatient lab results, inpatient diagnostic studies, and consultation reports with Michoacano.     Current outpatient and discharge medications have been reconciled for the patient.    Date of TCM Phone Call 1/17/2019 6/14/2019   [facility]   Date of Admission 1/15/2019 6/9/2019   Date of Discharge 1/16/2019 6/13/2019   Discharge Disposition Home or Self Care Home or Self Care     Risk for Readmission (LACE) Score: 14 (6/13/2019  6:00 AM)      History of Present Illness   He was admitted for sepsis on June 9, 2019 and discharged on June 13, 2019.   He was sent home taking Cipro and Keflex.   He was also given Difucan for yeast UTI.   He has been doing very well, no fever or chills.   His energy level has not improved.    BP is well controlled.   Hx of CHF, no increase of SOB.   He restarted Diclofenac gel to legs to help with sleep-stopped in hospital.  He has HomeHealth to help with leg ulcers.     The following portions of the patient's history were reviewed and updated as appropriate: allergies, past family history, past medical history, past social history, past surgical history and problem list.    Review of Systems   Constitutional: Positive for fatigue. Negative for chills.   HENT: Negative.    Respiratory: Negative.    Cardiovascular: Positive for leg swelling. Negative for chest pain and palpitations.   Gastrointestinal: Negative for abdominal pain, constipation, diarrhea and indigestion.   Musculoskeletal: Positive for arthralgias.   Neurological: Positive for weakness. Negative for dizziness, headache  and confusion.   Psychiatric/Behavioral: Negative.         Objective   Physical Exam   Constitutional: He is oriented to person, place, and time. He appears well-developed and well-nourished.   Neck: Normal range of motion. Neck supple.   Cardiovascular:   Irregularly irregular   Pulmonary/Chest: Effort normal and breath sounds normal.   Abdominal: Soft. Bowel sounds are normal. There is no tenderness.   Musculoskeletal:   Severe RA changes bettie hands, feet, knees   Lymphadenopathy:     He has no cervical adenopathy.   Neurological: He is alert and oriented to person, place, and time.   Psychiatric: He has a normal mood and affect. His behavior is normal. Judgment and thought content normal.   Nursing note and vitals reviewed.       Assessment/Plan     Michoacano was seen today for transitional care management and fatigue.    Diagnoses and all orders for this visit:    Hospital discharge follow-up  Comments:  Doing well.      Sepsis, due to unspecified organism (CMS/HCC)  Comments:  Complete antibiotics.  Keep appt with Dr. Jones    Yeast UTI  Comments:  Complete Diflucan    Essential hypertension  Comments:  Well controlled    Chronic diastolic heart failure (CMS/HCC)  Comments:  Stable, no dyspnea    Rheumatoid arthritis involving both hands with positive rheumatoid factor (CMS/HCC)  Comments:  Continue current meds    Chronic cutaneous venous stasis ulcer (CMS/HCC)  Comments:  Continue Homehealth    Other orders  -     predniSONE (DELTASONE) 10 MG tablet; Take 1 tablet by mouth Daily.       Patient Instructions   Continue current medications, complete antibiotics and diflucan.   Call for any fever, chills.

## 2019-06-21 ENCOUNTER — READMISSION MANAGEMENT (OUTPATIENT)
Dept: CALL CENTER | Facility: HOSPITAL | Age: 79
End: 2019-06-21

## 2019-06-21 NOTE — OUTREACH NOTE
Sepsis Week 2 Survey      Responses   Facility patient discharged from?  [place]   Does the patient have one of the following disease processes/diagnoses(primary or secondary)?  Sepsis   Week 2 attempt successful?  No   Unsuccessful attempts  Attempt 1          Laila Jones RN

## 2019-06-24 ENCOUNTER — READMISSION MANAGEMENT (OUTPATIENT)
Dept: CALL CENTER | Facility: HOSPITAL | Age: 79
End: 2019-06-24

## 2019-06-24 NOTE — OUTREACH NOTE
Sepsis Week 2 Survey      Responses   Facility patient discharged from?  [place]   Does the patient have one of the following disease processes/diagnoses(primary or secondary)?  Sepsis   Week 2 attempt successful?  No   Unsuccessful attempts  Attempt 2          Yaneth Tim RN

## 2019-06-27 ENCOUNTER — READMISSION MANAGEMENT (OUTPATIENT)
Dept: CALL CENTER | Facility: HOSPITAL | Age: 79
End: 2019-06-27

## 2019-06-27 NOTE — OUTREACH NOTE
Sepsis Week 3 Survey      Responses   Facility patient discharged from?  [place]   Does the patient have one of the following disease processes/diagnoses(primary or secondary)?  Sepsis   Week 3 attempt successful?  Yes   Call start time  1152   Call end time  1158   Discharge diagnosis  Metabolic encephalopathy and sepsis due to cellulitis and UTI   Has the patient kept scheduled appointments due by today?  Yes   What is the Home health agency?   [facility] comments  HH has come in has seen pt. He thinks he missed them coming to see him. he will ask his wife to call them today to see him as he has some blisters on his leg.   Psychosocial issues?  No   What is the patient's perception of their health status since discharge?  Same   Nursing interventions  Nurse provided patient education   Is the patient/caregiver able to teach back Sepsis?  S - Shivering,fever or very cold, E - Extreme pain or generalized discomfort (worst ever,especially abdomen)   Nursing interventions  Nurse provided patient education   Is patient/caregiver able to teach back steps to recovery at home?  Set small, achievable goals for return to baseline health, Rest and regain strength   Is the patient/caregiver able to teach back signs and symptoms of worsening condition:  Fever, Rapid heart rate (>90), Altered mental status(confusion/coma)   Is the patient/caregiver able to teach back the hierarchy of who to call/visit for symptoms/problems? PCP, Specialist, Home health nurse, Urgent Care, ED, 911  Yes   Additional teach back comments  Enc pt to f/u with Hh as well as his MD about any issues. Enc good fluid and po intake.    Week 3 call completed?  Yes          Yeni Preston RN

## 2019-07-03 DIAGNOSIS — I48.91 ATRIAL FIBRILLATION WITH RVR (HCC): ICD-10-CM

## 2019-07-03 RX ORDER — BISOPROLOL FUMARATE 5 MG/1
TABLET, FILM COATED ORAL
Qty: 45 TABLET | Refills: 2 | Status: SHIPPED | OUTPATIENT
Start: 2019-07-03 | End: 2020-02-10

## 2019-07-05 ENCOUNTER — READMISSION MANAGEMENT (OUTPATIENT)
Dept: CALL CENTER | Facility: HOSPITAL | Age: 79
End: 2019-07-05

## 2019-07-05 NOTE — OUTREACH NOTE
Sepsis Week 4 Survey      Responses   Facility patient discharged from?  [place]   Does the patient have one of the following disease processes/diagnoses(primary or secondary)?  Sepsis   Week 4 attempt successful?  No          Isaac Rojas RN

## 2019-07-17 ENCOUNTER — TELEPHONE (OUTPATIENT)
Dept: INTERNAL MEDICINE | Facility: CLINIC | Age: 79
End: 2019-07-17

## 2019-07-17 NOTE — TELEPHONE ENCOUNTER
Pt called wanting to know if he can take aspirin for his rheumatoid arthritis   Pain level 10 when it really hurts  He can't  rest   Pt is taking his Norco sometimes up to 3 a day   He states he took it years ago but hasn't took any since he was taking to many at that time   He doesn't need it daily just maybe a  couple a week

## 2019-07-18 ENCOUNTER — TELEPHONE (OUTPATIENT)
Dept: INTERNAL MEDICINE | Facility: CLINIC | Age: 79
End: 2019-07-18

## 2019-07-30 ENCOUNTER — TELEPHONE (OUTPATIENT)
Dept: INTERNAL MEDICINE | Facility: CLINIC | Age: 79
End: 2019-07-30

## 2019-08-01 ENCOUNTER — TELEPHONE (OUTPATIENT)
Dept: INTERNAL MEDICINE | Facility: CLINIC | Age: 79
End: 2019-08-01

## 2019-08-01 NOTE — TELEPHONE ENCOUNTER
Called to Aleida. She will get wound culture. She states he does not have cellulitis around the wound, but is starting to get some blisters

## 2019-08-01 NOTE — TELEPHONE ENCOUNTER
Aleida a Nurse with [facility] called thinks pt has a wound infection on his right posterior lower leg.  it has  yellow drainage and has an odor  Her call back number [phone]

## 2019-08-06 ENCOUNTER — TELEPHONE (OUTPATIENT)
Dept: INTERNAL MEDICINE | Facility: CLINIC | Age: 79
End: 2019-08-06

## 2019-08-07 ENCOUNTER — TELEPHONE (OUTPATIENT)
Dept: INTERNAL MEDICINE | Facility: CLINIC | Age: 79
End: 2019-08-07

## 2019-08-07 RX ORDER — CEFDINIR 300 MG/1
300 CAPSULE ORAL 2 TIMES DAILY
Qty: 20 CAPSULE | Refills: 0 | Status: ON HOLD | OUTPATIENT
Start: 2019-08-07 | End: 2019-10-23

## 2019-08-07 RX ORDER — FINASTERIDE 5 MG/1
TABLET, FILM COATED ORAL
Qty: 90 TABLET | Refills: 1 | Status: SHIPPED | OUTPATIENT
Start: 2019-08-07 | End: 2020-01-09

## 2019-08-15 ENCOUNTER — TELEPHONE (OUTPATIENT)
Dept: INTERNAL MEDICINE | Facility: CLINIC | Age: 79
End: 2019-08-15

## 2019-08-15 NOTE — TELEPHONE ENCOUNTER
JASMEET NURSE WITH [facility]  CALLED TO LET YOU KNOW SHE IS DOING PT'S RECERTIFICATION TODAY 8/15/19 TO CONTINUE HIS WEEKLY NURSING VISITS TO APPLY LINDA BOOTS TO HIS BILATERAL LOWER EXTREMITIES DUE TO HIS ULCERS

## 2019-09-04 ENCOUNTER — OUTSIDE FACILITY SERVICE (OUTPATIENT)
Dept: INTERNAL MEDICINE | Facility: CLINIC | Age: 79
End: 2019-09-04

## 2019-09-04 PROCEDURE — G0179 MD RECERTIFICATION HHA PT: HCPCS | Performed by: INTERNAL MEDICINE

## 2019-09-12 ENCOUNTER — TELEPHONE (OUTPATIENT)
Dept: INTERNAL MEDICINE | Facility: CLINIC | Age: 79
End: 2019-09-12

## 2019-09-12 DIAGNOSIS — I50.32 CHRONIC DIASTOLIC HEART FAILURE (HCC): Primary | ICD-10-CM

## 2019-09-12 DIAGNOSIS — M05.79 RHEUMATOID ARTHRITIS INVOLVING MULTIPLE SITES WITH POSITIVE RHEUMATOID FACTOR (HCC): ICD-10-CM

## 2019-09-12 NOTE — TELEPHONE ENCOUNTER
[facility] CALLED STATING THEY HAVE BEEN TREATING HIS LOWER LEGS WITH LINDA BOOTS AND COBALT DRESSINGS    WANTED TO LET YOU KNOW THAT TODAY HAS INCREASED SWELLING AND BLISTERS ON LOWER LEGS   WANTS TO KNOW IF THERE IS ANYTHING NEW YOU WANT THEM TO DO

## 2019-09-12 NOTE — TELEPHONE ENCOUNTER
Nicole from [facility] ([phone]) called office to obtain a verbal order for home health P.T. And O.T    Patient was seen in the home today and it was noted that the patient would benefit from these services.  Need physician approval.

## 2019-09-16 ENCOUNTER — TELEPHONE (OUTPATIENT)
Dept: INTERNAL MEDICINE | Facility: CLINIC | Age: 79
End: 2019-09-16

## 2019-09-16 NOTE — TELEPHONE ENCOUNTER
Pt calling wanting to know if he needs to continue to wear the hernia belt. Advised [facility] that this was controlling the hernis since he did not want surgery. He also wants more samples of Pennsaid. OK per Dr. Villaseñor. Pt notified   
Fall Risk

## 2019-09-26 ENCOUNTER — TELEPHONE (OUTPATIENT)
Dept: INTERNAL MEDICINE | Facility: CLINIC | Age: 79
End: 2019-09-26

## 2019-09-26 NOTE — TELEPHONE ENCOUNTER
FAUD FROM [facility] CALLED STATING PT IS DEVELOPING BLISTERS ON HIS LOWER LEFT LEG , REDNESS, WARMTH, INCREASED EDEMA    HE DOES HAVE WOUNDS ON THAT SAME LEG BUT NO ODOR OR DRAINAGE  FROM THEM

## 2019-09-27 NOTE — TELEPHONE ENCOUNTER
Spoke to Perfecto with [facility]. He will obtain wound culture and check on fever. Will call back if pt is febrile

## 2019-10-23 ENCOUNTER — APPOINTMENT (OUTPATIENT)
Dept: GENERAL RADIOLOGY | Facility: HOSPITAL | Age: 79
End: 2019-10-23

## 2019-10-23 ENCOUNTER — APPOINTMENT (OUTPATIENT)
Dept: CT IMAGING | Facility: HOSPITAL | Age: 79
End: 2019-10-23

## 2019-10-23 ENCOUNTER — HOSPITAL ENCOUNTER (INPATIENT)
Facility: HOSPITAL | Age: 79
LOS: 6 days | Discharge: HOME | End: 2019-10-29
Attending: EMERGENCY MEDICINE | Admitting: INTERNAL MEDICINE

## 2019-10-23 DIAGNOSIS — G93.40 SEPSIS WITH ENCEPHALOPATHY WITHOUT SEPTIC SHOCK, DUE TO UNSPECIFIED ORGANISM (HCC): ICD-10-CM

## 2019-10-23 DIAGNOSIS — R53.1 GENERALIZED WEAKNESS: ICD-10-CM

## 2019-10-23 DIAGNOSIS — D72.829 LEUKOCYTOSIS, UNSPECIFIED TYPE: ICD-10-CM

## 2019-10-23 DIAGNOSIS — R77.8 ELEVATED TROPONIN: ICD-10-CM

## 2019-10-23 DIAGNOSIS — I87.2 VENOUS INSUFFICIENCY (CHRONIC) (PERIPHERAL): ICD-10-CM

## 2019-10-23 DIAGNOSIS — E04.9 GOITER: ICD-10-CM

## 2019-10-23 DIAGNOSIS — R65.20 SEPSIS WITH ENCEPHALOPATHY WITHOUT SEPTIC SHOCK, DUE TO UNSPECIFIED ORGANISM (HCC): ICD-10-CM

## 2019-10-23 DIAGNOSIS — L03.115 CELLULITIS OF RIGHT LOWER EXTREMITY: ICD-10-CM

## 2019-10-23 DIAGNOSIS — R60.0 LOWER EXTREMITY EDEMA: ICD-10-CM

## 2019-10-23 DIAGNOSIS — Z78.9 IMPAIRED MOBILITY AND ADLS: ICD-10-CM

## 2019-10-23 DIAGNOSIS — A41.9 SEPSIS WITH ENCEPHALOPATHY WITHOUT SEPTIC SHOCK, DUE TO UNSPECIFIED ORGANISM (HCC): ICD-10-CM

## 2019-10-23 DIAGNOSIS — L97.929 NON-PRESSURE CHRONIC ULCER OF LEFT LOWER LEG, WITH UNSPECIFIED SEVERITY (HCC): ICD-10-CM

## 2019-10-23 DIAGNOSIS — R09.02 HYPOXIA: Primary | ICD-10-CM

## 2019-10-23 DIAGNOSIS — L97.511 SKIN ULCER OF RIGHT FOOT, LIMITED TO BREAKDOWN OF SKIN (HCC): ICD-10-CM

## 2019-10-23 DIAGNOSIS — Z74.09 IMPAIRED MOBILITY AND ADLS: ICD-10-CM

## 2019-10-23 PROBLEM — L97.909 VENOUS STASIS ULCER (HCC): Status: ACTIVE | Noted: 2019-10-23

## 2019-10-23 PROBLEM — R41.82 AMS (ALTERED MENTAL STATUS): Status: ACTIVE | Noted: 2019-10-23

## 2019-10-23 PROBLEM — I83.009 VENOUS STASIS ULCER (HCC): Status: ACTIVE | Noted: 2019-10-23

## 2019-10-23 LAB
ALBUMIN SERPL-MCNC: 3.3 G/DL (ref 3.5–5.2)
ALBUMIN/GLOB SERPL: 0.8 G/DL
ALP SERPL-CCNC: 76 U/L (ref 39–117)
ALT SERPL W P-5'-P-CCNC: 5 U/L (ref 1–41)
ANION GAP SERPL CALCULATED.3IONS-SCNC: 11 MMOL/L (ref 5–15)
AST SERPL-CCNC: 16 U/L (ref 1–40)
BACTERIA UR QL AUTO: ABNORMAL /HPF
BASOPHILS # BLD AUTO: 0.05 10*3/MM3 (ref 0–0.2)
BASOPHILS NFR BLD AUTO: 0.3 % (ref 0–1.5)
BILIRUB SERPL-MCNC: 1 MG/DL (ref 0.2–1.2)
BILIRUB UR QL STRIP: NEGATIVE
BUN BLD-MCNC: 19 MG/DL (ref 8–23)
BUN/CREAT SERPL: 31.1 (ref 7–25)
CALCIUM SPEC-SCNC: 9.2 MG/DL (ref 8.6–10.5)
CHLORIDE SERPL-SCNC: 101 MMOL/L (ref 98–107)
CK SERPL-CCNC: 98 U/L (ref 20–200)
CLARITY UR: CLEAR
CO2 SERPL-SCNC: 28 MMOL/L (ref 22–29)
COLOR UR: YELLOW
CREAT BLD-MCNC: 0.61 MG/DL (ref 0.76–1.27)
DEPRECATED RDW RBC AUTO: 49.7 FL (ref 37–54)
EOSINOPHIL # BLD AUTO: 0.03 10*3/MM3 (ref 0–0.4)
EOSINOPHIL NFR BLD AUTO: 0.2 % (ref 0.3–6.2)
ERYTHROCYTE [DISTWIDTH] IN BLOOD BY AUTOMATED COUNT: 15 % (ref 12.3–15.4)
GFR SERPL CREATININE-BSD FRML MDRD: >150 ML/MIN/1.73
GLOBULIN UR ELPH-MCNC: 4.2 GM/DL
GLUCOSE BLD-MCNC: 83 MG/DL (ref 65–99)
GLUCOSE UR STRIP-MCNC: NEGATIVE MG/DL
HCT VFR BLD AUTO: 40.2 % (ref 37.5–51)
HGB BLD-MCNC: 11.7 G/DL (ref 13–17.7)
HGB UR QL STRIP.AUTO: NEGATIVE
HYALINE CASTS UR QL AUTO: ABNORMAL /LPF
IMM GRANULOCYTES # BLD AUTO: 0.1 10*3/MM3 (ref 0–0.05)
IMM GRANULOCYTES NFR BLD AUTO: 0.5 % (ref 0–0.5)
KETONES UR QL STRIP: ABNORMAL
LEUKOCYTE ESTERASE UR QL STRIP.AUTO: ABNORMAL
LYMPHOCYTES # BLD AUTO: 0.71 10*3/MM3 (ref 0.7–3.1)
LYMPHOCYTES NFR BLD AUTO: 3.7 % (ref 19.6–45.3)
MAGNESIUM SERPL-MCNC: 1.8 MG/DL (ref 1.6–2.4)
MCH RBC QN AUTO: 26.2 PG (ref 26.6–33)
MCHC RBC AUTO-ENTMCNC: 29.1 G/DL (ref 31.5–35.7)
MCV RBC AUTO: 89.9 FL (ref 79–97)
MONOCYTES # BLD AUTO: 0.69 10*3/MM3 (ref 0.1–0.9)
MONOCYTES NFR BLD AUTO: 3.6 % (ref 5–12)
NEUTROPHILS # BLD AUTO: 17.48 10*3/MM3 (ref 1.7–7)
NEUTROPHILS NFR BLD AUTO: 91.7 % (ref 42.7–76)
NITRITE UR QL STRIP: NEGATIVE
NRBC BLD AUTO-RTO: 0 /100 WBC (ref 0–0.2)
NT-PROBNP SERPL-MCNC: 6612 PG/ML (ref 5–1800)
PH UR STRIP.AUTO: >=9 [PH] (ref 5–8)
PLATELET # BLD AUTO: 181 10*3/MM3 (ref 140–450)
PMV BLD AUTO: 11.6 FL (ref 6–12)
POTASSIUM BLD-SCNC: 4 MMOL/L (ref 3.5–5.2)
PROT SERPL-MCNC: 7.5 G/DL (ref 6–8.5)
PROT UR QL STRIP: ABNORMAL
RBC # BLD AUTO: 4.47 10*6/MM3 (ref 4.14–5.8)
RBC # UR: ABNORMAL /HPF
REF LAB TEST METHOD: ABNORMAL
SODIUM BLD-SCNC: 140 MMOL/L (ref 136–145)
SP GR UR STRIP: 1.01 (ref 1–1.03)
SQUAMOUS #/AREA URNS HPF: ABNORMAL /HPF
TROPONIN T SERPL-MCNC: 0.04 NG/ML (ref 0–0.03)
TROPONIN T SERPL-MCNC: 0.04 NG/ML (ref 0–0.03)
UROBILINOGEN UR QL STRIP: ABNORMAL
WBC NRBC COR # BLD: 19.06 10*3/MM3 (ref 3.4–10.8)
WBC UR QL AUTO: ABNORMAL /HPF

## 2019-10-23 PROCEDURE — 99223 1ST HOSP IP/OBS HIGH 75: CPT | Performed by: INTERNAL MEDICINE

## 2019-10-23 PROCEDURE — 81001 URINALYSIS AUTO W/SCOPE: CPT | Performed by: EMERGENCY MEDICINE

## 2019-10-23 PROCEDURE — 71045 X-RAY EXAM CHEST 1 VIEW: CPT

## 2019-10-23 PROCEDURE — 84484 ASSAY OF TROPONIN QUANT: CPT | Performed by: EMERGENCY MEDICINE

## 2019-10-23 PROCEDURE — 87147 CULTURE TYPE IMMUNOLOGIC: CPT | Performed by: INTERNAL MEDICINE

## 2019-10-23 PROCEDURE — 70450 CT HEAD/BRAIN W/O DYE: CPT

## 2019-10-23 PROCEDURE — 87186 SC STD MICRODIL/AGAR DIL: CPT | Performed by: INTERNAL MEDICINE

## 2019-10-23 PROCEDURE — 87070 CULTURE OTHR SPECIMN AEROBIC: CPT | Performed by: INTERNAL MEDICINE

## 2019-10-23 PROCEDURE — 83735 ASSAY OF MAGNESIUM: CPT | Performed by: EMERGENCY MEDICINE

## 2019-10-23 PROCEDURE — 71250 CT THORAX DX C-: CPT

## 2019-10-23 PROCEDURE — 87205 SMEAR GRAM STAIN: CPT | Performed by: INTERNAL MEDICINE

## 2019-10-23 PROCEDURE — 93005 ELECTROCARDIOGRAM TRACING: CPT | Performed by: EMERGENCY MEDICINE

## 2019-10-23 PROCEDURE — 87086 URINE CULTURE/COLONY COUNT: CPT | Performed by: EMERGENCY MEDICINE

## 2019-10-23 PROCEDURE — 82550 ASSAY OF CK (CPK): CPT | Performed by: EMERGENCY MEDICINE

## 2019-10-23 PROCEDURE — 80053 COMPREHEN METABOLIC PANEL: CPT | Performed by: EMERGENCY MEDICINE

## 2019-10-23 PROCEDURE — 85025 COMPLETE CBC W/AUTO DIFF WBC: CPT | Performed by: EMERGENCY MEDICINE

## 2019-10-23 PROCEDURE — 71275 CT ANGIOGRAPHY CHEST: CPT

## 2019-10-23 PROCEDURE — 83880 ASSAY OF NATRIURETIC PEPTIDE: CPT | Performed by: EMERGENCY MEDICINE

## 2019-10-23 PROCEDURE — 99285 EMERGENCY DEPT VISIT HI MDM: CPT

## 2019-10-23 RX ORDER — HEPARIN SODIUM 1000 [USP'U]/ML
30 INJECTION, SOLUTION INTRAVENOUS; SUBCUTANEOUS AS NEEDED
Status: DISCONTINUED | OUTPATIENT
Start: 2019-10-23 | End: 2019-10-23 | Stop reason: SDUPTHER

## 2019-10-23 RX ORDER — HEPARIN SODIUM 1000 [USP'U]/ML
4000 INJECTION, SOLUTION INTRAVENOUS; SUBCUTANEOUS ONCE
Status: COMPLETED | OUTPATIENT
Start: 2019-10-23 | End: 2019-10-24

## 2019-10-23 RX ORDER — DOCUSATE SODIUM 100 MG/1
100 CAPSULE, LIQUID FILLED ORAL DAILY PRN
Status: DISCONTINUED | OUTPATIENT
Start: 2019-10-23 | End: 2019-10-29 | Stop reason: HOSPADM

## 2019-10-23 RX ORDER — ONDANSETRON 2 MG/ML
4 INJECTION INTRAMUSCULAR; INTRAVENOUS EVERY 6 HOURS PRN
Status: DISCONTINUED | OUTPATIENT
Start: 2019-10-23 | End: 2019-10-29 | Stop reason: HOSPADM

## 2019-10-23 RX ORDER — SODIUM CHLORIDE 0.9 % (FLUSH) 0.9 %
10 SYRINGE (ML) INJECTION AS NEEDED
Status: DISCONTINUED | OUTPATIENT
Start: 2019-10-23 | End: 2019-10-29 | Stop reason: HOSPADM

## 2019-10-23 RX ORDER — POLYVINYL ALCOHOL 14 MG/ML
2 SOLUTION/ DROPS OPHTHALMIC
Status: DISCONTINUED | OUTPATIENT
Start: 2019-10-23 | End: 2019-10-29 | Stop reason: HOSPADM

## 2019-10-23 RX ORDER — HEPARIN SODIUM 1000 [USP'U]/ML
60 INJECTION, SOLUTION INTRAVENOUS; SUBCUTANEOUS AS NEEDED
Status: DISCONTINUED | OUTPATIENT
Start: 2019-10-23 | End: 2019-10-23 | Stop reason: SDUPTHER

## 2019-10-23 RX ORDER — VANCOMYCIN HYDROCHLORIDE 1 G/200ML
15 INJECTION, SOLUTION INTRAVENOUS EVERY 12 HOURS
Status: DISCONTINUED | OUTPATIENT
Start: 2019-10-24 | End: 2019-10-28

## 2019-10-23 RX ORDER — FLUTICASONE PROPIONATE 50 MCG
2 SPRAY, SUSPENSION (ML) NASAL DAILY
Status: DISCONTINUED | OUTPATIENT
Start: 2019-10-24 | End: 2019-10-29 | Stop reason: HOSPADM

## 2019-10-23 RX ORDER — SODIUM CHLORIDE, SODIUM LACTATE, POTASSIUM CHLORIDE, CALCIUM CHLORIDE 600; 310; 30; 20 MG/100ML; MG/100ML; MG/100ML; MG/100ML
50 INJECTION, SOLUTION INTRAVENOUS CONTINUOUS
Status: DISCONTINUED | OUTPATIENT
Start: 2019-10-24 | End: 2019-10-27

## 2019-10-23 RX ORDER — HYDROCODONE BITARTRATE AND ACETAMINOPHEN 5; 325 MG/1; MG/1
1 TABLET ORAL EVERY 6 HOURS PRN
Status: DISCONTINUED | OUTPATIENT
Start: 2019-10-23 | End: 2019-10-26

## 2019-10-23 RX ORDER — OFLOXACIN 3 MG/ML
1 SOLUTION/ DROPS OPHTHALMIC 3 TIMES DAILY
COMMUNITY
End: 2020-01-01

## 2019-10-23 RX ORDER — PREDNISONE 10 MG/1
10 TABLET ORAL DAILY
Status: DISCONTINUED | OUTPATIENT
Start: 2019-10-24 | End: 2019-10-29 | Stop reason: HOSPADM

## 2019-10-23 RX ORDER — IPRATROPIUM BROMIDE AND ALBUTEROL SULFATE 2.5; .5 MG/3ML; MG/3ML
3 SOLUTION RESPIRATORY (INHALATION)
Status: DISCONTINUED | OUTPATIENT
Start: 2019-10-24 | End: 2019-10-29 | Stop reason: HOSPADM

## 2019-10-23 RX ORDER — ACETAMINOPHEN 650 MG/1
650 SUPPOSITORY RECTAL EVERY 4 HOURS PRN
Status: DISCONTINUED | OUTPATIENT
Start: 2019-10-23 | End: 2019-10-29 | Stop reason: HOSPADM

## 2019-10-23 RX ORDER — AZELASTINE HYDROCHLORIDE 137 UG/1
2 SPRAY, METERED NASAL EVERY 12 HOURS SCHEDULED
Status: DISCONTINUED | OUTPATIENT
Start: 2019-10-24 | End: 2019-10-29 | Stop reason: HOSPADM

## 2019-10-23 RX ORDER — CIPROFLOXACIN HYDROCHLORIDE 3.5 MG/ML
1 SOLUTION/ DROPS TOPICAL
Status: DISCONTINUED | OUTPATIENT
Start: 2019-10-24 | End: 2019-10-26

## 2019-10-23 RX ORDER — HEPARIN SODIUM 10000 [USP'U]/100ML
12 INJECTION, SOLUTION INTRAVENOUS
Status: DISCONTINUED | OUTPATIENT
Start: 2019-10-23 | End: 2019-10-24

## 2019-10-23 RX ORDER — ACETAMINOPHEN 325 MG/1
650 TABLET ORAL EVERY 4 HOURS PRN
Status: DISCONTINUED | OUTPATIENT
Start: 2019-10-23 | End: 2019-10-29 | Stop reason: HOSPADM

## 2019-10-23 RX ORDER — ACETAMINOPHEN 160 MG/5ML
650 SOLUTION ORAL EVERY 4 HOURS PRN
Status: DISCONTINUED | OUTPATIENT
Start: 2019-10-23 | End: 2019-10-29 | Stop reason: HOSPADM

## 2019-10-23 RX ORDER — FINASTERIDE 5 MG/1
5 TABLET, FILM COATED ORAL DAILY
Status: DISCONTINUED | OUTPATIENT
Start: 2019-10-24 | End: 2019-10-29 | Stop reason: HOSPADM

## 2019-10-23 RX ORDER — ASPIRIN 81 MG/1
324 TABLET, CHEWABLE ORAL ONCE
Status: DISCONTINUED | OUTPATIENT
Start: 2019-10-23 | End: 2019-10-29 | Stop reason: HOSPADM

## 2019-10-23 RX ADMIN — IOPAMIDOL 79 ML: 755 INJECTION, SOLUTION INTRAVENOUS at 23:26

## 2019-10-24 ENCOUNTER — APPOINTMENT (OUTPATIENT)
Dept: GENERAL RADIOLOGY | Facility: HOSPITAL | Age: 79
End: 2019-10-24

## 2019-10-24 ENCOUNTER — APPOINTMENT (OUTPATIENT)
Dept: CARDIOLOGY | Facility: HOSPITAL | Age: 79
End: 2019-10-24

## 2019-10-24 ENCOUNTER — APPOINTMENT (OUTPATIENT)
Dept: CT IMAGING | Facility: HOSPITAL | Age: 79
End: 2019-10-24

## 2019-10-24 PROBLEM — Z79.52 CURRENT CHRONIC USE OF SYSTEMIC STEROIDS: Status: ACTIVE | Noted: 2019-10-24

## 2019-10-24 PROBLEM — M06.9 RHEUMATOID ARTHRITIS INVOLVING MULTIPLE SITES (HCC): Status: ACTIVE | Noted: 2019-10-24

## 2019-10-24 PROBLEM — R79.89 ELEVATED TROPONIN: Status: ACTIVE | Noted: 2019-10-24

## 2019-10-24 PROBLEM — R77.8 ELEVATED TROPONIN: Status: ACTIVE | Noted: 2019-10-24

## 2019-10-24 PROBLEM — D84.9 IMMUNOSUPPRESSED STATUS (HCC): Status: ACTIVE | Noted: 2019-10-24

## 2019-10-24 LAB
APTT PPP: 135.5 SECONDS (ref 85–120)
BACTERIA SPEC AEROBE CULT: NORMAL
BASOPHILS # BLD AUTO: 0.04 10*3/MM3 (ref 0–0.2)
BASOPHILS NFR BLD AUTO: 0.3 % (ref 0–1.5)
BH CV ECHO MEAS - AO ROOT DIAM: 2.9 CM
BH CV ECHO MEAS - BSA(HAYCOCK): 1.8 M^2
BH CV ECHO MEAS - BSA: 1.8 M^2
BH CV ECHO MEAS - BZI_BMI: 21.6 KILOGRAMS/M^2
BH CV ECHO MEAS - BZI_METRIC_HEIGHT: 172.7 CM
BH CV ECHO MEAS - BZI_METRIC_WEIGHT: 64.4 KG
BH CV ECHO MEAS - IVSD: 0.9 CM
BH CV ECHO MEAS - LA DIMENSION: 3.3 CM
BH CV ECHO MEAS - LVIDD: 4.9 CM
BH CV ECHO MEAS - LVIDS: 3 CM
BH CV ECHO MEAS - LVPWD: 0.9 CM
BH CV ECHO MEAS - RAP SYSTOLE: 3 MMHG
BH CV ECHO MEAS - RVSP: 27 MMHG
BH CV ECHO MEAS - TR MAX PG: 24 MMHG
BH CV ECHO MEAS - TR MAX VEL: 243.2 CM/SEC
BH CV VAS BP RIGHT ARM: NORMAL MMHG
BH CV XLRA - RV BASE: 3.8 CM
BH CV XLRA - RV LENGTH: 7.4 CM
BH CV XLRA - RV MID: 3.8 CM
CHOLEST SERPL-MCNC: 90 MG/DL (ref 0–200)
CRP SERPL-MCNC: 14.83 MG/DL (ref 0–0.5)
D-LACTATE SERPL-SCNC: 1.3 MMOL/L (ref 0.5–2)
DEPRECATED RDW RBC AUTO: 51.3 FL (ref 37–54)
EOSINOPHIL # BLD AUTO: 0.28 10*3/MM3 (ref 0–0.4)
EOSINOPHIL NFR BLD AUTO: 2.2 % (ref 0.3–6.2)
ERYTHROCYTE [DISTWIDTH] IN BLOOD BY AUTOMATED COUNT: 15.2 % (ref 12.3–15.4)
ERYTHROCYTE [SEDIMENTATION RATE] IN BLOOD: 68 MM/HR (ref 0–20)
HCT VFR BLD AUTO: 36.5 % (ref 37.5–51)
HDLC SERPL-MCNC: 54 MG/DL (ref 40–60)
HGB BLD-MCNC: 10.5 G/DL (ref 13–17.7)
IMM GRANULOCYTES # BLD AUTO: 0.03 10*3/MM3 (ref 0–0.05)
IMM GRANULOCYTES NFR BLD AUTO: 0.2 % (ref 0–0.5)
INR PPP: 1.47 (ref 0.85–1.16)
LDLC SERPL CALC-MCNC: 26 MG/DL (ref 0–100)
LDLC/HDLC SERPL: 0.48 {RATIO}
LV EF 2D ECHO EST: 60 %
LYMPHOCYTES # BLD AUTO: 0.72 10*3/MM3 (ref 0.7–3.1)
LYMPHOCYTES NFR BLD AUTO: 5.8 % (ref 19.6–45.3)
MAXIMAL PREDICTED HEART RATE: 139 BPM
MCH RBC QN AUTO: 26.3 PG (ref 26.6–33)
MCHC RBC AUTO-ENTMCNC: 28.8 G/DL (ref 31.5–35.7)
MCV RBC AUTO: 91.5 FL (ref 79–97)
MONOCYTES # BLD AUTO: 0.49 10*3/MM3 (ref 0.1–0.9)
MONOCYTES NFR BLD AUTO: 3.9 % (ref 5–12)
NEUTROPHILS # BLD AUTO: 10.96 10*3/MM3 (ref 1.7–7)
NEUTROPHILS NFR BLD AUTO: 87.6 % (ref 42.7–76)
NRBC BLD AUTO-RTO: 0 /100 WBC (ref 0–0.2)
PLATELET # BLD AUTO: 129 10*3/MM3 (ref 140–450)
PMV BLD AUTO: 11.2 FL (ref 6–12)
PROCALCITONIN SERPL-MCNC: 2.62 NG/ML (ref 0.1–0.25)
PROTHROMBIN TIME: 17.2 SECONDS (ref 11.2–14.3)
RBC # BLD AUTO: 3.99 10*6/MM3 (ref 4.14–5.8)
STRESS TARGET HR: 118 BPM
T4 FREE SERPL-MCNC: 1.41 NG/DL (ref 0.93–1.7)
TRIGL SERPL-MCNC: 51 MG/DL (ref 0–150)
TROPONIN T SERPL-MCNC: 0.07 NG/ML (ref 0–0.03)
TSH SERPL DL<=0.05 MIU/L-ACNC: 0.45 UIU/ML (ref 0.27–4.2)
UFH PPP CHRO-ACNC: 0.46 IU/ML (ref 0.3–0.7)
VLDLC SERPL-MCNC: 10.2 MG/DL
WBC NRBC COR # BLD: 12.52 10*3/MM3 (ref 3.4–10.8)

## 2019-10-24 PROCEDURE — 73610 X-RAY EXAM OF ANKLE: CPT

## 2019-10-24 PROCEDURE — 80061 LIPID PANEL: CPT | Performed by: INTERNAL MEDICINE

## 2019-10-24 PROCEDURE — 25010000002 PIPERACILLIN SOD-TAZOBACTAM PER 1 G

## 2019-10-24 PROCEDURE — 85652 RBC SED RATE AUTOMATED: CPT | Performed by: INTERNAL MEDICINE

## 2019-10-24 PROCEDURE — 25010000002 VANCOMYCIN PER 500 MG

## 2019-10-24 PROCEDURE — 87040 BLOOD CULTURE FOR BACTERIA: CPT | Performed by: INTERNAL MEDICINE

## 2019-10-24 PROCEDURE — 25010000002 HEPARIN (PORCINE) 100-0.45 UNIT/ML-% SOLUTION: Performed by: INTERNAL MEDICINE

## 2019-10-24 PROCEDURE — 94799 UNLISTED PULMONARY SVC/PX: CPT

## 2019-10-24 PROCEDURE — 25010000002 VANCOMYCIN 10 G RECONSTITUTED SOLUTION

## 2019-10-24 PROCEDURE — 97162 PT EVAL MOD COMPLEX 30 MIN: CPT

## 2019-10-24 PROCEDURE — 63710000001 PREDNISONE PER 5 MG: Performed by: INTERNAL MEDICINE

## 2019-10-24 PROCEDURE — 92610 EVALUATE SWALLOWING FUNCTION: CPT

## 2019-10-24 PROCEDURE — 99222 1ST HOSP IP/OBS MODERATE 55: CPT | Performed by: INTERNAL MEDICINE

## 2019-10-24 PROCEDURE — 85730 THROMBOPLASTIN TIME PARTIAL: CPT | Performed by: INTERNAL MEDICINE

## 2019-10-24 PROCEDURE — 84443 ASSAY THYROID STIM HORMONE: CPT | Performed by: INTERNAL MEDICINE

## 2019-10-24 PROCEDURE — 86140 C-REACTIVE PROTEIN: CPT | Performed by: INTERNAL MEDICINE

## 2019-10-24 PROCEDURE — 97110 THERAPEUTIC EXERCISES: CPT

## 2019-10-24 PROCEDURE — 25010000002 HEPARIN (PORCINE) PER 1000 UNITS: Performed by: INTERNAL MEDICINE

## 2019-10-24 PROCEDURE — 84145 PROCALCITONIN (PCT): CPT | Performed by: INTERNAL MEDICINE

## 2019-10-24 PROCEDURE — 83605 ASSAY OF LACTIC ACID: CPT | Performed by: INTERNAL MEDICINE

## 2019-10-24 PROCEDURE — 93306 TTE W/DOPPLER COMPLETE: CPT | Performed by: INTERNAL MEDICINE

## 2019-10-24 PROCEDURE — 85520 HEPARIN ASSAY: CPT | Performed by: INTERNAL MEDICINE

## 2019-10-24 PROCEDURE — 85025 COMPLETE CBC W/AUTO DIFF WBC: CPT | Performed by: INTERNAL MEDICINE

## 2019-10-24 PROCEDURE — 25010000002 ENOXAPARIN PER 10 MG: Performed by: INTERNAL MEDICINE

## 2019-10-24 PROCEDURE — 84439 ASSAY OF FREE THYROXINE: CPT | Performed by: INTERNAL MEDICINE

## 2019-10-24 PROCEDURE — 97530 THERAPEUTIC ACTIVITIES: CPT

## 2019-10-24 PROCEDURE — 99233 SBSQ HOSP IP/OBS HIGH 50: CPT | Performed by: INTERNAL MEDICINE

## 2019-10-24 PROCEDURE — 85610 PROTHROMBIN TIME: CPT | Performed by: INTERNAL MEDICINE

## 2019-10-24 PROCEDURE — 93306 TTE W/DOPPLER COMPLETE: CPT

## 2019-10-24 PROCEDURE — 0 IOPAMIDOL PER 1 ML: Performed by: INTERNAL MEDICINE

## 2019-10-24 PROCEDURE — 73700 CT LOWER EXTREMITY W/O DYE: CPT

## 2019-10-24 PROCEDURE — 84484 ASSAY OF TROPONIN QUANT: CPT | Performed by: INTERNAL MEDICINE

## 2019-10-24 RX ORDER — BISOPROLOL FUMARATE 5 MG/1
2.5 TABLET, FILM COATED ORAL
Status: DISCONTINUED | OUTPATIENT
Start: 2019-10-24 | End: 2019-10-29 | Stop reason: HOSPADM

## 2019-10-24 RX ORDER — ASPIRIN 81 MG/1
81 TABLET ORAL DAILY
Status: DISCONTINUED | OUTPATIENT
Start: 2019-10-24 | End: 2019-10-29 | Stop reason: HOSPADM

## 2019-10-24 RX ORDER — OFLOXACIN 3 MG/ML
1 SOLUTION/ DROPS OPHTHALMIC 3 TIMES DAILY
Status: DISCONTINUED | OUTPATIENT
Start: 2019-10-24 | End: 2019-10-29 | Stop reason: HOSPADM

## 2019-10-24 RX ADMIN — TAZOBACTAM SODIUM AND PIPERACILLIN SODIUM 3.38 G: 375; 3 INJECTION, SOLUTION INTRAVENOUS at 09:04

## 2019-10-24 RX ADMIN — PREDNISONE 10 MG: 10 TABLET ORAL at 09:04

## 2019-10-24 RX ADMIN — HYDROCODONE BITARTRATE AND ACETAMINOPHEN 1 TABLET: 5; 325 TABLET ORAL at 16:36

## 2019-10-24 RX ADMIN — TAZOBACTAM SODIUM AND PIPERACILLIN SODIUM 3.38 G: 375; 3 INJECTION, SOLUTION INTRAVENOUS at 16:30

## 2019-10-24 RX ADMIN — DICLOFENAC SODIUM 2 G: 10 GEL TOPICAL at 09:04

## 2019-10-24 RX ADMIN — OFLOXACIN 1 DROP: 3 SOLUTION/ DROPS OPHTHALMIC at 16:30

## 2019-10-24 RX ADMIN — IPRATROPIUM BROMIDE AND ALBUTEROL SULFATE 3 ML: 2.5; .5 SOLUTION RESPIRATORY (INHALATION) at 08:08

## 2019-10-24 RX ADMIN — BISOPROLOL FUMARATE 2.5 MG: 5 TABLET ORAL at 12:08

## 2019-10-24 RX ADMIN — VANCOMYCIN HYDROCHLORIDE 1000 MG: 1 INJECTION, SOLUTION INTRAVENOUS at 12:08

## 2019-10-24 RX ADMIN — HEPARIN SODIUM 4000 UNITS: 1000 INJECTION INTRAVENOUS; SUBCUTANEOUS at 00:45

## 2019-10-24 RX ADMIN — CIPROFLOXACIN 1 DROP: 3 SOLUTION OPHTHALMIC at 09:04

## 2019-10-24 RX ADMIN — IPRATROPIUM BROMIDE AND ALBUTEROL SULFATE 3 ML: 2.5; .5 SOLUTION RESPIRATORY (INHALATION) at 17:03

## 2019-10-24 RX ADMIN — DICLOFENAC SODIUM 2 G: 10 GEL TOPICAL at 01:52

## 2019-10-24 RX ADMIN — HYDROCODONE BITARTRATE AND ACETAMINOPHEN 1 TABLET: 5; 325 TABLET ORAL at 00:16

## 2019-10-24 RX ADMIN — AZELASTINE HYDROCHLORIDE 274 MCG: 137 SPRAY, METERED NASAL at 09:04

## 2019-10-24 RX ADMIN — SODIUM CHLORIDE, POTASSIUM CHLORIDE, SODIUM LACTATE AND CALCIUM CHLORIDE 75 ML/HR: 600; 310; 30; 20 INJECTION, SOLUTION INTRAVENOUS at 00:35

## 2019-10-24 RX ADMIN — HYDROCODONE BITARTRATE AND ACETAMINOPHEN 1 TABLET: 5; 325 TABLET ORAL at 09:03

## 2019-10-24 RX ADMIN — VANCOMYCIN HYDROCHLORIDE 1250 MG: 10 INJECTION, POWDER, LYOPHILIZED, FOR SOLUTION INTRAVENOUS at 01:46

## 2019-10-24 RX ADMIN — CIPROFLOXACIN 1 DROP: 3 SOLUTION OPHTHALMIC at 21:08

## 2019-10-24 RX ADMIN — FLUTICASONE PROPIONATE 2 SPRAY: 50 SPRAY, METERED NASAL at 09:05

## 2019-10-24 RX ADMIN — CIPROFLOXACIN 1 DROP: 3 SOLUTION OPHTHALMIC at 14:32

## 2019-10-24 RX ADMIN — HEPARIN SODIUM 12 UNITS/KG/HR: 10000 INJECTION, SOLUTION INTRAVENOUS at 00:46

## 2019-10-24 RX ADMIN — SODIUM CHLORIDE, POTASSIUM CHLORIDE, SODIUM LACTATE AND CALCIUM CHLORIDE 75 ML/HR: 600; 310; 30; 20 INJECTION, SOLUTION INTRAVENOUS at 20:22

## 2019-10-24 RX ADMIN — DICLOFENAC SODIUM 2 G: 10 GEL TOPICAL at 21:09

## 2019-10-24 RX ADMIN — OFLOXACIN 1 DROP: 3 SOLUTION/ DROPS OPHTHALMIC at 21:08

## 2019-10-24 RX ADMIN — ENOXAPARIN SODIUM 40 MG: 40 INJECTION SUBCUTANEOUS at 21:14

## 2019-10-24 RX ADMIN — FINASTERIDE 5 MG: 5 TABLET, FILM COATED ORAL at 09:04

## 2019-10-24 RX ADMIN — TAZOBACTAM SODIUM AND PIPERACILLIN SODIUM 3.38 G: 375; 3 INJECTION, SOLUTION INTRAVENOUS at 01:45

## 2019-10-24 RX ADMIN — ASPIRIN 81 MG: 81 TABLET, COATED ORAL at 12:08

## 2019-10-25 LAB
ANION GAP SERPL CALCULATED.3IONS-SCNC: 7 MMOL/L (ref 5–15)
BUN BLD-MCNC: 17 MG/DL (ref 8–23)
BUN/CREAT SERPL: 29.3 (ref 7–25)
CALCIUM SPEC-SCNC: 8.3 MG/DL (ref 8.6–10.5)
CHLORIDE SERPL-SCNC: 104 MMOL/L (ref 98–107)
CO2 SERPL-SCNC: 27 MMOL/L (ref 22–29)
CREAT BLD-MCNC: 0.58 MG/DL (ref 0.76–1.27)
DEPRECATED RDW RBC AUTO: 49.8 FL (ref 37–54)
ERYTHROCYTE [DISTWIDTH] IN BLOOD BY AUTOMATED COUNT: 14.8 % (ref 12.3–15.4)
GFR SERPL CREATININE-BSD FRML MDRD: >150 ML/MIN/1.73
GLUCOSE BLD-MCNC: 86 MG/DL (ref 65–99)
HCT VFR BLD AUTO: 36.6 % (ref 37.5–51)
HGB BLD-MCNC: 10.4 G/DL (ref 13–17.7)
MCH RBC QN AUTO: 25.9 PG (ref 26.6–33)
MCHC RBC AUTO-ENTMCNC: 28.4 G/DL (ref 31.5–35.7)
MCV RBC AUTO: 91 FL (ref 79–97)
PLATELET # BLD AUTO: 146 10*3/MM3 (ref 140–450)
PMV BLD AUTO: 11.1 FL (ref 6–12)
POTASSIUM BLD-SCNC: 3.6 MMOL/L (ref 3.5–5.2)
RBC # BLD AUTO: 4.02 10*6/MM3 (ref 4.14–5.8)
SODIUM BLD-SCNC: 138 MMOL/L (ref 136–145)
VANCOMYCIN TROUGH SERPL-MCNC: 15.7 MCG/ML (ref 5–20)
WBC NRBC COR # BLD: 9.67 10*3/MM3 (ref 3.4–10.8)

## 2019-10-25 PROCEDURE — 97164 PT RE-EVAL EST PLAN CARE: CPT

## 2019-10-25 PROCEDURE — 97597 DBRDMT OPN WND 1ST 20 CM/<: CPT

## 2019-10-25 PROCEDURE — 25010000002 VANCOMYCIN PER 500 MG

## 2019-10-25 PROCEDURE — 94799 UNLISTED PULMONARY SVC/PX: CPT

## 2019-10-25 PROCEDURE — 25010000002 PIPERACILLIN SOD-TAZOBACTAM PER 1 G

## 2019-10-25 PROCEDURE — 63710000001 PREDNISONE PER 5 MG: Performed by: INTERNAL MEDICINE

## 2019-10-25 PROCEDURE — 80048 BASIC METABOLIC PNL TOTAL CA: CPT | Performed by: INTERNAL MEDICINE

## 2019-10-25 PROCEDURE — 85027 COMPLETE CBC AUTOMATED: CPT | Performed by: INTERNAL MEDICINE

## 2019-10-25 PROCEDURE — 29581 APPL MULTLAYER CMPRN SYS LEG: CPT

## 2019-10-25 PROCEDURE — 80202 ASSAY OF VANCOMYCIN: CPT

## 2019-10-25 PROCEDURE — 99232 SBSQ HOSP IP/OBS MODERATE 35: CPT | Performed by: INTERNAL MEDICINE

## 2019-10-25 PROCEDURE — 25010000002 ENOXAPARIN PER 10 MG: Performed by: INTERNAL MEDICINE

## 2019-10-25 RX ORDER — DOCUSATE SODIUM 100 MG/1
100 CAPSULE, LIQUID FILLED ORAL 2 TIMES DAILY
Status: DISCONTINUED | OUTPATIENT
Start: 2019-10-25 | End: 2019-10-29 | Stop reason: HOSPADM

## 2019-10-25 RX ADMIN — DICLOFENAC SODIUM 2 G: 10 GEL TOPICAL at 08:22

## 2019-10-25 RX ADMIN — CIPROFLOXACIN 1 DROP: 3 SOLUTION OPHTHALMIC at 17:33

## 2019-10-25 RX ADMIN — VANCOMYCIN HYDROCHLORIDE 1000 MG: 1 INJECTION, SOLUTION INTRAVENOUS at 01:36

## 2019-10-25 RX ADMIN — PREDNISONE 10 MG: 10 TABLET ORAL at 08:22

## 2019-10-25 RX ADMIN — CIPROFLOXACIN 1 DROP: 3 SOLUTION OPHTHALMIC at 21:36

## 2019-10-25 RX ADMIN — IPRATROPIUM BROMIDE AND ALBUTEROL SULFATE 3 ML: 2.5; .5 SOLUTION RESPIRATORY (INHALATION) at 19:01

## 2019-10-25 RX ADMIN — VANCOMYCIN HYDROCHLORIDE 1000 MG: 1 INJECTION, SOLUTION INTRAVENOUS at 23:05

## 2019-10-25 RX ADMIN — TAZOBACTAM SODIUM AND PIPERACILLIN SODIUM 3.38 G: 375; 3 INJECTION, SOLUTION INTRAVENOUS at 08:22

## 2019-10-25 RX ADMIN — ASPIRIN 81 MG: 81 TABLET, COATED ORAL at 08:22

## 2019-10-25 RX ADMIN — AZELASTINE HYDROCHLORIDE 274 MCG: 137 SPRAY, METERED NASAL at 21:39

## 2019-10-25 RX ADMIN — TAZOBACTAM SODIUM AND PIPERACILLIN SODIUM 3.38 G: 375; 3 INJECTION, SOLUTION INTRAVENOUS at 23:05

## 2019-10-25 RX ADMIN — HYDROCODONE BITARTRATE AND ACETAMINOPHEN 1 TABLET: 5; 325 TABLET ORAL at 10:22

## 2019-10-25 RX ADMIN — FINASTERIDE 5 MG: 5 TABLET, FILM COATED ORAL at 08:22

## 2019-10-25 RX ADMIN — IPRATROPIUM BROMIDE AND ALBUTEROL SULFATE 3 ML: 2.5; .5 SOLUTION RESPIRATORY (INHALATION) at 17:25

## 2019-10-25 RX ADMIN — CIPROFLOXACIN 1 DROP: 3 SOLUTION OPHTHALMIC at 06:09

## 2019-10-25 RX ADMIN — SODIUM CHLORIDE, POTASSIUM CHLORIDE, SODIUM LACTATE AND CALCIUM CHLORIDE 75 ML/HR: 600; 310; 30; 20 INJECTION, SOLUTION INTRAVENOUS at 17:33

## 2019-10-25 RX ADMIN — TAZOBACTAM SODIUM AND PIPERACILLIN SODIUM 3.38 G: 375; 3 INJECTION, SOLUTION INTRAVENOUS at 17:33

## 2019-10-25 RX ADMIN — TAZOBACTAM SODIUM AND PIPERACILLIN SODIUM 3.38 G: 375; 3 INJECTION, SOLUTION INTRAVENOUS at 01:36

## 2019-10-25 RX ADMIN — VANCOMYCIN HYDROCHLORIDE 1000 MG: 1 INJECTION, SOLUTION INTRAVENOUS at 13:01

## 2019-10-25 RX ADMIN — OFLOXACIN 1 DROP: 3 SOLUTION/ DROPS OPHTHALMIC at 21:37

## 2019-10-25 RX ADMIN — BISOPROLOL FUMARATE 2.5 MG: 5 TABLET ORAL at 08:21

## 2019-10-25 RX ADMIN — IPRATROPIUM BROMIDE AND ALBUTEROL SULFATE 3 ML: 2.5; .5 SOLUTION RESPIRATORY (INHALATION) at 08:04

## 2019-10-25 RX ADMIN — ENOXAPARIN SODIUM 40 MG: 40 INJECTION SUBCUTANEOUS at 21:36

## 2019-10-25 RX ADMIN — OFLOXACIN 1 DROP: 3 SOLUTION/ DROPS OPHTHALMIC at 08:21

## 2019-10-25 RX ADMIN — DICLOFENAC SODIUM 2 G: 10 GEL TOPICAL at 21:37

## 2019-10-25 RX ADMIN — IPRATROPIUM BROMIDE AND ALBUTEROL SULFATE 3 ML: 2.5; .5 SOLUTION RESPIRATORY (INHALATION) at 13:45

## 2019-10-25 RX ADMIN — HYDROCODONE BITARTRATE AND ACETAMINOPHEN 1 TABLET: 5; 325 TABLET ORAL at 23:05

## 2019-10-26 PROBLEM — I24.89 DEMAND ISCHEMIA OF MYOCARDIUM: Status: ACTIVE | Noted: 2019-10-26

## 2019-10-26 PROBLEM — B37.49 YEAST UTI: Status: RESOLVED | Noted: 2019-01-16 | Resolved: 2019-10-26

## 2019-10-26 PROBLEM — I24.8 DEMAND ISCHEMIA OF MYOCARDIUM (HCC): Status: ACTIVE | Noted: 2019-10-26

## 2019-10-26 PROBLEM — R31.0 GROSS HEMATURIA: Status: RESOLVED | Noted: 2019-04-22 | Resolved: 2019-10-26

## 2019-10-26 PROBLEM — R33.9 URINARY RETENTION: Status: RESOLVED | Noted: 2017-11-22 | Resolved: 2019-10-26

## 2019-10-26 PROBLEM — T83.511A UTI (URINARY TRACT INFECTION) DUE TO URINARY INDWELLING CATHETER (HCC): Status: RESOLVED | Noted: 2019-01-16 | Resolved: 2019-10-26

## 2019-10-26 PROBLEM — D64.9 NORMOCYTIC ANEMIA: Status: RESOLVED | Noted: 2017-11-17 | Resolved: 2019-10-26

## 2019-10-26 PROBLEM — N39.0 UTI (URINARY TRACT INFECTION) DUE TO URINARY INDWELLING CATHETER (HCC): Status: RESOLVED | Noted: 2019-01-16 | Resolved: 2019-10-26

## 2019-10-26 PROBLEM — N40.0 BPH WITHOUT OBSTRUCTION/LOWER URINARY TRACT SYMPTOMS: Status: RESOLVED | Noted: 2019-01-29 | Resolved: 2019-10-26

## 2019-10-26 LAB
BASOPHILS # BLD AUTO: 0.02 10*3/MM3 (ref 0–0.2)
BASOPHILS NFR BLD AUTO: 0.3 % (ref 0–1.5)
DEPRECATED RDW RBC AUTO: 49.3 FL (ref 37–54)
EOSINOPHIL # BLD AUTO: 0.03 10*3/MM3 (ref 0–0.4)
EOSINOPHIL NFR BLD AUTO: 0.4 % (ref 0.3–6.2)
ERYTHROCYTE [DISTWIDTH] IN BLOOD BY AUTOMATED COUNT: 14.7 % (ref 12.3–15.4)
HCT VFR BLD AUTO: 37.7 % (ref 37.5–51)
HGB BLD-MCNC: 10.8 G/DL (ref 13–17.7)
IMM GRANULOCYTES # BLD AUTO: 0.05 10*3/MM3 (ref 0–0.05)
IMM GRANULOCYTES NFR BLD AUTO: 0.7 % (ref 0–0.5)
LYMPHOCYTES # BLD AUTO: 0.88 10*3/MM3 (ref 0.7–3.1)
LYMPHOCYTES NFR BLD AUTO: 11.5 % (ref 19.6–45.3)
MCH RBC QN AUTO: 26 PG (ref 26.6–33)
MCHC RBC AUTO-ENTMCNC: 28.6 G/DL (ref 31.5–35.7)
MCV RBC AUTO: 90.6 FL (ref 79–97)
MONOCYTES # BLD AUTO: 0.58 10*3/MM3 (ref 0.1–0.9)
MONOCYTES NFR BLD AUTO: 7.6 % (ref 5–12)
NEUTROPHILS # BLD AUTO: 6.12 10*3/MM3 (ref 1.7–7)
NEUTROPHILS NFR BLD AUTO: 79.5 % (ref 42.7–76)
NRBC BLD AUTO-RTO: 0 /100 WBC (ref 0–0.2)
PLATELET # BLD AUTO: 156 10*3/MM3 (ref 140–450)
PMV BLD AUTO: 11.4 FL (ref 6–12)
RBC # BLD AUTO: 4.16 10*6/MM3 (ref 4.14–5.8)
WBC NRBC COR # BLD: 7.68 10*3/MM3 (ref 3.4–10.8)

## 2019-10-26 PROCEDURE — 85025 COMPLETE CBC W/AUTO DIFF WBC: CPT | Performed by: INTERNAL MEDICINE

## 2019-10-26 PROCEDURE — 25010000002 PIPERACILLIN SOD-TAZOBACTAM PER 1 G

## 2019-10-26 PROCEDURE — 93010 ELECTROCARDIOGRAM REPORT: CPT | Performed by: INTERNAL MEDICINE

## 2019-10-26 PROCEDURE — 25010000002 VANCOMYCIN PER 500 MG

## 2019-10-26 PROCEDURE — 63710000001 PREDNISONE PER 5 MG: Performed by: INTERNAL MEDICINE

## 2019-10-26 PROCEDURE — 99233 SBSQ HOSP IP/OBS HIGH 50: CPT | Performed by: INTERNAL MEDICINE

## 2019-10-26 PROCEDURE — 94799 UNLISTED PULMONARY SVC/PX: CPT

## 2019-10-26 PROCEDURE — 93005 ELECTROCARDIOGRAM TRACING: CPT | Performed by: NURSE PRACTITIONER

## 2019-10-26 PROCEDURE — 99231 SBSQ HOSP IP/OBS SF/LOW 25: CPT | Performed by: NURSE PRACTITIONER

## 2019-10-26 PROCEDURE — 25010000002 ENOXAPARIN PER 10 MG: Performed by: INTERNAL MEDICINE

## 2019-10-26 RX ORDER — MAGNESIUM SULFATE HEPTAHYDRATE 40 MG/ML
2 INJECTION, SOLUTION INTRAVENOUS AS NEEDED
Status: DISCONTINUED | OUTPATIENT
Start: 2019-10-26 | End: 2019-10-29 | Stop reason: HOSPADM

## 2019-10-26 RX ORDER — MAGNESIUM SULFATE 1 G/100ML
1 INJECTION INTRAVENOUS AS NEEDED
Status: DISCONTINUED | OUTPATIENT
Start: 2019-10-26 | End: 2019-10-29 | Stop reason: HOSPADM

## 2019-10-26 RX ORDER — MAGNESIUM SULFATE HEPTAHYDRATE 40 MG/ML
4 INJECTION, SOLUTION INTRAVENOUS AS NEEDED
Status: DISCONTINUED | OUTPATIENT
Start: 2019-10-26 | End: 2019-10-29 | Stop reason: HOSPADM

## 2019-10-26 RX ORDER — HYDROCODONE BITARTRATE AND ACETAMINOPHEN 7.5; 325 MG/1; MG/1
1 TABLET ORAL EVERY 4 HOURS PRN
Status: DISCONTINUED | OUTPATIENT
Start: 2019-10-26 | End: 2019-10-29 | Stop reason: HOSPADM

## 2019-10-26 RX ORDER — SACCHAROMYCES BOULARDII 250 MG
250 CAPSULE ORAL 2 TIMES DAILY
Status: DISCONTINUED | OUTPATIENT
Start: 2019-10-26 | End: 2019-10-29 | Stop reason: HOSPADM

## 2019-10-26 RX ADMIN — HYDROCODONE BITARTRATE AND ACETAMINOPHEN 1 TABLET: 7.5; 325 TABLET ORAL at 12:21

## 2019-10-26 RX ADMIN — HYDROCODONE BITARTRATE AND ACETAMINOPHEN 1 TABLET: 7.5; 325 TABLET ORAL at 17:25

## 2019-10-26 RX ADMIN — OFLOXACIN 1 DROP: 3 SOLUTION/ DROPS OPHTHALMIC at 09:34

## 2019-10-26 RX ADMIN — PREDNISONE 10 MG: 10 TABLET ORAL at 09:33

## 2019-10-26 RX ADMIN — DICLOFENAC SODIUM 2 G: 10 GEL TOPICAL at 20:34

## 2019-10-26 RX ADMIN — ENOXAPARIN SODIUM 40 MG: 40 INJECTION SUBCUTANEOUS at 20:33

## 2019-10-26 RX ADMIN — DOCUSATE SODIUM 100 MG: 100 CAPSULE, LIQUID FILLED ORAL at 20:33

## 2019-10-26 RX ADMIN — HYDROCODONE BITARTRATE AND ACETAMINOPHEN 1 TABLET: 5; 325 TABLET ORAL at 05:59

## 2019-10-26 RX ADMIN — Medication 250 MG: at 20:33

## 2019-10-26 RX ADMIN — BISOPROLOL FUMARATE 2.5 MG: 5 TABLET ORAL at 09:32

## 2019-10-26 RX ADMIN — IPRATROPIUM BROMIDE AND ALBUTEROL SULFATE 3 ML: 2.5; .5 SOLUTION RESPIRATORY (INHALATION) at 12:58

## 2019-10-26 RX ADMIN — OFLOXACIN 1 DROP: 3 SOLUTION/ DROPS OPHTHALMIC at 15:04

## 2019-10-26 RX ADMIN — CIPROFLOXACIN 1 DROP: 3 SOLUTION OPHTHALMIC at 06:00

## 2019-10-26 RX ADMIN — Medication 250 MG: at 12:21

## 2019-10-26 RX ADMIN — DICLOFENAC SODIUM 2 G: 10 GEL TOPICAL at 09:33

## 2019-10-26 RX ADMIN — FINASTERIDE 5 MG: 5 TABLET, FILM COATED ORAL at 09:33

## 2019-10-26 RX ADMIN — TAZOBACTAM SODIUM AND PIPERACILLIN SODIUM 3.38 G: 375; 3 INJECTION, SOLUTION INTRAVENOUS at 15:04

## 2019-10-26 RX ADMIN — HYDROCODONE BITARTRATE AND ACETAMINOPHEN 1 TABLET: 7.5; 325 TABLET ORAL at 23:18

## 2019-10-26 RX ADMIN — VANCOMYCIN HYDROCHLORIDE 1000 MG: 1 INJECTION, SOLUTION INTRAVENOUS at 12:21

## 2019-10-26 RX ADMIN — TAZOBACTAM SODIUM AND PIPERACILLIN SODIUM 3.38 G: 375; 3 INJECTION, SOLUTION INTRAVENOUS at 09:31

## 2019-10-26 RX ADMIN — ASPIRIN 81 MG: 81 TABLET, COATED ORAL at 09:33

## 2019-10-26 RX ADMIN — OFLOXACIN 1 DROP: 3 SOLUTION/ DROPS OPHTHALMIC at 20:34

## 2019-10-26 RX ADMIN — AZELASTINE HYDROCHLORIDE 274 MCG: 137 SPRAY, METERED NASAL at 20:34

## 2019-10-26 RX ADMIN — FLUTICASONE PROPIONATE 2 SPRAY: 50 SPRAY, METERED NASAL at 09:33

## 2019-10-26 RX ADMIN — IPRATROPIUM BROMIDE AND ALBUTEROL SULFATE 3 ML: 2.5; .5 SOLUTION RESPIRATORY (INHALATION) at 07:53

## 2019-10-26 RX ADMIN — AZELASTINE HYDROCHLORIDE 274 MCG: 137 SPRAY, METERED NASAL at 09:33

## 2019-10-26 RX ADMIN — CIPROFLOXACIN 1 DROP: 3 SOLUTION OPHTHALMIC at 09:34

## 2019-10-26 RX ADMIN — IPRATROPIUM BROMIDE AND ALBUTEROL SULFATE 3 ML: 2.5; .5 SOLUTION RESPIRATORY (INHALATION) at 21:42

## 2019-10-26 RX ADMIN — IPRATROPIUM BROMIDE AND ALBUTEROL SULFATE 3 ML: 2.5; .5 SOLUTION RESPIRATORY (INHALATION) at 16:01

## 2019-10-26 RX ADMIN — DICLOFENAC SODIUM 2 G: 10 GEL TOPICAL at 15:05

## 2019-10-27 LAB
ANION GAP SERPL CALCULATED.3IONS-SCNC: 9 MMOL/L (ref 5–15)
BUN BLD-MCNC: 11 MG/DL (ref 8–23)
BUN/CREAT SERPL: 14.5 (ref 7–25)
CALCIUM SPEC-SCNC: 8.5 MG/DL (ref 8.6–10.5)
CHLORIDE SERPL-SCNC: 108 MMOL/L (ref 98–107)
CO2 SERPL-SCNC: 26 MMOL/L (ref 22–29)
CREAT BLD-MCNC: 0.76 MG/DL (ref 0.76–1.27)
DEPRECATED RDW RBC AUTO: 51.3 FL (ref 37–54)
ERYTHROCYTE [DISTWIDTH] IN BLOOD BY AUTOMATED COUNT: 14.9 % (ref 12.3–15.4)
GFR SERPL CREATININE-BSD FRML MDRD: 119 ML/MIN/1.73
GLUCOSE BLD-MCNC: 76 MG/DL (ref 65–99)
HCT VFR BLD AUTO: 39.4 % (ref 37.5–51)
HGB BLD-MCNC: 11.3 G/DL (ref 13–17.7)
MAGNESIUM SERPL-MCNC: 2.1 MG/DL (ref 1.6–2.4)
MCH RBC QN AUTO: 26.7 PG (ref 26.6–33)
MCHC RBC AUTO-ENTMCNC: 28.7 G/DL (ref 31.5–35.7)
MCV RBC AUTO: 92.9 FL (ref 79–97)
PLATELET # BLD AUTO: 151 10*3/MM3 (ref 140–450)
PMV BLD AUTO: 11.6 FL (ref 6–12)
POTASSIUM BLD-SCNC: 3.5 MMOL/L (ref 3.5–5.2)
RBC # BLD AUTO: 4.24 10*6/MM3 (ref 4.14–5.8)
SODIUM BLD-SCNC: 143 MMOL/L (ref 136–145)
WBC NRBC COR # BLD: 6.95 10*3/MM3 (ref 3.4–10.8)

## 2019-10-27 PROCEDURE — 99232 SBSQ HOSP IP/OBS MODERATE 35: CPT | Performed by: INTERNAL MEDICINE

## 2019-10-27 PROCEDURE — 85027 COMPLETE CBC AUTOMATED: CPT | Performed by: INTERNAL MEDICINE

## 2019-10-27 PROCEDURE — 97602 WOUND(S) CARE NON-SELECTIVE: CPT

## 2019-10-27 PROCEDURE — 63710000001 PREDNISONE PER 5 MG: Performed by: INTERNAL MEDICINE

## 2019-10-27 PROCEDURE — 80048 BASIC METABOLIC PNL TOTAL CA: CPT | Performed by: INTERNAL MEDICINE

## 2019-10-27 PROCEDURE — 83735 ASSAY OF MAGNESIUM: CPT | Performed by: INTERNAL MEDICINE

## 2019-10-27 PROCEDURE — 25010000002 PIPERACILLIN SOD-TAZOBACTAM PER 1 G

## 2019-10-27 PROCEDURE — 25010000002 ENOXAPARIN PER 10 MG: Performed by: INTERNAL MEDICINE

## 2019-10-27 PROCEDURE — 99231 SBSQ HOSP IP/OBS SF/LOW 25: CPT | Performed by: NURSE PRACTITIONER

## 2019-10-27 PROCEDURE — 25010000002 VANCOMYCIN PER 500 MG

## 2019-10-27 PROCEDURE — 94799 UNLISTED PULMONARY SVC/PX: CPT

## 2019-10-27 PROCEDURE — 29581 APPL MULTLAYER CMPRN SYS LEG: CPT

## 2019-10-27 RX ADMIN — FINASTERIDE 5 MG: 5 TABLET, FILM COATED ORAL at 09:22

## 2019-10-27 RX ADMIN — IPRATROPIUM BROMIDE AND ALBUTEROL SULFATE 3 ML: 2.5; .5 SOLUTION RESPIRATORY (INHALATION) at 16:42

## 2019-10-27 RX ADMIN — VANCOMYCIN HYDROCHLORIDE 1000 MG: 1 INJECTION, SOLUTION INTRAVENOUS at 12:09

## 2019-10-27 RX ADMIN — BISOPROLOL FUMARATE 2.5 MG: 5 TABLET ORAL at 09:22

## 2019-10-27 RX ADMIN — AZELASTINE HYDROCHLORIDE 274 MCG: 137 SPRAY, METERED NASAL at 21:51

## 2019-10-27 RX ADMIN — DICLOFENAC SODIUM 2 G: 10 GEL TOPICAL at 21:49

## 2019-10-27 RX ADMIN — ASPIRIN 81 MG: 81 TABLET, COATED ORAL at 09:22

## 2019-10-27 RX ADMIN — HYDROCODONE BITARTRATE AND ACETAMINOPHEN 1 TABLET: 7.5; 325 TABLET ORAL at 17:56

## 2019-10-27 RX ADMIN — DICLOFENAC SODIUM 2 G: 10 GEL TOPICAL at 16:07

## 2019-10-27 RX ADMIN — IPRATROPIUM BROMIDE AND ALBUTEROL SULFATE 3 ML: 2.5; .5 SOLUTION RESPIRATORY (INHALATION) at 12:47

## 2019-10-27 RX ADMIN — IPRATROPIUM BROMIDE AND ALBUTEROL SULFATE 3 ML: 2.5; .5 SOLUTION RESPIRATORY (INHALATION) at 19:51

## 2019-10-27 RX ADMIN — HYDROCODONE BITARTRATE AND ACETAMINOPHEN 1 TABLET: 7.5; 325 TABLET ORAL at 09:22

## 2019-10-27 RX ADMIN — OFLOXACIN 1 DROP: 3 SOLUTION/ DROPS OPHTHALMIC at 16:07

## 2019-10-27 RX ADMIN — DICLOFENAC SODIUM 2 G: 10 GEL TOPICAL at 09:20

## 2019-10-27 RX ADMIN — PREDNISONE 10 MG: 10 TABLET ORAL at 09:22

## 2019-10-27 RX ADMIN — IPRATROPIUM BROMIDE AND ALBUTEROL SULFATE 3 ML: 2.5; .5 SOLUTION RESPIRATORY (INHALATION) at 08:22

## 2019-10-27 RX ADMIN — TAZOBACTAM SODIUM AND PIPERACILLIN SODIUM 3.38 G: 375; 3 INJECTION, SOLUTION INTRAVENOUS at 01:12

## 2019-10-27 RX ADMIN — AZELASTINE HYDROCHLORIDE 274 MCG: 137 SPRAY, METERED NASAL at 09:20

## 2019-10-27 RX ADMIN — Medication 250 MG: at 21:48

## 2019-10-27 RX ADMIN — OFLOXACIN 1 DROP: 3 SOLUTION/ DROPS OPHTHALMIC at 09:22

## 2019-10-27 RX ADMIN — VANCOMYCIN HYDROCHLORIDE 1000 MG: 1 INJECTION, SOLUTION INTRAVENOUS at 01:12

## 2019-10-27 RX ADMIN — Medication 250 MG: at 09:22

## 2019-10-27 RX ADMIN — FLUTICASONE PROPIONATE 2 SPRAY: 50 SPRAY, METERED NASAL at 09:21

## 2019-10-27 RX ADMIN — TAZOBACTAM SODIUM AND PIPERACILLIN SODIUM 3.38 G: 375; 3 INJECTION, SOLUTION INTRAVENOUS at 09:21

## 2019-10-27 RX ADMIN — TAZOBACTAM SODIUM AND PIPERACILLIN SODIUM 3.38 G: 375; 3 INJECTION, SOLUTION INTRAVENOUS at 16:07

## 2019-10-27 RX ADMIN — HYDROCODONE BITARTRATE AND ACETAMINOPHEN 1 TABLET: 7.5; 325 TABLET ORAL at 21:48

## 2019-10-27 RX ADMIN — ENOXAPARIN SODIUM 40 MG: 40 INJECTION SUBCUTANEOUS at 21:49

## 2019-10-27 RX ADMIN — OFLOXACIN 1 DROP: 3 SOLUTION/ DROPS OPHTHALMIC at 21:53

## 2019-10-28 LAB
ANION GAP SERPL CALCULATED.3IONS-SCNC: 9 MMOL/L (ref 5–15)
BACTERIA SPEC AEROBE CULT: ABNORMAL
BACTERIA SPEC AEROBE CULT: ABNORMAL
BUN BLD-MCNC: 16 MG/DL (ref 8–23)
BUN/CREAT SERPL: 16 (ref 7–25)
CALCIUM SPEC-SCNC: 8.4 MG/DL (ref 8.6–10.5)
CHLORIDE SERPL-SCNC: 108 MMOL/L (ref 98–107)
CO2 SERPL-SCNC: 26 MMOL/L (ref 22–29)
CREAT BLD-MCNC: 1 MG/DL (ref 0.76–1.27)
GFR SERPL CREATININE-BSD FRML MDRD: 87 ML/MIN/1.73
GLUCOSE BLD-MCNC: 73 MG/DL (ref 65–99)
GRAM STN SPEC: ABNORMAL
POTASSIUM BLD-SCNC: 3.7 MMOL/L (ref 3.5–5.2)
SODIUM BLD-SCNC: 143 MMOL/L (ref 136–145)
VANCOMYCIN TROUGH SERPL-MCNC: 26.1 MCG/ML (ref 5–20)

## 2019-10-28 PROCEDURE — 25010000002 PIPERACILLIN SOD-TAZOBACTAM PER 1 G

## 2019-10-28 PROCEDURE — 25010000002 VANCOMYCIN PER 500 MG

## 2019-10-28 PROCEDURE — 94799 UNLISTED PULMONARY SVC/PX: CPT

## 2019-10-28 PROCEDURE — 63710000001 PREDNISONE PER 5 MG: Performed by: INTERNAL MEDICINE

## 2019-10-28 PROCEDURE — 99232 SBSQ HOSP IP/OBS MODERATE 35: CPT | Performed by: INTERNAL MEDICINE

## 2019-10-28 PROCEDURE — 80048 BASIC METABOLIC PNL TOTAL CA: CPT

## 2019-10-28 PROCEDURE — 97116 GAIT TRAINING THERAPY: CPT

## 2019-10-28 PROCEDURE — 99233 SBSQ HOSP IP/OBS HIGH 50: CPT | Performed by: INTERNAL MEDICINE

## 2019-10-28 PROCEDURE — 97166 OT EVAL MOD COMPLEX 45 MIN: CPT

## 2019-10-28 PROCEDURE — 97530 THERAPEUTIC ACTIVITIES: CPT

## 2019-10-28 PROCEDURE — 80202 ASSAY OF VANCOMYCIN: CPT

## 2019-10-28 RX ORDER — AMLODIPINE BESYLATE 5 MG/1
5 TABLET ORAL
Status: DISCONTINUED | OUTPATIENT
Start: 2019-10-28 | End: 2019-10-29

## 2019-10-28 RX ORDER — SODIUM CHLORIDE, SODIUM LACTATE, POTASSIUM CHLORIDE, CALCIUM CHLORIDE 600; 310; 30; 20 MG/100ML; MG/100ML; MG/100ML; MG/100ML
75 INJECTION, SOLUTION INTRAVENOUS CONTINUOUS
Status: ACTIVE | OUTPATIENT
Start: 2019-10-28 | End: 2019-10-29

## 2019-10-28 RX ADMIN — IPRATROPIUM BROMIDE AND ALBUTEROL SULFATE 3 ML: 2.5; .5 SOLUTION RESPIRATORY (INHALATION) at 15:36

## 2019-10-28 RX ADMIN — HYDROCODONE BITARTRATE AND ACETAMINOPHEN 1 TABLET: 7.5; 325 TABLET ORAL at 04:07

## 2019-10-28 RX ADMIN — HYDROCODONE BITARTRATE AND ACETAMINOPHEN 1 TABLET: 7.5; 325 TABLET ORAL at 08:56

## 2019-10-28 RX ADMIN — SODIUM CHLORIDE, POTASSIUM CHLORIDE, SODIUM LACTATE AND CALCIUM CHLORIDE 75 ML/HR: 600; 310; 30; 20 INJECTION, SOLUTION INTRAVENOUS at 18:38

## 2019-10-28 RX ADMIN — IPRATROPIUM BROMIDE AND ALBUTEROL SULFATE 3 ML: 2.5; .5 SOLUTION RESPIRATORY (INHALATION) at 20:06

## 2019-10-28 RX ADMIN — ASPIRIN 81 MG: 81 TABLET, COATED ORAL at 08:56

## 2019-10-28 RX ADMIN — FLUTICASONE PROPIONATE 2 SPRAY: 50 SPRAY, METERED NASAL at 08:58

## 2019-10-28 RX ADMIN — TAZOBACTAM SODIUM AND PIPERACILLIN SODIUM 3.38 G: 375; 3 INJECTION, SOLUTION INTRAVENOUS at 02:29

## 2019-10-28 RX ADMIN — DOCUSATE SODIUM 100 MG: 100 CAPSULE, LIQUID FILLED ORAL at 22:23

## 2019-10-28 RX ADMIN — VANCOMYCIN HYDROCHLORIDE 1000 MG: 1 INJECTION, SOLUTION INTRAVENOUS at 02:28

## 2019-10-28 RX ADMIN — TAZOBACTAM SODIUM AND PIPERACILLIN SODIUM 3.38 G: 375; 3 INJECTION, SOLUTION INTRAVENOUS at 16:21

## 2019-10-28 RX ADMIN — AMLODIPINE BESYLATE 5 MG: 5 TABLET ORAL at 16:21

## 2019-10-28 RX ADMIN — HYDROCODONE BITARTRATE AND ACETAMINOPHEN 1 TABLET: 7.5; 325 TABLET ORAL at 22:23

## 2019-10-28 RX ADMIN — TAZOBACTAM SODIUM AND PIPERACILLIN SODIUM 3.38 G: 375; 3 INJECTION, SOLUTION INTRAVENOUS at 08:49

## 2019-10-28 RX ADMIN — FINASTERIDE 5 MG: 5 TABLET, FILM COATED ORAL at 08:57

## 2019-10-28 RX ADMIN — OFLOXACIN 1 DROP: 3 SOLUTION/ DROPS OPHTHALMIC at 16:21

## 2019-10-28 RX ADMIN — OFLOXACIN 1 DROP: 3 SOLUTION/ DROPS OPHTHALMIC at 22:25

## 2019-10-28 RX ADMIN — Medication 250 MG: at 08:56

## 2019-10-28 RX ADMIN — BISOPROLOL FUMARATE 2.5 MG: 5 TABLET ORAL at 08:56

## 2019-10-28 RX ADMIN — PREDNISONE 10 MG: 10 TABLET ORAL at 08:56

## 2019-10-28 RX ADMIN — AZELASTINE HYDROCHLORIDE 274 MCG: 137 SPRAY, METERED NASAL at 08:58

## 2019-10-28 RX ADMIN — Medication 250 MG: at 22:23

## 2019-10-28 RX ADMIN — DICLOFENAC SODIUM 2 G: 10 GEL TOPICAL at 08:58

## 2019-10-28 RX ADMIN — AZELASTINE HYDROCHLORIDE 274 MCG: 137 SPRAY, METERED NASAL at 22:25

## 2019-10-28 RX ADMIN — IPRATROPIUM BROMIDE AND ALBUTEROL SULFATE 3 ML: 2.5; .5 SOLUTION RESPIRATORY (INHALATION) at 07:49

## 2019-10-28 RX ADMIN — OFLOXACIN 1 DROP: 3 SOLUTION/ DROPS OPHTHALMIC at 08:59

## 2019-10-28 RX ADMIN — DICLOFENAC SODIUM 2 G: 10 GEL TOPICAL at 22:24

## 2019-10-28 RX ADMIN — IPRATROPIUM BROMIDE AND ALBUTEROL SULFATE 3 ML: 2.5; .5 SOLUTION RESPIRATORY (INHALATION) at 11:05

## 2019-10-29 VITALS
BODY MASS INDEX: 21.75 KG/M2 | TEMPERATURE: 97.4 F | OXYGEN SATURATION: 97 % | SYSTOLIC BLOOD PRESSURE: 149 MMHG | HEIGHT: 68 IN | DIASTOLIC BLOOD PRESSURE: 87 MMHG | RESPIRATION RATE: 20 BRPM | HEART RATE: 70 BPM | WEIGHT: 143.5 LBS

## 2019-10-29 LAB
ANION GAP SERPL CALCULATED.3IONS-SCNC: 10 MMOL/L (ref 5–15)
BACTERIA SPEC AEROBE CULT: NORMAL
BACTERIA SPEC AEROBE CULT: NORMAL
BUN BLD-MCNC: 16 MG/DL (ref 8–23)
BUN/CREAT SERPL: 17.8 (ref 7–25)
CALCIUM SPEC-SCNC: 8.4 MG/DL (ref 8.6–10.5)
CHLORIDE SERPL-SCNC: 107 MMOL/L (ref 98–107)
CO2 SERPL-SCNC: 27 MMOL/L (ref 22–29)
CREAT BLD-MCNC: 0.9 MG/DL (ref 0.76–1.27)
GFR SERPL CREATININE-BSD FRML MDRD: 98 ML/MIN/1.73
GLUCOSE BLD-MCNC: 71 MG/DL (ref 65–99)
POTASSIUM BLD-SCNC: 3.4 MMOL/L (ref 3.5–5.2)
SODIUM BLD-SCNC: 144 MMOL/L (ref 136–145)
VANCOMYCIN SERPL-MCNC: 18.3 MCG/ML (ref 5–40)

## 2019-10-29 PROCEDURE — 94799 UNLISTED PULMONARY SVC/PX: CPT

## 2019-10-29 PROCEDURE — 97597 DBRDMT OPN WND 1ST 20 CM/<: CPT | Performed by: PHYSICAL THERAPIST

## 2019-10-29 PROCEDURE — 80048 BASIC METABOLIC PNL TOTAL CA: CPT

## 2019-10-29 PROCEDURE — 29581 APPL MULTLAYER CMPRN SYS LEG: CPT | Performed by: PHYSICAL THERAPIST

## 2019-10-29 PROCEDURE — 99239 HOSP IP/OBS DSCHRG MGMT >30: CPT | Performed by: INTERNAL MEDICINE

## 2019-10-29 PROCEDURE — 25010000002 PIPERACILLIN SOD-TAZOBACTAM PER 1 G

## 2019-10-29 PROCEDURE — 80202 ASSAY OF VANCOMYCIN: CPT

## 2019-10-29 PROCEDURE — 63710000001 PREDNISONE PER 5 MG: Performed by: INTERNAL MEDICINE

## 2019-10-29 RX ORDER — AMLODIPINE BESYLATE 5 MG/1
10 TABLET ORAL
Status: DISCONTINUED | OUTPATIENT
Start: 2019-10-29 | End: 2019-10-29 | Stop reason: HOSPADM

## 2019-10-29 RX ORDER — LEVOFLOXACIN 750 MG/1
750 TABLET ORAL DAILY
Qty: 5 TABLET | Refills: 0 | Status: SHIPPED | OUTPATIENT
Start: 2019-10-29 | End: 2020-01-01

## 2019-10-29 RX ORDER — CLINDAMYCIN HYDROCHLORIDE 300 MG/1
600 CAPSULE ORAL 3 TIMES DAILY
Qty: 32 CAPSULE | Refills: 0 | Status: SHIPPED | OUTPATIENT
Start: 2019-10-29 | End: 2020-01-01

## 2019-10-29 RX ORDER — SACCHAROMYCES BOULARDII 250 MG
250 CAPSULE ORAL 2 TIMES DAILY
Qty: 10 CAPSULE | Refills: 0 | Status: SHIPPED | OUTPATIENT
Start: 2019-10-29 | End: 2020-01-01

## 2019-10-29 RX ORDER — ASPIRIN 81 MG/1
81 TABLET ORAL DAILY
Qty: 30 TABLET | Refills: 0 | Status: SHIPPED | OUTPATIENT
Start: 2019-10-30 | End: 2020-01-01

## 2019-10-29 RX ORDER — POTASSIUM CHLORIDE 750 MG/1
40 CAPSULE, EXTENDED RELEASE ORAL ONCE
Status: COMPLETED | OUTPATIENT
Start: 2019-10-29 | End: 2019-10-29

## 2019-10-29 RX ORDER — POLYVINYL ALCOHOL 14 MG/ML
2 SOLUTION/ DROPS OPHTHALMIC
Qty: 15 ML | Refills: 0 | Status: SHIPPED | OUTPATIENT
Start: 2019-10-29 | End: 2020-01-01

## 2019-10-29 RX ORDER — AMLODIPINE BESYLATE 10 MG/1
10 TABLET ORAL
Qty: 30 TABLET | Refills: 0 | Status: SHIPPED | OUTPATIENT
Start: 2019-10-30 | End: 2020-01-09 | Stop reason: SDUPTHER

## 2019-10-29 RX ADMIN — AZELASTINE HYDROCHLORIDE 274 MCG: 137 SPRAY, METERED NASAL at 08:37

## 2019-10-29 RX ADMIN — FLUTICASONE PROPIONATE 2 SPRAY: 50 SPRAY, METERED NASAL at 08:37

## 2019-10-29 RX ADMIN — PREDNISONE 10 MG: 10 TABLET ORAL at 08:35

## 2019-10-29 RX ADMIN — OFLOXACIN 1 DROP: 3 SOLUTION/ DROPS OPHTHALMIC at 08:37

## 2019-10-29 RX ADMIN — TAZOBACTAM SODIUM AND PIPERACILLIN SODIUM 3.38 G: 375; 3 INJECTION, SOLUTION INTRAVENOUS at 08:40

## 2019-10-29 RX ADMIN — Medication 250 MG: at 08:40

## 2019-10-29 RX ADMIN — IPRATROPIUM BROMIDE AND ALBUTEROL SULFATE 3 ML: 2.5; .5 SOLUTION RESPIRATORY (INHALATION) at 07:26

## 2019-10-29 RX ADMIN — BISOPROLOL FUMARATE 2.5 MG: 5 TABLET ORAL at 08:35

## 2019-10-29 RX ADMIN — DICLOFENAC SODIUM 2 G: 10 GEL TOPICAL at 08:37

## 2019-10-29 RX ADMIN — ASPIRIN 81 MG: 81 TABLET, COATED ORAL at 08:35

## 2019-10-29 RX ADMIN — AMLODIPINE BESYLATE 10 MG: 5 TABLET ORAL at 08:40

## 2019-10-29 RX ADMIN — IPRATROPIUM BROMIDE AND ALBUTEROL SULFATE 3 ML: 2.5; .5 SOLUTION RESPIRATORY (INHALATION) at 16:16

## 2019-10-29 RX ADMIN — TAZOBACTAM SODIUM AND PIPERACILLIN SODIUM 3.38 G: 375; 3 INJECTION, SOLUTION INTRAVENOUS at 01:34

## 2019-10-29 RX ADMIN — HYDROCODONE BITARTRATE AND ACETAMINOPHEN 1 TABLET: 7.5; 325 TABLET ORAL at 02:51

## 2019-10-29 RX ADMIN — FINASTERIDE 5 MG: 5 TABLET, FILM COATED ORAL at 08:35

## 2019-10-29 RX ADMIN — POTASSIUM CHLORIDE 40 MEQ: 750 CAPSULE, EXTENDED RELEASE ORAL at 08:35

## 2019-10-29 RX ADMIN — HYDROCODONE BITARTRATE AND ACETAMINOPHEN 1 TABLET: 7.5; 325 TABLET ORAL at 12:36

## 2019-10-29 RX ADMIN — HYDROCODONE BITARTRATE AND ACETAMINOPHEN 1 TABLET: 7.5; 325 TABLET ORAL at 06:56

## 2019-10-30 ENCOUNTER — READMISSION MANAGEMENT (OUTPATIENT)
Dept: CALL CENTER | Facility: HOSPITAL | Age: 79
End: 2019-10-30

## 2019-10-30 ENCOUNTER — TRANSITIONAL CARE MANAGEMENT TELEPHONE ENCOUNTER (OUTPATIENT)
Dept: INTERNAL MEDICINE | Facility: CLINIC | Age: 79
End: 2019-10-30

## 2019-10-30 NOTE — OUTREACH NOTE
Prep Survey      Responses   Facility patient discharged from?  [facility]   Is patient eligible?  Yes   Discharge diagnosis  Sepsis r/t LE ulcerations, chronic steroid use,  AMS, COPD, CHF, cellulitis, Afib, HTN   Does the patient have one of the following disease processes/diagnoses(primary or secondary)?  Sepsis   Does the patient have Home health ordered?  No   What is the Home health agency?   Out patient wound care - [place]   Is there a DME ordered?  No   What DME was ordered?  dressing supplies were to be send home with patient   Prep survey completed?  Yes          Elvira Barnard RN

## 2019-10-30 NOTE — OUTREACH NOTE
"TCM call completed.  See TCM flowsheet for details.  Does have upcoming hospital follow up appt with Dr. Chavez 11/4/19.  \"I feel OK, except for this strange feeling.\"  Questioned pt, but unable to describe feeling.  Denies any fever, chills, SOB, chest pain, rapid heart rate, lightheadedness/dizziness, N/V.  I offered appt for today or tomorrow, but he declined, and says he doesn't think he needs to changed his sonia.  He still is having some diarrhea \"from the antibiotic in hospital,\" but has not increased or worsened.  Just started on the Florastor today.  Is eating and drinking.  No difficulty in urinating.  He states he cannot see wound, but has been told it looks like it is healing.   No one was with him at time of call. He starts outpt PT and wound care this Sat.  He denies any needs at present, knows to call office for any questions or concerns and appreciated the call.    "

## 2019-10-31 ENCOUNTER — TELEPHONE (OUTPATIENT)
Dept: INTERNAL MEDICINE | Facility: CLINIC | Age: 79
End: 2019-10-31

## 2019-11-01 ENCOUNTER — READMISSION MANAGEMENT (OUTPATIENT)
Dept: CALL CENTER | Facility: HOSPITAL | Age: 79
End: 2019-11-01

## 2019-11-01 NOTE — OUTREACH NOTE
Sepsis Week 1 Survey      Responses   Facility patient discharged from?  [facility]   Does the patient have one of the following disease processes/diagnoses(primary or secondary)?  Sepsis   Is there a successful TCM telephone encounter documented?  Yes          Lisa Patiño RN

## 2019-11-03 ENCOUNTER — HOSPITAL ENCOUNTER (OUTPATIENT)
Dept: PHYSICAL THERAPY | Facility: HOSPITAL | Age: 79
Setting detail: THERAPIES SERIES
Discharge: HOME | End: 2019-11-03

## 2019-11-03 DIAGNOSIS — S81.802D MULTIPLE OPEN WOUNDS OF LOWER EXTREMITY, LEFT, SUBSEQUENT ENCOUNTER: Primary | ICD-10-CM

## 2019-11-03 DIAGNOSIS — S81.801D MULTIPLE OPEN WOUNDS OF LOWER EXTREMITY, RIGHT, SUBSEQUENT ENCOUNTER: ICD-10-CM

## 2019-11-03 DIAGNOSIS — R60.0 BILATERAL LOWER EXTREMITY EDEMA: ICD-10-CM

## 2019-11-03 PROCEDURE — 97597 DBRDMT OPN WND 1ST 20 CM/<: CPT

## 2019-11-03 PROCEDURE — 29581 APPL MULTLAYER CMPRN SYS LEG: CPT

## 2019-11-03 PROCEDURE — 97163 PT EVAL HIGH COMPLEX 45 MIN: CPT

## 2019-11-03 NOTE — THERAPY EVALUATION
Outpatient Rehabilitation - Wound/Debridement Initial Eval  [facility]     Patient Name: Michoacano Rojas  : 1940  MRN: [id number]  Today's Date: 11/3/2019                  Admit Date: 11/3/2019    Visit Dx:    ICD-10-CM ICD-9-CM   1. Multiple open wounds of lower extremity, left, subsequent encounter S81.802D V58.89     894.0   2. Multiple open wounds of lower extremity, right, subsequent encounter S81.801D V58.89     894.0   3. 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   • Tracheal stenosis   • Diastolic dysfunction with chronic heart failure (CMS/HCC)   • Acute on chronic respiratory failure with hypoxia and hypercapnia (CMS/HCC)   • Incarcerated right inguinal hernia   • Venous insufficiency (chronic) (peripheral)   • Benign essential hypertension   • Non-pressure chronic ulcer of left lower leg, with unspecified severity (CMS/HCC)   • Lower extremity edema   • Chronic diastolic heart failure (CMS/HCC)   • Cognitive disorder   • Chronic conjunctivitis of both eyes   • Facial swelling   • Goiter   • Idiopathic chronic gout of multiple sites with tophus   • Medicare annual wellness visit, subsequent   • Morbid obesity (CMS/HCC)   • Major depressive disorder, single episode, mild (CMS/HCC)   • Skin ulcer of right foot, limited to breakdown of skin (CMS/HCC)   • COPD (chronic obstructive pulmonary disease) (CMS/HCC)   • Hypoxia   • sepsis secondary to LEs ulcerations   • AMS (altered mental status)   • Venous stasis ulcer (CMS/HCC)   •  Immunosuppressed status (CMS/[facility])   • Current chronic use of systemic steroids   • Rheumatoid arthritis involving multiple sites (CMS/[facility])   • Demand ischemia of myocardium (CMS/[facility])        Past Medical History:   Diagnosis Date   • A-fib (CMS/[facility])    • Atrial fibrillation (CMS/HCC)    • CHF (congestive heart failure) (CMS/[facility])    • Chronic cutaneous venous stasis ulcer (CMS/[facility])    • Coronary artery disease    • Disease of thyroid gland    • Hypertension    • Penile abnormality    • Peptic ulcer disease    • Primary malignant neoplasm of bronchus with metastasisto other site, unspecified laterality (CMS/[facility]) 4/22/2019   • RA (rheumatoid arthritis) (CMS/[facility])    • Rheumatic fever    • Rheumatoid arthritis (CMS/[facility])    • Sepsis (CMS/[facility]) 11/17/2017    from cellulitis due to leg ulcer, hospitalization, antibiotics, wound care   • Sleep apnea    • Vertigo    • Vertigo         Past Surgical History:   Procedure Laterality Date   • EYE SURGERY     • JOINT MANIPULATION      left hip repair       Patient History     Row Name 11/03/19 1100             History    Chief Complaint  Ulcer, wound or other skin conditions  -      Brief Description of Current Complaint  Pt with chronic venous stasis ulcerations to the BLE. He has been treated by PT wound care at [facility] several years ago for the same issues. Pt with recent admission for worsening BLE symptoms as well as fever, SOA, and sepsis.  Pt now presenting for f/u wound care  -      Previous treatment for THIS PROBLEM  Medication;Other (comment) antibiotics and wound care  -      Patient/Caregiver Goals  Heal wound  -      Patient's Rating of General Health  Fair  -MF         Pain     Pain Location  Leg  -      Pain at Present  5  -MF      Pain at Best  5  -MF      Pain at Worst  8;9  -MF      Manzo-Chung FACES Pain Rating   6  -MF         Daily Activities    Primary Language  English  -      Teaching needs identified  Management of Condition  -      Patient is  concerned about/has problems with  Difficulty with self care (i.e. bathing, dressing, toileting:;Bed Mobility;Grasping objects lifting;Performing home management (household chores, shopping, care of dependents);Performing job responsibilities/community activities (work, school,;Reaching over head;Repetitive movements of the hand, arm, shoulder;Sitting;Standing;Transfers (getting out of a chair, bed);Walking  -MF      Does patient have problems with the following?  None  -MF      Barriers to learning  None  -MF      Pt Participated in POC and Goals  Yes  -MF        User Key  (r) = Recorded By, (t) = Taken By, (c) = Cosigned By    Initials Name Provider Type    MF Arsalan Langford, PT Physical Therapist          EVALUATION    LDA Wound     Row Name 11/03/19 1500             Wound 11/03/19 1100 Left lateral leg Venous Ulcer    Wound - Properties Group Date first assessed: 11/03/19  - Time first assessed: 1100  -MF Side: Left  - Orientation: lateral  -MF Location: leg  -MF Primary Wound Type: Venous ulcer  -MF    Wound Image  Images linked: 1  -MF      Dressing Appearance  intact  -MF      Base  moist;pink;slough;yellow  -MF      Red (%), Wound Tissue Color  50  -MF      Yellow (%), Wound Tissue Color  50  -MF      Periwound  intact;macerated  -MF      Periwound Temperature  warm  -MF      Periwound Skin Turgor  soft  -MF      Edges  open  -MF      Wound Length (cm)  4 cm  -MF      Wound Width (cm)  3.2 cm  -MF      Wound Depth (cm)  0.1 cm  -MF      Drainage Characteristics/Odor  serosanguineous  -MF      Drainage Amount  moderate  -MF      Care, Wound  irrigated with;sterile normal saline;debrided  -      Dressing Care, Wound  -- endoform with xeroform to cover  -MF         Wound 11/03/19 1100 Left medial leg Venous Ulcer    Wound - Properties Group Date first assessed: 11/03/19  - Time first assessed: 1100  -MF Side: Left  - Orientation: medial  - Location: leg  -MF Primary Wound Type: Venous ulcer  -MF     Wound Image  Images linked: 1  -MF      Dressing Appearance  intact  -MF      Base  moist;pink;red;slough;yellow  -MF      Red (%), Wound Tissue Color  35  -MF      Yellow (%), Wound Tissue Color  65  -MF      Periwound  intact;moist  -MF      Periwound Temperature  warm  -MF      Periwound Skin Turgor  soft  -MF      Edges  irregular  -MF      Wound Length (cm)  6.5 cm  -MF      Wound Width (cm)  4 cm  -MF      Wound Depth (cm)  0.1 cm  -MF      Drainage Characteristics/Odor  serosanguineous  -MF      Drainage Amount  moderate  -MF      Care, Wound  irrigated with;sterile normal saline;debrided  -MF      Dressing Care, Wound  -- endoform with xeroform to cover  -MF         Wound 11/03/19 1100 Right posterior;lateral leg Venous Ulcer    Wound - Properties Group Date first assessed: 11/03/19  - Time first assessed: 1100  - Side: Right  -MF Orientation: posterior;lateral  -MF Location: leg  - Primary Wound Type: Venous ulcer  -MF    Wound Image  Images linked: 1  -MF      Dressing Appearance  intact  -MF      Base  moist;pink;red;slough;yellow  -MF      Red (%), Wound Tissue Color  80  -MF      Yellow (%), Wound Tissue Color  20  -MF      Periwound  intact;dry  -MF      Periwound Temperature  warm  -MF      Periwound Skin Turgor  soft  -MF      Edges  irregular  -MF      Wound Length (cm)  5 cm  -MF      Wound Width (cm)  1.8 cm  -MF      Wound Depth (cm)  0.1 cm  -MF      Drainage Characteristics/Odor  serosanguineous  -MF      Drainage Amount  moderate  -MF      Care, Wound  irrigated with;sterile normal saline;debrided  -MF      Dressing Care, Wound  -- endoform with xeroform to cover  -MF        User Key  (r) = Recorded By, (t) = Taken By, (c) = Cosigned By    Initials Name Provider Type    Arsalan Napoles, PT Physical Therapist        Lymphedema     Row Name 11/03/19 1100             Lymphedema Edema Assessment    Ptting Edema Category  By severity  -      Pitting Edema  Moderate;Severe R>L  -          Skin Changes/Observations    Location/Assessment  Lower Extremity  -      Lower Extremity Conditions  bilateral:;dry;hairless;scaly;crust;fragile;inflamed  -      Lower Extremity Color/Pigment  bilateral:;erythema;hyperpigmented  -         Lymphedema Pulses/Capillary Refill    Lymphedema Pulses/Capillary Refill  lower extremity pulses;capillary refill  -      Dorsalis Pedis Pulse  right:;left:;+1 diminished  -      Posterior Tibialis Pulse  right:;left:;+1 diminished  -      Capillary Refill  lower extremity capillary refill  -      Lower Extremity Capillary Refill  right:;left:;less than 3 seconds  -         Compression/Skin Care    Compression/Skin Care  skin care;wrapping location;bandaging  -      Skin Care  washed/dried  -      Wrapping Location  lower extremity  -      Wrapping Location LE  bilateral:;foot to knee  -      Wrapping Comments  xeroform to cover LEs / wounds with cast padding and 8cm and 10cm comprilans from MH to knee with spandage to secure  -      Bandage Layers  cotton liner;short-stretch bandages (comment size/quantity)  -        User Key  (r) = Recorded By, (t) = Taken By, (c) = Cosigned By    Initials Name Provider Type    MF Arsalan Langford, PT Physical Therapist          WOUND DEBRIDEMENT  Total area of Debridement: ~20cm2  Debridement Site 1  Location- Site 1: BLE wounds  Selective Debridement- Site 1: Wound Surface <20cmsq  Instruments- Site 1: tweezers  Excised Tissue Description- Site 1: moderate, slough, other (comment)(dry, flaking, nonviable periwound skin)  Bleeding- Site 1: none             Therapy Education     Row Name 11/03/19 1100             Therapy Education    Education Details  reviewed s/s of infection with dressing management as well as compression management. Family to attempt dressing change in 3 days with LE washing.   -MF      Given  Symptoms/condition management;Bandaging/dressing change  -      How Provided   Verbal;Demonstration  -      Provided to  Patient;Caregiver  -      Level of Understanding  Verbalized  -      Program  Reinforced  -        User Key  (r) = Recorded By, (t) = Taken By, (c) = Cosigned By    Initials Name Provider Type    Arsalan Napoles, PT Physical Therapist          Recommendation and Plan  PT Assessment/Plan     Row Name 19 1100          PT Assessment    Functional Limitations  Decreased safety during functional activities;Impaired gait;Impaired locomotion;Limitation in home management;Limitations in community activities;Limitations in functional capacity and performance;Performance in leisure activities;Performance in self-care ADL;Other (comment) wound management  -     Impairments  Integumentary integrity  -     Assessment Comments  Pt presents with chronic BLE ulcerations with increased edema and pain.  PT was able to lightly debride BLE wounds and adjusted compression to help reduce edema, increase venous return, and improve skin integrity .  -     Rehab Potential  Fair  -     Patient/caregiver participated in establishment of treatment plan and goals  Yes  -     Patient would benefit from skilled therapy intervention  Yes  -MF        PT Plan    PT Frequency  1x/week  -     Planned CPT's?  PT EVAL HIGH COMPLEXITY: 98200;PT HERNAN DEBRIDE OPEN WOUND UP TO 20 CM: 00320;PT MULTI LAYER COMP SYS LE  -     Physical Therapy Interventions (Optional Details)  wound care;patient/family education  -     PT Plan Comments  compression wrapping and debridement PRN with dressing management.   -       User Key  (r) = Recorded By, (t) = Taken By, (c) = Cosigned By    Initials Name Provider Type    Arsalan Napoles, PT Physical Therapist            Goals  PT OP Goals     Row Name 19 1100          PT Short Term Goals    STG Date to Achieve  19  -     STG 1  Decrease c/o pain to <4/10 at all times  -     STG 2  Pt will demonstrate 25% reduction in  wound dimensions to indicate healing progress.  -     STG 3  Pt will demonstrate only minimal exudate to indicate wound healing.  -        Long Term Goals    LTG Date to Achieve  02/01/20  -     LTG 1  Pt and/or caregivers will be independent with clean home dressing changes to continue progress upon d/c.  -     LTG 2  Pt will demonstrate 75% reduction in wound dimensions to indicate healing progress.  -     LTG 3  Pt will demonstrate scant wound exudate to indicate healing progress.  -        Time Calculation    PT Goal Re-Cert Due Date  02/01/20  -       User Key  (r) = Recorded By, (t) = Taken By, (c) = Cosigned By    Initials Name Provider Type     Arsalan Langford, PT Physical Therapist          Time Calculation: Start Time: 1100  Therapy Charges for Today     Code Description Service Date Service Provider Modifiers Qty    [id number]  PT EVAL HIGH COMPLEXITY 4 11/3/2019 Arsalan Langford, PT GP 1    [id number] HC HERNAN DEBRIDE OPEN WOUND UP TO 20CM 11/3/2019 Arsalan Langford, PT GP 1    [id number]  PT MULTI LAYER COMP SYS BELOW KNEE 11/3/2019 Arsalan Langford, PT GP 1                Arsalan Langford, PT  11/3/2019

## 2019-11-07 ENCOUNTER — READMISSION MANAGEMENT (OUTPATIENT)
Dept: CALL CENTER | Facility: HOSPITAL | Age: 79
End: 2019-11-07

## 2019-11-07 NOTE — OUTREACH NOTE
Sepsis Week 2 Survey      Responses   Facility patient discharged from?  [facility]   Does the patient have one of the following disease processes/diagnoses(primary or secondary)?  Sepsis   Week 2 attempt successful?  Yes   Call start time  1411   Call end time  1416   Discharge diagnosis  Sepsis r/t LE ulcerations, chronic steroid use,  AMS, COPD, CHF, cellulitis, Afib, HTN   Meds reviewed with patient/caregiver?  Yes   Is the patient having any side effects they believe may be caused by any medication additions or changes?  No   Does the patient have all medications related to this admission filled (includes all antibiotics, inhalers, nebulizers,steroids,etc.)  Yes   Is the patient taking all medications as directed (includes completed medication regime)?  Yes   Does the patient have a primary care provider?   Yes   Comments regarding PCP  Expalined to patient to make followup with Dr Chavez- he reports his son will schedule appt and take off work to take him.    Does the patient have an appointment with their PCP within 7 days of discharge?  No   What is preventing the patient from scheduling follow up appointments within 7 days of discharge?  Haven't had time   Nursing Interventions  Educated patient on importance of making appointment, Advised patient to make appointment   Has the patient kept scheduled appointments due by today?  N/A   Has home health visited the patient within 72 hours of discharge?  N/A   Did the patient receive a copy of their discharge instructions?  Yes   Nursing interventions  Reviewed instructions with patient   What is the patient's perception of their health status since discharge?  Same   Nursing interventions  Nurse provided patient education   Is the patient/caregiver able to teach back Sepsis?  S - Shivering,fever or very cold, E - Extreme pain or generalized discomfort (worst ever,especially abdomen)   Nursing interventions  Nurse provided reassurance to patient   Is  patient/caregiver able to teach back steps to recovery at home?  Set small, achievable goals for return to baseline health   Is the patient/caregiver able to teach back signs and symptoms of worsening condition:  Fever, Hyperthermia   Is the patient/caregiver able to teach back the hierarchy of who to call/visit for symptoms/problems? PCP, Specialist, Home health nurse, Urgent Care, ED, 911  Yes   Week 2 call completed?  Yes          Isaac Rojas RN

## 2019-11-10 ENCOUNTER — HOSPITAL ENCOUNTER (OUTPATIENT)
Dept: PHYSICAL THERAPY | Facility: HOSPITAL | Age: 79
Setting detail: THERAPIES SERIES
Discharge: HOME | End: 2019-11-10

## 2019-11-10 DIAGNOSIS — S81.802D MULTIPLE OPEN WOUNDS OF LOWER EXTREMITY, LEFT, SUBSEQUENT ENCOUNTER: Primary | ICD-10-CM

## 2019-11-10 DIAGNOSIS — S81.801D MULTIPLE OPEN WOUNDS OF LOWER EXTREMITY, RIGHT, SUBSEQUENT ENCOUNTER: ICD-10-CM

## 2019-11-10 DIAGNOSIS — R60.0 BILATERAL LOWER EXTREMITY EDEMA: ICD-10-CM

## 2019-11-10 PROCEDURE — 97597 DBRDMT OPN WND 1ST 20 CM/<: CPT

## 2019-11-10 PROCEDURE — 29581 APPL MULTLAYER CMPRN SYS LEG: CPT

## 2019-11-10 NOTE — THERAPY WOUND CARE TREATMENT
Outpatient Rehabilitation - Wound/Debridement Treatment Note  [facility]     Patient Name: Michoacano Rojas  : 1940  MRN: [id number]  Today's Date: 11/10/2019                 Admit Date: 11/10/2019    Visit Dx:    ICD-10-CM ICD-9-CM   1. Multiple open wounds of lower extremity, left, subsequent encounter S81.802D V58.89     894.0   2. Multiple open wounds of lower extremity, right, subsequent encounter S81.801D V58.89     894.0   3. 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   • Tracheal stenosis   • Diastolic dysfunction with chronic heart failure (CMS/HCC)   • Acute on chronic respiratory failure with hypoxia and hypercapnia (CMS/HCC)   • Incarcerated right inguinal hernia   • Venous insufficiency (chronic) (peripheral)   • Benign essential hypertension   • Non-pressure chronic ulcer of left lower leg, with unspecified severity (CMS/HCC)   • Lower extremity edema   • Chronic diastolic heart failure (CMS/HCC)   • Cognitive disorder   • Chronic conjunctivitis of both eyes   • Facial swelling   • Goiter   • Idiopathic chronic gout of multiple sites with tophus   • Medicare annual wellness visit, subsequent   • Morbid obesity (CMS/HCC)   • Major depressive disorder, single episode, mild (CMS/HCC)   • Skin ulcer of right foot, limited to breakdown of skin (CMS/HCC)   • COPD (chronic obstructive pulmonary disease) (CMS/HCC)   • Hypoxia   • sepsis secondary to LEs ulcerations   • AMS (altered mental status)   • Venous stasis ulcer (CMS/HCC)   •  Immunosuppressed status (CMS/HCC)   • Current chronic use of systemic steroids   • Rheumatoid arthritis involving multiple sites (CMS/HCC)   • Demand ischemia of myocardium (CMS/HCC)        Past Medical History:   Diagnosis Date   • A-fib (CMS/HCC)    • Atrial fibrillation (CMS/HCC)    • CHF (congestive heart failure) (CMS/HCC)    • Chronic cutaneous venous stasis ulcer (CMS/HCC)    • Coronary artery disease    • Disease of thyroid gland    • Hypertension    • Penile abnormality    • Peptic ulcer disease    • Primary malignant neoplasm of bronchus with metastasisto other site, unspecified laterality (CMS/[facility]) 4/22/2019   • RA (rheumatoid arthritis) (CMS/[facility])    • Rheumatic fever    • Rheumatoid arthritis (CMS/HCC)    • Sepsis (CMS/[facility]) 11/17/2017    from cellulitis due to leg ulcer, hospitalization, antibiotics, wound care   • Sleep apnea    • Vertigo    • Vertigo         Past Surgical History:   Procedure Laterality Date   • EYE SURGERY     • JOINT MANIPULATION      left hip repair         EVALUATION  PT Ortho     Row Name 11/10/19 1100       Subjective Comments    Subjective Comments  Family reports they changed the right leg wrap only due to pt c/o pain.  -       Subjective Pain    Able to rate subjective pain?  yes  -    Pre-Treatment Pain Level  3  -    Post-Treatment Pain Level  3  -    Subjective Pain Comment  facial scale, chronic pain  -       Transfers    Comment (Transfers)  in w/c with family assistance  -      User Key  (r) = Recorded By, (t) = Taken By, (c) = Cosigned By    Initials Name Provider Type    Kiley Damon, PT Physical Therapist          [facility] Wound     Row Name 11/10/19 1100             Wound 11/03/19 1100 Left lateral leg Venous Ulcer    Wound - Properties Group Date first assessed: 11/03/19  - Time first assessed: 1100  -MF Side: Left  -MF Orientation: lateral  -MF Location: leg  -MF Primary Wound Type: Venous ulcer  -MF    Dressing Appearance  intact;moist drainage  min saturation thru to outer wrap layer  -JM      Base  moist;pink;slough;yellow thick biofilm layer  -JM      Periwound  intact;macerated  -JM      Periwound Temperature  warm  -JM      Periwound Skin Turgor  soft  -JM      Edges  open  -JM      Drainage Characteristics/Odor  serosanguineous  -JM      Drainage Amount  moderate  -JM      Care, Wound  cleansed with;wound cleanser;debrided  -JM      Dressing Care, Wound  dressing applied;antimicrobial agent applied;foam;multi-layer wrap HFBr, xeroform, MLW  -JM      Periwound Care, Wound  barrier ointment applied;cleansed with pH balanced cleanser;dry periwound area maintained  -JM         Wound 11/03/19 1100 Left medial leg Venous Ulcer    Wound - Properties Group Date first assessed: 11/03/19  - Time first assessed: 1100  - Side: Left  -MF Orientation: medial  -MF Location: leg  -MF Primary Wound Type: Venous ulcer  -MF    Dressing Appearance  intact;moist drainage  -JM      Base  moist;pink;red;slough;yellow thick biofilm layer initially  -JM      Red (%), Wound Tissue Color  90 after debridement  -JM      Yellow (%), Wound Tissue Color  10  -JM      Periwound  intact;moist  -JM      Periwound Temperature  warm  -JM      Periwound Skin Turgor  soft  -JM      Edges  irregular  -JM      Drainage Characteristics/Odor  serosanguineous  -JM      Drainage Amount  moderate  -JM      Care, Wound  cleansed with;wound cleanser;debrided  -JM      Dressing Care, Wound  dressing applied;antimicrobial agent applied;foam;multi-layer wrap HFBr, xeroform, MLW  -JM      Periwound Care, Wound  barrier ointment applied;cleansed with pH balanced cleanser;dry periwound area maintained  -JM         Wound 11/03/19 1100 Right posterior;lateral leg Venous Ulcer    Wound - Properties Group Date first assessed: 11/03/19  - Time first assessed: 1100  - Side: Right  -MF Orientation: posterior;lateral  -MF Location: leg  -MF Primary Wound Type: Venous ulcer  -MF    Dressing Appearance   "intact  -JM      Base  moist;pink;red;slough;yellow  -JM      Periwound  intact;dry  -JM      Periwound Temperature  warm  -JM      Periwound Skin Turgor  soft  -JM      Edges  irregular  -JM      Drainage Characteristics/Odor  serosanguineous  -JM      Drainage Amount  moderate  -JM      Care, Wound  cleansed with;wound cleanser;debrided  -JM      Dressing Care, Wound  dressing applied;antimicrobial agent applied;foam;multi-layer wrap HFBr, xeroform, MLW  -JM      Periwound Care, Wound  barrier ointment applied;cleansed with pH balanced cleanser;dry periwound area maintained  -JM         Wound 11/10/19 1100 Right posterior;proximal calf    Wound - Properties Group Date first assessed: 11/10/19  -JM Time first assessed: 1100 -JM Present on Hospital Admission: Y  -JM Side: Right  -JM Orientation: posterior;proximal  -JM Location: calf  -JM    Dressing Appearance  intact;moist drainage xeroform and small optifoam  -JM      Base  clean;moist;red;epithelialization 90% epithelialized  -JM      Periwound  intact;dry;pink  -JM      Periwound Temperature  warm  -JM      Periwound Skin Turgor  soft  -JM      Edges  open  -JM      Drainage Characteristics/Odor  serosanguineous  -JM      Drainage Amount  small  -JM      Care, Wound  cleansed with;wound cleanser;debrided  -JM      Dressing Care, Wound  dressing applied;foam;antimicrobial agent applied;border dressing;multi-layer wrap HFBr, 4\" optifoam, MLW  -JM      Periwound Care, Wound  cleansed with pH balanced cleanser;dry periwound area maintained  -JM         Wound 11/10/19 1100 Right posterior;medial leg Venous Ulcer    Wound - Properties Group Date first assessed: 11/10/19  - Time first assessed: 1100  -JM Present on Hospital Admission: Y  -JM Side: Right  -JM Orientation: posterior;medial  -JM Location: leg  -JM Primary Wound Type: Venous ulcer  -JM    Dressing Appearance  intact;moist drainage  -JM      Base  moist;granulating;yellow;slough clean/red after " debridement of thick biofilm  -      Periwound  intact;macerated;moist min maceration of central skin bridge  -      Periwound Temperature  warm  -      Periwound Skin Turgor  firm  -      Edges  open  -      Drainage Characteristics/Odor  serosanguineous  -      Drainage Amount  moderate  -JM      Care, Wound  cleansed with;wound cleanser;debrided  -      Dressing Care, Wound  dressing applied;antimicrobial agent applied;foam;multi-layer wrap HFBr, xeroform, MLW  -JM      Periwound Care, Wound  barrier ointment applied;cleansed with pH balanced cleanser;dry periwound area maintained  -        User Key  (r) = Recorded By, (t) = Taken By, (c) = Cosigned By    Initials Name Provider Type    MF Arsalan Langford, PT Physical Therapist    Kiley Damon, PT Physical Therapist        Lymphedema     Row Name 11/10/19 1100             Lymphedema Edema Assessment    Pitting Edema  Moderate R>L  -JM         Skin Changes/Observations    Lower Extremity Conditions  bilateral:;shiny;scaly;crust;inflamed  -      Lower Extremity Color/Pigment  bilateral:;erythema;hyperpigmented  -         Lymphedema Pulses/Capillary Refill    Lower Extremity Capillary Refill  right:;left:;less than 3 seconds  -         Compression/Skin Care    Compression/Skin Care  skin care;wrapping location;bandaging  -      Skin Care  washed/dried  -      Wrapping Location  lower extremity  -      Wrapping Location LE  bilateral:;foot to knee  -      Wrapping Comments  z-guard to periwound, HFBr to open wounds, xeroform MH to prox calf clamshell application, ABD x2 to L ankle and R heel and ankle, cast padding, 8cm and 10cm comprilan for multilayer gradient compression, size 5 spandage  -      Bandage Layers  padding/fluff layer;short-stretch bandages (comment size/quantity)  -      Bandaging Technique  figure-eight;light compression  -        User Key  (r) = Recorded By, (t) = Taken By, (c) = Cosigned By     Initials Name Provider Type    Kiley Damon, PT Physical Therapist          WOUND DEBRIDEMENT  Total area of Debridement: 20cmsq  Debridement Site 1  Location- Site 1: BLE wounds (5 total)  Selective Debridement- Site 1: Wound Surface <20cmsq  Instruments- Site 1: tweezers  Excised Tissue Description- Site 1: maximum, slough, other (comment)(thick biofilm layer)  Bleeding- Site 1: seeping, held pressure, 1 minute             Therapy Education     Row Name 11/10/19 1100             Therapy Education    Education Details  Change wraps if saturated, otherwise may leave intact until next tx  -MI      Given  Symptoms/condition management;Bandaging/dressing change  -MI      How Provided  Verbal;Demonstration  -MI      Provided to  Patient;Caregiver  -      Level of Understanding  Verbalized  -      Program  Reinforced  -MI        User Key  (r) = Recorded By, (t) = Taken By, (c) = Cosigned By    Initials Name Provider Type    Kiley Damon, PT Physical Therapist          Recommendation and Plan  PT Assessment/Plan     Row Name 11/10/19 1100          PT Assessment    Functional Limitations  Decreased safety during functional activities;Impaired gait;Impaired locomotion;Limitation in home management;Limitations in community activities;Limitations in functional capacity and performance;Performance in leisure activities;Performance in self-care ADL;Other (comment) wound management  -     Impairments  Integumentary integrity  -     Assessment Comments  BLE wounds with thick biofilm layer and endoform dry and not incorporated, so PT d/c'd endoform and used HFBr to wounds to prevent biofilm buildup between txs.  PT able to debride thick biofilm layer from majority of wound beds.  Continued with xeroform to protect fragile skin, and MLW for increased venous return.  -        PT Plan    PT Frequency  1x/week  -     Physical Therapy Interventions (Optional Details)  patient/family education;wound care   -MI     PT Plan Comments  debridement, MLW  -MI       User Key  (r) = Recorded By, (t) = Taken By, (c) = Cosigned By    Initials Name Provider Type    Kiley Damon, PT Physical Therapist          Goals  PT OP Goals     Row Name 11/10/19 1100          Time Calculation    PT Goal Re-Cert Due Date  02/01/20  -MI       User Key  (r) = Recorded By, (t) = Taken By, (c) = Cosigned By    Initials Name Provider Type    Kiley Damon, PT Physical Therapist          PT Goal Re-Cert Due Date: 02/01/20            Time Calculation: Start Time: 1100  Therapy Charges for Today     Code Description Service Date Service Provider Modifiers Qty    [id number] HC HERNAN DEBRIDE OPEN WOUND UP TO 20CM 11/10/2019 Kiley Chinchilla, PT GP 1    [id number] HC PT MULTI LAYER COMP SYS BELOW KNEE 11/10/2019 Kiley Chinchilla, PT GP 1                  Kiley Chinchilla PT  11/10/2019

## 2019-11-15 ENCOUNTER — READMISSION MANAGEMENT (OUTPATIENT)
Dept: CALL CENTER | Facility: HOSPITAL | Age: 79
End: 2019-11-15

## 2019-11-15 NOTE — OUTREACH NOTE
Sepsis Week 3 Survey      Responses   Facility patient discharged from?  [place]   Does the patient have one of the following disease processes/diagnoses(primary or secondary)?  Sepsis   Week 3 attempt successful?  Yes   Call start time  1125   Call end time  1129   Discharge diagnosis  sepsis secondary to LEs ulcerations    Is patient permission given to speak with other caregiver?  Yes   List who call center can speak with  wife, Chantel Rojas   Person spoke with today (if not patient) and relationship  wife   Is the patient taking all medications as directed (includes completed medication regime)?  Yes   Has the patient kept scheduled appointments due by today?  Yes   Comments  Wife states that patient is seeing wound care on a weekly basis.    Has home health visited the patient within 72 hours of discharge?  N/A   Psychosocial issues?  No   Did the patient receive a copy of their discharge instructions?  Yes   What is the patient's perception of their health status since discharge?  Improving   Is patient/caregiver able to teach back steps to recovery at home?  Set small, achievable goals for return to baseline health, Rest and regain strength   Is the patient/caregiver able to teach back signs and symptoms of worsening condition:  Fever, Hyperthermia   Is the patient/caregiver able to teach back the hierarchy of who to call/visit for symptoms/problems? PCP, Specialist, Home health nurse, Urgent Care, ED, 911  Yes   Week 3 call completed?  Yes          Serenity Daniels RN

## 2019-11-16 ENCOUNTER — HOSPITAL ENCOUNTER (OUTPATIENT)
Dept: PHYSICAL THERAPY | Facility: HOSPITAL | Age: 79
Setting detail: THERAPIES SERIES
Discharge: HOME | End: 2019-11-16

## 2019-11-16 DIAGNOSIS — S81.802D MULTIPLE OPEN WOUNDS OF LOWER EXTREMITY, LEFT, SUBSEQUENT ENCOUNTER: Primary | ICD-10-CM

## 2019-11-16 DIAGNOSIS — R60.0 BILATERAL LOWER EXTREMITY EDEMA: ICD-10-CM

## 2019-11-16 DIAGNOSIS — S81.801D MULTIPLE OPEN WOUNDS OF LOWER EXTREMITY, RIGHT, SUBSEQUENT ENCOUNTER: ICD-10-CM

## 2019-11-16 PROCEDURE — 97597 DBRDMT OPN WND 1ST 20 CM/<: CPT | Performed by: PHYSICAL THERAPIST

## 2019-11-16 PROCEDURE — 29581 APPL MULTLAYER CMPRN SYS LEG: CPT | Performed by: PHYSICAL THERAPIST

## 2019-11-16 NOTE — THERAPY WOUND CARE TREATMENT
Outpatient Rehabilitation - Wound/Debridement Treatment Note  [facility]     Patient Name: Michoacano Rojas  : 1940  MRN: [id number]  Today's Date: 2019                 Admit Date: 2019    Visit Dx:    ICD-10-CM ICD-9-CM   1. Multiple open wounds of lower extremity, left, subsequent encounter S81.802D V58.89     894.0   2. Multiple open wounds of lower extremity, right, subsequent encounter S81.801D V58.89     894.0   3. 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   • Tracheal stenosis   • Diastolic dysfunction with chronic heart failure (CMS/HCC)   • Acute on chronic respiratory failure with hypoxia and hypercapnia (CMS/HCC)   • Incarcerated right inguinal hernia   • Venous insufficiency (chronic) (peripheral)   • Benign essential hypertension   • Non-pressure chronic ulcer of left lower leg, with unspecified severity (CMS/HCC)   • Lower extremity edema   • Chronic diastolic heart failure (CMS/HCC)   • Cognitive disorder   • Chronic conjunctivitis of both eyes   • Facial swelling   • Goiter   • Idiopathic chronic gout of multiple sites with tophus   • Medicare annual wellness visit, subsequent   • Morbid obesity (CMS/HCC)   • Major depressive disorder, single episode, mild (CMS/HCC)   • Skin ulcer of right foot, limited to breakdown of skin (CMS/HCC)   • COPD (chronic obstructive pulmonary disease) (CMS/HCC)   • Hypoxia   • sepsis secondary to LEs ulcerations   • AMS (altered mental status)   • Venous stasis ulcer (CMS/HCC)   •  Immunosuppressed status (CMS/HCC)   • Current chronic use of systemic steroids   • Rheumatoid arthritis involving multiple sites (CMS/HCC)   • Demand ischemia of myocardium (CMS/HCC)        Past Medical History:   Diagnosis Date   • A-fib (CMS/HCC)    • Atrial fibrillation (CMS/HCC)    • CHF (congestive heart failure) (CMS/HCC)    • Chronic cutaneous venous stasis ulcer (CMS/HCC)    • Coronary artery disease    • Disease of thyroid gland    • Hypertension    • Penile abnormality    • Peptic ulcer disease    • Primary malignant neoplasm of bronchus with metastasisto other site, unspecified laterality (CMS/[facility]) 4/22/2019   • RA (rheumatoid arthritis) (CMS/HCC)    • Rheumatic fever    • Rheumatoid arthritis (CMS/HCC)    • Sepsis (CMS/[facility]) 11/17/2017    from cellulitis due to leg ulcer, hospitalization, antibiotics, wound care   • Sleep apnea    • Vertigo    • Vertigo         Past Surgical History:   Procedure Laterality Date   • EYE SURGERY     • JOINT MANIPULATION      left hip repair                  PT Ortho     Row Name 11/16/19 1100       Subjective Comments    Subjective Comments  Pt reports unsure if his eye surgery worked. Report changing Lt leg between visits due to drainage.   -MW       Subjective Pain    Able to rate subjective pain?  yes  -MW    Pre-Treatment Pain Level  3  -MW    Post-Treatment Pain Level  3  -MW    Subjective Pain Comment  Chronic pain  -MW       Transfers    Comment (Transfers)  To/ from dept via w/c with family assistance. Seated for tx   -MW      User Key  (r) = Recorded By, (t) = Taken By, (c) = Cosigned By    Initials Name Provider Type    Amaya Eastman, PT Physical Therapist          LDA Wound     Row Name 11/16/19 1100             Wound 11/10/19 1100 Right posterior;proximal calf    Wound - Properties Group Date first assessed: 11/10/19  - Time first assessed: 1100  -JM Present on Hospital Admission: Y  -JM Side: Right  -JM Orientation: posterior;proximal  -JM Location:  "calf  -JM    Dressing Appearance  intact family changed yesterday; \"no need to change today\"  -MW         Wound 11/10/19 1100 Right posterior;medial leg Venous Ulcer    Wound - Properties Group Date first assessed: 11/10/19  - Time first assessed: 1100  - Present on Hospital Admission: Y  - Side: Right  - Orientation: posterior;medial  - Location: leg  - Primary Wound Type: Venous ulcer  -JM    Dressing Appearance  intact;moist drainage  -MW      Base  moist;granulating;red  -MW      Periwound  intact;moist;pale white  -MW      Periwound Temperature  warm  -MW      Periwound Skin Turgor  firm  -MW      Edges  open  -MW      Drainage Characteristics/Odor  serosanguineous  -MW      Drainage Amount  moderate  -MW      Care, Wound  cleansed with;wound cleanser;debrided  -MW      Dressing Care, Wound  dressing applied;antimicrobial agent applied;foam;multi-layer wrap HFBr, xeroform, cast padding; MLW   -MW      Periwound Care, Wound  barrier ointment applied;cleansed with pH balanced cleanser;dry periwound area maintained  -MW         Wound 11/03/19 1100 Left lateral leg Venous Ulcer    Wound - Properties Group Date first assessed: 11/03/19  - Time first assessed: 1100  - Side: Left  - Orientation: lateral  - Location: leg  - Primary Wound Type: Venous ulcer  -    Wound Image  Images linked: 1  -MW      Dressing Appearance  intact;moist drainage  -MW      Base  moist;pink;red;granulating  -MW      Periwound  intact;pale white  -MW      Periwound Temperature  warm  -MW      Periwound Skin Turgor  soft  -MW      Edges  open  -MW      Drainage Characteristics/Odor  serosanguineous  -MW      Drainage Amount  moderate  -MW      Care, Wound  cleansed with;wound cleanser;debrided  -MW      Dressing Care, Wound  dressing applied;antimicrobial agent applied;foam;multi-layer wrap HFBr, xeroform, ABD pad, cast padding; MLW   -MW      Periwound Care, Wound  barrier ointment applied;cleansed with pH balanced " cleanser;dry periwound area maintained  -MW         Wound 11/03/19 1100 Left medial leg Venous Ulcer    Wound - Properties Group Date first assessed: 11/03/19  - Time first assessed: 1100  -MF Side: Left  -MF Orientation: medial  -MF Location: leg  -MF Primary Wound Type: Venous ulcer  -MF    Wound Image  Images linked: 1  -MW      Dressing Appearance  intact;moist drainage soaked through wraps   -MW      Base  moist;pink;red;granulating  -MW      Periwound  intact;moist;pale white  -MW      Periwound Temperature  warm  -MW      Periwound Skin Turgor  soft  -MW      Edges  irregular  -MW      Drainage Characteristics/Odor  serosanguineous  -MW      Drainage Amount  moderate  -MW      Care, Wound  cleansed with;wound cleanser;debrided  -MW      Dressing Care, Wound  dressing applied;antimicrobial agent applied;foam;multi-layer wrap HFBr, xeroform, ABD pads, cast padding, MLW   -MW      Periwound Care, Wound  barrier ointment applied;cleansed with pH balanced cleanser;dry periwound area maintained  -MW         Wound 11/03/19 1100 Right posterior;lateral leg Venous Ulcer    Wound - Properties Group Date first assessed: 11/03/19  - Time first assessed: 1100  -MF Side: Right  -MF Orientation: posterior;lateral  -MF Location: leg  -MF Primary Wound Type: Venous ulcer  -MF    Wound Image  Images linked: 1  -MW      Dressing Appearance  intact  -MW      Base  moist;pink;red;granulating  -MW      Periwound  intact;pale white  -MW      Periwound Temperature  warm  -MW      Periwound Skin Turgor  soft  -MW      Edges  irregular  -MW      Drainage Characteristics/Odor  serosanguineous  -MW      Drainage Amount  moderate  -MW      Care, Wound  cleansed with;wound cleanser;debrided  -MW      Dressing Care, Wound  dressing applied;antimicrobial agent applied;foam;multi-layer wrap HFBr, xeroform, ABD pads, cast padding, MLW   -MW      Periwound Care, Wound  barrier ointment applied;cleansed with pH balanced cleanser;dry  periwound area maintained  -MW        User Key  (r) = Recorded By, (t) = Taken By, (c) = Cosigned By    Initials Name Provider Type    Arsalan Napoles, PT Physical Therapist    Amaya Eastman, PT Physical Therapist    Kiley Damon, PT Physical Therapist        Lymphedema     Row Name 11/16/19 1100          Pitting Edema  Mild  -MW          Lower Extremity Conditions  bilateral:;scaly scant scales remaining; majority removed with cleaning   -MW    Lower Extremity Color/Pigment  bilateral:;erythema;hyperpigmented  -MW          Lower Extremity Capillary Refill  right:;left:;less than 3 seconds  -MW          Compression/Skin Care  skin care;wrapping location;bandaging  -MW    Skin Care  washed/dried;moisturizing lotion applied;other (comment) z guard to periwound; Eucerin to intact skin   -MW    Wrapping Location  lower extremity  -MW    Wrapping Location LE  bilateral:;foot to knee  -MW    Wrapping Comments  Xeroform to cover heel to mid shin; ABD pads x 2 each leg, cast padding to secure. Comprilans 8 cm and 10 cm each leg   -MW    Bandage Layers  padding/fluff layer;short-stretch bandages (comment size/quantity)  -MW    Bandaging Technique  figure-eight;light compression  -MW      User Key  (r) = Recorded By, (t) = Taken By, (c) = Cosigned By    Initials Name Provider Type    Amaya Eastman, PT Physical Therapist          WOUND DEBRIDEMENT  Total area of Debridement: ~15 cm2   Debridement Site 1  Location- Site 1: BLE wounds (5 total)  Selective Debridement- Site 1: Wound Surface <20cmsq  Instruments- Site 1: tweezers  Excised Tissue Description- Site 1: moderate, other (comment)(hypertrophic skin debris from periwound areas )  Bleeding- Site 1: none                 Recommendation and Plan  PT Assessment/Plan     Row Name 11/16/19 1100          Functional Limitations  Decreased safety during functional activities;Impaired gait;Impaired locomotion;Limitation in home management;Limitations in  community activities;Limitations in functional capacity and performance;Performance in leisure activities;Performance in self-care ADL;Other (comment) wound management  -MW    Impairments  Integumentary integrity  -MW    Assessment Comments  BLE wounds with beefy red wound beds with transition to HFBr. ABD pads managing drainage. Debrided periwound hypertrophic skin debris and residual crusts/ scales. Edema decreasing as evidenced by skin wrinkles and soft tissues. Cont PT wound care for selective debridement and dressing management.   -MW    Rehab Potential  Fair  -MW    Patient/caregiver participated in establishment of treatment plan and goals  Yes  -MW    Patient would benefit from skilled therapy intervention  Yes  -MW          PT Frequency  1x/week  -MW    Physical Therapy Interventions (Optional Details)  wound care;patient/family education  -    PT Plan Comments  Debridement, dressing management; MLW   -      User Key  (r) = Recorded By, (t) = Taken By, (c) = Cosigned By    Initials Name Provider Type    MW Amaya Boudreaux, PT Physical Therapist          Goals                   Time Calculation: Start Time: 1100  Therapy Charges for Today     Code Description Service Date Service Provider Modifiers Qty    [id number]  PT MULTI LAYER COMP SYS BELOW KNEE 11/16/2019 Amaya Boudreaux, PT GP 1    [id number] HC HERNAN DEBRIDE OPEN WOUND UP TO 20CM 11/16/2019 Amaya Boudreaux, PT GP 1                  Amaya Boudreaux, PT  11/16/2019

## 2019-11-22 ENCOUNTER — READMISSION MANAGEMENT (OUTPATIENT)
Dept: CALL CENTER | Facility: HOSPITAL | Age: 79
End: 2019-11-22

## 2019-11-22 NOTE — OUTREACH NOTE
Sepsis Week 4 Survey      Responses   Facility patient discharged from?  [facility]   Does the patient have one of the following disease processes/diagnoses(primary or secondary)?  Sepsis   Week 4 attempt successful?  No          Frances Kearns RN

## 2019-11-23 ENCOUNTER — HOSPITAL ENCOUNTER (OUTPATIENT)
Dept: PHYSICAL THERAPY | Facility: HOSPITAL | Age: 79
Setting detail: THERAPIES SERIES
Discharge: HOME | End: 2019-11-23

## 2019-11-23 DIAGNOSIS — R60.0 BILATERAL LOWER EXTREMITY EDEMA: ICD-10-CM

## 2019-11-23 DIAGNOSIS — S81.801D MULTIPLE OPEN WOUNDS OF LOWER EXTREMITY, RIGHT, SUBSEQUENT ENCOUNTER: ICD-10-CM

## 2019-11-23 DIAGNOSIS — S81.802D MULTIPLE OPEN WOUNDS OF LOWER EXTREMITY, LEFT, SUBSEQUENT ENCOUNTER: Primary | ICD-10-CM

## 2019-11-23 PROCEDURE — 97597 DBRDMT OPN WND 1ST 20 CM/<: CPT

## 2019-11-23 PROCEDURE — 29581 APPL MULTLAYER CMPRN SYS LEG: CPT

## 2019-11-23 NOTE — THERAPY WOUND CARE TREATMENT
Outpatient Rehabilitation - Wound/Debridement Treatment Note  [facility]     Patient Name: Michoacano Rojas  : 1940  MRN: [id number]  Today's Date: 2019                 Admit Date: 2019    Visit Dx:    ICD-10-CM ICD-9-CM   1. Multiple open wounds of lower extremity, left, subsequent encounter S81.802D V58.89     894.0   2. Multiple open wounds of lower extremity, right, subsequent encounter S81.801D V58.89     894.0   3. 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   • Tracheal stenosis   • Diastolic dysfunction with chronic heart failure (CMS/HCC)   • Acute on chronic respiratory failure with hypoxia and hypercapnia (CMS/HCC)   • Incarcerated right inguinal hernia   • Venous insufficiency (chronic) (peripheral)   • Benign essential hypertension   • Non-pressure chronic ulcer of left lower leg, with unspecified severity (CMS/HCC)   • Lower extremity edema   • Chronic diastolic heart failure (CMS/HCC)   • Cognitive disorder   • Chronic conjunctivitis of both eyes   • Facial swelling   • Goiter   • Idiopathic chronic gout of multiple sites with tophus   • Medicare annual wellness visit, subsequent   • Morbid obesity (CMS/HCC)   • Major depressive disorder, single episode, mild (CMS/HCC)   • Skin ulcer of right foot, limited to breakdown of skin (CMS/HCC)   • COPD (chronic obstructive pulmonary disease) (CMS/HCC)   • Hypoxia   • sepsis secondary to LEs ulcerations   • AMS (altered mental status)   • Venous stasis ulcer (CMS/HCC)   •  Immunosuppressed status (CMS/HCC)   • Current chronic use of systemic steroids   • Rheumatoid arthritis involving multiple sites (CMS/HCC)   • Demand ischemia of myocardium (CMS/HCC)        Past Medical History:   Diagnosis Date   • A-fib (CMS/HCC)    • Atrial fibrillation (CMS/HCC)    • CHF (congestive heart failure) (CMS/HCC)    • Chronic cutaneous venous stasis ulcer (CMS/HCC)    • Coronary artery disease    • Disease of thyroid gland    • Hypertension    • Penile abnormality    • Peptic ulcer disease    • Primary malignant neoplasm of bronchus with metastasisto other site, unspecified laterality (CMS/HCC) 4/22/2019   • RA (rheumatoid arthritis) (CMS/HCC)    • Rheumatic fever    • Rheumatoid arthritis (CMS/HCC)    • Sepsis (CMS/HCC) 11/17/2017    from cellulitis due to leg ulcer, hospitalization, antibiotics, wound care   • Sleep apnea    • Vertigo    • Vertigo         Past Surgical History:   Procedure Laterality Date   • EYE SURGERY     • JOINT MANIPULATION      left hip repair         EVALUATION  PT Ortho     Row Name 11/23/19 1100       Subjective Comments    Subjective Comments  No complaints or changes since last assessment  -       Subjective Pain    Able to rate subjective pain?  yes  -MC    Pre-Treatment Pain Level  3  -MC    Post-Treatment Pain Level  3  -    Subjective Pain Comment  chronic pain  -       Transfers    Comment (Transfers)  remained seated in w/c for tx. Family assists with management of w/c and holding legs for dressings/wrapping  -      User Key  (r) = Recorded By, (t) = Taken By, (c) = Cosigned By    Initials Name Provider Type    Mary Casiano PT Physical Therapist          [facility] Wound     Row Name 11/23/19 1100             [REMOVED] Wound 11/10/19 1100 Right posterior;proximal calf    Wound - Properties Group Date first assessed: 11/10/19  - Time first assessed: 1100  -JM Present on Hospital Admission: Y  -JM Side: Right  - Orientation: posterior;proximal  -JM  Location: calf  -JM Resolution Date: 11/23/19  - Resolution Time: 1100  -MC Wound Outcome: Healed  -MC       Wound 11/10/19 1100 Right posterior;medial leg Venous Ulcer    Wound - Properties Group Date first assessed: 11/10/19  - Time first assessed: 1100  -JM Present on Hospital Admission: Y  -JM Side: Right  -JM Orientation: posterior;medial  - Location: leg  - Primary Wound Type: Venous ulcer  -    Dressing Appearance  intact;no drainage  -MC      Base  closed/resurfaced;epithelialization;pink  -MC      Periwound  intact;dry  -MC      Periwound Temperature  warm  -MC      Periwound Skin Turgor  firm  -MC      Edges  --  -MC      Drainage Characteristics/Odor  --  -MC      Drainage Amount  none  -MC      Care, Wound  cleansed with;wound cleanser  -MC      Dressing Care, Wound  multi-layer wrap  -MC         Wound 11/03/19 1100 Left lateral leg Venous Ulcer    Wound - Properties Group Date first assessed: 11/03/19  - Time first assessed: 1100  - Side: Left  - Orientation: lateral  - Location: leg  - Primary Wound Type: Venous ulcer  -    Dressing Appearance  intact;moist drainage  -MC      Base  moist;pink;red;granulating;epithelialization  -MC      Periwound  intact;pale white  -MC      Periwound Temperature  warm  -MC      Periwound Skin Turgor  soft  -MC      Edges  open  -MC      Drainage Characteristics/Odor  serosanguineous  -MC      Drainage Amount  small  -MC      Care, Wound  cleansed with;wound cleanser;debrided  -MC      Dressing Care, Wound  dressing applied;antimicrobial agent applied;foam;petroleum-based;gauze;multi-layer wrap HFBr, xeroform, abd pad, cast padding, MLW  -MC      Periwound Care, Wound  cleansed with pH balanced cleanser;barrier ointment applied zguard  -MC         Wound 11/03/19 1100 Left medial leg Venous Ulcer    Wound - Properties Group Date first assessed: 11/03/19  - Time first assessed: 1100  - Side: Left  - Orientation: medial  - Location: leg  -MF  Primary Wound Type: Venous ulcer  -MF    Dressing Appearance  intact;moist drainage  -MC      Base  moist;pink;red;granulating;epithelialization skin island  -MC      Periwound  intact;moist;pale white  -MC      Periwound Temperature  warm  -MC      Periwound Skin Turgor  soft  -MC      Edges  irregular  -MC      Drainage Characteristics/Odor  serosanguineous  -MC      Drainage Amount  small  -MC      Care, Wound  cleansed with;wound cleanser;debrided  -MC      Dressing Care, Wound  dressing applied;antimicrobial agent applied;foam;petroleum-based;gauze;multi-layer wrap HFBr, xeroform, abd pad, cast padding, MLW  -MC      Periwound Care, Wound  cleansed with pH balanced cleanser;barrier ointment applied zguard  -         Wound 11/03/19 1100 Right posterior;lateral leg Venous Ulcer    Wound - Properties Group Date first assessed: 11/03/19  - Time first assessed: 1100  - Side: Right  -MF Orientation: posterior;lateral  - Location: leg  - Primary Wound Type: Venous ulcer  -MF    Dressing Appearance  intact  -MC      Base  moist;pink;red;granulating  -MC      Periwound  intact;pale white  -MC      Periwound Temperature  warm  -MC      Periwound Skin Turgor  soft  -MC      Edges  irregular  -MC      Drainage Characteristics/Odor  serosanguineous  -MC      Drainage Amount  small  -MC      Care, Wound  cleansed with;wound cleanser;debrided  -MC      Dressing Care, Wound  dressing applied;antimicrobial agent applied;foam;petroleum-based;gauze;multi-layer wrap HFBr, xeroform, abd pad, cast padding, MLW  -MC      Periwound Care, Wound  cleansed with pH balanced cleanser;barrier ointment applied zguard  -        User Key  (r) = Recorded By, (t) = Taken By, (c) = Cosigned By    Initials Name Provider Type     Arsalan Langford, PT Physical Therapist    Mary Casiano, PT Physical Therapist    Kiley Damon, PT Physical Therapist        Lymphedema     Row Name 11/23/19 1100             Lymphedema  Edema Assessment    Ptting Edema Category  By severity  -      Pitting Edema  Mild  -         Skin Changes/Observations    Lower Extremity Conditions  bilateral:;dry;scaly;crust;scab(s);fragile  -      Lower Extremity Color/Pigment  bilateral:;red;hyperpigmented;brawny  -         Lymphedema Pulses/Capillary Refill    Lower Extremity Capillary Refill  right:;left:;less than 3 seconds  -         Compression/Skin Care    Compression/Skin Care  skin care;wrapping location;bandaging  -      Skin Care  washed/dried;lotion applied  -      Wrapping Location  lower extremity  -      Wrapping Location LE  bilateral:;foot to knee  -      Wrapping Comments  wound dressings, cast padding, size 8cm and 10cm comprilans applied to create gradient compression  -      Bandage Layers  padding/fluff layer;short-stretch bandages (comment size/quantity)  -      Bandaging Technique  figure-eight;light compression  -        User Key  (r) = Recorded By, (t) = Taken By, (c) = Cosigned By    Initials Name Provider Type    Mary aCsiano, PT Physical Therapist          WOUND DEBRIDEMENT  Total area of Debridement: 10 cm2  Debridement Site 1  Location- Site 1: BLE wounds/periwounds  Selective Debridement- Site 1: Wound Surface <20cmsq  Instruments- Site 1: tweezers  Excised Tissue Description- Site 1: minimum, slough, moderate, other (comment)(mod periwound crusting)  Bleeding- Site 1: none             Therapy Education     Row Name 11/23/19 [facility]             Therapy Education    Education Details  Continue with home dressing change on tues/wed or sooner if excess drainage noted.  -MC      Given  Symptoms/condition management;Bandaging/dressing change  -      Program  Reinforced  -MC      How Provided  Verbal;Demonstration  -MC      Provided to  Patient;Caregiver  -MC      Level of Understanding  Verbalized  -        User Key  (r) = Recorded By, (t) = Taken By, (c) = Cosigned By    Initials Name Provider Type     Mary Casiano PT Physical Therapist          Recommendation and Plan  PT Assessment/Plan     Row Name 11/23/19 1100          PT Assessment    Functional Limitations  Decreased safety during functional activities;Impaired gait;Impaired locomotion;Limitation in home management;Limitations in community activities;Limitations in functional capacity and performance;Performance in leisure activities;Performance in self-care ADL;Other (comment) wound mgmt, edema management  -     Impairments  Integumentary integrity;Pain;Edema  -     Assessment Comments  Pt with new epithelialization noted around the L lateral ankle wound and posterior R calf wound. R heel appears fully epithelialized, with skin flap remaining - viable and intact. Pt continues to develop slough and periwound crusting that requires debridement. Pt will continue to benefit from skilled PT wound care to promote healing.  -     Rehab Potential  Fair  -     Patient/caregiver participated in establishment of treatment plan and goals  Yes  -     Patient would benefit from skilled therapy intervention  Yes  -        PT Plan    PT Frequency  1x/week  -     Physical Therapy Interventions (Optional Details)  patient/family education;wound care  -     PT Plan Comments  debridement, MLW. ?Add maik. PROGRESS NOTE DUE  -       User Key  (r) = Recorded By, (t) = Taken By, (c) = Cosigned By    Initials Name Provider Type    Mary Casiano, PT Physical Therapist          Goals  PT OP Goals     Row Name 11/23/19 1100          PT Short Term Goals    STG Date to Achieve  12/18/19  -     STG 1  Decrease c/o pain to <4/10 at all times  -     STG 1 Progress  New  -     STG 2  Pt will demonstrate 25% reduction in wound dimensions to indicate healing progress.  -     STG 2 Progress  New  [facility]     STG 3  Pt will demonstrate only minimal exudate to indicate wound healing.  -     STG 3 Progress  New  [facility]        Long Term Goals    LTG  Date to Achieve  02/01/20  -     LTG 1  Pt and/or caregivers will be independent with clean home dressing changes to continue progress upon d/c.  -     LTG 1 Progress  New  -     LTG 2  Pt will demonstrate 75% reduction in wound dimensions to indicate healing progress.  -     LTG 2 Progress  New  -     LTG 3  Pt will demonstrate scant wound exudate to indicate healing progress.  -     LTG 3 Progress  New  -        Time Calculation    PT Goal Re-Cert Due Date  02/01/20  -       User Key  (r) = Recorded By, (t) = Taken By, (c) = Cosigned By    Initials Name Provider Type    Mary Casiano, PT Physical Therapist          PT Goal Re-Cert Due Date: 02/01/20  PT Short Term Goals  STG Date to Achieve: 12/18/19  STG 1: Decrease c/o pain to <4/10 at all times  STG 1 Progress: New  STG 2: Pt will demonstrate 25% reduction in wound dimensions to indicate healing progress.  STG 2 Progress: New  STG 3: Pt will demonstrate only minimal exudate to indicate wound healing.  STG 3 Progress: New  Long Term Goals  LTG Date to Achieve: 02/01/20  LTG 1: Pt and/or caregivers will be independent with clean home dressing changes to continue progress upon d/c.  LTG 1 Progress: New  LTG 2: Pt will demonstrate 75% reduction in wound dimensions to indicate healing progress.  LTG 2 Progress: New  LTG 3: Pt will demonstrate scant wound exudate to indicate healing progress.  LTG 3 Progress: New      Time Calculation: Start Time: 1100  Therapy Charges for Today     Code Description Service Date Service Provider Modifiers Qty    [id number]  HERNAN DEBRIDE OPEN WOUND UP TO 20CM 11/23/2019 Mary De Paz, PT GP 1    [id number] HC PT MULTI LAYER COMP SYS BELOW KNEE 11/23/2019 Mary De Paz, PT GP 1                  Mary De Paz, PT  11/23/2019

## 2019-12-07 ENCOUNTER — HOSPITAL ENCOUNTER (OUTPATIENT)
Dept: PHYSICAL THERAPY | Facility: HOSPITAL | Age: 79
Setting detail: THERAPIES SERIES
Discharge: HOME | End: 2019-12-07

## 2019-12-07 DIAGNOSIS — S81.801D MULTIPLE OPEN WOUNDS OF LOWER EXTREMITY, RIGHT, SUBSEQUENT ENCOUNTER: ICD-10-CM

## 2019-12-07 DIAGNOSIS — S81.802D MULTIPLE OPEN WOUNDS OF LOWER EXTREMITY, LEFT, SUBSEQUENT ENCOUNTER: Primary | ICD-10-CM

## 2019-12-07 DIAGNOSIS — R60.0 BILATERAL LOWER EXTREMITY EDEMA: ICD-10-CM

## 2019-12-07 PROCEDURE — 29581 APPL MULTLAYER CMPRN SYS LEG: CPT | Performed by: PHYSICAL THERAPIST

## 2019-12-07 PROCEDURE — 97597 DBRDMT OPN WND 1ST 20 CM/<: CPT | Performed by: PHYSICAL THERAPIST

## 2019-12-07 NOTE — THERAPY PROGRESS REPORT/RE-CERT
Outpatient Rehabilitation - Wound/Debridement Progress Note  [facility]     Patient Name: Michoacano Rojas  : 1940  MRN: [id number]  Today's Date: 2019                 Admit Date: 2019    Visit Dx:    ICD-10-CM ICD-9-CM   1. Multiple open wounds of lower extremity, left, subsequent encounter S81.802D V58.89     894.0   2. Multiple open wounds of lower extremity, right, subsequent encounter S81.801D V58.89     894.0   3. 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   • Tracheal stenosis   • Diastolic dysfunction with chronic heart failure (CMS/HCC)   • Acute on chronic respiratory failure with hypoxia and hypercapnia (CMS/HCC)   • Incarcerated right inguinal hernia   • Venous insufficiency (chronic) (peripheral)   • Benign essential hypertension   • Non-pressure chronic ulcer of left lower leg, with unspecified severity (CMS/HCC)   • Lower extremity edema   • Chronic diastolic heart failure (CMS/HCC)   • Cognitive disorder   • Chronic conjunctivitis of both eyes   • Facial swelling   • Goiter   • Idiopathic chronic gout of multiple sites with tophus   • Medicare annual wellness visit, subsequent   • Morbid obesity (CMS/HCC)   • Major depressive disorder, single episode, mild (CMS/HCC)   • Skin ulcer of right foot, limited to breakdown of skin (CMS/HCC)   • COPD (chronic obstructive pulmonary disease) (CMS/HCC)   • Hypoxia   • sepsis secondary to LEs ulcerations   • AMS (altered mental status)   • Venous stasis ulcer (CMS/HCC)   •  Immunosuppressed status (CMS/HCC)   • Current chronic use of systemic steroids   • Rheumatoid arthritis involving multiple sites (CMS/HCC)   • Demand ischemia of myocardium (CMS/HCC)        Past Medical History:   Diagnosis Date   • A-fib (CMS/HCC)    • Atrial fibrillation (CMS/HCC)    • CHF (congestive heart failure) (CMS/HCC)    • Chronic cutaneous venous stasis ulcer (CMS/HCC)    • Coronary artery disease    • Disease of thyroid gland    • Hypertension    • Penile abnormality    • Peptic ulcer disease    • Primary malignant neoplasm of bronchus with metastasisto other site, unspecified laterality (CMS/HCC) 4/22/2019   • RA (rheumatoid arthritis) (CMS/HCC)    • Rheumatic fever    • Rheumatoid arthritis (CMS/HCC)    • Sepsis (CMS/HCC) 11/17/2017    from cellulitis due to leg ulcer, hospitalization, antibiotics, wound care   • Sleep apnea    • Vertigo    • Vertigo         Past Surgical History:   Procedure Laterality Date   • EYE SURGERY     • JOINT MANIPULATION      left hip repair                          PT Ortho     Row Name 12/07/19 1030       Subjective Comments    Subjective Comments  Pt and son report new blister Rt leg; was more than an inch tall. Feels better but draining.   -MW       Subjective Pain    Able to rate subjective pain?  yes  -MW    Pre-Treatment Pain Level  3  -MW    Post-Treatment Pain Level  3  -MW    Subjective Pain Comment  chronic pain RA   -MW       Transfers    Comment (Transfers)  remained seated in w/c for tx. Family assists with management of w/c and holding legs for dressings/wrapping  -MW      User Key  (r) = Recorded By, (t) = Taken By, (c) = Cosigned By    Initials Name Provider Type    Amaya Eastman, PT Physical Therapist          LDA Wound     Row Name 12/07/19 1030             Wound 12/07/19 1030 Right anterior;proximal leg Blisters    Wound - Properties Group Date first assessed: 12/07/19  -MW Time first assessed: 1030  -MW Side: Right  -MW Orientation:  anterior;proximal  -MW Location: leg  -MW Primary Wound Type: Blisters  -MW    Wound Image  Images linked: 1  -MW      Dressing Appearance  intact;moist drainage  -MW      Base  moist;red;subcutaneous;granulating  -MW      Red (%), Wound Tissue Color  100  -MW      Periwound  intact;dry;swelling  -MW      Periwound Temperature  warm  -MW      Periwound Skin Turgor  soft  -MW      Edges  open  -MW      Wound Length (cm)  6.7 cm  -MW      Wound Width (cm)  9 cm  -MW      Wound Depth (cm)  0.1 cm  -MW      Drainage Characteristics/Odor  serous;serosanguineous  -MW      Drainage Amount  moderate  -MW      Care, Wound  cleansed with;wound cleanser;debrided  -MW      Dressing Care, Wound  dressing applied;antimicrobial agent applied;foam;petroleum-based;gauze;multi-layer wrap HFBt, xeroform, ABD, cast padding   -MW      Periwound Care, Wound  cleansed with pH balanced cleanser;dry periwound area maintained;barrier ointment applied  -MW         Wound 11/03/19 1100 Left medial leg Venous Ulcer    Wound - Properties Group Date first assessed: 11/03/19  - Time first assessed: 1100  -MF Side: Left  - Orientation: medial  - Location: leg  - Primary Wound Type: Venous ulcer  -MF    Wound Image  Images linked: 1  -MW      Dressing Appearance  intact;moist drainage  -MW      Base  moist;pink;red;granulating;epithelialization skin island  -MW      Periwound  intact;moist;pale white  -MW      Periwound Temperature  warm  -MW      Periwound Skin Turgor  soft  -MW      Edges  irregular  -MW      Wound Length (cm)  5 cm  -MW      Wound Width (cm)  6 cm  -MW      Wound Depth (cm)  0.1 cm  -MW      Drainage Characteristics/Odor  serosanguineous  -MW      Drainage Amount  small  -MW      Care, Wound  cleansed with;wound cleanser;debrided  -MW      Dressing Care, Wound  dressing applied;antimicrobial agent applied;low-adherent;foam;petroleum-based;gauze;multi-layer wrap HFBr, xeroform, ABD, cast padding   -MW      Periwound Care,  Wound  cleansed with pH balanced cleanser;dry periwound area maintained;barrier ointment applied z guard   -MW         Wound 11/03/19 1100 Right posterior;lateral leg Venous Ulcer    Wound - Properties Group Date first assessed: 11/03/19  - Time first assessed: 1100  - Side: Right  - Orientation: posterior;lateral  - Location: leg  - Primary Wound Type: Venous ulcer  -    Wound Image  Images linked: 1  -MW      Dressing Appearance  intact  -MW      Base  moist;pink;red;granulating  -MW      Periwound  intact;pale white  -MW      Periwound Temperature  warm  -MW      Periwound Skin Turgor  soft  -MW      Edges  irregular  -MW      Wound Length (cm)  4.7 cm  -MW      Wound Width (cm)  1 cm  -MW      Wound Depth (cm)  0.1 cm  -MW      Drainage Characteristics/Odor  serosanguineous  -MW      Drainage Amount  small  -MW      Care, Wound  cleansed with;wound cleanser;debrided  -MW      Dressing Care, Wound  dressing applied;antimicrobial agent applied;low-adherent;foam;petroleum-based;gauze;multi-layer wrap HFBr, xeroform, ABD pad, cast padding   -MW      Periwound Care, Wound  cleansed with pH balanced cleanser;dry periwound area maintained  -MW         Wound 11/10/19 1100 Right posterior;medial leg Venous Ulcer    Wound - Properties Group Date first assessed: 11/10/19  - Time first assessed: 1100  -JM Present on Hospital Admission: Y  - Side: Right  - Orientation: posterior;medial  - Location: leg  - Primary Wound Type: Venous ulcer  -    Wound Image  Images linked: 1  -MW      Dressing Appearance  intact;moist drainage  -MW      Base  moist;pink;red;subcutaneous;epithelialization  -MW      Periwound  intact;dry  -MW      Periwound Temperature  warm  -MW      Periwound Skin Turgor  firm  -MW      Wound Length (cm)  1.8 cm  -MW      Wound Width (cm)  0.7 cm  -MW      Wound Depth (cm)  0.1 cm  -MW      Drainage Characteristics/Odor  serosanguineous  -MW      Drainage Amount  scant  -MW      Care,  Wound  cleansed with;wound cleanser  -MW      Dressing Care, Wound  dressing changed;antimicrobial agent applied;low-adherent;foam;petroleum-based;gauze;multi-layer wrap HFBr, xeroform, ABD, cast padding   -MW         Wound 11/03/19 1100 Left lateral leg Venous Ulcer    Wound - Properties Group Date first assessed: 11/03/19  - Time first assessed: 1100  -MF Side: Left  - Orientation: lateral  - Location: leg  - Primary Wound Type: Venous ulcer  -MF    Wound Image  Images linked: 1  -MW      Dressing Appearance  intact;moist drainage  -MW      Base  moist;pink;red;granulating;epithelialization  -MW      Periwound  intact;pale white  -MW      Periwound Temperature  warm  -MW      Periwound Skin Turgor  soft  -MW      Edges  open  -MW      Wound Length (cm)  4.7 cm  -MW      Wound Width (cm)  1 cm  -MW      Wound Depth (cm)  0.1 cm  -MW      Drainage Characteristics/Odor  serosanguineous  -MW      Drainage Amount  small  -MW      Care, Wound  cleansed with;wound cleanser  -MW      Dressing Care, Wound  dressing applied;antimicrobial agent applied;low-adherent;foam;petroleum-based;gauze;multi-layer wrap HFBr, xeroform, ABD pad, cast padding  -MW      Periwound Care, Wound  cleansed with pH balanced cleanser;dry periwound area maintained  -MW        User Key  (r) = Recorded By, (t) = Taken By, (c) = Cosigned By    Initials Name Provider Type     Arsalan Langford, PT Physical Therapist    Amaya Eastman, PT Physical Therapist    Kiley Damon, PT Physical Therapist            WOUND DEBRIDEMENT  Total area of Debridement: ~15 cm2   Debridement Site 1  Location- Site 1: BLE wounds/periwounds  Selective Debridement- Site 1: Wound Surface <20cmsq  Instruments- Site 1: tweezers  Excised Tissue Description- Site 1: moderate, slough, minimum, other (comment)(min skin debris )  Bleeding- Site 1: seeping, scant             Therapy Education     Row Name 12/07/19 1030             Therapy Education     Education Details  Cont homd dressing changes; HFBt to blister area.   -MW      Given  Symptoms/condition management;Bandaging/dressing change  -MW      Program  Reinforced  -MW      How Provided  Verbal;Demonstration  -MW      Provided to  Patient;Caregiver wife and son   -MW      Level of Understanding  Verbalized  -MW        User Key  (r) = Recorded By, (t) = Taken By, (c) = Cosigned By    Initials Name Provider Type    MW Amaya Boudreaux, PT Physical Therapist          Recommendation and Plan  PT Assessment/Plan     Row Name 12/07/19 1030          PT Assessment    Functional Limitations  Decreased safety during functional activities;Impaired gait;Impaired locomotion;Limitation in home management;Limitations in community activities;Limitations in functional capacity and performance;Performance in leisure activities;Performance in self-care ADL;Other (comment) wound mgmt, edema management  -MW     Impairments  Integumentary integrity;Pain;Edema  -MW     Assessment Comments  RT posterior lateral and medial sites with new epithelial growth at wound edges; wound beds pink/red tissue. Lt medial ankle site with thick slough/ biofilm build up which required debridement. Debridement revealed new epithelial growth along wound edges and at island within wound bed. Lt lateral site with multiple skin bridges through out wound area; approx 50% is new epithelial tissue. Pt also with new blister/wound Rt shin area that came up last Sunday. Wound is beefy red with moderate drainage. Pt with excellent family support for home dressing changes between PT visit; but PT to continue for wound care for selective debridement, dressing management and edema management to optimize skin integrity / promote wound healing.    -MW     Rehab Potential  Fair  -MW     Patient/caregiver participated in establishment of treatment plan and goals  Yes  -MW     Patient would benefit from skilled therapy intervention  Yes  -MW        PT Plan    PT  Frequency  1x/week  -MW     Predicted Duration of Therapy Intervention (Therapy Eval)  8 weeks   -MW     Planned CPT's?  PT HERNAN DEBRIDE OPEN WOUND UP TO 20 CM: 54890;PT HERNAN DEBRIDE OPEN WOUND EA ADD 20 CM: 27434;PT NONSELECT DEBRIDE 15 MIN: 32927;PT MULTI LAYER COMP SYS LE;PT UNNA BOOT: 87677;PT SELF CARE/HOME MGMT/TRAIN EA 15: 50881  -MW     Physical Therapy Interventions (Optional Details)  wound care;patient/family education  -MW     PT Plan Comments  Debridement, dressing management; MLW ; Prima?   -MW       User Key  (r) = Recorded By, (t) = Taken By, (c) = Cosigned By    Initials Name Provider Type    Amaya Eastman, PT Physical Therapist          Goals  PT OP Goals     Row Name 19 1030          STG Date to Achieve  19  -MW    STG 1  Decrease c/o pain to <4/10 at all times  -MW    STG 1 Progress  Partially Met  -MW    STG 2  Pt will demonstrate 25% reduction in wound dimensions to indicate healing progress.  -MW    STG 2 Progress  Partially Met  -MW    STG 3  Pt will demonstrate only minimal exudate to indicate wound healing.  -MW    STG 3 Progress  Partially Met  -MW          LTG Date to Achieve  20  -MW    LTG 1  Pt and/or caregivers will be independent with clean home dressing changes to continue progress upon d/c.  -MW    LTG 1 Progress  Met  -MW    LTG 2  Pt will demonstrate 75% reduction in wound dimensions to indicate healing progress.  -MW    LTG 2 Progress  Ongoing  -MW    LTG 3  Pt will demonstrate scant wound exudate to indicate healing progress.  -MW    LTG 3 Progress  Ongoing  -MW          PT Goal Re-Cert Due Date  20  -MW      User Key  (r) = Recorded By, (t) = Taken By, (c) = Cosigned By    Initials Name Provider Type    Amaya Eastman, PT Physical Therapist            Time Calculation: Start Time: 1030  Therapy Charges for Today     Code Description Service Date Service Provider Modifiers Qty    [id number]  PT MULTI LAYER COMP SYS BELOW KNEE  12/7/2019 Amaya Boudreaux, PT GP 1    [id number] HC HERNAN DEBRIDE OPEN WOUND UP TO 20CM 12/7/2019 Amaya Boudreaux, PT GP 1                  Amaya Boudreaux, PT  12/7/2019

## 2019-12-14 ENCOUNTER — HOSPITAL ENCOUNTER (OUTPATIENT)
Dept: PHYSICAL THERAPY | Facility: HOSPITAL | Age: 79
Setting detail: THERAPIES SERIES
Discharge: HOME | End: 2019-12-14

## 2019-12-14 DIAGNOSIS — S81.802D MULTIPLE OPEN WOUNDS OF LOWER EXTREMITY, LEFT, SUBSEQUENT ENCOUNTER: Primary | ICD-10-CM

## 2019-12-14 DIAGNOSIS — S81.801D MULTIPLE OPEN WOUNDS OF LOWER EXTREMITY, RIGHT, SUBSEQUENT ENCOUNTER: ICD-10-CM

## 2019-12-14 DIAGNOSIS — R60.0 BILATERAL LOWER EXTREMITY EDEMA: ICD-10-CM

## 2019-12-14 PROCEDURE — 29581 APPL MULTLAYER CMPRN SYS LEG: CPT

## 2019-12-14 PROCEDURE — 97597 DBRDMT OPN WND 1ST 20 CM/<: CPT

## 2019-12-14 NOTE — THERAPY WOUND CARE TREATMENT
Outpatient Rehabilitation - Wound/Debridement Treatment Note  [facility]     Patient Name: Michoacano Rojas  : 1940  MRN: [id number]  Today's Date: 2019                 Admit Date: 2019    Visit Dx:    ICD-10-CM ICD-9-CM   1. Multiple open wounds of lower extremity, left, subsequent encounter S81.802D V58.89     894.0   2. Multiple open wounds of lower extremity, right, subsequent encounter S81.801D V58.89     894.0   3. 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   • Tracheal stenosis   • Diastolic dysfunction with chronic heart failure (CMS/HCC)   • Acute on chronic respiratory failure with hypoxia and hypercapnia (CMS/HCC)   • Incarcerated right inguinal hernia   • Venous insufficiency (chronic) (peripheral)   • Benign essential hypertension   • Non-pressure chronic ulcer of left lower leg, with unspecified severity (CMS/HCC)   • Lower extremity edema   • Chronic diastolic heart failure (CMS/HCC)   • Cognitive disorder   • Chronic conjunctivitis of both eyes   • Facial swelling   • Goiter   • Idiopathic chronic gout of multiple sites with tophus   • Medicare annual wellness visit, subsequent   • Morbid obesity (CMS/HCC)   • Major depressive disorder, single episode, mild (CMS/HCC)   • Skin ulcer of right foot, limited to breakdown of skin (CMS/HCC)   • COPD (chronic obstructive pulmonary disease) (CMS/HCC)   • Hypoxia   • sepsis secondary to LEs ulcerations   • AMS (altered mental status)   • Venous stasis ulcer (CMS/HCC)   •  Immunosuppressed status (CMS/HCC)   • Current chronic use of systemic steroids   • Rheumatoid arthritis involving multiple sites (CMS/HCC)   • Demand ischemia of myocardium (CMS/HCC)        Past Medical History:   Diagnosis Date   • A-fib (CMS/HCC)    • Atrial fibrillation (CMS/HCC)    • CHF (congestive heart failure) (CMS/HCC)    • Chronic cutaneous venous stasis ulcer (CMS/HCC)    • Coronary artery disease    • Disease of thyroid gland    • Hypertension    • Penile abnormality    • Peptic ulcer disease    • Primary malignant neoplasm of bronchus with metastasisto other site, unspecified laterality (CMS/HCC) 4/22/2019   • RA (rheumatoid arthritis) (CMS/HCC)    • Rheumatic fever    • Rheumatoid arthritis (CMS/HCC)    • Sepsis (CMS/[facility]) 11/17/2017    from cellulitis due to leg ulcer, hospitalization, antibiotics, wound care   • Sleep apnea    • Vertigo    • Vertigo         Past Surgical History:   Procedure Laterality Date   • EYE SURGERY     • JOINT MANIPULATION      left hip repair         EVALUATION  PT Ortho     Row Name 12/14/19 1345       Subjective Comments    Subjective Comments  No new c/o, states they changed the dressings but did not replace the short-stretch wraps since last night due to coming for tx.  -       Subjective Pain    Able to rate subjective pain?  yes  -    Pre-Treatment Pain Level  3  -JM    Post-Treatment Pain Level  3  -JM    Subjective Pain Comment  chronic RA pain  -       Transfers    Comment (Transfers)  seated in w/c, family assisting to position/lift LEs  -      User Key  (r) = Recorded By, (t) = Taken By, (c) = Cosigned By    Initials Name Provider Type    Kiley Damon, PT Physical Therapist          LDA Wound     Row Name 12/14/19 1345             Wound 12/07/19 1030 Right anterior;proximal leg Blisters    Wound - Properties Group Date first assessed: 12/07/19  -MW Time first assessed: 1030  -MW Side: Right  -MW Orientation: anterior;proximal  -MW Location: leg   -MW Primary Wound Type: Blisters  -MW    Dressing Appearance  intact;dried drainage HFBt adherent to wound bed  -JM      Base  moist;red;granulating;epithelialization 90% re-epithelialized  -JM      Periwound  intact;dry;swelling  -JM      Periwound Temperature  warm  -JM      Periwound Skin Turgor  soft  -JM      Edges  open  -JM      Drainage Characteristics/Odor  serous;serosanguineous  -JM      Drainage Amount  small  -JM      Care, Wound  cleansed with;wound cleanser;debrided  -JM      Dressing Care, Wound  dressing applied;antimicrobial agent applied;foam;multi-layer wrap HFBr, xeroform, MLW  -JM      Periwound Care, Wound  barrier ointment applied;cleansed with pH balanced cleanser;dry periwound area maintained z-guard  -         Wound 11/03/19 1100 Left medial leg Venous Ulcer    Wound - Properties Group Date first assessed: 11/03/19  - Time first assessed: 1100  - Side: Left  - Orientation: medial  - Location: leg  - Primary Wound Type: Venous ulcer  -MF    Dressing Appearance  intact;moist drainage  -JM      Base  moist;pink;red;granulating;epithelialization;yellow;slough skin islands, min brown/yellow slough anteriorly  -JM      Periwound  intact;moist;pale white  -JM      Periwound Temperature  warm  -JM      Periwound Skin Turgor  soft  -JM      Edges  irregular  -JM      Drainage Characteristics/Odor  serosanguineous;brown no odor  -JM      Drainage Amount  small;moderate  -JM      Care, Wound  cleansed with;wound cleanser;debrided  -JM      Dressing Care, Wound  dressing applied;antimicrobial agent applied;foam;multi-layer wrap HFBr, xeroform, MLW  -JM      Periwound Care, Wound  cleansed with pH balanced cleanser;dry periwound area maintained;barrier ointment applied z-guard  -         Wound 11/03/19 1100 Right posterior;lateral leg Venous Ulcer    Wound - Properties Group Date first assessed: 11/03/19  - Time first assessed: 1100  - Side: Right  -MF Orientation: posterior;lateral   -MF Location: leg  -MF Primary Wound Type: Venous ulcer  -MF    Dressing Appearance  intact;moist drainage  -JM      Base  moist;pink;red;granulating  -JM      Periwound  intact;pale white  -JM      Periwound Temperature  warm  -JM      Periwound Skin Turgor  soft  -JM      Edges  irregular  -JM      Drainage Characteristics/Odor  serosanguineous  -JM      Drainage Amount  small  -JM      Care, Wound  cleansed with;wound cleanser;debrided  -JM      Dressing Care, Wound  dressing applied;antimicrobial agent applied;foam;multi-layer wrap HFBr, xeroform, MLW  -JM      Periwound Care, Wound  barrier ointment applied;cleansed with pH balanced cleanser;dry periwound area maintained z-guard  -         Wound 11/10/19 1100 Right posterior;medial leg Venous Ulcer    Wound - Properties Group Date first assessed: 11/10/19  - Time first assessed: 1100  - Present on Hospital Admission: Y  - Side: Right  - Orientation: posterior;medial  - Location: leg  -JM Primary Wound Type: Venous ulcer  -JM    Dressing Appearance  intact;moist drainage  -JM      Base  moist;pink;red;subcutaneous;epithelialization  -JM      Periwound  intact;dry  -JM      Periwound Temperature  warm  -JM      Periwound Skin Turgor  firm  -JM      Drainage Characteristics/Odor  serosanguineous  -JM      Drainage Amount  scant  -JM      Care, Wound  cleansed with;wound cleanser;debrided  -JM      Dressing Care, Wound  dressing changed;antimicrobial agent applied;foam;multi-layer wrap HFBr, xeroform, MLW  -JM      Periwound Care, Wound  barrier ointment applied;cleansed with pH balanced cleanser;dry periwound area maintained z-guard  -         Wound 11/03/19 1100 Left lateral leg Venous Ulcer    Wound - Properties Group Date first assessed: 11/03/19  - Time first assessed: 1100  - Side: Left  - Orientation: lateral  - Location: leg  -MF Primary Wound Type: Venous ulcer  -MF    Dressing Appearance  intact;moist drainage  -JM      Base   moist;pink;red;granulating;epithelialization skin bridging centrally  -      Periwound  intact;pale white  -      Periwound Temperature  warm  -      Periwound Skin Turgor  soft  -      Edges  open  -      Drainage Characteristics/Odor  serosanguineous  -      Drainage Amount  small  -JM      Care, Wound  cleansed with;wound cleanser;debrided  -      Dressing Care, Wound  dressing applied;antimicrobial agent applied;foam;multi-layer wrap HFBr, xeroform, MLW  -JM      Periwound Care, Wound  cleansed with pH balanced cleanser;dry periwound area maintained;barrier ointment applied z-guard  -        User Key  (r) = Recorded By, (t) = Taken By, (c) = Cosigned By    Initials Name Provider Type    MF Arsalan Langford, PT Physical Therapist    MW Amaya Boudreaux, PT Physical Therapist    JM Kiley Chinchilla, PT Physical Therapist        Lymphedema     Row Name 12/14/19 5835             Lymphedema Edema Assessment    Ptting Edema Category  By severity  -      Pitting Edema  Mild;Moderate  -         Skin Changes/Observations    Lower Extremity Conditions  bilateral:;clean;dry;shiny;hairless;scaly;inflamed;fragile  -      Lower Extremity Color/Pigment  bilateral:;hyperpigmented  -      Skin Observations Comment  slighlty blistered/shiny  -         Lymphedema Pulses/Capillary Refill    Lower Extremity Capillary Refill  right:;left:;less than 3 seconds  -         Compression/Skin Care    Compression/Skin Care  skin care;wrapping location;bandaging  -      Skin Care  washed/dried;lotion applied  -      Wrapping Location  lower extremity  -      Wrapping Location LE  bilateral:;foot to knee  -      Wrapping Comments  wound dressings, cast padding, 8cm comprilan MH to ankle, 10cm comprilan ankle to prox calf  -      Bandage Layers  padding/fluff layer;short-stretch bandages (comment size/quantity)  -      Bandaging Technique  figure-eight;light compression  -        User Key  (r) =  Recorded By, (t) = Taken By, (c) = Cosigned By    Initials Name Provider Type    Kiley Damon, PT Physical Therapist          WOUND DEBRIDEMENT  Total area of Debridement: ~20cmsq  Debridement Site 1  Location- Site 1: BLE wounds/periwounds  Selective Debridement- Site 1: Wound Surface <20cmsq  Instruments- Site 1: tweezers, #15, scapel  Excised Tissue Description- Site 1: moderate, slough  Bleeding- Site 1: scant, held pressure, 1 minute             Therapy Education     Row Name 12/14/19 7547             Therapy Education    Education Details  Change dressings at least once prior to next tx, PRN for drainage.  Apply z-guard to periwound areas and z-guard/eucerin mix to remainder of calf.  HFBr to cover all wounds, secure with cast padding, may add ABD for additional absorption, conitnue using short-stretch bandages.  Instructed to replace compression and not leave off for extended periods in order to prevent development of blisters or increased edema.  -MI      Given  Symptoms/condition management;Bandaging/dressing change  -MI      Program  Reinforced  -MI      How Provided  Verbal;Demonstration  -MI      Provided to  Patient;Caregiver wife and son   -MI      Level of Understanding  Verbalized  -MI        User Key  (r) = Recorded By, (t) = Taken By, (c) = Cosigned By    Initials Name Provider Type    Kiley Damon, PT Physical Therapist          Recommendation and Plan  PT Assessment/Plan     Row Name 12/14/19 9787          PT Assessment    Functional Limitations  Decreased safety during functional activities;Impaired gait;Impaired locomotion;Limitation in home management;Limitations in community activities;Limitations in functional capacity and performance;Performance in leisure activities;Performance in self-care ADL;Other (comment) wound mgmt, edema management  -MI     Impairments  Integumentary integrity;Pain;Edema  -     Assessment Comments  Blister to ant RLE now approx 80%  re-epithelialized.  HFBt adherent to wound bed, so PT applied z-guard to area and covered with HFBr.  Other RLE wounds improving with continued new epithelial growth, still with min slough buildup requiring debridement.  L lat ankle with continued expansion of skin bridges.  Medial L ankle with slightly darker drainage and ant aspect with yellow/brown slough, but no odor or erythema noted, will monitor for S&S of infection.  Overall BLE skin integrity improved, although BLE with shiny blistered appearance today as compression had been left off since the night before.  PT reinforced need for continuous compression to prevent blistering and to reduce edema.  Also instructed family they can d/c xeroform if they apply z-guard to fragile shiny skin and continue cast padding under wraps.  -     Rehab Potential  Fair  -     Patient/caregiver participated in establishment of treatment plan and goals  Yes  -     Patient would benefit from skilled therapy intervention  Yes  -        PT Plan    PT Frequency  1x/week  -     Physical Therapy Interventions (Optional Details)  patient/family education;wound care  -     PT Plan Comments  debridement, advanced dressings, MLW  -       User Key  (r) = Recorded By, (t) = Taken By, (c) = Cosigned By    Initials Name Provider Type    Kiley Damon, PT Physical Therapist          Goals  PT OP Goals     Row Name 12/14/19 1345          Time Calculation    PT Goal Re-Cert Due Date  02/01/20  -       User Key  (r) = Recorded By, (t) = Taken By, (c) = Cosigned By    Initials Name Provider Type    Kiley Damon, PT Physical Therapist          PT Goal Re-Cert Due Date: 02/01/20            Time Calculation: Start Time: 1345  Therapy Charges for Today     Code Description Service Date Service Provider Modifiers Qty    99830856653 HC HERNAN DEBRIDE OPEN WOUND UP TO 20CM 12/14/2019 Kiley Chinchilla, PT GP 1    [id number] HC PT MULTI LAYER COMP SYS BELOW KNEE  12/14/2019 Kiley Chinchilla, PT GP 1                  Kiley Chinchilla, PT  12/14/2019

## 2019-12-15 ENCOUNTER — APPOINTMENT (OUTPATIENT)
Dept: CT IMAGING | Facility: HOSPITAL | Age: 79
End: 2019-12-15

## 2019-12-15 ENCOUNTER — HOSPITAL ENCOUNTER (EMERGENCY)
Facility: HOSPITAL | Age: 79
Discharge: HOME | End: 2019-12-15
Attending: EMERGENCY MEDICINE | Admitting: EMERGENCY MEDICINE

## 2019-12-15 VITALS
HEIGHT: 68 IN | HEART RATE: 70 BPM | BODY MASS INDEX: 21.98 KG/M2 | OXYGEN SATURATION: 96 % | DIASTOLIC BLOOD PRESSURE: 68 MMHG | TEMPERATURE: 97.9 F | WEIGHT: 145 LBS | SYSTOLIC BLOOD PRESSURE: 124 MMHG | RESPIRATION RATE: 18 BRPM

## 2019-12-15 DIAGNOSIS — K40.90 RIGHT INGUINAL HERNIA: Primary | ICD-10-CM

## 2019-12-15 DIAGNOSIS — I50.32 CHRONIC DIASTOLIC HEART FAILURE (HCC): ICD-10-CM

## 2019-12-15 DIAGNOSIS — J96.12 CHRONIC RESPIRATORY FAILURE WITH HYPOXIA AND HYPERCAPNIA (HCC): ICD-10-CM

## 2019-12-15 DIAGNOSIS — N39.0 URINARY TRACT INFECTION WITHOUT HEMATURIA, SITE UNSPECIFIED: ICD-10-CM

## 2019-12-15 DIAGNOSIS — J96.11 CHRONIC RESPIRATORY FAILURE WITH HYPOXIA AND HYPERCAPNIA (HCC): ICD-10-CM

## 2019-12-15 DIAGNOSIS — D84.9 IMMUNOSUPPRESSED STATUS (HCC): ICD-10-CM

## 2019-12-15 LAB
ALBUMIN SERPL-MCNC: 3.5 G/DL (ref 3.5–5.2)
ALBUMIN/GLOB SERPL: 1 G/DL
ALP SERPL-CCNC: 68 U/L (ref 39–117)
ALT SERPL W P-5'-P-CCNC: 8 U/L (ref 1–41)
ANION GAP SERPL CALCULATED.3IONS-SCNC: 10 MMOL/L (ref 5–15)
AST SERPL-CCNC: 13 U/L (ref 1–40)
BACTERIA UR QL AUTO: ABNORMAL /HPF
BASOPHILS # BLD AUTO: 0.02 10*3/MM3 (ref 0–0.2)
BASOPHILS NFR BLD AUTO: 0.2 % (ref 0–1.5)
BILIRUB SERPL-MCNC: 0.4 MG/DL (ref 0.2–1.2)
BILIRUB UR QL STRIP: NEGATIVE
BUN BLD-MCNC: 26 MG/DL (ref 8–23)
BUN/CREAT SERPL: 39.4 (ref 7–25)
CALCIUM SPEC-SCNC: 9.6 MG/DL (ref 8.6–10.5)
CHLORIDE SERPL-SCNC: 103 MMOL/L (ref 98–107)
CLARITY UR: CLEAR
CO2 SERPL-SCNC: 28 MMOL/L (ref 22–29)
COLOR UR: YELLOW
CREAT BLD-MCNC: 0.66 MG/DL (ref 0.76–1.27)
D-LACTATE SERPL-SCNC: 1.2 MMOL/L (ref 0.5–2)
DEPRECATED RDW RBC AUTO: 54.2 FL (ref 37–54)
EOSINOPHIL # BLD AUTO: 0.03 10*3/MM3 (ref 0–0.4)
EOSINOPHIL NFR BLD AUTO: 0.3 % (ref 0.3–6.2)
ERYTHROCYTE [DISTWIDTH] IN BLOOD BY AUTOMATED COUNT: 15.9 % (ref 12.3–15.4)
GFR SERPL CREATININE-BSD FRML MDRD: 140 ML/MIN/1.73
GLOBULIN UR ELPH-MCNC: 3.4 GM/DL
GLUCOSE BLD-MCNC: 111 MG/DL (ref 65–99)
GLUCOSE UR STRIP-MCNC: NEGATIVE MG/DL
HCT VFR BLD AUTO: 38.3 % (ref 37.5–51)
HGB BLD-MCNC: 11.1 G/DL (ref 13–17.7)
HGB UR QL STRIP.AUTO: NEGATIVE
HOLD SPECIMEN: NORMAL
HOLD SPECIMEN: NORMAL
HYALINE CASTS UR QL AUTO: ABNORMAL /LPF
IMM GRANULOCYTES # BLD AUTO: 0.06 10*3/MM3 (ref 0–0.05)
IMM GRANULOCYTES NFR BLD AUTO: 0.6 % (ref 0–0.5)
KETONES UR QL STRIP: NEGATIVE
LEUKOCYTE ESTERASE UR QL STRIP.AUTO: ABNORMAL
LIPASE SERPL-CCNC: 20 U/L (ref 13–60)
LYMPHOCYTES # BLD AUTO: 0.64 10*3/MM3 (ref 0.7–3.1)
LYMPHOCYTES NFR BLD AUTO: 6.5 % (ref 19.6–45.3)
MCH RBC QN AUTO: 26.9 PG (ref 26.6–33)
MCHC RBC AUTO-ENTMCNC: 29 G/DL (ref 31.5–35.7)
MCV RBC AUTO: 92.7 FL (ref 79–97)
MONOCYTES # BLD AUTO: 0.32 10*3/MM3 (ref 0.1–0.9)
MONOCYTES NFR BLD AUTO: 3.3 % (ref 5–12)
MUCOUS THREADS URNS QL MICRO: ABNORMAL /HPF
NEUTROPHILS # BLD AUTO: 8.74 10*3/MM3 (ref 1.7–7)
NEUTROPHILS NFR BLD AUTO: 89.1 % (ref 42.7–76)
NITRITE UR QL STRIP: NEGATIVE
NRBC BLD AUTO-RTO: 0 /100 WBC (ref 0–0.2)
PH UR STRIP.AUTO: 5.5 [PH] (ref 5–8)
PLATELET # BLD AUTO: 188 10*3/MM3 (ref 140–450)
PMV BLD AUTO: 10.8 FL (ref 6–12)
POTASSIUM BLD-SCNC: 4.4 MMOL/L (ref 3.5–5.2)
PROT SERPL-MCNC: 6.9 G/DL (ref 6–8.5)
PROT UR QL STRIP: NEGATIVE
RBC # BLD AUTO: 4.13 10*6/MM3 (ref 4.14–5.8)
RBC # UR: ABNORMAL /HPF
REF LAB TEST METHOD: ABNORMAL
SODIUM BLD-SCNC: 141 MMOL/L (ref 136–145)
SP GR UR STRIP: 1.02 (ref 1–1.03)
SQUAMOUS #/AREA URNS HPF: ABNORMAL /HPF
UROBILINOGEN UR QL STRIP: ABNORMAL
WBC NRBC COR # BLD: 9.81 10*3/MM3 (ref 3.4–10.8)
WBC UR QL AUTO: ABNORMAL /HPF
WHOLE BLOOD HOLD SPECIMEN: NORMAL
WHOLE BLOOD HOLD SPECIMEN: NORMAL
YEAST URNS QL MICRO: ABNORMAL /HPF

## 2019-12-15 PROCEDURE — 74176 CT ABD & PELVIS W/O CONTRAST: CPT

## 2019-12-15 PROCEDURE — 80053 COMPREHEN METABOLIC PANEL: CPT | Performed by: EMERGENCY MEDICINE

## 2019-12-15 PROCEDURE — 83690 ASSAY OF LIPASE: CPT | Performed by: EMERGENCY MEDICINE

## 2019-12-15 PROCEDURE — 25010000002 CEFTRIAXONE PER 250 MG: Performed by: PHYSICIAN ASSISTANT

## 2019-12-15 PROCEDURE — 96365 THER/PROPH/DIAG IV INF INIT: CPT

## 2019-12-15 PROCEDURE — 87086 URINE CULTURE/COLONY COUNT: CPT | Performed by: PHYSICIAN ASSISTANT

## 2019-12-15 PROCEDURE — 83605 ASSAY OF LACTIC ACID: CPT | Performed by: EMERGENCY MEDICINE

## 2019-12-15 PROCEDURE — 85025 COMPLETE CBC W/AUTO DIFF WBC: CPT | Performed by: EMERGENCY MEDICINE

## 2019-12-15 PROCEDURE — 99284 EMERGENCY DEPT VISIT MOD MDM: CPT

## 2019-12-15 PROCEDURE — 81001 URINALYSIS AUTO W/SCOPE: CPT | Performed by: EMERGENCY MEDICINE

## 2019-12-15 RX ORDER — SODIUM CHLORIDE 0.9 % (FLUSH) 0.9 %
10 SYRINGE (ML) INJECTION AS NEEDED
Status: DISCONTINUED | OUTPATIENT
Start: 2019-12-15 | End: 2019-12-15 | Stop reason: HOSPADM

## 2019-12-15 RX ORDER — DOXYCYCLINE 100 MG/1
100 CAPSULE ORAL 2 TIMES DAILY
Qty: 28 CAPSULE | Refills: 0 | Status: SHIPPED | OUTPATIENT
Start: 2019-12-15 | End: 2020-01-01

## 2019-12-15 RX ADMIN — CEFTRIAXONE 1 G: 1 INJECTION, POWDER, FOR SOLUTION INTRAMUSCULAR; INTRAVENOUS at 19:48

## 2019-12-15 NOTE — ED PROVIDER NOTES
"Subjective   Michoacano Rojas is an 79 year old male complaining of abdominal pain. The patient reports that he was diagnosed with a hernia close to a year ago. However, it was decided that he would not need an operation due to the hernia sliding in and out. In addition, his physicians were not certain that the patient could tolerate the procedure. The patient states that this morning around 0730 this morning, he had a bowel movement which produced increased pain in the area of his hernia. The patient decided to wait to see if his pain would dissipate; however, it worsened throughout the day and the patient decided to come to the ED. The patient states that the pain is so intense that he sometimes feels as if he might pass out. He describes the pain as \"a pressure pain, not a needling pain\" which is present around his waistline. The patient states that he was able to have a normal bowel movement 4 hours ago. He is also able to urinate normally. The patient has an extensive medical history including atrial fibrillation, peptic ulcer disease, congestive heart failure, and sepsis. He has a surgical history of left hip repair. There are no other acute complaints at this time.      Abdominal Pain   Pain location:  Periumbilical  Pain quality: pressure    Pain radiates to:  Does not radiate  Pain severity:  Moderate  Onset quality:  Sudden  Duration:  11 hours  Timing:  Constant  Progression:  Unchanged  Chronicity:  New  Context comment:  Bowel movement.  Relieved by:  None tried  Ineffective treatments:  None tried  Associated symptoms: no constipation        Review of Systems   Gastrointestinal: Positive for abdominal pain. Negative for constipation.        Normal bowel movements. Hernia present.   Genitourinary: Negative for difficulty urinating.   All other systems reviewed and are negative.      Past Medical History:   Diagnosis Date   • A-fib (CMS/HCC)    • Atrial fibrillation (CMS/HCC)    • CHF (congestive heart " failure) (CMS/[facility])    • Chronic cutaneous venous stasis ulcer (CMS/[facility])    • Coronary artery disease    • Disease of thyroid gland    • Hypertension    • Penile abnormality    • Peptic ulcer disease    • Primary malignant neoplasm of bronchus with metastasisto other site, unspecified laterality (CMS/[facility]) 4/22/2019   • RA (rheumatoid arthritis) (CMS/[facility])    • Rheumatic fever    • Rheumatoid arthritis (CMS/[facility])    • Sepsis (CMS/[facility]) 11/17/2017    from cellulitis due to leg ulcer, hospitalization, antibiotics, wound care   • Sleep apnea    • Vertigo    • Vertigo        Allergies   Allergen Reactions   • Naproxen Sodium Other (See Comments)     Increased heart rate  Aleve       Past Surgical History:   Procedure Laterality Date   • EYE SURGERY     • JOINT MANIPULATION      left hip repair       Family History   Problem Relation Age of Onset   • Hypertension Mother    • Alzheimer's disease Mother    • Alzheimer's disease Father    • Hypertension Sister    • Lung cancer Brother    • Diabetes Brother        Social History     Socioeconomic History   • Marital status:      Spouse name: Not on file   • Number of children: Not on file   • Years of education: Not on file   • Highest education level: Not on file   Tobacco Use   • Smoking status: Never Smoker   • Smokeless tobacco: Never Used   • Tobacco comment: Quit 01/01/1968   Substance and Sexual Activity   • Alcohol use: No   • Drug use: No   • Sexual activity: Defer         Objective   Physical Exam   Constitutional: He is oriented to person, place, and time. He appears well-developed and well-nourished.   HENT:   Head: Normocephalic and atraumatic.   Nose: Nose normal.   Eyes: Conjunctivae are normal. No scleral icterus.   Neck: Normal range of motion. Neck supple.   Cardiovascular: Normal rate and regular rhythm.   No murmur heard.  Pulmonary/Chest: Effort normal and breath sounds normal. No respiratory distress.   Abdominal: Soft. A hernia (Large right inguinal  "hernia measuring approximately [id number] that is rather easily reduced with gentle pressure.) is present. Hernia confirmed positive in the right inguinal area.   Genitourinary: Right testis shows no tenderness. Left testis shows no tenderness.   Genitourinary Comments: Testes are descended bilaterally and nontender. Normal circumcised male.    Musculoskeletal: Normal range of motion.   Neurological: He is alert and oriented to person, place, and time.   Skin: Skin is warm and dry.   Psychiatric: He has a normal mood and affect. His behavior is normal.   Nursing note and vitals reviewed.      Procedures         ED Course  ED Course as of Dec 15 2135   Sun Dec 15, 2019   2133 Patient advised surgical consult may be in his best interest considering the size of his hernia.  Patient states he does not think he would be a good surgical candidate, and prefers no surgery at this point.  He states \"I just do not want to be in pain\", his hernia belt was not fitting properly at all, he will be even a prescription for a hernia belt, and it is advised that he is properly fitted for a hernia belt.  Recommend following up with Dr. Chavez for proper donning of hernia belt.  We will also refer back to urology for urinary tract infection.  He has been given Rocephin here in the emergency department, and will be discharged with doxycycline, and a referral to Dr. Roy with urology.  He was advised to return to the emergency department for evaluation at any time should he have any questions or concerns, and especially if he has any change or worsening of his symptoms.  He and his family verbalized understanding and are agreeable to the plan.    [TG]      ED Course User Index  [TG] Arden Stallings PA-C       Recent Results (from the past 24 hour(s))   Comprehensive Metabolic Panel    Collection Time: 12/15/19  6:36 PM   Result Value Ref Range    Glucose 111 (H) 65 - 99 mg/dL    BUN 26 (H) 8 - 23 mg/dL    Creatinine 0.66 (L) " 0.76 - 1.27 mg/dL    Sodium 141 136 - 145 mmol/L    Potassium 4.4 3.5 - 5.2 mmol/L    Chloride 103 98 - 107 mmol/L    CO2 28.0 22.0 - 29.0 mmol/L    Calcium 9.6 8.6 - 10.5 mg/dL    Total Protein 6.9 6.0 - 8.5 g/dL    Albumin 3.50 3.50 - 5.20 g/dL    ALT (SGPT) 8 1 - 41 U/L    AST (SGOT) 13 1 - 40 U/L    Alkaline Phosphatase 68 39 - 117 U/L    Total Bilirubin 0.4 0.2 - 1.2 mg/dL    eGFR  African Amer 140 >60 mL/min/1.73    Globulin 3.4 gm/dL    A/G Ratio 1.0 g/dL    BUN/Creatinine Ratio 39.4 (H) 7.0 - 25.0    Anion Gap 10.0 5.0 - 15.0 mmol/L   Lipase    Collection Time: 12/15/19  6:36 PM   Result Value Ref Range    Lipase 20 13 - 60 U/L   Lactic Acid, Plasma    Collection Time: 12/15/19  6:36 PM   Result Value Ref Range    Lactate 1.2 0.5 - 2.0 mmol/L   Light Blue Top    Collection Time: 12/15/19  6:36 PM   Result Value Ref Range    Extra Tube hold for add-on    Green Top (Gel)    Collection Time: 12/15/19  6:36 PM   Result Value Ref Range    Extra Tube Hold for add-ons.    Lavender Top    Collection Time: 12/15/19  6:36 PM   Result Value Ref Range    Extra Tube hold for add-on    Gold Top - SST    Collection Time: 12/15/19  6:36 PM   Result Value Ref Range    Extra Tube Hold for add-ons.    CBC Auto Differential    Collection Time: 12/15/19  6:36 PM   Result Value Ref Range    WBC 9.81 3.40 - 10.80 10*3/mm3    RBC 4.13 (L) 4.14 - 5.80 10*6/mm3    Hemoglobin 11.1 (L) 13.0 - 17.7 g/dL    Hematocrit 38.3 37.5 - 51.0 %    MCV 92.7 79.0 - 97.0 fL    MCH 26.9 26.6 - 33.0 pg    MCHC 29.0 (L) 31.5 - 35.7 g/dL    RDW 15.9 (H) 12.3 - 15.4 %    RDW-SD 54.2 (H) 37.0 - 54.0 fl    MPV 10.8 6.0 - 12.0 fL    Platelets 188 140 - 450 10*3/mm3    Neutrophil % 89.1 (H) 42.7 - 76.0 %    Lymphocyte % 6.5 (L) 19.6 - 45.3 %    Monocyte % 3.3 (L) 5.0 - 12.0 %    Eosinophil % 0.3 0.3 - 6.2 %    Basophil % 0.2 0.0 - 1.5 %    Immature Grans % 0.6 (H) 0.0 - 0.5 %    Neutrophils, Absolute 8.74 (H) 1.70 - 7.00 10*3/mm3    Lymphocytes, Absolute  0.64 (L) 0.70 - 3.10 10*3/mm3    Monocytes, Absolute 0.32 0.10 - 0.90 10*3/mm3    Eosinophils, Absolute 0.03 0.00 - 0.40 10*3/mm3    Basophils, Absolute 0.02 0.00 - 0.20 10*3/mm3    Immature Grans, Absolute 0.06 (H) 0.00 - 0.05 10*3/mm3    nRBC 0.0 0.0 - 0.2 /100 WBC   Urinalysis With Microscopic If Indicated (No Culture) - Urine, Clean Catch    Collection Time: 12/15/19  6:44 PM   Result Value Ref Range    Color, UA Yellow Yellow, Straw    Appearance, UA Clear Clear    pH, UA 5.5 5.0 - 8.0    Specific Gravity, UA 1.021 1.001 - 1.030    Glucose, UA Negative Negative    Ketones, UA Negative Negative    Bilirubin, UA Negative Negative    Blood, UA Negative Negative    Protein, UA Negative Negative    Leuk Esterase, UA Moderate (2+) (A) Negative    Nitrite, UA Negative Negative    Urobilinogen, UA 0.2 E.U./dL 0.2 - 1.0 E.U./dL   Urinalysis, Microscopic Only - Urine, Clean Catch    Collection Time: 12/15/19  6:44 PM   Result Value Ref Range    RBC, UA 0-2 None Seen, 0-2 /HPF    WBC, UA 31-50 (A) None Seen, 0-2 /HPF    Bacteria, UA Trace None Seen, Trace /HPF    Squamous Epithelial Cells, UA 0-2 None Seen, 0-2 /HPF    Yeast, UA Large/3+ Budding Yeast None Seen /HPF    Hyaline Casts, UA 0-6 0 - 6 /LPF    Mucus, UA Trace None Seen, Trace /HPF    Methodology Manual Light Microscopy      Note: In addition to lab results from this visit, the labs listed above may include labs taken at another facility or during a different encounter within the last 24 hours. Please correlate lab times with ED admission and discharge times for further clarification of the services performed during this visit.    CT Abdomen Pelvis Without Contrast   Final Result   #1 this patient is a large right inguinal hernia. Cecum, appendix and portions of the distal small bowel extend into the hernia sac into the right hemiscrotum with small amount of surrounding free fluid. There is nothing to suggest strangulation or bowel   obstruction at this time.  The hernia orifice is about 3.9 x 3.3 cm diameter. The hernia sac is larger than on the study of 1/15/2019. Is a tiny left inguinal hernia containing fat.   2. Prostate gland is enlarged with elevation bladder base and protrusion into the bladder base. There are also 2 small bladder calculi the tiny nonobstructing right renal calculus.   3. Upper abdominal structures are partly obscured by the patient's extremities.   4. Gallstones within a contracted gallbladder.   5. Osseous findings suggestive of a inflammatory arthritis.            Signer Name: Cassie Peters MD    Signed: 12/15/2019 7:54 PM    Workstation Name: [id number]     [facility]        Vitals:    12/15/19 2044 12/15/19 2045 12/15/19 2047 12/15/19 2057   BP:  124/68  124/68   BP Location:    Right arm   Patient Position:    Lying   Pulse:   66 70   Resp:    18   Temp:       TempSrc:       SpO2: 96%   96%   Weight:       Height:         Medications   sodium chloride 0.9 % flush 10 mL (has no administration in time range)   cefTRIAXone (ROCEPHIN) 1 g/100 mL 0.9% NS (MBP) (0 g Intravenous Stopped 12/15/19 2020)     ECG/EMG Results (last 24 hours)     ** No results found for the last 24 hours. **        No orders to display                   MDM  Number of Diagnoses or Management Options  Chronic diastolic heart failure (CMS/HCC): established and improving  Chronic respiratory failure with hypoxia and hypercapnia (CMS/HCC): established and improving  Immunosuppressed status (CMS/HCC): established and improving  Right inguinal hernia: established and worsening  Urinary tract infection without hematuria, site unspecified: new and requires workup     Amount and/or Complexity of Data Reviewed  Clinical lab tests: ordered and reviewed  Tests in the radiology section of CPT®: reviewed and ordered  Tests in the medicine section of CPT®: reviewed and ordered    Risk of Complications, Morbidity, and/or Mortality  Presenting problems:  high  Diagnostic procedures: moderate  Management options: moderate    Patient Progress  Patient progress: stable      Final diagnoses:   Right inguinal hernia   Urinary tract infection without hematuria, site unspecified   Chronic diastolic heart failure (CMS/HCC)   Immunosuppressed status (CMS/HCC)   Chronic respiratory failure with hypoxia and hypercapnia (CMS/HCC)       Documentation assistance provided by cass Hassan.  Information recorded by the scribe was done at my direction and has been verified and validated by me.     Eduarda Hassan  12/15/19 [id number]       Arden Stallings PA-C  12/15/19 4524

## 2019-12-16 NOTE — DISCHARGE INSTRUCTIONS
Follow-up with your primary care provider tomorrow for recheck.  Follow-up with Dr. Roy for urology consult regarding your urinary tract infection.  Wear an appropriately fitted hernia belt/truss.  Discuss referral for surgical consult with your primary care provider.  Return to the emergency department immediately if any change or worsening of symptoms.

## 2019-12-17 ENCOUNTER — TELEPHONE (OUTPATIENT)
Dept: EMERGENCY DEPT | Facility: HOSPITAL | Age: 79
End: 2019-12-17

## 2019-12-17 ENCOUNTER — TELEPHONE (OUTPATIENT)
Dept: INTERNAL MEDICINE | Facility: CLINIC | Age: 79
End: 2019-12-17

## 2019-12-17 LAB
BACTERIA SPEC AEROBE CULT: ABNORMAL
BACTERIA SPEC AEROBE CULT: ABNORMAL

## 2019-12-17 NOTE — TELEPHONE ENCOUNTER
PT CALLED ASKING IF HE CAN GET AN ORDER FOR AN HERNIA BELT HE EXPLAINED HE HAS CALLED [facility] AND OTHER DME BUT THEY DON'T CARRY THEM     PLEASE ADVISE AND GIVE CALL

## 2019-12-18 NOTE — TELEPHONE ENCOUNTER
Called [facility] and they do not have the belts, but do have hernia briefs - cost is $55. Called [facility]  [phone] and they do have the belts  $ 42.50. Pt notified

## 2019-12-19 ENCOUNTER — PATIENT OUTREACH (OUTPATIENT)
Dept: CASE MANAGEMENT | Facility: OTHER | Age: 79
End: 2019-12-19

## 2019-12-19 NOTE — OUTREACH NOTE
Care Coordination Assessment    Documented/Reviewed By:  Ankur Patiño RN Date/time:  12/19/2019  5:17 PM   Assessment completed with:  patient  Living arrangement:  spouse  Support system:  family  Type of residence:  private residence  Equipment used at home:  wheelchair, walker  Inadequate nutrition:  No  Medication adherence problem:  No  Experiencing side effects from current medications:  No  History of fall(s) in last 6 months:  No  Difficulty keeping appointments:  Yes (Comment: Son has to take off from work)  Family aware of the patient's advance care planning wishes:  Yes  Chronic pain:  Yes  Location of chronic pain:  arthritis in joints all over

## 2019-12-19 NOTE — OUTREACH NOTE
Care Plan Note      Responses   Annual Wellness Visit:   Patient Will Schedule   Does patient have depression diagnosis?  No   Advanced Directives:  Patient Has   Medication Adherence  Medications understood        The main concerns and/or symptoms the patient would like to address: new inguinal hernia belt is working properly.  He's taking the antibiotic as prescribed in the ED 12/15/19 for UTI. Reminded to drink adequate amounts of water. Lives with wife who can assist as needed. His son transports when able to take off from work, uses a wheelchair when going outside the home. Educated on AWV due. Declines MyChart.  Pt voiced no further needs or questions at present, appreciated the call.     Education/instruction provided by Care Coordinator: Informed of care advisor role, contact number.     Follow Up Outreach Due:  As needed    Ankur Patiño RN  Ambulatory     12/19/2019, 5:21 PM

## 2019-12-22 ENCOUNTER — APPOINTMENT (OUTPATIENT)
Dept: PHYSICAL THERAPY | Facility: HOSPITAL | Age: 79
End: 2019-12-22

## 2019-12-28 ENCOUNTER — HOSPITAL ENCOUNTER (OUTPATIENT)
Dept: PHYSICAL THERAPY | Facility: HOSPITAL | Age: 79
Setting detail: THERAPIES SERIES
Discharge: HOME | End: 2019-12-28

## 2019-12-28 DIAGNOSIS — S81.801D MULTIPLE OPEN WOUNDS OF LOWER EXTREMITY, RIGHT, SUBSEQUENT ENCOUNTER: ICD-10-CM

## 2019-12-28 DIAGNOSIS — R60.0 BILATERAL LOWER EXTREMITY EDEMA: ICD-10-CM

## 2019-12-28 DIAGNOSIS — S81.802D MULTIPLE OPEN WOUNDS OF LOWER EXTREMITY, LEFT, SUBSEQUENT ENCOUNTER: Primary | ICD-10-CM

## 2019-12-28 PROCEDURE — 29581 APPL MULTLAYER CMPRN SYS LEG: CPT

## 2019-12-28 PROCEDURE — 97597 DBRDMT OPN WND 1ST 20 CM/<: CPT

## 2019-12-28 NOTE — THERAPY PROGRESS REPORT/RE-CERT
Outpatient Rehabilitation - Wound/Debridement Progress Note  [facility]     Patient Name: Michoacano Rojas  : 1940  MRN: [id number]  Today's Date: 2019                 Admit Date: 2019    Visit Dx:    ICD-10-CM ICD-9-CM   1. Multiple open wounds of lower extremity, left, subsequent encounter S81.802D V58.89     894.0   2. Multiple open wounds of lower extremity, right, subsequent encounter S81.801D V58.89     894.0   3. 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   • Tracheal stenosis   • Diastolic dysfunction with chronic heart failure (CMS/HCC)   • Acute on chronic respiratory failure with hypoxia and hypercapnia (CMS/HCC)   • Incarcerated right inguinal hernia   • Venous insufficiency (chronic) (peripheral)   • Benign essential hypertension   • Non-pressure chronic ulcer of left lower leg, with unspecified severity (CMS/HCC)   • Lower extremity edema   • Chronic diastolic heart failure (CMS/HCC)   • Cognitive disorder   • Chronic conjunctivitis of both eyes   • Facial swelling   • Goiter   • Idiopathic chronic gout of multiple sites with tophus   • Medicare annual wellness visit, subsequent   • Morbid obesity (CMS/HCC)   • Major depressive disorder, single episode, mild (CMS/HCC)   • Skin ulcer of right foot, limited to breakdown of skin (CMS/HCC)   • COPD (chronic obstructive pulmonary disease) (CMS/HCC)   • Hypoxia   • sepsis secondary to LEs ulcerations   • AMS (altered mental status)   • Venous stasis ulcer (CMS/HCC)   •  Immunosuppressed status (CMS/HCC)   • Current chronic use of systemic steroids   • Rheumatoid arthritis involving multiple sites (CMS/HCC)   • Demand ischemia of myocardium (CMS/HCC)        Past Medical History:   Diagnosis Date   • A-fib (CMS/HCC)    • Atrial fibrillation (CMS/HCC)    • CHF (congestive heart failure) (CMS/HCC)    • Chronic cutaneous venous stasis ulcer (CMS/HCC)    • Coronary artery disease    • Disease of thyroid gland    • Hypertension    • Penile abnormality    • Peptic ulcer disease    • Primary malignant neoplasm of bronchus with metastasisto other site, unspecified laterality (CMS/HCC) 4/22/2019   • RA (rheumatoid arthritis) (CMS/HCC)    • Rheumatic fever    • Rheumatoid arthritis (CMS/HCC)    • Sepsis (CMS/HCC) 11/17/2017    from cellulitis due to leg ulcer, hospitalization, antibiotics, wound care   • Sleep apnea    • Vertigo    • Vertigo         Past Surgical History:   Procedure Laterality Date   • EYE SURGERY     • JOINT MANIPULATION      left hip repair         EVALUATION  PT Ortho     Row Name 12/28/19 1100       Subjective Comments    Subjective Comments  Pt cont to have c/o pian to R lateral heel   -MF       Subjective Pain    Able to rate subjective pain?  yes  -MF    Pre-Treatment Pain Level  4  -MF    Post-Treatment Pain Level  4  -MF    Subjective Pain Comment  chronic RA pain, but increased pain in R heel   -MF       Transfers    Comment (Transfers)  Pt to and from dept via w/c with family.     -MF      User Key  (r) = Recorded By, (t) = Taken By, (c) = Cosigned By    Initials Name Provider Type    Arsalan Napoles, PT Physical Therapist          LDA Wound     Row Name 12/28/19 1100             Wound 12/07/19 1030 Right anterior;proximal leg Blisters    Wound - Properties Group Date first assessed: 12/07/19  -MW Time first assessed: 1030  -MW Side: Right  -MW Orientation: anterior;proximal  -MW Location: leg  -MW Primary Wound Type: Blisters  -MW    Dressing Appearance   intact  -MF      Base  closed/resurfaced;epithelialization  -MF      Periwound  intact;dry;swelling  -MF      Periwound Temperature  warm  -MF      Drainage Amount  none  -MF      Dressing Care, Wound  non-adherent;petroleum-based  -MF         Wound 11/10/19 1100 Right posterior;medial leg Venous Ulcer    Wound - Properties Group Date first assessed: 11/10/19  - Time first assessed: 1100  - Present on Hospital Admission: Y  - Side: Right  - Orientation: posterior;medial  - Location: leg  - Primary Wound Type: Venous ulcer  -JM    Dressing Appearance  intact;moist drainage  -MF      Base  moist;pink;red;subcutaneous;epithelialization  -MF      Periwound  intact;dry  -MF      Periwound Temperature  warm  -MF      Periwound Skin Turgor  firm  -MF      Wound Length (cm)  1.5 cm  -MF      Wound Width (cm)  0.7 cm  -MF      Wound Depth (cm)  0.1 cm  -MF      Drainage Characteristics/Odor  serosanguineous  -MF      Drainage Amount  scant  -MF      Care, Wound  irrigated with;sterile normal saline;debrided  -MF      Dressing Care, Wound  foam;low-adherent mepilexAg  -MF         Wound 11/03/19 1100 Left lateral leg Venous Ulcer    Wound - Properties Group Date first assessed: 11/03/19  - Time first assessed: 1100  - Side: Left  - Orientation: lateral  - Location: leg  - Primary Wound Type: Venous ulcer  -MF    Wound Image  Images linked: 1  -MF      Dressing Appearance  intact;moist drainage  -MF      Base  moist;pink;red;granulating;epithelialization  -MF      Periwound  intact;pale white  -MF      Periwound Temperature  warm  -MF      Periwound Skin Turgor  soft  -MF      Edges  open  -MF      Wound Length (cm)  2.8 cm  -MF      Wound Width (cm)  1 cm  -MF      Wound Depth (cm)  0.1 cm  -MF      Drainage Characteristics/Odor  serosanguineous  -MF      Drainage Amount  small  -MF      Care, Wound  irrigated with;sterile normal saline;debrided  -MF      Dressing Care, Wound  foam;low-adherent HFBr  -MF       Periwound Care, Wound  cleansed with pH balanced cleanser  -MF         Wound 11/03/19 1100 Left medial leg Venous Ulcer    Wound - Properties Group Date first assessed: 11/03/19  - Time first assessed: 1100  - Side: Left  - Orientation: medial  - Location: leg  -MF Primary Wound Type: Venous ulcer  -MF    Wound Image  Images linked: 1  -MF      Dressing Appearance  intact;moist drainage  -MF      Base  moist;pink;red;granulating;epithelialization;yellow;slough  -MF      Periwound  intact;moist;pale white  -      Periwound Temperature  warm  -MF      Periwound Skin Turgor  soft  -MF      Edges  irregular  -MF      Wound Length (cm)  4 cm  -MF      Wound Width (cm)  5.2 cm  -MF      Wound Depth (cm)  0.1 cm  -MF      Drainage Characteristics/Odor  serosanguineous;brown  -MF      Drainage Amount  small;moderate  -MF      Care, Wound  irrigated with;sterile normal saline;debrided  -      Dressing Care, Wound  foam;low-adherent HFBr   -      Periwound Care, Wound  cleansed with pH balanced cleanser  -        User Key  (r) = Recorded By, (t) = Taken By, (c) = Cosigned By    Initials Name Provider Type     Arsalan Langford, PT Physical Therapist    Amaya Eastman, PT Physical Therapist    Kiley Damon, PT Physical Therapist        Lymphedema     Row Name 12/28/19 1100             Lymphedema Edema Assessment    Ptting Edema Category  By severity  -      Pitting Edema  Mild;Moderate  -         Skin Changes/Observations    Location/Assessment  Lower Extremity  -      Lower Extremity Conditions  bilateral:;clean;dry;shiny;hairless;scaly;inflamed;fragile  -      Lower Extremity Color/Pigment  bilateral:;hyperpigmented  -         Compression/Skin Care    Compression/Skin Care  skin care;wrapping location;bandaging  -      Skin Care  washed/dried;lotion applied  -MF      Wrapping Location  lower extremity  -      Wrapping Location LE  bilateral:;foot to knee  -      Wrapping  Comments  xeroform and cast padding to secure wound dressings with size 3cm and 4 cm comprilans to cover.   -MF      Bandage Layers  padding/fluff layer;short-stretch bandages (comment size/quantity)  -MF        User Key  (r) = Recorded By, (t) = Taken By, (c) = Cosigned By    Initials Name Provider Type    Arsalan Napoles, PT Physical Therapist          WOUND DEBRIDEMENT  Total area of Debridement: ~20cm2  Debridement Site 1  Location- Site 1: BLE wounds/periwounds  Selective Debridement- Site 1: Wound Surface <20cmsq  Instruments- Site 1: tweezers  Excised Tissue Description- Site 1: moderate, slough, other (comment)(hypertrophic periwound crust. )  Bleeding- Site 1: maximum, held pressure, 3-5 minutes, AgNitrate sticks(bleeding to R Lateral heel )             Therapy Education     Row Name 12/28/19 1100             Therapy Education    Education Details  Pt / family to cont with home wound management.   -MF      Given  Symptoms/condition management;Bandaging/dressing change  -MF      Program  Reinforced  -MF      How Provided  Verbal;Demonstration  -MF      Provided to  Patient;Caregiver  -MF      Level of Understanding  Verbalized  -MF        User Key  (r) = Recorded By, (t) = Taken By, (c) = Cosigned By    Initials Name Provider Type    Arsalan Napoles, PT Physical Therapist          Recommendation and Plan  PT Assessment/Plan     Row Name 12/28/19 1100          PT Assessment    Functional Limitations  Decreased safety during functional activities;Impaired gait;Impaired locomotion;Limitation in home management;Limitations in community activities;Limitations in functional capacity and performance;Performance in leisure activities;Performance in self-care ADL;Other (comment) wound care and edema management.   -MF     Impairments  Integumentary integrity;Pain;Edema  -MF     Assessment Comments  R ant shin blister improving with moderate reepithelialization noted to all other wounds to BLEs.  Pt had new  bleeding area noted to R heel which required silver nitrate, but pt had previous reports to increased pain to those areas.   Pt making good progress with debridement and dressing management at this time.   PT will cont with 1x/week tx   -MF     Rehab Potential  Fair  -     Patient/caregiver participated in establishment of treatment plan and goals  Yes  -MF     Patient would benefit from skilled therapy intervention  Yes  -MF        PT Plan    PT Frequency  1x/week  -     Physical Therapy Interventions (Optional Details)  wound care;patient/family education  -     PT Plan Comments  debridement, dressing changes, pt and family education.   -       User Key  (r) = Recorded By, (t) = Taken By, (c) = Cosigned By    Initials Name Provider Type    Arsalan Napoles, PT Physical Therapist          Goals  PT OP Goals     Row Name 12/28/19 1100          PT Short Term Goals    STG Date to Achieve  01/27/20  -     STG 1  Decrease c/o pain to <4/10 at all times  -MF     STG 1 Progress  Partially Met  -MF     STG 2  Pt will demonstrate 25% reduction in wound dimensions to indicate healing progress.  -MF     STG 2 Progress  Partially Met  -     STG 3  Pt will demonstrate only minimal exudate to indicate wound healing.  -     STG 3 Progress  Partially Met  -        Long Term Goals    LTG Date to Achieve  02/01/20  -     LTG 1  Pt and/or caregivers will be independent with clean home dressing changes to continue progress upon d/c.  -     LTG 1 Progress  Met  -     LTG 2  Pt will demonstrate 75% reduction in wound dimensions to indicate healing progress.  -     LTG 2 Progress  Ongoing  -     LTG 3  Pt will demonstrate scant wound exudate to indicate healing progress.  -     LTG 3 Progress  Ongoing  -        Time Calculation    PT Goal Re-Cert Due Date  02/01/20  -       User Key  (r) = Recorded By, (t) = Taken By, (c) = Cosigned By    Initials Name Provider Type    Arsalan Napoles, PT  Physical Therapist          PT Goal Re-Cert Due Date: 02/01/20  PT Short Term Goals  STG Date to Achieve: 01/27/20  STG 1: Decrease c/o pain to <4/10 at all times  STG 1 Progress: Partially Met  STG 2: Pt will demonstrate 25% reduction in wound dimensions to indicate healing progress.  STG 2 Progress: Partially Met  STG 3: Pt will demonstrate only minimal exudate to indicate wound healing.  STG 3 Progress: Partially Met  Long Term Goals  LTG Date to Achieve: 02/01/20  LTG 1: Pt and/or caregivers will be independent with clean home dressing changes to continue progress upon d/c.  LTG 1 Progress: Met  LTG 2: Pt will demonstrate 75% reduction in wound dimensions to indicate healing progress.  LTG 2 Progress: Ongoing  LTG 3: Pt will demonstrate scant wound exudate to indicate healing progress.  LTG 3 Progress: Ongoing      Time Calculation: Start Time: 1100  Therapy Charges for Today     Code Description Service Date Service Provider Modifiers Qty    34137123837 HC HERNAN DEBRIDE OPEN WOUND UP TO 20CM 12/28/2019 Arsalan Langford, PT GP 1    [id number] HC PT MULTI LAYER COMP SYS BELOW KNEE 12/28/2019 Arsalan Langford, PT GP 1                  Arsalan Langford, PT  12/28/2019

## 2020-01-01 ENCOUNTER — DOCUMENTATION (OUTPATIENT)
Dept: PHYSICAL THERAPY | Facility: HOSPITAL | Age: 80
End: 2020-01-01

## 2020-01-01 ENCOUNTER — TELEPHONE (OUTPATIENT)
Dept: INTERNAL MEDICINE | Facility: CLINIC | Age: 80
End: 2020-01-01

## 2020-01-01 ENCOUNTER — HOSPITAL ENCOUNTER (OUTPATIENT)
Dept: PHYSICAL THERAPY | Facility: HOSPITAL | Age: 80
Setting detail: THERAPIES SERIES
Discharge: HOME | End: 2020-02-16

## 2020-01-01 ENCOUNTER — HOSPITAL ENCOUNTER (OUTPATIENT)
Dept: PHYSICAL THERAPY | Facility: HOSPITAL | Age: 80
Setting detail: THERAPIES SERIES
Discharge: HOME | End: 2020-08-25

## 2020-01-01 ENCOUNTER — HOSPITAL ENCOUNTER (OUTPATIENT)
Dept: PHYSICAL THERAPY | Facility: HOSPITAL | Age: 80
Setting detail: THERAPIES SERIES
Discharge: HOME | End: 2020-09-27

## 2020-01-01 ENCOUNTER — HOSPITAL ENCOUNTER (OUTPATIENT)
Dept: PHYSICAL THERAPY | Facility: HOSPITAL | Age: 80
Setting detail: THERAPIES SERIES
Discharge: HOME | End: 2020-10-17

## 2020-01-01 ENCOUNTER — HOSPITAL ENCOUNTER (OUTPATIENT)
Facility: HOSPITAL | Age: 80
Setting detail: OBSERVATION
Discharge: NURSING, REHAB OR LONG-TERM CARE | End: 2021-01-07
Attending: EMERGENCY MEDICINE | Admitting: INTERNAL MEDICINE

## 2020-01-01 ENCOUNTER — APPOINTMENT (OUTPATIENT)
Dept: PHYSICAL THERAPY | Facility: HOSPITAL | Age: 80
End: 2020-01-01

## 2020-01-01 ENCOUNTER — OFFICE VISIT (OUTPATIENT)
Dept: INTERNAL MEDICINE | Facility: CLINIC | Age: 80
End: 2020-01-01

## 2020-01-01 ENCOUNTER — HOSPITAL ENCOUNTER (OUTPATIENT)
Dept: PHYSICAL THERAPY | Facility: HOSPITAL | Age: 80
Setting detail: THERAPIES SERIES
Discharge: HOME | End: 2020-08-30

## 2020-01-01 ENCOUNTER — APPOINTMENT (OUTPATIENT)
Dept: GENERAL RADIOLOGY | Facility: HOSPITAL | Age: 80
End: 2020-01-01

## 2020-01-01 ENCOUNTER — TELEMEDICINE (OUTPATIENT)
Dept: INTERNAL MEDICINE | Facility: CLINIC | Age: 80
End: 2020-01-01

## 2020-01-01 ENCOUNTER — HOSPITAL ENCOUNTER (OUTPATIENT)
Dept: PHYSICAL THERAPY | Facility: HOSPITAL | Age: 80
Setting detail: THERAPIES SERIES
Discharge: HOME | End: 2020-03-08

## 2020-01-01 VITALS — DIASTOLIC BLOOD PRESSURE: 67 MMHG | SYSTOLIC BLOOD PRESSURE: 123 MMHG

## 2020-01-01 DIAGNOSIS — R60.0 BILATERAL LOWER EXTREMITY EDEMA: ICD-10-CM

## 2020-01-01 DIAGNOSIS — J41.8 MIXED SIMPLE AND MUCOPURULENT CHRONIC BRONCHITIS (HCC): ICD-10-CM

## 2020-01-01 DIAGNOSIS — R65.10 SIRS (SYSTEMIC INFLAMMATORY RESPONSE SYNDROME) (HCC): Primary | ICD-10-CM

## 2020-01-01 DIAGNOSIS — S81.802D MULTIPLE OPEN WOUNDS OF LOWER EXTREMITY, LEFT, SUBSEQUENT ENCOUNTER: Primary | ICD-10-CM

## 2020-01-01 DIAGNOSIS — M05.79 RHEUMATOID ARTHRITIS INVOLVING MULTIPLE SITES WITH POSITIVE RHEUMATOID FACTOR (HCC): Primary | ICD-10-CM

## 2020-01-01 DIAGNOSIS — M05.741 RHEUMATOID ARTHRITIS INVOLVING BOTH HANDS WITH POSITIVE RHEUMATOID FACTOR (HCC): ICD-10-CM

## 2020-01-01 DIAGNOSIS — I10 ESSENTIAL HYPERTENSION: ICD-10-CM

## 2020-01-01 DIAGNOSIS — L97.529 SKIN ULCERS OF FOOT, BILATERAL (HCC): ICD-10-CM

## 2020-01-01 DIAGNOSIS — I10 BENIGN ESSENTIAL HYPERTENSION: ICD-10-CM

## 2020-01-01 DIAGNOSIS — R60.0 EDEMA OF RIGHT UPPER ARM: ICD-10-CM

## 2020-01-01 DIAGNOSIS — I48.91 ATRIAL FIBRILLATION WITH RVR (HCC): ICD-10-CM

## 2020-01-01 DIAGNOSIS — S81.801D MULTIPLE OPEN WOUNDS OF LOWER EXTREMITY, RIGHT, SUBSEQUENT ENCOUNTER: ICD-10-CM

## 2020-01-01 DIAGNOSIS — I87.2 VENOUS STASIS ULCER OF RIGHT ANKLE WITHOUT VARICOSE VEINS, UNSPECIFIED ULCER STAGE (HCC): Primary | ICD-10-CM

## 2020-01-01 DIAGNOSIS — M05.79 RHEUMATOID ARTHRITIS INVOLVING MULTIPLE SITES WITH POSITIVE RHEUMATOID FACTOR: ICD-10-CM

## 2020-01-01 DIAGNOSIS — M05.79 RHEUMATOID ARTHRITIS INVOLVING MULTIPLE SITES WITH POSITIVE RHEUMATOID FACTOR (HCC): ICD-10-CM

## 2020-01-01 DIAGNOSIS — T38.0X5A IMMUNOCOMPROMISED DUE TO CORTICOSTEROIDS (HCC): ICD-10-CM

## 2020-01-01 DIAGNOSIS — M05.742 RHEUMATOID ARTHRITIS INVOLVING BOTH HANDS WITH POSITIVE RHEUMATOID FACTOR (HCC): ICD-10-CM

## 2020-01-01 DIAGNOSIS — M06.9 RHEUMATOID ARTHRITIS INVOLVING BOTH FEET, UNSPECIFIED WHETHER RHEUMATOID FACTOR PRESENT (HCC): ICD-10-CM

## 2020-01-01 DIAGNOSIS — Z79.52 IMMUNOCOMPROMISED DUE TO CORTICOSTEROIDS (HCC): ICD-10-CM

## 2020-01-01 DIAGNOSIS — L97.319 VENOUS STASIS ULCER OF RIGHT ANKLE WITHOUT VARICOSE VEINS, UNSPECIFIED ULCER STAGE (HCC): Primary | ICD-10-CM

## 2020-01-01 DIAGNOSIS — M79.89 SWELLING OF ARM: ICD-10-CM

## 2020-01-01 DIAGNOSIS — N39.0 ACUTE UTI: ICD-10-CM

## 2020-01-01 DIAGNOSIS — L97.519 SKIN ULCERS OF FOOT, BILATERAL (HCC): ICD-10-CM

## 2020-01-01 DIAGNOSIS — J30.1 SEASONAL ALLERGIC RHINITIS DUE TO POLLEN: ICD-10-CM

## 2020-01-01 DIAGNOSIS — D84.821 IMMUNOCOMPROMISED DUE TO CORTICOSTEROIDS (HCC): ICD-10-CM

## 2020-01-01 LAB
ALBUMIN SERPL-MCNC: 3 G/DL (ref 3.5–5.2)
ALBUMIN/GLOB SERPL: 0.8 G/DL
ALP SERPL-CCNC: 68 U/L (ref 39–117)
ALT SERPL W P-5'-P-CCNC: 5 U/L (ref 1–41)
ANION GAP SERPL CALCULATED.3IONS-SCNC: 12 MMOL/L (ref 5–15)
AST SERPL-CCNC: 14 U/L (ref 1–40)
B PARAPERT DNA SPEC QL NAA+PROBE: NOT DETECTED
B PERT DNA SPEC QL NAA+PROBE: NOT DETECTED
BACTERIA UR QL AUTO: ABNORMAL /HPF
BASOPHILS # BLD AUTO: 0.02 10*3/MM3 (ref 0–0.2)
BASOPHILS NFR BLD AUTO: 0.2 % (ref 0–1.5)
BILIRUB SERPL-MCNC: 0.6 MG/DL (ref 0–1.2)
BILIRUB UR QL STRIP: NEGATIVE
BUN SERPL-MCNC: 25 MG/DL (ref 8–23)
BUN/CREAT SERPL: 45.5 (ref 7–25)
C PNEUM DNA NPH QL NAA+NON-PROBE: NOT DETECTED
CALCIUM SPEC-SCNC: 9 MG/DL (ref 8.6–10.5)
CHLORIDE SERPL-SCNC: 105 MMOL/L (ref 98–107)
CLARITY UR: ABNORMAL
CO2 SERPL-SCNC: 26 MMOL/L (ref 22–29)
COLOR UR: YELLOW
CREAT SERPL-MCNC: 0.55 MG/DL (ref 0.76–1.27)
D-LACTATE SERPL-SCNC: 0.8 MMOL/L (ref 0.5–2)
DEPRECATED RDW RBC AUTO: 50.2 FL (ref 37–54)
EOSINOPHIL # BLD AUTO: 0 10*3/MM3 (ref 0–0.4)
EOSINOPHIL NFR BLD AUTO: 0 % (ref 0.3–6.2)
ERYTHROCYTE [DISTWIDTH] IN BLOOD BY AUTOMATED COUNT: 14.7 % (ref 12.3–15.4)
FLUAV H1 2009 PAND RNA NPH QL NAA+PROBE: NOT DETECTED
FLUAV H1 HA GENE NPH QL NAA+PROBE: NOT DETECTED
FLUAV H3 RNA NPH QL NAA+PROBE: NOT DETECTED
FLUAV SUBTYP SPEC NAA+PROBE: NOT DETECTED
FLUBV RNA ISLT QL NAA+PROBE: NOT DETECTED
GFR SERPL CREATININE-BSD FRML MDRD: >150 ML/MIN/1.73
GLOBULIN UR ELPH-MCNC: 3.7 GM/DL
GLUCOSE SERPL-MCNC: 106 MG/DL (ref 65–99)
GLUCOSE UR STRIP-MCNC: NEGATIVE MG/DL
HADV DNA SPEC NAA+PROBE: NOT DETECTED
HCOV 229E RNA SPEC QL NAA+PROBE: NOT DETECTED
HCOV HKU1 RNA SPEC QL NAA+PROBE: NOT DETECTED
HCOV NL63 RNA SPEC QL NAA+PROBE: NOT DETECTED
HCOV OC43 RNA SPEC QL NAA+PROBE: NOT DETECTED
HCT VFR BLD AUTO: 37.1 % (ref 37.5–51)
HGB BLD-MCNC: 10.8 G/DL (ref 13–17.7)
HGB UR QL STRIP.AUTO: NEGATIVE
HMPV RNA NPH QL NAA+NON-PROBE: NOT DETECTED
HPIV1 RNA SPEC QL NAA+PROBE: NOT DETECTED
HPIV2 RNA SPEC QL NAA+PROBE: NOT DETECTED
HPIV3 RNA NPH QL NAA+PROBE: NOT DETECTED
HPIV4 P GENE NPH QL NAA+PROBE: NOT DETECTED
HYALINE CASTS UR QL AUTO: ABNORMAL /LPF
IMM GRANULOCYTES # BLD AUTO: 0.04 10*3/MM3 (ref 0–0.05)
IMM GRANULOCYTES NFR BLD AUTO: 0.4 % (ref 0–0.5)
KETONES UR QL STRIP: ABNORMAL
LEUKOCYTE ESTERASE UR QL STRIP.AUTO: ABNORMAL
LYMPHOCYTES # BLD AUTO: 0.34 10*3/MM3 (ref 0.7–3.1)
LYMPHOCYTES NFR BLD AUTO: 3.6 % (ref 19.6–45.3)
M PNEUMO IGG SER IA-ACNC: NOT DETECTED
MCH RBC QN AUTO: 26.8 PG (ref 26.6–33)
MCHC RBC AUTO-ENTMCNC: 29.1 G/DL (ref 31.5–35.7)
MCV RBC AUTO: 92.1 FL (ref 79–97)
MONOCYTES # BLD AUTO: 0.1 10*3/MM3 (ref 0.1–0.9)
MONOCYTES NFR BLD AUTO: 1.1 % (ref 5–12)
NEUTROPHILS NFR BLD AUTO: 9.01 10*3/MM3 (ref 1.7–7)
NEUTROPHILS NFR BLD AUTO: 94.7 % (ref 42.7–76)
NITRITE UR QL STRIP: NEGATIVE
NRBC BLD AUTO-RTO: 0 /100 WBC (ref 0–0.2)
PH UR STRIP.AUTO: 6.5 [PH] (ref 5–8)
PLATELET # BLD AUTO: 166 10*3/MM3 (ref 140–450)
PMV BLD AUTO: 11.4 FL (ref 6–12)
POTASSIUM SERPL-SCNC: 4.4 MMOL/L (ref 3.5–5.2)
PROCALCITONIN SERPL-MCNC: 0.69 NG/ML (ref 0–0.25)
PROT SERPL-MCNC: 6.7 G/DL (ref 6–8.5)
PROT UR QL STRIP: ABNORMAL
RBC # BLD AUTO: 4.03 10*6/MM3 (ref 4.14–5.8)
RBC # UR: ABNORMAL /HPF
REF LAB TEST METHOD: ABNORMAL
RHINOVIRUS RNA SPEC NAA+PROBE: NOT DETECTED
RSV RNA NPH QL NAA+NON-PROBE: NOT DETECTED
SARS-COV-2 RNA NPH QL NAA+NON-PROBE: NOT DETECTED
SODIUM SERPL-SCNC: 143 MMOL/L (ref 136–145)
SP GR UR STRIP: 1.02 (ref 1–1.03)
SQUAMOUS #/AREA URNS HPF: ABNORMAL /HPF
URATE SERPL-MCNC: 6.5 MG/DL (ref 3.4–7)
UROBILINOGEN UR QL STRIP: ABNORMAL
WBC # BLD AUTO: 9.51 10*3/MM3 (ref 3.4–10.8)
WBC UR QL AUTO: ABNORMAL /HPF
YEAST URNS QL MICRO: ABNORMAL /HPF

## 2020-01-01 PROCEDURE — 87086 URINE CULTURE/COLONY COUNT: CPT | Performed by: NURSE PRACTITIONER

## 2020-01-01 PROCEDURE — 97597 DBRDMT OPN WND 1ST 20 CM/<: CPT

## 2020-01-01 PROCEDURE — 99214 OFFICE O/P EST MOD 30 MIN: CPT | Performed by: PHYSICIAN ASSISTANT

## 2020-01-01 PROCEDURE — 29581 APPL MULTLAYER CMPRN SYS LEG: CPT

## 2020-01-01 PROCEDURE — 99441 PR PHYS/QHP TELEPHONE EVALUATION 5-10 MIN: CPT | Performed by: INTERNAL MEDICINE

## 2020-01-01 PROCEDURE — 84550 ASSAY OF BLOOD/URIC ACID: CPT | Performed by: NURSE PRACTITIONER

## 2020-01-01 PROCEDURE — 99442 PR PHYS/QHP TELEPHONE EVALUATION 11-20 MIN: CPT | Performed by: INTERNAL MEDICINE

## 2020-01-01 PROCEDURE — 25010000002 PIPERACILLIN SOD-TAZOBACTAM PER 1 G: Performed by: NURSE PRACTITIONER

## 2020-01-01 PROCEDURE — 81001 URINALYSIS AUTO W/SCOPE: CPT | Performed by: NURSE PRACTITIONER

## 2020-01-01 PROCEDURE — 80053 COMPREHEN METABOLIC PANEL: CPT | Performed by: NURSE PRACTITIONER

## 2020-01-01 PROCEDURE — 99220 PR INITIAL OBSERVATION CARE/DAY 70 MINUTES: CPT | Performed by: PHYSICIAN ASSISTANT

## 2020-01-01 PROCEDURE — 83605 ASSAY OF LACTIC ACID: CPT | Performed by: NURSE PRACTITIONER

## 2020-01-01 PROCEDURE — G0378 HOSPITAL OBSERVATION PER HR: HCPCS

## 2020-01-01 PROCEDURE — 97598 DBRDMT OPN WND ADDL 20CM/<: CPT

## 2020-01-01 PROCEDURE — 0202U NFCT DS 22 TRGT SARS-COV-2: CPT | Performed by: NURSE PRACTITIONER

## 2020-01-01 PROCEDURE — 84145 PROCALCITONIN (PCT): CPT | Performed by: NURSE PRACTITIONER

## 2020-01-01 PROCEDURE — 87040 BLOOD CULTURE FOR BACTERIA: CPT | Performed by: NURSE PRACTITIONER

## 2020-01-01 PROCEDURE — 85025 COMPLETE CBC W/AUTO DIFF WBC: CPT | Performed by: NURSE PRACTITIONER

## 2020-01-01 PROCEDURE — 97163 PT EVAL HIGH COMPLEX 45 MIN: CPT

## 2020-01-01 PROCEDURE — 99284 EMERGENCY DEPT VISIT MOD MDM: CPT

## 2020-01-01 PROCEDURE — 71045 X-RAY EXAM CHEST 1 VIEW: CPT

## 2020-01-01 PROCEDURE — 96365 THER/PROPH/DIAG IV INF INIT: CPT

## 2020-01-01 PROCEDURE — 25010000002 VANCOMYCIN 10 G RECONSTITUTED SOLUTION: Performed by: NURSE PRACTITIONER

## 2020-01-01 RX ORDER — AMLODIPINE BESYLATE 5 MG/1
5 TABLET ORAL
Status: ON HOLD
Start: 2020-01-01 | End: 2021-01-01 | Stop reason: SDUPTHER

## 2020-01-01 RX ORDER — PREDNISONE 10 MG/1
10 TABLET ORAL DAILY
Start: 2020-01-01 | End: 2020-01-01 | Stop reason: SDUPTHER

## 2020-01-01 RX ORDER — AMLODIPINE BESYLATE 5 MG/1
10 TABLET ORAL DAILY
Status: DISCONTINUED | OUTPATIENT
Start: 2021-01-01 | End: 2021-01-01 | Stop reason: HOSPADM

## 2020-01-01 RX ORDER — SODIUM CHLORIDE 0.9 % (FLUSH) 0.9 %
10 SYRINGE (ML) INJECTION AS NEEDED
Status: DISCONTINUED | OUTPATIENT
Start: 2020-01-01 | End: 2021-01-01 | Stop reason: HOSPADM

## 2020-01-01 RX ORDER — PREDNISONE 1 MG/1
15 TABLET ORAL DAILY
Qty: 90 TABLET | Refills: 5 | Status: SHIPPED | OUTPATIENT
Start: 2020-01-01 | End: 2020-01-01

## 2020-01-01 RX ORDER — PREDNISONE 10 MG/1
10 TABLET ORAL DAILY
Qty: 30 TABLET | Refills: 5 | Status: SHIPPED | OUTPATIENT
Start: 2020-01-01

## 2020-01-01 RX ORDER — ONDANSETRON 2 MG/ML
4 INJECTION INTRAMUSCULAR; INTRAVENOUS EVERY 6 HOURS PRN
Status: DISCONTINUED | OUTPATIENT
Start: 2020-01-01 | End: 2021-01-01 | Stop reason: HOSPADM

## 2020-01-01 RX ORDER — SODIUM CHLORIDE 0.9 % (FLUSH) 0.9 %
10 SYRINGE (ML) INJECTION EVERY 12 HOURS SCHEDULED
Status: DISCONTINUED | OUTPATIENT
Start: 2020-01-01 | End: 2021-01-01 | Stop reason: HOSPADM

## 2020-01-01 RX ORDER — AZELASTINE HYDROCHLORIDE 137 UG/1
2 SPRAY, METERED NASAL EVERY 12 HOURS SCHEDULED
Qty: 30 ML | Refills: 5 | Status: SHIPPED | OUTPATIENT
Start: 2020-01-01

## 2020-01-01 RX ORDER — FINASTERIDE 5 MG/1
TABLET, FILM COATED ORAL
Qty: 90 TABLET | Refills: 1 | Status: SHIPPED | OUTPATIENT
Start: 2020-01-01 | End: 2020-01-01

## 2020-01-01 RX ORDER — PREDNISONE 1 MG/1
TABLET ORAL
Qty: 120 TABLET | Refills: 1 | Status: SHIPPED | OUTPATIENT
Start: 2020-01-01 | End: 2020-01-01 | Stop reason: ALTCHOICE

## 2020-01-01 RX ORDER — ACETAMINOPHEN 650 MG/1
650 SUPPOSITORY RECTAL EVERY 4 HOURS PRN
Status: DISCONTINUED | OUTPATIENT
Start: 2020-01-01 | End: 2021-01-01 | Stop reason: HOSPADM

## 2020-01-01 RX ORDER — PREDNISONE 10 MG/1
10 TABLET ORAL DAILY
Qty: 90 TABLET | Refills: 1 | Status: SHIPPED | OUTPATIENT
Start: 2020-01-01 | End: 2020-01-01 | Stop reason: ALTCHOICE

## 2020-01-01 RX ORDER — TRAMADOL HYDROCHLORIDE 50 MG/1
50 TABLET ORAL 2 TIMES DAILY
Qty: 60 TABLET | Refills: 1 | Status: ON HOLD | OUTPATIENT
Start: 2020-01-01 | End: 2021-01-01 | Stop reason: SDUPTHER

## 2020-01-01 RX ORDER — PREDNISONE 1 MG/1
TABLET ORAL
Qty: 120 TABLET | Refills: 1 | Status: SHIPPED | OUTPATIENT
Start: 2020-01-01 | End: 2020-01-01

## 2020-01-01 RX ORDER — AMLODIPINE BESYLATE 10 MG/1
10 TABLET ORAL
Qty: 90 TABLET | Refills: 1 | Status: SHIPPED | OUTPATIENT
Start: 2020-01-01 | End: 2020-01-01

## 2020-01-01 RX ORDER — PREDNISONE 1 MG/1
5 TABLET ORAL DAILY
Qty: 90 TABLET | Refills: 1 | Status: SHIPPED | OUTPATIENT
Start: 2020-01-01 | End: 2020-01-01 | Stop reason: SDUPTHER

## 2020-01-01 RX ORDER — DOCUSATE SODIUM 100 MG/1
100 CAPSULE, LIQUID FILLED ORAL NIGHTLY
Status: DISCONTINUED | OUTPATIENT
Start: 2020-01-01 | End: 2021-01-01 | Stop reason: HOSPADM

## 2020-01-01 RX ORDER — AMLODIPINE BESYLATE 10 MG/1
10 TABLET ORAL
Qty: 30 TABLET | Refills: 5 | Status: SHIPPED | OUTPATIENT
Start: 2020-01-01 | End: 2020-01-01 | Stop reason: SDUPTHER

## 2020-01-01 RX ORDER — FINASTERIDE 5 MG/1
5 TABLET, FILM COATED ORAL DAILY
Status: DISCONTINUED | OUTPATIENT
Start: 2021-01-01 | End: 2021-01-01 | Stop reason: HOSPADM

## 2020-01-01 RX ORDER — BISOPROLOL FUMARATE 5 MG/1
2.5 TABLET, FILM COATED ORAL DAILY
Status: DISCONTINUED | OUTPATIENT
Start: 2021-01-01 | End: 2021-01-01 | Stop reason: HOSPADM

## 2020-01-01 RX ORDER — AMLODIPINE BESYLATE 10 MG/1
TABLET ORAL
Qty: 90 TABLET | Refills: 1 | Status: SHIPPED | OUTPATIENT
Start: 2020-01-01

## 2020-01-01 RX ORDER — BISOPROLOL FUMARATE 5 MG/1
TABLET, FILM COATED ORAL
Qty: 45 TABLET | Refills: 2 | Status: SHIPPED | OUTPATIENT
Start: 2020-01-01

## 2020-01-01 RX ORDER — ACETAMINOPHEN 160 MG/5ML
650 SOLUTION ORAL EVERY 4 HOURS PRN
Status: DISCONTINUED | OUTPATIENT
Start: 2020-01-01 | End: 2021-01-01 | Stop reason: HOSPADM

## 2020-01-01 RX ORDER — FUROSEMIDE 20 MG/1
20 TABLET ORAL DAILY
Qty: 7 TABLET | Refills: 0 | Status: SHIPPED | OUTPATIENT
Start: 2020-01-01 | End: 2020-01-01

## 2020-01-01 RX ORDER — TRAMADOL HYDROCHLORIDE 50 MG/1
50 TABLET ORAL 2 TIMES DAILY
Status: DISCONTINUED | OUTPATIENT
Start: 2020-01-01 | End: 2021-01-01 | Stop reason: HOSPADM

## 2020-01-01 RX ORDER — FINASTERIDE 5 MG/1
TABLET, FILM COATED ORAL
Qty: 90 TABLET | Refills: 1 | Status: SHIPPED | OUTPATIENT
Start: 2020-01-01 | End: 2021-01-01 | Stop reason: SDUPTHER

## 2020-01-01 RX ORDER — PREDNISONE 1 MG/1
TABLET ORAL
Qty: 240 TABLET | OUTPATIENT
Start: 2020-01-01

## 2020-01-01 RX ORDER — PREDNISONE 1 MG/1
10 TABLET ORAL
Status: DISCONTINUED | OUTPATIENT
Start: 2021-01-01 | End: 2021-01-01 | Stop reason: HOSPADM

## 2020-01-01 RX ORDER — AMLODIPINE BESYLATE 10 MG/1
10 TABLET ORAL
Qty: 30 TABLET | Refills: 0 | Status: SHIPPED | OUTPATIENT
Start: 2020-01-01 | End: 2020-01-01 | Stop reason: SDUPTHER

## 2020-01-01 RX ORDER — ACETAMINOPHEN 325 MG/1
650 TABLET ORAL EVERY 4 HOURS PRN
Status: DISCONTINUED | OUTPATIENT
Start: 2020-01-01 | End: 2021-01-01 | Stop reason: HOSPADM

## 2020-01-01 RX ADMIN — TAZOBACTAM SODIUM AND PIPERACILLIN SODIUM 3.38 G: 375; 3 INJECTION, SOLUTION INTRAVENOUS at 21:34

## 2020-01-01 RX ADMIN — VANCOMYCIN HYDROCHLORIDE 1500 MG: 100 INJECTION, POWDER, LYOPHILIZED, FOR SOLUTION INTRAVENOUS at 21:34

## 2020-01-04 ENCOUNTER — APPOINTMENT (OUTPATIENT)
Dept: PHYSICAL THERAPY | Facility: HOSPITAL | Age: 80
End: 2020-01-04

## 2020-01-09 RX ORDER — AMLODIPINE BESYLATE 5 MG/1
TABLET ORAL
Qty: 90 TABLET | Refills: 1 | OUTPATIENT
Start: 2020-01-09

## 2020-01-09 RX ORDER — FINASTERIDE 5 MG/1
TABLET, FILM COATED ORAL
Qty: 90 TABLET | Refills: 1 | Status: SHIPPED | OUTPATIENT
Start: 2020-01-09 | End: 2020-01-01

## 2020-01-09 RX ORDER — AMLODIPINE BESYLATE 10 MG/1
10 TABLET ORAL
Qty: 30 TABLET | Refills: 0 | Status: SHIPPED | OUTPATIENT
Start: 2020-01-09 | End: 2020-01-01 | Stop reason: SDUPTHER

## 2020-01-12 ENCOUNTER — EPISODE CHANGES (OUTPATIENT)
Dept: CASE MANAGEMENT | Facility: OTHER | Age: 80
End: 2020-01-12

## 2020-01-12 ENCOUNTER — HOSPITAL ENCOUNTER (OUTPATIENT)
Dept: PHYSICAL THERAPY | Facility: HOSPITAL | Age: 80
Setting detail: THERAPIES SERIES
Discharge: HOME | End: 2020-01-12

## 2020-01-12 DIAGNOSIS — S81.802D MULTIPLE OPEN WOUNDS OF LOWER EXTREMITY, LEFT, SUBSEQUENT ENCOUNTER: Primary | ICD-10-CM

## 2020-01-12 DIAGNOSIS — S81.801D MULTIPLE OPEN WOUNDS OF LOWER EXTREMITY, RIGHT, SUBSEQUENT ENCOUNTER: ICD-10-CM

## 2020-01-12 DIAGNOSIS — R60.0 BILATERAL LOWER EXTREMITY EDEMA: ICD-10-CM

## 2020-01-12 PROCEDURE — 29581 APPL MULTLAYER CMPRN SYS LEG: CPT

## 2020-01-12 PROCEDURE — 97602 WOUND(S) CARE NON-SELECTIVE: CPT

## 2020-01-12 PROCEDURE — 97597 DBRDMT OPN WND 1ST 20 CM/<: CPT

## 2020-01-26 ENCOUNTER — HOSPITAL ENCOUNTER (OUTPATIENT)
Dept: PHYSICAL THERAPY | Facility: HOSPITAL | Age: 80
Setting detail: THERAPIES SERIES
Discharge: HOME | End: 2020-01-26

## 2020-01-26 DIAGNOSIS — R60.0 BILATERAL LOWER EXTREMITY EDEMA: ICD-10-CM

## 2020-01-26 DIAGNOSIS — S81.802D MULTIPLE OPEN WOUNDS OF LOWER EXTREMITY, LEFT, SUBSEQUENT ENCOUNTER: Primary | ICD-10-CM

## 2020-01-26 DIAGNOSIS — S81.801D MULTIPLE OPEN WOUNDS OF LOWER EXTREMITY, RIGHT, SUBSEQUENT ENCOUNTER: ICD-10-CM

## 2020-01-26 PROCEDURE — 97597 DBRDMT OPN WND 1ST 20 CM/<: CPT

## 2020-01-26 PROCEDURE — 29581 APPL MULTLAYER CMPRN SYS LEG: CPT

## 2020-01-26 NOTE — THERAPY WOUND CARE TREATMENT
Outpatient Rehabilitation - Wound/Debridement Treatment Note  [facility]     Patient Name: Michoacano Rojas  : 1940  MRN: [id number]  Today's Date: 2020                 Admit Date: 2020    Visit Dx:    ICD-10-CM ICD-9-CM   1. Multiple open wounds of lower extremity, left, subsequent encounter S81.802D V58.89     894.0   2. Multiple open wounds of lower extremity, right, subsequent encounter S81.801D V58.89     894.0   3. 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   • Tracheal stenosis   • Diastolic dysfunction with chronic heart failure (CMS/HCC)   • Acute on chronic respiratory failure with hypoxia and hypercapnia (CMS/HCC)   • Incarcerated right inguinal hernia   • Venous insufficiency (chronic) (peripheral)   • Benign essential hypertension   • Non-pressure chronic ulcer of left lower leg, with unspecified severity (CMS/HCC)   • Lower extremity edema   • Chronic diastolic heart failure (CMS/HCC)   • Cognitive disorder   • Chronic conjunctivitis of both eyes   • Facial swelling   • Goiter   • Idiopathic chronic gout of multiple sites with tophus   • Medicare annual wellness visit, subsequent   • Morbid obesity (CMS/HCC)   • Major depressive disorder, single episode, mild (CMS/HCC)   • Skin ulcer of right foot, limited to breakdown of skin (CMS/HCC)   • COPD (chronic obstructive pulmonary disease) (CMS/HCC)   • Hypoxia   • sepsis secondary to LEs ulcerations   • AMS (altered mental status)   • Venous stasis ulcer (CMS/HCC)   •  Immunosuppressed status (CMS/HCC)   • Current chronic use of systemic steroids   • Rheumatoid arthritis involving multiple sites (CMS/HCC)   • Demand ischemia of myocardium (CMS/HCC)        Past Medical History:   Diagnosis Date   • A-fib (CMS/HCC)    • Atrial fibrillation (CMS/HCC)    • CHF (congestive heart failure) (CMS/HCC)    • Chronic cutaneous venous stasis ulcer (CMS/HCC)    • Coronary artery disease    • Disease of thyroid gland    • Hypertension    • Penile abnormality    • Peptic ulcer disease    • Primary malignant neoplasm of bronchus with metastasisto other site, unspecified laterality (CMS/HCC) 4/22/2019   • RA (rheumatoid arthritis) (CMS/HCC)    • Rheumatic fever    • Rheumatoid arthritis (CMS/HCC)    • Sepsis (CMS/HCC) 11/17/2017    from cellulitis due to leg ulcer, hospitalization, antibiotics, wound care   • Sleep apnea    • Vertigo    • Vertigo         Past Surgical History:   Procedure Laterality Date   • EYE SURGERY     • JOINT MANIPULATION      left hip repair         EVALUATION  PT Ortho     Row Name 01/26/20 1115       Subjective Comments    Subjective Comments  No new issues, pt c/o R heel pain.  -JM       Subjective Pain    Able to rate subjective pain?  yes  -JM    Pre-Treatment Pain Level  3  -JM    Post-Treatment Pain Level  3  -JM       Transfers    Comment (Transfers)  to/from tx room in w/c, assisted by family  -MI      User Key  (r) = Recorded By, (t) = Taken By, (c) = Cosigned By    Initials Name Provider Type    Kiley Damon, PT Physical Therapist          LDA Wound     Row Name 01/26/20 1115             Wound 11/03/19 1100 Left medial leg Venous Ulcer    Wound - Properties Group Date first assessed: 11/03/19  - Time first assessed: 1100  - Side: Left  - Orientation: medial  -MF Location: leg  -MF Primary Wound Type: Venous ulcer  -MF    Dressing Appearance  intact;moist drainage  -MI      Base  moist;pink;red;granulating;epithelialization;yellow;slough  -MI       "Periwound  intact;moist;pale white;macerated min maceration inferiorly  -JM      Periwound Temperature  warm  -JM      Periwound Skin Turgor  soft  -JM      Edges  irregular  -JM      Drainage Characteristics/Odor  serosanguineous  -JM      Drainage Amount  small;moderate  -JM      Care, Wound  cleansed with;wound cleanser;debrided  -JM      Dressing Care, Wound  dressing applied;collagen;antimicrobial agent applied;foam;multi-layer wrap maik, HFBt, xeroform, MLW  -JM      Periwound Care, Wound  cleansed with pH balanced cleanser;barrier ointment applied;dry periwound area maintained z-guard  -JM         Wound 11/03/19 1100 Right posterior;lateral leg Venous Ulcer    Wound - Properties Group Date first assessed: 11/03/19  -MF Time first assessed: 1100  -MF Side: Right  -MF Orientation: posterior;lateral  -MF Location: leg  - Primary Wound Type: Venous ulcer  -MF    Dressing Appearance  intact;moist drainage  -JM      Base  epithelialization;clean;moist;pink;red  -JM      Periwound  intact;pale white  -JM      Periwound Temperature  warm  -JM      Periwound Skin Turgor  soft  -JM      Edges  irregular  -JM      Drainage Characteristics/Odor  serosanguineous  -JM      Drainage Amount  small  -JM      Care, Wound  cleansed with;wound cleanser;debrided  -JM      Dressing Care, Wound  dressing applied;antimicrobial agent applied;foam;border dressing HFBt, 6\" optifoam, MLW  -JM      Periwound Care, Wound  cleansed with pH balanced cleanser;dry periwound area maintained  -JM         Wound 12/07/19 1030 Right anterior;proximal leg Blisters    Wound - Properties Group Date first assessed: 12/07/19  -MW Time first assessed: 1030  -MW Side: Right  -MW Orientation: anterior;proximal  -MW Location: leg  -MW Primary Wound Type: Blisters  -MW    Dressing Appearance  intact  -JM      Base  clean;closed/resurfaced  -JM      Periwound  intact;dry;swelling  -JM      Periwound Temperature  warm  -JM      Drainage Amount  none  -JM  " "    Care, Wound  cleansed with;wound cleanser  -JM      Dressing Care, Wound  petroleum-based;gauze;multi-layer wrap  -JM      Periwound Care, Wound  cleansed with pH balanced cleanser;dry periwound area maintained  -JM         Wound 11/10/19 1100 Right posterior;medial leg Venous Ulcer    Wound - Properties Group Date first assessed: 11/10/19  - Time first assessed: 1100  -JM Present on Hospital Admission: Y  - Side: Right  - Orientation: posterior;medial  - Location: leg  -JM Primary Wound Type: Venous ulcer  -JM    Dressing Appearance  intact;moist drainage  -JM      Base  moist;pink;red;epithelialization inf aspect epithelializing  -JM      Periwound  intact;dry  -JM      Periwound Temperature  warm  -JM      Periwound Skin Turgor  firm  -JM      Drainage Characteristics/Odor  serosanguineous  -JM      Drainage Amount  scant  -JM      Care, Wound  cleansed with;wound cleanser;debrided  -JM      Dressing Care, Wound  dressing applied;antimicrobial agent applied;foam;border dressing HFBt, 4\" optifoam gentle  -      Periwound Care, Wound  cleansed with pH balanced cleanser;dry periwound area maintained  -         Wound 11/03/19 1100 Left lateral leg Venous Ulcer    Wound - Properties Group Date first assessed: 11/03/19  - Time first assessed: 1100  - Side: Left  - Orientation: lateral  - Location: leg  -MF Primary Wound Type: Venous ulcer  -MF    Dressing Appearance  intact;moist drainage  -JM      Base  moist;pink;red;granulating;epithelialization central skin bridge present  -JM      Periwound  intact;pale white;macerated min maceration  -      Periwound Temperature  warm  -      Periwound Skin Turgor  soft  -JM      Edges  open  -JM      Drainage Characteristics/Odor  serosanguineous  -JM      Drainage Amount  small  -JM      Care, Wound  cleansed with;wound cleanser;debrided  -JM      Dressing Care, Wound  dressing applied;antimicrobial agent applied;foam;multi-layer wrap HFBt, xeroform, " "MLW  -      Periwound Care, Wound  barrier ointment applied;cleansed with pH balanced cleanser;dry periwound area maintained z-guard  -        User Key  (r) = Recorded By, (t) = Taken By, (c) = Cosigned By    Initials Name Provider Type     Arsalan Langford, PT Physical Therapist    Amaya Eastman, PT Physical Therapist    Kiley Damon, PT Physical Therapist        Lymphedema     Row Name 01/26/20 4945             Lymphedema Edema Assessment    Ptting Edema Category  By severity  -      Pitting Edema  Mild;Moderate mild where compression wraps in place, mod prox  -         Skin Changes/Observations    Lower Extremity Conditions  bilateral:;clean;dry;shiny;hairless;scaly;inflamed;fragile  -      Lower Extremity Color/Pigment  bilateral:;hyperpigmented  -         Lymphedema Pulses/Capillary Refill    Lower Extremity Capillary Refill  right:;left:;less than 3 seconds  -         Compression/Skin Care    Compression/Skin Care  skin care;wrapping location;bandaging  -      Skin Care  washed/dried;lotion applied  -      Wrapping Location  lower extremity  -      Wrapping Location LE  bilateral:;foot to knee  -      Wrapping Comments  wound dressings, 6\" optifoam to R heel, xeroform, cast padding, size 8cm and 12cm comprilans  -      Bandage Layers  padding/fluff layer;short-stretch bandages (comment size/quantity)  -      Bandaging Technique  figure-eight;light compression  -        User Key  (r) = Recorded By, (t) = Taken By, (c) = Cosigned By    Initials Name Provider Type    Kiley Damon, PT Physical Therapist          WOUND DEBRIDEMENT  Total area of Debridement: 10cmsq  Debridement Site 1  Location- Site 1: BLE wounds/periwounds  Selective Debridement- Site 1: Wound Surface <20cmsq  Instruments- Site 1: tweezers  Excised Tissue Description- Site 1: moderate, slough  Bleeding- Site 1: scant             Therapy Education     Row Name 01/26/20 1115             " Therapy Education    Education Details  Continue with maik to medial L ankle wound, HFBt to all wounds, may use optifoam gentle to secure HFB on RLE and for R heel.  Continue with xeroform to fragile areas, cast padding and be sure to use comprilans entire lower leg MH to prox calf.  Will trial every other week tx but call for appointment sooner if needed.  May ask for Plastics referral to address skin flap R heel.  -MI      Given  Symptoms/condition management;Bandaging/dressing change  -MI      Program  Reinforced;Modified  -MI      How Provided  Verbal;Demonstration  -MI      Provided to  Patient;Caregiver  -MI      Level of Understanding  Verbalized  -MI        User Key  (r) = Recorded By, (t) = Taken By, (c) = Cosigned By    Initials Name Provider Type    Kiley Damon, PT Physical Therapist          Recommendation and Plan  PT Assessment/Plan     Row Name 01/26/20 1115          PT Assessment    Functional Limitations  Decreased safety during functional activities;Impaired gait;Impaired locomotion;Limitation in home management;Limitations in community activities;Limitations in functional capacity and performance;Performance in leisure activities;Performance in self-care ADL;Other (comment) wound mgmt  -     Impairments  Integumentary integrity;Pain;Edema  -     Assessment Comments  ant RLE without blisters or open wounds, but still fragile and will benefit from continued use of xeroform to protect fragile skin.  RLE wounds re-epithelializing well, mostly graulated with minimal slough buildup.  R heel with painful skin flap where previous skin tear/shearing injury has left a flap of loose viable skin laterally.  Pt may benefit from Plastics referral to address skin flap.  LLE wounds still with moderate drainage, still with min slough but mostly red/granulated.  Family adequately changing dressings at home, but reinforced need to maintain compression MH to prox calf as prox calf was shiny/edematous  where comprilan was only to mid-calf.  Pt appropriate to trial every other week tx.  -     Rehab Potential  Fair  -     Patient/caregiver participated in establishment of treatment plan and goals  Yes  -     Patient would benefit from skilled therapy intervention  Yes  -        PT Plan    PT Frequency  Other (comment) trial every 2 weeks  -     Physical Therapy Interventions (Optional Details)  patient/family education;wound care  -     PT Plan Comments  debridement, MLW, ?pre-cert for next tx  -       User Key  (r) = Recorded By, (t) = Taken By, (c) = Cosigned By    Initials Name Provider Type    Kiley Damon, PT Physical Therapist          Goals  PT OP Goals     Row Name 01/26/20 1115          Time Calculation    PT Goal Re-Cert Due Date  02/01/20  -       User Key  (r) = Recorded By, (t) = Taken By, (c) = Cosigned By    Initials Name Provider Type    Kiley Damon, PT Physical Therapist          PT Goal Re-Cert Due Date: 02/01/20            Time Calculation: Start Time: 1115  Therapy Charges for Today     Code Description Service Date Service Provider Modifiers Qty    02751229945 HC HERNAN DEBRIDE OPEN WOUND UP TO 20CM 1/26/2020 Kiley Chinchilla, PT GP 1    33181135909 HC PT MULTI LAYER COMP SYS BELOW KNEE 1/26/2020 Kiley Chinchilla, PT GP 1                  Kiley Chinchilla, PT  1/26/2020

## 2020-01-27 ENCOUNTER — TELEPHONE (OUTPATIENT)
Dept: INTERNAL MEDICINE | Facility: CLINIC | Age: 80
End: 2020-01-27

## 2020-01-27 NOTE — TELEPHONE ENCOUNTER
Pt called to ask if he can reduce his Lortab, that he has been taking four times a day. He would like to reduce it to twice daily and take 1 tylenol in between. (650 mg TYLENOL)     Pt can be contacted [phone].

## 2020-02-02 ENCOUNTER — HOSPITAL ENCOUNTER (OUTPATIENT)
Dept: PHYSICAL THERAPY | Facility: HOSPITAL | Age: 80
Setting detail: THERAPIES SERIES
Discharge: HOME | End: 2020-02-02

## 2020-02-02 DIAGNOSIS — S81.802D MULTIPLE OPEN WOUNDS OF LOWER EXTREMITY, LEFT, SUBSEQUENT ENCOUNTER: Primary | ICD-10-CM

## 2020-02-02 DIAGNOSIS — R60.0 BILATERAL LOWER EXTREMITY EDEMA: ICD-10-CM

## 2020-02-02 DIAGNOSIS — S81.801D MULTIPLE OPEN WOUNDS OF LOWER EXTREMITY, RIGHT, SUBSEQUENT ENCOUNTER: ICD-10-CM

## 2020-02-02 PROCEDURE — 29581 APPL MULTLAYER CMPRN SYS LEG: CPT

## 2020-02-02 PROCEDURE — 97597 DBRDMT OPN WND 1ST 20 CM/<: CPT

## 2020-02-02 NOTE — THERAPY PROGRESS REPORT/RE-CERT
Outpatient Rehabilitation - Wound/Debridement Progress Note  [facility]     Patient Name: Michoacano Rojas  : 1940  MRN: [id number]  Today's Date: 2020                 Admit Date: 2020    Visit Dx:    ICD-10-CM ICD-9-CM   1. Multiple open wounds of lower extremity, left, subsequent encounter S81.802D V58.89     894.0   2. Multiple open wounds of lower extremity, right, subsequent encounter S81.801D V58.89     894.0   3. 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   • Tracheal stenosis   • Diastolic dysfunction with chronic heart failure (CMS/HCC)   • Acute on chronic respiratory failure with hypoxia and hypercapnia (CMS/HCC)   • Incarcerated right inguinal hernia   • Venous insufficiency (chronic) (peripheral)   • Benign essential hypertension   • Non-pressure chronic ulcer of left lower leg, with unspecified severity (CMS/HCC)   • Lower extremity edema   • Chronic diastolic heart failure (CMS/HCC)   • Cognitive disorder   • Chronic conjunctivitis of both eyes   • Facial swelling   • Goiter   • Idiopathic chronic gout of multiple sites with tophus   • Medicare annual wellness visit, subsequent   • Morbid obesity (CMS/HCC)   • Major depressive disorder, single episode, mild (CMS/HCC)   • Skin ulcer of right foot, limited to breakdown of skin (CMS/HCC)   • COPD (chronic obstructive pulmonary disease) (CMS/HCC)   • Hypoxia   • sepsis secondary to LEs ulcerations   • AMS (altered mental status)   • Venous stasis ulcer (CMS/HCC)   •  Immunosuppressed status (CMS/HCC)   • Current chronic use of systemic steroids   • Rheumatoid arthritis involving multiple sites (CMS/HCC)   • Demand ischemia of myocardium (CMS/HCC)        Past Medical History:   Diagnosis Date   • A-fib (CMS/HCC)    • Atrial fibrillation (CMS/HCC)    • CHF (congestive heart failure) (CMS/HCC)    • Chronic cutaneous venous stasis ulcer (CMS/HCC)    • Coronary artery disease    • Disease of thyroid gland    • Hypertension    • Penile abnormality    • Peptic ulcer disease    • Primary malignant neoplasm of bronchus with metastasisto other site, unspecified laterality (CMS/HCC) 4/22/2019   • RA (rheumatoid arthritis) (CMS/HCC)    • Rheumatic fever    • Rheumatoid arthritis (CMS/HCC)    • Sepsis (CMS/[facility]) 11/17/2017    from cellulitis due to leg ulcer, hospitalization, antibiotics, wound care   • Sleep apnea    • Vertigo    • Vertigo         Past Surgical History:   Procedure Laterality Date   • EYE SURGERY     • JOINT MANIPULATION      left hip repair         EVALUATION  PT Ortho     Row Name 02/02/20 1115       Subjective Comments    Subjective Comments  Pt states his R heel started hurting more after a transfer a couple days ago, so they scheduled to come in this week instead of waiting until next week.  -MI       Subjective Pain    Able to rate subjective pain?  yes  -MI    Pre-Treatment Pain Level  7  -MI    Post-Treatment Pain Level  5  -MI    Subjective Pain Comment  R heel  -       Transfers    Comment (Transfers)  in w/c for tx, assisted by family  -MI      User Key  (r) = Recorded By, (t) = Taken By, (c) = Cosigned By    Initials Name Provider Type    Kiley Damon PT Physical Therapist          ELEAZAR Wound     Row Name 02/02/20 1115             Wound 11/03/19 1100 Left lateral leg Venous Ulcer    Wound - Properties Group Date first assessed: 11/03/19  -MF Time first assessed: 1100  -MF Side: Left  -MF Orientation: lateral  -MF Location: leg  -MF Primary Wound Type:  Venous ulcer  -MF    Wound Image  Images linked: 1  -JM      Dressing Appearance  intact;moist drainage  -JM      Base  moist;pink;red;granulating;epithelialization central skin bridge  -JM      Periwound  intact;pale white;macerated min maceration  -JM      Periwound Temperature  warm  -JM      Periwound Skin Turgor  soft  -JM      Edges  open  -JM      Wound Length (cm)  3 cm  -JM      Wound Width (cm)  4 cm  -JM      Wound Depth (cm)  0.1 cm  -JM      Drainage Characteristics/Odor  serosanguineous  -JM      Drainage Amount  small  -JM      Care, Wound  cleansed with;wound cleanser;debrided  -JM      Dressing Care, Wound  dressing applied;antimicrobial agent applied;foam;multi-layer wrap HFBt, xeroform, MLW  -JM      Periwound Care, Wound  barrier ointment applied;cleansed with pH balanced cleanser;dry periwound area maintained z-guard  -         Wound 11/03/19 1100 Left medial leg Venous Ulcer    Wound - Properties Group Date first assessed: 11/03/19  - Time first assessed: 1100  - Side: Left  - Orientation: medial  -MF Location: leg  -MF Primary Wound Type: Venous ulcer  -MF    Wound Image  Images linked: 1  -JM      Dressing Appearance  intact;moist drainage  -JM      Base  moist;pink;red;granulating;epithelialization;yellow;slough  -JM      Periwound  intact;moist;pale white;macerated min maceration inferiorly  -JM      Periwound Temperature  warm  -JM      Periwound Skin Turgor  soft  -JM      Edges  irregular  -JM      Wound Length (cm)  5.3 cm  -JM      Wound Width (cm)  6.2 cm  -JM      Wound Depth (cm)  0.1 cm  -JM      Drainage Characteristics/Odor  serosanguineous  -JM      Drainage Amount  small;moderate  -JM      Care, Wound  cleansed with;wound cleanser;debrided  -JM      Dressing Care, Wound  dressing applied;collagen;antimicrobial agent applied;foam;petroleum-based;multi-layer wrap maik, HFBt, xeroform, MLW  -JM      Periwound Care, Wound  cleansed with pH balanced cleanser;dry periwound  area maintained;barrier ointment applied z-guard  -         Wound 11/03/19 1100 Right posterior;lateral leg Venous Ulcer    Wound - Properties Group Date first assessed: 11/03/19  -MF Time first assessed: 1100  -MF Side: Right  -MF Orientation: posterior;lateral  -MF Location: leg  -MF Primary Wound Type: Venous ulcer  -MF    Wound Image  Images linked: 1  -JM      Dressing Appearance  intact;moist drainage  -      Base  epithelialization;clean;moist;pink;red  -      Periwound  intact;pale white  -      Periwound Temperature  warm  -      Periwound Skin Turgor  soft  -      Edges  irregular  -      Wound Length (cm)  1.5 cm  -JM      Wound Width (cm)  1.5 cm  -      Wound Depth (cm)  0.1 cm  -JM      Drainage Characteristics/Odor  serosanguineous  -JM      Drainage Amount  small  -JM      Care, Wound  cleansed with;wound cleanser;debrided  -      Dressing Care, Wound  dressing applied;antimicrobial agent applied;foam;petroleum-based;gauze;multi-layer wrap HFBt, xeroform, MLW  -      Periwound Care, Wound  cleansed with pH balanced cleanser;dry periwound area maintained;barrier ointment applied z-guard  -         Wound 12/07/19 1030 Right anterior;proximal leg Blisters    Wound - Properties Group Date first assessed: 12/07/19  -MW Time first assessed: 1030  -MW Side: Right  -MW Orientation: anterior;proximal  -MW Location: leg  -MW Primary Wound Type: Blisters  -MW    Wound Image  Images linked: 1  -JM      Dressing Appearance  intact  -      Base  clean;closed/resurfaced;pink two small pinpoint areas  -      Periwound  intact;dry;swelling  -      Periwound Temperature  warm  -      Drainage Amount  none  -      Care, Wound  cleansed with;wound cleanser  -      Dressing Care, Wound  petroleum-based;gauze;multi-layer wrap xeroform, MLW  -JM      Periwound Care, Wound  cleansed with pH balanced cleanser;dry periwound area maintained  -         Wound 11/10/19 1100 Right  "posterior;medial leg Venous Ulcer    Wound - Properties Group Date first assessed: 11/10/19  - Time first assessed: 1100  -JM Present on Hospital Admission: Y  -JM Side: Right  - Orientation: posterior;medial  - Location: leg  - Primary Wound Type: Venous ulcer  -    Wound Image  Images linked: 1  -JM      Dressing Appearance  intact;moist drainage  -JM      Base  moist;pink;red;epithelialization inf aspect epithelializing  -JM      Periwound  intact;dry;pink  -JM      Periwound Temperature  warm  -      Periwound Skin Turgor  firm  -      Edges  open  -JM      Wound Length (cm)  1.2 cm  -JM      Wound Width (cm)  0.7 cm  -JM      Wound Depth (cm)  0.1 cm  -JM      Drainage Characteristics/Odor  serosanguineous  -JM      Drainage Amount  scant  -JM      Care, Wound  cleansed with;wound cleanser;debrided  -      Dressing Care, Wound  dressing applied;antimicrobial agent applied;foam;border dressing;multi-layer wrap HFBt, 4\" optifoam gentle, MLW  -JM      Periwound Care, Wound  cleansed with pH balanced cleanser;dry periwound area maintained  -        User Key  (r) = Recorded By, (t) = Taken By, (c) = Cosigned By    Initials Name Provider Type    Arsalan Napoles, PT Physical Therapist    Amaya Eastman, PT Physical Therapist    Kiley Damon, PT Physical Therapist        Lymphedema     Row Name 02/02/20 6638             Lymphedema Edema Assessment    Ptting Edema Category  By severity  -      Pitting Edema  Mild;Moderate mild LLE, mod RLE  -JM         Skin Changes/Observations    Lower Extremity Conditions  bilateral:;clean;dry;shiny;hairless;scaly;inflamed;fragile  -      Lower Extremity Color/Pigment  bilateral:;hyperpigmented;erythema min erythema of R calf  -JM         Lymphedema Pulses/Capillary Refill    Lower Extremity Capillary Refill  right:;left:;less than 3 seconds  -         Compression/Skin Care    Compression/Skin Care  skin care;wrapping location;bandaging  " "-      Skin Care  washed/dried;lotion applied  -      Wrapping Location  lower extremity  -      Wrapping Location LE  bilateral:;foot to knee  -      Wrapping Comments  wound dressings, 6\" optifoam to R heel, xeroform, cast padding, size 8cm and 12cm comprilans  -      Bandage Layers  padding/fluff layer;short-stretch bandages (comment size/quantity)  -      Bandaging Technique  figure-eight;light compression  -        User Key  (r) = Recorded By, (t) = Taken By, (c) = Cosigned By    Initials Name Provider Type    Kiley Damon, PT Physical Therapist          WOUND DEBRIDEMENT  Total area of Debridement: 8cmsq  Debridement Site 1  Location- Site 1: BLE wounds/periwounds  Selective Debridement- Site 1: Wound Surface <20cmsq  Instruments- Site 1: tweezers  Excised Tissue Description- Site 1: minimum, slough  Bleeding- Site 1: scant             Therapy Education     Row Name 02/02/20 1115             Therapy Education    Given  Symptoms/condition management;Bandaging/dressing change  -      Program  Reinforced;Modified  -      How Provided  Verbal;Demonstration  -      Provided to  Patient;Caregiver  -      Level of Understanding  Verbalized  -        User Key  (r) = Recorded By, (t) = Taken By, (c) = Cosigned By    Initials Name Provider Type    Kiley Damon, PT Physical Therapist          Recommendation and Plan  PT Assessment/Plan     Row Name 02/02/20 1115          PT Assessment    Functional Limitations  Decreased safety during functional activities;Impaired gait;Impaired locomotion;Limitation in home management;Limitations in community activities;Limitations in functional capacity and performance;Performance in leisure activities;Performance in self-care ADL;Other (comment) wound mgmt  -     Impairments  Integumentary integrity;Pain;Edema  -     Assessment Comments  Pt with improvement in all wounds except for slight increase in wound area medial L ankle.  However, L " ankle with less slough and increased granulation since start of care.  Pt with improvement in edema when consistent with home compression use.  Still having moderate drainage from medial L ankle.  Also noted mild erythema of R calf today, will monitor.  Family independent with home dressing changes and compression wrapping.  Pt will continue to require skilled PT for debridement of non-viable tissues and advanced dressings management.  Current goals and POC remain appropriate.  -     Rehab Potential  Fair  -     Patient/caregiver participated in establishment of treatment plan and goals  Yes  -     Patient would benefit from skilled therapy intervention  Yes  -        PT Plan    PT Frequency  1x/week  -     Predicted Duration of Therapy Intervention (Therapy Eval)  8 visits  -     Planned CPT's?  PT HERNAN DEBRIDE OPEN WOUND UP TO 20 CM: 27238;PT HERNAN DEBRIDE OPEN WOUND EA ADD 20 CM: 95124;PT NONSELECT DEBRIDE 15 MIN: 66372;PT MULTI LAYER COMP SYS LE;PT SELF CARE/MGMT/TRAIN 15 MIN: 71029  -     Physical Therapy Interventions (Optional Details)  patient/family education;wound care  -     PT Plan Comments  debridement, MLW, ins auth submitted  -       User Key  (r) = Recorded By, (t) = Taken By, (c) = Cosigned By    Initials Name Provider Type    Kiley Damon, PT Physical Therapist          Goals  PT OP Goals     Row Name 20 1115          PT Short Term Goals    STG 1  Decrease c/o pain to <4/10 at all times  -     STG 1 Progress  Partially Met  -     STG 2  Pt will demonstrate 25% reduction in wound dimensions to indicate healing progress.  -     STG 2 Progress  Partially Met;Progressing  -     STG 3  Pt will demonstrate only minimal exudate to indicate wound healing.  -     STG 3 Progress  Partially Met  -        Long Term Goals    LTG 1  Pt and/or caregivers will be independent with clean home dressing changes to continue progress upon d/c.  -     LTG 1 Progress   Met  -     LTG 2  Pt will demonstrate 75% reduction in wound dimensions to indicate healing progress.  -     LTG 2 Progress  Ongoing  -     LTG 3  Pt will demonstrate scant wound exudate to indicate healing progress.  -     LTG 3 Progress  Ongoing  -        Time Calculation    PT Goal Re-Cert Due Date  05/02/20  -MI       User Key  (r) = Recorded By, (t) = Taken By, (c) = Cosigned By    Initials Name Provider Type    Kiley Damon, PT Physical Therapist          PT Goal Re-Cert Due Date: 05/02/20  PT Short Term Goals  STG 1: Decrease c/o pain to <4/10 at all times  STG 1 Progress: Partially Met  STG 2: Pt will demonstrate 25% reduction in wound dimensions to indicate healing progress.  STG 2 Progress: Partially Met, Progressing  STG 3: Pt will demonstrate only minimal exudate to indicate wound healing.  STG 3 Progress: Partially Met  Long Term Goals  LTG 1: Pt and/or caregivers will be independent with clean home dressing changes to continue progress upon d/c.  LTG 1 Progress: Met  LTG 2: Pt will demonstrate 75% reduction in wound dimensions to indicate healing progress.  LTG 2 Progress: Ongoing  LTG 3: Pt will demonstrate scant wound exudate to indicate healing progress.  LTG 3 Progress: Ongoing      Time Calculation: Start Time: 1115  Therapy Charges for Today     Code Description Service Date Service Provider Modifiers Qty    [id number] HC HERNAN DEBRIDE OPEN WOUND UP TO 20CM 2/2/2020 Kiley Chinchilla, PT GP 1    [id number] HC PT MULTI LAYER COMP SYS BELOW KNEE 2/2/2020 Kiley Chinchilla, PT GP 1                  Kiley Chinchilla PT  2/2/2020

## 2020-02-09 ENCOUNTER — APPOINTMENT (OUTPATIENT)
Dept: PHYSICAL THERAPY | Facility: HOSPITAL | Age: 80
End: 2020-02-09

## 2020-02-10 DIAGNOSIS — I48.91 ATRIAL FIBRILLATION WITH RVR (HCC): ICD-10-CM

## 2020-02-10 RX ORDER — BISOPROLOL FUMARATE 5 MG/1
TABLET, FILM COATED ORAL
Qty: 45 TABLET | Refills: 2 | Status: SHIPPED | OUTPATIENT
Start: 2020-02-10 | End: 2020-01-01

## 2020-02-16 NOTE — THERAPY WOUND CARE TREATMENT
Outpatient Rehabilitation - Wound/Debridement Treatment Note  [facility]     Patient Name: Michoacano Rojas  : 1940  MRN: [id number]  Today's Date: 2020                 Admit Date: 2020    Visit Dx:    ICD-10-CM ICD-9-CM   1. Multiple open wounds of lower extremity, left, subsequent encounter S81.802D V58.89     894.0   2. Multiple open wounds of lower extremity, right, subsequent encounter S81.801D V58.89     894.0   3. 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   • Tracheal stenosis   • Diastolic dysfunction with chronic heart failure (CMS/HCC)   • Acute on chronic respiratory failure with hypoxia and hypercapnia (CMS/HCC)   • Incarcerated right inguinal hernia   • Venous insufficiency (chronic) (peripheral)   • Benign essential hypertension   • Non-pressure chronic ulcer of left lower leg, with unspecified severity (CMS/HCC)   • Lower extremity edema   • Chronic diastolic heart failure (CMS/HCC)   • Cognitive disorder   • Chronic conjunctivitis of both eyes   • Facial swelling   • Goiter   • Idiopathic chronic gout of multiple sites with tophus   • Medicare annual wellness visit, subsequent   • Morbid obesity (CMS/HCC)   • Major depressive disorder, single episode, mild (CMS/HCC)   • Skin ulcer of right foot, limited to breakdown of skin (CMS/HCC)   • COPD (chronic obstructive pulmonary disease) (CMS/HCC)   • Hypoxia   • sepsis secondary to LEs ulcerations   • AMS (altered mental status)   • Venous stasis ulcer (CMS/HCC)   •  Immunosuppressed status (CMS/HCC)   • Current chronic use of systemic steroids   • Rheumatoid arthritis involving multiple sites (CMS/HCC)   • Demand ischemia of myocardium (CMS/HCC)        Past Medical History:   Diagnosis Date   • A-fib (CMS/HCC)    • Atrial fibrillation (CMS/HCC)    • CHF (congestive heart failure) (CMS/HCC)    • Chronic cutaneous venous stasis ulcer (CMS/HCC)    • Coronary artery disease    • Disease of thyroid gland    • Hypertension    • Penile abnormality    • Peptic ulcer disease    • Primary malignant neoplasm of bronchus with metastasisto other site, unspecified laterality (CMS/HCC) 4/22/2019   • RA (rheumatoid arthritis) (CMS/HCC)    • Rheumatic fever    • Rheumatoid arthritis (CMS/HCC)    • Sepsis (CMS/HCC) 11/17/2017    from cellulitis due to leg ulcer, hospitalization, antibiotics, wound care   • Sleep apnea    • Vertigo    • Vertigo         Past Surgical History:   Procedure Laterality Date   • EYE SURGERY     • JOINT MANIPULATION      left hip repair         EVALUATION  PT Ortho     Row Name 02/16/20 1115       Subjective Comments    Subjective Comments  Pt cont to have c/o pain to R lateral heel   -MF       Subjective Pain    Able to rate subjective pain?  yes  -MF    Pre-Treatment Pain Level  6  -MF    Post-Treatment Pain Level  5  -MF    Subjective Pain Comment  R heel   -MF       Transfers    Comment (Transfers)  to and from dept via w/c  -MF      User Key  (r) = Recorded By, (t) = Taken By, (c) = Cosigned By    Initials Name Provider Type    Arsalan Napoles, PT Physical Therapist          LDA Wound     Row Name 02/16/20 1115             Wound 12/07/19 1030 Right anterior;proximal leg Blisters    Wound - Properties Group Date first assessed: 12/07/19  -MW Time first assessed: 1030  -MW Side: Right  -MW Orientation: anterior;proximal  -MW Location: leg  -MW Primary Wound Type: Blisters  -MW    Dressing Appearance  intact  -MF      Base  clean;closed/resurfaced;pink  -MF          Wound 11/10/19 1100 Right posterior;medial leg Venous Ulcer    Wound - Properties Group Date first assessed: 11/10/19  - Time first assessed: 1100  - Present on Hospital Admission: Y  - Side: Right  - Orientation: posterior;medial  - Location: leg  - Primary Wound Type: Venous ulcer  -    Wound Image  Images linked: 1  -MF      Dressing Appearance  intact;moist drainage  -MF      Base  moist;pink;red;epithelialization  -MF      Periwound  intact;dry;pink  -MF      Periwound Temperature  warm  -MF      Periwound Skin Turgor  firm  -MF      Edges  open  -MF      Drainage Characteristics/Odor  serosanguineous  -MF      Drainage Amount  scant  -MF      Care, Wound  irrigated with;sterile normal saline;debrided  -MF      Dressing Care, Wound  foam HFBt with xeroform  -MF         Wound 11/03/19 1100 Left lateral leg Venous Ulcer    Wound - Properties Group Date first assessed: 11/03/19  - Time first assessed: 1100  -MF Side: Left  - Orientation: lateral  - Location: leg  -MF Primary Wound Type: Venous ulcer  -MF    Wound Image  Images linked: 1  -MF      Dressing Appearance  intact;moist drainage  -MF      Base  moist;pink;red;granulating;epithelialization  -MF      Periwound  intact;pale white;macerated  -MF      Periwound Temperature  warm  -MF      Periwound Skin Turgor  soft  -MF      Edges  open  -MF      Drainage Characteristics/Odor  serosanguineous  -MF      Drainage Amount  small  -MF      Care, Wound  irrigated with;sterile normal saline;debrided  -MF      Dressing Care, Wound  foam;low-adherent HFBt to cover with xeroform   -MF      Periwound Care, Wound  barrier ointment applied Zguard  -MF         Wound 11/03/19 1100 Left medial leg Venous Ulcer    Wound - Properties Group Date first assessed: 11/03/19  - Time first assessed: 1100  - Side: Left  - Orientation: medial  - Location: leg  -MF Primary Wound Type: Venous ulcer  -MF    Wound Image  Images linked: 1  -MF      Dressing  Appearance  intact;moist drainage  -MF      Base  moist;pink;red;granulating;epithelialization;yellow;slough  -MF      Periwound  intact;moist;pale white;macerated  -MF      Periwound Temperature  warm  -MF      Periwound Skin Turgor  soft  -MF      Edges  irregular  -MF      Drainage Characteristics/Odor  serosanguineous  -MF      Drainage Amount  small;moderate  -MF      Care, Wound  irrigated with;sterile normal saline;debrided  -MF      Dressing Care, Wound  foam;low-adherent HFBt with xeroform   -MF      Periwound Care, Wound  barrier ointment applied  -MF         Wound 11/03/19 1100 Right posterior;lateral leg Venous Ulcer    Wound - Properties Group Date first assessed: 11/03/19  - Time first assessed: 1100  -MF Side: Right  -MF Orientation: posterior;lateral  -MF Location: leg  - Primary Wound Type: Venous ulcer  -MF    Dressing Appearance  intact;moist drainage  -MF      Base  epithelialization;clean;moist;pink;red  -MF      Periwound  intact;pale white  -MF      Periwound Temperature  warm  -MF      Periwound Skin Turgor  soft  -MF      Edges  irregular  -MF      Drainage Characteristics/Odor  serosanguineous  -MF      Drainage Amount  small  -MF      Care, Wound  irrigated with;sterile normal saline;debrided  -MF      Dressing Care, Wound  foam;low-adherent HFBt with optifoam   -MF      Periwound Care, Wound  cleansed with pH balanced cleanser  -MF        User Key  (r) = Recorded By, (t) = Taken By, (c) = Cosigned By    Initials Name Provider Type    MF Arsalan Langford, PT Physical Therapist    Amaya Eastman, PT Physical Therapist    Kiley Damon, PT Physical Therapist        Lymphedema     Row Name 02/16/20 1115             Lymphedema Edema Assessment    Ptting Edema Category  By severity  -      Pitting Edema  Mild;Moderate  -MF         Skin Changes/Observations    Location/Assessment  Lower Extremity  -      Lower Extremity Conditions   bilateral:;clean;dry;shiny;hairless;scaly;inflamed;fragile  -      Lower Extremity Color/Pigment  bilateral:;hyperpigmented;erythema  -         Compression/Skin Care    Compression/Skin Care  skin care;wrapping location;bandaging  -      Skin Care  washed/dried;lotion applied  -MF      Wrapping Location  lower extremity  -MF      Wrapping Location LE  bilateral:;foot to knee  -      Wrapping Comments  HFBt with xeroform to BLE ulcerations, mepilex Ag foam to R lateral heel with ABD pad.  cast padding and 6cm comprilans to cover.   -      Bandage Layers  padding/fluff layer;short-stretch bandages (comment size/quantity)  -        User Key  (r) = Recorded By, (t) = Taken By, (c) = Cosigned By    Initials Name Provider Type    Arsalan Napoles, PT Physical Therapist          WOUND DEBRIDEMENT  Total area of Debridement: 8cm2  Debridement Site 1  Location- Site 1: BLE wounds/periwounds  Selective Debridement- Site 1: Wound Surface <20cmsq  Instruments- Site 1: tweezers  Excised Tissue Description- Site 1: minimum, slough  Bleeding- Site 1: none                 Recommendation and Plan  PT Assessment/Plan     Row Name 02/16/20 1114          PT Assessment    Functional Limitations  Decreased safety during functional activities;Impaired gait;Impaired locomotion;Limitation in home management;Limitations in community activities;Limitations in functional capacity and performance;Performance in leisure activities;Performance in self-care ADL;Other (comment) wound management   -     Impairments  Integumentary integrity;Pain;Edema  -     Assessment Comments  pt cont to have moderate granulation of wound beds, but cont to have significant drainage from wounds.  Pt will cont to benefit from light debridement of biofilm / slough build up of wound beds to help decrease bioburden and improve healing potential.   -     Rehab Potential  Fair  -     Patient/caregiver participated in establishment of treatment  plan and goals  Yes  -     Patient would benefit from skilled therapy intervention  Yes  -MF        PT Plan    PT Frequency  1x/week  -     Physical Therapy Interventions (Optional Details)  wound care;patient/family education  -     PT Plan Comments  cont with debridement and compression wrapping.   -       User Key  (r) = Recorded By, (t) = Taken By, (c) = Cosigned By    Initials Name Provider Type    Arsalan Napoles, PT Physical Therapist          Goals  PT OP Goals     Row Name 02/16/20 1115          Time Calculation    PT Goal Re-Cert Due Date  05/02/20  -       User Key  (r) = Recorded By, (t) = Taken By, (c) = Cosigned By    Initials Name Provider Type    Arsalan Napoles, PT Physical Therapist          PT Goal Re-Cert Due Date: 05/02/20            Time Calculation: Start Time: 1115  Therapy Charges for Today     Code Description Service Date Service Provider Modifiers Qty    [id number] HC PT MULTI LAYER COMP SYS BELOW KNEE 2/16/2020 Arsalan Langford, PT GP 1    [id number] HC HERNAN DEBRIDE OPEN WOUND UP TO 20CM 2/16/2020 Arsalan Langford, PT GP 1                  Arsalan Langford, PT  2/16/2020

## 2020-03-08 NOTE — THERAPY PROGRESS REPORT/RE-CERT
Outpatient Rehabilitation - Wound/Debridement Progress Note  [facility]     Patient Name: Michoacano Rojas  : 1940  MRN: [id number]  Today's Date: 3/8/2020                 Admit Date: 3/8/2020    Visit Dx:    ICD-10-CM ICD-9-CM   1. Multiple open wounds of lower extremity, left, subsequent encounter S81.802D V58.89     894.0   2. Multiple open wounds of lower extremity, right, subsequent encounter S81.801D V58.89     894.0   3. 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   • Tracheal stenosis   • Diastolic dysfunction with chronic heart failure (CMS/HCC)   • Acute on chronic respiratory failure with hypoxia and hypercapnia (CMS/HCC)   • Incarcerated right inguinal hernia   • Venous insufficiency (chronic) (peripheral)   • Benign essential hypertension   • Non-pressure chronic ulcer of left lower leg, with unspecified severity (CMS/HCC)   • Lower extremity edema   • Chronic diastolic heart failure (CMS/HCC)   • Cognitive disorder   • Chronic conjunctivitis of both eyes   • Facial swelling   • Goiter   • Idiopathic chronic gout of multiple sites with tophus   • Medicare annual wellness visit, subsequent   • Morbid obesity (CMS/HCC)   • Major depressive disorder, single episode, mild (CMS/HCC)   • Skin ulcer of right foot, limited to breakdown of skin (CMS/HCC)   • COPD (chronic obstructive pulmonary disease) (CMS/HCC)   • Hypoxia   • sepsis secondary to LEs ulcerations   • AMS (altered mental status)   • Venous stasis ulcer (CMS/HCC)   •  Immunosuppressed status (CMS/HCC)   • Current chronic use of systemic steroids   • Rheumatoid arthritis involving multiple sites (CMS/HCC)   • Demand ischemia of myocardium (CMS/HCC)        Past Medical History:   Diagnosis Date   • A-fib (CMS/HCC)    • Atrial fibrillation (CMS/HCC)    • CHF (congestive heart failure) (CMS/HCC)    • Chronic cutaneous venous stasis ulcer (CMS/HCC)    • Coronary artery disease    • Disease of thyroid gland    • Hypertension    • Penile abnormality    • Peptic ulcer disease    • Primary malignant neoplasm of bronchus with metastasisto other site, unspecified laterality (CMS/[facility]) 4/22/2019   • RA (rheumatoid arthritis) (CMS/HCC)    • Rheumatic fever    • Rheumatoid arthritis (CMS/HCC)    • Sepsis (CMS/[facility]) 11/17/2017    from cellulitis due to leg ulcer, hospitalization, antibiotics, wound care   • Sleep apnea    • Vertigo    • Vertigo         Past Surgical History:   Procedure Laterality Date   • EYE SURGERY     • JOINT MANIPULATION      left hip repair         EVALUATION  PT Ortho     Row Name 03/08/20 1145       Subjective Comments    Subjective Comments  Son and spouse report the BLE edema is improving steadily. The pt continues to c/o pain in the R heel.  -MC       Subjective Pain    Able to rate subjective pain?  yes  -MC    Pre-Treatment Pain Level  7  -MC    Post-Treatment Pain Level  6  -MC    Subjective Pain Comment  R heel  -MC       Transfers    Comment (Transfers)  in w/c for tx, to/from dept with son/spouse, who assist with positioning and lifting the LEs.  -      User Key  (r) = Recorded By, (t) = Taken By, (c) = Cosigned By    Initials Name Provider Type    Mary Casiano PT Physical Therapist          LDA Wound     Row Name 03/08/20 1145             [REMOVED] Wound 12/07/19 1030 Right anterior;proximal leg Blisters    Wound - Properties Group Date first assessed: 12/07/19  -MW Time first assessed: 1030  -MW Side: Right  -MW Orientation: anterior;proximal  -MW  Location: leg  -MW Primary Wound Type: Blisters  -MW Resolution Date: 03/08/20  - Resolution Time: 1145  -MC Wound Outcome: Healed  -MC       Wound 11/10/19 1100 Right posterior;medial leg Venous Ulcer    Wound - Properties Group Date first assessed: 11/10/19  - Time first assessed: 1100  - Present on Hospital Admission: Y  - Side: Right  - Orientation: posterior;medial  - Location: leg  - Primary Wound Type: Venous ulcer  -    Dressing Appearance  intact;moist drainage  -MC      Base  moist;pink;red;epithelialization;yellow;slough  -MC      Periwound  intact;dry;pink  -MC      Periwound Temperature  warm  -MC      Periwound Skin Turgor  firm  -MC      Edges  open  -MC      Wound Length (cm)  1.2 cm  -MC      Wound Width (cm)  0.8 cm  -MC      Wound Depth (cm)  0.1 cm  -MC      Drainage Characteristics/Odor  serosanguineous  -MC      Drainage Amount  scant  -MC      Care, Wound  cleansed with;wound cleanser;debrided  -MC      Dressing Care, Wound  dressing applied;antimicrobial agent applied;foam;silver impregnated HFBt, optifoam Ag  -MC      Periwound Care, Wound  cleansed with pH balanced cleanser;dry periwound area maintained  -MC         Wound 11/03/19 1100 Left lateral leg Venous Ulcer    Wound - Properties Group Date first assessed: 11/03/19  - Time first assessed: 1100  - Side: Left  - Orientation: lateral  - Location: leg  - Primary Wound Type: Venous ulcer  -    Dressing Appearance  intact;moist drainage  -MC      Base  moist;pink;red;granulating;epithelialization  -MC      Periwound  intact;pale white;macerated  -      Periwound Temperature  warm  -      Periwound Skin Turgor  soft  -MC      Edges  open  -MC      Wound Length (cm)  2.8 cm  -MC      Wound Width (cm)  1.8 cm  -MC      Wound Depth (cm)  0.1 cm  -MC      Drainage Characteristics/Odor  serosanguineous  -MC      Drainage Amount  small  -MC      Care, Wound  cleansed with;wound cleanser;debrided  -MC      Dressing  Care, Wound  dressing applied;petroleum-based;gauze;multi-layer wrap xeroform, MLW  -MC      Periwound Care, Wound  cleansed with pH balanced cleanser;barrier ointment applied zguard  -         Wound 11/03/19 1100 Left medial leg Venous Ulcer    Wound - Properties Group Date first assessed: 11/03/19  - Time first assessed: 1100  - Side: Left  - Orientation: medial  - Location: leg  -MF Primary Wound Type: Venous ulcer  -MF    Dressing Appearance  intact;moist drainage  -MC      Base  moist;pink;red;granulating;epithelialization  -MC      Periwound  intact;moist;pale white;macerated  -MC      Periwound Temperature  warm  -MC      Periwound Skin Turgor  soft  -MC      Edges  irregular  -MC      Wound Length (cm)  5 cm  -MC      Wound Width (cm)  5.8 cm  -MC      Wound Depth (cm)  0.1 cm  -MC      Drainage Characteristics/Odor  serosanguineous  -MC      Drainage Amount  small;moderate  -MC      Care, Wound  cleansed with;wound cleanser;debrided  -      Dressing Care, Wound  dressing applied;collagen;antimicrobial agent applied;foam;petroleum-based;gauze;multi-layer wrap collagen, HFBt, xeroform, MLW  -MC      Periwound Care, Wound  cleansed with pH balanced cleanser;barrier ointment applied zguard  -         Wound 11/03/19 1100 Right posterior;lateral leg Venous Ulcer    Wound - Properties Group Date first assessed: 11/03/19  - Time first assessed: 1100  - Side: Right  - Orientation: posterior;lateral  - Location: leg  -MF Primary Wound Type: Venous ulcer  -MF    Dressing Appearance  intact;moist drainage  -MC      Base  epithelialization;clean;moist;pink;red  -MC      Periwound  intact;pale white  -MC      Periwound Temperature  warm  -MC      Periwound Skin Turgor  soft  -MC      Edges  irregular  -MC      Wound Length (cm)  4.3 cm  -MC      Wound Width (cm)  0.8 cm  -MC      Wound Depth (cm)  0.1 cm  -MC      Drainage Characteristics/Odor  serosanguineous  -MC      Drainage Amount  small  -MC       Care, Wound  cleansed with;wound cleanser;debrided  -      Dressing Care, Wound  dressing applied;antimicrobial agent applied;foam;petroleum-based;gauze;multi-layer wrap HFBt, xeroform, MLW  -      Periwound Care, Wound  cleansed with pH balanced cleanser;dry periwound area maintained  -        User Key  (r) = Recorded By, (t) = Taken By, (c) = Cosigned By    Initials Name Provider Type    Arsalan Napoles, PT Physical Therapist    Amaya Eastman, PT Physical Therapist    Mary Casiano, PT Physical Therapist    Kiley Damon, PT Physical Therapist        Lymphedema     Row Name 03/08/20 6202             Lymphedema Edema Assessment    Ptting Edema Category  By severity  -      Pitting Edema  Mild;Moderate  -      Edema Assessment Comment  mild LLE, mod proximal RLE  -         Skin Changes/Observations    Lower Extremity Conditions  bilateral:;clean;dry;shiny;hairless;scaly;inflamed;fragile  -      Lower Extremity Color/Pigment  bilateral:;hyperpigmented;erythema  -         Lymphedema Pulses/Capillary Refill    Lower Extremity Capillary Refill  right:;left:;less than 3 seconds  -         Compression/Skin Care    Skin Care  washed/dried;lotion applied  -      Wrapping Location  lower extremity  -      Wrapping Location LE  bilateral:;foot to knee  -      Wrapping Comments  wound dressings; optifoam Ag to R heel, cast padding, size 8/10 comprilans in overlapping layers to create gradient compression  -      Bandage Layers  padding/fluff layer;short-stretch bandages (comment size/quantity)  -      Bandaging Technique  figure-eight;light compression  -        User Key  (r) = Recorded By, (t) = Taken By, (c) = Cosigned By    Initials Name Provider Type    Mary Casiano, PT Physical Therapist          WOUND DEBRIDEMENT  Total area of Debridement: 12 cm2  Debridement Site 1  Location- Site 1: BLE wounds/periwounds  Selective Debridement- Site 1: Wound  Surface <20cmsq  Instruments- Site 1: tweezers  Excised Tissue Description- Site 1: minimum, slough  Bleeding- Site 1: none             Therapy Education     Row Name 03/08/20 0137             Therapy Education    Education Details  Include left heel in xeroform/wrapping d/t new skin tear. Wrap legs from MH to proximal calf. Elevate legs when able. R heel skin flap will not resolve on its own. Pt will likely need plastics/general surgery consult to address, if he is appropriate for surgery.  -      Given  Symptoms/condition management;Bandaging/dressing change  -      Program  Reinforced  -      How Provided  Verbal;Demonstration  -MC      Provided to  Patient;Caregiver  -      Level of Understanding  Verbalized  -        User Key  (r) = Recorded By, (t) = Taken By, (c) = Cosigned By    Initials Name Provider Type    Mary Casiano, PT Physical Therapist          Recommendation and Plan  PT Assessment/Plan     Row Name 03/08/20 6237          PT Assessment    Functional Limitations  Decreased safety during functional activities;Impaired gait;Impaired locomotion;Limitation in home management;Limitations in community activities;Limitations in functional capacity and performance;Performance in leisure activities;Performance in self-care ADL;Other (comment) wound mgmt, edema management  -     Impairments  Integumentary integrity;Pain;Edema  -     Assessment Comments  Pt has met or partially met all of his short term goals for PT wound care. Given the pt's multiple comorbidities and physical barriers, pt will continue to have slow progress. However, PT able to debride additional necrotic material today, and noted improvement in all wound measurements apart from the R proximal/posterior wound. Pt will continue to benefit from skilled PT wound care to promote healing.  -     Rehab Potential  Fair  -     Patient/caregiver participated in establishment of treatment plan and goals  Yes  -     Patient  would benefit from skilled therapy intervention  Yes  -        PT Plan    PT Frequency  Other (comment) every 2 weeks  -     Predicted Duration of Therapy Intervention (Therapy Eval)  10 visits  -     Planned CPT's?  PT SELF CARE/MGMT/TRAIN 15 MIN: 33074;PT NONSELECT DEBRIDE 15 MIN: 04298;PT HERNAN DEBRIDE OPEN WOUND UP TO 20 CM: 12455;PT HERNAN DEBRIDE OPEN WOUND EA ADD 20 CM: 17878;PT MULTI LAYER COMP SYS LE  -     Physical Therapy Interventions (Optional Details)  patient/family education;wound care  -     PT Plan Comments  debridement prn, MLW  -       User Key  (r) = Recorded By, (t) = Taken By, (c) = Cosigned By    Initials Name Provider Type    Mary Casiano PT Physical Therapist          Goals  PT OP Goals     Row Name 20 1145          PT Short Term Goals    STG Date to Achieve  20  -     STG 1  Decrease c/o pain to <4/10 at all times  -     STG 1 Progress  Partially Met  -     STG 2  Pt will demonstrate 25% reduction in wound dimensions to indicate healing progress.  -     STG 2 Progress  Met  -     STG 3  Pt will demonstrate only minimal exudate to indicate wound healing.  -     STG 3 Progress  Met  -        Long Term Goals    LTG Date to Achieve  20  -     LTG 1  Pt and/or caregivers will be independent with clean home dressing changes to continue progress upon d/c.  -     LTG 1 Progress  Met  -     LTG 2  Pt will demonstrate 75% reduction in wound dimensions to indicate healing progress.  -     LTG 2 Progress  Ongoing  -     LTG 3  Pt will demonstrate scant wound exudate to indicate healing progress.  -     LTG 3 Progress  Ongoing  -        Time Calculation    PT Goal Re-Cert Due Date  20  -       User Key  (r) = Recorded By, (t) = Taken By, (c) = Cosigned By    Initials Name Provider Type    Mary Casiano PT Physical Therapist          PT Goal Re-Cert Due Date: 20  PT Short Term Goals  STG Date to Achieve:  01/27/20  STG 1: Decrease c/o pain to <4/10 at all times  STG 1 Progress: Partially Met  STG 2: Pt will demonstrate 25% reduction in wound dimensions to indicate healing progress.  STG 2 Progress: Met  STG 3: Pt will demonstrate only minimal exudate to indicate wound healing.  STG 3 Progress: Met  Long Term Goals  LTG Date to Achieve: 02/01/20  LTG 1: Pt and/or caregivers will be independent with clean home dressing changes to continue progress upon d/c.  LTG 1 Progress: Met  LTG 2: Pt will demonstrate 75% reduction in wound dimensions to indicate healing progress.  LTG 2 Progress: Ongoing  LTG 3: Pt will demonstrate scant wound exudate to indicate healing progress.  LTG 3 Progress: Ongoing      Time Calculation: Start Time: 1145  Therapy Charges for Today     Code Description Service Date Service Provider Modifiers Qty    88757585911 HC HERNAN DEBRIDE OPEN WOUND UP TO 20CM 3/8/2020 Mary De Paz, PT GP 1    90266894506 HC PT MULTI LAYER COMP SYS BELOW KNEE 3/8/2020 Mary De Paz, PT GP 1                  Mary De Paz, PT  3/8/2020

## 2020-03-20 NOTE — TELEPHONE ENCOUNTER
[facility] She said pt was wanting to know if he could decrease his prednisone. He is concerned because he has been on it a long time. She did advise pt she did not think it was a good idea, but he wanted Dr. Chavez's opinion

## 2020-03-20 NOTE — TELEPHONE ENCOUNTER
Done.  He will take one 5 mg tablets and four 1 mg tablets a day for one month, then if doing ok, will reduce further.

## 2020-03-20 NOTE — TELEPHONE ENCOUNTER
He can, but when we have attempted to wean in the past he has had significant increase in joint pain and inflammation.  If he wants to try again would wean very slowly, ie, reduce to 9mg a day, decreasing by 1 mg per month as he tolerates.

## 2020-03-20 NOTE — TELEPHONE ENCOUNTER
Called patient he and his wife verbalized understanding and said he would like to try the reduction of 1 mg a mo

## 2020-04-08 NOTE — PROGRESS NOTES
[facility]     Michoacano Rojas  1940   [id number]      Patient Care Team:  Michael Chavez MD as PCP - General  Michael Chavez MD as PCP - Family Medicine    Chief Complaint::   Chief Complaint   Patient presents with   • Eye Drainage     He states they watering a lot   • Medication Problem     wants to know if he can increase prednisone   • Knee Pain     L knee   • Dizziness     Need new RX       You have chosen to receive care through a telephone visit today. Do you consent to use a telephone visit for your medical care today? Yes    HPI  Mr. Rojas is seen today via telephone visit for follow-up of his hypertension, COPD and rheumatoid arthritis.  His primary concern today is joint pain.  Last week he ask if he could taper his prednisone.  He was reduced from 10 mg a day to 9 mg a day.  Since then his knee pain is been significantly worse.  He is taking hydrocodone 3-4 times a day which still helps but does not adequately relieve his pain.  The left knee is worse than the right.  He also has watery itchy eyes and some dizziness.  He has nasal congestion as well.  There is no cough, fever or shortness of breath.    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)   • Lower extremity edema   • Chronic diastolic heart failure (CMS/[facility])   • Cognitive disorder   • Chronic conjunctivitis of both eyes   • Facial swelling   • Goiter   • Idiopathic chronic gout of multiple sites with tophus   • Medicare annual wellness visit, subsequent   • Morbid obesity (CMS/[facility])   • Major depressive disorder, single episode, mild (CMS/[facility])   • Skin ulcer of right foot, limited to breakdown of skin (CMS/[facility])   • COPD (chronic obstructive pulmonary disease) (CMS/[facility])   • Hypoxia   • sepsis secondary to LEs ulcerations   • AMS (altered mental status)   • Venous stasis ulcer (CMS/[facility])   • Immunosuppressed status (CMS/[facility])   • Current chronic use of systemic steroids   • Rheumatoid arthritis involving multiple sites (CMS/[facility])   • Demand ischemia of myocardium (CMS/[facility])        Past Medical History:   Diagnosis Date   • A-fib (CMS/[facility])    • Atrial fibrillation (CMS/[facility])    • CHF (congestive heart failure) (CMS/[facility])    • Chronic cutaneous venous stasis ulcer (CMS/[facility])    • Coronary artery disease    • Disease of thyroid gland    • Hypertension    • Penile abnormality    • Peptic ulcer disease    • Primary malignant neoplasm of bronchus with metastasisto other site, unspecified laterality (CMS/[facility]) 4/22/2019   • RA (rheumatoid arthritis) (CMS/[facility])    • Rheumatic fever    • Rheumatoid arthritis (CMS/[facility])    • Sepsis (CMS/[facility]) 11/17/2017    from cellulitis due to leg ulcer, hospitalization, antibiotics, wound care   • Sleep apnea    • Vertigo    • Vertigo        Past Surgical History:   Procedure Laterality Date   • EYE SURGERY     • JOINT MANIPULATION      left hip repair       Family History   Problem Relation Age of Onset   • Hypertension Mother    • Alzheimer's disease Mother    • Alzheimer's disease Father    • Hypertension Sister    • Lung cancer Brother    • Diabetes Brother        Social History     Socioeconomic  History   • Marital status:      Spouse name: Not on file   • Number of children: Not on file   • Years of education: Not on file   • Highest education level: Not on file   Tobacco Use   • Smoking status: Never Smoker   • Smokeless tobacco: Never Used   • Tobacco comment: Quit 01/01/1968   Substance and Sexual Activity   • Alcohol use: No   • Drug use: No   • Sexual activity: Defer       Allergies   Allergen Reactions   • Naproxen Sodium Other (See Comments)     Increased heart rate  Aleve         Current Outpatient Medications:   •  amLODIPine (NORVASC) 10 MG tablet, Take 1 tablet by mouth Daily., Disp: 30 tablet, Rfl: 0  •  Azelastine HCl 137 MCG/SPRAY solution, 2 sprays into the nostril(s) as directed by provider Every 12 (Twelve) Hours., Disp: 30 mL, Rfl: 5  •  bisoprolol (ZEBeta) 5 MG tablet, TAKE 1/2 TABLET EVERY DAY, Disp: 45 tablet, Rfl: 2  •  Camphor-Menthol-Methyl Sal (HM SALONPAS PAIN RELIEF) 1.2-5.7-6.3 % patch, Apply  topically to the appropriate area as directed As Needed., Disp: , Rfl:   •  cholecalciferol (VITAMIN D3) 1000 UNITS tablet, Take 2,000 Units by mouth Daily., Disp: , Rfl:   •  docusate sodium (COLACE) 50 MG capsule, Take 100 mg by mouth Every Night., Disp: , Rfl:   •  Elastic Bandages & Supports (HERNIA BELT DOUBLE LARGE) misc, For right inguinal hernia, Disp: 1 each, Rfl: 0  •  finasteride (PROSCAR) 5 MG tablet, TAKE 1 TABLET EVERY DAY, Disp: 90 tablet, Rfl: 1  •  HYDROcodone-acetaminophen (NORCO)  MG per tablet, 1 tablet. Can take up to 4 daily, Disp: , Rfl:   •  predniSONE (DELTASONE) 1 MG tablet, Take 4 tablets a day with one 5 mg tablet., Disp: 120 tablet, Rfl: 1  •  predniSONE (DELTASONE) 5 MG tablet, Take 1 tablet by mouth Daily., Disp: 90 tablet, Rfl: 1  •  diclofenac (VOLTAREN) 1 % gel gel, Apply 4 g topically to the appropriate area as directed 4 (Four) Times a Day., Disp: 100 g, Rfl: 2  •  fluticasone (FLONASE) 50 MCG/ACT nasal spray, 2 sprays into the nostril(s) as  directed by provider 2 (Two) Times a Day As Needed., Disp: , Rfl:   •  traMADol (ULTRAM) 50 MG tablet, Take 1 tablet by mouth 2 (Two) Times a Day., Disp: 60 tablet, Rfl: 1    Review of Systems   Constitutional: Negative.  Negative for fever.   HENT: Positive for rhinorrhea.    Eyes: Positive for discharge and itching.   Respiratory: Negative.  Negative for chest tightness and shortness of breath.    Cardiovascular: Negative.  Negative for chest pain.   Gastrointestinal: Negative for abdominal pain, blood in stool, constipation and diarrhea.   Musculoskeletal: Positive for arthralgias and joint swelling.   Neurological: Positive for dizziness.        Vital Signs  There were no vitals filed for this visit.    Physical Exam   General: There is no apparent distress  Cardiopulmonary: No respiratory distress  Neurologic: Alert and oriented x3  Psychiatric: Speech is coherent, thought processes normal    Procedures    ACE III MINI             Assessment/Plan:    Michoacano was seen today for eye drainage, medication problem, knee pain and dizziness.    Diagnoses and all orders for this visit:    Rheumatoid arthritis involving multiple sites with positive rheumatoid factor (CMS/HCC)  -     traMADol (ULTRAM) 50 MG tablet; Take 1 tablet by mouth 2 (Two) Times a Day.    Seasonal allergic rhinitis due to pollen    Benign essential hypertension    Mixed simple and mucopurulent chronic bronchitis (CMS/HCC)    Other orders  -     Azelastine HCl 137 MCG/SPRAY solution; 2 sprays into the nostril(s) as directed by provider Every 12 (Twelve) Hours.  -     Discontinue: Diclofenac Sodium (Pennsaid) 2 % solution; APPLY BY TOPICAL ROUTE 4 TIMES EVERY DAY TO LESION AREAS  -     diclofenac (VOLTAREN) 1 % gel gel; Apply 4 g topically to the appropriate area as directed 4 (Four) Times a Day.    Plan    Predictably, his joint pain is worse even with a modest reduction of prednisone.  He will increase his prednisone dose back to 10 mg a day.  He  will continue hydrocodone.  I have suggested he add tramadol 50 mg twice a day along with naproxen 500 mg twice a day for the next 2 weeks to see if that augments his pain relief.    He is given meclizine for dizziness and his antihistamine nasal spray is renewed.    Blood pressure is stable on bisoprolol    COPD currently is minimally symptomatic.  No specific treatment is indicated.    This visit has been rescheduled as a phone visit to comply with patient safety concerns in accordance with CDC recommendations. Total time of discussion was 7 minutes.    Plan of care reviewed with patient at the conclusion of today's visit. Education was provided regarding diagnosis, management, and any prescribed or recommended OTC medications.Patient verbalizes understanding of and agreement with management plan.         Michael Chavez MD

## 2020-04-09 NOTE — TELEPHONE ENCOUNTER
Patient called and stated that refills are needed for the following prescription(s):    amLODIPine (NORVASC) 10 MG tablet- 90 day supply    Pharmacy: [facility] [address] [facility]-confirmed  PH:[phone]  FX: [phone]    Pt asked how important is it to wear his brace 24 Hrs a day.  Pt states that his pain is slightly eased when it is removed.    Patient callback: [phone]    Please advise

## 2020-04-09 NOTE — TELEPHONE ENCOUNTER
Suggest he leave it off and see how he does.  If the brace does help him with some function, he can wear it as needed.

## 2020-04-22 NOTE — THERAPY DISCHARGE NOTE
Outpatient Rehabilitation - Wound/Debridement Discharge Summary       Patient Name: Michoacano Rojas  : 1940  MRN: [id number]  Today's Date: 2020                  Admit Date: (Not on file)    Visit Dx:    ICD-10-CM ICD-9-CM   1. Multiple open wounds of lower extremity, left, subsequent encounter S81.802D V58.89     894.0   2. Multiple open wounds of lower extremity, right, subsequent encounter S81.801D V58.89     894.0   3. Bilateral lower extremity edema R60.0 782.3       Patient Active Problem List   Diagnosis   • Hematuria   • Prostate hypertrophy   • Rheumatoid arthritis (CMS/HCC)   • Essential hypertension   • Chronic cutaneous venous stasis ulcer (CMS/[facility])   • Very poor mobility   • Large joint arthralgia of multiple sites   • Arthralgia of hands, bilateral   • Generalized stiffness   • Atrial fibrillation with RVR (CMS/HCC)   • Anasarca   • Sepsis (CMS/[facility])   • Cellulitis   • 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   • Tracheal stenosis   • Diastolic dysfunction with chronic heart failure (CMS/HCC)   • Acute on chronic respiratory failure with hypoxia and hypercapnia (CMS/HCC)   • Incarcerated right inguinal hernia   • Venous insufficiency (chronic) (peripheral)   • Benign essential hypertension   • Non-pressure chronic ulcer of left lower leg, with unspecified severity (CMS/HCC)   • Lower extremity edema   • Chronic diastolic heart failure (CMS/HCC)   • Cognitive disorder   • Chronic conjunctivitis of both eyes   • Facial swelling   • Goiter   • Idiopathic chronic gout of multiple sites with tophus   • Medicare annual wellness visit, subsequent   • Morbid obesity (CMS/[facility])   • Major depressive disorder, single episode, mild (CMS/HCC)   • Skin ulcer of right foot, limited to breakdown of skin (CMS/HCC)   • COPD (chronic obstructive pulmonary disease) (CMS/HCC)   • Hypoxia   • sepsis secondary to LEs ulcerations   • AMS (altered mental status)   • Venous stasis ulcer (CMS/HCC)   •  Immunosuppressed status (CMS/HCC)   • Current chronic use of systemic steroids   • Rheumatoid arthritis involving multiple sites (CMS/HCC)   • Demand ischemia of myocardium (CMS/HCC)        Past Medical History:   Diagnosis Date   • A-fib (CMS/HCC)    • Atrial fibrillation (CMS/HCC)    • CHF (congestive heart failure) (CMS/HCC)    • Chronic cutaneous venous stasis ulcer (CMS/HCC)    • Coronary artery disease    • Disease of thyroid gland    • Hypertension    • Penile abnormality    • Peptic ulcer disease    • Primary malignant neoplasm of bronchus with metastasisto other site, unspecified laterality (CMS/HCC) 4/22/2019   • RA (rheumatoid arthritis) (CMS/HCC)    • Rheumatic fever    • Rheumatoid arthritis (CMS/HCC)    • Sepsis (CMS/HCC) 11/17/2017    from cellulitis due to leg ulcer, hospitalization, antibiotics, wound care   • Sleep apnea    • Vertigo    • Vertigo         Past Surgical History:   Procedure Laterality Date   • EYE SURGERY     • JOINT MANIPULATION      left hip repair         EVALUATION                WOUND DEBRIDEMENT                            Recommendation and Plan      Goals  PT OP Goals     Row Name 04/22/20 0900          PT Short Term Goals    STG Date to Achieve  01/27/20  -     STG 1  Decrease c/o pain to <4/10 at all times  -     STG 1 Progress  Partially Met  -     STG 2  Pt will demonstrate 25% reduction in wound dimensions to indicate healing progress.  -     STG 2 Progress  Met  -     STG 3  Pt will demonstrate only minimal exudate to indicate wound healing.  -     STG 3 Progress  Met  -        Long Term Goals    LTG Date to Achieve  02/01/20  -     LTG 1  Pt and/or caregivers will be independent with clean home dressing changes to continue progress upon d/c.  -     LTG 1 Progress  Met  -     LTG 2  Pt will demonstrate 75% reduction in wound dimensions to indicate healing progress.  -     LTG 2 Progress  Ongoing;Not Met  -     LTG 3  Pt will demonstrate scant  wound exudate to indicate healing progress.  -     LTG 3 Progress  Ongoing;Not Met  -       User Key  (r) = Recorded By, (t) = Taken By, (c) = Cosigned By    Initials Name Provider Type    Kiley Damon, PT Physical Therapist          Time Calculation:              OP Discharge Summary     Row Name 04/22/20 0955             OP PT Discharge Summary    Date of Discharge  04/22/20  -      Reason for Discharge  other (comment) Pt has not been treated in greater than 30 days due to COVID-19 epidemic.  Pt will need new MD referral for future tx., if needed.  -      Outcomes Achieved  Patient able to partially acheive established goals  -      Discharge Destination  Home with caregiver assist  -        User Key  (r) = Recorded By, (t) = Taken By, (c) = Cosigned By    Initials Name Provider Type    Kiley Damon, PT Physical Therapist          Kiley Chinchilla, JOSE MANUEL  4/22/2020

## 2020-05-04 NOTE — TELEPHONE ENCOUNTER
I increased his prednisone back to 10 mg a day at last month's telephone visit.  Is that what he is taking?

## 2020-05-04 NOTE — TELEPHONE ENCOUNTER
PT WANTED TO KNOW IF YOU COULD INCREASE HIS PREDISONE AS HIS ARTHRITIS IS FLARING UP AND THIS WOULD GET HIM BACK ON TRACK; PLEASE ADVISE AND HE ALSO SEVERAL QUESTIONS AS WELL..    [facility] [address]

## 2020-05-04 NOTE — TELEPHONE ENCOUNTER
I called patient.  He confirms he is taking 10 mg of prednisone QD.  Wife says the patient continues to get weaker.

## 2020-05-07 NOTE — TELEPHONE ENCOUNTER
Received fax to refill prednisone 10mg. Epic shows an rx for 1mg and 5mg tablets but I also know the RX was changed on 05/04. Please update med module and send in a new order to [facility].

## 2020-05-11 NOTE — TELEPHONE ENCOUNTER
Patient called and stated that refills are needed for the following prescription(s):    amLODIPine (NORVASC) 10 MG tablet    Pharmacy: [facility]-confirmed  PH: [phone]  FX: [phone]    Patient callback: [phone]    Please advise.

## 2020-05-19 NOTE — TELEPHONE ENCOUNTER
[facility] Mail Order, 90 days    Kiley from [facility] states that she also wanted to see when the Palliative Home Health can resume.   She can be reached at [phone] Ext. [phone]    Fax No. [phone]

## 2020-06-05 NOTE — TELEPHONE ENCOUNTER
I can refer him to a different eye doctor if he wants.  I usually recommend Dr Lamar, but [facility] are usually very good

## 2020-06-05 NOTE — TELEPHONE ENCOUNTER
Spoke pt and wife - he has F/U appt with eye doc Tues. Advised to keep appt and if not happy we can refer elsewhere. They voice understanding

## 2020-06-05 NOTE — TELEPHONE ENCOUNTER
PATIENT CALLED AND STATED THAT HE HAD CATARACT SURGERY DONE ON HIS RIGHT EYE AT THE [facility] 6 MONTHS AGO.    HE SAYS HE IS HAVING PROBLEMS WITH THE LEFT EYE WITH VISION ISSUES AND WATERING OF HIS EYE.  [facility] AND THEY WILL BE HAVING A DOCTOR FROM  AT THAT VISIT FOR CONSULT.    PATIENT SAYS HE DOES NOT TRUST THEM AND WANTS TO KNOW WHAT DR. SAVAGE ADVISES.

## 2020-06-11 NOTE — TELEPHONE ENCOUNTER
Patient stated his eye appt went well. He got a shot. Stated the eye doc said everything looks good. Advised patient to quit rubbing his eyes.     Patient also stated he has been on prednisone since he was 15. Has caused side effects after all these years. States his teeth are falling out, he has bruises all over his body. He did mention something else but you could barely hear or understand what the patient was saying. He wants to know if he could come off of the prednisone or do something else.     Please advise.

## 2020-06-11 NOTE — TELEPHONE ENCOUNTER
Every time we try to reduce his prednisone he experiences more pain and swelling and we end up increasing the dose again.  Suggest he just stay on it.

## 2020-06-11 NOTE — TELEPHONE ENCOUNTER
PT STATES THAT EVERYTHING LOOKS GOOD WITH HIS EYE.    DR STATED THAT HE NEEDS TO STOP RUBBING IT.    PT IS REQUESTING A CALL BACK FROM [facility].    PLEASE ADVISE PT @ [phone]

## 2020-06-22 NOTE — TELEPHONE ENCOUNTER
I can send him to a rheumatologist if he wants to but his RA is so far advanced I'm not sure they can do much for him.

## 2020-06-22 NOTE — TELEPHONE ENCOUNTER
Pt states the prednisone is making him very weak. He wants to know if there is a new rheumatologist he can see? He wants to get set up for a video/telephone visit. Message sent to the front

## 2020-06-22 NOTE — TELEPHONE ENCOUNTER
PT CALLED AND S REQUESTING A CALL BACK REGARDING HIS:  predniSONE (DELTASONE) 1 MG tablet    PT STATED IT IS AFFECTING HIS ENTIRE BODY    PLEASE ADVISE AT: [phone]

## 2020-06-23 NOTE — TELEPHONE ENCOUNTER
Spoke with pt and he said that he doesn't want to go to rheumatology if Dr. Chavez doesn't think it will help.

## 2020-06-24 NOTE — TELEPHONE ENCOUNTER
----- Message from Hayley Campos sent at 6/23/2020  2:41 PM EDT -----  Regarding: RE: video / telephone visit  I have spoken w/pt and he stated that he doesn't believe he needs an appt now because Dr Chavez couldn't do anything for him because of the advanced RA.  Does he still need the appt?  ----- Message -----  From: Sera Pacheco MA  Sent: 6/22/2020   5:03 PM EDT  To: [facility] [address] [facility]  Subject: video / telephone visit                          Pt would like to get set up for a video or telephone visit

## 2020-06-25 NOTE — TELEPHONE ENCOUNTER
Pt said he is taking 10 mg of prednisone, but is having a lot of pain. Can he increase his dose or is there something else he can add to it?

## 2020-07-23 NOTE — TELEPHONE ENCOUNTER
PT CALLED REQUESTING TO SCHEDULE A TELEPHONE VISIT     PT STATED HE HAS QUESTIONS REGARDING HIS ARTHRITIS  PT DOES NOT WANT TO COME INTO THE OFFICE    PLEASE ADVISE AT:[phone]

## 2020-07-28 NOTE — PROGRESS NOTES
"[facility]     Michoacano Rojas  1940   [id number]      Patient Care Team:  Michael Chavez MD as PCP - General  Michael Chavez MD as PCP - Family Medicine    Chief Complaint::   Chief Complaint   Patient presents with   • Rheumatoid Arthritis      You have chosen to receive care through a telephone visit. Do you consent to use a telephone visit for your medical care today? Yes      HPI  Mr. Rojas is seen today via telephone visit to discuss his rheumatoid arthritis.  He is now on 10 mg of prednisone a day as well as several topical products for joint pain and stiffness.  His question is about what can be done to make him more functional.  Unfortunately he has had essentially \"burned out\" RA since I have known him.  Prednisone has helped when he had increased joint pain.  He has not been ambulatory for many years.  At home he is basically chair bound and wheelchair bound.  He uses hydrocodone as well for pain but today is more concerned about function then than pain.  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   • Tracheal stenosis   • Diastolic dysfunction with chronic heart failure (CMS/HCC)   • Acute on chronic respiratory failure with hypoxia and hypercapnia (CMS/HCC)   • Incarcerated right inguinal hernia   • Venous insufficiency (chronic) (peripheral)   • Benign essential hypertension   • Non-pressure chronic ulcer of left lower leg, with unspecified severity (CMS/HCC)   • Lower " extremity edema   • Chronic diastolic heart failure (CMS/[facility])   • Cognitive disorder   • Chronic conjunctivitis of both eyes   • Facial swelling   • Goiter   • Idiopathic chronic gout of multiple sites with tophus   • Medicare annual wellness visit, subsequent   • Morbid obesity (CMS/[facility])   • Major depressive disorder, single episode, mild (CMS/[facility])   • Skin ulcer of right foot, limited to breakdown of skin (CMS/[facility])   • COPD (chronic obstructive pulmonary disease) (CMS/[facility])   • Hypoxia   • sepsis secondary to LEs ulcerations   • AMS (altered mental status)   • Venous stasis ulcer (CMS/[facility])   • Immunosuppressed status (CMS/[facility])   • Current chronic use of systemic steroids   • Rheumatoid arthritis involving multiple sites (CMS/[facility])   • Demand ischemia of myocardium (CMS/[facility])        Past Medical History:   Diagnosis Date   • A-fib (CMS/[facility])    • Atrial fibrillation (CMS/[facility])    • CHF (congestive heart failure) (CMS/[facility])    • Chronic cutaneous venous stasis ulcer (CMS/[facility])    • Coronary artery disease    • Disease of thyroid gland    • Hypertension    • Penile abnormality    • Peptic ulcer disease    • Primary malignant neoplasm of bronchus with metastasisto other site, unspecified laterality (CMS/[facility]) 4/22/2019   • RA (rheumatoid arthritis) (CMS/[facility])    • Rheumatic fever    • Rheumatoid arthritis (CMS/[facility])    • Sepsis (CMS/[facility]) 11/17/2017    from cellulitis due to leg ulcer, hospitalization, antibiotics, wound care   • Sleep apnea    • Vertigo    • Vertigo        Past Surgical History:   Procedure Laterality Date   • EYE SURGERY     • JOINT MANIPULATION      left hip repair       Family History   Problem Relation Age of Onset   • Hypertension Mother    • Alzheimer's disease Mother    • Alzheimer's disease Father    • Hypertension Sister    • Lung cancer Brother    • Diabetes Brother        Social History     Socioeconomic History   • Marital status:      Spouse name: Not on file   • Number of children: Not on  file   • Years of education: Not on file   • Highest education level: Not on file   Tobacco Use   • Smoking status: Never Smoker   • Smokeless tobacco: Never Used   • Tobacco comment: Quit 01/01/1968   Substance and Sexual Activity   • Alcohol use: No   • Drug use: No   • Sexual activity: Defer       Allergies   Allergen Reactions   • Naproxen Sodium Other (See Comments)     Increased heart rate  Aleve         Current Outpatient Medications:   •  amLODIPine (NORVASC) 10 MG tablet, Take 1 tablet by mouth Daily., Disp: 90 tablet, Rfl: 1  •  Azelastine HCl 137 MCG/SPRAY solution, 2 sprays into the nostril(s) as directed by provider Every 12 (Twelve) Hours., Disp: 30 mL, Rfl: 5  •  bisoprolol (ZEBeta) 5 MG tablet, TAKE 1/2 TABLET EVERY DAY, Disp: 45 tablet, Rfl: 2  •  Camphor-Menthol-Methyl Sal (HM SALONPAS PAIN RELIEF) 1.2-5.7-6.3 % patch, Apply  topically to the appropriate area as directed As Needed., Disp: , Rfl:   •  cholecalciferol (VITAMIN D3) 1000 UNITS tablet, Take 2,000 Units by mouth Daily., Disp: , Rfl:   •  diclofenac (VOLTAREN) 1 % gel gel, Apply 4 g topically to the appropriate area as directed 4 (Four) Times a Day., Disp: 100 g, Rfl: 2  •  docusate sodium (COLACE) 50 MG capsule, Take 100 mg by mouth Every Night., Disp: , Rfl:   •  Elastic Bandages & Supports (HERNIA BELT DOUBLE LARGE) misc, For right inguinal hernia, Disp: 1 each, Rfl: 0  •  finasteride (PROSCAR) 5 MG tablet, TAKE 1 TABLET EVERY DAY, Disp: 90 tablet, Rfl: 1  •  fluticasone (FLONASE) 50 MCG/ACT nasal spray, 2 sprays into the nostril(s) as directed by provider 2 (Two) Times a Day As Needed., Disp: , Rfl:   •  HYDROcodone-acetaminophen (NORCO)  MG per tablet, 1 tablet. Can take up to 4 daily, Disp: , Rfl:   •  predniSONE (DELTASONE) 1 MG tablet, Take 4 tablets a day with one 5 mg tablet., Disp: 120 tablet, Rfl: 1  •  predniSONE (DELTASONE) 5 MG tablet, Take 3 tablets by mouth Daily., Disp: 90 tablet, Rfl: 5  •  traMADol (ULTRAM) 50 MG  tablet, Take 1 tablet by mouth 2 (Two) Times a Day., Disp: 60 tablet, Rfl: 1    Review of Systems   Constitutional: Negative.    Respiratory: Negative.  Negative for chest tightness and shortness of breath.    Cardiovascular: Negative.  Negative for chest pain.   Gastrointestinal: Negative for abdominal pain, blood in stool, constipation and diarrhea.   Musculoskeletal: Positive for arthralgias and joint swelling.        Vital Signs  There were no vitals filed for this visit.    Physical Exam   Constitutional: He is oriented to person, place, and time.   Pulmonary/Chest: No respiratory distress.   Neurological: He is alert and oriented to person, place, and time.   Psychiatric: He has a normal mood and affect.      Procedures    ACE III MINI             Assessment/Plan:    Michoacano was seen today for rheumatoid arthritis.    Diagnoses and all orders for this visit:    Rheumatoid arthritis involving multiple sites with positive rheumatoid factor (CMS/[facility])    Other orders  -     predniSONE (DELTASONE) 5 MG tablet; Take 3 tablets by mouth Daily.    Plan    Unfortunately there is no magic here.  Long-term use of a higher dose of prednisone could be dangerous and not really helped much.  He is past the point of active synovitis where DMARD therapy would be helpful.  He may increase his prednisone to 30 mg a day for 3 days then reduce to 15 mg a day.  If that is helpful, he may do that once or twice a month.    This visit has been rescheduled as a phone visit to comply with patient safety concerns in accordance with CDC recommendations. Total time of discussion was 8 minutes.        Plan of care reviewed with patient at the conclusion of today's visit. Education was provided regarding diagnosis, management, and any prescribed or recommended OTC medications.Patient verbalizes understanding of and agreement with management plan.         Michael Chavez MD

## 2020-08-05 NOTE — TELEPHONE ENCOUNTER
I had suggested that he increase prednisone dose to 30 mg a day just to see if it helped.  Since it hasn't, he should reduce prednisone back to 10-15 mg a day.

## 2020-08-05 NOTE — TELEPHONE ENCOUNTER
Patient called and stated he was prescribed a steroid and was told to call back and report how the medication was doing in a few days. The patient states it has been a few days and he has not noticed much of a change. He states that he feels like he is taking the medication for no reason because it is not helping. Please advise.     Patient call back [phone]

## 2020-08-14 NOTE — TELEPHONE ENCOUNTER
Please let them know that I think wound care would be a good idea.  I can make referral if ok with them.

## 2020-08-14 NOTE — TELEPHONE ENCOUNTER
PATIENT CALLED AND STATED THAT HE CALLED 911 THIS MORNING DUE TO HAVING RIGHT  LEG PAIN AND COULD HARDLY MOVE HIS LEG.  HE SAID THE EMS TOOK HIS VITALS BUT DID NOT CHECK HIS LEG AND THEY STATED THEY FOUND NO REASON TO TAKE HIM TO THE HOSPITAL.    PATIENT STATES HE HAS A LARGE HOLE IN THE BACK OF HIS RIGHT KNEE WRAPPED WITH SOME SLIGHT PINK DRAINAGE AND TAKES PREDNISONE 10 MG DAILY.  HE SAYS HIS LEG IS SLIGHTLY SWOLLEN AND HIS PAIN ON A SCALE OF 0-10 IS 30.    HIS WIFE THEN GOT ON THE PHONE AND ASKED IF HE NEEDS TO GO TO THE WOUND CLINIC LIKE HE USED TO.  PATIENT AND WIFE WERE VAGUE ON SOME SYMPTOMS.    HE SAID HE COULD POSSIBLY COME IN IF HE CAN GET HIS SON TO BRING HIM IN.  PLEASE ADVISE  [phone]

## 2020-08-26 NOTE — THERAPY EVALUATION
Outpatient Rehabilitation - Wound/Debridement Initial Eval  [facility]     Patient Name: Michoacano Rojas  : 1940  MRN: [id number]  Today's Date: 2020                  Admit Date: 2020    Visit Dx:    ICD-10-CM ICD-9-CM   1. Multiple open wounds of lower extremity, left, subsequent encounter S81.802D V58.89     894.0   2. Multiple open wounds of lower extremity, right, subsequent encounter S81.801D V58.89     894.0   3. 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   • Tracheal stenosis   • Diastolic dysfunction with chronic heart failure (CMS/HCC)   • Acute on chronic respiratory failure with hypoxia and hypercapnia (CMS/HCC)   • Incarcerated right inguinal hernia   • Venous insufficiency (chronic) (peripheral)   • Benign essential hypertension   • Non-pressure chronic ulcer of left lower leg, with unspecified severity (CMS/HCC)   • Lower extremity edema   • Chronic diastolic heart failure (CMS/HCC)   • Cognitive disorder   • Chronic conjunctivitis of both eyes   • Facial swelling   • Goiter   • Idiopathic chronic gout of multiple sites with tophus   • Medicare annual wellness visit, subsequent   • Morbid obesity (CMS/HCC)   • Major depressive disorder, single episode, mild (CMS/HCC)   • Skin ulcer of right foot, limited to breakdown of skin (CMS/HCC)   • COPD (chronic obstructive pulmonary disease) (CMS/HCC)   • Hypoxia   • sepsis secondary to LEs ulcerations   • AMS (altered mental status)   • Venous stasis ulcer (CMS/HCC)   •  Immunosuppressed status (CMS/[facility])   • Current chronic use of systemic steroids   • Rheumatoid arthritis involving multiple sites (CMS/[facility])   • Demand ischemia of myocardium (CMS/[facility])        Past Medical History:   Diagnosis Date   • A-fib (CMS/[facility])    • Atrial fibrillation (CMS/[facility])    • CHF (congestive heart failure) (CMS/[facility])    • Chronic cutaneous venous stasis ulcer (CMS/[facility])    • Coronary artery disease    • Disease of thyroid gland    • Hypertension    • Penile abnormality    • Peptic ulcer disease    • Primary malignant neoplasm of bronchus with metastasisto other site, unspecified laterality (CMS/[facility]) 4/22/2019   • RA (rheumatoid arthritis) (CMS/[facility])    • Rheumatic fever    • Rheumatoid arthritis (CMS/[facility])    • Sepsis (CMS/[facility]) 11/17/2017    from cellulitis due to leg ulcer, hospitalization, antibiotics, wound care   • Sleep apnea    • Vertigo    • Vertigo         Past Surgical History:   Procedure Laterality Date   • EYE SURGERY     • JOINT MANIPULATION      left hip repair       Patient History     Row Name 08/25/20 1447             History    Chief Complaint  Ulcer, wound or other skin conditions;Swelling;Pain  -      Type of Pain  Lower Extremity / Leg  -      Brief Description of Current Complaint  Pt returns to PT wound care with the same BLE ulcerations as noted in previous episodes of care. Pt has noticed more pain and drainage from the posterior R knee wound. Family has been redressing the wounds with available supplies 1-2x/week. Reports they ran out of most supplies, but have been making due with what they could find.  -      Previous treatment for THIS PROBLEM  Other (comment) PT wound care  -      Patient/Caregiver Goals  Heal wound;Decrease swelling;Relieve pain  -      Patient's Rating of General Health  Fair  -      Occupation/sports/leisure activities  Retired  -      Patient seeing anyone else for problem(s)?  No  -      Related/Recent Hospitalizations  No  -         Pain      Pain Location  Leg right  -MC      Pain at Present  6  -MC         Services    Are you currently receiving Home Health services  No  -      Do you plan to receive Home Health services in the near future  No  -         Daily Activities    Primary Language  English  -      How does patient learn best?  Listening;Demonstration  -      Teaching needs identified  Management of Condition  -      Patient is concerned about/has problems with  Other (comment) wound, edema management  -      Does patient have problems with the following?  None  -      Barriers to learning  None  -      Recommended Referrals  Physician Likely needs PCP follow up, c/o pain and may need referral  -      Pt Participated in POC and Goals  Yes  -         Safety    Are you being hurt, hit, or frightened by anyone at home or in your life?  No  -MC      Are you being neglected by a caregiver  No  -        User Key  (r) = Recorded By, (t) = Taken By, (c) = Cosigned By    Initials Name Provider Type    Mary Casiano PT Physical Therapist          EVALUATION  PT Ortho     Row Name 08/25/20 1445       Subjective Pain    Able to rate subjective pain?  yes  -    Pre-Treatment Pain Level  6  -    Post-Treatment Pain Level  4  -MC    Subjective Pain Comment  R knee and heel, all areas improved after tx  -MC       Transfers    Comment (Transfers)  in w/c for tx, to/from dept with spouse  -      User Key  (r) = Recorded By, (t) = Taken By, (c) = Cosigned By    Initials Name Provider Type    Mary Casiano PT Physical Therapist        LDA Wound     Row Name 08/25/20 1445             [REMOVED] Wound 11/03/19 1100 Right posterior;lateral leg Venous Ulcer    Wound - Properties Group Date first assessed: 11/03/19  - Time first assessed: 1100  -MF Side: Right  -MF Orientation: posterior;lateral  -MF Location: leg  -MF Primary Wound Type: Venous ulcer  -MF Resolution Date: 08/25/20  - Resolution Time: 1445  -MC Wound  Outcome: Unknown  -MC       [REMOVED] Wound 11/03/19 1100 Left medial leg Venous Ulcer    Wound - Properties Group Date first assessed: 11/03/19  -MF Time first assessed: 1100  -MF Side: Left  -MF Orientation: medial  -MF Location: leg  -MF Primary Wound Type: Venous ulcer  -MF Resolution Date: 08/25/20  -MC Resolution Time: 1445  -MC Wound Outcome: Unknown  -MC       [REMOVED] Wound 11/03/19 1100 Left lateral leg Venous Ulcer    Wound - Properties Group Date first assessed: 11/03/19  -MF Time first assessed: 1100  -MF Side: Left  -MF Orientation: lateral  -MF Location: leg  -MF Primary Wound Type: Venous ulcer  -MF Resolution Date: 08/25/20  -MC Resolution Time: 1445  -MC Wound Outcome: Unknown  -MC       [REMOVED] Wound 11/10/19 1100 Right posterior;medial leg Venous Ulcer    Wound - Properties Group Date first assessed: 11/10/19  - Time first assessed: 1100  -JM Present on Hospital Admission: Y  -JM Side: Right  -JM Orientation: posterior;medial  -JM Location: leg  -JM Primary Wound Type: Venous ulcer  -JM Resolution Date: 08/25/20  -MC Resolution Time: 1445  -MC Wound Outcome: Unknown  -MC       Wound 08/25/20 1445 Right lateral;distal leg Venous Ulcer    Wound - Properties Group Date first assessed: 08/25/20  -MC Time first assessed: 1445  -MC Present on Hospital Admission: Y  -MC Side: Right  -MC Orientation: lateral;distal  - Location: leg  - Primary Wound Type: Venous ulcer  -MC    Wound Image  Images linked: 1  -      Dressing Appearance  intact;moist drainage  -      Base  pink;red;moist small open area surrounded by macerated skin  -      Periwound  moist;pale white;macerated  -      Periwound Temperature  warm  -      Periwound Skin Turgor  firm  -      Edges  irregular;open  -      Wound Length (cm)  1.4 cm  -      Wound Width (cm)  2 cm  -      Wound Depth (cm)  0.1 cm  -      Drainage Characteristics/Odor  serosanguineous  -      Drainage Amount  small  -      Care, Wound   cleansed with;wound cleanser;debrided  -MC      Dressing Care, Wound  dressing applied;antimicrobial agent applied;foam;multi-layer wrap HFBt, qwick, kerlix, MLW  -MC      Periwound Care, Wound  cleansed with pH balanced cleanser;barrier ointment applied zguard  -         Wound 08/25/20 1445 Right lateral leg Venous Ulcer    Wound - Properties Group Date first assessed: 08/25/20  - Time first assessed: 1445  - Present on Hospital Admission: Y  - Side: Right  - Orientation: lateral  - Location: leg  - Primary Wound Type: Venous ulcer  -    Wound Image  Images linked: 1  -MC      Dressing Appearance  intact;moist drainage  -MC      Base  moist;pink;red  -MC      Periwound  intact;moist;macerated;pale white  -      Periwound Temperature  warm  -MC      Periwound Skin Turgor  soft  -MC      Edges  irregular;open  -      Wound Length (cm)  3.2 cm  -      Wound Width (cm)  1 cm  -      Wound Depth (cm)  0.1 cm  -      Drainage Characteristics/Odor  serosanguineous;sanguineous  -      Drainage Amount  small  -MC      Care, Wound  cleansed with;wound cleanser;debrided  -MC      Dressing Care, Wound  dressing applied;border dressing;foam;other (see comments);multi-layer wrap HFBt, qwick, kerlix, MLW  -MC      Periwound Care, Wound  cleansed with pH balanced cleanser;barrier ointment applied [facility]  -         Wound 08/25/20 1445 Right posterior popliteal Venous Ulcer    Wound - Properties Group Date first assessed: 08/25/20  - Time first assessed: 1445  - Present on Hospital Admission: Y  - Side: Right  - Orientation: posterior  - Location: popliteal  - Primary Wound Type: Venous ulcer  -    Wound Image  Images linked: 1  -MC      Dressing Appearance  intact;moist drainage  -MC      Base  moist;pink;red;yellow;slough;subcutaneous  -MC      Periwound  intact;dry;moist  -MC      Periwound Temperature  warm  -MC      Periwound Skin Turgor  soft  -MC      Edges  irregular;open  -MC       Wound Length (cm)  2.3 cm  -MC      Wound Width (cm)  2 cm  -MC      Wound Depth (cm)  0.5 cm  -MC      Drainage Characteristics/Odor  serosanguineous  -MC      Drainage Amount  moderate  -MC      Care, Wound  cleansed with;wound cleanser;debrided  -MC      Dressing Care, Wound  dressing applied;antimicrobial agent applied;foam;silver impregnated;low-adherent;multi-layer wrap HFBt, optifoam Ag, MLW  -MC      Periwound Care, Wound  cleansed with pH balanced cleanser;barrier ointment applied zguard  -         Wound 08/25/20 1445 Left medial;distal leg Venous Ulcer    Wound - Properties Group Date first assessed: 08/25/20  - Time first assessed: 1445  - Present on Hospital Admission: Y  - Side: Left  - Orientation: medial;distal  - Location: leg  - Primary Wound Type: Venous ulcer  -    Wound Image  Images linked: 1  -      Dressing Appearance  intact;moist drainage  -      Base  moist;pink;red;yellow;slough;other (see comments) areas of hypergranulation  -      Periwound  moist;swelling;other (see comments) extensive hypertrophic crust  -      Periwound Temperature  warm  -      Periwound Skin Turgor  soft  -      Edges  irregular;open  -MC      Wound Length (cm)  7.5 cm  -MC      Wound Width (cm)  7 cm  -      Wound Depth (cm)  0.1 cm raised in some areas  -MC      Drainage Characteristics/Odor  serosanguineous;serous  -MC      Drainage Amount  large  -MC      Care, Wound  cleansed with;wound cleanser;debrided  -MC      Dressing Care, Wound  dressing applied;antimicrobial agent applied;foam;other (see comments);multi-layer wrap HFBt, qwick, kerlix, MLW  -MC      Periwound Care, Wound  cleansed with pH balanced cleanser;barrier ointment applied zguard  -         Wound 08/25/20 1445 Left medial leg Venous Ulcer    Wound - Properties Group Date first assessed: 08/25/20  - Time first assessed: 1445  - Present on Hospital Admission: Y  - Side: Left  - Orientation: [facility]  -  Location: leg  - Primary Wound Type: Venous ulcer  -    Wound Image  Images linked: 1  -MC      Dressing Appearance  intact;moist drainage  -MC      Base  moist;pink;red  -MC      Periwound  intact;moist  -MC      Periwound Temperature  warm  -MC      Periwound Skin Turgor  soft  -MC      Edges  irregular;open  -MC      Wound Length (cm)  3.7 cm  -MC      Wound Width (cm)  3.5 cm  -MC      Wound Depth (cm)  0.1 cm  -MC      Drainage Characteristics/Odor  serosanguineous  -MC      Drainage Amount  moderate  -MC      Care, Wound  cleansed with;wound cleanser;debrided  -MC      Dressing Care, Wound  dressing applied;antimicrobial agent applied;foam;other (see comments);multi-layer wrap HFBt, qwick, kerlix, MLW  -MC      Periwound Care, Wound  cleansed with pH balanced cleanser;barrier ointment applied zguard  -         Wound 08/25/20 1445 Left lateral;distal leg Venous Ulcer    Wound - Properties Group Date first assessed: 08/25/20  - Time first assessed: 1445  - Present on Hospital Admission: Y  - Side: Left  - Orientation: lateral;distal  - Location: leg  - Primary Wound Type: Venous ulcer  -    Wound Image  Images linked: 1  -MC      Dressing Appearance  intact;moist drainage  -MC      Base  moist;pink;red;yellow;slough  -MC      Periwound  intact;dry;pink;other (see comments) hypertrophic crust  -MC      Periwound Temperature  warm  -MC      Periwound Skin Turgor  soft  -MC      Edges  irregular;open  -MC      Wound Length (cm)  3.2 cm  -MC      Wound Width (cm)  4.7 cm  -MC      Wound Depth (cm)  0.1 cm  -MC      Drainage Characteristics/Odor  serosanguineous;serous  -MC      Drainage Amount  large  -MC      Care, Wound  cleansed with;wound cleanser;debrided  -MC      Dressing Care, Wound  dressing applied;antimicrobial agent applied;foam;other (see comments);multi-layer wrap HFBt, qwick, kerlix, MLW  -MC      Periwound Care, Wound  cleansed with pH balanced cleanser;barrier ointment applied  zguard  -        User Key  (r) = Recorded By, (t) = Taken By, (c) = Cosigned By    Initials Name Provider Type    Arsalan Napoles, PT Physical Therapist    Mary Casiano, PT Physical Therapist    Mary Casiano, PT Physical Therapist    Kiley Damon, PT Physical Therapist        Lymphedema     Row Name 08/25/20 1446             Lymphedema Edema Assessment    Ptting Edema Category  By severity  -      Pitting Edema  Mild;Moderate  -      Edema Assessment Comment  mild LLE and distal RLE, mod middle/proximal RLE  -         Skin Changes/Observations    Lower Extremity Conditions  bilateral:;clean;dry;shiny;hairless;scaly;inflamed;fragile  -      Lower Extremity Color/Pigment  bilateral:;hyperpigmented;erythema  -         Lymphedema Pulses/Capillary Refill    Lower Extremity Capillary Refill  right:;left:;less than 3 seconds  -         Compression/Skin Care    Skin Care  washed/dried  -      Wrapping Location  lower extremity  -      Wrapping Location LE  bilateral:;foot to knee  -      Wrapping Comments  wound dressings, kerlix, comprilans size 8cm and 12 cm in overlapping layers to create gradient compression.  -      Bandage Layers  padding/fluff layer;short-stretch bandages (comment size/quantity)  -      Bandaging Technique  figure-eight;light compression  -        User Key  (r) = Recorded By, (t) = Taken By, (c) = Cosigned By    Initials Name Provider Type    Mary Casiano, PT Physical Therapist          WOUND DEBRIDEMENT  Total area of Debridement: ~50 cm2  Debridement Site 1  Location- Site 1: BLE wounds/periwounds  Selective Debridement- Site 1: Wound Surface >20cmsq  Instruments- Site 1: tweezers  Excised Tissue Description- Site 1: moderate, slough, maximum, other (comment)(periwound crusting/nonviable hypertrophic skin)  Bleeding- Site 1: none             Therapy Education     Row Name 08/25/20 7098             Therapy Education    Education  Details  Reviewed s/sx of infection and importance of leg elevation. Call MD with any new concerns or concern for infection. Reapply dressings/wraps every 2-3 days or more often if needed d/t drainage. Apply zguard around periwounds and dress all open areas with HFBt. Place qwick in areas where there is more drainage, such as the bilateral L ankle wounds. Wrap in kerlix and reapply comprilans. May apply optifoam Ag over R heel skin flap. Re-emphasized that the R heel area is not actually open at this time, and the skin flap will not likely resolve without surgical intervention.  -MC      Given  Symptoms/condition management;Bandaging/dressing change  -      Program  New  -MC      How Provided  Verbal;Demonstration  -MC      Provided to  Patient;Caregiver spouse  -      Level of Understanding  Verbalized  -        User Key  (r) = Recorded By, (t) = Taken By, (c) = Cosigned By    Initials Name Provider Type    Mary Casiano, PT Physical Therapist          Recommendation and Plan  PT Assessment/Plan     Row Name 08/25/20 3712          PT Assessment    Functional Limitations  Decreased safety during functional activities;Impaired gait;Impaired locomotion;Limitation in home management;Limitations in community activities;Limitations in functional capacity and performance;Performance in leisure activities;Performance in self-care ADL;Other (comment) wound, edema management  -     Impairments  Integumentary integrity;Pain;Edema  -     Assessment Comments  Pt returns to PT wound care with multiple open wounds to both LEs, mostly known from his previous episode of care. There is some minor foul odor, but this appears to be more from drainage and the wraps being in place for several days vs. any s/sx of infection. The open areas are mostly clean, moist, and red/pink after debridement, with some slough noted to the posterior R knee wound and some hypergranulation to the medial/distal LLE wound. Pt with  extensive periwound crusting that required debridement as well. Pt with mild/moderate BLE edema that is baseline for the patient. The pt has a complex presentation due to multiple comorbidities, physical debilitation that limits his ability to perform any leg elevation or self-care, and chronicity of the wound areas. Pt will be limited in his progress by limited ability to present to OP appointments d/t transportation issues. However, his family is very supportive and willing to help perform dressing changes at home. Pt will benefit from skilled PT wound care for debridement, dressings management, and edema management.  -     Rehab Potential  Fair  -     Patient/caregiver participated in establishment of treatment plan and goals  Yes  -     Patient would benefit from skilled therapy intervention  Yes  -        PT Plan    PT Frequency  1x/week weekends only d/t limited transportation  -     Predicted Duration of Therapy Intervention (Therapy Eval)  20 visits  -     Planned CPT's?  PT EVAL HIGH COMPLEXITY: 22373;PT SELF CARE/MGMT/TRAIN 15 MIN: 77241;PT NONSELECT DEBRIDE 15 MIN: 41739;PT HERNAN DEBRIDE OPEN WOUND UP TO 20 CM: 62074;PT HERNAN DEBRIDE OPEN WOUND EA ADD 20 CM: 84550;PT UNNA BOOT: 06705;PT MULTI LAYER COMP SYS LE  -     Physical Therapy Interventions (Optional Details)  patient/family education;wound care  -     PT Plan Comments  debridement, dressings, MLW, education.  -       User Key  (r) = Recorded By, (t) = Taken By, (c) = Cosigned By    Initials Name Provider Type     Mary De Paz, PT Physical Therapist            Goals  PT OP Goals     Row Name 20 1445          PT Short Term Goals    STG 1  Pt will verbalize s/sx of infection.  -     STG 1 Progress  New  -     STG 2  Pt will demonstrate 25% reduction in wound dimensions to indicate healing progress.  -     STG 2 Progress  New  -     STG 3  Pt will demonstrate only minimal exudate to indicate wound healing.   -     STG 3 Progress  New  -        Long Term Goals    LTG 1  Pt and/or caregivers will be independent with clean home dressing changes to continue progress upon d/c.  -     LTG 1 Progress  New  -     LTG 2  Pt will demonstrate 75% reduction in wound dimensions to indicate healing progress.  -     LTG 2 Progress  New  -     LTG 3  Pt will demonstrate scant wound exudate to indicate healing progress.  -     LTG 3 Progress  New  -        Time Calculation    PT Goal Re-Cert Due Date  11/23/20  -       User Key  (r) = Recorded By, (t) = Taken By, (c) = Cosigned By    Initials Name Provider Type    Mary Casiano, PT Physical Therapist          Time Calculation: Start Time: 1445  Therapy Charges for Today     Code Description Service Date Service Provider Modifiers Qty    76964011140 HC PT EVAL HIGH COMPLEXITY 4 8/25/2020 Mary De Paz, PT GP 1    03043362323 HC HERNAN DEBRIDE OPEN WOUND UP TO 20CM 8/25/2020 Mary De Paz, PT GP 1    99666474026 HC PT MULTI LAYER COMP SYS BELOW KNEE 8/25/2020 Mary De Paz, PT GP 1    [id number] HC PT HERNAN DEBRIDE OPEN WOUND EA ADD 20CM 8/25/2020 Mary De Paz, PT GP 2                Mary De Paz PT  8/26/2020

## 2020-08-30 NOTE — THERAPY WOUND CARE TREATMENT
Outpatient Rehabilitation - Wound/Debridement Treatment Note  [facility]     Patient Name: Michoacano Rojas  : 1940  MRN: [id number]  Today's Date: 2020                 Admit Date: 2020    Visit Dx:    ICD-10-CM ICD-9-CM   1. Multiple open wounds of lower extremity, left, subsequent encounter S81.802D V58.89     894.0   2. Multiple open wounds of lower extremity, right, subsequent encounter S81.801D V58.89     894.0   3. 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   • Tracheal stenosis   • Diastolic dysfunction with chronic heart failure (CMS/HCC)   • Acute on chronic respiratory failure with hypoxia and hypercapnia (CMS/HCC)   • Incarcerated right inguinal hernia   • Venous insufficiency (chronic) (peripheral)   • Benign essential hypertension   • Non-pressure chronic ulcer of left lower leg, with unspecified severity (CMS/HCC)   • Lower extremity edema   • Chronic diastolic heart failure (CMS/HCC)   • Cognitive disorder   • Chronic conjunctivitis of both eyes   • Facial swelling   • Goiter   • Idiopathic chronic gout of multiple sites with tophus   • Medicare annual wellness visit, subsequent   • Morbid obesity (CMS/HCC)   • Major depressive disorder, single episode, mild (CMS/HCC)   • Skin ulcer of right foot, limited to breakdown of skin (CMS/HCC)   • COPD (chronic obstructive pulmonary disease) (CMS/HCC)   • Hypoxia   • sepsis secondary to LEs ulcerations   • AMS (altered mental status)   • Venous stasis ulcer (CMS/HCC)   •  Immunosuppressed status (CMS/HCC)   • Current chronic use of systemic steroids   • Rheumatoid arthritis involving multiple sites (CMS/HCC)   • Demand ischemia of myocardium (CMS/HCC)        Past Medical History:   Diagnosis Date   • A-fib (CMS/HCC)    • Atrial fibrillation (CMS/HCC)    • CHF (congestive heart failure) (CMS/[facility])    • Chronic cutaneous venous stasis ulcer (CMS/HCC)    • Coronary artery disease    • Disease of thyroid gland    • Hypertension    • Penile abnormality    • Peptic ulcer disease    • Primary malignant neoplasm of bronchus with metastasisto other site, unspecified laterality (CMS/[facility]) 4/22/2019   • RA (rheumatoid arthritis) (CMS/[facility])    • Rheumatic fever    • Rheumatoid arthritis (CMS/HCC)    • Sepsis (CMS/[facility]) 11/17/2017    from cellulitis due to leg ulcer, hospitalization, antibiotics, wound care   • Sleep apnea    • Vertigo    • Vertigo         Past Surgical History:   Procedure Laterality Date   • EYE SURGERY     • JOINT MANIPULATION      left hip repair         EVALUATION  PT Ortho     Row Name 08/30/20 1100       Subjective Comments    Subjective Comments  Pt with moderate c/o pain to B heels, with increased c/o pain with wound to post R knee.   -MF       Subjective Pain    Able to rate subjective pain?  yes  -MF    Pre-Treatment Pain Level  6  -MF    Post-Treatment Pain Level  6  -MF    Subjective Pain Comment  R post knee  -MF       Transfers    Comment (Transfers)  in w/c for tx, to/from dept with spouse  -MF      User Key  (r) = Recorded By, (t) = Taken By, (c) = Cosigned By    Initials Name Provider Type    Arsalan Napoles, PT Physical Therapist          LDA Wound     Row Name 08/30/20 1100             Wound 08/25/20 1445 Right lateral;distal leg Venous Ulcer    Wound - Properties Group Date first assessed: 08/25/20  - Time first assessed: 1445  - Present on Hospital Admission: Y  -MC Side: Right  -MC Orientation: lateral;distal  - Location: leg  -MC Primary Wound  Type: Venous ulcer  -MC    Dressing Appearance  intact;moist drainage  -MF      Base  pink;red;moist  -MF      Periwound  moist;pale white;macerated  -MF      Periwound Temperature  warm  -MF      Periwound Skin Turgor  firm  -MF      Edges  irregular;open  -MF      Drainage Characteristics/Odor  serosanguineous  -MF      Drainage Amount  moderate  -MF      Care, Wound  irrigated with;sterile normal saline;debrided;cleansed with;soap and water  -MF      Dressing Care, Wound  foam;low-adherent HFBc with qwick foam to cover and kerlix to secure  -MF      Periwound Care, Wound  barrier ointment applied;cleansed with pH balanced cleanser  -         Wound 08/25/20 1445 Right lateral leg Venous Ulcer    Wound - Properties Group Date first assessed: 08/25/20  [facility] [place] Time first assessed: 1445  - Present on Hospital Admission: Y  - Side: Right  - Orientation: lateral  - Location: leg  - Primary Wound Type: Venous ulcer  -MC    Dressing Appearance  intact;moist drainage  -MF      Base  moist;pink;red  -MF      Periwound  intact;moist;macerated;pale white  -MF      Periwound Temperature  warm  -MF      Periwound Skin Turgor  soft  -MF      Edges  irregular;open  -MF      Drainage Characteristics/Odor  serosanguineous;sanguineous  -MF      Drainage Amount  moderate  -MF      Care, Wound  irrigated with;debrided;cleansed with;soap and water  -MF      Dressing Care, Wound  foam;low-adherent HFBc with qwick foam and kerlix to secure  -MF      Periwound Care, Wound  dry periwound area maintained;cleansed with pH balanced cleanser  -         Wound 08/25/20 1445 Right posterior popliteal Venous Ulcer    Wound - Properties Group Date first assessed: 08/25/20  [facility] [place] Time first assessed: 1445 - Present on Hospital Admission: Y  - Side: Right  - Orientation: posterior  - Location: popliteal  - Primary Wound Type: Venous ulcer  -MC    Dressing Appearance  intact;moist drainage  -MF      Base   moist;pink;red;yellow;slough;subcutaneous  -MF      Periwound  intact;dry;moist  -MF      Periwound Temperature  warm  -MF      Periwound Skin Turgor  soft  -MF      Edges  irregular;open  -MF      Drainage Characteristics/Odor  serosanguineous  -MF      Drainage Amount  moderate  -MF      Care, Wound  irrigated with;sterile normal saline;cleansed with;soap and water  -MF      Dressing Care, Wound  foam;low-adherent HFBc with qwick foam and kerlix to secure  -MF      Periwound Care, Wound  cleansed with pH balanced cleanser;dry periwound area maintained  -MF         Wound 08/25/20 1445 Left medial;distal leg Venous Ulcer    Wound - Properties Group Date first assessed: 08/25/20  [facility] [place] [facility] [place] first assessed: 144  - Present on Hospital Admission: Y  - Side: Left  - Orientation: medial;distal  - Location: leg  - Primary Wound Type: Venous ulcer  -MC    Dressing Appearance  intact;moist drainage  -MF      Base  moist;pink;red;yellow;slough;other (see comments)  -MF      Periwound  moist;swelling  -MF      Periwound Temperature  warm  -MF      Periwound Skin Turgor  soft  -MF      Edges  irregular;open  -MF      Drainage Characteristics/Odor  serosanguineous;serous  -MF      Drainage Amount  large  -MF      Care, Wound  irrigated with;sterile normal saline;cleansed with;soap and water;debrided  -MF         Wound 08/25/20 1445 Left medial leg Venous Ulcer    Wound - Properties Group Date first assessed: 08/25/20  - Time first assessed: 1445  - Present on Hospital Admission: Y  - Side: Left  - Orientation: medial  - Location: leg  - Primary Wound Type: Venous ulcer  -MC    Dressing Appearance  intact;moist drainage  -MF      Base  moist;pink;red  -MF      Periwound  intact;moist  -MF      Periwound Temperature  warm  -MF      Periwound Skin Turgor  soft  -MF      Edges  irregular;open  -MF      Drainage Characteristics/Odor  serosanguineous  -MF      Drainage Amount  moderate  -MF      Care, Wound   irrigated with;sterile normal saline;cleansed with;soap and water;debrided  -MF      Dressing Care, Wound  foam;low-adherent HFBc with qwick foam and kerlix to secure  -MF      Periwound Care, Wound  cleansed with pH balanced cleanser;dry periwound area maintained  -MF         Wound 08/25/20 1445 Left lateral;distal leg Venous Ulcer    Wound - Properties Group Date first assessed: 08/25/20  [facility] [place] [place] Time first assessed: 1445  - Present on Hospital Admission: Y  - Side: Left  - Orientation: lateral;distal  - Location: leg  - Primary Wound Type: Venous ulcer  -    Dressing Appearance  intact;moist drainage  -MF      Base  moist;pink;red;yellow;slough  -MF      Periwound  intact;dry;pink;other (see comments)  -MF      Periwound Temperature  warm  -      Periwound Skin Turgor  soft  -MF      Edges  irregular;open  -MF      Drainage Characteristics/Odor  serosanguineous;serous  -MF      Drainage Amount  large  -MF      Care, Wound  irrigated with;sterile normal saline;cleansed with;soap and water;debrided  -MF      Dressing Care, Wound  foam;low-adherent HFBc with qwick foam and kerlix to secure  -MF      Periwound Care, Wound  cleansed with pH balanced cleanser;dry periwound area maintained  -        User Key  (r) = Recorded By, (t) = Taken By, (c) = Cosigned By    Initials Name Provider Type     Arsalan Langford, PT Physical Therapist    Mary Casiano, PT Physical Therapist        Lymphedema     Row Name 08/30/20 1100             Lymphedema Edema Assessment    Ptting Edema Category  By severity  -      Pitting Edema  Severe;Moderate  -         Skin Changes/Observations    Location/Assessment  Lower Extremity  -      Lower Extremity Conditions  bilateral:;clean;dry;shiny;hairless;scaly;inflamed;fragile  -      Lower Extremity Color/Pigment  bilateral:;hyperpigmented;erythema  -         Compression/Skin Care    Skin Care  washed/dried  -      Wrapping Location  lower extremity  -       Wrapping Location LE  bilateral:;foot to knee  -      Wrapping Comments  HFBt and qwick foam to cover with zguard to periwound area. kerlix to secure dressings with 10cm conform to cover  -      Bandage Layers  padding/fluff layer;short-stretch bandages (comment size/quantity)  -      Bandaging Technique  figure-eight;light compression  -        User Key  (r) = Recorded By, (t) = Taken By, (c) = Cosigned By    Initials Name Provider Type    MF Arsalan Langford, PT Physical Therapist          WOUND DEBRIDEMENT  Total area of Debridement: ~20cm2  Debridement Site 1  Location- Site 1: BLE wounds/periwounds  Selective Debridement- Site 1: Wound Surface >20cmsq  Instruments- Site 1: tweezers  Excised Tissue Description- Site 1: moderate, slough, other (comment)(hypertrophic crust)  Bleeding- Site 1: none                 Recommendation and Plan  PT Assessment/Plan     Row Name 08/30/20 1100          PT Assessment    Functional Limitations  Decreased safety during functional activities;Impaired gait;Impaired locomotion;Limitation in home management;Limitations in community activities;Limitations in functional capacity and performance;Performance in leisure activities;Performance in self-care ADL;Other (comment) wound care, edema  -     Impairments  Integumentary integrity;Pain;Edema  -MF     Assessment Comments  Pt noted to have increased LE edema today with small intact blisters noted.  B ankle / foot wounds with increased drainage, but no increased s/s of infection. PT discussed with family need to increase LE elevation or call pt's MD to discuss diuretics to help better manage fluild to improve skin integrity and limit further complications.   -     Rehab Potential  Fair  -     Patient/caregiver participated in establishment of treatment plan and goals  Yes  -     Patient would benefit from skilled therapy intervention  Yes  -MF        PT Plan    PT Frequency  1x/week  -     Physical Therapy  Interventions (Optional Details)  wound care;patient/family education  -     PT Plan Comments  cont with debridement and compression wrapping   -       User Key  (r) = Recorded By, (t) = Taken By, (c) = Cosigned By    Initials Name Provider Type    Arsalan Napoles, PT Physical Therapist          Goals  PT OP Goals     Row Name 08/30/20 1100          Time Calculation    PT Goal Re-Cert Due Date  11/23/20  -       User Key  (r) = Recorded By, (t) = Taken By, (c) = Cosigned By    Initials Name Provider Type    Arsalan Napoles, PT Physical Therapist          PT Goal Re-Cert Due Date: 11/23/20            Time Calculation: Start Time: 1100  Therapy Charges for Today     Code Description Service Date Service Provider Modifiers Qty    [id number]  HERNAN DEBRIDE OPEN WOUND UP TO 20CM 8/30/2020 Arsalan Langford, PT GP 1    [id number]  PT MULTI LAYER COMP SYS BELOW KNEE 8/30/2020 Arsalan Langford, PT GP 1                  Arsalan Langford, PT  8/30/2020

## 2020-09-18 NOTE — TELEPHONE ENCOUNTER
Caller: Chantel Rojas    Relationship: Emergency Contact    Best call back number: [phone]    Medication needed:   Requested Prescriptions     Pending Prescriptions Disp Refills   • Diclofenac Sodium (Pennsaid) 2 % solution        Sig: Place 1 application on the skin as directed by provider 2 (two) times a day.       When do you need the refill by: 9/18/20    What details did the patient provide when requesting the medication: patient is out of medication    Does the patient have less than a 3 day supply:  [x] Yes  [] No    What is the patient's preferred pharmacy: [facility] [address] [place] [address] [phone] [facility] [phone]

## 2020-09-24 NOTE — TELEPHONE ENCOUNTER
Morphine 15mg was given to patient from palliative care. Patient stated he is in a lot of pain. Both legs. Patient took 0.5 tablet this morning. Patients bottle states he can take 0.5 tab every 6 hours.       They would like a refill on Diclofenac sodium topical. Patient has been out for about 2 weeks.

## 2020-09-24 NOTE — TELEPHONE ENCOUNTER
PATIENT CALLED ASKING IF HE CAN DISCONTINUE MORPHINE AND JUST KEEP TAKING LORTAB. MORPHINE DOESN'T SEEM TO DO ANYTHING FOR PATIENT. PATIENT TOOK MORPHINE AT 8AM AND WOULD LIKE TO TAKE A LORTAB NOW IF POSSIBLE.    PLEASE ADVISE  PATIENT CALL BACK NUMBER [phone]

## 2020-09-27 NOTE — THERAPY PROGRESS REPORT/RE-CERT
Outpatient Rehabilitation - Wound/Debridement Progress Note  [facility]     Patient Name: Michoacano Rojas  : 1940  MRN: [id number]  Today's Date: 2020       L LE:         R LE:          Admit Date: 2020    Visit Dx:    ICD-10-CM ICD-9-CM   1. Multiple open wounds of lower extremity, left, subsequent encounter  S81.802D V58.89     894.0   2. Multiple open wounds of lower extremity, right, subsequent encounter  S81.801D V58.89     894.0   3. 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   • Tracheal stenosis   • Diastolic dysfunction with chronic heart failure (CMS/HCC)   • Acute on chronic respiratory failure with hypoxia and hypercapnia (CMS/HCC)   • Incarcerated right inguinal hernia   • Venous insufficiency (chronic) (peripheral)   • Benign essential hypertension   • Non-pressure chronic ulcer of left lower leg, with unspecified severity (CMS/HCC)   • Lower extremity edema   • Chronic diastolic heart failure (CMS/HCC)   • Cognitive disorder   • Chronic conjunctivitis of both eyes   • Facial swelling   • Goiter   • Idiopathic chronic gout of multiple sites with tophus   • Medicare annual wellness visit, subsequent   • Morbid obesity (CMS/HCC)   • Major depressive disorder, single episode, mild (CMS/HCC)   • Skin ulcer of right foot, limited to breakdown of skin (CMS/HCC)   • COPD (chronic obstructive pulmonary disease) (CMS/HCC)   • Hypoxia   • sepsis secondary to LEs ulcerations   • AMS (altered mental status)   • Venous stasis ulcer  (CMS/HCC)   • Immunosuppressed status (CMS/HCC)   • Current chronic use of systemic steroids   • Rheumatoid arthritis involving multiple sites (CMS/HCC)   • Demand ischemia of myocardium (CMS/HCC)        Past Medical History:   Diagnosis Date   • A-fib (CMS/HCC)    • Atrial fibrillation (CMS/HCC)    • CHF (congestive heart failure) (CMS/HCC)    • Chronic cutaneous venous stasis ulcer (CMS/HCC)    • Coronary artery disease    • Disease of thyroid gland    • Hypertension    • Penile abnormality    • Peptic ulcer disease    • Primary malignant neoplasm of bronchus with metastasisto other site, unspecified laterality (CMS/HCC) 4/22/2019   • RA (rheumatoid arthritis) (CMS/HCC)    • Rheumatic fever    • Rheumatoid arthritis (CMS/HCC)    • Sepsis (CMS/HCC) 11/17/2017    from cellulitis due to leg ulcer, hospitalization, antibiotics, wound care   • Sleep apnea    • Vertigo    • Vertigo         Past Surgical History:   Procedure Laterality Date   • EYE SURGERY     • JOINT MANIPULATION      left hip repair         EVALUATION  PT Ortho     Row Name 09/27/20 1115       Subjective Comments    Subjective Comments  Patient states his son and wife have been performing dressing changes without issue.  -MP       Subjective Pain    Able to rate subjective pain?  yes  -MP    Pre-Treatment Pain Level  4  -MP    Post-Treatment Pain Level  4  -MP       Transfers    Comment (Transfers)  seated in w/c  -MP      User Key  (r) = Recorded By, (t) = Taken By, (c) = Cosigned By    Initials Name Provider Type    Amor Horner, PT Physical Therapist          [facility] Wound     Row Name 09/27/20 1115             Wound 08/25/20 1445 Right lateral;distal leg Venous Ulcer    Wound - Properties Group Placement Date: 08/25/20  - Placement Time: 1445  - Present on Hospital Admission: Y  - Side: Right  - Location: leg  - Primary Wound Type: Venous ulcer  -    Wound Image  Images linked: 1  -MP      Dressing Appearance  intact;moist drainage   -MP      Base  pink;red;moist  -MP      Periwound  moist;pale white;macerated  -MP      Periwound Temperature  warm  -MP      Periwound Skin Turgor  firm  -MP      Edges  irregular;open  -MP      Wound Length (cm)  1.8 cm  -MP      Wound Width (cm)  3 cm  -MP      Wound Depth (cm)  0.1 cm  -MP      Drainage Characteristics/Odor  serosanguineous  -MP      Drainage Amount  moderate  -MP      Care, Wound  cleansed with;wound cleanser;debrided  -MP      Dressing Care  dressing applied;antimicrobial agent applied;foam;multi-layer wrap HFBt, qwick, kerlix, MLW  -MP      Periwound Care  cleansed with pH balanced cleanser;barrier ointment applied Z guard  -      Retired Wound - Properties Group Date first assessed: 08/25/20  [facility] Time first assessed: 1445  - Present on Hospital Admission: Y  - Side: Right  - Retired Orientation: lateral;distal  - Location: leg  - Primary Wound Type: Venous ulcer  -       Wound 08/25/20 [facility] Right lateral leg Venous Ulcer    Wound - Properties Group Placement Date: 08/25/20  - Placement Time: [facility]  - Present on Hospital Admission: Y  - Side: Right  [facility] Location: leg  - Primary Wound Type: Venous ulcer  -    Wound Image  Images linked: 1  -MP      Dressing Appearance  intact;moist drainage  -MP      Base  moist;pink;red  -MP      Periwound  intact;moist;macerated;pale white  -MP      Periwound Temperature  warm  -MP      Periwound Skin Turgor  soft  -MP      Edges  irregular;open  -MP      Wound Length (cm)  4.9 cm  -MP      Wound Width (cm)  2 cm  -MP      Wound Depth (cm)  0.1 cm  -MP      Drainage Characteristics/Odor  serosanguineous;sanguineous  -MP      Drainage Amount  moderate  -MP      Care, Wound  cleansed with;wound cleanser;debrided  -MP      Dressing Care  dressing applied;antimicrobial agent applied;foam;multi-layer wrap HFBt, qwick, kerlix, MLW  -MP      Periwound Care  dry periwound area maintained;cleansed with pH balanced cleanser  -MP      Retired  Wound - Properties Group Date first assessed: 08/25/20  - Time first assessed: 1445  - Present on Hospital Admission: Y  - Side: Right  - Retired Orientation: lateral  - Location: leg  -MC Primary Wound Type: Venous ulcer  -MC       Wound 08/25/20 1445 Right posterior popliteal Venous Ulcer    Wound - Properties Group Placement Date: 08/25/20  - Placement Time: 1445  - Present on Hospital Admission: Y  - Side: Right  - Location: popliteal  -MC Primary Wound Type: Venous ulcer  -MC    Wound Image  Images linked: 1  -MP      Dressing Appearance  intact;moist drainage  -MP      Base  moist;pink;red;yellow;slough;subcutaneous  -MP      Periwound  intact;dry;moist  -MP      Periwound Temperature  warm  -MP      Periwound Skin Turgor  soft  -MP      Edges  irregular;open  -MP      Wound Length (cm)  2.2 cm  -MP      Wound Width (cm)  1.9 cm  -MP      Wound Depth (cm)  0.4 cm  -MP      Drainage Characteristics/Odor  serosanguineous  -MP      Drainage Amount  moderate  -MP      Care, Wound  cleansed with;wound cleanser;debrided  -MP      Dressing Care  dressing applied;antimicrobial agent applied;foam;multi-layer wrap HFBt, qwick, kerlix, MLW  -MP      Periwound Care  cleansed with pH balanced cleanser;dry periwound area maintained  -MP      Retired Wound - Properties Group Date first assessed: 08/25/20  - Time first assessed: 1445  - Present on Hospital Admission: Y  - Side: Right  - Retired Orientation: posterior  - Location: popliteal  -MC Primary Wound Type: Venous ulcer  -MC       Wound 08/25/20 1445 Left medial;distal leg Venous Ulcer    Wound - Properties Group Placement Date: 08/25/20  - Placement Time: 1445  - Present on Hospital Admission: Y  - Side: Left  - Location: leg  - Primary Wound Type: Venous ulcer  -MC    Wound Image  Images linked: 1  -MP      Dressing Appearance  intact;moist drainage  -MP      Base  moist;pink;red;yellow;slough;other (see comments)  -MP       Periwound  moist;swelling  -MP      Periwound Temperature  warm  -MP      Periwound Skin Turgor  soft  -MP      Edges  irregular;open  -MP      Wound Length (cm)  7.7 cm  -MP      Wound Width (cm)  7.1 cm  -MP      Wound Depth (cm)  0.1 cm  -MP      Drainage Characteristics/Odor  serosanguineous;serous  -MP      Drainage Amount  moderate  -MP      Care, Wound  cleansed with;wound cleanser;debrided  -MP      Dressing Care  dressing changed;antimicrobial agent applied;foam;multi-layer wrap HFBt, qwick, kerlix, MLW  -MP      Retired Wound - Properties Group Date first assessed: 08/25/20  - Time first assessed: 1445  - Present on Hospital Admission: Y  - Side: Left  - Retired Orientation: medial;distal  - Location: leg  - Primary Wound Type: Venous ulcer  -MC       Wound 08/25/20 1445 Left medial leg Venous Ulcer    Wound - Properties Group Placement Date: 08/25/20  - Placement Time: 1445 - Present on Hospital Admission: Y  - Side: Left  - Location: leg  - Primary Wound Type: Venous ulcer  -    Wound Image  --  -MP      Dressing Appearance  intact;moist drainage  -MP      Base  moist;pink;red  -MP      Periwound  intact;moist  -MP      Periwound Temperature  warm  -MP      Periwound Skin Turgor  soft  -MP      Edges  irregular;open  -MP      Drainage Characteristics/Odor  serosanguineous  -MP      Drainage Amount  moderate  -MP      Care, Wound  cleansed with;wound cleanser;debrided  -MP      Dressing Care  dressing applied;antimicrobial agent applied;foam;multi-layer wrap HFBt, qwick, kerlix, MLW  -MP      Periwound Care  cleansed with pH balanced cleanser;dry periwound area maintained  -MP      Retired Wound - Properties Group Date first assessed: 08/25/20  - Time first assessed: 1445  - Present on Hospital Admission: Y  - Side: Left  - Retired Orientation: medial  - Location: leg  - Primary Wound Type: Venous ulcer  -MC       Wound 08/25/20 1445 Left lateral;distal leg Venous  Ulcer    Wound - Properties Group Placement Date: 08/25/20  - Placement Time: 1445 - Present on Hospital Admission: Y  - Side: Left  - Location: leg  - Primary Wound Type: Venous ulcer  -    Wound Image  Images linked: 1  -MP      Dressing Appearance  intact;moist drainage  -MP      Base  moist;pink;red;yellow;slough fragile skin bridge  -MP      Periwound  intact;dry;pink;other (see comments)  -MP      Periwound Temperature  warm  -MP      Periwound Skin Turgor  soft  -MP      Edges  irregular;open  -MP      Wound Length (cm)  5.4 cm  -MP      Wound Width (cm)  5.3 cm  -MP      Wound Depth (cm)  0.1 cm  -MP      Drainage Characteristics/Odor  serosanguineous;serous  -MP      Drainage Amount  large  -MP      Care, Wound  cleansed with;wound cleanser;debrided  -MP      Dressing Care  dressing applied;antimicrobial agent applied;foam;multi-layer wrap HFBt, qwick, kerlix, MLW  -MP      Periwound Care  cleansed with pH balanced cleanser;barrier ointment applied Zguard  -      Retired Wound - Properties Group Date first assessed: 08/25/20  [facility] Time first assessed: 1445 - Present on Hospital Admission: Y  - Side: Left  - Retired Orientation: lateral;distal  - Location: leg  - Primary Wound Type: Venous ulcer  -      User Key  (r) = Recorded By, (t) = Taken By, (c) = Cosigned By    Initials Name Provider Type     Mary De Paz, PT Physical Therapist    MP Amor Quiroz, PT Physical Therapist        Lymphedema     Row Name 09/27/20 1115             Lymphedema Edema Assessment    Ptting Edema Category  By severity  -MP      Pitting Edema  Severe;Moderate  -MP         Skin Changes/Observations    Location/Assessment  Lower Extremity  -MP      Lower Extremity Conditions  bilateral:;clean;dry;shiny;hairless;scaly;inflamed;fragile  -      Lower Extremity Color/Pigment  bilateral:;hyperpigmented;erythema  -MP         Lymphedema Pulses/Capillary Refill    Lymphedema Pulses/Capillary Refill   lower extremity pulses;capillary refill  -MP      Dorsalis Pedis Pulse  right:;left:;+1 diminished  -MP      Posterior Tibialis Pulse  right:;left:;+1 diminished  -MP      Capillary Refill  lower extremity capillary refill  -MP      Lower Extremity Capillary Refill  right:;left:;less than 3 seconds  -MP         Lymphedema Measurements    Measurement Type(s)  Quick Girth  -MP         Compression/Skin Care    Compression/Skin Care  skin care;wrapping location;bandaging  -MP      Skin Care  washed/dried  -MP      Wrapping Location  lower extremity  -MP      Wrapping Location LE  bilateral:;foot to knee  -MP      Wrapping Comments  Dressings, kerlix to secure, 10cm comprilan  -MP      Bandage Layers  padding/fluff layer;short-stretch bandages (comment size/quantity)  -MP      Bandaging Technique  figure-eight;light compression  -MP        User Key  (r) = Recorded By, (t) = Taken By, (c) = Cosigned By    Initials Name Provider Type    Amor Horner, PT Physical Therapist          WOUND DEBRIDEMENT  Total area of Debridement: 10cm2  Debridement Site 1  Location- Site 1: BLE wounds/periwounds  Selective Debridement- Site 1: Wound Surface <20cmsq  Instruments- Site 1: tweezers  Excised Tissue Description- Site 1: moderate, slough, other (comment)(hypertrophic skin crusts)  Bleeding- Site 1: none             Therapy Education     Row Name 09/27/20 2214             Therapy Education    Education Details  Educated patient on signs and symptoms of infection. Patient instructed to call MD if infection is suspected. Reviewed proper positioning of comprilan/MLW and dressings.  -MP      Given  Symptoms/condition management;Bandaging/dressing change  -MP      Program  Reinforced  -MP      How Provided  Verbal;Demonstration  -MP      Provided to  Patient;Caregiver spouse  -MP      Level of Understanding  Verbalized  -MP        User Key  (r) = Recorded By, (t) = Taken By, (c) = Cosigned By    Initials Name Provider Type    MEHDI  Amor Quiroz, PT Physical Therapist          Recommendation and Plan  PT Assessment/Plan     Row Name 20 1110          PT Assessment    Functional Limitations  Decreased safety during functional activities;Impaired gait;Impaired locomotion;Limitation in home management;Limitations in community activities;Limitations in functional capacity and performance;Performance in leisure activities;Performance in self-care ADL;Other (comment) wound care, edema  -MP     Impairments  Integumentary integrity;Pain;Edema  -MP     Assessment Comments  Noted increase in periwound redness of R popliteal periwound skin; however, no warmth noted from area. Patient relates increased redness to friction from having to get in and out of bed. PT reviewed signs and sx of infection and instructed family to pay close attention to popliteal wound. L lateral wound measured larger in size this date with fragile skin bridge present that PT applied zguard on. PT able to debride moderate slough from wound beds this date. Patient would continue to benefit from skilled wound care to promote increased wound healing potential. PT progress note completed this date with patient making gradual progress towards goals.  -MP     Rehab Potential  Fair  -MP     Patient/caregiver participated in establishment of treatment plan and goals  Yes  -MP     Patient would benefit from skilled therapy intervention  Yes  -MP        PT Plan    PT Frequency  1x/week  -MP     Predicted Duration of Therapy Intervention (OT)  20 visits  -MP     Planned CPT's?  PT NONSELECT DEBRIDE 15 MIN: 58494;PT HERNAN DEBRIDE OPEN WOUND UP TO 20 CM: 50911;PT HERNAN DEBRIDE OPEN WOUND EA ADD 20 CM: 42960;PT NLFU MIST: 06939;PT UNNA BOOT: 16905;PT MULTI LAYER COMP SYS LE;PT SELF CARE/MGMT/TRAIN 15 MIN: 41026  -MP     Physical Therapy Interventions (Optional Details)  patient/family education;wound care  -MP     PT Plan Comments  debridement, compression wrapping  -MP       User Key   (r) = Recorded By, (t) = Taken By, (c) = Cosigned By    Initials Name Provider Type    MP Amor Quiroz, PT Physical Therapist          Goals  PT OP Goals     Row Name 09/27/20 1115          PT Short Term Goals    STG Date to Achieve  01/27/20  -MP     STG 1  Pt will verbalize s/sx of infection.  -MP     STG 1 Progress  Ongoing  -MP     STG 2  Pt will demonstrate 25% reduction in wound dimensions to indicate healing progress.  -MP     STG 2 Progress  Ongoing  -MP     STG 3  Pt will demonstrate only minimal exudate to indicate wound healing.  -MP     STG 3 Progress  Ongoing  -MP        Long Term Goals    LTG Date to Achieve  02/01/20  -MP     LTG 1  Pt and/or caregivers will be independent with clean home dressing changes to continue progress upon d/c.  -MP     LTG 1 Progress  Ongoing  -MP     LTG 2  Pt will demonstrate 75% reduction in wound dimensions to indicate healing progress.  -MP     LTG 2 Progress  Ongoing  -MP     LTG 3  Pt will demonstrate scant wound exudate to indicate healing progress.  -MP     LTG 3 Progress  Ongoing  -MP        Time Calculation    PT Goal Re-Cert Due Date  11/23/20  -MP       User Key  (r) = Recorded By, (t) = Taken By, (c) = Cosigned By    Initials Name Provider Type    Amor Horner, PT Physical Therapist          PT Goal Re-Cert Due Date: 11/23/20  PT Short Term Goals  STG Date to Achieve: 01/27/20  STG 1: Pt will verbalize s/sx of infection.  STG 1 Progress: Ongoing  STG 2: Pt will demonstrate 25% reduction in wound dimensions to indicate healing progress.  STG 2 Progress: Ongoing  STG 3: Pt will demonstrate only minimal exudate to indicate wound healing.  STG 3 Progress: Ongoing  Long Term Goals  LTG Date to Achieve: 02/01/20  LTG 1: Pt and/or caregivers will be independent with clean home dressing changes to continue progress upon d/c.  LTG 1 Progress: Ongoing  LTG 2: Pt will demonstrate 75% reduction in wound dimensions to indicate healing progress.  LTG 2 Progress:  Ongoing  LTG 3: Pt will demonstrate scant wound exudate to indicate healing progress.  LTG 3 Progress: Ongoing      Time Calculation: Start Time: 1115  Therapy Charges for Today     Code Description Service Date Service Provider Modifiers Qty    96694353393 HC HERNAN DEBRIDE OPEN WOUND UP TO 20CM 9/27/2020 Amor Quiroz, PT GP 1    [id number] HC PT MULTI LAYER COMP SYS BELOW KNEE 9/27/2020 Amor Quiroz, PT GP 1                  Amor Quiroz PT  9/27/2020

## 2020-09-28 NOTE — TELEPHONE ENCOUNTER
[facility] STATES THAT THE INSURANCE DOES NOT WANT TO COVER UNLESS PT TRIES THESE MEDICATION IN PILL FORM.    Diclofenac Sodium (Pennsaid) 2 % solution.    BELOW IS THE LIST THAT INSURANCE RECOMMEND   MOBIC  VOLTAREN  IBUPROFEN  NAPROXEN    PLEASE ADVISE  ELEONORA  [phone]

## 2020-09-29 NOTE — TELEPHONE ENCOUNTER
JUAN WITH [facility] CALLED TO REQUEST A REFERRAL FOR BEHAVORIAL HEALTH TELEPHONIC  AND ORDERS FOR A DME LIFT. JUAN ASKED FOR SOMEONE TO CONTACT THE PT'S WIFE WHEN THIS HAS BEEN COMPLETED.    JUAN'S CONTACT [phone]  [phone]  [facility]  JUAN STATED THAT A MESSAGE CAN BE LEFT AT THIS NUMBER.    PT'S WIFE KAILEE OSMAN CONTACT [phone]    PLEASE ADVISE

## 2020-09-30 NOTE — TELEPHONE ENCOUNTER
Called [facility] again and spoke to Toma. They do have Tres lifts. Would need to fax rx, office note and demographics to [phone]. Discussed with pt - he would like us to send in for the lift. Also discussed the depression issue and he does not want to talk to anyone

## 2020-09-30 NOTE — TELEPHONE ENCOUNTER
Radha with [facility] call. She spoke with pt and spouse and they would like an order for a cornell lift.nPt son is having to lift him to get him in and out of bed and W/C. Pt is also having depression and Radha said pt would be receptive to telemedicine visit for behavioral health. She did mention she did not see any meds for pt for depression.  Called [email or web address] (formerly [email or web address])  [phone]. They do not have lift in stock, but should be able to get in about a week.  Called [facility]  [phone] - JJ for a return call

## 2020-10-01 NOTE — TELEPHONE ENCOUNTER
Before I DO THIS, SEE how Chantel feels about it.  Does she think it will help him.    Statement Selected

## 2020-10-06 NOTE — TELEPHONE ENCOUNTER
Please let Ankur know that we have discussed with mr prerna's family who do not feel a lift is a good idea.  For that reason, I am not going to prescribe it.

## 2020-10-06 NOTE — TELEPHONE ENCOUNTER
[facility] NURSE CARE MANAGER FOR PATIENT CALLED ASKING FOR A SCRIPT FOR A ADRIAN LIFT FOR PATIENT TO GET OUT OF BED.    PLEASE ADVISE AND CALL [facility] -[phone]

## 2020-10-06 NOTE — TELEPHONE ENCOUNTER
Called and spoke with Chantel. She does not think a cornell lift will help. She said the patient also thinks he can work it himself and that is not possible. She wants to hold off.

## 2020-10-15 NOTE — TELEPHONE ENCOUNTER
PATIENT STATES HE IS HAS BEEN EXPERIENCING RIGHT ARM PAIN AND SWELLING FROM THE HAND TO THE ELBOW FOR 5 DAYS.  THE PATIENT ALSO STATES HIS TEETH ARE STARTING TO GET LOOSE AT THE GUMS AND WANTS TO KNOW IF YEARS OF PREDNISONE COULD CAUSE THIS.     PLEASE CALL PATIENT AND ADVISE ASAP [phone]

## 2020-10-15 NOTE — TELEPHONE ENCOUNTER
Not a common side effect of prednisone, but could happen.  Most likely cause is problems in the gums.

## 2020-10-16 NOTE — TELEPHONE ENCOUNTER
Discussed with pt. He saidm he is also concerned about his B/P - it runs 110-115/68-69. He said he feels tired all the time

## 2020-10-17 NOTE — THERAPY WOUND CARE TREATMENT
Outpatient Rehabilitation - Wound/Debridement Treatment Note  [facility]     Patient Name: Michoacano Rojas  : 1940  MRN: [id number]  Today's Date: 10/17/2020                 Admit Date: 10/17/2020    Visit Dx:    ICD-10-CM ICD-9-CM   1. Multiple open wounds of lower extremity, left, subsequent encounter  S81.802D V58.89     894.0   2. Multiple open wounds of lower extremity, right, subsequent encounter  S81.801D V58.89     894.0   3. 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   • Tracheal stenosis   • Diastolic dysfunction with chronic heart failure (CMS/HCC)   • Acute on chronic respiratory failure with hypoxia and hypercapnia (CMS/HCC)   • Incarcerated right inguinal hernia   • Venous insufficiency (chronic) (peripheral)   • Benign essential hypertension   • Non-pressure chronic ulcer of left lower leg, with unspecified severity (CMS/HCC)   • Lower extremity edema   • Chronic diastolic heart failure (CMS/HCC)   • Cognitive disorder   • Chronic conjunctivitis of both eyes   • Facial swelling   • Goiter   • Idiopathic chronic gout of multiple sites with tophus   • Medicare annual wellness visit, subsequent   • Morbid obesity (CMS/HCC)   • Major depressive disorder, single episode, mild (CMS/HCC)   • Skin ulcer of right foot, limited to breakdown of skin (CMS/HCC)   • COPD (chronic obstructive pulmonary disease) (CMS/HCC)   • Hypoxia   • sepsis secondary to LEs ulcerations   • AMS (altered mental status)   • Venous stasis ulcer (CMS/HCC)   •  Immunosuppressed status (CMS/HCC)   • Current chronic use of systemic steroids   • Rheumatoid arthritis involving multiple sites (CMS/HCC)   • Demand ischemia of myocardium (CMS/HCC)        Past Medical History:   Diagnosis Date   • A-fib (CMS/HCC)    • Atrial fibrillation (CMS/HCC)    • CHF (congestive heart failure) (CMS/HCC)    • Chronic cutaneous venous stasis ulcer (CMS/HCC)    • Coronary artery disease    • Disease of thyroid gland    • Hypertension    • Penile abnormality    • Peptic ulcer disease    • Primary malignant neoplasm of bronchus with metastasisto other site, unspecified laterality (CMS/HCC) 4/22/2019   • RA (rheumatoid arthritis) (CMS/HCC)    • Rheumatic fever    • Rheumatoid arthritis (CMS/HCC)    • Sepsis (CMS/[facility]) 11/17/2017    from cellulitis due to leg ulcer, hospitalization, antibiotics, wound care   • Sleep apnea    • Vertigo    • Vertigo         Past Surgical History:   Procedure Laterality Date   • EYE SURGERY     • JOINT MANIPULATION      left hip repair         EVALUATION  PT Ortho     Row Name 10/17/20 1110       Subjective Comments    Subjective Comments  Pt with increased R UE edema   -MF       Subjective Pain    Able to rate subjective pain?  yes  -MF    Pre-Treatment Pain Level  4  -MF    Post-Treatment Pain Level  4  -MF       Transfers    Comment (Transfers)  seated in w/c  -MF      User Key  (r) = Recorded By, (t) = Taken By, (c) = Cosigned By    Initials Name Provider Type    Arsalan Napoles, PT Physical Therapist          [facility] Wound     Row Name 10/17/20 1110             Wound 08/25/20 1445 Right lateral;distal leg Venous Ulcer    Wound - Properties Group Placement Date: 08/25/20  - Placement Time: 1445  - Present on Hospital Admission: Y  - Side: Right  - Location: leg  - Primary Wound Type: Venous ulcer  -    Dressing Appearance  intact;moist drainage  -      Base  pink;red;moist  -MF      Periwound  moist;pale white;macerated  -MF      Periwound  Temperature  warm  -MF      Periwound Skin Turgor  firm  -MF      Edges  irregular;open  -MF      Drainage Characteristics/Odor  serosanguineous  -MF      Drainage Amount  moderate  -MF      Care, Wound  irrigated with;sterile normal saline;debrided nonsel britney with washclth  -MF      Dressing Care  foam;low-adherent HFBr with qwick and cast padding to secure  -MF      Retired Wound - Properties Group Date first assessed: 08/25/20  [facility] [place] 1445 - Present on Hospital Admission: Y  - Side: Right  - Retired Orientation: lateral;distal  - Location: leg  - Primary Wound Type: Venous ulcer  -MC       Wound 08/25/20 1445 Right lateral leg Venous Ulcer    Wound - Properties Group Placement Date: 08/25/20  - Placement Time: 1445 - Present on Hospital Admission: Y  - Side: Right  - Location: leg  -MC Primary Wound Type: Venous ulcer  -MC    Dressing Appearance  intact;moist drainage  -MF      Base  moist;pink;red  -MF      Periwound  intact;moist;macerated;pale white  -MF      Periwound Temperature  warm  -MF      Periwound Skin Turgor  soft  -MF      Edges  irregular;open  -MF      Drainage Characteristics/Odor  serosanguineous;sanguineous  -MF      Drainage Amount  moderate  -MF      Care, Wound  irrigated with;sterile normal saline  -MF      Dressing Care  foam;low-adherent HFBr with qwick and cast padding to secure  -MF      Periwound Care  barrier ointment applied;dry periwound area maintained  -MF      Retired Wound - Properties Group Date first assessed: 08/25/20  - Time first assessed: 1445 - Present on Hospital Admission: Y  - Side: Right  - Retired Orientation: lateral  - Location: leg  -MC Primary Wound Type: Venous ulcer  -MC       Wound 08/25/20 1445 Right posterior popliteal Venous Ulcer    Wound - Properties Group Placement Date: 08/25/20  - Placement Time: 1445 - Present on Hospital Admission: Y  - Side: Right  - Location: popliteal  -MC Primary Wound  Type: Venous ulcer  -MC    Dressing Appearance  intact;moist drainage  -MF      Base  moist;pink;red;yellow;slough;subcutaneous  -MF      Periwound  intact;dry;moist  -MF      Periwound Temperature  warm  -MF      Periwound Skin Turgor  soft  -MF      Edges  irregular;open  -MF      Drainage Characteristics/Odor  serosanguineous  -MF      Drainage Amount  moderate  -MF      Care, Wound  irrigated with;sterile normal saline;debrided nonsel britney with wash cloth   -MF      Dressing Care  foam;low-adherent HFBr with optifoam  -MF      Retired Wound - Properties Group Date first assessed: 08/25/20  [facility] [place] first assessed: 1445 - Present on Hospital Admission: Y  - Side: Right  - Retired Orientation: posterior  - Location: popliteal  - Primary Wound Type: Venous ulcer  -MC       Wound 08/25/20 1445 Left medial;distal leg Venous Ulcer    Wound - Properties Group Placement Date: 08/25/20  - Placement Time: 1445 - Present on Hospital Admission: Y  - Side: Left  - Location: leg  - Primary Wound Type: Venous ulcer  -MC    Dressing Appearance  intact;moist drainage  -MF      Base  moist;pink;red;yellow;slough;other (see comments)  -MF      Periwound  moist;swelling  -MF      Periwound Temperature  warm  -MF      Periwound Skin Turgor  soft  -MF      Edges  irregular;open  -MF      Drainage Characteristics/Odor  serosanguineous;serous  -MF      Drainage Amount  moderate  -MF      Care, Wound  irrigated with;sterile normal saline;debrided  -MF      Dressing Care  foam;low-adherent HFBr with qwick and cast padding to secure  -MF      Retired Wound - Properties Group Date first assessed: 08/25/20  - Time first assessed: 1445 - Present on Hospital Admission: Y  - Side: Left  - Retired Orientation: medial;distal  - Location: leg  - Primary Wound Type: Venous ulcer  -MC       Wound 08/25/20 1445 Left medial leg Venous Ulcer    Wound - Properties Group Placement Date: 08/25/20  - Placement Time:  1445  - Present on Hospital Admission: Y  - Side: Left  - Location: leg  - Primary Wound Type: Venous ulcer  -MC    Dressing Appearance  intact;moist drainage  -MF      Base  moist;pink;red  -MF      Periwound  intact;moist  -MF      Periwound Temperature  warm  -MF      Periwound Skin Turgor  soft  -MF      Edges  irregular;open  -MF      Drainage Characteristics/Odor  serosanguineous  -MF      Drainage Amount  moderate  -MF      Care, Wound  irrigated with;sterile normal saline;debrided  -MF      Dressing Care  foam;low-adherent HFBr with qwick and cast padding to secure  -MF      Periwound Care  cleansed with pH balanced cleanser  -MF      Retired Wound - Properties Group Date first assessed: 08/25/20  [facility] [place] first assessed: 1445 - Present on Hospital Admission: Y  - Side: Left  - Retired Orientation: medial  - Location: leg  - Primary Wound Type: Venous ulcer  -MC       Wound 08/25/20 1445 Left lateral;distal leg Venous Ulcer    Wound - Properties Group Placement Date: 08/25/20  - Placement Time: 1445 - Present on Hospital Admission: Y  - Side: Left  - Location: leg  - Primary Wound Type: Venous ulcer  -MC    Dressing Appearance  intact;moist drainage  -MF      Base  moist;pink;red;yellow;slough  -MF      Periwound  intact;dry;pink;other (see comments)  -MF      Periwound Temperature  warm  -MF      Periwound Skin Turgor  soft  -MF      Edges  irregular;open  -MF      Drainage Characteristics/Odor  serosanguineous;serous  -MF      Drainage Amount  large  -MF      Care, Wound  irrigated with;sterile normal saline  -MF      Dressing Care  foam;low-adherent HFBr with qwick and cast padding to secure  -MF      Periwound Care  cleansed with pH balanced cleanser  -MF      Retired Wound - Properties Group Date first assessed: 08/25/20  - Time first assessed: 1445 - Present on Hospital Admission: Y  - Side: Left  - Retired Orientation: lateral;distal  - Location: leg  -  Primary Wound Type: Venous ulcer  -      User Key  (r) = Recorded By, (t) = Taken By, (c) = Cosigned By    Initials Name Provider Type    Arsalan Napoles, PT Physical Therapist    Mary Casiano, PT Physical Therapist        Lymphedema     Row Name 10/17/20 1110             Lymphedema Edema Assessment    Ptting Edema Category  By severity  -      Pitting Edema  Severe;Moderate  -         Compression/Skin Care    Compression/Skin Care  skin care;wrapping location;bandaging  -      Skin Care  washed/dried  -      Wrapping Location  lower extremity  -      Wrapping Location LE  bilateral:;foot to knee  -      Wrapping Comments  HFBr with qwick to cover and cast padding to secure.  size 4/5 compressogrip with comprilans to cover.   -      Bandage Layers  padding/fluff layer;short-stretch bandages (comment size/quantity)  -      Bandaging Technique  figure-eight;light compression  -        User Key  (r) = Recorded By, (t) = Taken By, (c) = Cosigned By    Initials Name Provider Type    Arsalan Napoles, PT Physical Therapist          WOUND DEBRIDEMENT                       Recommendation and Plan  PT Assessment/Plan     Row Name 10/17/20 1110          PT Assessment    Functional Limitations  Decreased safety during functional activities;Impaired gait;Impaired locomotion;Limitation in home management;Limitations in community activities;Limitations in functional capacity and performance;Performance in leisure activities;Performance in self-care ADL;Other (comment) wound care  -     Impairments  Integumentary integrity;Pain;Edema  -     Assessment Comments  Pt with increased RUE edema and BLE edema noted today with increased drainage from wounds.  PT added compressogrip to help better manage LE edema and improve skin integrity by helping to reduce exudate.   -     Rehab Potential  Fair  -     Patient/caregiver participated in establishment of treatment plan and goals  Yes  -      Patient would benefit from skilled therapy intervention  Yes  -        PT Plan    PT Frequency  1x/week  -     Physical Therapy Interventions (Optional Details)  wound care;patient/family education  -     PT Plan Comments  debridement, compression wrapping and education.   -       User Key  (r) = Recorded By, (t) = Taken By, (c) = Cosigned By    Initials Name Provider Type    Arsalan Napoles, PT Physical Therapist          Goals                   Time Calculation:    Therapy Charges for Today     Code Description Service Date Service Provider Modifiers Qty    [id number]  PT MULTI LAYER COMP SYS BELOW KNEE 10/17/2020 Arsalan Langford, PT GP 1                  Arsalan Langford, PT  10/17/2020

## 2020-10-19 NOTE — ASSESSMENT & PLAN NOTE
Hypertension is improving with treatment.  Continue current treatment regimen.  Dietary sodium restriction.  Weight loss.  Continue current medications.  Ambulatory blood pressure monitoring.  Blood pressure will be reassessed at the next regular appointment.

## 2020-10-19 NOTE — TELEPHONE ENCOUNTER
An order for labs and doppler have been placed.  He also needs to have labs drawn.  He does not need to be fasting.  Need labs completed this week.

## 2020-10-29 NOTE — TELEPHONE ENCOUNTER
Caller: Michoacano Rojas    Relationship: Self    Best call back number: [phone]     What test was performed:  SCAN ON RIGHT ARM TO CHECK FOR BLOOD CLOTS     When was the test performed:  YESTERDAY OR DAY BEFORE     [facility]    Additional notes: PATIENT STATED HE IS STILL IN A LOT OF PAIN AND NEEDS HELP

## 2020-10-29 NOTE — TELEPHONE ENCOUNTER
Called and advised patient. He verbalized understanding. He wants to know what else he can do for pain.

## 2020-10-29 NOTE — TELEPHONE ENCOUNTER
Please let him know that there was no blood clot.  He should try to keep his arm elevated on a pillow and use heat.

## 2020-10-29 NOTE — TELEPHONE ENCOUNTER
I would route this to Dr. Chavez, as this is his long time patient and I do not prescribe pain medication.   His appt with me was a telephone visit

## 2020-11-10 NOTE — TELEPHONE ENCOUNTER
ISHA CALLED STATING PT IS HAVING TROUBLE GETTING IN AND OUT OF BED WOULD LIKE TO GET APPROVED FOR A ADRIAN LIFT.          ISHA ALSO STATED THAT PT WOULD LIKE TO REEVALUATE HIS STEROID MEDICATION  FOR Rheumatoid arthritis      PLEASE ADVISE -[phone]

## 2020-11-12 NOTE — TELEPHONE ENCOUNTER
Spoke to pt and spouse. Spouse does not think pt will be able to use cornell lift, but pt wants to try. Advised I will contact [employer]  [phone] and [employer]

## 2020-11-12 NOTE — TELEPHONE ENCOUNTER
Everytime we have tried to reduce the dose his pain has gotten worse, so I don't think he would be able to get off of it.  He can try to reduce the dose and get to the lowest dose that helps.

## 2020-11-12 NOTE — TELEPHONE ENCOUNTER
Spoke to pt and wife. Pt was concerned because he said he was having issues with his gums and teeth and thought it was from the prednisone. Wife said he needs refill on prednisone

## 2020-11-12 NOTE — TELEPHONE ENCOUNTER
PATIENT HAS TAKEN PREDNISONE FOR A LONG TIME.  WONDERS ABOUT SIDE EFFECTS OR OTHER MEDICATION THAT HE COULD TAKE INSTEAD.      BEST CALL NUMBER  [phone]

## 2020-12-04 NOTE — TELEPHONE ENCOUNTER
Alpa from [facility] called to leave a message, the patient is interested in getting a cornell lift to help with transportation and if his Doctor can order that?  Can she get a call back?  Thank you

## 2020-12-08 NOTE — TELEPHONE ENCOUNTER
Called and LM-  informaed Dr Chavez is out of the office but will forward message for Dr Chavez to review.

## 2020-12-09 NOTE — TELEPHONE ENCOUNTER
Please see note from home care.  Each time this is brought up, we have discussed with Mrs Rojas, who does not think they can handle the cornell lift

## 2020-12-17 NOTE — PROGRESS NOTES
"[facility]     Michoacano Rojas  1940   [id number]      Patient Care Team:  Michael Chavez MD as PCP - General  Michael Chavez MD as PCP - Family Medicine    Chief Complaint::   Chief Complaint   Patient presents with   • Leg Pain   • Hypertension      You have chosen to receive care through a telephone visit. Do you consent to use a telephone visit for your medical care today? Yes      HPI  Mr. Rojas is seen today via telephone visit for joint pain due to his rheumatoid arthritis.  He has longstanding end-stage joint destruction from RA since he was a child.  He is now under the care of palliative care.  He is on hydrocodone 10.  They have tried morphine and at one point OxyContin, which apparently caused him to have \"violent thoughts.\"  He also remains on prednisone.  His blood pressure ranges generally from 10 5-1 15 systolic.    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   • Tracheal stenosis   • Diastolic dysfunction with chronic heart failure (CMS/HCC)   • Acute on chronic respiratory failure with hypoxia and hypercapnia (CMS/HCC)   • Incarcerated right inguinal hernia   • Venous insufficiency (chronic) (peripheral)   • Benign essential hypertension   • Non-pressure chronic ulcer of left lower leg, with unspecified severity (CMS/HCC)   • Lower extremity edema   • Chronic diastolic heart failure (CMS/HCC)   • Cognitive disorder   • Chronic conjunctivitis of both eyes   • Facial swelling   • Goiter   • Idiopathic chronic gout of multiple sites " with [facility]   • Medicare annual wellness visit, subsequent   • Morbid obesity (CMS/[facility])   • Major depressive disorder, single episode, mild (CMS/[facility])   • Skin ulcer of right foot, limited to breakdown of skin (CMS/[facility])   • COPD (chronic obstructive pulmonary disease) (CMS/[facility])   • Hypoxia   • sepsis secondary to LEs ulcerations   • AMS (altered mental status)   • Venous stasis ulcer (CMS/[facility])   • Immunosuppressed status (CMS/[facility])   • Current chronic use of systemic steroids   • Rheumatoid arthritis involving multiple sites (CMS/[facility])   • Demand ischemia of myocardium (CMS/[facility])        Past Medical History:   Diagnosis Date   • A-fib (CMS/[facility])    • Atrial fibrillation (CMS/[facility])    • CHF (congestive heart failure) (CMS/[facility])    • Chronic cutaneous venous stasis ulcer (CMS/[facility])    • Coronary artery disease    • Disease of thyroid gland    • Hypertension    • Penile abnormality    • Peptic ulcer disease    • Primary malignant neoplasm of bronchus with metastasisto other site, unspecified laterality (CMS/[facility]) 4/22/2019   • RA (rheumatoid arthritis) (CMS/[facility])    • Rheumatic fever    • Rheumatoid arthritis (CMS/[facility])    • Sepsis (CMS/[facility]) 11/17/2017    from cellulitis due to leg ulcer, hospitalization, antibiotics, wound care   • Sleep apnea    • Vertigo    • Vertigo        Past Surgical History:   Procedure Laterality Date   • EYE SURGERY     • JOINT MANIPULATION      left hip repair       Family History   Problem Relation Age of Onset   • Hypertension Mother    • Alzheimer's disease Mother    • Alzheimer's disease Father    • Hypertension Sister    • Lung cancer Brother    • Diabetes Brother        Social History     Socioeconomic History   • Marital status:      Spouse name: Not on file   • Number of children: Not on file   • Years of education: Not on file   • Highest education level: Not on file   Tobacco Use   • Smoking status: Never Smoker   • Smokeless tobacco: Never Used   • Tobacco comment: Quit 01/01/1968    Substance and Sexual Activity   • Alcohol use: No   • Drug use: No   • Sexual activity: Defer       Allergies   Allergen Reactions   • Naproxen Sodium Other (See Comments)     Increased heart rate  Aleve         Current Outpatient Medications:   •  amLODIPine (NORVASC) 10 MG tablet, TAKE 1 TABLET EVERY DAY, Disp: 90 tablet, Rfl: 1  •  amLODIPine (NORVASC) 5 MG tablet, Take 1 tablet by mouth Daily., Disp: , Rfl:   •  Azelastine HCl 137 MCG/SPRAY solution, 2 sprays into the nostril(s) as directed by provider Every 12 (Twelve) Hours., Disp: 30 mL, Rfl: 5  •  bisoprolol (ZEBeta) 5 MG tablet, TAKE 1/2 TABLET EVERY DAY, Disp: 45 tablet, Rfl: 2  •  Camphor-Menthol-Methyl Sal (HM SALONPAS PAIN RELIEF) 1.2-5.7-6.3 % patch, Apply  topically to the appropriate area as directed As Needed., Disp: , Rfl:   •  cholecalciferol (VITAMIN D3) 1000 UNITS tablet, Take 2,000 Units by mouth Daily., Disp: , Rfl:   •  diclofenac (VOLTAREN) 1 % gel gel, Apply 4 g topically to the appropriate area as directed 4 (Four) Times a Day As Needed (pain)., Disp: 100 g, Rfl: 5  •  docusate sodium (COLACE) 50 MG capsule, Take 100 mg by mouth Every Night., Disp: , Rfl:   •  Elastic Bandages & Supports (HERNIA BELT DOUBLE LARGE) misc, For right inguinal hernia, Disp: 1 each, Rfl: 0  •  finasteride (PROSCAR) 5 MG tablet, TAKE 1 TABLET EVERY DAY, Disp: 90 tablet, Rfl: 1  •  fluticasone (FLONASE) 50 MCG/ACT nasal spray, 2 sprays into the nostril(s) as directed by provider 2 (Two) Times a Day As Needed., Disp: , Rfl:   •  HYDROcodone-acetaminophen (NORCO)  MG per tablet, 1 tablet. Can take up to 4 daily, Disp: , Rfl:   •  predniSONE (DELTASONE) 10 MG tablet, Take 1 tablet by mouth Daily., Disp: 30 tablet, Rfl: 5  •  traMADol (ULTRAM) 50 MG tablet, Take 1 tablet by mouth 2 (Two) Times a Day., Disp: 60 tablet, Rfl: 1    Review of Systems   Constitutional: Negative.    Respiratory: Negative.  Negative for chest tightness and shortness of breath.     Cardiovascular: Negative.  Negative for chest pain.   Gastrointestinal: Negative for abdominal pain, blood in stool, constipation and diarrhea.   Musculoskeletal: Positive for arthralgias and joint swelling.        Vital Signs  There were no vitals filed for this visit.    Physical Exam  Pulmonary:      Effort: No respiratory distress.   Neurological:      Mental Status: He is oriented to person, place, and time.   Psychiatric:         Mood and Affect: Mood normal.         Thought Content: Thought content normal.          Procedures    ACE III MINI             Assessment/Plan:    Diagnoses and all orders for this visit:    1. Rheumatoid arthritis involving multiple sites with positive rheumatoid factor (CMS/[facility]) (Primary)    2. Benign essential hypertension    Plan    Unfortunately there is little to offer in light of palliative cares efforts with aggressive pain management.  We will continue hydrocodone and prednisone.    Blood pressure is well controlled, he will continue current regimen.    This visit has been rescheduled as a phone visit to comply with patient safety concerns in accordance with CDC recommendations. Total time of discussion was 11 minutes.          Plan of care reviewed with patient at the conclusion of today's visit. Education was provided regarding diagnosis, management, and any prescribed or recommended OTC medications.Patient verbalizes understanding of and agreement with management plan.         Michael Chavez MD

## 2020-12-21 NOTE — TELEPHONE ENCOUNTER
Dr Chavez is not here and really needs an in person visit with Bairon or an extender and labs and to see the patient

## 2020-12-21 NOTE — TELEPHONE ENCOUNTER
PATIENT CALLED AND STATED THAT HE HAD A TELEPHONE VISIT WITH DR. SAVAGE AND FORGOT TO TELL HIM ABOUT ANOTHER ISSUE.    HE STATES THAT HE WAS TOLD HE HAD A GOITER ON THE RIGHT SIDE OF HIS NECK AND IT MAY BE CAUSING HIM TO HAVE VISUAL ISSUES, WATERY EYES.    PLEASE ADVISE. [phone]

## 2020-12-21 NOTE — TELEPHONE ENCOUNTER
Spoke to pt - he does not want to talk to anyone and can't get in the office. He would like to wait and talk to Dr. Chavez next week

## 2020-12-31 PROBLEM — M25.50 JOINT PAIN: Status: ACTIVE | Noted: 2020-01-01

## 2020-12-31 NOTE — TELEPHONE ENCOUNTER
Pt's wife called stating that patient is declining. He is unable to move, can't stand and is staying in bed all day.    Chantel wanted to know if Dr. Chavez could increase his prednisone. She thinks this might give him a boost.

## 2020-12-31 NOTE — TELEPHONE ENCOUNTER
Suggest she double his dose for the next 5 days to see if that helps.  I think he is on 10 mg a day.

## 2021-01-01 ENCOUNTER — APPOINTMENT (OUTPATIENT)
Dept: GENERAL RADIOLOGY | Facility: HOSPITAL | Age: 81
End: 2021-01-01

## 2021-01-01 ENCOUNTER — OFFICE VISIT (OUTPATIENT)
Dept: INTERNAL MEDICINE | Facility: CLINIC | Age: 81
End: 2021-01-01

## 2021-01-01 ENCOUNTER — APPOINTMENT (OUTPATIENT)
Dept: CT IMAGING | Facility: HOSPITAL | Age: 81
End: 2021-01-01

## 2021-01-01 ENCOUNTER — TELEPHONE (OUTPATIENT)
Dept: INTERNAL MEDICINE | Facility: CLINIC | Age: 81
End: 2021-01-01

## 2021-01-01 ENCOUNTER — HOSPITAL ENCOUNTER (INPATIENT)
Facility: HOSPITAL | Age: 81
LOS: 3 days | End: 2021-01-29
Attending: EMERGENCY MEDICINE | Admitting: INTERNAL MEDICINE

## 2021-01-01 ENCOUNTER — HOSPITAL ENCOUNTER (INPATIENT)
Facility: HOSPITAL | Age: 81
LOS: 17 days | End: 2021-02-15
Attending: INTERNAL MEDICINE | Admitting: INTERNAL MEDICINE

## 2021-01-01 VITALS
WEIGHT: 148.2 LBS | HEART RATE: 62 BPM | SYSTOLIC BLOOD PRESSURE: 147 MMHG | OXYGEN SATURATION: 93 % | RESPIRATION RATE: 18 BRPM | BODY MASS INDEX: 20.75 KG/M2 | DIASTOLIC BLOOD PRESSURE: 91 MMHG | HEIGHT: 71 IN | TEMPERATURE: 97.8 F

## 2021-01-01 VITALS
DIASTOLIC BLOOD PRESSURE: 81 MMHG | RESPIRATION RATE: 22 BRPM | SYSTOLIC BLOOD PRESSURE: 94 MMHG | WEIGHT: 149 LBS | TEMPERATURE: 97.2 F | HEART RATE: 139 BPM | HEIGHT: 64 IN | OXYGEN SATURATION: 96 % | BODY MASS INDEX: 25.44 KG/M2

## 2021-01-01 VITALS
WEIGHT: 149.03 LBS | SYSTOLIC BLOOD PRESSURE: 105 MMHG | DIASTOLIC BLOOD PRESSURE: 61 MMHG | TEMPERATURE: 97.5 F | HEART RATE: 110 BPM | HEIGHT: 64 IN | OXYGEN SATURATION: 83 % | RESPIRATION RATE: 14 BRPM | BODY MASS INDEX: 25.44 KG/M2

## 2021-01-01 VITALS
SYSTOLIC BLOOD PRESSURE: 130 MMHG | DIASTOLIC BLOOD PRESSURE: 76 MMHG | OXYGEN SATURATION: 92 % | HEART RATE: 78 BPM | TEMPERATURE: 97.8 F

## 2021-01-01 DIAGNOSIS — R53.1 GENERALIZED WEAKNESS: ICD-10-CM

## 2021-01-01 DIAGNOSIS — T38.0X5A IMMUNOCOMPROMISED DUE TO CORTICOSTEROIDS (HCC): Primary | ICD-10-CM

## 2021-01-01 DIAGNOSIS — J96.01 ACUTE RESPIRATORY FAILURE WITH HYPOXIA (HCC): ICD-10-CM

## 2021-01-01 DIAGNOSIS — I10 ESSENTIAL HYPERTENSION: ICD-10-CM

## 2021-01-01 DIAGNOSIS — J12.82 PNEUMONIA DUE TO COVID-19 VIRUS: Primary | ICD-10-CM

## 2021-01-01 DIAGNOSIS — Z79.52 IMMUNOCOMPROMISED DUE TO CORTICOSTEROIDS (HCC): Primary | ICD-10-CM

## 2021-01-01 DIAGNOSIS — D84.821 IMMUNOCOMPROMISED DUE TO CORTICOSTEROIDS (HCC): Primary | ICD-10-CM

## 2021-01-01 DIAGNOSIS — U07.1 PNEUMONIA DUE TO COVID-19 VIRUS: Primary | ICD-10-CM

## 2021-01-01 DIAGNOSIS — M06.9 RHEUMATOID ARTHRITIS INVOLVING BOTH FEET, UNSPECIFIED WHETHER RHEUMATOID FACTOR PRESENT (HCC): ICD-10-CM

## 2021-01-01 DIAGNOSIS — Z74.09 IMMOBILITY: ICD-10-CM

## 2021-01-01 LAB
ABO GROUP BLD: NORMAL
ALBUMIN SERPL-MCNC: 2.2 G/DL (ref 3.5–5.2)
ALBUMIN SERPL-MCNC: 2.3 G/DL (ref 3.5–5.2)
ALBUMIN SERPL-MCNC: 2.4 G/DL (ref 3.5–5.2)
ALBUMIN SERPL-MCNC: 2.6 G/DL (ref 3.5–5.2)
ALBUMIN SERPL-MCNC: 3 G/DL (ref 3.5–5.2)
ALBUMIN/GLOB SERPL: 0.6 G/DL
ALBUMIN/GLOB SERPL: 0.6 G/DL
ALBUMIN/GLOB SERPL: 0.7 G/DL
ALBUMIN/GLOB SERPL: 0.7 G/DL
ALBUMIN/GLOB SERPL: 0.8 G/DL
ALP SERPL-CCNC: 56 U/L (ref 39–117)
ALP SERPL-CCNC: 58 U/L (ref 39–117)
ALP SERPL-CCNC: 60 U/L (ref 39–117)
ALP SERPL-CCNC: 66 U/L (ref 39–117)
ALP SERPL-CCNC: 77 U/L (ref 39–117)
ALT SERPL W P-5'-P-CCNC: 5 U/L (ref 1–41)
ALT SERPL W P-5'-P-CCNC: 8 U/L (ref 1–41)
ALT SERPL W P-5'-P-CCNC: 9 U/L (ref 1–41)
ANION GAP SERPL CALCULATED.3IONS-SCNC: 11 MMOL/L (ref 5–15)
ANION GAP SERPL CALCULATED.3IONS-SCNC: 12 MMOL/L (ref 5–15)
ANION GAP SERPL CALCULATED.3IONS-SCNC: 12 MMOL/L (ref 5–15)
ANION GAP SERPL CALCULATED.3IONS-SCNC: 5 MMOL/L (ref 5–15)
ANION GAP SERPL CALCULATED.3IONS-SCNC: 6 MMOL/L (ref 5–15)
ANION GAP SERPL CALCULATED.3IONS-SCNC: 8 MMOL/L (ref 5–15)
ANION GAP SERPL CALCULATED.3IONS-SCNC: 8 MMOL/L (ref 5–15)
ANISOCYTOSIS BLD QL: NORMAL
AST SERPL-CCNC: 12 U/L (ref 1–40)
AST SERPL-CCNC: 14 U/L (ref 1–40)
AST SERPL-CCNC: 15 U/L (ref 1–40)
AST SERPL-CCNC: 16 U/L (ref 1–40)
AST SERPL-CCNC: 16 U/L (ref 1–40)
B PARAPERT DNA SPEC QL NAA+PROBE: NOT DETECTED
B PERT DNA SPEC QL NAA+PROBE: NOT DETECTED
BACTERIA SPEC AEROBE CULT: ABNORMAL
BACTERIA SPEC AEROBE CULT: NORMAL
BACTERIA SPEC RESP CULT: ABNORMAL
BACTERIA SPEC RESP CULT: ABNORMAL
BACTERIA UR QL AUTO: ABNORMAL /HPF
BASOPHILS # BLD AUTO: 0.01 10*3/MM3 (ref 0–0.2)
BASOPHILS # BLD MANUAL: 0 10*3/MM3 (ref 0–0.2)
BASOPHILS NFR BLD AUTO: 0 % (ref 0–1.5)
BASOPHILS NFR BLD AUTO: 0.1 % (ref 0–1.5)
BILIRUB SERPL-MCNC: 0.3 MG/DL (ref 0–1.2)
BILIRUB SERPL-MCNC: 0.3 MG/DL (ref 0–1.2)
BILIRUB SERPL-MCNC: 0.4 MG/DL (ref 0–1.2)
BILIRUB SERPL-MCNC: 0.6 MG/DL (ref 0–1.2)
BILIRUB SERPL-MCNC: 0.6 MG/DL (ref 0–1.2)
BILIRUB UR QL STRIP: NEGATIVE
BLD GP AB SCN SERPL QL: NEGATIVE
BUN SERPL-MCNC: 16 MG/DL (ref 8–23)
BUN SERPL-MCNC: 17 MG/DL (ref 8–23)
BUN SERPL-MCNC: 18 MG/DL (ref 8–23)
BUN SERPL-MCNC: 19 MG/DL (ref 8–23)
BUN SERPL-MCNC: 20 MG/DL (ref 8–23)
BUN SERPL-MCNC: 21 MG/DL (ref 8–23)
BUN SERPL-MCNC: 22 MG/DL (ref 8–23)
BUN SERPL-MCNC: 22 MG/DL (ref 8–23)
BUN SERPL-MCNC: 23 MG/DL (ref 8–23)
BUN/CREAT SERPL: 25.4 (ref 7–25)
BUN/CREAT SERPL: 32.7 (ref 7–25)
BUN/CREAT SERPL: 33.3 (ref 7–25)
BUN/CREAT SERPL: 38.8 (ref 7–25)
BUN/CREAT SERPL: 39.6 (ref 7–25)
BUN/CREAT SERPL: 39.7 (ref 7–25)
BUN/CREAT SERPL: 43.5 (ref 7–25)
BUN/CREAT SERPL: 47.8 (ref 7–25)
BUN/CREAT SERPL: 48.9 (ref 7–25)
BURR CELLS BLD QL SMEAR: ABNORMAL
BURR CELLS BLD QL SMEAR: NORMAL
C PNEUM DNA NPH QL NAA+NON-PROBE: NOT DETECTED
CALCIUM SPEC-SCNC: 8.1 MG/DL (ref 8.6–10.5)
CALCIUM SPEC-SCNC: 8.1 MG/DL (ref 8.6–10.5)
CALCIUM SPEC-SCNC: 8.2 MG/DL (ref 8.6–10.5)
CALCIUM SPEC-SCNC: 8.2 MG/DL (ref 8.6–10.5)
CALCIUM SPEC-SCNC: 8.3 MG/DL (ref 8.6–10.5)
CALCIUM SPEC-SCNC: 8.4 MG/DL (ref 8.6–10.5)
CALCIUM SPEC-SCNC: 8.5 MG/DL (ref 8.6–10.5)
CHLORIDE SERPL-SCNC: 105 MMOL/L (ref 98–107)
CHLORIDE SERPL-SCNC: 105 MMOL/L (ref 98–107)
CHLORIDE SERPL-SCNC: 106 MMOL/L (ref 98–107)
CHLORIDE SERPL-SCNC: 107 MMOL/L (ref 98–107)
CHLORIDE SERPL-SCNC: 108 MMOL/L (ref 98–107)
CHLORIDE SERPL-SCNC: 110 MMOL/L (ref 98–107)
CHLORIDE SERPL-SCNC: 110 MMOL/L (ref 98–107)
CK SERPL-CCNC: 23 U/L (ref 20–200)
CK SERPL-CCNC: 27 U/L (ref 20–200)
CK SERPL-CCNC: 36 U/L (ref 20–200)
CLARITY UR: ABNORMAL
CO2 SERPL-SCNC: 23 MMOL/L (ref 22–29)
CO2 SERPL-SCNC: 24 MMOL/L (ref 22–29)
CO2 SERPL-SCNC: 26 MMOL/L (ref 22–29)
CO2 SERPL-SCNC: 27 MMOL/L (ref 22–29)
CO2 SERPL-SCNC: 28 MMOL/L (ref 22–29)
CO2 SERPL-SCNC: 28 MMOL/L (ref 22–29)
CO2 SERPL-SCNC: 29 MMOL/L (ref 22–29)
COLOR UR: YELLOW
CREAT SERPL-MCNC: 0.45 MG/DL (ref 0.76–1.27)
CREAT SERPL-MCNC: 0.46 MG/DL (ref 0.76–1.27)
CREAT SERPL-MCNC: 0.46 MG/DL (ref 0.76–1.27)
CREAT SERPL-MCNC: 0.48 MG/DL (ref 0.76–1.27)
CREAT SERPL-MCNC: 0.49 MG/DL (ref 0.76–1.27)
CREAT SERPL-MCNC: 0.53 MG/DL (ref 0.76–1.27)
CREAT SERPL-MCNC: 0.55 MG/DL (ref 0.76–1.27)
CREAT SERPL-MCNC: 0.58 MG/DL (ref 0.76–1.27)
CREAT SERPL-MCNC: 0.67 MG/DL (ref 0.76–1.27)
CRP SERPL-MCNC: 12.82 MG/DL (ref 0–0.5)
CRP SERPL-MCNC: 3.86 MG/DL (ref 0–0.5)
CRP SERPL-MCNC: 6.49 MG/DL (ref 0–0.5)
D DIMER PPP FEU-MCNC: 1.19 MCGFEU/ML (ref 0–0.56)
D DIMER PPP FEU-MCNC: 1.55 MCGFEU/ML (ref 0–0.56)
D DIMER PPP FEU-MCNC: 2.14 MCGFEU/ML (ref 0–0.56)
D-LACTATE SERPL-SCNC: 1 MMOL/L (ref 0.5–2)
DEPRECATED RDW RBC AUTO: 47.7 FL (ref 37–54)
DEPRECATED RDW RBC AUTO: 48.4 FL (ref 37–54)
DEPRECATED RDW RBC AUTO: 48.7 FL (ref 37–54)
DEPRECATED RDW RBC AUTO: 50.3 FL (ref 37–54)
DEPRECATED RDW RBC AUTO: 50.4 FL (ref 37–54)
DEPRECATED RDW RBC AUTO: 50.7 FL (ref 37–54)
DEPRECATED RDW RBC AUTO: 51 FL (ref 37–54)
DEPRECATED RDW RBC AUTO: 52.2 FL (ref 37–54)
EOSINOPHIL # BLD AUTO: 0 10*3/MM3 (ref 0–0.4)
EOSINOPHIL # BLD MANUAL: 0 10*3/MM3 (ref 0–0.4)
EOSINOPHIL NFR BLD AUTO: 0 % (ref 0.3–6.2)
EOSINOPHIL NFR BLD MANUAL: 0 % (ref 0.3–6.2)
ERYTHROCYTE [DISTWIDTH] IN BLOOD BY AUTOMATED COUNT: 14.3 % (ref 12.3–15.4)
ERYTHROCYTE [DISTWIDTH] IN BLOOD BY AUTOMATED COUNT: 14.4 % (ref 12.3–15.4)
ERYTHROCYTE [DISTWIDTH] IN BLOOD BY AUTOMATED COUNT: 14.4 % (ref 12.3–15.4)
ERYTHROCYTE [DISTWIDTH] IN BLOOD BY AUTOMATED COUNT: 14.6 % (ref 12.3–15.4)
ERYTHROCYTE [DISTWIDTH] IN BLOOD BY AUTOMATED COUNT: 15 % (ref 12.3–15.4)
ERYTHROCYTE [DISTWIDTH] IN BLOOD BY AUTOMATED COUNT: 15.1 % (ref 12.3–15.4)
ERYTHROCYTE [DISTWIDTH] IN BLOOD BY AUTOMATED COUNT: 15.2 % (ref 12.3–15.4)
ERYTHROCYTE [DISTWIDTH] IN BLOOD BY AUTOMATED COUNT: 15.4 % (ref 12.3–15.4)
FERRITIN SERPL-MCNC: 374.2 NG/ML (ref 30–400)
FERRITIN SERPL-MCNC: 380.6 NG/ML (ref 30–400)
FERRITIN SERPL-MCNC: 394.1 NG/ML (ref 30–400)
FERRITIN SERPL-MCNC: 435 NG/ML (ref 30–400)
FIBRINOGEN PPP-MCNC: 458 MG/DL (ref 215–430)
FIBRINOGEN PPP-MCNC: 532 MG/DL (ref 215–430)
FLUAV H1 2009 PAND RNA NPH QL NAA+PROBE: NOT DETECTED
FLUAV H1 HA GENE NPH QL NAA+PROBE: NOT DETECTED
FLUAV H3 RNA NPH QL NAA+PROBE: NOT DETECTED
FLUAV RNA RESP QL NAA+PROBE: NOT DETECTED
FLUAV SUBTYP SPEC NAA+PROBE: NOT DETECTED
FLUBV RNA ISLT QL NAA+PROBE: NOT DETECTED
FLUBV RNA RESP QL NAA+PROBE: NOT DETECTED
FOLATE SERPL-MCNC: 11 NG/ML (ref 4.78–24.2)
GFR SERPL CREATININE-BSD FRML MDRD: 138 ML/MIN/1.73
GFR SERPL CREATININE-BSD FRML MDRD: >150 ML/MIN/1.73
GLOBULIN UR ELPH-MCNC: 3.3 GM/DL
GLOBULIN UR ELPH-MCNC: 3.5 GM/DL
GLOBULIN UR ELPH-MCNC: 3.5 GM/DL
GLOBULIN UR ELPH-MCNC: 3.6 GM/DL
GLOBULIN UR ELPH-MCNC: 3.7 GM/DL
GLUCOSE SERPL-MCNC: 110 MG/DL (ref 65–99)
GLUCOSE SERPL-MCNC: 136 MG/DL (ref 65–99)
GLUCOSE SERPL-MCNC: 148 MG/DL (ref 65–99)
GLUCOSE SERPL-MCNC: 79 MG/DL (ref 65–99)
GLUCOSE SERPL-MCNC: 81 MG/DL (ref 65–99)
GLUCOSE SERPL-MCNC: 81 MG/DL (ref 65–99)
GLUCOSE SERPL-MCNC: 83 MG/DL (ref 65–99)
GLUCOSE SERPL-MCNC: 91 MG/DL (ref 65–99)
GLUCOSE SERPL-MCNC: 91 MG/DL (ref 65–99)
GLUCOSE UR STRIP-MCNC: NEGATIVE MG/DL
GRAM STN SPEC: ABNORMAL
GRAM STN SPEC: NORMAL
HADV DNA SPEC NAA+PROBE: NOT DETECTED
HCOV 229E RNA SPEC QL NAA+PROBE: NOT DETECTED
HCOV HKU1 RNA SPEC QL NAA+PROBE: NOT DETECTED
HCOV NL63 RNA SPEC QL NAA+PROBE: NOT DETECTED
HCOV OC43 RNA SPEC QL NAA+PROBE: NOT DETECTED
HCT VFR BLD AUTO: 34.8 % (ref 37.5–51)
HCT VFR BLD AUTO: 37.1 % (ref 37.5–51)
HCT VFR BLD AUTO: 38.3 % (ref 37.5–51)
HCT VFR BLD AUTO: 39 % (ref 37.5–51)
HCT VFR BLD AUTO: 39.9 % (ref 37.5–51)
HCT VFR BLD AUTO: 40.6 % (ref 37.5–51)
HCT VFR BLD AUTO: 40.9 % (ref 37.5–51)
HCT VFR BLD AUTO: 41.2 % (ref 37.5–51)
HGB BLD-MCNC: 10 G/DL (ref 13–17.7)
HGB BLD-MCNC: 10.1 G/DL (ref 13–17.7)
HGB BLD-MCNC: 10.9 G/DL (ref 13–17.7)
HGB BLD-MCNC: 11 G/DL (ref 13–17.7)
HGB BLD-MCNC: 11.4 G/DL (ref 13–17.7)
HGB BLD-MCNC: 11.5 G/DL (ref 13–17.7)
HGB BLD-MCNC: 11.6 G/DL (ref 13–17.7)
HGB BLD-MCNC: 11.7 G/DL (ref 13–17.7)
HGB UR QL STRIP.AUTO: ABNORMAL
HMPV RNA NPH QL NAA+NON-PROBE: NOT DETECTED
HOLD SPECIMEN: NORMAL
HOLD SPECIMEN: NORMAL
HPIV1 RNA SPEC QL NAA+PROBE: NOT DETECTED
HPIV2 RNA SPEC QL NAA+PROBE: NOT DETECTED
HPIV3 RNA NPH QL NAA+PROBE: NOT DETECTED
HPIV4 P GENE NPH QL NAA+PROBE: NOT DETECTED
HYALINE CASTS UR QL AUTO: ABNORMAL /LPF
HYPOCHROMIA BLD QL: NORMAL
IL6 SERPL-MCNC: 19.7 PG/ML (ref 0–13)
IMM GRANULOCYTES # BLD AUTO: 0.04 10*3/MM3 (ref 0–0.05)
IMM GRANULOCYTES # BLD AUTO: 0.08 10*3/MM3 (ref 0–0.05)
IMM GRANULOCYTES # BLD AUTO: 0.13 10*3/MM3 (ref 0–0.05)
IMM GRANULOCYTES NFR BLD AUTO: 0.5 % (ref 0–0.5)
IMM GRANULOCYTES NFR BLD AUTO: 0.9 % (ref 0–0.5)
IMM GRANULOCYTES NFR BLD AUTO: 1.1 % (ref 0–0.5)
IRON 24H UR-MRATE: 19 MCG/DL (ref 59–158)
IRON SATN MFR SERPL: 12 % (ref 20–50)
KETONES UR QL STRIP: ABNORMAL
L PNEUMO1 AG UR QL IA: NEGATIVE
LDH SERPL-CCNC: 236 U/L (ref 135–225)
LDH SERPL-CCNC: 261 U/L (ref 135–225)
LDH SERPL-CCNC: 274 U/L (ref 135–225)
LEUKOCYTE ESTERASE UR QL STRIP.AUTO: ABNORMAL
LYMPHOCYTES # BLD AUTO: 0.47 10*3/MM3 (ref 0.7–3.1)
LYMPHOCYTES # BLD AUTO: 0.55 10*3/MM3 (ref 0.7–3.1)
LYMPHOCYTES # BLD AUTO: 0.63 10*3/MM3 (ref 0.7–3.1)
LYMPHOCYTES # BLD MANUAL: 0.95 10*3/MM3 (ref 0.7–3.1)
LYMPHOCYTES NFR BLD AUTO: 4.8 % (ref 19.6–45.3)
LYMPHOCYTES NFR BLD AUTO: 5.9 % (ref 19.6–45.3)
LYMPHOCYTES NFR BLD AUTO: 7.2 % (ref 19.6–45.3)
LYMPHOCYTES NFR BLD MANUAL: 12 % (ref 19.6–45.3)
LYMPHOCYTES NFR BLD MANUAL: 6 % (ref 5–12)
M PNEUMO IGG SER IA-ACNC: NOT DETECTED
MAGNESIUM SERPL-MCNC: 2.2 MG/DL (ref 1.6–2.4)
MCH RBC QN AUTO: 25.5 PG (ref 26.6–33)
MCH RBC QN AUTO: 25.7 PG (ref 26.6–33)
MCH RBC QN AUTO: 25.7 PG (ref 26.6–33)
MCH RBC QN AUTO: 25.8 PG (ref 26.6–33)
MCH RBC QN AUTO: 25.8 PG (ref 26.6–33)
MCH RBC QN AUTO: 25.9 PG (ref 26.6–33)
MCH RBC QN AUTO: 26 PG (ref 26.6–33)
MCH RBC QN AUTO: 26.3 PG (ref 26.6–33)
MCHC RBC AUTO-ENTMCNC: 27.2 G/DL (ref 31.5–35.7)
MCHC RBC AUTO-ENTMCNC: 27.9 G/DL (ref 31.5–35.7)
MCHC RBC AUTO-ENTMCNC: 28.1 G/DL (ref 31.5–35.7)
MCHC RBC AUTO-ENTMCNC: 28.4 G/DL (ref 31.5–35.7)
MCHC RBC AUTO-ENTMCNC: 28.6 G/DL (ref 31.5–35.7)
MCHC RBC AUTO-ENTMCNC: 28.6 G/DL (ref 31.5–35.7)
MCHC RBC AUTO-ENTMCNC: 28.7 G/DL (ref 31.5–35.7)
MCHC RBC AUTO-ENTMCNC: 28.7 G/DL (ref 31.5–35.7)
MCV RBC AUTO: 90 FL (ref 79–97)
MCV RBC AUTO: 90.3 FL (ref 79–97)
MCV RBC AUTO: 90.5 FL (ref 79–97)
MCV RBC AUTO: 90.7 FL (ref 79–97)
MCV RBC AUTO: 91.2 FL (ref 79–97)
MCV RBC AUTO: 91.6 FL (ref 79–97)
MCV RBC AUTO: 92.2 FL (ref 79–97)
MCV RBC AUTO: 94.4 FL (ref 79–97)
METAMYELOCYTES NFR BLD MANUAL: 1 % (ref 0–0)
MONOCYTES # BLD AUTO: 0.18 10*3/MM3 (ref 0.1–0.9)
MONOCYTES # BLD AUTO: 0.28 10*3/MM3 (ref 0.1–0.9)
MONOCYTES # BLD AUTO: 0.36 10*3/MM3 (ref 0.1–0.9)
MONOCYTES # BLD AUTO: 0.48 10*3/MM3 (ref 0.1–0.9)
MONOCYTES NFR BLD AUTO: 2.1 % (ref 5–12)
MONOCYTES NFR BLD AUTO: 2.4 % (ref 5–12)
MONOCYTES NFR BLD AUTO: 4.5 % (ref 5–12)
NEUTROPHILS # BLD AUTO: 6.42 10*3/MM3 (ref 1.7–7)
NEUTROPHILS NFR BLD AUTO: 10.59 10*3/MM3 (ref 1.7–7)
NEUTROPHILS NFR BLD AUTO: 7.15 10*3/MM3 (ref 1.7–7)
NEUTROPHILS NFR BLD AUTO: 7.82 10*3/MM3 (ref 1.7–7)
NEUTROPHILS NFR BLD AUTO: 89 % (ref 42.7–76)
NEUTROPHILS NFR BLD AUTO: 89.7 % (ref 42.7–76)
NEUTROPHILS NFR BLD AUTO: 91.6 % (ref 42.7–76)
NEUTROPHILS NFR BLD MANUAL: 73 % (ref 42.7–76)
NEUTS BAND NFR BLD MANUAL: 8 % (ref 0–5)
NITRITE UR QL STRIP: NEGATIVE
NRBC BLD AUTO-RTO: 0 /100 WBC (ref 0–0.2)
PH UR STRIP.AUTO: 5.5 [PH] (ref 5–8)
PLAT MORPH BLD: NORMAL
PLAT MORPH BLD: NORMAL
PLATELET # BLD AUTO: 119 10*3/MM3 (ref 140–450)
PLATELET # BLD AUTO: 131 10*3/MM3 (ref 140–450)
PLATELET # BLD AUTO: 137 10*3/MM3 (ref 140–450)
PLATELET # BLD AUTO: 158 10*3/MM3 (ref 140–450)
PLATELET # BLD AUTO: 164 10*3/MM3 (ref 140–450)
PLATELET # BLD AUTO: 169 10*3/MM3 (ref 140–450)
PLATELET # BLD AUTO: 182 10*3/MM3 (ref 140–450)
PLATELET # BLD AUTO: 185 10*3/MM3 (ref 140–450)
PMV BLD AUTO: 10.9 FL (ref 6–12)
PMV BLD AUTO: 11.2 FL (ref 6–12)
PMV BLD AUTO: 11.3 FL (ref 6–12)
PMV BLD AUTO: 11.3 FL (ref 6–12)
PMV BLD AUTO: 11.4 FL (ref 6–12)
PMV BLD AUTO: 11.6 FL (ref 6–12)
PMV BLD AUTO: 12.6 FL (ref 6–12)
PMV BLD AUTO: 12.7 FL (ref 6–12)
POTASSIUM SERPL-SCNC: 3.4 MMOL/L (ref 3.5–5.2)
POTASSIUM SERPL-SCNC: 3.7 MMOL/L (ref 3.5–5.2)
POTASSIUM SERPL-SCNC: 3.8 MMOL/L (ref 3.5–5.2)
POTASSIUM SERPL-SCNC: 3.8 MMOL/L (ref 3.5–5.2)
POTASSIUM SERPL-SCNC: 3.9 MMOL/L (ref 3.5–5.2)
POTASSIUM SERPL-SCNC: 4.1 MMOL/L (ref 3.5–5.2)
POTASSIUM SERPL-SCNC: 4.3 MMOL/L (ref 3.5–5.2)
POTASSIUM SERPL-SCNC: 4.4 MMOL/L (ref 3.5–5.2)
POTASSIUM SERPL-SCNC: 4.7 MMOL/L (ref 3.5–5.2)
PROCALCITONIN SERPL-MCNC: 0.46 NG/ML (ref 0–0.25)
PROCALCITONIN SERPL-MCNC: 0.48 NG/ML (ref 0–0.25)
PROT SERPL-MCNC: 5.6 G/DL (ref 6–8.5)
PROT SERPL-MCNC: 5.7 G/DL (ref 6–8.5)
PROT SERPL-MCNC: 6.1 G/DL (ref 6–8.5)
PROT SERPL-MCNC: 6.1 G/DL (ref 6–8.5)
PROT SERPL-MCNC: 6.6 G/DL (ref 6–8.5)
PROT UR QL STRIP: ABNORMAL
QT INTERVAL: 338 MS
QT INTERVAL: 400 MS
QTC INTERVAL: 476 MS
QTC INTERVAL: 499 MS
RBC # BLD AUTO: 3.8 10*6/MM3 (ref 4.14–5.8)
RBC # BLD AUTO: 3.93 10*6/MM3 (ref 4.14–5.8)
RBC # BLD AUTO: 4.23 10*6/MM3 (ref 4.14–5.8)
RBC # BLD AUTO: 4.23 10*6/MM3 (ref 4.14–5.8)
RBC # BLD AUTO: 4.42 10*6/MM3 (ref 4.14–5.8)
RBC # BLD AUTO: 4.51 10*6/MM3 (ref 4.14–5.8)
RBC # BLD AUTO: 4.51 10*6/MM3 (ref 4.14–5.8)
RBC # BLD AUTO: 4.52 10*6/MM3 (ref 4.14–5.8)
RBC # UR: ABNORMAL /HPF
REF LAB TEST METHOD: ABNORMAL
RETICS # AUTO: 0.03 10*6/MM3 (ref 0.02–0.13)
RETICS/RBC NFR AUTO: 0.75 % (ref 0.7–1.9)
RH BLD: POSITIVE
RHINOVIRUS RNA SPEC NAA+PROBE: NOT DETECTED
RSV RNA NPH QL NAA+NON-PROBE: NOT DETECTED
S PNEUM AG SPEC QL LA: NEGATIVE
SARS-COV-2 RDRP RESP QL NAA+PROBE: NORMAL
SARS-COV-2 RNA NPH QL NAA+NON-PROBE: DETECTED
SARS-COV-2 RNA RESP QL NAA+PROBE: DETECTED
SMUDGE CELLS BLD QL SMEAR: ABNORMAL
SMUDGE CELLS BLD QL SMEAR: NORMAL
SODIUM SERPL-SCNC: 139 MMOL/L (ref 136–145)
SODIUM SERPL-SCNC: 140 MMOL/L (ref 136–145)
SODIUM SERPL-SCNC: 141 MMOL/L (ref 136–145)
SODIUM SERPL-SCNC: 141 MMOL/L (ref 136–145)
SODIUM SERPL-SCNC: 144 MMOL/L (ref 136–145)
SODIUM SERPL-SCNC: 144 MMOL/L (ref 136–145)
SODIUM SERPL-SCNC: 146 MMOL/L (ref 136–145)
SODIUM SERPL-SCNC: 146 MMOL/L (ref 136–145)
SODIUM SERPL-SCNC: 147 MMOL/L (ref 136–145)
SP GR UR STRIP: 1.02 (ref 1–1.03)
SQUAMOUS #/AREA URNS HPF: ABNORMAL /HPF
T&S EXPIRATION DATE: NORMAL
TIBC SERPL-MCNC: 158 MCG/DL (ref 298–536)
TRANSFERRIN SERPL-MCNC: 106 MG/DL (ref 200–360)
TROPONIN T SERPL-MCNC: 0.01 NG/ML (ref 0–0.03)
TROPONIN T SERPL-MCNC: 0.03 NG/ML (ref 0–0.03)
TSH SERPL DL<=0.05 MIU/L-ACNC: 0.22 UIU/ML (ref 0.27–4.2)
UROBILINOGEN UR QL STRIP: ABNORMAL
VANCOMYCIN SERPL-MCNC: 10.2 MCG/ML (ref 5–40)
VIT B12 BLD-MCNC: 809 PG/ML (ref 211–946)
WBC # BLD AUTO: 11.56 10*3/MM3 (ref 3.4–10.8)
WBC # BLD AUTO: 6.66 10*3/MM3 (ref 3.4–10.8)
WBC # BLD AUTO: 7.49 10*3/MM3 (ref 3.4–10.8)
WBC # BLD AUTO: 7.92 10*3/MM3 (ref 3.4–10.8)
WBC # BLD AUTO: 7.92 10*3/MM3 (ref 3.4–10.8)
WBC # BLD AUTO: 8.03 10*3/MM3 (ref 3.4–10.8)
WBC # BLD AUTO: 8.11 10*3/MM3 (ref 3.4–10.8)
WBC # BLD AUTO: 8.72 10*3/MM3 (ref 3.4–10.8)
WBC UR QL AUTO: ABNORMAL /HPF
WHOLE BLOOD HOLD SPECIMEN: NORMAL
WHOLE BLOOD HOLD SPECIMEN: NORMAL
YEAST URNS QL MICRO: ABNORMAL /HPF

## 2021-01-01 PROCEDURE — 25010000002 HALOPERIDOL LACTATE PER 5 MG: Performed by: NURSE PRACTITIONER

## 2021-01-01 PROCEDURE — 83615 LACTATE (LD) (LDH) ENZYME: CPT | Performed by: INTERNAL MEDICINE

## 2021-01-01 PROCEDURE — 85027 COMPLETE CBC AUTOMATED: CPT | Performed by: HOSPITALIST

## 2021-01-01 PROCEDURE — 25010000002 FUROSEMIDE PER 20 MG: Performed by: NURSE PRACTITIONER

## 2021-01-01 PROCEDURE — 74176 CT ABD & PELVIS W/O CONTRAST: CPT

## 2021-01-01 PROCEDURE — 25010000002 AZITHROMYCIN PER 500 MG: Performed by: PHYSICIAN ASSISTANT

## 2021-01-01 PROCEDURE — 25010000002 KETOROLAC TROMETHAMINE PER 15 MG: Performed by: NURSE PRACTITIONER

## 2021-01-01 PROCEDURE — 82728 ASSAY OF FERRITIN: CPT | Performed by: INTERNAL MEDICINE

## 2021-01-01 PROCEDURE — 63710000001 PREDNISONE PER 5 MG: Performed by: INTERNAL MEDICINE

## 2021-01-01 PROCEDURE — 97530 THERAPEUTIC ACTIVITIES: CPT

## 2021-01-01 PROCEDURE — 84145 PROCALCITONIN (PCT): CPT | Performed by: PHYSICIAN ASSISTANT

## 2021-01-01 PROCEDURE — 99226 PR SBSQ OBSERVATION CARE/DAY 35 MINUTES: CPT | Performed by: HOSPITALIST

## 2021-01-01 PROCEDURE — 25010000002 LORAZEPAM PER 2 MG: Performed by: INTERNAL MEDICINE

## 2021-01-01 PROCEDURE — 25010000002 CEFTRIAXONE PER 250 MG: Performed by: PHYSICIAN ASSISTANT

## 2021-01-01 PROCEDURE — 99239 HOSP IP/OBS DSCHRG MGMT >30: CPT | Performed by: INTERNAL MEDICINE

## 2021-01-01 PROCEDURE — 25010000002 MORPHINE PER 10 MG: Performed by: INTERNAL MEDICINE

## 2021-01-01 PROCEDURE — 25010000002 ENOXAPARIN PER 10 MG: Performed by: INTERNAL MEDICINE

## 2021-01-01 PROCEDURE — 99232 SBSQ HOSP IP/OBS MODERATE 35: CPT | Performed by: INTERNAL MEDICINE

## 2021-01-01 PROCEDURE — 25010000002 PIPERACILLIN SOD-TAZOBACTAM PER 1 G: Performed by: INTERNAL MEDICINE

## 2021-01-01 PROCEDURE — 73030 X-RAY EXAM OF SHOULDER: CPT

## 2021-01-01 PROCEDURE — 86900 BLOOD TYPING SEROLOGIC ABO: CPT | Performed by: INTERNAL MEDICINE

## 2021-01-01 PROCEDURE — 25010000002 LORAZEPAM PER 2 MG: Performed by: NURSE PRACTITIONER

## 2021-01-01 PROCEDURE — 80053 COMPREHEN METABOLIC PANEL: CPT | Performed by: INTERNAL MEDICINE

## 2021-01-01 PROCEDURE — 85007 BL SMEAR W/DIFF WBC COUNT: CPT | Performed by: INTERNAL MEDICINE

## 2021-01-01 PROCEDURE — 81001 URINALYSIS AUTO W/SCOPE: CPT | Performed by: PHYSICIAN ASSISTANT

## 2021-01-01 PROCEDURE — 87186 SC STD MICRODIL/AGAR DIL: CPT | Performed by: PHYSICIAN ASSISTANT

## 2021-01-01 PROCEDURE — 99213 OFFICE O/P EST LOW 20 MIN: CPT | Performed by: ORTHOPAEDIC SURGERY

## 2021-01-01 PROCEDURE — 83520 IMMUNOASSAY QUANT NOS NONAB: CPT | Performed by: INTERNAL MEDICINE

## 2021-01-01 PROCEDURE — 80048 BASIC METABOLIC PNL TOTAL CA: CPT | Performed by: HOSPITALIST

## 2021-01-01 PROCEDURE — G0378 HOSPITAL OBSERVATION PER HR: HCPCS

## 2021-01-01 PROCEDURE — 87205 SMEAR GRAM STAIN: CPT | Performed by: INTERNAL MEDICINE

## 2021-01-01 PROCEDURE — 99285 EMERGENCY DEPT VISIT HI MDM: CPT

## 2021-01-01 PROCEDURE — 87636 SARSCOV2 & INF A&B AMP PRB: CPT | Performed by: PHYSICIAN ASSISTANT

## 2021-01-01 PROCEDURE — 96372 THER/PROPH/DIAG INJ SC/IM: CPT

## 2021-01-01 PROCEDURE — 99214 OFFICE O/P EST MOD 30 MIN: CPT | Performed by: ORTHOPAEDIC SURGERY

## 2021-01-01 PROCEDURE — 99226 PR SBSQ OBSERVATION CARE/DAY 35 MINUTES: CPT | Performed by: INTERNAL MEDICINE

## 2021-01-01 PROCEDURE — 84466 ASSAY OF TRANSFERRIN: CPT | Performed by: INTERNAL MEDICINE

## 2021-01-01 PROCEDURE — 93005 ELECTROCARDIOGRAM TRACING: CPT | Performed by: INTERNAL MEDICINE

## 2021-01-01 PROCEDURE — 97535 SELF CARE MNGMENT TRAINING: CPT

## 2021-01-01 PROCEDURE — 97162 PT EVAL MOD COMPLEX 30 MIN: CPT

## 2021-01-01 PROCEDURE — 82746 ASSAY OF FOLIC ACID SERUM: CPT | Performed by: INTERNAL MEDICINE

## 2021-01-01 PROCEDURE — 85025 COMPLETE CBC W/AUTO DIFF WBC: CPT | Performed by: INTERNAL MEDICINE

## 2021-01-01 PROCEDURE — 93005 ELECTROCARDIOGRAM TRACING: CPT | Performed by: EMERGENCY MEDICINE

## 2021-01-01 PROCEDURE — 25010000002 MORPHINE PER 10 MG: Performed by: FAMILY MEDICINE

## 2021-01-01 PROCEDURE — XW033E5 INTRODUCTION OF REMDESIVIR ANTI-INFECTIVE INTO PERIPHERAL VEIN, PERCUTANEOUS APPROACH, NEW TECHNOLOGY GROUP 5: ICD-10-PCS | Performed by: INTERNAL MEDICINE

## 2021-01-01 PROCEDURE — 82607 VITAMIN B-12: CPT | Performed by: INTERNAL MEDICINE

## 2021-01-01 PROCEDURE — 84145 PROCALCITONIN (PCT): CPT | Performed by: INTERNAL MEDICINE

## 2021-01-01 PROCEDURE — 0202U NFCT DS 22 TRGT SARS-COV-2: CPT | Performed by: INTERNAL MEDICINE

## 2021-01-01 PROCEDURE — 87070 CULTURE OTHR SPECIMN AEROBIC: CPT | Performed by: INTERNAL MEDICINE

## 2021-01-01 PROCEDURE — 82550 ASSAY OF CK (CPK): CPT | Performed by: INTERNAL MEDICINE

## 2021-01-01 PROCEDURE — 25010000002 DIGOXIN PER 500 MCG: Performed by: INTERNAL MEDICINE

## 2021-01-01 PROCEDURE — 87076 CULTURE ANAEROBE IDENT EACH: CPT | Performed by: INTERNAL MEDICINE

## 2021-01-01 PROCEDURE — 86901 BLOOD TYPING SEROLOGIC RH(D): CPT | Performed by: INTERNAL MEDICINE

## 2021-01-01 PROCEDURE — 99233 SBSQ HOSP IP/OBS HIGH 50: CPT | Performed by: INTERNAL MEDICINE

## 2021-01-01 PROCEDURE — 87040 BLOOD CULTURE FOR BACTERIA: CPT | Performed by: PHYSICIAN ASSISTANT

## 2021-01-01 PROCEDURE — 84484 ASSAY OF TROPONIN QUANT: CPT | Performed by: INTERNAL MEDICINE

## 2021-01-01 PROCEDURE — 85384 FIBRINOGEN ACTIVITY: CPT | Performed by: INTERNAL MEDICINE

## 2021-01-01 PROCEDURE — 25010000002 VANCOMYCIN PER 500 MG

## 2021-01-01 PROCEDURE — 99223 1ST HOSP IP/OBS HIGH 75: CPT | Performed by: INTERNAL MEDICINE

## 2021-01-01 PROCEDURE — 71250 CT THORAX DX C-: CPT

## 2021-01-01 PROCEDURE — 25010000002 AMIODARONE IN DEXTROSE 5% 150-4.21 MG/100ML-% SOLUTION: Performed by: INTERNAL MEDICINE

## 2021-01-01 PROCEDURE — 86140 C-REACTIVE PROTEIN: CPT | Performed by: INTERNAL MEDICINE

## 2021-01-01 PROCEDURE — 87899 AGENT NOS ASSAY W/OPTIC: CPT | Performed by: INTERNAL MEDICINE

## 2021-01-01 PROCEDURE — 25010000002 MORPHINE PER 10 MG: Performed by: NURSE PRACTITIONER

## 2021-01-01 PROCEDURE — 71045 X-RAY EXAM CHEST 1 VIEW: CPT

## 2021-01-01 PROCEDURE — 99225 PR SBSQ OBSERVATION CARE/DAY 25 MINUTES: CPT | Performed by: INTERNAL MEDICINE

## 2021-01-01 PROCEDURE — 25010000002 DEXAMETHASONE PER 1 MG: Performed by: INTERNAL MEDICINE

## 2021-01-01 PROCEDURE — 63710000001 DEXAMETHASONE PER 0.25 MG: Performed by: INTERNAL MEDICINE

## 2021-01-01 PROCEDURE — 99442 PR PHYS/QHP TELEPHONE EVALUATION 11-20 MIN: CPT | Performed by: NURSE PRACTITIONER

## 2021-01-01 PROCEDURE — 85045 AUTOMATED RETICULOCYTE COUNT: CPT | Performed by: INTERNAL MEDICINE

## 2021-01-01 PROCEDURE — 84484 ASSAY OF TROPONIN QUANT: CPT | Performed by: EMERGENCY MEDICINE

## 2021-01-01 PROCEDURE — 85379 FIBRIN DEGRADATION QUANT: CPT | Performed by: INTERNAL MEDICINE

## 2021-01-01 PROCEDURE — 85025 COMPLETE CBC W/AUTO DIFF WBC: CPT | Performed by: EMERGENCY MEDICINE

## 2021-01-01 PROCEDURE — 97110 THERAPEUTIC EXERCISES: CPT

## 2021-01-01 PROCEDURE — 83540 ASSAY OF IRON: CPT | Performed by: INTERNAL MEDICINE

## 2021-01-01 PROCEDURE — 25010000002 AMIODARONE IN DEXTROSE 5% 360-4.14 MG/200ML-% SOLUTION: Performed by: INTERNAL MEDICINE

## 2021-01-01 PROCEDURE — 80202 ASSAY OF VANCOMYCIN: CPT

## 2021-01-01 PROCEDURE — 94799 UNLISTED PULMONARY SVC/PX: CPT

## 2021-01-01 PROCEDURE — 83735 ASSAY OF MAGNESIUM: CPT | Performed by: EMERGENCY MEDICINE

## 2021-01-01 PROCEDURE — 97166 OT EVAL MOD COMPLEX 45 MIN: CPT

## 2021-01-01 PROCEDURE — 87147 CULTURE TYPE IMMUNOLOGIC: CPT | Performed by: INTERNAL MEDICINE

## 2021-01-01 PROCEDURE — 99222 1ST HOSP IP/OBS MODERATE 55: CPT | Performed by: INTERNAL MEDICINE

## 2021-01-01 PROCEDURE — 25010000002 VANCOMYCIN 10 G RECONSTITUTED SOLUTION

## 2021-01-01 PROCEDURE — 87077 CULTURE AEROBIC IDENTIFY: CPT | Performed by: PHYSICIAN ASSISTANT

## 2021-01-01 PROCEDURE — 70450 CT HEAD/BRAIN W/O DYE: CPT

## 2021-01-01 PROCEDURE — 93010 ELECTROCARDIOGRAM REPORT: CPT | Performed by: INTERNAL MEDICINE

## 2021-01-01 PROCEDURE — 84443 ASSAY THYROID STIM HORMONE: CPT | Performed by: INTERNAL MEDICINE

## 2021-01-01 PROCEDURE — 86850 RBC ANTIBODY SCREEN: CPT | Performed by: INTERNAL MEDICINE

## 2021-01-01 PROCEDURE — 87086 URINE CULTURE/COLONY COUNT: CPT | Performed by: PHYSICIAN ASSISTANT

## 2021-01-01 PROCEDURE — 83605 ASSAY OF LACTIC ACID: CPT | Performed by: PHYSICIAN ASSISTANT

## 2021-01-01 PROCEDURE — 25010000002 FUROSEMIDE PER 20 MG: Performed by: INTERNAL MEDICINE

## 2021-01-01 PROCEDURE — 99217 PR OBSERVATION CARE DISCHARGE MANAGEMENT: CPT | Performed by: INTERNAL MEDICINE

## 2021-01-01 PROCEDURE — 80053 COMPREHEN METABOLIC PANEL: CPT | Performed by: EMERGENCY MEDICINE

## 2021-01-01 PROCEDURE — 87635 SARS-COV-2 COVID-19 AMP PRB: CPT | Performed by: INTERNAL MEDICINE

## 2021-01-01 RX ORDER — LORAZEPAM 2 MG/ML
0.5 INJECTION INTRAMUSCULAR
Status: DISCONTINUED | OUTPATIENT
Start: 2021-01-01 | End: 2021-01-01

## 2021-01-01 RX ORDER — METOPROLOL TARTRATE 5 MG/5ML
2.5 INJECTION INTRAVENOUS ONCE
Status: DISCONTINUED | OUTPATIENT
Start: 2021-01-01 | End: 2021-01-01

## 2021-01-01 RX ORDER — CETIRIZINE HYDROCHLORIDE 10 MG/1
5 TABLET ORAL DAILY
Status: DISCONTINUED | OUTPATIENT
Start: 2021-01-01 | End: 2021-01-01

## 2021-01-01 RX ORDER — HYDROCODONE BITARTRATE AND ACETAMINOPHEN 10; 325 MG/1; MG/1
1 TABLET ORAL EVERY 6 HOURS PRN
Status: DISCONTINUED | OUTPATIENT
Start: 2021-01-01 | End: 2021-01-01

## 2021-01-01 RX ORDER — MELATONIN
2000 DAILY
Status: CANCELLED | OUTPATIENT
Start: 2021-01-01

## 2021-01-01 RX ORDER — LORAZEPAM 0.5 MG/1
0.5 TABLET ORAL EVERY 6 HOURS PRN
Status: CANCELLED | OUTPATIENT
Start: 2021-01-01 | End: 2021-01-01

## 2021-01-01 RX ORDER — METOPROLOL TARTRATE 5 MG/5ML
2.5 INJECTION INTRAVENOUS EVERY 6 HOURS
Status: DISCONTINUED | OUTPATIENT
Start: 2021-01-01 | End: 2021-01-01

## 2021-01-01 RX ORDER — DEXTROMETHORPHAN POLISTIREX 30 MG/5ML
60 SUSPENSION ORAL EVERY 12 HOURS PRN
Status: CANCELLED | OUTPATIENT
Start: 2021-01-01

## 2021-01-01 RX ORDER — DEXTROMETHORPHAN POLISTIREX 30 MG/5ML
60 SUSPENSION ORAL EVERY 12 HOURS PRN
Status: DISCONTINUED | OUTPATIENT
Start: 2021-01-01 | End: 2021-01-01

## 2021-01-01 RX ORDER — FINASTERIDE 5 MG/1
5 TABLET, FILM COATED ORAL DAILY
Status: CANCELLED | OUTPATIENT
Start: 2021-01-01

## 2021-01-01 RX ORDER — DOCUSATE SODIUM 100 MG/1
100 CAPSULE, LIQUID FILLED ORAL NIGHTLY
Status: DISCONTINUED | OUTPATIENT
Start: 2021-01-01 | End: 2021-01-01

## 2021-01-01 RX ORDER — MORPHINE SULFATE 4 MG/ML
4 INJECTION, SOLUTION INTRAMUSCULAR; INTRAVENOUS EVERY 6 HOURS
Status: COMPLETED | OUTPATIENT
Start: 2021-01-01 | End: 2021-01-01

## 2021-01-01 RX ORDER — MORPHINE SULFATE 4 MG/ML
4 INJECTION, SOLUTION INTRAMUSCULAR; INTRAVENOUS
Status: DISPENSED | OUTPATIENT
Start: 2021-01-01 | End: 2021-01-01

## 2021-01-01 RX ORDER — SODIUM CHLORIDE 0.9 % (FLUSH) 0.9 %
10 SYRINGE (ML) INJECTION AS NEEDED
Status: DISCONTINUED | OUTPATIENT
Start: 2021-01-01 | End: 2021-01-01 | Stop reason: HOSPADM

## 2021-01-01 RX ORDER — FUROSEMIDE 10 MG/ML
40 INJECTION INTRAMUSCULAR; INTRAVENOUS ONCE
Status: COMPLETED | OUTPATIENT
Start: 2021-01-01 | End: 2021-01-01

## 2021-01-01 RX ORDER — LORAZEPAM 0.5 MG/1
0.5 TABLET ORAL EVERY 4 HOURS PRN
Status: DISCONTINUED | OUTPATIENT
Start: 2021-01-01 | End: 2021-01-01 | Stop reason: HOSPADM

## 2021-01-01 RX ORDER — MORPHINE SULFATE 4 MG/ML
4 INJECTION, SOLUTION INTRAMUSCULAR; INTRAVENOUS EVERY 6 HOURS
Status: DISCONTINUED | OUTPATIENT
Start: 2021-01-01 | End: 2021-01-01

## 2021-01-01 RX ORDER — MORPHINE SULFATE 4 MG/ML
4 INJECTION, SOLUTION INTRAMUSCULAR; INTRAVENOUS
Status: DISCONTINUED | OUTPATIENT
Start: 2021-01-01 | End: 2021-01-01

## 2021-01-01 RX ORDER — LORAZEPAM 0.5 MG/1
0.5 TABLET ORAL EVERY 6 HOURS PRN
Status: DISCONTINUED | OUTPATIENT
Start: 2021-01-01 | End: 2021-01-01

## 2021-01-01 RX ORDER — HALOPERIDOL 5 MG/ML
1 INJECTION INTRAMUSCULAR EVERY 4 HOURS PRN
Status: DISCONTINUED | OUTPATIENT
Start: 2021-01-01 | End: 2021-02-16 | Stop reason: HOSPADM

## 2021-01-01 RX ORDER — HYDROCODONE BITARTRATE AND ACETAMINOPHEN 10; 325 MG/1; MG/1
1 TABLET ORAL EVERY 6 HOURS PRN
Status: CANCELLED | OUTPATIENT
Start: 2021-01-01

## 2021-01-01 RX ORDER — SODIUM CHLORIDE 0.9 % (FLUSH) 0.9 %
10 SYRINGE (ML) INJECTION AS NEEDED
Status: CANCELLED | OUTPATIENT
Start: 2021-01-01

## 2021-01-01 RX ORDER — CETIRIZINE HYDROCHLORIDE 10 MG/1
10 TABLET ORAL DAILY
Status: DISCONTINUED | OUTPATIENT
Start: 2021-01-01 | End: 2021-01-01 | Stop reason: HOSPADM

## 2021-01-01 RX ORDER — LORAZEPAM 0.5 MG/1
0.5 TABLET ORAL EVERY 4 HOURS PRN
Status: DISCONTINUED | OUTPATIENT
Start: 2021-01-01 | End: 2021-01-01

## 2021-01-01 RX ORDER — ALBUTEROL SULFATE 2.5 MG/3ML
2.5 SOLUTION RESPIRATORY (INHALATION) EVERY 6 HOURS PRN
Status: DISCONTINUED | OUTPATIENT
Start: 2021-01-01 | End: 2021-01-01 | Stop reason: HOSPADM

## 2021-01-01 RX ORDER — SODIUM CHLORIDE 0.9 % (FLUSH) 0.9 %
10 SYRINGE (ML) INJECTION EVERY 12 HOURS SCHEDULED
Status: DISCONTINUED | OUTPATIENT
Start: 2021-01-01 | End: 2021-01-01 | Stop reason: HOSPADM

## 2021-01-01 RX ORDER — MORPHINE SULFATE 1 MG/ML
INJECTION INTRAVENOUS CONTINUOUS
Status: DISCONTINUED | OUTPATIENT
Start: 2021-01-01 | End: 2021-02-16 | Stop reason: HOSPADM

## 2021-01-01 RX ORDER — POLYVINYL ALCOHOL 14 MG/ML
2 SOLUTION/ DROPS OPHTHALMIC
Status: DISCONTINUED | OUTPATIENT
Start: 2021-01-01 | End: 2021-02-16 | Stop reason: HOSPADM

## 2021-01-01 RX ORDER — ONDANSETRON 2 MG/ML
4 INJECTION INTRAMUSCULAR; INTRAVENOUS EVERY 6 HOURS PRN
Status: DISCONTINUED | OUTPATIENT
Start: 2021-01-01 | End: 2021-01-01

## 2021-01-01 RX ORDER — POLYVINYL ALCOHOL 14 MG/ML
2 SOLUTION/ DROPS OPHTHALMIC 2 TIMES DAILY
Status: DISCONTINUED | OUTPATIENT
Start: 2021-01-01 | End: 2021-02-16 | Stop reason: HOSPADM

## 2021-01-01 RX ORDER — BISACODYL 10 MG
10 SUPPOSITORY, RECTAL RECTAL NIGHTLY
Status: DISCONTINUED | OUTPATIENT
Start: 2021-01-01 | End: 2021-01-01

## 2021-01-01 RX ORDER — FINASTERIDE 5 MG/1
5 TABLET, FILM COATED ORAL DAILY
Status: DISCONTINUED | OUTPATIENT
Start: 2021-01-01 | End: 2021-01-01

## 2021-01-01 RX ORDER — FINASTERIDE 5 MG/1
5 TABLET, FILM COATED ORAL DAILY
COMMUNITY

## 2021-01-01 RX ORDER — BISACODYL 10 MG
10 SUPPOSITORY, RECTAL RECTAL DAILY PRN
Status: DISCONTINUED | OUTPATIENT
Start: 2021-01-01 | End: 2021-02-16 | Stop reason: HOSPADM

## 2021-01-01 RX ORDER — POTASSIUM CHLORIDE 7.45 MG/ML
10 INJECTION INTRAVENOUS
Status: DISCONTINUED | OUTPATIENT
Start: 2021-01-01 | End: 2021-01-01 | Stop reason: HOSPADM

## 2021-01-01 RX ORDER — BISOPROLOL FUMARATE 5 MG/1
2.5 TABLET, FILM COATED ORAL DAILY
Status: CANCELLED | OUTPATIENT
Start: 2021-01-01

## 2021-01-01 RX ORDER — DEXAMETHASONE SODIUM PHOSPHATE 4 MG/ML
6 INJECTION, SOLUTION INTRA-ARTICULAR; INTRALESIONAL; INTRAMUSCULAR; INTRAVENOUS; SOFT TISSUE
Status: DISCONTINUED | OUTPATIENT
Start: 2021-01-01 | End: 2021-01-01 | Stop reason: HOSPADM

## 2021-01-01 RX ORDER — CETIRIZINE HYDROCHLORIDE 10 MG/1
5 TABLET ORAL DAILY
Status: DISCONTINUED | OUTPATIENT
Start: 2021-01-01 | End: 2021-01-01 | Stop reason: HOSPADM

## 2021-01-01 RX ORDER — AMOXICILLIN 250 MG
2 CAPSULE ORAL NIGHTLY PRN
Status: DISCONTINUED | OUTPATIENT
Start: 2021-01-01 | End: 2021-01-01

## 2021-01-01 RX ORDER — MORPHINE SULFATE 2 MG/ML
6 INJECTION, SOLUTION INTRAMUSCULAR; INTRAVENOUS EVERY 6 HOURS
Status: DISCONTINUED | OUTPATIENT
Start: 2021-01-01 | End: 2021-01-01

## 2021-01-01 RX ORDER — BISACODYL 10 MG
10 SUPPOSITORY, RECTAL RECTAL DAILY
Status: DISCONTINUED | OUTPATIENT
Start: 2021-01-01 | End: 2021-02-16 | Stop reason: HOSPADM

## 2021-01-01 RX ORDER — MORPHINE SULFATE 2 MG/ML
8 INJECTION, SOLUTION INTRAMUSCULAR; INTRAVENOUS EVERY 4 HOURS
Status: DISCONTINUED | OUTPATIENT
Start: 2021-01-01 | End: 2021-01-01

## 2021-01-01 RX ORDER — AMOXICILLIN 250 MG
2 CAPSULE ORAL NIGHTLY
Status: DISCONTINUED | OUTPATIENT
Start: 2021-01-01 | End: 2021-01-01

## 2021-01-01 RX ORDER — GLYCOPYRROLATE 0.2 MG/ML
0.4 INJECTION INTRAMUSCULAR; INTRAVENOUS EVERY 8 HOURS
Status: DISCONTINUED | OUTPATIENT
Start: 2021-01-01 | End: 2021-02-16 | Stop reason: HOSPADM

## 2021-01-01 RX ORDER — ALBUTEROL SULFATE 2.5 MG/3ML
2.5 SOLUTION RESPIRATORY (INHALATION) EVERY 6 HOURS PRN
Status: DISCONTINUED | OUTPATIENT
Start: 2021-01-01 | End: 2021-01-01

## 2021-01-01 RX ORDER — MELATONIN
2000 DAILY
Status: DISCONTINUED | OUTPATIENT
Start: 2021-01-01 | End: 2021-01-01

## 2021-01-01 RX ORDER — SODIUM CHLORIDE 0.9 % (FLUSH) 0.9 %
10 SYRINGE (ML) INJECTION EVERY 12 HOURS SCHEDULED
Status: CANCELLED | OUTPATIENT
Start: 2021-01-01

## 2021-01-01 RX ORDER — CETIRIZINE HYDROCHLORIDE 10 MG/1
5 TABLET ORAL DAILY
Start: 2021-01-01

## 2021-01-01 RX ORDER — ACETAMINOPHEN 325 MG/1
650 TABLET ORAL EVERY 6 HOURS PRN
Start: 2021-01-01

## 2021-01-01 RX ORDER — LORAZEPAM 2 MG/ML
0.5 INJECTION INTRAMUSCULAR EVERY 6 HOURS
Status: DISCONTINUED | OUTPATIENT
Start: 2021-01-01 | End: 2021-01-01

## 2021-01-01 RX ORDER — FUROSEMIDE 10 MG/ML
20 INJECTION INTRAMUSCULAR; INTRAVENOUS EVERY 6 HOURS PRN
Status: DISCONTINUED | OUTPATIENT
Start: 2021-01-01 | End: 2021-02-16 | Stop reason: HOSPADM

## 2021-01-01 RX ORDER — LOPERAMIDE HYDROCHLORIDE 2 MG/1
2 CAPSULE ORAL 4 TIMES DAILY PRN
Status: DISCONTINUED | OUTPATIENT
Start: 2021-01-01 | End: 2021-01-01 | Stop reason: HOSPADM

## 2021-01-01 RX ORDER — LORAZEPAM 2 MG/ML
1 INJECTION INTRAMUSCULAR
Status: DISCONTINUED | OUTPATIENT
Start: 2021-01-01 | End: 2021-02-16 | Stop reason: HOSPADM

## 2021-01-01 RX ORDER — MORPHINE SULFATE 2 MG/ML
2 INJECTION, SOLUTION INTRAMUSCULAR; INTRAVENOUS
Status: CANCELLED | OUTPATIENT
Start: 2021-01-01 | End: 2021-01-01

## 2021-01-01 RX ORDER — GLYCOPYRROLATE 0.2 MG/ML
0.2 INJECTION INTRAMUSCULAR; INTRAVENOUS EVERY 8 HOURS
Status: DISCONTINUED | OUTPATIENT
Start: 2021-01-01 | End: 2021-01-01

## 2021-01-01 RX ORDER — ACETAMINOPHEN 325 MG/1
650 TABLET ORAL EVERY 6 HOURS PRN
Status: DISCONTINUED | OUTPATIENT
Start: 2021-01-01 | End: 2021-01-01

## 2021-01-01 RX ORDER — ACETAMINOPHEN 650 MG/1
650 SUPPOSITORY RECTAL EVERY 4 HOURS PRN
Status: DISCONTINUED | OUTPATIENT
Start: 2021-01-01 | End: 2021-02-16 | Stop reason: HOSPADM

## 2021-01-01 RX ORDER — LORAZEPAM 2 MG/ML
0.5 INJECTION INTRAMUSCULAR EVERY 4 HOURS PRN
Status: DISCONTINUED | OUTPATIENT
Start: 2021-01-01 | End: 2021-01-01

## 2021-01-01 RX ORDER — FUROSEMIDE 10 MG/ML
20 INJECTION INTRAMUSCULAR; INTRAVENOUS 2 TIMES DAILY
Status: DISCONTINUED | OUTPATIENT
Start: 2021-01-01 | End: 2021-01-01

## 2021-01-01 RX ORDER — LORAZEPAM 2 MG/ML
0.5 INJECTION INTRAMUSCULAR 2 TIMES DAILY
Status: DISCONTINUED | OUTPATIENT
Start: 2021-01-01 | End: 2021-01-01

## 2021-01-01 RX ORDER — SODIUM CHLORIDE 0.9 % (FLUSH) 0.9 %
10 SYRINGE (ML) INJECTION EVERY 12 HOURS SCHEDULED
Status: DISCONTINUED | OUTPATIENT
Start: 2021-01-01 | End: 2021-02-16 | Stop reason: HOSPADM

## 2021-01-01 RX ORDER — KETOROLAC TROMETHAMINE 30 MG/ML
15 INJECTION, SOLUTION INTRAMUSCULAR; INTRAVENOUS EVERY 6 HOURS PRN
Status: DISCONTINUED | OUTPATIENT
Start: 2021-01-01 | End: 2021-02-16 | Stop reason: HOSPADM

## 2021-01-01 RX ORDER — DEXAMETHASONE SODIUM PHOSPHATE 4 MG/ML
6 INJECTION, SOLUTION INTRA-ARTICULAR; INTRALESIONAL; INTRAMUSCULAR; INTRAVENOUS; SOFT TISSUE
Status: CANCELLED | OUTPATIENT
Start: 2021-01-01 | End: 2021-01-01

## 2021-01-01 RX ORDER — LORAZEPAM 2 MG/ML
1 INJECTION INTRAMUSCULAR EVERY 4 HOURS
Status: DISCONTINUED | OUTPATIENT
Start: 2021-01-01 | End: 2021-02-16 | Stop reason: HOSPADM

## 2021-01-01 RX ORDER — FUROSEMIDE 10 MG/ML
20 INJECTION INTRAMUSCULAR; INTRAVENOUS 3 TIMES DAILY
Status: DISCONTINUED | OUTPATIENT
Start: 2021-01-01 | End: 2021-01-01

## 2021-01-01 RX ORDER — HYDROCODONE BITARTRATE AND ACETAMINOPHEN 10; 325 MG/1; MG/1
1 TABLET ORAL EVERY 6 HOURS PRN
Qty: 12 TABLET | Refills: 0 | Status: SHIPPED | OUTPATIENT
Start: 2021-01-01

## 2021-01-01 RX ORDER — ALBUTEROL SULFATE 2.5 MG/3ML
2.5 SOLUTION RESPIRATORY (INHALATION) EVERY 6 HOURS PRN
Status: CANCELLED | OUTPATIENT
Start: 2021-01-01

## 2021-01-01 RX ORDER — HYDROCODONE BITARTRATE AND ACETAMINOPHEN 10; 325 MG/1; MG/1
1 TABLET ORAL EVERY 6 HOURS PRN
Status: DISCONTINUED | OUTPATIENT
Start: 2021-01-01 | End: 2021-01-01 | Stop reason: HOSPADM

## 2021-01-01 RX ORDER — ACETAMINOPHEN 325 MG/1
650 TABLET ORAL EVERY 6 HOURS PRN
Status: CANCELLED | OUTPATIENT
Start: 2021-01-01

## 2021-01-01 RX ORDER — SCOLOPAMINE TRANSDERMAL SYSTEM 1 MG/1
1 PATCH, EXTENDED RELEASE TRANSDERMAL
Status: DISCONTINUED | OUTPATIENT
Start: 2021-01-01 | End: 2021-02-16 | Stop reason: HOSPADM

## 2021-01-01 RX ORDER — POTASSIUM CHLORIDE 1.5 G/1.77G
40 POWDER, FOR SOLUTION ORAL AS NEEDED
Status: DISCONTINUED | OUTPATIENT
Start: 2021-01-01 | End: 2021-01-01 | Stop reason: HOSPADM

## 2021-01-01 RX ORDER — LORAZEPAM 2 MG/ML
0.5 INJECTION INTRAMUSCULAR EVERY 4 HOURS PRN
Status: CANCELLED | OUTPATIENT
Start: 2021-01-01 | End: 2021-01-01

## 2021-01-01 RX ORDER — TRAMADOL HYDROCHLORIDE 50 MG/1
50 TABLET ORAL 2 TIMES DAILY
Qty: 6 TABLET | Refills: 0 | Status: SHIPPED | OUTPATIENT
Start: 2021-01-01 | End: 2021-01-01 | Stop reason: ALTCHOICE

## 2021-01-01 RX ORDER — DEXTROMETHORPHAN POLISTIREX 30 MG/5ML
60 SUSPENSION ORAL EVERY 12 HOURS PRN
Status: DISCONTINUED | OUTPATIENT
Start: 2021-01-01 | End: 2021-01-01 | Stop reason: HOSPADM

## 2021-01-01 RX ORDER — GLYCOPYRROLATE 0.2 MG/ML
0.4 INJECTION INTRAMUSCULAR; INTRAVENOUS EVERY 4 HOURS PRN
Status: DISCONTINUED | OUTPATIENT
Start: 2021-01-01 | End: 2021-02-16 | Stop reason: HOSPADM

## 2021-01-01 RX ORDER — OXYMETAZOLINE HYDROCHLORIDE 0.05 G/100ML
2 SPRAY NASAL 2 TIMES DAILY
Status: DISPENSED | OUTPATIENT
Start: 2021-01-01 | End: 2021-01-01

## 2021-01-01 RX ORDER — METOPROLOL TARTRATE 5 MG/5ML
2.5 INJECTION INTRAVENOUS EVERY 6 HOURS PRN
Status: DISCONTINUED | OUTPATIENT
Start: 2021-01-01 | End: 2021-01-01

## 2021-01-01 RX ORDER — DOCUSATE SODIUM 100 MG/1
100 CAPSULE, LIQUID FILLED ORAL NIGHTLY
Status: DISCONTINUED | OUTPATIENT
Start: 2021-01-01 | End: 2021-01-01 | Stop reason: HOSPADM

## 2021-01-01 RX ORDER — DIGOXIN 0.25 MG/ML
250 INJECTION INTRAMUSCULAR; INTRAVENOUS ONCE
Status: COMPLETED | OUTPATIENT
Start: 2021-01-01 | End: 2021-01-01

## 2021-01-01 RX ORDER — SODIUM CHLORIDE 0.9 % (FLUSH) 0.9 %
10 SYRINGE (ML) INJECTION AS NEEDED
Status: DISCONTINUED | OUTPATIENT
Start: 2021-01-01 | End: 2021-02-16 | Stop reason: HOSPADM

## 2021-01-01 RX ORDER — DOCUSATE SODIUM 100 MG/1
100 CAPSULE, LIQUID FILLED ORAL NIGHTLY
Status: CANCELLED | OUTPATIENT
Start: 2021-01-01

## 2021-01-01 RX ORDER — ERYTHROMYCIN 5 MG/G
OINTMENT OPHTHALMIC EVERY 12 HOURS SCHEDULED
Status: DISCONTINUED | OUTPATIENT
Start: 2021-01-01 | End: 2021-01-01

## 2021-01-01 RX ORDER — POTASSIUM CHLORIDE 750 MG/1
40 CAPSULE, EXTENDED RELEASE ORAL AS NEEDED
Status: DISCONTINUED | OUTPATIENT
Start: 2021-01-01 | End: 2021-01-01 | Stop reason: HOSPADM

## 2021-01-01 RX ORDER — ALBUTEROL SULFATE 90 UG/1
2 AEROSOL, METERED RESPIRATORY (INHALATION) EVERY 6 HOURS PRN
Status: DISCONTINUED | OUTPATIENT
Start: 2021-01-01 | End: 2021-01-01 | Stop reason: HOSPADM

## 2021-01-01 RX ORDER — ERYTHROMYCIN 5 MG/G
OINTMENT OPHTHALMIC EVERY 12 HOURS SCHEDULED
Start: 2021-01-01 | End: 2021-01-01

## 2021-01-01 RX ORDER — ERYTHROMYCIN 5 MG/G
OINTMENT OPHTHALMIC EVERY 12 HOURS SCHEDULED
Status: DISCONTINUED | OUTPATIENT
Start: 2021-01-01 | End: 2021-01-01 | Stop reason: HOSPADM

## 2021-01-01 RX ORDER — LORAZEPAM 2 MG/ML
0.5 INJECTION INTRAMUSCULAR EVERY 4 HOURS PRN
Status: DISCONTINUED | OUTPATIENT
Start: 2021-01-01 | End: 2021-01-01 | Stop reason: HOSPADM

## 2021-01-01 RX ORDER — MELATONIN
2000 DAILY
Status: DISCONTINUED | OUTPATIENT
Start: 2021-01-01 | End: 2021-01-01 | Stop reason: HOSPADM

## 2021-01-01 RX ORDER — DEXAMETHASONE SODIUM PHOSPHATE 4 MG/ML
6 INJECTION, SOLUTION INTRA-ARTICULAR; INTRALESIONAL; INTRAMUSCULAR; INTRAVENOUS; SOFT TISSUE
Status: DISCONTINUED | OUTPATIENT
Start: 2021-01-01 | End: 2021-01-01

## 2021-01-01 RX ORDER — SODIUM CHLORIDE 0.9 % (FLUSH) 0.9 %
10 SYRINGE (ML) INJECTION AS NEEDED
Status: DISCONTINUED | OUTPATIENT
Start: 2021-01-01 | End: 2021-01-01

## 2021-01-01 RX ORDER — AMLODIPINE BESYLATE 10 MG/1
10 TABLET ORAL DAILY
Status: DISCONTINUED | OUTPATIENT
Start: 2021-01-01 | End: 2021-01-01 | Stop reason: HOSPADM

## 2021-01-01 RX ORDER — BISOPROLOL FUMARATE 5 MG/1
2.5 TABLET, FILM COATED ORAL DAILY
Status: DISCONTINUED | OUTPATIENT
Start: 2021-01-01 | End: 2021-01-01

## 2021-01-01 RX ORDER — MORPHINE SULFATE 2 MG/ML
8 INJECTION, SOLUTION INTRAMUSCULAR; INTRAVENOUS
Status: DISCONTINUED | OUTPATIENT
Start: 2021-01-01 | End: 2021-02-16 | Stop reason: HOSPADM

## 2021-01-01 RX ORDER — CETIRIZINE HYDROCHLORIDE 10 MG/1
5 TABLET ORAL DAILY
Status: CANCELLED | OUTPATIENT
Start: 2021-01-01

## 2021-01-01 RX ORDER — AMLODIPINE BESYLATE 10 MG/1
10 TABLET ORAL DAILY
Status: DISCONTINUED | OUTPATIENT
Start: 2021-01-01 | End: 2021-01-01

## 2021-01-01 RX ORDER — AMLODIPINE BESYLATE 10 MG/1
10 TABLET ORAL DAILY
Status: CANCELLED | OUTPATIENT
Start: 2021-01-01

## 2021-01-01 RX ORDER — BISOPROLOL FUMARATE 5 MG/1
2.5 TABLET, FILM COATED ORAL DAILY
Status: DISCONTINUED | OUTPATIENT
Start: 2021-01-01 | End: 2021-01-01 | Stop reason: HOSPADM

## 2021-01-01 RX ORDER — PREDNISONE 10 MG/1
10 TABLET ORAL DAILY
Status: DISCONTINUED | OUTPATIENT
Start: 2021-01-01 | End: 2021-01-01 | Stop reason: SDUPTHER

## 2021-01-01 RX ORDER — LORAZEPAM 0.5 MG/1
0.5 TABLET ORAL EVERY 4 HOURS PRN
Status: CANCELLED | OUTPATIENT
Start: 2021-01-01

## 2021-01-01 RX ORDER — MORPHINE SULFATE 2 MG/ML
6 INJECTION, SOLUTION INTRAMUSCULAR; INTRAVENOUS
Status: DISCONTINUED | OUTPATIENT
Start: 2021-01-01 | End: 2021-01-01

## 2021-01-01 RX ORDER — LORAZEPAM 2 MG/ML
1 INJECTION INTRAMUSCULAR EVERY 6 HOURS
Status: DISCONTINUED | OUTPATIENT
Start: 2021-01-01 | End: 2021-01-01

## 2021-01-01 RX ORDER — KETOROLAC TROMETHAMINE 30 MG/ML
30 INJECTION, SOLUTION INTRAMUSCULAR; INTRAVENOUS EVERY 6 HOURS PRN
Status: DISCONTINUED | OUTPATIENT
Start: 2021-01-01 | End: 2021-01-01

## 2021-01-01 RX ORDER — MORPHINE SULFATE 2 MG/ML
8 INJECTION, SOLUTION INTRAMUSCULAR; INTRAVENOUS
Status: DISPENSED | OUTPATIENT
Start: 2021-01-01 | End: 2021-01-01

## 2021-01-01 RX ORDER — FINASTERIDE 5 MG/1
5 TABLET, FILM COATED ORAL DAILY
Status: DISCONTINUED | OUTPATIENT
Start: 2021-01-01 | End: 2021-01-01 | Stop reason: HOSPADM

## 2021-01-01 RX ORDER — FUROSEMIDE 10 MG/ML
20 INJECTION INTRAMUSCULAR; INTRAVENOUS EVERY 6 HOURS
Status: DISCONTINUED | OUTPATIENT
Start: 2021-01-01 | End: 2021-01-01

## 2021-01-01 RX ORDER — ACETAMINOPHEN 325 MG/1
650 TABLET ORAL EVERY 6 HOURS PRN
Status: DISCONTINUED | OUTPATIENT
Start: 2021-01-01 | End: 2021-01-01 | Stop reason: HOSPADM

## 2021-01-01 RX ORDER — GLYCOPYRROLATE 0.2 MG/ML
0.2 INJECTION INTRAMUSCULAR; INTRAVENOUS EVERY 6 HOURS PRN
Status: DISCONTINUED | OUTPATIENT
Start: 2021-01-01 | End: 2021-01-01

## 2021-01-01 RX ORDER — MORPHINE SULFATE 2 MG/ML
2 INJECTION, SOLUTION INTRAMUSCULAR; INTRAVENOUS
Status: DISCONTINUED | OUTPATIENT
Start: 2021-01-01 | End: 2021-01-01

## 2021-01-01 RX ORDER — MORPHINE SULFATE 2 MG/ML
2 INJECTION, SOLUTION INTRAMUSCULAR; INTRAVENOUS
Status: DISCONTINUED | OUTPATIENT
Start: 2021-01-01 | End: 2021-01-01 | Stop reason: HOSPADM

## 2021-01-01 RX ORDER — FLUTICASONE PROPIONATE 50 MCG
2 SPRAY, SUSPENSION (ML) NASAL 2 TIMES DAILY PRN
Status: DISCONTINUED | OUTPATIENT
Start: 2021-01-01 | End: 2021-01-01 | Stop reason: HOSPADM

## 2021-01-01 RX ADMIN — TAZOBACTAM SODIUM AND PIPERACILLIN SODIUM 3.38 G: 375; 3 INJECTION, SOLUTION INTRAVENOUS at 12:10

## 2021-01-01 RX ADMIN — TRAMADOL HYDROCHLORIDE 50 MG: 50 TABLET, FILM COATED ORAL at 09:02

## 2021-01-01 RX ADMIN — ERYTHROMYCIN: 5 OINTMENT OPHTHALMIC at 09:42

## 2021-01-01 RX ADMIN — ENOXAPARIN SODIUM 40 MG: 40 INJECTION SUBCUTANEOUS at 09:42

## 2021-01-01 RX ADMIN — TRAMADOL HYDROCHLORIDE 50 MG: 50 TABLET, FILM COATED ORAL at 21:48

## 2021-01-01 RX ADMIN — FUROSEMIDE 20 MG: 10 INJECTION, SOLUTION INTRAMUSCULAR; INTRAVENOUS at 07:59

## 2021-01-01 RX ADMIN — MORPHINE SULFATE 4 MG: 4 INJECTION, SOLUTION INTRAMUSCULAR; INTRAVENOUS at 14:24

## 2021-01-01 RX ADMIN — MORPHINE SULFATE 8 MG: 2 INJECTION, SOLUTION INTRAMUSCULAR; INTRAVENOUS at 05:43

## 2021-01-01 RX ADMIN — HYDROCODONE BITARTRATE AND ACETAMINOPHEN 1 TABLET: 10; 325 TABLET ORAL at 05:59

## 2021-01-01 RX ADMIN — SODIUM CHLORIDE, PRESERVATIVE FREE 10 ML: 5 INJECTION INTRAVENOUS at 21:40

## 2021-01-01 RX ADMIN — ENOXAPARIN SODIUM 40 MG: 40 INJECTION SUBCUTANEOUS at 08:57

## 2021-01-01 RX ADMIN — POTASSIUM CHLORIDE 40 MEQ: 1.5 POWDER, FOR SOLUTION ORAL at 23:17

## 2021-01-01 RX ADMIN — SODIUM CHLORIDE, PRESERVATIVE FREE 10 ML: 5 INJECTION INTRAVENOUS at 23:06

## 2021-01-01 RX ADMIN — HYDROCODONE BITARTRATE AND ACETAMINOPHEN 1 TABLET: 10; 325 TABLET ORAL at 17:39

## 2021-01-01 RX ADMIN — LORAZEPAM 1 MG: 2 INJECTION INTRAMUSCULAR; INTRAVENOUS at 14:32

## 2021-01-01 RX ADMIN — MORPHINE SULFATE 4 MG: 4 INJECTION, SOLUTION INTRAMUSCULAR; INTRAVENOUS at 18:11

## 2021-01-01 RX ADMIN — ENOXAPARIN SODIUM 40 MG: 40 INJECTION SUBCUTANEOUS at 21:14

## 2021-01-01 RX ADMIN — MORPHINE SULFATE 4 MG: 4 INJECTION, SOLUTION INTRAMUSCULAR; INTRAVENOUS at 08:57

## 2021-01-01 RX ADMIN — MORPHINE SULFATE 4 MG: 4 INJECTION, SOLUTION INTRAMUSCULAR; INTRAVENOUS at 04:16

## 2021-01-01 RX ADMIN — LORAZEPAM 0.5 MG: 2 INJECTION INTRAMUSCULAR; INTRAVENOUS at 01:51

## 2021-01-01 RX ADMIN — TRAMADOL HYDROCHLORIDE 50 MG: 50 TABLET, FILM COATED ORAL at 08:38

## 2021-01-01 RX ADMIN — LORAZEPAM 0.5 MG: 2 INJECTION INTRAMUSCULAR; INTRAVENOUS at 12:38

## 2021-01-01 RX ADMIN — SODIUM CHLORIDE, PRESERVATIVE FREE 10 ML: 5 INJECTION INTRAVENOUS at 08:18

## 2021-01-01 RX ADMIN — MORPHINE SULFATE 4 MG: 4 INJECTION, SOLUTION INTRAMUSCULAR; INTRAVENOUS at 03:29

## 2021-01-01 RX ADMIN — FUROSEMIDE 20 MG: 10 INJECTION, SOLUTION INTRAMUSCULAR; INTRAVENOUS at 13:44

## 2021-01-01 RX ADMIN — TAZOBACTAM SODIUM AND PIPERACILLIN SODIUM 3.38 G: 375; 3 INJECTION, SOLUTION INTRAVENOUS at 04:17

## 2021-01-01 RX ADMIN — ERYTHROMYCIN: 5 OINTMENT OPHTHALMIC at 21:48

## 2021-01-01 RX ADMIN — SODIUM CHLORIDE, PRESERVATIVE FREE 10 ML: 5 INJECTION INTRAVENOUS at 09:43

## 2021-01-01 RX ADMIN — MORPHINE SULFATE 8 MG: 2 INJECTION, SOLUTION INTRAMUSCULAR; INTRAVENOUS at 21:12

## 2021-01-01 RX ADMIN — GLYCOPYRROLATE 0.4 MG: 0.2 INJECTION INTRAMUSCULAR; INTRAVENOUS at 13:38

## 2021-01-01 RX ADMIN — MINERAL OIL: 1000 LIQUID ORAL at 08:21

## 2021-01-01 RX ADMIN — SODIUM CHLORIDE, PRESERVATIVE FREE 10 ML: 5 INJECTION INTRAVENOUS at 20:05

## 2021-01-01 RX ADMIN — MORPHINE SULFATE 8 MG: 2 INJECTION, SOLUTION INTRAMUSCULAR; INTRAVENOUS at 10:17

## 2021-01-01 RX ADMIN — LORAZEPAM 0.5 MG: 2 INJECTION INTRAMUSCULAR; INTRAVENOUS at 00:17

## 2021-01-01 RX ADMIN — MORPHINE SULFATE 8 MG: 2 INJECTION, SOLUTION INTRAMUSCULAR; INTRAVENOUS at 17:36

## 2021-01-01 RX ADMIN — MORPHINE SULFATE 4 MG: 4 INJECTION, SOLUTION INTRAMUSCULAR; INTRAVENOUS at 13:40

## 2021-01-01 RX ADMIN — LORAZEPAM 0.5 MG: 2 INJECTION INTRAMUSCULAR; INTRAVENOUS at 03:29

## 2021-01-01 RX ADMIN — FUROSEMIDE 40 MG: 10 INJECTION INTRAMUSCULAR; INTRAVENOUS at 11:21

## 2021-01-01 RX ADMIN — MORPHINE SULFATE 6 MG: 2 INJECTION, SOLUTION INTRAMUSCULAR; INTRAVENOUS at 04:05

## 2021-01-01 RX ADMIN — MORPHINE SULFATE 6 MG: 2 INJECTION, SOLUTION INTRAMUSCULAR; INTRAVENOUS at 08:25

## 2021-01-01 RX ADMIN — LORAZEPAM 1 MG: 2 INJECTION INTRAMUSCULAR; INTRAVENOUS at 05:19

## 2021-01-01 RX ADMIN — ERYTHROMYCIN: 5 OINTMENT OPHTHALMIC at 09:03

## 2021-01-01 RX ADMIN — FUROSEMIDE 20 MG: 10 INJECTION, SOLUTION INTRAMUSCULAR; INTRAVENOUS at 12:38

## 2021-01-01 RX ADMIN — ERYTHROMYCIN: 5 OINTMENT OPHTHALMIC at 08:17

## 2021-01-01 RX ADMIN — REMDESIVIR 100 MG: 100 INJECTION, POWDER, LYOPHILIZED, FOR SOLUTION INTRAVENOUS at 11:06

## 2021-01-01 RX ADMIN — FINASTERIDE 5 MG: 5 TABLET, FILM COATED ORAL at 09:31

## 2021-01-01 RX ADMIN — SODIUM CHLORIDE, PRESERVATIVE FREE 10 ML: 5 INJECTION INTRAVENOUS at 22:45

## 2021-01-01 RX ADMIN — VANCOMYCIN HYDROCHLORIDE 750 MG: 750 INJECTION, SOLUTION INTRAVENOUS at 14:23

## 2021-01-01 RX ADMIN — MINERAL OIL: 1000 LIQUID ORAL at 08:15

## 2021-01-01 RX ADMIN — ENOXAPARIN SODIUM 40 MG: 40 INJECTION SUBCUTANEOUS at 08:38

## 2021-01-01 RX ADMIN — AMLODIPINE BESYLATE 10 MG: 5 TABLET ORAL at 09:41

## 2021-01-01 RX ADMIN — LORAZEPAM 0.5 MG: 2 INJECTION INTRAMUSCULAR; INTRAVENOUS at 21:22

## 2021-01-01 RX ADMIN — MORPHINE SULFATE 8 MG: 2 INJECTION, SOLUTION INTRAMUSCULAR; INTRAVENOUS at 22:15

## 2021-01-01 RX ADMIN — LORAZEPAM 1 MG: 2 INJECTION INTRAMUSCULAR; INTRAVENOUS at 17:36

## 2021-01-01 RX ADMIN — BISOPROLOL FUMARATE 2.5 MG: 5 TABLET, FILM COATED ORAL at 08:09

## 2021-01-01 RX ADMIN — ENOXAPARIN SODIUM 40 MG: 40 INJECTION SUBCUTANEOUS at 09:15

## 2021-01-01 RX ADMIN — BISACODYL 10 MG: 10 SUPPOSITORY RECTAL at 13:39

## 2021-01-01 RX ADMIN — Medication 2 SPRAY: at 09:43

## 2021-01-01 RX ADMIN — MORPHINE SULFATE 8 MG: 2 INJECTION, SOLUTION INTRAMUSCULAR; INTRAVENOUS at 09:54

## 2021-01-01 RX ADMIN — MINERAL OIL: 1000 LIQUID ORAL at 08:12

## 2021-01-01 RX ADMIN — DOCUSATE SODIUM 100 MG: 100 CAPSULE ORAL at 20:05

## 2021-01-01 RX ADMIN — SODIUM CHLORIDE, PRESERVATIVE FREE 10 ML: 5 INJECTION INTRAVENOUS at 08:15

## 2021-01-01 RX ADMIN — MORPHINE SULFATE 8 MG: 2 INJECTION, SOLUTION INTRAMUSCULAR; INTRAVENOUS at 08:24

## 2021-01-01 RX ADMIN — AMIODARONE HYDROCHLORIDE 150 MG: 1.5 INJECTION, SOLUTION INTRAVENOUS at 10:02

## 2021-01-01 RX ADMIN — LORAZEPAM 0.5 MG: 2 INJECTION INTRAMUSCULAR; INTRAVENOUS at 03:55

## 2021-01-01 RX ADMIN — MORPHINE SULFATE 8 MG: 2 INJECTION, SOLUTION INTRAMUSCULAR; INTRAVENOUS at 20:06

## 2021-01-01 RX ADMIN — BISOPROLOL FUMARATE 2.5 MG: 5 TABLET, FILM COATED ORAL at 10:48

## 2021-01-01 RX ADMIN — TRAMADOL HYDROCHLORIDE 50 MG: 50 TABLET, FILM COATED ORAL at 09:41

## 2021-01-01 RX ADMIN — ENOXAPARIN SODIUM 40 MG: 40 INJECTION SUBCUTANEOUS at 08:14

## 2021-01-01 RX ADMIN — MINERAL OIL: 1000 LIQUID ORAL at 20:12

## 2021-01-01 RX ADMIN — CEFTRIAXONE SODIUM 1 G: 1 INJECTION, POWDER, FOR SOLUTION INTRAMUSCULAR; INTRAVENOUS at 21:24

## 2021-01-01 RX ADMIN — LORAZEPAM 0.5 MG: 2 INJECTION INTRAMUSCULAR; INTRAVENOUS at 09:18

## 2021-01-01 RX ADMIN — REMDESIVIR 200 MG: 100 INJECTION, POWDER, LYOPHILIZED, FOR SOLUTION INTRAVENOUS at 22:43

## 2021-01-01 RX ADMIN — SODIUM CHLORIDE, PRESERVATIVE FREE 10 ML: 5 INJECTION INTRAVENOUS at 21:17

## 2021-01-01 RX ADMIN — TAZOBACTAM SODIUM AND PIPERACILLIN SODIUM 3.38 G: 375; 3 INJECTION, SOLUTION INTRAVENOUS at 04:25

## 2021-01-01 RX ADMIN — METOPROLOL TARTRATE 2.5 MG: 5 INJECTION INTRAVENOUS at 18:01

## 2021-01-01 RX ADMIN — FLUTICASONE PROPIONATE 2 SPRAY: 50 SPRAY, METERED NASAL at 03:22

## 2021-01-01 RX ADMIN — SODIUM CHLORIDE, PRESERVATIVE FREE 10 ML: 5 INJECTION INTRAVENOUS at 20:11

## 2021-01-01 RX ADMIN — FUROSEMIDE 20 MG: 10 INJECTION, SOLUTION INTRAMUSCULAR; INTRAVENOUS at 01:08

## 2021-01-01 RX ADMIN — MORPHINE SULFATE 4 MG: 4 INJECTION, SOLUTION INTRAMUSCULAR; INTRAVENOUS at 00:13

## 2021-01-01 RX ADMIN — FUROSEMIDE 20 MG: 10 INJECTION, SOLUTION INTRAMUSCULAR; INTRAVENOUS at 08:22

## 2021-01-01 RX ADMIN — MINERAL OIL: 1000 LIQUID ORAL at 21:35

## 2021-01-01 RX ADMIN — LORAZEPAM 1 MG: 2 INJECTION INTRAMUSCULAR; INTRAVENOUS at 09:19

## 2021-01-01 RX ADMIN — SODIUM CHLORIDE, PRESERVATIVE FREE 10 ML: 5 INJECTION INTRAVENOUS at 08:14

## 2021-01-01 RX ADMIN — GLYCOPYRROLATE 0.2 MG: 0.2 INJECTION INTRAMUSCULAR; INTRAVENOUS at 22:23

## 2021-01-01 RX ADMIN — SODIUM CHLORIDE, PRESERVATIVE FREE 10 ML: 5 INJECTION INTRAVENOUS at 21:32

## 2021-01-01 RX ADMIN — MORPHINE SULFATE 4 MG: 4 INJECTION, SOLUTION INTRAMUSCULAR; INTRAVENOUS at 13:08

## 2021-01-01 RX ADMIN — MINERAL OIL: 1000 LIQUID ORAL at 08:26

## 2021-01-01 RX ADMIN — AZITHROMYCIN 500 MG: 500 INJECTION, POWDER, LYOPHILIZED, FOR SOLUTION INTRAVENOUS at 19:19

## 2021-01-01 RX ADMIN — FUROSEMIDE 20 MG: 10 INJECTION, SOLUTION INTRAMUSCULAR; INTRAVENOUS at 21:17

## 2021-01-01 RX ADMIN — LORAZEPAM 1 MG: 2 INJECTION INTRAMUSCULAR; INTRAVENOUS at 22:15

## 2021-01-01 RX ADMIN — MORPHINE SULFATE 8 MG: 2 INJECTION, SOLUTION INTRAMUSCULAR; INTRAVENOUS at 08:21

## 2021-01-01 RX ADMIN — MORPHINE SULFATE 4 MG: 4 INJECTION, SOLUTION INTRAMUSCULAR; INTRAVENOUS at 12:19

## 2021-01-01 RX ADMIN — LORAZEPAM 1 MG: 2 INJECTION INTRAMUSCULAR; INTRAVENOUS at 10:35

## 2021-01-01 RX ADMIN — LORAZEPAM 1 MG: 2 INJECTION INTRAMUSCULAR; INTRAVENOUS at 13:49

## 2021-01-01 RX ADMIN — FUROSEMIDE 20 MG: 10 INJECTION, SOLUTION INTRAMUSCULAR; INTRAVENOUS at 08:56

## 2021-01-01 RX ADMIN — BISOPROLOL FUMARATE 2.5 MG: 5 TABLET, FILM COATED ORAL at 09:43

## 2021-01-01 RX ADMIN — MINERAL OIL: 1000 LIQUID ORAL at 08:56

## 2021-01-01 RX ADMIN — SODIUM CHLORIDE 250 ML: 9 INJECTION, SOLUTION INTRAVENOUS at 06:10

## 2021-01-01 RX ADMIN — FUROSEMIDE 20 MG: 10 INJECTION, SOLUTION INTRAMUSCULAR; INTRAVENOUS at 06:01

## 2021-01-01 RX ADMIN — MORPHINE SULFATE 8 MG: 2 INJECTION, SOLUTION INTRAMUSCULAR; INTRAVENOUS at 17:10

## 2021-01-01 RX ADMIN — LORAZEPAM 1 MG: 2 INJECTION INTRAMUSCULAR; INTRAVENOUS at 17:28

## 2021-01-01 RX ADMIN — LORAZEPAM 1 MG: 2 INJECTION INTRAMUSCULAR; INTRAVENOUS at 15:05

## 2021-01-01 RX ADMIN — FUROSEMIDE 20 MG: 10 INJECTION, SOLUTION INTRAMUSCULAR; INTRAVENOUS at 09:19

## 2021-01-01 RX ADMIN — MORPHINE SULFATE 4 MG: 4 INJECTION, SOLUTION INTRAMUSCULAR; INTRAVENOUS at 06:04

## 2021-01-01 RX ADMIN — MINERAL OIL: 1000 LIQUID ORAL at 20:08

## 2021-01-01 RX ADMIN — LORAZEPAM 1 MG: 2 INJECTION INTRAMUSCULAR; INTRAVENOUS at 21:14

## 2021-01-01 RX ADMIN — LORAZEPAM 1 MG: 2 INJECTION INTRAMUSCULAR; INTRAVENOUS at 05:44

## 2021-01-01 RX ADMIN — MORPHINE SULFATE 8 MG: 2 INJECTION, SOLUTION INTRAMUSCULAR; INTRAVENOUS at 01:32

## 2021-01-01 RX ADMIN — TRAMADOL HYDROCHLORIDE 50 MG: 50 TABLET, FILM COATED ORAL at 09:42

## 2021-01-01 RX ADMIN — MORPHINE SULFATE 2 MG: 2 INJECTION, SOLUTION INTRAMUSCULAR; INTRAVENOUS at 08:01

## 2021-01-01 RX ADMIN — SODIUM CHLORIDE, PRESERVATIVE FREE 10 ML: 5 INJECTION INTRAVENOUS at 20:09

## 2021-01-01 RX ADMIN — LORAZEPAM 1 MG: 2 INJECTION INTRAMUSCULAR; INTRAVENOUS at 18:23

## 2021-01-01 RX ADMIN — BISOPROLOL FUMARATE 2.5 MG: 5 TABLET, FILM COATED ORAL at 08:15

## 2021-01-01 RX ADMIN — MORPHINE SULFATE 4 MG: 4 INJECTION, SOLUTION INTRAMUSCULAR; INTRAVENOUS at 18:56

## 2021-01-01 RX ADMIN — MORPHINE SULFATE 6 MG: 2 INJECTION, SOLUTION INTRAMUSCULAR; INTRAVENOUS at 14:31

## 2021-01-01 RX ADMIN — FUROSEMIDE 20 MG: 10 INJECTION, SOLUTION INTRAMUSCULAR; INTRAVENOUS at 08:50

## 2021-01-01 RX ADMIN — LORAZEPAM 0.5 MG: 0.5 TABLET ORAL at 09:31

## 2021-01-01 RX ADMIN — REMDESIVIR 100 MG: 100 INJECTION, POWDER, LYOPHILIZED, FOR SOLUTION INTRAVENOUS at 17:44

## 2021-01-01 RX ADMIN — Medication: at 07:14

## 2021-01-01 RX ADMIN — LORAZEPAM 1 MG: 2 INJECTION INTRAMUSCULAR; INTRAVENOUS at 05:07

## 2021-01-01 RX ADMIN — LORAZEPAM 1 MG: 2 INJECTION INTRAMUSCULAR; INTRAVENOUS at 17:41

## 2021-01-01 RX ADMIN — SCOPALAMINE 1 PATCH: 1 PATCH, EXTENDED RELEASE TRANSDERMAL at 21:14

## 2021-01-01 RX ADMIN — MORPHINE SULFATE 4 MG: 4 INJECTION, SOLUTION INTRAMUSCULAR; INTRAVENOUS at 05:00

## 2021-01-01 RX ADMIN — DOCUSATE SODIUM 100 MG: 100 CAPSULE ORAL at 21:14

## 2021-01-01 RX ADMIN — SODIUM CHLORIDE, PRESERVATIVE FREE 10 ML: 5 INJECTION INTRAVENOUS at 08:26

## 2021-01-01 RX ADMIN — TRAMADOL HYDROCHLORIDE 50 MG: 50 TABLET, FILM COATED ORAL at 20:10

## 2021-01-01 RX ADMIN — LORAZEPAM 1 MG: 2 INJECTION INTRAMUSCULAR; INTRAVENOUS at 01:57

## 2021-01-01 RX ADMIN — FUROSEMIDE 20 MG: 10 INJECTION, SOLUTION INTRAMUSCULAR; INTRAVENOUS at 08:39

## 2021-01-01 RX ADMIN — SODIUM CHLORIDE, PRESERVATIVE FREE 10 ML: 5 INJECTION INTRAVENOUS at 21:48

## 2021-01-01 RX ADMIN — BISOPROLOL FUMARATE 2.5 MG: 5 TABLET, FILM COATED ORAL at 09:32

## 2021-01-01 RX ADMIN — MORPHINE SULFATE 8 MG: 2 INJECTION, SOLUTION INTRAMUSCULAR; INTRAVENOUS at 05:45

## 2021-01-01 RX ADMIN — LORAZEPAM 1 MG: 2 INJECTION INTRAMUSCULAR; INTRAVENOUS at 02:11

## 2021-01-01 RX ADMIN — SODIUM CHLORIDE, PRESERVATIVE FREE 10 ML: 5 INJECTION INTRAVENOUS at 20:41

## 2021-01-01 RX ADMIN — PREDNISONE 10 MG: 5 TABLET ORAL at 09:41

## 2021-01-01 RX ADMIN — REMDESIVIR 100 MG: 100 INJECTION, POWDER, LYOPHILIZED, FOR SOLUTION INTRAVENOUS at 11:21

## 2021-01-01 RX ADMIN — Medication: at 01:55

## 2021-01-01 RX ADMIN — Medication: at 17:13

## 2021-01-01 RX ADMIN — DEXAMETHASONE SODIUM PHOSPHATE 6 MG: 4 INJECTION, SOLUTION INTRA-ARTICULAR; INTRALESIONAL; INTRAMUSCULAR; INTRAVENOUS; SOFT TISSUE at 09:18

## 2021-01-01 RX ADMIN — SODIUM CHLORIDE, PRESERVATIVE FREE 10 ML: 5 INJECTION INTRAVENOUS at 21:27

## 2021-01-01 RX ADMIN — SODIUM CHLORIDE, PRESERVATIVE FREE 10 ML: 5 INJECTION INTRAVENOUS at 22:08

## 2021-01-01 RX ADMIN — LORAZEPAM 0.5 MG: 2 INJECTION INTRAMUSCULAR; INTRAVENOUS at 13:08

## 2021-01-01 RX ADMIN — HYDROCODONE BITARTRATE AND ACETAMINOPHEN 1 TABLET: 10; 325 TABLET ORAL at 20:23

## 2021-01-01 RX ADMIN — SODIUM CHLORIDE, PRESERVATIVE FREE 10 ML: 5 INJECTION INTRAVENOUS at 08:39

## 2021-01-01 RX ADMIN — LORAZEPAM 1 MG: 2 INJECTION INTRAMUSCULAR; INTRAVENOUS at 09:56

## 2021-01-01 RX ADMIN — MORPHINE SULFATE 4 MG: 4 INJECTION, SOLUTION INTRAMUSCULAR; INTRAVENOUS at 01:13

## 2021-01-01 RX ADMIN — FUROSEMIDE 20 MG: 10 INJECTION, SOLUTION INTRAMUSCULAR; INTRAVENOUS at 20:59

## 2021-01-01 RX ADMIN — MORPHINE SULFATE 8 MG: 2 INJECTION, SOLUTION INTRAMUSCULAR; INTRAVENOUS at 09:36

## 2021-01-01 RX ADMIN — GLYCOPYRROLATE 0.4 MG: 0.2 INJECTION INTRAMUSCULAR; INTRAVENOUS at 14:56

## 2021-01-01 RX ADMIN — PREDNISONE 10 MG: 5 TABLET ORAL at 09:43

## 2021-01-01 RX ADMIN — BISOPROLOL FUMARATE 2.5 MG: 5 TABLET, FILM COATED ORAL at 08:58

## 2021-01-01 RX ADMIN — FUROSEMIDE 20 MG: 10 INJECTION, SOLUTION INTRAMUSCULAR; INTRAVENOUS at 09:15

## 2021-01-01 RX ADMIN — SODIUM CHLORIDE, PRESERVATIVE FREE 10 ML: 5 INJECTION INTRAVENOUS at 08:12

## 2021-01-01 RX ADMIN — MORPHINE SULFATE 4 MG: 4 INJECTION, SOLUTION INTRAMUSCULAR; INTRAVENOUS at 02:50

## 2021-01-01 RX ADMIN — SODIUM CHLORIDE, PRESERVATIVE FREE 10 ML: 5 INJECTION INTRAVENOUS at 00:21

## 2021-01-01 RX ADMIN — SODIUM CHLORIDE, PRESERVATIVE FREE 10 ML: 5 INJECTION INTRAVENOUS at 09:22

## 2021-01-01 RX ADMIN — LORAZEPAM 1 MG: 2 INJECTION INTRAMUSCULAR; INTRAVENOUS at 04:06

## 2021-01-01 RX ADMIN — CETIRIZINE HYDROCHLORIDE 5 MG: 10 TABLET, FILM COATED ORAL at 09:31

## 2021-01-01 RX ADMIN — SODIUM CHLORIDE, PRESERVATIVE FREE 10 ML: 5 INJECTION INTRAVENOUS at 08:59

## 2021-01-01 RX ADMIN — MORPHINE SULFATE 4 MG: 4 INJECTION, SOLUTION INTRAMUSCULAR; INTRAVENOUS at 18:00

## 2021-01-01 RX ADMIN — DEXAMETHASONE SODIUM PHOSPHATE 6 MG: 4 INJECTION, SOLUTION INTRA-ARTICULAR; INTRALESIONAL; INTRAMUSCULAR; INTRAVENOUS; SOFT TISSUE at 09:24

## 2021-01-01 RX ADMIN — ERYTHROMYCIN 1 APPLICATION: 5 OINTMENT OPHTHALMIC at 12:35

## 2021-01-01 RX ADMIN — LORAZEPAM 1 MG: 2 INJECTION INTRAMUSCULAR; INTRAVENOUS at 09:54

## 2021-01-01 RX ADMIN — CEFTRIAXONE SODIUM 1 G: 1 INJECTION, POWDER, FOR SOLUTION INTRAMUSCULAR; INTRAVENOUS at 21:48

## 2021-01-01 RX ADMIN — AMLODIPINE BESYLATE 10 MG: 5 TABLET ORAL at 08:38

## 2021-01-01 RX ADMIN — HYDROCODONE BITARTRATE AND ACETAMINOPHEN 1 TABLET: 10; 325 TABLET ORAL at 00:51

## 2021-01-01 RX ADMIN — METOPROLOL TARTRATE 2.5 MG: 5 INJECTION INTRAVENOUS at 12:21

## 2021-01-01 RX ADMIN — CEFTRIAXONE SODIUM 1 G: 1 INJECTION, POWDER, FOR SOLUTION INTRAMUSCULAR; INTRAVENOUS at 18:24

## 2021-01-01 RX ADMIN — GLYCOPYRROLATE 0.2 MG: 0.2 INJECTION INTRAMUSCULAR; INTRAVENOUS at 14:12

## 2021-01-01 RX ADMIN — METOPROLOL TARTRATE 2.5 MG: 5 INJECTION INTRAVENOUS at 18:11

## 2021-01-01 RX ADMIN — FUROSEMIDE 20 MG: 10 INJECTION, SOLUTION INTRAMUSCULAR; INTRAVENOUS at 08:15

## 2021-01-01 RX ADMIN — MORPHINE SULFATE 4 MG: 4 INJECTION, SOLUTION INTRAMUSCULAR; INTRAVENOUS at 12:37

## 2021-01-01 RX ADMIN — SODIUM CHLORIDE, PRESERVATIVE FREE 10 ML: 5 INJECTION INTRAVENOUS at 20:00

## 2021-01-01 RX ADMIN — ENOXAPARIN SODIUM 40 MG: 40 INJECTION SUBCUTANEOUS at 20:04

## 2021-01-01 RX ADMIN — CETIRIZINE HYDROCHLORIDE 5 MG: 10 TABLET, FILM COATED ORAL at 08:09

## 2021-01-01 RX ADMIN — HALOPERIDOL LACTATE 1 MG: 5 INJECTION, SOLUTION INTRAMUSCULAR at 07:15

## 2021-01-01 RX ADMIN — MORPHINE SULFATE 4 MG: 4 INJECTION, SOLUTION INTRAMUSCULAR; INTRAVENOUS at 00:02

## 2021-01-01 RX ADMIN — FLUTICASONE PROPIONATE 2 SPRAY: 50 SPRAY, METERED NASAL at 02:07

## 2021-01-01 RX ADMIN — MORPHINE SULFATE 4 MG: 4 INJECTION, SOLUTION INTRAMUSCULAR; INTRAVENOUS at 17:19

## 2021-01-01 RX ADMIN — MORPHINE SULFATE 2 MG: 2 INJECTION, SOLUTION INTRAMUSCULAR; INTRAVENOUS at 11:06

## 2021-01-01 RX ADMIN — FUROSEMIDE 20 MG: 10 INJECTION, SOLUTION INTRAMUSCULAR; INTRAVENOUS at 06:04

## 2021-01-01 RX ADMIN — LORAZEPAM 1 MG: 2 INJECTION INTRAMUSCULAR; INTRAVENOUS at 13:38

## 2021-01-01 RX ADMIN — FUROSEMIDE 20 MG: 10 INJECTION, SOLUTION INTRAMUSCULAR; INTRAVENOUS at 12:39

## 2021-01-01 RX ADMIN — MINERAL OIL: 1000 LIQUID ORAL at 14:31

## 2021-01-01 RX ADMIN — SCOPALAMINE 1 PATCH: 1 PATCH, EXTENDED RELEASE TRANSDERMAL at 21:46

## 2021-01-01 RX ADMIN — MINERAL OIL: 1000 LIQUID ORAL at 17:36

## 2021-01-01 RX ADMIN — FUROSEMIDE 20 MG: 10 INJECTION, SOLUTION INTRAMUSCULAR; INTRAVENOUS at 08:26

## 2021-01-01 RX ADMIN — DEXAMETHASONE 6 MG: 4 TABLET ORAL at 09:31

## 2021-01-01 RX ADMIN — FUROSEMIDE 20 MG: 10 INJECTION, SOLUTION INTRAMUSCULAR; INTRAVENOUS at 20:40

## 2021-01-01 RX ADMIN — DIGOXIN 250 MCG: 0.25 INJECTION INTRAMUSCULAR; INTRAVENOUS at 12:35

## 2021-01-01 RX ADMIN — LORAZEPAM 0.5 MG: 2 INJECTION INTRAMUSCULAR; INTRAVENOUS at 17:32

## 2021-01-01 RX ADMIN — DOCUSATE SODIUM 100 MG: 100 CAPSULE ORAL at 00:17

## 2021-01-01 RX ADMIN — FUROSEMIDE 20 MG: 10 INJECTION, SOLUTION INTRAMUSCULAR; INTRAVENOUS at 21:10

## 2021-01-01 RX ADMIN — FINASTERIDE 5 MG: 5 TABLET, FILM COATED ORAL at 09:15

## 2021-01-01 RX ADMIN — LORAZEPAM 1 MG: 2 INJECTION INTRAMUSCULAR; INTRAVENOUS at 22:05

## 2021-01-01 RX ADMIN — MORPHINE SULFATE 4 MG: 4 INJECTION, SOLUTION INTRAMUSCULAR; INTRAVENOUS at 09:21

## 2021-01-01 RX ADMIN — LORAZEPAM 0.5 MG: 2 INJECTION INTRAMUSCULAR; INTRAVENOUS at 08:57

## 2021-01-01 RX ADMIN — LORAZEPAM 1 MG: 2 INJECTION INTRAMUSCULAR; INTRAVENOUS at 14:03

## 2021-01-01 RX ADMIN — LORAZEPAM 1 MG: 2 INJECTION INTRAMUSCULAR; INTRAVENOUS at 21:35

## 2021-01-01 RX ADMIN — SODIUM CHLORIDE, PRESERVATIVE FREE 10 ML: 5 INJECTION INTRAVENOUS at 21:15

## 2021-01-01 RX ADMIN — METOPROLOL TARTRATE 2.5 MG: 5 INJECTION INTRAVENOUS at 13:44

## 2021-01-01 RX ADMIN — HALOPERIDOL LACTATE 1 MG: 5 INJECTION, SOLUTION INTRAMUSCULAR at 08:06

## 2021-01-01 RX ADMIN — ERYTHROMYCIN: 5 OINTMENT OPHTHALMIC at 09:44

## 2021-01-01 RX ADMIN — GLYCOPYRROLATE 0.4 MG: 0.2 INJECTION INTRAMUSCULAR; INTRAVENOUS at 14:02

## 2021-01-01 RX ADMIN — TAZOBACTAM SODIUM AND PIPERACILLIN SODIUM 3.38 G: 375; 3 INJECTION, SOLUTION INTRAVENOUS at 21:13

## 2021-01-01 RX ADMIN — MORPHINE SULFATE 8 MG: 2 INJECTION, SOLUTION INTRAMUSCULAR; INTRAVENOUS at 17:54

## 2021-01-01 RX ADMIN — FUROSEMIDE 20 MG: 10 INJECTION, SOLUTION INTRAMUSCULAR; INTRAVENOUS at 20:41

## 2021-01-01 RX ADMIN — ENOXAPARIN SODIUM 40 MG: 40 INJECTION SUBCUTANEOUS at 09:41

## 2021-01-01 RX ADMIN — MORPHINE SULFATE 4 MG: 4 INJECTION, SOLUTION INTRAMUSCULAR; INTRAVENOUS at 22:45

## 2021-01-01 RX ADMIN — AMLODIPINE BESYLATE 10 MG: 5 TABLET ORAL at 08:58

## 2021-01-01 RX ADMIN — MORPHINE SULFATE 8 MG: 2 INJECTION, SOLUTION INTRAMUSCULAR; INTRAVENOUS at 01:59

## 2021-01-01 RX ADMIN — SODIUM CHLORIDE, PRESERVATIVE FREE 10 ML: 5 INJECTION INTRAVENOUS at 09:18

## 2021-01-01 RX ADMIN — FUROSEMIDE 20 MG: 10 INJECTION, SOLUTION INTRAMUSCULAR; INTRAVENOUS at 12:18

## 2021-01-01 RX ADMIN — LORAZEPAM 0.5 MG: 0.5 TABLET ORAL at 16:15

## 2021-01-01 RX ADMIN — FINASTERIDE 5 MG: 5 TABLET, FILM COATED ORAL at 10:48

## 2021-01-01 RX ADMIN — DOCUSATE SODIUM 100 MG: 100 CAPSULE ORAL at 21:24

## 2021-01-01 RX ADMIN — MORPHINE SULFATE 4 MG: 4 INJECTION, SOLUTION INTRAMUSCULAR; INTRAVENOUS at 14:20

## 2021-01-01 RX ADMIN — FUROSEMIDE 20 MG: 10 INJECTION, SOLUTION INTRAMUSCULAR; INTRAVENOUS at 13:08

## 2021-01-01 RX ADMIN — LORAZEPAM 1 MG: 2 INJECTION INTRAMUSCULAR; INTRAVENOUS at 21:10

## 2021-01-01 RX ADMIN — SODIUM CHLORIDE, PRESERVATIVE FREE 10 ML: 5 INJECTION INTRAVENOUS at 05:35

## 2021-01-01 RX ADMIN — LORAZEPAM 1 MG: 2 INJECTION INTRAMUSCULAR; INTRAVENOUS at 01:32

## 2021-01-01 RX ADMIN — DOCUSATE SODIUM 100 MG: 100 CAPSULE ORAL at 22:42

## 2021-01-01 RX ADMIN — Medication: at 12:30

## 2021-01-01 RX ADMIN — CEFTRIAXONE SODIUM 1 G: 1 INJECTION, POWDER, FOR SOLUTION INTRAMUSCULAR; INTRAVENOUS at 20:11

## 2021-01-01 RX ADMIN — MORPHINE SULFATE 4 MG: 4 INJECTION, SOLUTION INTRAMUSCULAR; INTRAVENOUS at 12:40

## 2021-01-01 RX ADMIN — FUROSEMIDE 20 MG: 10 INJECTION, SOLUTION INTRAMUSCULAR; INTRAVENOUS at 13:36

## 2021-01-01 RX ADMIN — ACETAMINOPHEN 650 MG: 325 TABLET, FILM COATED ORAL at 00:17

## 2021-01-01 RX ADMIN — ERYTHROMYCIN: 5 OINTMENT OPHTHALMIC at 04:34

## 2021-01-01 RX ADMIN — BISOPROLOL FUMARATE 2.5 MG: 5 TABLET, FILM COATED ORAL at 09:33

## 2021-01-01 RX ADMIN — AMIODARONE HYDROCHLORIDE 1 MG/MIN: 1.8 INJECTION, SOLUTION INTRAVENOUS at 05:13

## 2021-01-01 RX ADMIN — MORPHINE SULFATE 6 MG: 2 INJECTION, SOLUTION INTRAMUSCULAR; INTRAVENOUS at 21:13

## 2021-01-01 RX ADMIN — LORAZEPAM 0.5 MG: 2 INJECTION INTRAMUSCULAR; INTRAVENOUS at 12:36

## 2021-01-01 RX ADMIN — FINASTERIDE 5 MG: 5 TABLET, FILM COATED ORAL at 09:45

## 2021-01-01 RX ADMIN — LORAZEPAM 0.5 MG: 0.5 TABLET ORAL at 03:11

## 2021-01-01 RX ADMIN — MORPHINE SULFATE 4 MG: 4 INJECTION, SOLUTION INTRAMUSCULAR; INTRAVENOUS at 01:51

## 2021-01-01 RX ADMIN — GLYCOPYRROLATE 0.2 MG: 0.2 INJECTION INTRAMUSCULAR; INTRAVENOUS at 05:44

## 2021-01-01 RX ADMIN — MORPHINE SULFATE 8 MG: 2 INJECTION, SOLUTION INTRAMUSCULAR; INTRAVENOUS at 15:32

## 2021-01-01 RX ADMIN — LORAZEPAM 1 MG: 2 INJECTION INTRAMUSCULAR; INTRAVENOUS at 01:55

## 2021-01-01 RX ADMIN — LORAZEPAM 1 MG: 2 INJECTION INTRAMUSCULAR; INTRAVENOUS at 22:36

## 2021-01-01 RX ADMIN — FUROSEMIDE 20 MG: 10 INJECTION, SOLUTION INTRAMUSCULAR; INTRAVENOUS at 08:06

## 2021-01-01 RX ADMIN — CETIRIZINE HYDROCHLORIDE 5 MG: 10 TABLET, FILM COATED ORAL at 09:28

## 2021-01-01 RX ADMIN — AMLODIPINE BESYLATE 10 MG: 5 TABLET ORAL at 09:01

## 2021-01-01 RX ADMIN — MORPHINE SULFATE 8 MG: 2 INJECTION, SOLUTION INTRAMUSCULAR; INTRAVENOUS at 01:24

## 2021-01-01 RX ADMIN — MORPHINE SULFATE 4 MG: 4 INJECTION, SOLUTION INTRAMUSCULAR; INTRAVENOUS at 10:44

## 2021-01-01 RX ADMIN — SCOPALAMINE 1 PATCH: 1 PATCH, EXTENDED RELEASE TRANSDERMAL at 00:14

## 2021-01-01 RX ADMIN — Medication 2000 UNITS: at 09:28

## 2021-01-01 RX ADMIN — LORAZEPAM 0.5 MG: 2 INJECTION INTRAMUSCULAR; INTRAVENOUS at 08:39

## 2021-01-01 RX ADMIN — MORPHINE SULFATE 4 MG: 4 INJECTION, SOLUTION INTRAMUSCULAR; INTRAVENOUS at 17:56

## 2021-01-01 RX ADMIN — MINERAL OIL: 1000 LIQUID ORAL at 20:41

## 2021-01-01 RX ADMIN — LORAZEPAM 0.5 MG: 2 INJECTION INTRAMUSCULAR; INTRAVENOUS at 21:31

## 2021-01-01 RX ADMIN — FUROSEMIDE 20 MG: 10 INJECTION, SOLUTION INTRAMUSCULAR; INTRAVENOUS at 19:53

## 2021-01-01 RX ADMIN — GLYCOPYRROLATE 0.4 MG: 0.2 INJECTION INTRAMUSCULAR; INTRAVENOUS at 05:32

## 2021-01-01 RX ADMIN — PREDNISONE 10 MG: 5 TABLET ORAL at 08:14

## 2021-01-01 RX ADMIN — MORPHINE SULFATE 4 MG: 4 INJECTION, SOLUTION INTRAMUSCULAR; INTRAVENOUS at 06:24

## 2021-01-01 RX ADMIN — MORPHINE SULFATE 8 MG: 2 INJECTION, SOLUTION INTRAMUSCULAR; INTRAVENOUS at 13:49

## 2021-01-01 RX ADMIN — HYDROCODONE BITARTRATE AND ACETAMINOPHEN 1 TABLET: 10; 325 TABLET ORAL at 21:14

## 2021-01-01 RX ADMIN — FUROSEMIDE 20 MG: 10 INJECTION, SOLUTION INTRAMUSCULAR; INTRAVENOUS at 19:59

## 2021-01-01 RX ADMIN — MORPHINE SULFATE 4 MG: 4 INJECTION, SOLUTION INTRAMUSCULAR; INTRAVENOUS at 13:44

## 2021-01-01 RX ADMIN — FUROSEMIDE 20 MG: 10 INJECTION, SOLUTION INTRAMUSCULAR; INTRAVENOUS at 05:00

## 2021-01-01 RX ADMIN — SODIUM CHLORIDE, PRESERVATIVE FREE 10 ML: 5 INJECTION INTRAVENOUS at 21:13

## 2021-01-01 RX ADMIN — POTASSIUM CHLORIDE 40 MEQ: 10 CAPSULE, COATED, EXTENDED RELEASE ORAL at 16:09

## 2021-01-01 RX ADMIN — CETIRIZINE HYDROCHLORIDE 10 MG: 10 TABLET, FILM COATED ORAL at 09:05

## 2021-01-01 RX ADMIN — FUROSEMIDE 20 MG: 10 INJECTION, SOLUTION INTRAMUSCULAR; INTRAVENOUS at 17:03

## 2021-01-01 RX ADMIN — LORAZEPAM 0.5 MG: 2 INJECTION INTRAMUSCULAR; INTRAVENOUS at 22:07

## 2021-01-01 RX ADMIN — TAZOBACTAM SODIUM AND PIPERACILLIN SODIUM 3.38 G: 375; 3 INJECTION, SOLUTION INTRAVENOUS at 11:57

## 2021-01-01 RX ADMIN — MORPHINE SULFATE 8 MG: 2 INJECTION, SOLUTION INTRAMUSCULAR; INTRAVENOUS at 09:00

## 2021-01-01 RX ADMIN — AMLODIPINE BESYLATE 10 MG: 5 TABLET ORAL at 09:16

## 2021-01-01 RX ADMIN — MORPHINE SULFATE 4 MG: 4 INJECTION, SOLUTION INTRAMUSCULAR; INTRAVENOUS at 06:01

## 2021-01-01 RX ADMIN — PREDNISONE 10 MG: 5 TABLET ORAL at 09:16

## 2021-01-01 RX ADMIN — SODIUM CHLORIDE, PRESERVATIVE FREE 10 ML: 5 INJECTION INTRAVENOUS at 20:45

## 2021-01-01 RX ADMIN — AMLODIPINE BESYLATE 10 MG: 10 TABLET ORAL at 09:31

## 2021-01-01 RX ADMIN — SODIUM CHLORIDE, PRESERVATIVE FREE 10 ML: 5 INJECTION INTRAVENOUS at 08:50

## 2021-01-01 RX ADMIN — DEXAMETHASONE 6 MG: 4 TABLET ORAL at 09:28

## 2021-01-01 RX ADMIN — FUROSEMIDE 20 MG: 10 INJECTION, SOLUTION INTRAMUSCULAR; INTRAVENOUS at 17:55

## 2021-01-01 RX ADMIN — FUROSEMIDE 20 MG: 10 INJECTION, SOLUTION INTRAMUSCULAR; INTRAVENOUS at 17:18

## 2021-01-01 RX ADMIN — MORPHINE SULFATE 4 MG: 4 INJECTION, SOLUTION INTRAMUSCULAR; INTRAVENOUS at 21:31

## 2021-01-01 RX ADMIN — ALBUTEROL SULFATE 2 PUFF: 108 AEROSOL, METERED RESPIRATORY (INHALATION) at 15:51

## 2021-01-01 RX ADMIN — FUROSEMIDE 20 MG: 10 INJECTION, SOLUTION INTRAMUSCULAR; INTRAVENOUS at 08:57

## 2021-01-01 RX ADMIN — MORPHINE SULFATE 4 MG: 4 INJECTION, SOLUTION INTRAMUSCULAR; INTRAVENOUS at 16:20

## 2021-01-01 RX ADMIN — FINASTERIDE 5 MG: 5 TABLET, FILM COATED ORAL at 09:03

## 2021-01-01 RX ADMIN — DEXAMETHASONE SODIUM PHOSPHATE 6 MG: 4 INJECTION, SOLUTION INTRA-ARTICULAR; INTRALESIONAL; INTRAMUSCULAR; INTRAVENOUS; SOFT TISSUE at 22:42

## 2021-01-01 RX ADMIN — GLYCOPYRROLATE 0.2 MG: 0.2 INJECTION INTRAMUSCULAR; INTRAVENOUS at 05:07

## 2021-01-01 RX ADMIN — FINASTERIDE 5 MG: 5 TABLET, FILM COATED ORAL at 08:09

## 2021-01-01 RX ADMIN — LORAZEPAM 1 MG: 2 INJECTION INTRAMUSCULAR; INTRAVENOUS at 11:34

## 2021-01-01 RX ADMIN — SODIUM CHLORIDE 500 ML: 9 INJECTION, SOLUTION INTRAVENOUS at 19:15

## 2021-01-01 RX ADMIN — BISACODYL 10 MG: 10 SUPPOSITORY RECTAL at 21:28

## 2021-01-01 RX ADMIN — HYDROCODONE BITARTRATE AND ACETAMINOPHEN 1 TABLET: 10; 325 TABLET ORAL at 03:51

## 2021-01-01 RX ADMIN — TRAMADOL HYDROCHLORIDE 50 MG: 50 TABLET, FILM COATED ORAL at 21:03

## 2021-01-01 RX ADMIN — CEFTRIAXONE SODIUM 1 G: 1 INJECTION, POWDER, FOR SOLUTION INTRAMUSCULAR; INTRAVENOUS at 22:59

## 2021-01-01 RX ADMIN — CETIRIZINE HYDROCHLORIDE 10 MG: 10 TABLET, FILM COATED ORAL at 12:24

## 2021-01-01 RX ADMIN — FUROSEMIDE 20 MG: 10 INJECTION, SOLUTION INTRAMUSCULAR; INTRAVENOUS at 21:40

## 2021-01-01 RX ADMIN — TAZOBACTAM SODIUM AND PIPERACILLIN SODIUM 3.38 G: 375; 3 INJECTION, SOLUTION INTRAVENOUS at 03:12

## 2021-01-01 RX ADMIN — HYDROCODONE BITARTRATE AND ACETAMINOPHEN 1 TABLET: 10; 325 TABLET ORAL at 12:40

## 2021-01-01 RX ADMIN — DIGOXIN 250 MCG: 0.25 INJECTION INTRAMUSCULAR; INTRAVENOUS at 10:05

## 2021-01-01 RX ADMIN — LORAZEPAM 1 MG: 2 INJECTION INTRAMUSCULAR; INTRAVENOUS at 15:32

## 2021-01-01 RX ADMIN — DOCUSATE SODIUM 50MG AND SENNOSIDES 8.6MG 2 TABLET: 8.6; 5 TABLET, FILM COATED ORAL at 20:41

## 2021-01-01 RX ADMIN — LORAZEPAM 1 MG: 2 INJECTION INTRAMUSCULAR; INTRAVENOUS at 01:24

## 2021-01-01 RX ADMIN — DEXAMETHASONE SODIUM PHOSPHATE 6 MG: 4 INJECTION, SOLUTION INTRA-ARTICULAR; INTRALESIONAL; INTRAMUSCULAR; INTRAVENOUS; SOFT TISSUE at 09:15

## 2021-01-01 RX ADMIN — LORAZEPAM 1 MG: 2 INJECTION INTRAMUSCULAR; INTRAVENOUS at 22:24

## 2021-01-01 RX ADMIN — MORPHINE SULFATE 4 MG: 4 INJECTION, SOLUTION INTRAMUSCULAR; INTRAVENOUS at 01:07

## 2021-01-01 RX ADMIN — FUROSEMIDE 20 MG: 10 INJECTION, SOLUTION INTRAMUSCULAR; INTRAVENOUS at 09:24

## 2021-01-01 RX ADMIN — MORPHINE SULFATE 8 MG: 2 INJECTION, SOLUTION INTRAMUSCULAR; INTRAVENOUS at 05:07

## 2021-01-01 RX ADMIN — MORPHINE SULFATE 8 MG: 2 INJECTION, SOLUTION INTRAMUSCULAR; INTRAVENOUS at 07:15

## 2021-01-01 RX ADMIN — FUROSEMIDE 20 MG: 10 INJECTION, SOLUTION INTRAMUSCULAR; INTRAVENOUS at 21:13

## 2021-01-01 RX ADMIN — PREDNISONE 10 MG: 5 TABLET ORAL at 09:02

## 2021-01-01 RX ADMIN — LORAZEPAM 1 MG: 2 INJECTION INTRAMUSCULAR; INTRAVENOUS at 17:54

## 2021-01-01 RX ADMIN — FUROSEMIDE 20 MG: 10 INJECTION, SOLUTION INTRAMUSCULAR; INTRAVENOUS at 14:20

## 2021-01-01 RX ADMIN — MORPHINE SULFATE 8 MG: 2 INJECTION, SOLUTION INTRAMUSCULAR; INTRAVENOUS at 02:11

## 2021-01-01 RX ADMIN — GLYCOPYRROLATE 0.2 MG: 0.2 INJECTION INTRAMUSCULAR; INTRAVENOUS at 13:49

## 2021-01-01 RX ADMIN — LORAZEPAM 1 MG: 2 INJECTION INTRAMUSCULAR; INTRAVENOUS at 14:56

## 2021-01-01 RX ADMIN — MORPHINE SULFATE 4 MG: 4 INJECTION, SOLUTION INTRAMUSCULAR; INTRAVENOUS at 04:55

## 2021-01-01 RX ADMIN — LORAZEPAM 1 MG: 2 INJECTION INTRAMUSCULAR; INTRAVENOUS at 05:33

## 2021-01-01 RX ADMIN — LORAZEPAM 1 MG: 2 INJECTION INTRAMUSCULAR; INTRAVENOUS at 14:12

## 2021-01-01 RX ADMIN — SODIUM CHLORIDE, PRESERVATIVE FREE 10 ML: 5 INJECTION INTRAVENOUS at 09:27

## 2021-01-01 RX ADMIN — ERYTHROMYCIN 1 APPLICATION: 5 OINTMENT OPHTHALMIC at 21:17

## 2021-01-01 RX ADMIN — AMLODIPINE BESYLATE 10 MG: 10 TABLET ORAL at 09:28

## 2021-01-01 RX ADMIN — FINASTERIDE 5 MG: 5 TABLET, FILM COATED ORAL at 09:29

## 2021-01-01 RX ADMIN — PREDNISONE 10 MG: 5 TABLET ORAL at 08:38

## 2021-01-01 RX ADMIN — Medication 2 SPRAY: at 21:17

## 2021-01-01 RX ADMIN — GLYCOPYRROLATE 0.2 MG: 0.2 INJECTION INTRAMUSCULAR; INTRAVENOUS at 13:36

## 2021-01-01 RX ADMIN — FUROSEMIDE 20 MG: 10 INJECTION, SOLUTION INTRAMUSCULAR; INTRAVENOUS at 18:23

## 2021-01-01 RX ADMIN — LORAZEPAM 1 MG: 2 INJECTION INTRAMUSCULAR; INTRAVENOUS at 05:23

## 2021-01-01 RX ADMIN — MINERAL OIL: 1000 LIQUID ORAL at 08:07

## 2021-01-01 RX ADMIN — SODIUM CHLORIDE, PRESERVATIVE FREE 10 ML: 5 INJECTION INTRAVENOUS at 09:45

## 2021-01-01 RX ADMIN — LORAZEPAM 0.5 MG: 2 INJECTION INTRAMUSCULAR; INTRAVENOUS at 08:50

## 2021-01-01 RX ADMIN — PREDNISONE 10 MG: 5 TABLET ORAL at 08:57

## 2021-01-01 RX ADMIN — Medication 2 SPRAY: at 09:03

## 2021-01-01 RX ADMIN — AMLODIPINE BESYLATE 10 MG: 5 TABLET ORAL at 08:14

## 2021-01-01 RX ADMIN — LOPERAMIDE HYDROCHLORIDE 2 MG: 2 CAPSULE ORAL at 15:51

## 2021-01-01 RX ADMIN — BISOPROLOL FUMARATE 2.5 MG: 5 TABLET, FILM COATED ORAL at 09:16

## 2021-01-01 RX ADMIN — TRAMADOL HYDROCHLORIDE 50 MG: 50 TABLET, FILM COATED ORAL at 22:58

## 2021-01-01 RX ADMIN — CETIRIZINE HYDROCHLORIDE 10 MG: 10 TABLET, FILM COATED ORAL at 09:15

## 2021-01-01 RX ADMIN — LORAZEPAM 0.5 MG: 2 INJECTION INTRAMUSCULAR; INTRAVENOUS at 22:44

## 2021-01-01 RX ADMIN — FUROSEMIDE 20 MG: 10 INJECTION, SOLUTION INTRAMUSCULAR; INTRAVENOUS at 19:55

## 2021-01-01 RX ADMIN — TRAMADOL HYDROCHLORIDE 50 MG: 50 TABLET, FILM COATED ORAL at 21:24

## 2021-01-01 RX ADMIN — MORPHINE SULFATE 6 MG: 2 INJECTION, SOLUTION INTRAMUSCULAR; INTRAVENOUS at 15:06

## 2021-01-01 RX ADMIN — MORPHINE SULFATE 8 MG: 2 INJECTION, SOLUTION INTRAMUSCULAR; INTRAVENOUS at 22:24

## 2021-01-01 RX ADMIN — SCOPALAMINE 1 PATCH: 1 PATCH, EXTENDED RELEASE TRANSDERMAL at 19:56

## 2021-01-01 RX ADMIN — LORAZEPAM 1 MG: 2 INJECTION INTRAMUSCULAR; INTRAVENOUS at 09:01

## 2021-01-01 RX ADMIN — SODIUM CHLORIDE, PRESERVATIVE FREE 10 ML: 5 INJECTION INTRAVENOUS at 22:44

## 2021-01-01 RX ADMIN — GLYCOPYRROLATE 0.4 MG: 0.2 INJECTION INTRAMUSCULAR; INTRAVENOUS at 21:35

## 2021-01-01 RX ADMIN — MORPHINE SULFATE 8 MG: 2 INJECTION, SOLUTION INTRAMUSCULAR; INTRAVENOUS at 22:05

## 2021-01-01 RX ADMIN — METOPROLOL TARTRATE 2.5 MG: 5 INJECTION INTRAVENOUS at 06:17

## 2021-01-01 RX ADMIN — SODIUM CHLORIDE, PRESERVATIVE FREE 10 ML: 5 INJECTION INTRAVENOUS at 02:50

## 2021-01-01 RX ADMIN — Medication 2000 UNITS: at 09:31

## 2021-01-01 RX ADMIN — LORAZEPAM 1 MG: 2 INJECTION INTRAMUSCULAR; INTRAVENOUS at 08:26

## 2021-01-01 RX ADMIN — SODIUM CHLORIDE, PRESERVATIVE FREE 10 ML: 5 INJECTION INTRAVENOUS at 09:32

## 2021-01-01 RX ADMIN — METOPROLOL TARTRATE 2.5 MG: 5 INJECTION INTRAVENOUS at 08:50

## 2021-01-01 RX ADMIN — MORPHINE SULFATE 4 MG: 4 INJECTION, SOLUTION INTRAMUSCULAR; INTRAVENOUS at 06:34

## 2021-01-01 RX ADMIN — MORPHINE SULFATE 4 MG: 4 INJECTION, SOLUTION INTRAMUSCULAR; INTRAVENOUS at 20:41

## 2021-01-01 RX ADMIN — TAZOBACTAM SODIUM AND PIPERACILLIN SODIUM 3.38 G: 375; 3 INJECTION, SOLUTION INTRAVENOUS at 22:43

## 2021-01-01 RX ADMIN — LORAZEPAM 0.5 MG: 0.5 TABLET ORAL at 14:30

## 2021-01-01 RX ADMIN — METOPROLOL TARTRATE 2.5 MG: 5 INJECTION INTRAVENOUS at 12:36

## 2021-01-01 RX ADMIN — TAZOBACTAM SODIUM AND PIPERACILLIN SODIUM 3.38 G: 375; 3 INJECTION, SOLUTION INTRAVENOUS at 20:04

## 2021-01-01 RX ADMIN — LORAZEPAM 1 MG: 2 INJECTION INTRAMUSCULAR; INTRAVENOUS at 17:31

## 2021-01-01 RX ADMIN — SODIUM CHLORIDE, PRESERVATIVE FREE 10 ML: 5 INJECTION INTRAVENOUS at 09:04

## 2021-01-01 RX ADMIN — FUROSEMIDE 20 MG: 10 INJECTION, SOLUTION INTRAMUSCULAR; INTRAVENOUS at 20:13

## 2021-01-01 RX ADMIN — KETOROLAC TROMETHAMINE 30 MG: 30 INJECTION, SOLUTION INTRAMUSCULAR at 09:54

## 2021-01-01 RX ADMIN — MORPHINE SULFATE 8 MG: 2 INJECTION, SOLUTION INTRAMUSCULAR; INTRAVENOUS at 05:23

## 2021-01-01 RX ADMIN — SCOPALAMINE 1 PATCH: 1 PATCH, EXTENDED RELEASE TRANSDERMAL at 20:13

## 2021-01-01 RX ADMIN — BISOPROLOL FUMARATE 2.5 MG: 5 TABLET, FILM COATED ORAL at 09:29

## 2021-01-01 RX ADMIN — SCOPALAMINE 1 PATCH: 1 PATCH, EXTENDED RELEASE TRANSDERMAL at 21:33

## 2021-01-01 RX ADMIN — HYDROCODONE BITARTRATE AND ACETAMINOPHEN 1 TABLET: 10; 325 TABLET ORAL at 14:08

## 2021-01-01 RX ADMIN — MORPHINE SULFATE 4 MG: 4 INJECTION, SOLUTION INTRAMUSCULAR; INTRAVENOUS at 13:01

## 2021-01-01 RX ADMIN — Medication 2 SPRAY: at 13:50

## 2021-01-01 RX ADMIN — TRAMADOL HYDROCHLORIDE 50 MG: 50 TABLET, FILM COATED ORAL at 00:16

## 2021-01-01 RX ADMIN — GLYCOPYRROLATE 0.2 MG: 0.2 INJECTION INTRAMUSCULAR; INTRAVENOUS at 22:15

## 2021-01-01 RX ADMIN — SCOPALAMINE 1 PATCH: 1 PATCH, EXTENDED RELEASE TRANSDERMAL at 09:06

## 2021-01-01 RX ADMIN — SODIUM CHLORIDE, PRESERVATIVE FREE 10 ML: 5 INJECTION INTRAVENOUS at 21:00

## 2021-01-01 RX ADMIN — FINASTERIDE 5 MG: 5 TABLET, FILM COATED ORAL at 09:43

## 2021-01-01 RX ADMIN — MORPHINE SULFATE 4 MG: 4 INJECTION, SOLUTION INTRAMUSCULAR; INTRAVENOUS at 08:50

## 2021-01-01 RX ADMIN — MORPHINE SULFATE 4 MG: 4 INJECTION, SOLUTION INTRAMUSCULAR; INTRAVENOUS at 00:18

## 2021-01-01 RX ADMIN — MORPHINE SULFATE 4 MG: 4 INJECTION, SOLUTION INTRAMUSCULAR; INTRAVENOUS at 08:39

## 2021-01-01 RX ADMIN — FUROSEMIDE 20 MG: 10 INJECTION, SOLUTION INTRAMUSCULAR; INTRAVENOUS at 01:13

## 2021-01-01 RX ADMIN — SODIUM CHLORIDE, PRESERVATIVE FREE 10 ML: 5 INJECTION INTRAVENOUS at 08:22

## 2021-01-01 RX ADMIN — LORAZEPAM 1 MG: 2 INJECTION INTRAMUSCULAR; INTRAVENOUS at 02:39

## 2021-01-01 RX ADMIN — Medication 2000 UNITS: at 08:09

## 2021-01-01 RX ADMIN — TRAMADOL HYDROCHLORIDE 50 MG: 50 TABLET, FILM COATED ORAL at 08:15

## 2021-01-01 RX ADMIN — GLYCOPYRROLATE 0.2 MG: 0.2 INJECTION INTRAMUSCULAR; INTRAVENOUS at 22:05

## 2021-01-01 RX ADMIN — MORPHINE SULFATE 4 MG: 4 INJECTION, SOLUTION INTRAMUSCULAR; INTRAVENOUS at 22:06

## 2021-01-01 RX ADMIN — FLUTICASONE PROPIONATE 2 SPRAY: 50 SPRAY, METERED NASAL at 09:42

## 2021-01-01 RX ADMIN — FUROSEMIDE 20 MG: 10 INJECTION, SOLUTION INTRAMUSCULAR; INTRAVENOUS at 21:31

## 2021-01-01 RX ADMIN — FINASTERIDE 5 MG: 5 TABLET, FILM COATED ORAL at 08:15

## 2021-01-01 RX ADMIN — MORPHINE SULFATE 4 MG: 4 INJECTION, SOLUTION INTRAMUSCULAR; INTRAVENOUS at 01:44

## 2021-01-01 RX ADMIN — MORPHINE SULFATE 4 MG: 4 INJECTION, SOLUTION INTRAMUSCULAR; INTRAVENOUS at 09:18

## 2021-01-01 RX ADMIN — FUROSEMIDE 20 MG: 10 INJECTION, SOLUTION INTRAMUSCULAR; INTRAVENOUS at 04:55

## 2021-01-01 RX ADMIN — FUROSEMIDE 20 MG: 10 INJECTION, SOLUTION INTRAMUSCULAR; INTRAVENOUS at 08:11

## 2021-01-01 RX ADMIN — VANCOMYCIN HYDROCHLORIDE 1250 MG: 100 INJECTION, POWDER, LYOPHILIZED, FOR SOLUTION INTRAVENOUS at 22:43

## 2021-01-01 RX ADMIN — FINASTERIDE 5 MG: 5 TABLET, FILM COATED ORAL at 08:57

## 2021-01-01 RX ADMIN — CEFTRIAXONE SODIUM 1 G: 1 INJECTION, POWDER, FOR SOLUTION INTRAMUSCULAR; INTRAVENOUS at 00:17

## 2021-01-01 RX ADMIN — SODIUM CHLORIDE, PRESERVATIVE FREE 10 ML: 5 INJECTION INTRAVENOUS at 08:38

## 2021-01-01 RX ADMIN — ENOXAPARIN SODIUM 40 MG: 40 INJECTION SUBCUTANEOUS at 09:03

## 2021-01-01 RX ADMIN — GLYCOPYRROLATE 0.4 MG: 0.2 INJECTION INTRAMUSCULAR; INTRAVENOUS at 21:18

## 2021-01-01 RX ADMIN — ERYTHROMYCIN 1 APPLICATION: 5 OINTMENT OPHTHALMIC at 21:03

## 2021-01-01 RX ADMIN — SODIUM CHLORIDE, PRESERVATIVE FREE 10 ML: 5 INJECTION INTRAVENOUS at 21:06

## 2021-01-01 RX ADMIN — FUROSEMIDE 20 MG: 10 INJECTION, SOLUTION INTRAMUSCULAR; INTRAVENOUS at 14:24

## 2021-01-01 RX ADMIN — POLYVINYL ALCOHOL 2 DROP: 14 SOLUTION/ DROPS OPHTHALMIC at 13:39

## 2021-01-01 RX ADMIN — SODIUM CHLORIDE, PRESERVATIVE FREE 10 ML: 5 INJECTION INTRAVENOUS at 21:45

## 2021-01-01 RX ADMIN — SODIUM CHLORIDE, PRESERVATIVE FREE 10 ML: 5 INJECTION INTRAVENOUS at 09:41

## 2021-01-01 RX ADMIN — VANCOMYCIN HYDROCHLORIDE 750 MG: 750 INJECTION, SOLUTION INTRAVENOUS at 04:28

## 2021-01-01 RX ADMIN — MORPHINE SULFATE 4 MG: 4 INJECTION, SOLUTION INTRAMUSCULAR; INTRAVENOUS at 17:04

## 2021-01-01 RX ADMIN — SODIUM CHLORIDE, PRESERVATIVE FREE 10 ML: 5 INJECTION INTRAVENOUS at 20:12

## 2021-01-01 RX ADMIN — GLYCOPYRROLATE 0.2 MG: 0.2 INJECTION INTRAMUSCULAR; INTRAVENOUS at 15:32

## 2021-01-01 RX ADMIN — MORPHINE SULFATE 4 MG: 4 INJECTION, SOLUTION INTRAMUSCULAR; INTRAVENOUS at 06:18

## 2021-01-01 RX ADMIN — ENOXAPARIN SODIUM 40 MG: 40 INJECTION SUBCUTANEOUS at 22:42

## 2021-01-01 RX ADMIN — GLYCOPYRROLATE 0.4 MG: 0.2 INJECTION INTRAMUSCULAR; INTRAVENOUS at 05:19

## 2021-01-01 RX ADMIN — LORAZEPAM 0.5 MG: 2 INJECTION INTRAMUSCULAR; INTRAVENOUS at 10:44

## 2021-01-01 RX ADMIN — TRAMADOL HYDROCHLORIDE 50 MG: 50 TABLET, FILM COATED ORAL at 08:57

## 2021-01-01 RX ADMIN — LOPERAMIDE HYDROCHLORIDE 2 MG: 2 CAPSULE ORAL at 17:39

## 2021-01-01 RX ADMIN — CETIRIZINE HYDROCHLORIDE 10 MG: 10 TABLET, FILM COATED ORAL at 09:33

## 2021-01-01 RX ADMIN — FUROSEMIDE 20 MG: 10 INJECTION, SOLUTION INTRAMUSCULAR; INTRAVENOUS at 18:56

## 2021-01-01 RX ADMIN — TRAMADOL HYDROCHLORIDE 50 MG: 50 TABLET, FILM COATED ORAL at 21:17

## 2021-01-01 RX ADMIN — HYDROCODONE BITARTRATE AND ACETAMINOPHEN 1 TABLET: 10; 325 TABLET ORAL at 01:44

## 2021-01-01 RX ADMIN — MORPHINE SULFATE 4 MG: 4 INJECTION, SOLUTION INTRAMUSCULAR; INTRAVENOUS at 17:35

## 2021-01-01 RX ADMIN — KETOROLAC TROMETHAMINE 30 MG: 30 INJECTION, SOLUTION INTRAMUSCULAR at 08:06

## 2021-01-01 RX ADMIN — MINERAL OIL: 1000 LIQUID ORAL at 21:18

## 2021-01-01 RX ADMIN — ERYTHROMYCIN: 5 OINTMENT OPHTHALMIC at 08:59

## 2021-01-01 RX ADMIN — LORAZEPAM 1 MG: 2 INJECTION INTRAMUSCULAR; INTRAVENOUS at 05:45

## 2021-01-01 RX ADMIN — MORPHINE SULFATE 8 MG: 2 INJECTION, SOLUTION INTRAMUSCULAR; INTRAVENOUS at 09:56

## 2021-01-01 RX ADMIN — LORAZEPAM 0.5 MG: 2 INJECTION INTRAMUSCULAR; INTRAVENOUS at 13:01

## 2021-01-01 RX ADMIN — LORAZEPAM 1 MG: 2 INJECTION INTRAMUSCULAR; INTRAVENOUS at 17:10

## 2021-01-01 RX ADMIN — BISOPROLOL FUMARATE 2.5 MG: 5 TABLET, FILM COATED ORAL at 09:05

## 2021-01-01 RX ADMIN — GLYCOPYRROLATE 0.2 MG: 0.2 INJECTION INTRAMUSCULAR; INTRAVENOUS at 05:45

## 2021-01-01 RX ADMIN — FLUTICASONE PROPIONATE 2 SPRAY: 50 SPRAY, METERED NASAL at 09:03

## 2021-01-01 RX ADMIN — DEXAMETHASONE 6 MG: 4 TABLET ORAL at 08:09

## 2021-01-01 RX ADMIN — MORPHINE SULFATE 8 MG: 2 INJECTION, SOLUTION INTRAMUSCULAR; INTRAVENOUS at 14:12

## 2021-01-01 RX ADMIN — MORPHINE SULFATE 4 MG: 4 INJECTION, SOLUTION INTRAMUSCULAR; INTRAVENOUS at 00:48

## 2021-01-01 RX ADMIN — ERYTHROMYCIN: 5 OINTMENT OPHTHALMIC at 20:11

## 2021-01-01 NOTE — NURSING NOTE
Patient transferred from ED via stretcher.  Patient tolerated well.  MARY ABDULLAHI in to assess the patient.

## 2021-01-01 NOTE — PROGRESS NOTES
"    [facility] Medicine Services  PROGRESS NOTE    Patient Name: Michoacano Rojas  : 1940  MRN: [id number]    Date of Admission: 2020  Primary Care Physician: Michael Chavez MD    Subjective   Subjective     CC:  F/U weakness    HPI:  Patient seen this morning, complains of some pain in his legs but otherwise no major complaints. He wants me to \"be honest\" with him, \"Am I ever going to walk again?\"    ROS:  Gen-no fevers, no chills  CV-no chest pain, no palpitations  Resp-no cough, no dyspnea  GI-no N/V/D, no abd pain    All other systems reviewed and negative except any additional pertinent positives and negatives as discussed in HPI.      Objective   Objective     Vital Signs:   Temp:  [97.6 °F (36.4 °C)-99 °F (37.2 °C)] 97.7 °F (36.5 °C)  Heart Rate:  [62-84] 62  Resp:  [16-22] 16  BP: (104-137)/(54-88) 104/54        Physical Exam:  Gen-no acute distress, chronically ill appearing  HENT-NCAT, mucous membranes moist  CV-RRR, S1 S2 normal, no m/r/g  Resp-CTAB, no wheezes or rales  Abd-soft, NT, ND, +BS  Ext-chronic trace BLE edema  Neuro-A&Ox3, no focal deficits  Skin-multiple ulcerations to bilateral ankles, did not examine buttock or thigh wound; woody appearance to lower extremities  Psych-appropriate mood      Results Reviewed:  Results from last 7 days   Lab Units 21   WBC 10*3/mm3 8.03 9.51   HEMOGLOBIN g/dL 10.0* 10.8*   HEMATOCRIT % 34.8* 37.1*   PLATELETS 10*3/mm3 158 166   PROCALCITONIN ng/mL  --  0.69*     Results from last 7 days   Lab Units 21   SODIUM mmol/L 141 143   POTASSIUM mmol/L 4.1 4.4   CHLORIDE mmol/L 107 105   CO2 mmol/L 23.0 26.0   BUN mg/dL 22 25*   CREATININE mg/dL 0.46* 0.55*   GLUCOSE mg/dL 91 106*   CALCIUM mg/dL 8.5* 9.0   ALT (SGPT) U/L 5 5   AST (SGOT) U/L 12 14     Estimated Creatinine Clearance: 67.7 mL/min (A) (by C-G formula based on SCr of 0.46 mg/dL (L)).    Microbiology Results " Abnormal     Procedure Component Value - Date/Time    COVID PRE-OP / PRE-PROCEDURE SCREENING ORDER (NO ISOLATION) - Swab, Nasopharynx [[id number]]  (Normal) Collected: 12/31/20 1957    Lab Status: Final result Specimen: Swab from Nasopharynx Updated: 12/31/20 2106    Narrative:      The following orders were created for panel order COVID PRE-OP / PRE-PROCEDURE SCREENING ORDER (NO ISOLATION) - Swab, Nasopharynx.  Procedure                               Abnormality         Status                     ---------                               -----------         ------                     Respiratory Panel PCR w/...[[id number]]  Normal              Final result                 Please view results for these tests on the individual orders.    Respiratory Panel PCR w/COVID-19(SARS-CoV-2) SARMAD/SHERI/DEEPIKA/PAD/COR/MAD/KATALINA In-House, NP Swab in UTM/VTM, 3-4 HR TAT - Swab, Nasopharynx [[id number]]  (Normal) Collected: 12/31/20 1957    Lab Status: Final result Specimen: Swab from Nasopharynx Updated: 12/31/20 2106     ADENOVIRUS, PCR Not Detected     Coronavirus 229E Not Detected     Coronavirus HKU1 Not Detected     Coronavirus NL63 Not Detected     Coronavirus OC43 Not Detected     COVID19 Not Detected     Human Metapneumovirus Not Detected     Human Rhinovirus/Enterovirus Not Detected     Influenza A PCR Not Detected     Influenza A H1 Not Detected     Influenza A H1 2009 PCR Not Detected     Influenza A H3 Not Detected     Influenza B PCR Not Detected     Parainfluenza Virus 1 Not Detected     Parainfluenza Virus 2 Not Detected     Parainfluenza Virus 3 Not Detected     Parainfluenza Virus 4 Not Detected     RSV, PCR Not Detected     Bordetella pertussis pcr Not Detected     Bordetella parapertussis PCR Not Detected     Chlamydophila pneumoniae PCR Not Detected     Mycoplasma pneumo by PCR Not Detected    Narrative:      Fact sheet for providers:  https://docs.[employer]/wp-content/uploads/RLG5310-4350-OT1.1-EUA-Provider-Fact-Sheet-3.pdf    Fact sheet for patients: https://docs.[employer]/wp-content/uploads/YVZ3492-0731-JK1.1-EUA-Patient-Fact-Sheet-1.pdf    Test performed by PCR.          Imaging Results (Last 24 Hours)     Procedure Component Value Units Date/Time    XR Chest 1 View [[id number]] Collected: 12/31/20 2016     Updated: 12/31/20 2018    Narrative:      CR Chest 1 Vw    INDICATION:   80-year-old male with weakness today.     COMPARISON:    Chest 10/23/2019  CTA chest 10/23/2019    FINDINGS:  Single portable AP view(s) of the chest.  Mediastinum is mildly widened secondary to known large thyroid goiter. The appearance is unchanged from prior exams. Heart size is stable. Lungs are clear. No pleural effusions or pneumothorax. Advanced  degenerative changes in both glenohumeral joints.      Impression:      No acute cardiopulmonary findings. No change from 10/23/2019    Signer Name: Ramon Nguyen MD   Signed: 12/31/2020 8:16 PM   Workstation Name: [id number]    [facility]          Results for orders placed during the hospital encounter of 10/23/19   Adult Transthoracic Echo Complete W/ Cont if Necessary Per Protocol    Narrative · Right ventricular cavity is borderline dilated.  · Mild tricuspid valve regurgitation is present.  · There is calcification of the aortic valve.  · Estimated EF = 60%.  · Left ventricular systolic function is normal.  · Left ventricular wall motion is normal          I have reviewed the medications:  Scheduled Meds:amLODIPine, 10 mg, Oral, Daily  bisoprolol, 2.5 mg, Oral, Daily  cefTRIAXone, 1 g, Intravenous, Q24H  docusate sodium, 100 mg, Oral, Nightly  enoxaparin, 40 mg, Subcutaneous, Q24H  erythromycin, , Both Eyes, Q12H  finasteride, 5 mg, Oral, Daily  predniSONE, 10 mg, Oral, Daily With Breakfast  sodium chloride, 10 mL, Intravenous, Q12H  traMADol, 50 mg, Oral, BID      Continuous  Infusions:   PRN Meds:.•  acetaminophen **OR** acetaminophen **OR** acetaminophen  •  ondansetron  •  [COMPLETED] Insert peripheral IV **AND** sodium chloride  •  sodium chloride    Assessment/Plan   Assessment & Plan     Active Hospital Problems    Diagnosis  POA   • **Acute on chronic cutaneous venous stasis ulcer (CMS/[facility]) [I83.009, L97.909]  Yes   • Joint pain [M25.50]  Yes   • Acute UTI (urinary tract infection) [N39.0]  Unknown   • COPD (chronic obstructive pulmonary disease) (CMS/[facility]) [J44.9]  Yes   • Rheumatoid arthritis (CMS/[facility]) [M06.9]  Yes   • Essential hypertension [I10]  Yes      Resolved Hospital Problems   No resolved problems to display.        Brief Hospital Course to date:  Michoacano Rojas is a 80 y.o. male with hx of rheumatoid arthritis on daily prednisone, chronic diastolic CHF, PAF, COPD, BPH, chronic venous stasis ulcerations, and severe debility who presents due to generalized weakness, decreased mobility, and worsening appearance of his chronic wounds. His son who is his primary caregiver was diagnosed with COVID-19 recently and has been quarantining away from the patient and patient's wife for the past ~12 days. During this time, his wife has been attempting to care for him and his wounds, but patient has progressively declined to the point where he was unable to get out of bed. Typically can ambulate with a cane at home. Workup in the ER revealed a UTI.    *All problems are new to me today.    Acute UTI  BPH  --UA with moderate leukocytes, TNTC WBC, however no bacteria seen. He also has large/3+ budding yeast. Will continue with Rocephin and follow up urine culture.  --Continue Proscar.    Acute on chronic venous stasis ulcers and pressure ulcers  --Wound care consulted.  --Do not appear actively infected at this time.    Rheumatoid arthritis  Chronic immunosuppression with prednisone  --Continue daily prednisone 10 mg.     HTN  Chronic diastolic CHF  --Continue Norvasc,  Bisoprolol.  --No evidence of decompensated CHF.    PAF  --Continue Bisoprolol.  --Not on anticoagulation (previously on Eliquis) due to debility and fall risk.    Severe debility  --Patient has steadily declined since son has not be able to care for him. Will have PT/OT evaluate. I discussed rehab with patient and at this point, he does seem agreeable.     DVT Prophylaxis:  Lovenox      Disposition: I expect the patient to be discharged TBD    Called and updated son Yosvany. He has recovered from COVID, it has been about 2 weeks. Appreciates the call.     CODE STATUS:   Code Status and Medical Interventions:   Ordered at: 12/31/20 8887     Limited Support to NOT Include:    Intubation     Code Status:    No CPR     Medical Interventions (Level of Support Prior to Arrest):    Limited       Susan Oneill MD  01/01/21

## 2021-01-01 NOTE — H&P
[facility] Medicine Services  Clinical Decision Unit  HISTORY & PHYSICAL    Patient Name: Michoacano Rojas  : 1940  MRN: [id number]  Primary Care Physician: Michael Chavez MD  Date of admission: 2020  4:47 PM      Subjective   Subjective     Chief Complaint:  Weakness  Chronic wounds    HPI:  Michoacano Rojas is a 80 y.o. male with a history of rheumatoid arthritis on daily prednisone (chronic prednisone >40 years), severe debility, diastolic CHF, paroxysmal afib (previously on eliquis), COPD, BPH, chronic venous stasis ulcerations who presents to the ED from home for decreased mobility and chronic wounds of his legs and sacral region.  Pt states his son who is his caregiver was diagnosed with covid and has not been able to help care for him. His wife has been trying to do wound care at home. Also he is able to ambulate with canes at home and as of yesterday, his legs became too weak to walk and he was unable to get out of bed. He also complains of worsening right shoulder pain.      In the ED, patient noted to multiple wounds of various stages of healing to his feet, ankles, thighs and buttocks.  He denies fever, chills, N/V/D, SOB or cough. Labs are stable including WBC 9.51, lactate 0.8, procalcitonin 0.69.  UA was abnormal, with moderate leukocytes, WBC TNTC, 3+ yeast, trace ketones. He was started on IV vanc/zosyn in the ED.  COVID screen negative. CXR no acute findings.    Hospital medicine will admit for further evaluation and treatment.         Review of Systems   Constitutional: Negative for appetite change, chills and fever.   HENT: Negative.    Eyes: Negative.    Respiratory: Negative.    Cardiovascular: Negative.    Gastrointestinal: Negative.    Endocrine: Negative.    Genitourinary: Negative.    Musculoskeletal: Positive for arthralgias, back pain, gait problem and joint swelling.   Skin: Positive for color change and wound.   Neurological: Positive for  weakness.   Hematological: Negative.    Psychiatric/Behavioral: Negative.         All other systems reviewed and negative    Personal History     Past Medical History:   Diagnosis Date   • A-fib (CMS/HCC)    • Atrial fibrillation (CMS/HCC)    • CHF (congestive heart failure) (CMS/[facility])    • Chronic cutaneous venous stasis ulcer (CMS/HCC)    • Coronary artery disease    • Disease of thyroid gland    • Hypertension    • Penile abnormality    • Peptic ulcer disease    • Primary malignant neoplasm of bronchus with metastasisto other site, unspecified laterality (CMS/[facility]) 4/22/2019   • RA (rheumatoid arthritis) (CMS/[facility])    • Rheumatic fever    • Rheumatoid arthritis (CMS/HCC)    • Sepsis (CMS/[facility]) 11/17/2017    from cellulitis due to leg ulcer, hospitalization, antibiotics, wound care   • Sleep apnea    • Vertigo    • Vertigo        Past Surgical History:   Procedure Laterality Date   • EYE SURGERY     • JOINT MANIPULATION      left hip repair       Family History: family history includes Alzheimer's disease in his father and mother; Diabetes in his brother; Hypertension in his mother and sister; Lung cancer in his brother. Otherwise pertinent FHx was reviewed and unremarkable.     Social History:  reports that he has never smoked. He has never used smokeless tobacco. He reports that he does not drink alcohol or use drugs.  Social History     Social History Narrative   • Not on file       Medications:  Prior to Admission medications    Medication Sig Start Date End Date Taking? Authorizing Provider   amLODIPine (NORVASC) 10 MG tablet TAKE 1 TABLET EVERY DAY 11/23/20   Michael Chavez MD   amLODIPine (NORVASC) 5 MG tablet Take 1 tablet by mouth Daily. 10/19/20   Ana Price PA-C   Azelastine HCl 137 MCG/SPRAY solution 2 sprays into the nostril(s) as directed by provider Every 12 (Twelve) Hours. 4/8/20   Michael hCavez MD   bisoprolol (ZEBeta) 5 MG tablet TAKE 1/2 TABLET EVERY DAY 8/26/20    Michael Chavez MD   Camphor-Menthol-Methyl Sal (HM SALONPAS PAIN RELIEF) 1.2-5.7-6.3 % patch Apply  topically to the appropriate area as directed As Needed. 9/8/17   Eligio Farfan MD   cholecalciferol (VITAMIN D3) 1000 UNITS tablet Take 2,000 Units by mouth Daily.    Eligio Farfan MD   diclofenac (VOLTAREN) 1 % gel gel Apply 4 g topically to the appropriate area as directed 4 (Four) Times a Day As Needed (pain). 9/28/20   Michael Chavez MD   docusate sodium (COLACE) 50 MG capsule Take 100 mg by mouth Every Night.    Eligio Farfan MD   Elastic Bandages & Supports (HERNIA BELT DOUBLE LARGE) misc For right inguinal hernia 12/15/19   Arden Stallings PA-C   finasteride (PROSCAR) 5 MG tablet TAKE 1 TABLET EVERY DAY 10/21/20   Michael Chavez MD   fluticasone (FLONASE) 50 MCG/ACT nasal spray 2 sprays into the nostril(s) as directed by provider 2 (Two) Times a Day As Needed.    ProviderEligio MD   HYDROcodone-acetaminophen (NORCO)  MG per tablet 1 tablet. Can take up to 4 daily    Eligio Farfan MD   predniSONE (DELTASONE) 10 MG tablet Take 1 tablet by mouth Daily. 11/12/20   Michael Chavez MD   traMADol (ULTRAM) 50 MG tablet Take 1 tablet by mouth 2 (Two) Times a Day. 4/8/20   Michael Chavez MD       Medications Prior to Admission   Medication Sig Dispense Refill Last Dose   • amLODIPine (NORVASC) 10 MG tablet TAKE 1 TABLET EVERY DAY 90 tablet 1    • amLODIPine (NORVASC) 5 MG tablet Take 1 tablet by mouth Daily.      • Azelastine HCl 137 MCG/SPRAY solution 2 sprays into the nostril(s) as directed by provider Every 12 (Twelve) Hours. 30 mL 5    • bisoprolol (ZEBeta) 5 MG tablet TAKE 1/2 TABLET EVERY DAY 45 tablet 2    • Camphor-Menthol-Methyl Sal (HM SALONPAS PAIN RELIEF) 1.2-5.7-6.3 % patch Apply  topically to the appropriate area as directed As Needed.      • cholecalciferol (VITAMIN D3) 1000 UNITS tablet Take 2,000 Units by mouth  Daily.      • diclofenac (VOLTAREN) 1 % gel gel Apply 4 g topically to the appropriate area as directed 4 (Four) Times a Day As Needed (pain). 100 g 5    • docusate sodium (COLACE) 50 MG capsule Take 100 mg by mouth Every Night.      • Elastic Bandages & Supports (HERNIA BELT DOUBLE LARGE) misc For right inguinal hernia 1 each 0    • finasteride (PROSCAR) 5 MG tablet TAKE 1 TABLET EVERY DAY 90 tablet 1    • fluticasone (FLONASE) 50 MCG/ACT nasal spray 2 sprays into the nostril(s) as directed by provider 2 (Two) Times a Day As Needed.      • HYDROcodone-acetaminophen (NORCO)  MG per tablet 1 tablet. Can take up to 4 daily      • predniSONE (DELTASONE) 10 MG tablet Take 1 tablet by mouth Daily. 30 tablet 5    • traMADol (ULTRAM) 50 MG tablet Take 1 tablet by mouth 2 (Two) Times a Day. 60 tablet 1        Allergies   Allergen Reactions   • Naproxen Sodium Other (See Comments)     Increased heart rate  Aleve       Objective   Objective     Vital Signs:   Temp:  [99 °F (37.2 °C)] 99 °F (37.2 °C)  Heart Rate:  [75-84] 75  Resp:  [18-22] 18  BP: (120-137)/(66-88) 131/70        Physical Exam   Constitutional: chronically ill appearing elderly male  Eyes: PERRLA, sclerae anicteric, no conjunctival injection  HENT: NCAT, mucous membranes moist  Neck: Supple, no thyromegaly, no lymphadenopathy, trachea midline  Respiratory: Clear to auscultation bilaterally, nonlabored respirations   Cardiovascular: RRR, no murmurs, rubs, or gallops, palpable pedal pulses bilaterally  Gastrointestinal: Positive bowel sounds, soft, nontender, nondistended  Musculoskeletal: enlarged/bony joints, severe bony destructive changes to hands/joints, swollen right forearm  Psychiatric: Appropriate affect, cooperative  Neurologic: Oriented x 3, strength symmetric in all extremities, Cranial Nerves grossly intact to confrontation, speech clear  Skin: multiple ulcerations to bilateral ankles, posterior R thigh, buttocks  - hyperpigmentation, dry skin  to bilateral lower extremities    Results Reviewed:  UA - TNTC WBC, mod 2+ leukocytes  WBC 9.51    Assessment/Plan   Assessment / Plan     Active Hospital Problems    Diagnosis POA   • Joint pain [M25.50] Yes   • Acute UTI (urinary tract infection) [N39.0] Unknown   • COPD (chronic obstructive pulmonary disease) (CMS/[facility]) [J44.9] Yes   • Rheumatoid arthritis (CMS/[facility]) [M06.9] Yes     Currently taking prednisone 10mg daily.     • Essential hypertension [I10] Yes     Continue amlodipine 5mg as directed.     • Chronic cutaneous venous stasis ulcer (CMS/[facility]) [I83.009, L97.909] Yes       1. Acute on chronic venous stasis ulcers and pressure ulcers  - consult wound care for dressing changes  - wound culture  - social work consult for home health wound care vs long term placement due to severe debility and lack of mobility    2. Rheumatoid arthritis w/o DMARD      Chronic immunosuppression on prednisone  - continue daily prednisone 10 mg   - PT/OT eval and treat for mobility   - pain control    3. UTI      BPH  - s/p one dose of vanc/zosyn in the ED  - will begin Rocephin IV tomorrow  - await urine culture    4. Generalized debility/weakness  - PT/OT eval and treat for possible placement    Admission Status:   I believe this patient meets OBSERVATION status, however if further evaluation or treatment plans warrant, status may change.  Based upon current information, I predict patient's care encounter to be less than or equal to 2 midnights.        Discharge Blueprint (criteria for discharge):   1. Wound care evaluation and safe discharge plan    Electronically signed by BRADLY Yates, 12/31/20, 9:53 PM EST.

## 2021-01-01 NOTE — PLAN OF CARE
Goal Outcome Evaluation:  Plan of Care Reviewed With: patient  Progress: no change  Outcome Summary: VSS, Afib per tele. Pain meds given 1x- it did seem to help. Alert and oriented. PT/OT consulted- did not see him today. WOC consulted- they did dress the patients wounds accordingly and placed orders. 02 2L per NC- pt desats when he sleeps. Pt placed on [facility] bed- q2 hour turns. Pt incontinent of bladder. Pt has not had had much of an appetite. He did speak to Dr. Oneill about possibly going to rehab- this may be an option. Heel boots on.

## 2021-01-01 NOTE — NURSING NOTE
WOC consulted for multiple wounds, POA:    Patient familiar to WOC.     Assessment: Patient presents with the following:  - Left medial ankle venous ulcer measuring 4.5x8.5x0.1 cm.  Wound bed is moist, pink and blanchable. Periwound edges are irregular/open, pink and dry.  - Left lateral ankle venous ulcer measuring 5x4x0.2 cm. Wound bed is moist, red and blanchable.  Periwound edges are irregular/open, pink and dry.  -Right  Distal calf venous ulcer measuring 5.5x1.5x0.2 cm.  Wound bed is moist, pink and blanchable. Periwound edges are irregular/open and dry.  Right popliteal stag 2 pressure injury measuring 3.5x1.5x0.2 cm.  Wound bed is moist, pink/gray/yellow and blanchable.  Periwound edges are irregular/open, pink and dry.    Patient bottom is intact.  Some areas of friction noted, but skin is blanchable. Patient has significant hyperkeratosis and gout.    Recommendation:  WOC orders.  See order for details for specific treatments for BLE wounds.  Keep patient dry. Apply moisturizing lotion to BLE. Avoid incontinence brief.  Will order Dolphin bed.    All skin interventions in place.  Heels blanchable, intact and offloaded with waffle boot.     Discussed plan of care with RN.    Thank you for consulting WOC.  Will continue to   follow.  Please contact with questions or if needs arise.

## 2021-01-01 NOTE — PROGRESS NOTES
Clinical Nutrition   Reason For Visit: Identified at risk by screening criteria, Nonhealing wound or pressure ulcer    Patient Name: Michoacano Rojas  YOB: 1940  MRN: [id number]  Date of Encounter: 01/01/21 10:36 EST  Admission date: 12/31/2020      Nutrition Assessment     Admission Problem List:  UTI  Acute on chronic venous stasis ulcers and pressure injuries  ---left medial ankle venous ulcer  ---left lateral ankle venous ulcer  ---right distal calf venous ulcer  ---right popliteal stage 2 pressure injury      Applicable PMH:  HTN  CAD  CHF  PAF  COPD  BPH  RA - chronic steroid use  Severe debility  Chronic venous stasis ulcerations      Applicable medical tests/procedures since admission:       Reported/Observed/Food/Nutrition Related History   Patient reports he was eating well/normal prior to admission. RD advised patient to increase calorie and protein intake for wound healing. Patient would like to try orange Boost Breeze as well as receive double meat portions.    Anthropometrics   Height: 71 in  Weight: 148 lbs (measured wt without specified method per [facility] doc 12/31)  BMI: 20.7  BMI classification: Normal: 18.5-24.9kg/m2   IBW: 172 lbs    Labs reviewed   Labs reviewed: Yes    Results from last 7 days   Lab Units 01/01/21 0310 12/31/20 1958   SODIUM mmol/L 141 143   POTASSIUM mmol/L 4.1 4.4   CHLORIDE mmol/L 107 105   CO2 mmol/L 23.0 26.0   BUN mg/dL 22 25*   CREATININE mg/dL 0.46* 0.55*   GLUCOSE mg/dL 91 106*   CALCIUM mg/dL 8.5* 9.0     Results from last 7 days   Lab Units 01/01/21 0310 12/31/20 1958   WBC 10*3/mm3 8.03 9.51   ALBUMIN g/dL 2.60* 3.00*         Lab Results   Lab Value Date/Time    HGBA1C 5.50 06/05/2018 0502     Medications reviewed   Medications reviewed: Yes  Pertinent: antibiotic, colace, steroid    Current Nutrition Prescription   PO: Diet Regular; Cardiac    Evaluation of Received Nutrient/Fluid Intake: 50% x 1 meal    Nutrition Diagnosis     Problem Increased  nutrient needs (protein)   Etiology Skin integrity   Signs/Symptoms Acute on chronic venous stasis ulcers and right popliteal stage 2 pressure injury     Intervention   Intervention: Follow treatment progress, Care plan reviewed, Interview for preferences, Menu adjusted, Encourage intake, Supplement provided     -Ordered double meat portions.  -Ordered orange Boost Breeze daily with lunch.    Goal:   General: Nutrition support treatment  PO: Establish PO    Monitoring/Evaluation:   Monitoring/Evaluation: Per protocol, PO intake, Supplement intake, Pertinent labs, Weight, Skin status    Michaela Carroll RD  Time Spent: 45 min

## 2021-01-01 NOTE — ED PROVIDER NOTES
Subjective   Patient presents to the emergency department with difficulty walking and flareup of gout.  He tells me he is taking care of at home but his caregiver has Covid he tells me his wife has been doing it but has been difficulty taking care of him.  He has wounds of his legs that have been wrapped by her.  He tells me is a history of rheumatoid arthritis and is on prednisone and has had problems with contractures of the right side of the body since he was a child.  Tells me his right arm has been swollen recently.      Illness  Severity:  Moderate  Onset quality:  Gradual  Duration:  1 week  Timing:  Constant  Progression:  Worsening  Chronicity:  New  Relieved by:  Nothing  Worsened by:  Movement  Associated symptoms: no abdominal pain, no chest pain, no congestion, no cough, no diarrhea, no fever, no nausea, no shortness of breath, no sore throat, no vomiting and no wheezing        Review of Systems   Constitutional: Negative for chills, diaphoresis and fever.   HENT: Negative for congestion and sore throat.    Respiratory: Negative for cough, choking, chest tightness, shortness of breath and wheezing.    Cardiovascular: Negative for chest pain and leg swelling.   Gastrointestinal: Negative for abdominal distention, abdominal pain, anal bleeding, blood in stool, constipation, diarrhea, nausea and vomiting.   Genitourinary: Negative for difficulty urinating, dysuria, flank pain, frequency, hematuria and urgency.   All other systems reviewed and are negative.      Past Medical History:   Diagnosis Date   • A-fib (CMS/HCC)    • Atrial fibrillation (CMS/HCC)    • CHF (congestive heart failure) (CMS/HCC)    • Chronic cutaneous venous stasis ulcer (CMS/HCC)    • Coronary artery disease    • Disease of thyroid gland    • Hypertension    • Penile abnormality    • Peptic ulcer disease    • Primary malignant neoplasm of bronchus with metastasisto other site, unspecified laterality (CMS/HCC) 4/22/2019   • RA  (rheumatoid arthritis) (CMS/Formerly [facility])    • Rheumatic fever    • Rheumatoid arthritis (CMS/[facility])    • Sepsis (CMS/[facility]) 11/17/2017    from cellulitis due to leg ulcer, hospitalization, antibiotics, wound care   • Sleep apnea    • Vertigo    • Vertigo        Allergies   Allergen Reactions   • Naproxen Sodium Other (See Comments)     Increased heart rate  Aleve       Past Surgical History:   Procedure Laterality Date   • EYE SURGERY     • JOINT MANIPULATION      left hip repair       Family History   Problem Relation Age of Onset   • Hypertension Mother    • Alzheimer's disease Mother    • Alzheimer's disease Father    • Hypertension Sister    • Lung cancer Brother    • Diabetes Brother        Social History     Socioeconomic History   • Marital status:      Spouse name: Not on file   • Number of children: Not on file   • Years of education: Not on file   • Highest education level: Not on file   Tobacco Use   • Smoking status: Never Smoker   • Smokeless tobacco: Never Used   • Tobacco comment: Quit 01/01/1968   Substance and Sexual Activity   • Alcohol use: No   • Drug use: No   • Sexual activity: Defer           Objective   Physical Exam  Constitutional:       Appearance: He is well-developed.   HENT:      Head: Normocephalic and atraumatic.      Right Ear: External ear normal.      Left Ear: External ear normal.      Nose: Nose normal.   Eyes:      Conjunctiva/sclera: Conjunctivae normal.      Pupils: Pupils are equal, round, and reactive to light.   Neck:      Musculoskeletal: Normal range of motion and neck supple.   Cardiovascular:      Rate and Rhythm: Normal rate and regular rhythm.      Heart sounds: Normal heart sounds.   Pulmonary:      Effort: Pulmonary effort is normal.      Breath sounds: Normal breath sounds.   Abdominal:      General: Bowel sounds are normal.      Palpations: Abdomen is soft.   Musculoskeletal:      Comments: Extensive fungal infection with contractures to bilateral lower  extremities.    Moderate edema and contracture to the right arm.   Skin:     General: Skin is warm and dry.   Neurological:      Mental Status: He is alert and oriented to person, place, and time.   Psychiatric:         Behavior: Behavior normal.         Judgment: Judgment normal.         Procedures           ED Course  ED Course as of Dec 31 2226   Thu Dec 31, 2020   2040 Broad differential including DVT of his right upper arm as well as extensive fungal infection to his lower extremities and ulcers.    [JM]   2139 Multiple sources of infection.  There is no single joint is the sole culprit of infection as he has multiple joints that are painful and swollen on essentially each extremity.    [JM]   2205 I spoke with Dr. Del Cid who will admit    [JM]      ED Course User Index  [MI] Bertram Tovar APRN           XR Chest 1 View   Final Result   No acute cardiopulmonary findings. No change from 10/23/2019      Signer Name: Ramon Nguyen MD    Signed: 12/31/2020 8:16 PM    Workstation Name: [email or web address]     [facility]    Final diagnoses:   SIRS (systemic inflammatory response syndrome) (CMS/HCC)   Skin ulcers of foot, bilateral (CMS/HCC)   Acute UTI   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   Swelling of arm            Bertram Tovar APRN  12/31/20 2209       Bertram Tovar APRN  12/31/20 2226

## 2021-01-01 NOTE — PLAN OF CARE
Goal Outcome Evaluation:  Plan of Care Reviewed With: patient  Progress: no change  Outcome Summary: 80 year old black male that was admitted tonight through the ED.  He has an IV in place and patent.  He has multiple skin breakdown that needs to be assessed by Wound care.  He was admitted due to increased pain in his right shoulder that has restricted movement.  He has had adequate UOP.  Denies N/V and increased discomfort at present time.  He is at increased risk for fall.  Will continue to monitor throughout the rest of the shift.

## 2021-01-02 NOTE — PROGRESS NOTES
[facility] Medicine Services  PROGRESS NOTE    Patient Name: Michoacano Roajs  : 1940  MRN: [id number]    Date of Admission: 2020  Primary Care Physician: Michael Chavez MD    Subjective   Subjective     CC:  F/U weakness    HPI:  Patient seen this morning. Complains of some allergies, asking for Flonase. Says he had some mild dyspnea earlier this morning but not anymore. Denies cough.    ROS:  Gen-no fevers, no chills  CV-no chest pain, no palpitations  Resp-no cough, no dyspnea  GI-no N/V/D, no abd pain    All other systems reviewed and negative except any additional pertinent positives and negatives as discussed in HPI.      Objective   Objective     Vital Signs:   Temp:  [97.8 °F (36.6 °C)-98.5 °F (36.9 °C)] 97.8 °F (36.6 °C)  Heart Rate:  [55-80] 63  Resp:  [16-18] 16  BP: (104-130)/(54-81) 130/67        Physical Exam:  Gen-no acute distress, chronically ill appearing  HENT-NCAT, mucous membranes moist, eyes watery but conjunctiva clear  CV-RRR, S1 S2 normal, no m/r/g  Resp-CTAB, no wheezes or rales, nonlabored  Abd-soft, NT, ND, +BS  Ext-chronic trace BLE edema  Neuro-A&Ox3, no focal deficits  Skin-multiple ulcerations to bilateral ankles, did not examine buttock or thigh wound; woody appearance to lower extremities  Psych-appropriate mood      Results Reviewed:  Results from last 7 days   Lab Units 21   WBC 10*3/mm3 7.92 8.03 9.51   HEMOGLOBIN g/dL 10.1* 10.0* 10.8*   HEMATOCRIT % 37.1* 34.8* 37.1*   PLATELETS 10*3/mm3 169 158 166   PROCALCITONIN ng/mL  --   --  0.69*     Results from last 7 days   Lab Units 21   SODIUM mmol/L 140 141 143   POTASSIUM mmol/L 3.8 4.1 4.4   CHLORIDE mmol/L 108* 107 105   CO2 mmol/L 26.0 23.0 26.0   BUN mg/dL 20 22 25*   CREATININE mg/dL 0.46* 0.46* 0.55*   GLUCOSE mg/dL 91 91 106*   CALCIUM mg/dL 8.1* 8.5* 9.0   ALT (SGPT) U/L  --  5 5   AST (SGOT)  U/L  --  12 14     Estimated Creatinine Clearance: 67.7 mL/min (A) (by C-G formula based on SCr of 0.46 mg/dL (L)).    Microbiology Results Abnormal     Procedure Component Value - Date/Time    Blood Culture - Blood, Arm, Right [[id number]] Collected: 12/31/20 2038    Lab Status: Preliminary result Specimen: Blood from Arm, Right Updated: 01/01/21 2100     Blood Culture No growth at 24 hours    Blood Culture - Blood, Arm, Left [[id number]] Collected: 12/31/20 2020    Lab Status: Preliminary result Specimen: Blood from Arm, Left Updated: 01/01/21 2100     Blood Culture No growth at 24 hours    COVID PRE-OP / PRE-PROCEDURE SCREENING ORDER (NO ISOLATION) - Swab, Nasopharynx [[id number]]  (Normal) Collected: 12/31/20 1957    Lab Status: Final result Specimen: Swab from Nasopharynx Updated: 12/31/20 2106    Narrative:      The following orders were created for panel order COVID PRE-OP / PRE-PROCEDURE SCREENING ORDER (NO ISOLATION) - Swab, Nasopharynx.  Procedure                               Abnormality         Status                     ---------                               -----------         ------                     Respiratory Panel PCR w/...[[id number]]  Normal              Final result                 Please view results for these tests on the individual orders.    Respiratory Panel PCR w/COVID-19(SARS-CoV-2) SARMAD/SHERI/DEEPIKA/PAD/COR/MAD/KATALINA In-House, NP Swab in UTM/VTM, 3-4 HR TAT - Swab, Nasopharynx [[id number]]  (Normal) Collected: 12/31/20 1957    Lab Status: Final result Specimen: Swab from Nasopharynx Updated: 12/31/20 2106     ADENOVIRUS, PCR Not Detected     Coronavirus 229E Not Detected     Coronavirus HKU1 Not Detected     Coronavirus NL63 Not Detected     Coronavirus OC43 Not Detected     COVID19 Not Detected     Human Metapneumovirus Not Detected     Human Rhinovirus/Enterovirus Not Detected     Influenza A PCR Not Detected     Influenza A H1 Not Detected     Influenza A H1 2009 PCR Not Detected     Influenza  A H3 Not Detected     Influenza B PCR Not Detected     Parainfluenza Virus 1 Not Detected     Parainfluenza Virus 2 Not Detected     Parainfluenza Virus 3 Not Detected     Parainfluenza Virus 4 Not Detected     RSV, PCR Not Detected     Bordetella pertussis pcr Not Detected     Bordetella parapertussis PCR Not Detected     Chlamydophila pneumoniae PCR Not Detected     Mycoplasma pneumo by PCR Not Detected    Narrative:      Fact sheet for providers: https://docs.[employer]/wp-content/uploads/UEA6561-9686-JP7.1-EUA-Provider-Fact-Sheet-3.pdf    Fact sheet for patients: https://docs.[employer]/wp-content/uploads/SCE0639-9984-YH9.1-EUA-Patient-Fact-Sheet-1.pdf    Test performed by PCR.          Imaging Results (Last 24 Hours)     ** No results found for the last 24 hours. **          Results for orders placed during the hospital encounter of 10/23/19   Adult Transthoracic Echo Complete W/ Cont if Necessary Per Protocol    Narrative · Right ventricular cavity is borderline dilated.  · Mild tricuspid valve regurgitation is present.  · There is calcification of the aortic valve.  · Estimated EF = 60%.  · Left ventricular systolic function is normal.  · Left ventricular wall motion is normal          I have reviewed the medications:  Scheduled Meds:amLODIPine, 10 mg, Oral, Daily  bisoprolol, 2.5 mg, Oral, Daily  cefTRIAXone, 1 g, Intravenous, Q24H  docusate sodium, 100 mg, Oral, Nightly  enoxaparin, 40 mg, Subcutaneous, Q24H  erythromycin, , Both Eyes, Q12H  finasteride, 5 mg, Oral, Daily  predniSONE, 10 mg, Oral, Daily With Breakfast  sodium chloride, 10 mL, Intravenous, Q12H  traMADol, 50 mg, Oral, BID      Continuous Infusions:   PRN Meds:.•  acetaminophen **OR** acetaminophen **OR** acetaminophen  •  HYDROcodone-acetaminophen  •  ondansetron  •  [COMPLETED] Insert peripheral IV **AND** sodium chloride  •  sodium chloride    Assessment/Plan   Assessment & Plan     Active Hospital Problems    Diagnosis  POA   •  **Acute on chronic cutaneous venous stasis ulcer (CMS/[facility]) [I83.009, L97.909]  Yes   • Joint pain [M25.50]  Yes   • Acute UTI (urinary tract infection) [N39.0]  Unknown   • COPD (chronic obstructive pulmonary disease) (CMS/[facility]) [J44.9]  Yes   • Rheumatoid arthritis (CMS/[facility]) [M06.9]  Yes   • Essential hypertension [I10]  Yes      Resolved Hospital Problems   No resolved problems to display.        Brief Hospital Course to date:  Michoacano Rojas is a 80 y.o. male with hx of rheumatoid arthritis on daily prednisone, chronic diastolic CHF, PAF, COPD, BPH, chronic venous stasis ulcerations, and severe debility who presents due to generalized weakness, decreased mobility, and worsening appearance of his chronic wounds. His son who is his primary caregiver was diagnosed with COVID-19 recently and has been quarantining away from the patient and patient's wife for the past 2 weeks. During this time, his wife has been attempting to care for him and his wounds, but patient has progressively declined to the point where he was unable to get out of bed. Typically can ambulate with a cane at home. Workup in the ER revealed a UTI. Admitted for further management.    Acute UTI  BPH  --UA with moderate leukocytes, TNTC WBC, however no bacteria seen. He also has large/3+ budding yeast. Will continue with Rocephin and follow up urine culture.   --Continue Proscar.    Acute on chronic venous stasis ulcers and pressure ulcers  --Wound care following and managing.  --Do not appear actively infected at this time.     Rheumatoid arthritis  Chronic immunosuppression with prednisone  --Continue daily prednisone 10 mg.     HTN  Chronic diastolic CHF  --Continue Norvasc, Bisoprolol.  --No evidence of decompensated CHF.    PAF  --Continue Bisoprolol.  --Not on anticoagulation (previously on Eliquis) due to debility and fall risk.    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    Severe debility  Chronic RLE weakness  --Patient has steadily declined since son has not be able to care for him. Will have PT/OT evaluate. I discussed rehab with patient and at this point, he does seem agreeable.   --Per son, patient has chronic right leg weakness.     DVT Prophylaxis:  Lovenox      Disposition: I expect the patient to be discharged likely to rehab next week    CODE STATUS:   Code Status and Medical Interventions:   Ordered at: 12/31/20 2210     Limited Support to NOT Include:    Intubation     Code Status:    No CPR     Medical Interventions (Level of Support Prior to Arrest):    Limited       Susan Oneill MD  01/02/21

## 2021-01-02 NOTE — PLAN OF CARE
Problem: Adult Inpatient Plan of Care  Goal: Plan of Care Review  Recent Flowsheet Documentation  Taken 1/2/2021 0941 by Lia Trammell OT  Plan of Care Reviewed With:   patient   spouse  Outcome Summary: OT eval complete.  Pt with limited UE ROM with R weaker than L,  R knee fused.  Pt mod A supine to sit,  setup to wash face, min A to feed self with L hand using reqular utensils.  Pt uses built up utensils at home, but declined to be issued AE for feeding.  Pt declined STs and transfer to chair d/t not having forearm crutches and shoes available, and wife reports she will bring those items to the hospital by 01/03.  Pt dependent supine to sit. OT will follow to advance pt toward increased independence with ADLs.  Recommend SNF rehab upon d/c.

## 2021-01-02 NOTE — PLAN OF CARE
Goal Outcome Evaluation:  Plan of Care Reviewed With: patient  Progress: no change   Pt VSS on 3L O2. Pt did receive bed bath today. Dressings were changed last night. Gluteal dressing changed due to it becoming soiled today. Pt did have BM.  OT worked with pt today. Pt home crutches were brought to him by his wife to assist with therapy. Pt is resting at this time.

## 2021-01-02 NOTE — THERAPY EVALUATION
Patient Name: Michoacano Rojas  : 1940    MRN: [id number]                              Today's Date: 2021       Admit Date: 2020    Visit Dx:     ICD-10-CM ICD-9-CM   1. SIRS (systemic inflammatory response syndrome) (CMS/[facility])  R65.10 995.90   2. Skin ulcers of foot, bilateral (CMS/[facility])  L97.519 707.15    L97.529    3. Acute UTI  N39.0 599.0   4. 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  Z79.52 V58.65   5. Swelling of arm  M79.89 729.81     Patient Active Problem List   Diagnosis   • Hematuria   • Prostate hypertrophy   • Rheumatoid arthritis (CMS/[facility])   • Essential hypertension   • Acute on chronic cutaneous venous stasis ulcer (CMS/[facility])   • Very poor mobility   • Large joint arthralgia of multiple sites   • Arthralgia of hands, bilateral   • Generalized stiffness   • Atrial fibrillation with RVR (CMS/[facility])   • Anasarca   • Sepsis (CMS/[facility])   • Cellulitis   • 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   • Tracheal stenosis   • Diastolic dysfunction with chronic heart failure (CMS/[facility])   • Acute on chronic respiratory failure with hypoxia and hypercapnia (CMS/[facility])   • Incarcerated right inguinal hernia   • Venous insufficiency (chronic) (peripheral)   • Benign essential hypertension   • Non-pressure chronic ulcer of left lower leg, with unspecified severity (CMS/[facility])   • Lower extremity edema   • Chronic diastolic heart failure (CMS/[facility])   • Cognitive disorder   • Chronic conjunctivitis of both eyes   • Facial swelling   • Goiter   • Idiopathic chronic gout of multiple sites with tophus   • Medicare annual wellness visit, subsequent   • Morbid obesity (CMS/[facility])   • Major depressive disorder, single episode, mild (CMS/[facility])   • Skin ulcer of right foot, limited to breakdown of skin (CMS/[facility])   • Acute UTI (urinary tract infection)   • COPD (chronic obstructive pulmonary disease) (CMS/[facility])   • Hypoxia   • sepsis secondary to LEs ulcerations   • AMS (altered mental status)   • Venous stasis ulcer (CMS/[facility])    • Immunosuppressed status (CMS/[facility])   • Current chronic use of systemic steroids   • Rheumatoid arthritis involving multiple sites (CMS/H[facility])   • Demand ischemia of myocardium (CMS/HCC)   • Joint pain     Past Medical History:   Diagnosis Date   • A-fib (CMS/[facility])    • Atrial fibrillation (CMS/[facility])    • CHF (congestive heart failure) (CMS/[facility])    • Chronic cutaneous venous stasis ulcer (CMS/[facility])    • Coronary artery disease    • Disease of thyroid gland    • Hypertension    • Penile abnormality    • Peptic ulcer disease    • Primary malignant neoplasm of bronchus with metastasisto other site, unspecified laterality (CMS/[facility]) 4/22/2019   • RA (rheumatoid arthritis) (CMS/[facility])    • Rheumatic fever    • Rheumatoid arthritis (CMS/[facility])    • Sepsis (CMS/[facility]) 11/17/2017    from cellulitis due to leg ulcer, hospitalization, antibiotics, wound care   • Sleep apnea    • Vertigo    • Vertigo      Past Surgical History:   Procedure Laterality Date   • EYE SURGERY     • JOINT MANIPULATION      left hip repair     General Information     Row Name 01/02/21 0941          OT Time and Intention    Document Type  evaluation  -AC     Mode of Treatment  occupational therapy  -     Row Name 01/02/21 0950          General Information    Patient Profile Reviewed  yes  -AC     Prior Level of Function  min assist:;all household mobility;bed mobility;feeding;grooming;max assist:;dressing;bathing pt uses forearm crutches to ambulate,  has built up utensils and adaptive cup for feeding - pt declined use of AE for feeding while at hosp stating he can use regular utensils  -     Existing Precautions/Restrictions  fall RA, R knee fused, limited range R UE and L hand  forearm crutches to ambulate ( wife reports she will bring forearm crutches to hospital)  -     Barriers to Rehab  medically complex;previous functional deficit  -     Row Name 01/02/21 0979          Living Environment    Lives With  spouse son assists as needed  -     Row  Name 01/02/21 0941          Home Main Entrance    Number of Stairs, Main Entrance  -- ramp to enter  -     Row Name 01/02/21 0941          Cognition    Orientation Status (Cognition)  oriented x 4  -     Row Name 01/02/21 0941          Safety Issues, Functional Mobility    Safety Issues Affecting Function (Mobility)  safety precaution awareness;safety precautions follow-through/compliance;sequencing abilities  -     Impairments Affecting Function (Mobility)  balance;grasp;pain;range of motion (ROM);strength  -       User Key  (r) = Recorded By, (t) = Taken By, (c) = Cosigned By    Initials Name Provider Type     Lia Trammell, OT Occupational Therapist          Mobility/ADL's     Row Name 01/02/21 0941          Bed Mobility    Bed Mobility  supine-sit;sit-supine  -     Supine-Sit [place] (Bed Mobility)  moderate assist (50% patient effort)  -     Sit-Supine [place] (Bed Mobility)  dependent (less than 25% patient effort)  -     Bed Mobility, Safety Issues  decreased use of arms for pushing/pulling;decreased use of legs for bridging/pushing  -     Assistive Device (Bed Mobility)  head of bed elevated  -     Row Name 01/02/21 0941          Transfers    Comment (Transfers)  pt declined to attempt to stand as shoes and forearm crutches no available -  pt's wife reports she will bring them by 01/03  -     Row Name 01/02/21 0941          Activities of Daily Living    BADL Assessment/Intervention  lower body dressing;feeding;grooming  -     Row Name 01/02/21 0941          Lower Body Dressing Assessment/Training    [place] Level (Lower Body Dressing)  doff;don;socks;dependent (less than 25% patient effort)  -     Position (Lower Body Dressing)  supine  -     Row Name 01/02/21 0941          Self-Feeding Assessment/Training    [place] Level (Feeding)  scoop food and bring to mouth;set up;minimum assist (75% patient effort)  -     Position (Self-Feeding)  edge of bed sitting   -AC     Comment (Feeding)  pt demo ability to feed self scrambled eggs and sausage using reqular fork after setup with min spills; pt uses built up utensils at home, but declined to have AE issued  -     Row Name 01/02/21 0941          Grooming Assessment/Training    [place] Level (Grooming)  wash face, hands;set up  -     Position (Grooming)  edge of bed sitting  -     Comment (Grooming)  wash cloth grsped between thumb and index finger of L hand to wash face  -       User Key  (r) = Recorded By, (t) = Taken By, (c) = Cosigned By    Initials Name Provider Type    Lia Butler, OT Occupational Therapist        Obj/Interventions     Row Name 01/02/21 0941          Vision Assessment/Intervention    Visual Impairment/Limitations  corrective lenses for reading pt able to id objects and # fingers  -     Row Name 01/02/21 0941          Range of Motion Comprehensive    Comment, General Range of Motion  Pt with RA - RUE range limited 75% and pt reports that his movment has become more limited in past 2 weeks, L shld and elbow limited ~ 25%,  pt grasps objects in L hand with with thumb and index finger for feeding and grooming  -     Row Name 01/02/21 0941          Strength Comprehensive (MMT)    Comment, General Manual Muscle Testing (MMT) Assessment  R UE grossly 2-/5,  L UE grossly 3-/5  -       User Key  (r) = Recorded By, (t) = Taken By, (c) = Cosigned By    Initials Name Provider Type    Lia Butler, OT Occupational Therapist        Goals/Plan     Row Name 01/02/21 0941          Bed Mobility Goal 1 (OT)    Activity/Assistive Device (Bed Mobility Goal 1, OT)  sit to supine;supine to sit  -     [place] Level/Cues Needed (Bed Mobility Goal 1, OT)  minimum assist (75% or more patient effort)  -     Time Frame (Bed Mobility Goal 1, OT)  by discharge  -     Progress/Outcomes (Bed Mobility Goal 1, OT)  goal ongoing  -     Row Name 01/02/21 0941          Transfer Goal 1 (OT)     Activity/Assistive Device (Transfer Goal 1, OT)  bed-to-chair/chair-to-bed;toilet;other (see comments) forearm crutches  -AC     [place] Level/Cues Needed (Transfer Goal 1, OT)  minimum assist (75% or more patient effort)  -AC     Time Frame (Transfer Goal 1, OT)  by discharge  -AC     Progress/Outcome (Transfer Goal 1, OT)  goal ongoing  -     Row Name 01/02/21 0941          Self-Feeding Goal 1 (OT)    Activity/Device (Self-Feeding Goal 1, OT)  liquids to mouth;scoop food and bring to mouth AE if pt would like issued; he declined at eval  -AC     [place] Level/Cues Needed (Self-Feeding Goal 1, OT)  set-up required;supervision required  -AC     Time Frame (Self-Feeding Goal 1, OT)  by discharge  -AC     Progress/Outcomes (Self-Feeding Goal 1, OT)  goal ongoing  -     Row Name 01/02/21 0941          Strength Goal 1 (OT)    Strength Goal 1 (OT)  pt will complete AROM/AAROM 10 reps BUE daily as needed to increase strength and endurance to support ADLs  -AC     Time Frame (Strength Goal 1, OT)  by discharge  -AC     Progress/Outcome (Strength Goal 1, OT)  goal ongoing  -     Row Name 01/02/21 0941          Therapy Assessment/Plan (OT)    Planned Therapy Interventions (OT)  activity tolerance training;adaptive equipment training;functional balance retraining;BADL retraining;occupation/activity based interventions;patient/caregiver education/training;strengthening exercise;transfer/mobility retraining  -       User Key  (r) = Recorded By, (t) = Taken By, (c) = Cosigned By    Initials Name Provider Type    AC Lia Trammell, OT Occupational Therapist        Clinical Impression     Row Name 01/02/21 0941          Pain Assessment    Additional Documentation  Pain Scale: Numbers Pre/Post-Treatment (Group)  -     Row Name 01/02/21 0941          Pain Scale: Numbers Pre/Post-Treatment    Pretreatment Pain Rating  4/10  -AC     Posttreatment Pain Rating  4/10  -AC     Pain Location - Side  Left  -AC     Pain  Location - Orientation  lower  -AC     Pain Location  extremity  -     Pain Intervention(s)  Repositioned  -     Row Name 01/02/21 0941          Plan of Care Review    Plan of Care Reviewed With  patient;spouse  -     Outcome Summary  OT eval complete.  Pt with limited UE ROM with R weaker than L,  R knee fused.  Pt mod A supine to sit,  setup to wash face, min A to feed self with L hand using reqular utensils.  Pt uses built up utensils at home, but declined to be issued AE for feeding.  Pt declined STs and transfer to chair d/t not having forearm crutches and shoes available, and wife reports she will bring those items to the hospital by 01/03.  Pt dependent supine to sit. OT will follow to advance pt toward increased independence with ADLs.  Recommend snf rehab upon d/c.  -     Row Name 01/02/21 0941          Therapy Plan Review/Discharge Plan (OT)    Anticipated Discharge Disposition (OT)  skilled nursing facility  -     Row Name 01/02/21 0941          Vital Signs    Pre Systolic BP Rehab  130  -AC     Pre Treatment Diastolic BP  67  -AC     Post Systolic BP Rehab  131  -AC     Post Treatment Diastolic BP  78  -AC     Pretreatment Heart Rate (beats/min)  65  -AC     Intratreatment Resp Rate (breaths/min)  63  -AC     Pre SpO2 (%)  97  -AC     O2 Delivery Pre Treatment  supplemental O2  -AC     O2 Delivery Intra Treatment  supplemental O2  -AC     Post SpO2 (%)  99  -AC     O2 Delivery Post Treatment  supplemental O2  -AC     Pre Patient Position  Supine  -AC     Intra Patient Position  Sitting  -AC     Post Patient Position  Supine  -AC     Row Name 01/02/21 0941          Positioning and Restraints    Pre-Treatment Position  in bed  -AC     Post Treatment Position  bed  -AC     In Bed  fowlers;call light within reach;encouraged to call for assist;exit alarm on;R multipodus;L multipodus  -       User Key  (r) = Recorded By, (t) = Taken By, (c) = Cosigned By    Initials Name Provider Type    AC  Lia Trammell, OT Occupational Therapist        Outcome Measures    No documentation.       Occupational Therapy Education                 Title: PT OT SLP Therapies (In Progress)     Topic: Occupational Therapy (In Progress)     Point: ADL training (Done)     Description:   Instruct learner(s) on proper safety adaptation and remediation techniques during self care or transfers.   Instruct in proper use of assistive devices.              Learning Progress Summary           Patient Acceptance, E, VU by  at 1/2/2021 0941    Comment: benefits of activity                   Point: Home exercise program (Not Started)     Description:   Instruct learner(s) on appropriate technique for monitoring, assisting and/or progressing therapeutic exercises/activities.              Learner Progress:  Not documented in this visit.          Point: Precautions (Not Started)     Description:   Instruct learner(s) on prescribed precautions during self-care and functional transfers.              Learner Progress:  Not documented in this visit.          Point: Body mechanics (Not Started)     Description:   Instruct learner(s) on proper positioning and spine alignment during self-care, functional mobility activities and/or exercises.              Learner Progress:  Not documented in this visit.                      User Key     Initials Effective Dates Name Provider Type Discipline     06/23/15 -  Lia Trammell, OT Occupational Therapist OT              OT Recommendation and Plan  Planned Therapy Interventions (OT): activity tolerance training, adaptive equipment training, functional balance retraining, BADL retraining, occupation/activity based interventions, patient/caregiver education/training, strengthening exercise, transfer/mobility retraining  Plan of Care Review  Plan of Care Reviewed With: patient, spouse  Outcome Summary: OT eval complete.  Pt with limited UE ROM with R weaker than L,  R knee fused.  Pt mod A supine to sit,   setup to wash face, min A to feed self with L hand using reqular utensils.  Pt uses built up utensils at home, but declined to be issued AE for feeding.  Pt declined STs and transfer to chair d/t not having forearm crutches and shoes available, and wife reports she will bring those items to the hospital by 01/03.  Pt dependent supine to sit. OT will follow to advance pt toward increased independence with ADLs.  Recommend snf rehab upon d/c.     Time Calculation:   Time Calculation- OT     Row Name 01/02/21 0941             Time Calculation- OT    OT Start Time  0941  -      OT Received On  01/02/21  -      OT Goal Re-Cert Due Date  01/12/21  -         Timed Charges    17865 - OT Self Care/Mgmt Minutes  15  -AC        User Key  (r) = Recorded By, (t) = Taken By, (c) = Cosigned By    Initials Name Provider Type     Lia Trammell, OT Occupational Therapist        Therapy Charges for Today     Code Description Service Date Service Provider Modifiers Qty    [id number]  OT EVAL MOD COMPLEXITY 4 1/2/2021 Lia Trammell, OT GO 1    [id number]  OT SELF CARE/MGMT/TRAIN EA 15 MIN 1/2/2021 Lia Trammell, OT GO 1               Lia Trammell OT  1/2/2021

## 2021-01-02 NOTE — PLAN OF CARE
Goal Outcome Evaluation:  Plan of Care Reviewed With: patient  Progress: no change  Outcome Summary: Pt has been monitored closely all shift. Vitals have been stable except when he was sleeping soundly and he became bradycardic running high 40-'s to low 50's. PT was snoring heavily and denied any history of sleep apnea.Oxygen was increased to 3 liters and oxygen sats stayed in the high 90's. Pt was turned per protocol and drsg changes were done at 2200hrs as ordered and lotion was placed on skin which remains very dry. PT voiced several times that he feels like he needs to go to a longterm care facility because he feels his wife can not manage his ADL's and medical care anymore. Emotional support was given several times throughout the night.

## 2021-01-03 PROBLEM — M25.511 RIGHT SHOULDER PAIN: Status: ACTIVE | Noted: 2021-01-01

## 2021-01-03 PROBLEM — K40.90 INGUINAL HERNIA, RIGHT: Status: ACTIVE | Noted: 2021-01-01

## 2021-01-03 NOTE — PROGRESS NOTES
"    [facility] Medicine Services  PROGRESS NOTE    Patient Name: Michoacano Rojas  : 1940  MRN: [id number]    Date of Admission: 2020  Primary Care Physician: Michael Chavez MD    Subjective   Subjective     CC:  F/U weakness    HPI:  Patient seen this morning. Having some diarrhea. Complains of his hernia \"coming out of place.\" Afraid to eat because he has a bowel movement every time he eats. Also complains of right shoulder pain and cannot up his arm due to it.     ROS:  Gen-no fevers, no chills  CV-no chest pain, no palpitations  Resp-no cough, no dyspnea  GI-no N/V, +diarrhea, no abd pain    All other systems reviewed and negative except any additional pertinent positives and negatives as discussed in HPI.      Objective   Objective     Vital Signs:   Temp:  [97.3 °F (36.3 °C)-98.3 °F (36.8 °C)] 98.3 °F (36.8 °C)  Heart Rate:  [54-67] 62  Resp:  [16-18] 18  BP: (118-146)/(59-81) 128/74        Physical Exam:  Gen-no acute distress, chronically ill appearing  HENT-NCAT, mucous membranes moist, eyes watery but conjunctiva clear  CV-RRR, S1 S2 normal, no m/r/g  Resp-CTAB, no wheezes or rales, nonlabored  Abd-soft, ND, +BS, lower abdominal TTP  Ext-chronic trace BLE edema  Neuro-A&Ox3, no focal deficits  MSK-right shoulder limited ROM due to pain  Skin-bilateral legs currently wrapped  Psych-appropriate mood      Results Reviewed:  Results from last 7 days   Lab Units 21  03021  03120   WBC 10*3/mm3 7.49 7.92 8.03 9.51   HEMOGLOBIN g/dL 10.9* 10.1* 10.0* 10.8*   HEMATOCRIT % 39.0 37.1* 34.8* 37.1*   PLATELETS 10*3/mm3 164 169 158 166   PROCALCITONIN ng/mL  --   --   --  0.69*     Results from last 7 days   Lab Units 21  0309 21   SODIUM mmol/L 144 140 141 143   POTASSIUM mmol/L 3.7 3.8 4.1 4.4   CHLORIDE mmol/L 108* 108* 107 105   CO2 mmol/L 24.0 26.0 23.0 26.0   BUN mg/dL 22 20 22 " 25*   CREATININE mg/dL 0.45* 0.46* 0.46* 0.55*   GLUCOSE mg/dL 81 91 91 106*   CALCIUM mg/dL 8.5* 8.1* 8.5* 9.0   ALT (SGPT) U/L  --   --  5 5   AST (SGOT) U/L  --   --  12 14     Estimated Creatinine Clearance: 67.7 mL/min (A) (by C-G formula based on SCr of 0.45 mg/dL (L)).    Microbiology Results Abnormal     Procedure Component Value - Date/Time    Blood Culture - Blood, Arm, Right [[id number]] Collected: 12/31/20 2038    Lab Status: Preliminary result Specimen: Blood from Arm, Right Updated: 01/02/21 2100     Blood Culture No growth at 2 days    Blood Culture - Blood, Arm, Left [[id number]] Collected: 12/31/20 2020    Lab Status: Preliminary result Specimen: Blood from Arm, Left Updated: 01/02/21 2100     Blood Culture No growth at 2 days    Urine Culture - Urine, Urine, Clean Catch [[id number]]  (Abnormal) Collected: 12/31/20 1958    Lab Status: Final result Specimen: Urine, Clean Catch Updated: 01/02/21 1523     Urine Culture <10,000 CFU/mL Gram Negative Bacilli     Comment: Call if further workup needed.         COVID PRE-OP / PRE-PROCEDURE SCREENING ORDER (NO ISOLATION) - Swab, Nasopharynx [[id number]]  (Normal) Collected: 12/31/20 1957    Lab Status: Final result Specimen: Swab from Nasopharynx Updated: 12/31/20 2106    Narrative:      The following orders were created for panel order COVID PRE-OP / PRE-PROCEDURE SCREENING ORDER (NO ISOLATION) - Swab, Nasopharynx.  Procedure                               Abnormality         Status                     ---------                               -----------         ------                     Respiratory Panel PCR w/...[[id number]]  Normal              Final result                 Please view results for these tests on the individual orders.    Respiratory Panel PCR w/COVID-19(SARS-CoV-2) SARMAD/SHERI/DEEPIKA/PAD/COR/MAD/KATALINA In-House, NP Swab in [facility], 3-4 HR TAT - Swab, Nasopharynx [[id number]]  (Normal) Collected: 12/31/20 1957    Lab Status: Final result Specimen: Swab  from Nasopharynx Updated: 12/31/20 2106     ADENOVIRUS, PCR Not Detected     Coronavirus 229E Not Detected     Coronavirus HKU1 Not Detected     Coronavirus NL63 Not Detected     Coronavirus OC43 Not Detected     COVID19 Not Detected     Human Metapneumovirus Not Detected     Human Rhinovirus/Enterovirus Not Detected     Influenza A PCR Not Detected     Influenza A H1 Not Detected     Influenza A H1 2009 PCR Not Detected     Influenza A H3 Not Detected     Influenza B PCR Not Detected     Parainfluenza Virus 1 Not Detected     Parainfluenza Virus 2 Not Detected     Parainfluenza Virus 3 Not Detected     Parainfluenza Virus 4 Not Detected     RSV, PCR Not Detected     Bordetella pertussis pcr Not Detected     Bordetella parapertussis PCR Not Detected     Chlamydophila pneumoniae PCR Not Detected     Mycoplasma pneumo by PCR Not Detected    Narrative:      Fact sheet for providers: https://docs.[employer]/wp-content/uploads/RYW7748-3476-PH7.1-EUA-Provider-Fact-Sheet-3.pdf    Fact sheet for patients: https://docs.[employer]/wp-content/uploads/AEN4296-3920-LD4.1-EUA-Patient-Fact-Sheet-1.pdf    Test performed by PCR.          Imaging Results (Last 24 Hours)     Procedure Component Value Units Date/Time    XR Shoulder 2+ View Right [[id number]] Collected: 01/03/21 1035     Updated: 01/03/21 1039    Narrative:         EXAMINATION: XR SHOULDER 2+ VW RIGHT-      INDICATION: right shoulder pain, reduced mobility; R65.10-Systemic  inflammatory response syndrome (sirs) of non-infectious origin without  acute organ dysfunction; L97.519-Non-pressure chronic ulcer of other  part of right foot with unspecified severity; L97.529-Non-pressure  chronic ulcer of other part of left foot with unspecified severity;  N39.0-Urinary tract infection, site not specified; Z79.52-Long term (      COMPARISON: Chest x-ray 12/31/2020     FINDINGS: Internal rotation, external rotation and scapular Y views of  the right shoulder reveal advanced  degenerative changes with essentially  total joint space height loss of the glenohumeral joint as well as  moderate DJD of the AC joint. There is angulated margins of the scaphoid  near the acromion seen on axial and shoulder AP views indeterminate for  fracture versus degeneration with further imaging considered if concern  for fracture at this site. Visualized right hemithorax grossly clear.             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 No acute fracture of the  humerus however angular margins concerning for acute fracture and/or  angulated degenerative changes near the acromion seen on axial view  greatest involvement with further imaging recommended for evaluation if  concern or point tenderness at this site in particular                Results for orders placed during the hospital encounter of 10/23/19   Adult Transthoracic Echo Complete W/ Cont if Necessary Per Protocol    Narrative · Right ventricular cavity is borderline dilated.  · Mild tricuspid valve regurgitation is present.  · There is calcification of the aortic valve.  · Estimated EF = 60%.  · Left ventricular systolic function is normal.  · Left ventricular wall motion is normal          I have reviewed the medications:  Scheduled Meds:amLODIPine, 10 mg, Oral, Daily  bisoprolol, 2.5 mg, Oral, Daily  cefTRIAXone, 1 g, Intravenous, Q24H  docusate sodium, 100 mg, Oral, Nightly  enoxaparin, 40 mg, Subcutaneous, Q24H  erythromycin, , Both Eyes, Q12H  finasteride, 5 mg, Oral, Daily  predniSONE, 10 mg, Oral, Daily With Breakfast  sodium chloride, 10 mL, Intravenous, Q12H  traMADol, 50 mg, Oral, BID      Continuous Infusions:   PRN Meds:.•  acetaminophen **OR** acetaminophen **OR** acetaminophen  •  albuterol  •  fluticasone  •  HYDROcodone-acetaminophen  •  ondansetron  •  [COMPLETED] Insert peripheral IV **AND** sodium chloride  •  sodium chloride    Assessment/Plan    Assessment & Plan     Active Hospital Problems    Diagnosis  POA   • **Acute on chronic cutaneous venous stasis ulcer (CMS/[facility]) [I83.009, L97.909]  Yes   • Inguinal hernia, right [K40.90]  Yes   • Right shoulder pain [M25.511]  Yes   • Joint pain [M25.50]  Yes   • Acute UTI (urinary tract infection) [N39.0]  Yes   • COPD (chronic obstructive pulmonary disease) (CMS/[facility]) [J44.9]  Yes   • Rheumatoid arthritis (CMS/[facility]) [M06.9]  Yes   • Essential hypertension [I10]  Yes      Resolved Hospital Problems   No resolved problems to display.        Brief Hospital Course to date:  Michoacano Rojas is a 80 y.o. male with hx of rheumatoid arthritis on daily prednisone, chronic diastolic CHF, PAF, COPD, BPH, chronic venous stasis ulcerations, and severe debility who presents due to generalized weakness, decreased mobility, and worsening appearance of his chronic wounds. His son who is his primary caregiver was diagnosed with COVID-19 recently and has been quarantining away from the patient and patient's wife for the past 2 weeks. During this time, his wife has been attempting to care for him and his wounds, but patient has progressively declined to the point where he was unable to get out of bed. Typically can ambulate with a cane at home. Workup in the ER revealed a UTI. Admitted for further management.    Acute UTI  BPH  --UA with moderate leukocytes, TNTC WBC, however no bacteria seen. He also has large/3+ budding yeast. Will continue with Rocephin. Urine culture with <10,000 gram negative bacilli, no further workup will be done.  --Continue Proscar.    Acute on chronic venous stasis ulcers and pressure ulcers  --Wound care following and managing.  --Do not appear actively infected at this time.     Right shoulder pain  --X-ray ordered and reviewed: advanced DJD of right glenohumeral joint with total joint space height loss and degeneration, there is some concern for acute fracture of the humerus along the angular margins.  Ortho consulted for further evaluation.    Abdominal pain  Large right inguinal hernia  --CT A/P ordered and reviewed: appears to show worsening large right direct inguinal hernia containing increasing components of bowel, worse since 2019. Will consult General Surgery for further evaluation.     Rheumatoid arthritis  Chronic immunosuppression with prednisone  --Continue daily prednisone 10 mg.     HTN  Chronic diastolic CHF  --Continue Norvasc, Bisoprolol.  --No evidence of decompensated CHF.    PAF  --Continue Bisoprolol.  --Not on anticoagulation (previously on Eliquis) due to debility and fall risk.    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    Severe debility  Chronic RLE weakness  --Patient has steadily declined since son has not be able to care for him. Will have PT/OT evaluate. I discussed rehab with patient and at this point, he does seem agreeable.   --Per son, patient has chronic right leg weakness.     DVT Prophylaxis:  Lovenox      Disposition: I expect the patient to be discharged likely to rehab next week--CM to see and make referrals starting tomorrow    CODE STATUS:   Code Status and Medical Interventions:   Ordered at: 12/31/20 221     Limited Support to NOT Include:    Intubation     Code Status:    No CPR     Medical Interventions (Level of Support Prior to Arrest):    Limited       Susan Oneill MD  01/03/21

## 2021-01-03 NOTE — CONSULTS
"Patient Name:  Michoacano Rojas  YOB: 1940  [id number]       Patient Care Team:  Michael Chavez MD as PCP - General  Michael Chavez MD as PCP - Family Medicine      General Surgery Consult Note     Date of Consultation: 01/03/21    Consulting Physician - Susan Oneill MD    Reason for Consult - Right inguinal hernia    Subjective     I have been asked to see  Michoacano Rojas , a 80 y.o. male in consultation for right inguinal hernia.  He has a chronic history of rheumatoid arthritis on daily prednisone, chronic diastolic CHF, paroxysmal atrial fibrillation, chronic venous stasis disease, severe debility and COPD who presented to the hospital with generalized weakness, decreased mobility, and worsening of his chronic skin and leg wounds.  The patient's son is his primary caregiver, but was recently diagnosed with COVID-19 and has been quarantined with the family.  Therefore the patient's care is called the patient's wife the past 2 weeks, who is unable to help him to the level he requires.  The patient's physical status has progressively declined.  He was admitted to the hospital for further work-up and management.  He has a longstanding chronic inguinal hernia on the right side.  This is not causing him pain, but he reports that it is usually \"out\".  We have been asked to see him for possible surgical management.  He reports that has been told by another surgeon that he is not a candidate for operation on this.  He is eating and having normal bowel functions.  No evidence of obstruction incarceration.      Allergy:   Allergies   Allergen Reactions   • Naproxen Sodium Other (See Comments)     Increased heart rate  Aleve       Medications:  amLODIPine, 10 mg, Oral, Daily  bisoprolol, 2.5 mg, Oral, Daily  cefTRIAXone, 1 g, Intravenous, Q24H  docusate sodium, 100 mg, Oral, Nightly  enoxaparin, 40 mg, Subcutaneous, Q24H  erythromycin, , Both Eyes, Q12H  finasteride, 5 mg, Oral, " Daily  predniSONE, 10 mg, Oral, Daily With Breakfast  sodium chloride, 10 mL, Intravenous, Q12H  traMADol, 50 mg, Oral, BID         No current facility-administered medications on file prior to encounter.      Current Outpatient Medications on File Prior to Encounter   Medication Sig   • amLODIPine (NORVASC) 10 MG tablet TAKE 1 TABLET EVERY DAY   • amLODIPine (NORVASC) 5 MG tablet Take 1 tablet by mouth Daily.   • Azelastine HCl 137 MCG/SPRAY solution 2 sprays into the nostril(s) as directed by provider Every 12 (Twelve) Hours.   • bisoprolol (ZEBeta) 5 MG tablet TAKE 1/2 TABLET EVERY DAY   • Camphor-Menthol-Methyl Sal (HM SALONPAS PAIN RELIEF) 1.2-5.7-6.3 % patch Apply  topically to the appropriate area as directed As Needed.   • cholecalciferol (VITAMIN D3) 1000 UNITS tablet Take 2,000 Units by mouth Daily.   • diclofenac (VOLTAREN) 1 % gel gel Apply 4 g topically to the appropriate area as directed 4 (Four) Times a Day As Needed (pain).   • docusate sodium (COLACE) 50 MG capsule Take 100 mg by mouth Every Night.   • Elastic Bandages & Supports (HERNIA BELT DOUBLE LARGE) misc For right inguinal hernia   • finasteride (PROSCAR) 5 MG tablet TAKE 1 TABLET EVERY DAY   • fluticasone (FLONASE) 50 MCG/ACT nasal spray 2 sprays into the nostril(s) as directed by provider 2 (Two) Times a Day As Needed.   • HYDROcodone-acetaminophen (NORCO)  MG per tablet 1 tablet. Can take up to 4 daily   • predniSONE (DELTASONE) 10 MG tablet Take 1 tablet by mouth Daily.   • traMADol (ULTRAM) 50 MG tablet Take 1 tablet by mouth 2 (Two) Times a Day.       PMHx:   Past Medical History:   Diagnosis Date   • A-fib (CMS/HCC)    • Atrial fibrillation (CMS/HCC)    • CHF (congestive heart failure) (CMS/HCC)    • Chronic cutaneous venous stasis ulcer (CMS/HCC)    • Coronary artery disease    • Disease of thyroid gland    • Hypertension    • Penile abnormality    • Peptic ulcer disease    • Primary malignant neoplasm of bronchus with  "metastasisto other site, unspecified laterality (CMS/[facility]) 4/22/2019   • RA (rheumatoid arthritis) (CMS/[facility])    • Rheumatic fever    • Rheumatoid arthritis (CMS/[facility])    • Sepsis (CMS/[facility]) 11/17/2017    from cellulitis due to leg ulcer, hospitalization, antibiotics, wound care   • Sleep apnea    • Vertigo    • Vertigo        Past Surgical History:  Past Surgical History:   Procedure Laterality Date   • EYE SURGERY     • JOINT MANIPULATION      left hip repair         Family History: Noncontributory     Social History: Pt lives in [place] with his wife and under the care of his son.    Tobacco use: Denies     EtOH use : Denies    Illicit drug use: Denies      Review of Systems        Constitutional: No fevers, chills or malaise   Eyes: Denies visual changes    Cardiovascular: Denies chest pain, palpitations   Pulmonary: Denies cough or shortness of breath   Abdominal/ GI: See HPI    Genitourinary: Denies dysuria or hematuria   Musculoskeletal: Denies any but chronic joint aches, pains or deformities   Psychiatric: No recent mood changes   Neurologic: No paresthesias           Objective     Physical Exam:      Vital Signs  /62 (BP Location: Left arm, Patient Position: Lying)   Pulse 68   Temp 97.9 °F (36.6 °C) (Oral)   Resp 16   Ht 180.3 cm (71\")   Wt 67.2 kg (148 lb 3.2 oz)   SpO2 95%   BMI 20.67 kg/m²     Intake/Output Summary (Last 24 hours) at 1/3/2021 1621  Last data filed at 1/3/2021 1347  Gross per 24 hour   Intake 480 ml   Output --   Net 480 ml         Physical Exam:    Gen: Chronically debilitated male.  Head: Normocephalic, atraumatic.   Eyes: Pupils equal, round, react to light and accommodation.   Mouth: Oral mucosa without lesions,   Neck: No masses, lymphadenopathy or carotid bruits bilaterally   CV: Rhythm  and rate regular , no  murmurs, rubs or gallops  Lungs: Clear  to auscultation bilaterally   Abdomen: Bowel sounds positive  , soft, nontender.  Groin : He has a large right inguinal " hernia that is easily reducible containing small and large bowel.  After reduction it was immediately refills.  There is no tenderness.  Extremities: Chronic venous stasis disease bilaterally  Lymphatics: No abnormal lymphadenopathy appreciated   Neurologic: Weak bilateral lower extremities and right upper extremity      Results Review: I have personally reviewed all of the recent lab and imaging results available at this time.  Laboratory exam reveals a white count of 7000 with a hemoglobin of 10.9 and a platelet count of 164.  Comprehensive metabolic panel without significant acute findings.    CT scan of the abdomen and pelvis was personally viewed by me as well as a final dictated and edited report.  This demonstrates a large right inguinal hernia containing both small and large bowel without obstruction.  Oral contrast which had previously been given extends to the cecum as well as to the colon distally indicating no evidence of obstruction.  There are stones within the gallbladder.  There is no evidence of acute inflammatory process in the abdomen or pelvis.  No evidence of bowel obstruction.     Assessment/Plan     Assessment and Plan:     Inguinal hernia, right-Mr. Rojas has evidence of a chronic large reducible right inguinal hernia.  He is not a candidate for operative repair during this admission due to the concern for sepsis and infection.  Furthermore, he is a very poor operative candidate.  I would rather plan for an outpatient visit when he is through this acute presentation. We can then discuss the risks and benefits of elective repair in the future.  There is no need for surgical intervention at this time.  We will sign off, please call any questions you may have.     Hospital Problem List     * (Principal) Acute on chronic cutaneous venous stasis ulcer (CMS/HCC)    Rheumatoid arthritis (CMS/[facility])    Overview Signed 10/19/2020  1:49 PM by Ana Price PA-C     Currently taking prednisone  10mg daily.         Essential hypertension    Overview Signed 10/19/2020  1:48 PM by Ana Price PA-C     Continue amlodipine 5mg as directed.             Acute UTI (urinary tract infection)    COPD (chronic obstructive pulmonary disease) (CMS/HCC)        Joint pain        Right shoulder pain                  I discussed the patient's findings and my recommendations with the patient and/or family, as well as the primary team     Prasanth Mcnamara MD  01/03/21  16:21 EST

## 2021-01-03 NOTE — PLAN OF CARE
Problem: Adult Inpatient Plan of Care  Goal: Plan of Care Review  Recent Flowsheet Documentation  Taken 1/3/2021 1037 by Bettie Ardon, PT  Progress: improving  Plan of Care Reviewed With: patient  Outcome Summary: PT eval completed. Presents w/ new R scap. fx, RUE swelling, UTI,SIRS, severe RA (R knee fused), ulcers BLE, decr LE strength/endurance & impaired funct mobil. Transf to sitting EOB w/ draw sheet & dep.2A, unable to attempt STS, ret. to sup. & transf to recliner w/ mech lift, STS trial w/o [employer] cxs (gt belt & BUE supp + L knee stabiliz.& counterpressure to sacrum) w/ dep 2A x 30 sec.,then req. return to sitting (L knee buckling) & performed AA-PROM BLE + addnl sit bal activ. Nsg then notified PT that R scap fx noted on xray.Will ask for clarif of WB status RUE & defer use of [employer] cx RUE in interim. Noted signif BP drop w/ mobil.& desat to 91% on 3 L. Pt may benefit from STS w/ Arjo.Recommend SNF at d/c.   Goal Outcome Evaluation:  Plan of Care Reviewed With: patient  Progress: improving  Outcome Summary: PT eval completed. Presents w/ new R scap. fx, RUE swelling, UTI,SIRS, severe RA (R knee fused), ulcers BLE, decr LE strength/endurance & impaired funct mobil. Transf to sitting EOB w/ draw sheet & dep.2A, unable to attempt STS, ret. to sup. & transf to recliner w/ mech lift, STS trial w/o L[employer] cxs (gt belt & BUE supp + L knee stabiliz.& counterpressure to sacrum) w/ dep 2A x 30 sec.,then req. return to sitting (L knee buckling) & performed AA-PROM BLE + addnl sit bal activ. Nsg then notified PT that R scap fx noted on xray.Will ask for clarif of WB status RUE & defer use of [employer] cx RUE in interim. Noted signif BP drop w/ mobil.& desat to 91% on 3 L. Pt may benefit from STS w/ Arjo.Recommend SNF at d/c.

## 2021-01-03 NOTE — PLAN OF CARE
Goal Outcome Evaluation:  Plan of Care Reviewed With: patient  Progress: improving   Pt VSS on 3L O2. Pt was transferred to chair with physical therapy. X-ray obtained today showing right fractured scapula. Surgery consulted for hernia. Pt is not candidate at this time for surgery per Dr fermin.  Pt has had multiple BM today. Imodium given for this. Pt resting in bed at this time.

## 2021-01-03 NOTE — THERAPY EVALUATION
Patient Name: Michoacano Rojas  : 1940    MRN: [id number]                              Today's Date: 1/3/2021       Admit Date: 2020    Visit Dx:     ICD-10-CM ICD-9-CM   1. SIRS (systemic inflammatory response syndrome) (CMS/[facility])  R65.10 995.90   2. Skin ulcers of foot, bilateral (CMS/[facility])  L97.519 707.15    L97.529    3. Acute UTI  N39.0 599.0   4. 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  Z79.52 V58.65   5. 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   • Tracheal stenosis   • Diastolic dysfunction with chronic heart failure (CMS/[facility])   • Acute on chronic respiratory failure with hypoxia and hypercapnia (CMS/[facility])   • Incarcerated right inguinal hernia   • Venous insufficiency (chronic) (peripheral)   • Benign essential hypertension   • Non-pressure chronic ulcer of left lower leg, with unspecified severity (CMS/[facility])   • Lower extremity edema   • Chronic diastolic heart failure (CMS/[facility])   • Cognitive disorder   • Chronic conjunctivitis of both eyes   • Facial swelling   • Goiter   • Idiopathic chronic gout of multiple sites with tophus   • Medicare annual wellness visit, subsequent   • Morbid obesity (CMS/[facility])   • Major depressive disorder, single episode, mild (CMS/[facility])   • Skin ulcer of right foot, limited to breakdown of skin (CMS/[facility])   • Acute UTI (urinary tract infection)   • COPD (chronic obstructive pulmonary disease) (CMS/[facility])   • Hypoxia   • sepsis secondary to LEs ulcerations   • AMS (altered mental status)   • Venous stasis ulcer (CMS/[facility])  "  • Immunosuppressed status (CMS/H[facility])   • Current chronic use of systemic steroids   • Rheumatoid arthritis involving multiple sites (CMS/HCC)   • Demand ischemia of myocardium (CMS/HCC)   • Joint pain     Past Medical History:   Diagnosis Date   • A-fib (CMS/HCC)    • Atrial fibrillation (CMS/HCC)    • CHF (congestive heart failure) (CMS/[facility])    • Chronic cutaneous venous stasis ulcer (CMS/HCC)    • Coronary artery disease    • Disease of thyroid gland    • Hypertension    • Penile abnormality    • Peptic ulcer disease    • Primary malignant neoplasm of bronchus with metastasisto other site, unspecified laterality (CMS/[facility]) 4/22/2019   • RA (rheumatoid arthritis) (CMS/[facility])    • Rheumatic fever    • Rheumatoid arthritis (CMS/HCC)    • Sepsis (CMS/[facility]) 11/17/2017    from cellulitis due to leg ulcer, hospitalization, antibiotics, wound care   • Sleep apnea    • Vertigo    • Vertigo      Past Surgical History:   Procedure Laterality Date   • EYE SURGERY     • JOINT MANIPULATION      left hip repair     General Information     Row Name 01/03/21 1037          Physical Therapy Time and Intention    Document Type  evaluation  -DM     Mode of Treatment  physical therapy  -DM     Row Name 01/03/21 1037          General Information    Patient Profile Reviewed  yes  -DM     Prior Level of Function  min assist:;all household mobility;gait;transfer;bed mobility;dependent:;home management;cooking;cleaning;driving;shopping;yard work req. Min.A of wife for gt w/ Lofstrand cxs PTA(prog.weak);has hosp bed,comm,gr.bar,sh.chair,ramp;wife does HM, driv,shop  -DM     Existing Precautions/Restrictions  fall;oxygen therapy device and L/min;other (see comments) R scap.fx per xrays;RA, w/R knee fusion, mcbride.range RUE per OT, Lofstrand cxs & special shoes for gt; emot.labile/crying 1-3 & stating \"I want to quit;dealth w/ this since 15 y.o.;I'm tired; this is too much\"(nsg to check on med.for depr.)  -DM     Barriers to Rehab  medically " complex;previous functional deficit  -DM     Row Name 01/03/21 1037          Living Environment    Lives With  spouse son assists PRN  -DM     Row Name 01/03/21 1037          Home Main Entrance    Number of Stairs, Main Entrance  other (see comments) Ramp at ent.  -DM     Row Name 01/03/21 1037          Cognition    Orientation Status (Cognition)  oriented x 3  -DM     Row Name 01/03/21 1037          Safety Issues, Functional Mobility    Safety Issues Affecting Function (Mobility)  safety precaution awareness;safety precautions follow-through/compliance;insight into deficits/self-awareness;sequencing abilities  -DM     Impairments Affecting Function (Mobility)  balance;endurance/activity tolerance;grasp;pain;postural/trunk control;strength  -DM       User Key  (r) = Recorded By, (t) = Taken By, (c) = Cosigned By    Initials Name Provider Type    DM Bettie Ardon, PT Physical Therapist        Mobility     Row Name 01/03/21 1037          Bed Mobility    Bed Mobility  rolling left;rolling right;scooting/bridging;supine-sit;sit-supine  -DM     Rolling Left [place] (Bed Mobility)  verbal cues;dependent (less than 25% patient effort);2 person assist draw sheet used;placed lift sling  -DM     Rolling Right [place] (Bed Mobility)  verbal cues;dependent (less than 25% patient effort);1 person to manage equipment Pt able to bring L hand to R bedrail;2 trials (to logroll to sitting EOB, then to place lift sling once ret. to supine)  -DM     Scooting/Bridging [place] (Bed Mobility)  verbal cues;dependent (less than 25% patient effort);2 person assist  -DM     Supine-Sit [place] (Bed Mobility)  verbal cues;dependent (less than 25% patient effort);2 person assist  -DM     Sit-Supine [place] (Bed Mobility)  verbal cues;dependent (less than 25% patient effort);2 person assist  -DM     Assistive Device (Bed Mobility)  draw sheet;head of bed elevated  -DM     Comment (Bed Mobility)  issued lift sling,  "placed orthopedic shoes & socks on pt, PCT assisting PT to mobilize to EOB w/ draw sheet; PT passively slid L foot firmly under JAMES;Lofstrand cxs placed beside pt;pt.assessed bal. & stated \"I can't do this;can't stand\";transf to sup& lift sling placed  -DM     Row Name 01/03/21 1037          Transfers    Comment (Transfers)  pt decl to attempting STS once EOB;Ret to sup.& transf to chair w/mech lift; STS from recliner w/ dep.2A(PT blocked L knee/foot to prevent sliding;assisted RLE to slide back under C of G during transit.mvmt (knee fused);PCT provided counterpress.to sacrum to shift forw. over C of G;gt belt/no AD, to allow closer guarding;able to stand 30 sec (80% of full stance); pt stating \"got to sit\",& hips lowered to sitting  -DM     Row Name 01/03/21 1037          Sit-Stand Transfer    Sit-Stand [place] (Transfers)  dependent (less than 25% patient effort);2 person assist;verbal cues c/o L lat thigh pain;s/p init trial, decl. further d/t going to CT scan  -DM     Assistive Device (Sit-Stand Transfers)  other (see comments) gt belt;def. use of Lofstrand cxs (pt inspected,then decl. to use)  -DM     Row Name 01/03/21 1037          Gait/Stairs (Locomotion)    [place] Level (Gait)  unable to assess  -DM     Row Name 01/03/21 1037          Mobility    Extremity Weight-bearing Status  other (see comments) nsg just notified PT of R scap.fx noted on xrays; will await MD guidance for WB limitations RUE  -DM       User Key  (r) = Recorded By, (t) = Taken By, (c) = Cosigned By    Initials Name Provider Type    Bettie Heck, PT Physical Therapist        Obj/Interventions     Row Name 01/03/21 1037          Range of Motion Comprehensive    General Range of Motion  lower extremity range of motion deficits identified  -DM     Comment, General Range of Motion  R knee fused;unable to init.R hip flexors(pass. flex to 30 deg;c/o abdom hernia);R ankle mcbride. 75-80%;L hip/knee mcrbide. 50-75% d/t thigh pain & L ankle " mcbride. 50-60 %  -DM     Row Name 01/03/21 1037          Strength Comprehensive (MMT)    General Manual Muscle Testing (MMT) Assessment  lower extremity strength deficits identified  -DM     Comment, General Manual Muscle Testing (MMT) Assessment  R hip/ankle 1+ to 2-/5; LLE 2- to 2/5  -DM     Row Name 01/03/21 1037          Motor Skills    Therapeutic Exercise  hip;knee;ankle  -DM     Row Name 01/03/21 1037          Hip (Therapeutic Exercise)    Hip (Therapeutic Exercise)  AAROM (active assistive range of motion);PROM (passive range of motion);isometric exercises  -DM     Hip AAROM (Therapeutic Exercise)  left;flexion;extension;aBduction;aDduction;external rotation;internal rotation;sitting;10 repetitions  -DM     Hip PROM (Therapeutic Exercise)  right;flexion;extension;aBduction;aDduction;sitting;10 repetitions;5 repetitions  -DM     Hip Isometrics (Therapeutic Exercise)  bilateral;gluteal sets;sitting;10 repetitions  -DM     Row Name 01/03/21 1037          Knee (Therapeutic Exercise)    Knee (Therapeutic Exercise)  AAROM (active assistive range of motion);isometric exercises  -DM     Knee AAROM (Therapeutic Exercise)  left;flexion;extension;sitting;10 repetitions  -DM     Knee Isometrics (Therapeutic Exercise)  left;hamstring sets;quad sets;sitting;10 repetitions  -DM     Row Name 01/03/21 1037          Ankle (Therapeutic Exercise)    Ankle (Therapeutic Exercise)  AROM (active range of motion);AAROM (active assistive range of motion)  -DM     Ankle AROM (Therapeutic Exercise)  bilateral;dorsiflexion;plantarflexion;10 repetitions;sitting  -DM     Ankle AAROM (Therapeutic Exercise)  bilateral;other (see comments);sitting AC  -DM     Row Name 01/03/21 1037          Balance    Balance Assessment  sitting static balance;sitting dynamic balance;standing static balance;standing dynamic balance  -DM     Static Sitting Balance  sitting in chair;sitting, edge of bed;unsupported;moderate impairment holding trunk forw. for gt  belt, inserting pillow  -DM     Dynamic Sitting Balance  sitting in chair;sitting, edge of bed;severe impairment;unsupported recip scooting  -DM     Static Standing Balance  severe impairment;supported;standing trunk ext w/ PT stabiliz L knee/trunk & PCT stabiliz sacrum  -DM     Dynamic Standing Balance  severe impairment;supported;standing shifted wt forw over C of G; L knee buckled/sat back  -DM     Balance Interventions  sitting;standing;static;dynamic;weight shifting activity  -DM       User Key  (r) = Recorded By, (t) = Taken By, (c) = Cosigned By    Initials Name Provider Type    DM Bettie Ardon, PT Physical Therapist        Goals/Plan     Row Name 01/03/21 1037          Bed Mobility Goal 1 (PT)    Activity/Assistive Device (Bed Mobility Goal 1, PT)  bed mobility activities, all  -DM     [place] Level/Cues Needed (Bed Mobility Goal 1, PT)  maximum assist (25-49% patient effort);2 person assist  -DM     Time Frame (Bed Mobility Goal 1, PT)  long term goal (LTG);10 days  -DM     Row Name 01/03/21 1037          Transfer Goal 1 (PT)    Activity/Assistive Device (Transfer Goal 1, PT)  sit-to-stand/stand-to-sit;bed-to-chair/chair-to-bed;other (see comments) approp AD  -DM     [place] Level/Cues Needed (Transfer Goal 1, PT)  maximum assist (25-49% patient effort);2 person assist  -DM     Time Frame (Transfer Goal 1, PT)  long term goal (LTG);10 days  -DM     Row Name 01/03/21 1037          Gait Training Goal 1 (PT)    Activity/Assistive Device (Gait Training Goal 1, PT)  gait (walking locomotion);assistive device use w/ clarific. by MD of WB status RUE (R scap. fx per xray 1-3);stable VS  -DM     [place] Level (Gait Training Goal 1, PT)  maximum assist (25-49% patient effort);2 person assist  -DM     Distance (Gait Training Goal 1, PT)  5  -DM     Time Frame (Gait Training Goal 1, PT)  long term goal (LTG);10 days  -DM     Row Name 01/03/21 1037          Patient Education Goal (PT)    Activity  (Patient Education Goal, PT)  HEP Exer  -DM     [place]/Cues/Accuracy (Memory Goal 2, PT)  demonstrates adequately;verbalizes understanding  -DM     Time Frame (Patient Education Goal, PT)  long term goal (LTG);10 days  -DM       User Key  (r) = Recorded By, (t) = Taken By, (c) = Cosigned By    Initials Name Provider Type    DM Bettie Ardon, PT Physical Therapist        Clinical Impression     Row Name 01/03/21 1037          Pain    Additional Documentation  Pain Scale: Numbers Pre/Post-Treatment (Group)  -DM     Row Name 01/03/21 1037          Pain Scale: Numbers Pre/Post-Treatment    Pretreatment Pain Rating  3/10  -DM     Posttreatment Pain Rating  4/10  -DM     Pain Location - Side  Left  -DM     Pain Location - Orientation  lower  -DM     Pain Location  extremity  -DM     Pain Intervention(s)  Repositioned;Elevated;Rest  -DM     Row Name 01/03/21 1037          Plan of Care Review    Plan of Care Reviewed With  patient  -DM     Progress  improving  -DM     Outcome Summary  PT eval completed. Presents w/ new R scap. fx, RUE swelling, UTI,SIRS, severe RA (R knee fused), ulcers BLE, decr LE strength/endurance & impaired funct mobil. Transf to sitting EOB w/ draw sheet & dep.2A, unable to attempt STS, ret. to sup. & transf to recliner w/ mech lift, STS trial w/o Lofstrand cxs (gt belt & BUE supp + L knee stabiliz.& counterpressure to sacrum) w/ dep 2A x 30 sec.,then req. return to sitting (L knee buckling) & performed AA-PROM BLE + addnl sit bal activ. Nsg then notified PT that R scap fx noted on xray.Will ask for clarif of WB status RUE & defer use of Lofstrand cx RUE in interim. Noted signif BP drop w/ mobil.& desat to 91% on 3 L. Pt may benefit from STS w/ Arjo.Recommend SNF at d/c.  -DM     Row Name 01/03/21 1037          Therapy Assessment/Plan (PT)    Patient/Family Therapy Goals Statement (PT)  improved funct mobil  -DM     Rehab Potential (PT)  good, to achieve stated therapy goals  -DM     Criteria  for Skilled Interventions Met (PT)  yes;meets criteria;skilled treatment is necessary  -DM     Row Name 01/03/21 1037          Vital Signs    Pre Systolic BP Rehab  146  -DM     Pre Treatment Diastolic BP  77  -DM     Intra Systolic BP Rehab  128 EOB  -DM     Intra Treatment Diastolic BP  74  -DM     Post Systolic BP Rehab  121 UIC  -DM     Post Treatment Diastolic BP  59  -DM     Pretreatment Heart Rate (beats/min)  65  -DM     Intratreatment Heart Rate (beats/min)  79  -DM     Posttreatment Heart Rate (beats/min)  78  -DM     Pre SpO2 (%)  97  -DM     O2 Delivery Pre Treatment  supplemental O2  -DM     Intra SpO2 (%)  91  -DM     O2 Delivery Intra Treatment  supplemental O2  -DM     Post SpO2 (%)  96  -DM     O2 Delivery Post Treatment  supplemental O2  -DM     Pre Patient Position  Supine  -DM     Intra Patient Position  Standing  -DM     Post Patient Position  Sitting  -DM     Rest Breaks   2 1 Sit + 1 sup.  -DM     Row Name 01/03/21 1037          Positioning and Restraints    Pre-Treatment Position  in bed  -DM     Post Treatment Position  chair  -DM     In Chair  notified nsg;reclined;call light within reach;encouraged to call for assist;exit alarm on;waffle cushion;RUE elevated;LUE elevated;legs elevated folded towels placed under B calves for pressure relief  -DM       User Key  (r) = Recorded By, (t) = Taken By, (c) = Cosigned By    Initials Name Provider Type    DM Bettie Ardon, PT Physical Therapist        Outcome Measures     Row Name 01/03/21 1037          How much help from another person do you currently need...    Turning from your back to your side while in flat bed without using bedrails?  1  -DM     Moving from lying on back to sitting on the side of a flat bed without bedrails?  1  -DM     Moving to and from a bed to a chair (including a wheelchair)?  1  -DM     Standing up from a chair using your arms (e.g., wheelchair, bedside chair)?  1  -DM     Climbing 3-5 steps with a railing?  1  -DM      To walk in hospital room?  1  -DM     AM-PAC 6 Clicks Score (PT)  6  -DM     Row Name 01/03/21 1037          Functional Assessment    Outcome Measure Options  AM-PAC 6 Clicks Basic Mobility (PT)  -DM       User Key  (r) = Recorded By, (t) = Taken By, (c) = Cosigned By    Initials Name Provider Type    DM Bettie Ardon, PT Physical Therapist        Physical Therapy Education                 Title: PT OT SLP Therapies (In Progress)     Topic: Physical Therapy (In Progress)     Point: Mobility training (In Progress)     Learning Progress Summary           Patient Acceptance, E,D, NR by DM at 1/3/2021 1241                   Point: Home exercise program (In Progress)     Learning Progress Summary           Patient Acceptance, E,D, NR by DM at 1/3/2021 1241                   Point: Body mechanics (In Progress)     Learning Progress Summary           Patient Acceptance, E,D, NR by DM at 1/3/2021 1241                   Point: Precautions (In Progress)     Learning Progress Summary           Patient Acceptance, E,D, NR by DM at 1/3/2021 1241                               User Key     Initials Effective Dates Name Provider Type Discipline    DM 06/19/15 -  Bettie Ardon, PT Physical Therapist PT              PT Recommendation and Plan  Planned Therapy Interventions (PT): balance training, bed mobility training, gait training, home exercise program, patient/family education, strengthening, transfer training  Plan of Care Reviewed With: patient  Progress: improving  Outcome Summary: PT eval completed. Presents w/ new R scap. fx, RUE swelling, UTI,SIRS, severe RA (R knee fused), ulcers BLE, decr LE strength/endurance & impaired funct mobil. Transf to sitting EOB w/ draw sheet & dep.2A, unable to attempt STS, ret. to sup. & transf to recliner w/ mech lift, STS trial w/o Lofstrand cxs (gt belt & BUE supp + L knee stabiliz.& counterpressure to sacrum) w/ dep 2A x 30 sec.,then req. return to sitting (L knee buckling) &  performed AA-PROM BLE + addnl sit bal activ. Nsg then notified PT that R scap fx noted on xray.Will ask for clarif of WB status RUE & defer use of Lofstrand cx RUE in interim. Noted signif BP drop w/ mobil.& desat to 91% on 3 L. Pt may benefit from STS w/ Arjo.Recommend SNF at d/c.     Time Calculation:   PT Charges     Row Name 01/03/21 1242             Time Calculation    Start Time  1037  -DM      PT Received On  01/03/21  -DM      PT Goal Re-Cert Due Date  01/13/21  -DM         Time Calculation- PT    Total Timed Code Minutes- PT  51 minute(s)  -DM         Timed Charges    93960 - PT Therapeutic Exercise Minutes  15  -DM      60903 - PT Therapeutic Activity Minutes  36  -DM        User Key  (r) = Recorded By, (t) = Taken By, (c) = Cosigned By    Initials Name Provider Type    DM Bettie Ardon, PT Physical Therapist        Therapy Charges for Today     Code Description Service Date Service Provider Modifiers Qty    97449637457 HC PT THER PROC EA 15 MIN 1/3/2021 Bettie Ardon, PT GP 1    68287402034 HC PT THERAPEUTIC ACT EA 15 MIN 1/3/2021 Bettie Ardon, PT GP 2    30546742354 HC PT EVAL MOD COMPLEXITY 4 1/3/2021 Bettie Ardon, PT GP 1          PT G-Codes  Outcome Measure Options: AM-PAC 6 Clicks Basic Mobility (PT)  AM-PAC 6 Clicks Score (PT): 6    Bettie Ardon, JOSE MANUEL  1/3/2021

## 2021-01-03 NOTE — PLAN OF CARE
Problem: Adult Inpatient Plan of Care  Goal: Plan of Care Review  Flowsheets (Taken 1/3/2021 3780)  Progress: no change  Plan of Care Reviewed With: patient  Outcome Summary: pt VSS. 3lpm on NC. Complaint of pain - managed with scheduled pain meds. Multiple incontinent bowel movements. Wound care dressing changed per order. Barrier cream appiled to bottom. Slept ok through the night. Will continue to monitor.

## 2021-01-04 NOTE — PLAN OF CARE
Goal Outcome Evaluation:  Plan of Care Reviewed With: patient  Progress: improving   Pt VSS on 3L O2. Pt has had minimal c/o pain today. K+ replacement initiated for level of 3.4. Orthopedic saw pt today. Per note looking at possible injections for right shoulder pain. Pt had multiple BM today. Imodium given for this. Pt resting at this time.

## 2021-01-04 NOTE — PROGRESS NOTES
Discharge Planning Assessment  [facility]     Patient Name: Michoacano Rojas  MRN: [id number]  Today's Date: 1/4/2021    Admit Date: 12/31/2020    Discharge Needs Assessment     Row Name 01/04/21 0945       Living Environment    Lives With  spouse    Current Living Arrangements  home/apartment/condo    Living Arrangement Comments  Lives in a one level home with a ramp. States his spouse and son have had to assist him with all his care needs for serveral months.       Discharge Needs Assessment    Equipment Currently Used at Home  walker, rolling;wheelchair;crutches;commode;bath bench;hospital bed;ramp    Equipment Needed After Discharge  none    Discharge Coordination/Progress  States he is not current with HH and has never been to inpt rehab in the past.        Discharge Plan     Row Name 01/04/21 0947       Plan    Plan  HH vs SNF    Patient/Family in Agreement with Plan  yes    Plan Comments  I spoke with the pt. He is unsure of his DC needs at this time. The pt gave me permission to speak with his spouse regarding DC planning needs. I will F/U with the pts spouse regarding HH vs SNF at DC.    Final Discharge Disposition Code  03 - skilled nursing facility (SNF)    Row Name 01/04/21 0826       Plan    Final Discharge Disposition Code  03 - skilled nursing facility (SNF)        Continued Care and Services - Admitted Since 12/31/2020    Coordination has not been started for this encounter.       Expected Discharge Date and Time     Expected Discharge Date Expected Discharge Time    Jan 6, 2021         Demographic Summary    No documentation.       Functional Status     Row Name 01/04/21 0944       Functional Status    Usual Activity Tolerance  fair       Functional Status, IADL    Medications  independent    Meal Preparation  completely dependent    Housekeeping  completely dependent    Laundry  completely dependent    Shopping  completely dependent        Psychosocial    No documentation.       Abuse/Neglect     No documentation.       Legal    No documentation.       Substance Abuse    No documentation.       Patient Forms    No documentation.           Ruth Faulkner RN

## 2021-01-04 NOTE — PROGRESS NOTES
Continued Stay Note  [facility]     Patient Name: Michoacano Rojas  MRN: [id number]  Today's Date: 1/4/2021    Admit Date: 12/31/2020    Discharge Plan     Row Name 01/04/21 1335       Plan    Plan  skilled nursing facility    Provided Post Acute Provider List?  Yes    Post Acute Provider List  Nursing Home    Delivered To  Support Person    Support Person  Yosvnay    Method of Delivery  Other (comment) email    Plan Comments  I spoke with Yosvany, Mr. Rojas's son on the phone. He states that at home Mr. Rojas has been ambulating with crutches. PT evaluated him yesterday and they are recommending that he go to rehab at a skilled nursing facility after this admission. Yosvany is in agreement with this. I emailed him a list of facilities located in [facility]. He is going to discuss this with Mr. Rojas and his mother and let me know his choices. I requested that he choose 3 facilities to send referrals to. This will require insurance approval.    13:56 EST Yosvany is requesting that referrals are made to the [facility] [facility] and [facility]. I have made these referrals. His top choice is the [facility] [facility].    Final Discharge Disposition Code  30 - still a patient        Discharge Codes    No documentation.           Bj Llanes RN

## 2021-01-04 NOTE — PROGRESS NOTES
[facility] Medicine Services  PROGRESS NOTE    Patient Name: Michoacano Rojas  : 1940  MRN: [id number]    Date of Admission: 2020  Primary Care Physician: Michael Chavez MD    Subjective   Subjective     CC:  F/U weakness    HPI:  Patient seen this morning. Complains of nasal congestion. Says Flonase isn't helping.     ROS:  Gen-no fevers, no chills  CV-no chest pain, no palpitations  Resp-no cough, no dyspnea  GI-no N/V/D, no abd pain    All other systems reviewed and negative except any additional pertinent positives and negatives as discussed in HPI.      Objective   Objective     Vital Signs:   Temp:  [97.2 °F (36.2 °C)-98.4 °F (36.9 °C)] 98.4 °F (36.9 °C)  Heart Rate:  [54-68] 62  Resp:  [16] 16  BP: (126-145)/(62-78) 129/78        Physical Exam:  Gen-no acute distress, chronically ill appearing  HENT-NCAT, mucous membranes moist, eyes watery but conjunctiva clear  CV-RRR, S1 S2 normal, no m/r/g  Resp-CTAB, no wheezes or rales, nonlabored  Abd-soft, NT, ND, +BS, large right inguinal hernia  Ext-chronic trace BLE edema  Neuro-A&Ox3, no focal deficits  MSK-right shoulder limited ROM due to pain  Skin-bilateral legs currently wrapped  Psych-appropriate mood      Results Reviewed:  Results from last 7 days   Lab Units 21  1958   WBC 10*3/mm3 6.66 7.49 7.92   < > 9.51   HEMOGLOBIN g/dL 11.4* 10.9* 10.1*   < > 10.8*   HEMATOCRIT % 39.9 39.0 37.1*   < > 37.1*   PLATELETS 10*3/mm3 182 164 169   < > 166   PROCALCITONIN ng/mL  --   --   --   --  0.69*    < > = values in this interval not displayed.     Results from last 7 days   Lab Units 21  03021  0314 0120   SODIUM mmol/L 139 144 140 141 143   POTASSIUM mmol/L 3.4* 3.7 3.8 4.1 4.4   CHLORIDE mmol/L 105 108* 108* 107 105   CO2 mmol/L 29.0 24.0 26.0 23.0 26.0   BUN mg/dL 16 22 20 22 25*   CREATININE mg/dL 0.48* 0.45*  0.46* 0.46* 0.55*   GLUCOSE mg/dL 81 81 91 91 106*   CALCIUM mg/dL 8.2* 8.5* 8.1* 8.5* 9.0   ALT (SGPT) U/L  --   --   --  5 5   AST (SGOT) U/L  --   --   --  12 14     Estimated Creatinine Clearance: 67.7 mL/min (A) (by C-G formula based on SCr of 0.48 mg/dL (L)).    Microbiology Results Abnormal     Procedure Component Value - Date/Time    Blood Culture - Blood, Arm, Right [[id number]] Collected: 12/31/20 2038    Lab Status: Preliminary result Specimen: Blood from Arm, Right Updated: 01/03/21 2100     Blood Culture No growth at 3 days    Blood Culture - Blood, Arm, Left [[id number]] Collected: 12/31/20 2020    Lab Status: Preliminary result Specimen: Blood from Arm, Left Updated: 01/03/21 2100     Blood Culture No growth at 3 days    Urine Culture - Urine, Urine, Clean Catch [[id number]]  (Abnormal) Collected: 12/31/20 1958    Lab Status: Final result Specimen: Urine, Clean Catch Updated: 01/02/21 1523     Urine Culture <10,000 CFU/mL Gram Negative Bacilli     Comment: Call if further workup needed.         COVID PRE-OP / PRE-PROCEDURE SCREENING ORDER (NO ISOLATION) - Swab, Nasopharynx [[id number]]  (Normal) Collected: 12/31/20 1957    Lab Status: Final result Specimen: Swab from Nasopharynx Updated: 12/31/20 2106    Narrative:      The following orders were created for panel order COVID PRE-OP / PRE-PROCEDURE SCREENING ORDER (NO ISOLATION) - Swab, Nasopharynx.  Procedure                               Abnormality         Status                     ---------                               -----------         ------                     Respiratory Panel PCR w/...[[id number]]  Normal              Final result                 Please view results for these tests on the individual orders.    Respiratory Panel PCR w/COVID-19(SARS-CoV-2) SARMAD/SHERI/DEEPIKA/PAD/COR/MAD/KATALINA In-House, NP Swab in UTM/[facility], 3-4 HR TAT - Swab, Nasopharynx [[id number]]  (Normal) Collected: 12/31/20 1957    Lab Status: Final result Specimen: Swab from  Nasopharynx Updated: 12/31/20 2106     ADENOVIRUS, PCR Not Detected     Coronavirus 229E Not Detected     Coronavirus HKU1 Not Detected     Coronavirus NL63 Not Detected     Coronavirus OC43 Not Detected     COVID19 Not Detected     Human Metapneumovirus Not Detected     Human Rhinovirus/Enterovirus Not Detected     Influenza A PCR Not Detected     Influenza A H1 Not Detected     Influenza A H1 2009 PCR Not Detected     Influenza A H3 Not Detected     Influenza B PCR Not Detected     Parainfluenza Virus 1 Not Detected     Parainfluenza Virus 2 Not Detected     Parainfluenza Virus 3 Not Detected     Parainfluenza Virus 4 Not Detected     RSV, PCR Not Detected     Bordetella pertussis pcr Not Detected     Bordetella parapertussis PCR Not Detected     Chlamydophila pneumoniae PCR Not Detected     Mycoplasma pneumo by PCR Not Detected    Narrative:      Fact sheet for providers: https://docs.[employer]/wp-content/uploads/ECJ0345-0674-GK6.1-EUA-Provider-Fact-Sheet-3.pdf    Fact sheet for patients: https://docs.[employer]/wp-content/uploads/WPU4052-7102-UK8.1-EUA-Patient-Fact-Sheet-1.pdf    Test performed by PCR.          Imaging Results (Last 24 Hours)     Procedure Component Value Units Date/Time    CT Abdomen Pelvis Without Contrast [[id number]] Collected: 01/03/21 1314     Updated: 01/04/21 1133    Narrative:      EXAMINATION: CT ABDOMEN/PELVIS WO CONTRAST - 01/03/2021      INDICATION: R65.10-Systemic inflammatory response syndrome (sirs) of  non-infectious origin without acute organ dysfunction;  L97.519-Non-pressure chronic ulcer of other part of right foot with  unspecified severity; L97.529-Non-pressure chronic ulcer of other part  of left foot with unspecified severity; N39.0-Urinary tract infection,  site not specified. Abdominal pain.     TECHNIQUE: CT abdomen and pelvis without intravenous contrast.     The radiation dose reduction device was turned on for each scan per the  ALARA (As Low as Reasonably  Achievable) protocol.     COMPARISON: CT abdomen and pelvis 12/15/2019.     FINDINGS: Lung bases demonstrate small bilateral pleural effusions with  adjacent atelectasis. Liver without focal lesion. Gallbladder contains  numerous calcifications of gallstones and cholelithiasis. No biliary  dilatation. Pancreas and spleen unremarkable. Adrenals without distinct  nodule. Kidneys without hydronephrosis or hydroureter demonstrating  simple appearing left superior pole renal cortical cyst. No bulky  retroperitoneal adenopathy. Atherosclerotic nonaneurysmal abdominal  aorta. GI tract evaluation without focal thickening or disproportionate  dilatation of bowel to suggest mechanical obstructive process, however,  small and large bowel contained within a large right direct inguinal  hernia extending with edema and bowel extending to the right scrotum.  Pelvic viscera otherwise unremarkable. Diffuse body wall edema. No acute  osseous findings.       Impression:         1. Small bilateral pleural effusions right greater than left with  adjacent atelectasis.     2. Large right direct inguinal hernia containing increasing components  of small and large bowel likely terminal ileum and cecum descending into  the right inguinal canal through the direct inguinal hernia connection  and into the right scrotum with increased involvement from 2019 exam  along with adjacent edema.     DICTATED:   01/03/2021  EDITED/ls :   01/03/2021         This report was finalized on 1/4/2021 11:30 AM by Dr. Jaime Parnell.       XR Shoulder 2+ View Right [[id number]] Collected: 01/03/21 1035     Updated: 01/03/21 1438    Narrative:         EXAMINATION: XR RIGHT SHOULDER, 2 VIEWS - 01/03/2021     INDICATION: R65.10-Systemic inflammatory response syndrome (sirs) of  non-infectious origin without acute organ dysfunction;  L97.519-Non-pressure chronic ulcer of other part of right foot with  unspecified severity; L97.529-Non-pressure chronic ulcer of other  part  of left foot with unspecified severity; N39.0-Urinary tract infection,  site not specified. Right shoulder pain, reduced mobility.      COMPARISON: Chest x-ray 12/31/2020     FINDINGS: Internal rotation, external rotation and scapular Y views of  the right shoulder reveal advanced degenerative changes with essentially  total joint space height loss of the glenohumeral joint as well as  moderate DJD of the AC joint. There are angulated margins of the  scaphoid near the acromion seen on axial and shoulder AP views  indeterminate for fracture versus degeneration with further imaging  considered if concern for fracture at this site. Visualized right  hemithorax grossly clear.        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 No acute fracture of the  humerus, however, angulated margins concerning for acute fracture and/or  angulated degenerative changes near the acromion seen on axial view  greatest involvement with further imaging recommended for evaluation if  concern of point tenderness at this site in particular.     DICTATED:   01/03/2021  EDITED/ls :   01/03/2021                 Results for orders placed during the hospital encounter of 10/23/19   Adult Transthoracic Echo Complete W/ Cont if Necessary Per Protocol    Narrative · Right ventricular cavity is borderline dilated.  · Mild tricuspid valve regurgitation is present.  · There is calcification of the aortic valve.  · Estimated EF = 60%.  · Left ventricular systolic function is normal.  · Left ventricular wall motion is normal          I have reviewed the medications:  Scheduled Meds:amLODIPine, 10 mg, Oral, Daily  bisoprolol, 2.5 mg, Oral, Daily  cefTRIAXone, 1 g, Intravenous, Q24H  cetirizine, 10 mg, Oral, Daily  docusate sodium, 100 mg, Oral, Nightly  enoxaparin, 40 mg, Subcutaneous, Q24H  erythromycin, , Both Eyes, Q12H  finasteride, 5 mg, Oral, Daily  oxymetazoline, 2 spray,  Each Nare, BID  predniSONE, 10 mg, Oral, Daily With Breakfast  sodium chloride, 10 mL, Intravenous, Q12H  traMADol, 50 mg, Oral, BID      Continuous Infusions:   PRN Meds:.•  acetaminophen **OR** acetaminophen **OR** acetaminophen  •  albuterol  •  fluticasone  •  HYDROcodone-acetaminophen  •  loperamide  •  ondansetron  •  potassium chloride **OR** potassium chloride **OR** potassium chloride  •  [COMPLETED] Insert peripheral IV **AND** sodium chloride  •  sodium chloride    Assessment/Plan   Assessment & Plan     Active Hospital Problems    Diagnosis  POA   • **Acute on chronic cutaneous venous stasis ulcer (CMS/HCC) [I83.009, L97.909]  Yes   • Inguinal hernia, right [K40.90]  Yes   • Right shoulder pain [M25.511]  Yes   • Joint pain [M25.50]  Yes   • Acute UTI (urinary tract infection) [N39.0]  Yes   • COPD (chronic obstructive pulmonary disease) (CMS/[facility]) [J44.9]  Yes   • Rheumatoid arthritis (CMS/[facility]) [M06.9]  Yes   • Essential hypertension [I10]  Yes      Resolved Hospital Problems   No resolved problems to display.        Brief Hospital Course to date:  Michoacano Rojas is a 80 y.o. male with hx of rheumatoid arthritis on daily prednisone, chronic diastolic CHF, PAF, COPD, BPH, chronic venous stasis ulcerations, and severe debility who presents due to generalized weakness, decreased mobility, and worsening appearance of his chronic wounds. His son who is his primary caregiver was diagnosed with COVID-19 recently and has been quarantining away from the patient and patient's wife for the past 2 weeks. During this time, his wife has been attempting to care for him and his wounds, but patient has progressively declined to the point where he was unable to get out of bed. Typically can ambulate with a cane at home. Workup in the ER revealed a UTI. Admitted for further management.    Acute UTI  BPH  --UA with moderate leukocytes, TNTC WBC, however no bacteria seen. He also has large/3+ budding yeast. Will continue  with Rocephin Day 5. Urine culture with <10,000 gram negative bacilli, no further workup will be done.  --Continue Proscar.    Acute on chronic venous stasis ulcers and pressure ulcers  --Wound care following and managing.  --Do not appear actively infected at this time.     Right shoulder pain  --X-ray ordered and reviewed: advanced DJD of right glenohumeral joint with total joint space height loss and degeneration, there is some concern for acute fracture of the humerus along the angular margins. Ortho consulted for further evaluation.    Abdominal pain  Large right inguinal hernia  --CT A/P ordered and reviewed: appears to show worsening large right direct inguinal hernia containing increasing components of bowel, worse since 2019. Consulted General Surgery for further evaluation--not a candidate for operative repair during this admission, and may not be a great operative candidate at all given chronic debility and multiple medical co-morbidities. Plan for outpatient follow up with Dr. Mcnamara for further discussion.    Rheumatoid arthritis  Chronic immunosuppression with prednisone  --Continue daily prednisone 10 mg.     HTN  Chronic diastolic CHF  --Continue Norvasc, Bisoprolol.  --No evidence of decompensated CHF.    PAF  --Continue Bisoprolol.  --Not on anticoagulation (previously on Eliquis) due to debility and fall risk.    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    Sinus congestion  --Start Afrin nasal spray x 3 days.  --Add Zyrtec.    Severe debility  Chronic RLE weakness  --Patient has steadily declined since son has not be able to care for him. Will have PT/OT evaluate. Discussed rehab with patient, he does seem agreeable.   --Per son, patient has chronic right leg weakness and tends to depend on his left leg quite a bit.     DVT Prophylaxis:  Lovenox      Disposition: I  expect the patient to be discharged to rehab, making referrals today    Called jim Bar and updated him.    CODE STATUS:   Code Status and Medical Interventions:   Ordered at: 12/31/20 2217     Limited Support to NOT Include:    Intubation     Code Status:    No CPR     Medical Interventions (Level of Support Prior to Arrest):    Limited       Susan Oneill MD  01/04/21

## 2021-01-04 NOTE — CONSULTS
[facility] [place] [facility] Consultation Note    Subjective     Patient Care Team:  Patient Care Team:  Michael Chavez MD as PCP - General  Michael Chavez MD as PCP - Family Medicine    Referring Provider: Apoorva Del Cid MD  Reason for Consultation: Right shoulder pain    Chief Complaint   Patient presents with   • Joint Pain, right shoulder        HPI    Michoacano Rojas is a 80 y.o. male who presents with right shoulder pain.  Onset: atraumatic and gradual in nature. The issue has been ongoing for several years, with a history of rheumatoid arthritis, affecting multiple joints.  However the right shoulder pain has been increasing over the past few weeks.  No history of trauma.  No fevers, chills or night sweats.  I was asked see the patient for possible fracture of the acromion.      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   • Tracheal stenosis   • Diastolic dysfunction with chronic heart failure (CMS/HCC)   • Acute on chronic respiratory failure with hypoxia and hypercapnia (CMS/HCC)   • Incarcerated right inguinal hernia   • Venous insufficiency (chronic) (peripheral)   • Benign essential hypertension   • Non-pressure chronic ulcer of left lower leg, with unspecified severity (CMS/HCC)   • Lower extremity edema   • Chronic diastolic heart failure (CMS/HCC)   • Cognitive disorder   • Chronic conjunctivitis of both eyes   • Facial swelling   • Goiter   • Idiopathic chronic gout of multiple sites with tophus   • Medicare annual wellness visit, subsequent   • Morbid obesity  (CMS/[facility])   • Major depressive disorder, single episode, mild (CMS/[facility])   • Skin ulcer of right foot, limited to breakdown of skin (CMS/[facility])   • Acute UTI (urinary tract infection)   • COPD (chronic obstructive pulmonary disease) (CMS/[facility])   • Hypoxia   • sepsis secondary to LEs ulcerations   • AMS (altered mental status)   • Venous stasis ulcer (CMS/[facility])   • Immunosuppressed status (CMS/[facility])   • Current chronic use of systemic steroids   • Rheumatoid arthritis involving multiple sites (CMS/[facility])   • Demand ischemia of myocardium (CMS/[facility])   • Joint pain   • Inguinal hernia, right   • Right shoulder pain     Past Medical History:   Diagnosis Date   • A-fib (CMS/[facility])    • Atrial fibrillation (CMS/[facility])    • CHF (congestive heart failure) (CMS/[facility])    • Chronic cutaneous venous stasis ulcer (CMS/[facility])    • Coronary artery disease    • Disease of thyroid gland    • Hypertension    • Penile abnormality    • Peptic ulcer disease    • Primary malignant neoplasm of bronchus with metastasisto other site, unspecified laterality (CMS/[facility]) 4/22/2019   • RA (rheumatoid arthritis) (CMS/[facility])    • Rheumatic fever    • Rheumatoid arthritis (CMS/[facility])    • Sepsis (CMS/[facility]) 11/17/2017    from cellulitis due to leg ulcer, hospitalization, antibiotics, wound care   • Sleep apnea    • Vertigo    • Vertigo       Past Surgical History:   Procedure Laterality Date   • EYE SURGERY     • JOINT MANIPULATION      left hip repair      Family History   Problem Relation Age of Onset   • Hypertension Mother    • Alzheimer's disease Mother    • Alzheimer's disease Father    • Hypertension Sister    • Lung cancer Brother    • Diabetes Brother      Social History     Socioeconomic History   • Marital status:      Spouse name: Not on file   • Number of children: Not on file   • Years of education: Not on file   • Highest education level: Not on file   Tobacco Use   • Smoking status: Never Smoker   • Smokeless tobacco: Never Used   • Tobacco  comment: Quit 01/01/1968   Substance and Sexual Activity   • Alcohol use: No   • Drug use: No   • Sexual activity: Defer      Medications Prior to Admission   Medication Sig Dispense Refill Last Dose   • amLODIPine (NORVASC) 10 MG tablet TAKE 1 TABLET EVERY DAY 90 tablet 1    • amLODIPine (NORVASC) 5 MG tablet Take 1 tablet by mouth Daily.      • Azelastine HCl 137 MCG/SPRAY solution 2 sprays into the nostril(s) as directed by provider Every 12 (Twelve) Hours. 30 mL 5    • bisoprolol (ZEBeta) 5 MG tablet TAKE 1/2 TABLET EVERY DAY 45 tablet 2    • Camphor-Menthol-Methyl Sal (HM SALONPAS PAIN RELIEF) 1.2-5.7-6.3 % patch Apply  topically to the appropriate area as directed As Needed.      • cholecalciferol (VITAMIN D3) 1000 UNITS tablet Take 2,000 Units by mouth Daily.      • diclofenac (VOLTAREN) 1 % gel gel Apply 4 g topically to the appropriate area as directed 4 (Four) Times a Day As Needed (pain). 100 g 5    • docusate sodium (COLACE) 50 MG capsule Take 100 mg by mouth Every Night.      • Elastic Bandages & Supports (HERNIA BELT DOUBLE LARGE) misc For right inguinal hernia 1 each 0    • finasteride (PROSCAR) 5 MG tablet TAKE 1 TABLET EVERY DAY 90 tablet 1    • fluticasone (FLONASE) 50 MCG/ACT nasal spray 2 sprays into the nostril(s) as directed by provider 2 (Two) Times a Day As Needed.      • HYDROcodone-acetaminophen (NORCO)  MG per tablet 1 tablet. Can take up to 4 daily      • predniSONE (DELTASONE) 10 MG tablet Take 1 tablet by mouth Daily. 30 tablet 5    • traMADol (ULTRAM) 50 MG tablet Take 1 tablet by mouth 2 (Two) Times a Day. 60 tablet 1      Allergies   Allergen Reactions   • Naproxen Sodium Other (See Comments)     Increased heart rate  Aleve        Review of Systems   Constitutional: Negative for appetite change, chills and fever.   HENT: Negative.    Eyes: Negative.    Respiratory: Negative.    Cardiovascular: Negative.    Gastrointestinal: Negative.    Endocrine: Negative.    Genitourinary:  "Negative.    Musculoskeletal: Positive for arthralgias, back pain, gait problem and joint swelling.   Skin: Positive for color change and wound.   Neurological: Positive for weakness.   Hematological: Negative.    Psychiatric/Behavioral: Negative.      Objective      Physical Exam  /81 (BP Location: Left arm, Patient Position: Lying)   Pulse 62   Temp 98 °F (36.7 °C) (Oral)   Resp 16   Ht 180.3 cm (71\")   Wt 67.2 kg (148 lb 3.2 oz)   SpO2 100%   BMI 20.67 kg/m²     Body mass index is 20.67 kg/m².    General:   Mental Status:  Alert   Appearance: Cooperative, in no acute distress   Build and Nutrition: Deconditioned male   Orientation: Alert and oriented to person, place and time   Posture: Laying in a hospital bed    Integument:   Right shoulder: No skin lesions, no rash, no ecchymosis    Neurologic:   Sensation:    Right hand: Intact to light touch in the digits   Motor:  Right upper extremity: Able to move his fingers    Vascular:   Right upper extremity: 2+ radial pulse, prompt capillary refill    Upper Extremities:   Right Shoulder:    Tenderness:  None, particularly over the superior aspect of the shoulder    Swelling:  In the forearm    Crepitus: Positive in the shoulder joint    Range of motion:  External rotation:  0°       Forward flexion:  20°       Passively I can bring his arm up to about 70 degrees of forward flexion  Instability:  None  Deformities:  Hand deformity consistent with rheumatoid arthritis      Imaging/Studies  Imaging Results (Last 24 Hours)     Procedure Component Value Units Date/Time    XR Shoulder 2+ View Right [[id number]] Collected: 01/03/21 1035     Updated: 01/04/21 1527    Narrative:         EXAMINATION: XR RIGHT SHOULDER, 2 VIEWS - 01/03/2021     INDICATION: R65.10-Systemic inflammatory response syndrome (sirs) of  non-infectious origin without acute organ dysfunction;  L97.519-Non-pressure chronic ulcer of other part of right foot with  unspecified severity; " L97.529-Non-pressure chronic ulcer of other part  of left foot with unspecified severity; N39.0-Urinary tract infection,  site not specified. Right shoulder pain, reduced mobility.      COMPARISON: Chest x-ray 12/31/2020     FINDINGS: Internal rotation, external rotation and scapular Y views of  the right shoulder reveal advanced degenerative changes with essentially  total joint space height loss of the glenohumeral joint as well as  moderate DJD of the AC joint. There are angulated margins of the  scaphoid near the acromion seen on axial and shoulder AP views  indeterminate for fracture versus degeneration with further imaging  considered if concern for fracture at this site. Visualized right  hemithorax grossly clear.        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 No acute fracture of the  humerus, however, angulated margins concerning for acute fracture and/or  angulated degenerative changes near the acromion seen on axial view  greatest involvement with further imaging recommended for evaluation if  concern of point tenderness at this site in particular.     DICTATED:   01/03/2021  EDITED/ls :   01/03/2021      This report was finalized on 1/4/2021 3:24 PM by Dr. Jaime Parnell.       CT Abdomen Pelvis Without Contrast [[id number]] Collected: 01/03/21 1314     Updated: 01/04/21 1133    Narrative:      EXAMINATION: CT ABDOMEN/PELVIS WO CONTRAST - 01/03/2021      INDICATION: R65.10-Systemic inflammatory response syndrome (sirs) of  non-infectious origin without acute organ dysfunction;  L97.519-Non-pressure chronic ulcer of other part of right foot with  unspecified severity; L97.529-Non-pressure chronic ulcer of other part  of left foot with unspecified severity; N39.0-Urinary tract infection,  site not specified. Abdominal pain.     TECHNIQUE: CT abdomen and pelvis without intravenous contrast.     The radiation dose reduction device was  turned on for each scan per the  ALARA (As Low as Reasonably Achievable) protocol.     COMPARISON: CT abdomen and pelvis 12/15/2019.     FINDINGS: Lung bases demonstrate small bilateral pleural effusions with  adjacent atelectasis. Liver without focal lesion. Gallbladder contains  numerous calcifications of gallstones and cholelithiasis. No biliary  dilatation. Pancreas and spleen unremarkable. Adrenals without distinct  nodule. Kidneys without hydronephrosis or hydroureter demonstrating  simple appearing left superior pole renal cortical cyst. No bulky  retroperitoneal adenopathy. Atherosclerotic nonaneurysmal abdominal  aorta. GI tract evaluation without focal thickening or disproportionate  dilatation of bowel to suggest mechanical obstructive process, however,  small and large bowel contained within a large right direct inguinal  hernia extending with edema and bowel extending to the right scrotum.  Pelvic viscera otherwise unremarkable. Diffuse body wall edema. No acute  osseous findings.       Impression:         1. Small bilateral pleural effusions right greater than left with  adjacent atelectasis.     2. Large right direct inguinal hernia containing increasing components  of small and large bowel likely terminal ileum and cecum descending into  the right inguinal canal through the direct inguinal hernia connection  and into the right scrotum with increased involvement from 2019 exam  along with adjacent edema.     DICTATED:   01/03/2021  EDITED/ls :   01/03/2021         This report was finalized on 1/4/2021 11:30 AM by Dr. Jaime Parnell.             Results from last 7 days   Lab Units 01/04/21  0444 01/03/21  0309 01/02/21  0314 01/01/21  0310 12/31/20  1958   WBC 10*3/mm3 6.66 7.49 7.92 8.03 9.51   HEMOGLOBIN g/dL 11.4* 10.9* 10.1* 10.0* 10.8*   HEMATOCRIT % 39.9 39.0 37.1* 34.8* 37.1*   MCV fL 90.3 92.2 94.4 91.6 92.1   PLATELETS 10*3/mm3 182 164 169 158 166         Results Review:   I reviewed the  "patient's new clinical lab test results. and I reviewed the patient's new imaging test results.    Assessment and Plan     Assessment:    Severe arthritis, right shoulder glenohumeral joint, with probable insufficiency fracture acromion    Chronic cutaneous venous stasis ulcer (CMS/HCC)    Acute on chronic cutaneous venous stasis ulcer (CMS/HCC)    Rheumatoid arthritis (CMS/HCC)    Essential hypertension    Acute UTI (urinary tract infection)    COPD (chronic obstructive pulmonary disease) (CMS/HCC)    Joint pain    Inguinal hernia, right    Right shoulder pain      Plan:    I reviewed my findings with the patient.  He is having significant pain in the shoulder.  He has advanced arthritis.  He is nontender over the acromion, therefore I do not believe this is an acute fracture, and most likely represents insufficiency in light of his significant arthritis, and superior migration of the humeral head, which is created an additional \"articulation\".  We could consider an intra-articular shoulder injection, and I will revisit that with the patient tomorrow.    I discussed the patients findings and my recommendations with patient, family and nursing staff    Medical Decision Making  Data/Risk: radiology tests and independent visualization of imaging, lab tests, or EMG/NCV      Arsalan Hickman MD  01/04/21  17:54 EST      Dragon disclaimer:  Much of this encounter note is an electronic transcription/translation of spoken language to printed text. The electronic translation of spoken language may permit erroneous, or at times, nonsensical words or phrases to be inadvertently transcribed; Although I have reviewed the note for such errors, some may still exist.    "

## 2021-01-04 NOTE — NURSING NOTE
Pt IV outdated. Nurse attempted to get new IV twice and was unsuccessful.  House Supervisor contacted to get Ultrasound IV.

## 2021-01-04 NOTE — PLAN OF CARE
Goal Outcome Evaluation:  Plan of Care Reviewed With: patient  Progress: improving  Outcome Summary: Pt has had a quiet evening and remains very nice and cooperative. Vitals have been stable but he cintinues to be bradycardic with heart rates in the 40's-50 range. Oxygen has remained on at 2-3 liters to maintain oxygen sats in the 90's . Pts wounds were changed to his right leg and gluteal region as ordered and he was repositioned frequently. Overall, he had a restful night and slept the majority of the shift.

## 2021-01-05 NOTE — PROGRESS NOTES
"      [facility] [place] [facility] Progress Note    Subjective      LOS: 0 days   Patient Care Team:  Michael Chavez MD as PCP - General  Michael Chavez MD as PCP - Family Medicine    CC: Right shoulder pain    Interval History:   No new complaints today.  Shoulder feels better than yesterday.    Objective      Vital Signs  Temp (24hrs), Av.1 °F (36.7 °C), Min:98 °F (36.7 °C), Max:98.2 °F (36.8 °C)      /91   Pulse 67   Temp 98 °F (36.7 °C) (Oral)   Resp 16   Ht 180.3 cm (71\")   Wt 67.2 kg (148 lb 3.2 oz)   SpO2 96%   BMI 20.67 kg/m²     Examination:   Examination of the right shoulder: Positive crepitus.  Nontender to palpation.  Sensation and motor intact distally.    Labs:  Results from last 7 days   Lab Units 21  0424 21  0444 21  0309 21  0314 21  0310   WBC 10*3/mm3 8.11 6.66 7.49 7.92 8.03   HEMOGLOBIN g/dL 11.0* 11.4* 10.9* 10.1* 10.0*   HEMATOCRIT % 38.3 39.9 39.0 37.1* 34.8*   MCV fL 90.5 90.3 92.2 94.4 91.6   PLATELETS 10*3/mm3 185 182 164 169 158       Radiology:  Imaging Results (Last 24 Hours)     Procedure Component Value Units Date/Time    XR Shoulder 2+ View Right [[id number]] Collected: 21 1035     Updated: 21 1527    Narrative:         EXAMINATION: XR RIGHT SHOULDER, 2 VIEWS - 2021     INDICATION: R65.10-Systemic inflammatory response syndrome (sirs) of  non-infectious origin without acute organ dysfunction;  L97.519-Non-pressure chronic ulcer of other part of right foot with  unspecified severity; L97.529-Non-pressure chronic ulcer of other part  of left foot with unspecified severity; N39.0-Urinary tract infection,  site not specified. Right shoulder pain, reduced mobility.      COMPARISON: Chest x-ray 2020     FINDINGS: Internal rotation, external rotation and scapular Y views of  the right shoulder reveal advanced degenerative changes with essentially  total joint space height loss of the glenohumeral joint as " well as  moderate DJD of the AC joint. There are angulated margins of the  scaphoid near the acromion seen on axial and shoulder AP views  indeterminate for fracture versus degeneration with further imaging  considered if concern for fracture at this site. Visualized right  hemithorax grossly clear.        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 No acute fracture of the  humerus, however, angulated margins concerning for acute fracture and/or  angulated degenerative changes near the acromion seen on axial view  greatest involvement with further imaging recommended for evaluation if  concern of point tenderness at this site in particular.     DICTATED:   01/03/2021  EDITED/ls :   01/03/2021      This report was finalized on 1/4/2021 3:24 PM by Dr. Jaime Parnell.             PT:  Physical Therapy - Plan of Care Review - Outcome Summary:  Outcome Summary: PT eval completed. Presents w/ new R scap. fx, RUE swelling, UTI,SIRS, severe RA (R knee fused), ulcers BLE, decr LE strength/endurance & impaired funct mobil. Transf to sitting EOB w/ draw sheet & dep.2A, unable to attempt STS, ret. to sup. & transf to recliner w/ mech lift, STS trial w/o Lofstrand cxs (gt belt & BUE supp + L knee stabiliz.& counterpressure to sacrum) w/ dep 2A x 30 sec.,then req. return to sitting (L knee buckling) & performed AA-PROM BLE + addnl sit bal activ. Nsg then notified PT that R scap fx noted on xray.Will ask for clarif of WB status RUE & defer use of Lofstrand cx RUE in interim. Noted signif BP drop w/ mobil.& desat to 91% on 3 L. Pt may benefit from STS w/ Arjo.Recommend SNF at d/c. (01/03/21 1037)]       Results Review:     I reviewed the patient's new clinical results.    Assessment and Plan     Assessment:   Shoulder arthritis, right shoulder, with insufficiency fracture acromion, likely chronic    Patient symptoms have improved.  Conservative treatment is  recommended.  Please call for any questions.      Acute on chronic cutaneous venous stasis ulcer (CMS/HCC)    Rheumatoid arthritis (CMS/HCC)    Essential hypertension    Acute UTI (urinary tract infection)    COPD (chronic obstructive pulmonary disease) (CMS/HCC)    Joint pain    Inguinal hernia, right    Right shoulder pain      Plan for disposition: Follow-up as an outpatient as needed.      Future Appointments   Date Time Provider Department Center   6/18/2021  2:00 PM Michael Chavez MD [facility]           Arsalan Hickman MD  01/05/21  11:40 EST

## 2021-01-05 NOTE — PROGRESS NOTES
Continued Stay Note  [facility]     Patient Name: Michoacano Rojas  MRN: [id number]  Today's Date: 1/5/2021    Admit Date: 12/31/2020    Discharge Plan     Row Name 01/05/21 1506       Plan    Plan  SNF    Patient/Family in Agreement with Plan  yes    Plan Comments  [facility] is unable to accept the pt. [facility] has offered a bed. I could not reach the pts son but spoke with the pts spouse by phone. She is agreeable to [facility] [place]. They will need to obtain insurance approval. Will need a covid test within 72 hours of transfer. I have a [facility] ambulance for 1-7-2020 at 1100 if ready.    Final Discharge Disposition Code  03 - skilled nursing facility (SNF)        Discharge Codes    No documentation.       Expected Discharge Date and Time     Expected Discharge Date Expected Discharge Time    Jan 6, 2021             Ruth Faulkner RN

## 2021-01-05 NOTE — PROGRESS NOTES
Clinical Nutrition   Reason For Visit: Follow-up protocol    Patient Name: Michoacano Rojas  YOB: 1940  MRN: [id number]  Date of Encounter: 01/05/21 13:06 EST  Admission date: 12/31/2020      Nutrition Assessment     Admission Problem List:  UTI  Acute on chronic venous stasis ulcers and pressure injuries  ---left medial ankle venous ulcer  ---left lateral ankle venous ulcer  ---right distal calf venous ulcer  ---right popliteal stage 2 pressure injury  Large right inguinal hernia  Severe arthritis right shoulder  Abdominal pain  Diarrhea      Applicable PMH:  HTN  CAD  CHF  PAF  COPD  BPH  RA - chronic steroid use  Severe debility  Chronic venous stasis ulcerations      Applicable medical tests/procedures since admission:       Reported/Observed/Food/Nutrition Related History   Patient reports he has been eating okay. States the portions are too big. Does not want double meat portions anymore. Was having diarrhea but has improved. Typically drinking one Boost Breeze daily. Requests jello with each meal.    Anthropometrics   Height: 71 in  Weight: 148 lbs (measured wt without specified method per [facility] doc 12/31)  BMI: 20.7  BMI classification: Normal: 18.5-24.9kg/m2   IBW: 172 lbs    Labs reviewed   Labs reviewed: Yes    Medications reviewed   Medications reviewed: Yes  Pertinent: colace, steroid  PRN: imodium    Current Nutrition Prescription   PO: Diet Regular; Cardiac; double meat portions  Oral Nutrition Supplement: Boost Breeze 1x daily    Evaluation of Received Nutrient/Fluid Intake: 44% / 8 meals    Nutrition Diagnosis     1/1, 1/5  Problem Increased nutrient needs (protein)   Etiology Skin integrity   Signs/Symptoms Acute on chronic venous stasis ulcers and right popliteal stage 2 pressure injury   Status: ongoing    1/5  Problem Inadequate oral intake   Etiology Decreased appetite   Signs/Symptoms PO intake: 44% / 8 meals       Intervention   Intervention: Follow treatment progress, Care plan  reviewed, Menu adjusted, Encourage intake     -Discontinued double meat portions.  -Ordered jello with each meal.     Goal:   General: Nutrition support treatment  PO: Increase intake    Monitoring/Evaluation:   Monitoring/Evaluation: Per protocol, PO intake, Supplement intake, Pertinent labs, Weight, Skin status    Michaela Carroll RD  Time Spent: 20 min

## 2021-01-05 NOTE — PLAN OF CARE
Goal Outcome Evaluation:  Plan of Care Reviewed With: patient  Progress: improving  Outcome Summary: Pt had a quiet night last evening . Vitals have remained stable until pt started snoring and oxygen sats dropped into the 80's . Wond care was done at 0400hrs per protocol. Overall, quiet night .  Discussed with Salud SHEIKH patients outdated IV line.It is not five days in place. Pt is refusing to have new saline lock placed after a few attempts have been made to replace. He appears to understand the high risk of infection when sites are not rotated and he still prefers to refrain from any further sticks.

## 2021-01-05 NOTE — PROGRESS NOTES
[facility] Medicine Services  PROGRESS NOTE    Patient Name: Michoacano Rojas  : 1940  MRN: [id number]    Date of Admission: 2020  Primary Care Physician: Michael Chavez MD    Subjective   Subjective     CC:  Follow up UTI    HPI:  Reports he is doing well this morning.  Has tolerated antibiotics, awaiting at rehab.  Denies substantial pain or other acute issues.    ROS:  Gen- No fevers, chills  CV- No chest pain, palpitations  Resp- No cough, dyspnea  GI- No N/V/D, abd pain    Objective   Objective     Vital Signs:   Temp:  [98 °F (36.7 °C)-98.2 °F (36.8 °C)] 98 °F (36.7 °C)  Heart Rate:  [57-68] 63  Resp:  [16-17] 16  BP: (122-158)/(65-91) 133/90        Physical Exam:  Constitutional: Awake, alert, lying in bed, chronically ill-appearing  HENT: NCAT, mucous membranes moist  Respiratory: Clear to auscultation bilaterally, respiratory effort normal   Cardiovascular: RRR, no murmurs, rubs, or gallops, palpable radial pulses bilaterally  Gastrointestinal: Positive bowel sounds, soft, nontender, nondistended  Musculoskeletal: Arthritic changes to both hands; chronic venous stasis changes to bilateral lower extremities with thickened skin, clean bandages over bilateral shins  Psychiatric: Appropriate affect, cooperative  Neurologic: Speech clear    Results Reviewed:  Results from last 7 days   Lab Units 21  0424 21  0444 21  03020   WBC 10*3/mm3 8.11 6.66 7.49   < > 9.51   HEMOGLOBIN g/dL 11.0* 11.4* 10.9*   < > 10.8*   HEMATOCRIT % 38.3 39.9 39.0   < > 37.1*   PLATELETS 10*3/mm3 185 182 164   < > 166   PROCALCITONIN ng/mL  --   --   --   --  0.69*    < > = values in this interval not displayed.     Results from last 7 days   Lab Units 21  0424 21  0444 21  0309  21  03120   SODIUM mmol/L 141 139 144   < > 141 143   POTASSIUM mmol/L 4.7 3.4* 3.7   < > 4.1 4.4   CHLORIDE mmol/L 106 105 108*   < > 107 105    CO2 mmol/L 27.0 29.0 24.0   < > 23.0 26.0   BUN mg/dL 17 16 22   < > 22 25*   CREATININE mg/dL 0.67* 0.48* 0.45*   < > 0.46* 0.55*   GLUCOSE mg/dL 79 81 81   < > 91 106*   CALCIUM mg/dL 8.4* 8.2* 8.5*   < > 8.5* 9.0   ALT (SGPT) U/L  --   --   --   --  5 5   AST (SGOT) U/L  --   --   --   --  12 14    < > = values in this interval not displayed.     Estimated Creatinine Clearance: 67.7 mL/min (A) (by C-G formula based on SCr of 0.67 mg/dL (L)).    Microbiology Results Abnormal     Procedure Component Value - Date/Time    Blood Culture - Blood, Arm, Right [[id number]] Collected: 12/31/20 2038    Lab Status: Preliminary result Specimen: Blood from Arm, Right Updated: 01/04/21 2100     Blood Culture No growth at 4 days    Blood Culture - Blood, Arm, Left [[id number]] Collected: 12/31/20 2020    Lab Status: Preliminary result Specimen: Blood from Arm, Left Updated: 01/04/21 2100     Blood Culture No growth at 4 days    Urine Culture - Urine, Urine, Clean Catch [[id number]]  (Abnormal) Collected: 12/31/20 1958    Lab Status: Final result Specimen: Urine, Clean Catch Updated: 01/02/21 1523     Urine Culture <10,000 CFU/mL Gram Negative Bacilli     Comment: Call if further workup needed.         COVID PRE-OP / PRE-PROCEDURE SCREENING ORDER (NO ISOLATION) - Swab, Nasopharynx [[id number]]  (Normal) Collected: 12/31/20 1957    Lab Status: Final result Specimen: Swab from Nasopharynx Updated: 12/31/20 2106    Narrative:      The following orders were created for panel order COVID PRE-OP / PRE-PROCEDURE SCREENING ORDER (NO ISOLATION) - Swab, Nasopharynx.  Procedure                               Abnormality         Status                     ---------                               -----------         ------                     Respiratory Panel PCR w/...[[id number]]  Normal              Final result                 Please view results for these tests on the individual orders.    Respiratory Panel PCR w/COVID-19(SARS-CoV-2)  SARMAD/SHERI/DEEPIKA/PAD/COR/MAD/KATALINA In-House, NP Swab in UTM/VTM, 3-4 HR TAT - Swab, Nasopharynx [[id number]]  (Normal) Collected: 12/31/20 1957    Lab Status: Final result Specimen: Swab from Nasopharynx Updated: 12/31/20 2106     ADENOVIRUS, PCR Not Detected     Coronavirus 229E Not Detected     Coronavirus HKU1 Not Detected     Coronavirus NL63 Not Detected     Coronavirus OC43 Not Detected     COVID19 Not Detected     Human Metapneumovirus Not Detected     Human Rhinovirus/Enterovirus Not Detected     Influenza A PCR Not Detected     Influenza A H1 Not Detected     Influenza A H1 2009 PCR Not Detected     Influenza A H3 Not Detected     Influenza B PCR Not Detected     Parainfluenza Virus 1 Not Detected     Parainfluenza Virus 2 Not Detected     Parainfluenza Virus 3 Not Detected     Parainfluenza Virus 4 Not Detected     RSV, PCR Not Detected     Bordetella pertussis pcr Not Detected     Bordetella parapertussis PCR Not Detected     Chlamydophila pneumoniae PCR Not Detected     Mycoplasma pneumo by PCR Not Detected    Narrative:      Fact sheet for providers: https://docs.[facility]IN[facility]/wp-content/uploads/DKP6577-2292-GC6.1-EUA-Provider-Fact-Sheet-3.pdf    Fact sheet for patients: https://docs.[facility]/wp-content/uploads/CVF4410-6921-LM7.1-EUA-Patient-Fact-Sheet-1.pdf    Test performed by PCR.          Imaging Results (Last 24 Hours)     ** No results found for the last 24 hours. **          Results for orders placed during the hospital encounter of 10/23/19   Adult Transthoracic Echo Complete W/ Cont if Necessary Per Protocol    Narrative · Right ventricular cavity is borderline dilated.  · Mild tricuspid valve regurgitation is present.  · There is calcification of the aortic valve.  · Estimated EF = 60%.  · Left ventricular systolic function is normal.  · Left ventricular wall motion is normal          I have reviewed the medications:  Scheduled Meds:amLODIPine, 10 mg, Oral, Daily  bisoprolol, 2.5 mg, Oral,  Daily  cetirizine, 10 mg, Oral, Daily  docusate sodium, 100 mg, Oral, Nightly  enoxaparin, 40 mg, Subcutaneous, Q24H  erythromycin, , Both Eyes, Q12H  finasteride, 5 mg, Oral, Daily  oxymetazoline, 2 spray, Each Nare, BID  predniSONE, 10 mg, Oral, Daily With Breakfast  sodium chloride, 10 mL, Intravenous, Q12H  traMADol, 50 mg, Oral, BID      Continuous Infusions:   PRN Meds:.•  acetaminophen **OR** acetaminophen **OR** acetaminophen  •  albuterol  •  fluticasone  •  HYDROcodone-acetaminophen  •  loperamide  •  ondansetron  •  potassium chloride **OR** potassium chloride **OR** potassium chloride  •  [COMPLETED] Insert peripheral IV **AND** sodium chloride  •  sodium chloride    Assessment/Plan   Assessment & Plan     Active Hospital Problems    Diagnosis  POA   • **Acute on chronic cutaneous venous stasis ulcer (CMS/[facility]) [I83.009, L97.909]  Yes   • Inguinal hernia, right [K40.90]  Yes   • Right shoulder pain [M25.511]  Yes   • Joint pain [M25.50]  Yes   • Acute UTI (urinary tract infection) [N39.0]  Yes   • COPD (chronic obstructive pulmonary disease) (CMS/[facility]) [J44.9]  Yes   • Rheumatoid arthritis (CMS/[facility]) [M06.9]  Yes   • Essential hypertension [I10]  Yes      Resolved Hospital Problems   No resolved problems to display.        Brief Hospital Course to date:  Michoacano Rojas is a 80 y.o. male with rheumatoid arthritis on chronic prednisone, chronic diastolic CHF, COPD, BPH, chronic venous stasis with ulcerations and severe debility who presented with generalized weakness, diminished mobility, worsening of his chronic wounds.  His son who is his primary caregiver was diagnosed with COVID-19 and has not been available to care for the patient leading his wife to provide him care.  He progressively declined and was nonambulatory.  He was admitted under the presumption UTI with urine culture ultimately returning unremarkable and is now awaiting rehab placement    The following problems new to me  today    Assessment/plan    Failure to thrive in adult  Worsening of chronic venous stasis ulcers/pressure ulcers  -Patient chronically has RT LE weakness worse compared to the left  - WOC following and assisting with care of ulcers  -PT/OT, rehab placement    Doubt UTI  BPH  -UA was <10,000 CFU E. coli, highly doubtful this represents substantial enough bacterial load to create such severe systemic symptoms  -S/p 5 days of intravenous ceftriaxone  -Continue Proscar    Right shoulder pain  -Advanced arthritis likely related to his chronic rheumatoid arthritis; seen by orthopedics who are contemplating intra-articular injection    Large right inguinal hernia  -CT with enlarging right direct inguinal hernia worse since 2019, seen by general surgery, not a candidate during current admission for repair and likely poor candidate overall    Rheumatoid arthritis  Immunocompromised host with chronic prednisone use  -Continue prednisone 10 mg    Hypertension  Chronic diastolic CHF  Paroxysmal atrial fibrillation  -Continue home amlodipine, bisoprolol, was previously on Eliquis which had been discontinued due to reported fall risk    Chronic goiter with tracheal deviation  Chronic restrictive lung disease  -Patient previously declined ENT evaluation earlier this admission  -Patient has been documented as 3 L/min oxygen, however my evaluation in the room this morning the oxygen is always turned off with the patient maintaining his O2 sats    Sinusitis  -Continue Afrin, Zyrtec    DVT Prophylaxis: Lovenox      Disposition: I expect the patient to be discharged to inpatient rehab on 1/7/2021.    CODE STATUS:   Code Status and Medical Interventions:   Ordered at: 12/31/20 3931     Limited Support to NOT Include:    Intubation     Code Status:    No CPR     Medical Interventions (Level of Support Prior to Arrest):    Limited       Ronak Costello,   01/05/21

## 2021-01-06 NOTE — THERAPY TREATMENT NOTE
Patient Name: Michoacano Rojas  : 1940    MRN: [id number]                              Today's Date: 2021       Admit Date: 2020    Visit Dx:     ICD-10-CM ICD-9-CM   1. SIRS (systemic inflammatory response syndrome) (CMS/[facility])  R65.10 995.90   2. Skin ulcers of foot, bilateral (CMS/[facility])  L97.519 707.15    L97.529    3. Acute UTI  N39.0 599.0   4. 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  Z79.52 V58.65   5. Swelling of arm  M79.89 729.81     Patient Active Problem List   Diagnosis   • Hematuria   • Prostate hypertrophy   • Rheumatoid arthritis (CMS/[facility])   • Essential hypertension   • Acute on chronic cutaneous venous stasis ulcer (CMS/[facility])   • Very poor mobility   • Large joint arthralgia of multiple sites   • Arthralgia of hands, bilateral   • Generalized stiffness   • Atrial fibrillation with RVR (CMS/[facility])   • Anasarca   • Sepsis (CMS/[facility])   • Cellulitis   • 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   • Tracheal stenosis   • Diastolic dysfunction with chronic heart failure (CMS/[facility])   • Acute on chronic respiratory failure with hypoxia and hypercapnia (CMS/[facility])   • Incarcerated right inguinal hernia   • Venous insufficiency (chronic) (peripheral)   • Benign essential hypertension   • Non-pressure chronic ulcer of left lower leg, with unspecified severity (CMS/[facility])   • Lower extremity edema   • Chronic diastolic heart failure (CMS/[facility])   • Cognitive disorder   • Chronic conjunctivitis of both eyes   • Facial swelling   • Goiter   • Idiopathic chronic gout of multiple sites with tophus   • Medicare annual wellness visit, subsequent   • Morbid obesity (CMS/[facility])   • Major depressive disorder, single episode, mild (CMS/[facility])   • Skin ulcer of right foot, limited to breakdown of skin (CMS/[facility])   • Acute UTI (urinary tract infection)   • COPD (chronic obstructive pulmonary disease) (CMS/[facility])   • Hypoxia   • sepsis secondary to LEs ulcerations   • AMS (altered mental status)   • Venous stasis ulcer (CMS/[facility])    • Immunosuppressed status (CMS/H[facility])   • Current chronic use of systemic steroids   • Rheumatoid arthritis involving multiple sites (CMS/HCC)   • Demand ischemia of myocardium (CMS/HCC)   • Joint pain   • Inguinal hernia, right   • Right shoulder pain     Past Medical History:   Diagnosis Date   • A-fib (CMS/HCC)    • Atrial fibrillation (CMS/HCC)    • CHF (congestive heart failure) (CMS/HCC)    • Chronic cutaneous venous stasis ulcer (CMS/HCC)    • Coronary artery disease    • Disease of thyroid gland    • Hypertension    • Penile abnormality    • Peptic ulcer disease    • Primary malignant neoplasm of bronchus with metastasisto other site, unspecified laterality (CMS/[facility]) 4/22/2019   • RA (rheumatoid arthritis) (CMS/[facility])    • Rheumatic fever    • Rheumatoid arthritis (CMS/HCC)    • Sepsis (CMS/[facility]) 11/17/2017    from cellulitis due to leg ulcer, hospitalization, antibiotics, wound care   • Sleep apnea    • Vertigo    • Vertigo      Past Surgical History:   Procedure Laterality Date   • EYE SURGERY     • JOINT MANIPULATION      left hip repair     General Information     Row Name 01/06/21 1232          Physical Therapy Time and Intention    Document Type  therapy note (daily note)  -DM     Mode of Treatment  physical therapy  -DM     Row Name 01/06/21 1232          General Information    Existing Precautions/Restrictions  fall;oxygen therapy device and L/min;other (see comments) R shldr arthritis/R acromium insuff. fx( ? chr.) per ortho/Dr Hickman 1-5-21; severe RA all jts  -DM       User Key  (r) = Recorded By, (t) = Taken By, (c) = Cosigned By    Initials Name Provider Type    DM Bettie Ardon, PT Physical Therapist        Mobility     Row Name 01/06/21 1232          Bed Mobility    Bed Mobility  rolling left;rolling right;scooting/bridging  -DM     Rolling Left [place] (Bed Mobility)  verbal cues;dependent (less than 25% patient effort);2 person assist placed new draw sheet & lift sling under pt  -DM      Rolling Right [place] (Bed Mobility)  verbal cues;dependent (less than 25% patient effort);2 person assist  -DM     Scooting/Bridging [place] (Bed Mobility)  verbal cues;dependent (less than 25% patient effort);2 person assist scooted toward HOB(feet near footboard)  -DM     Assistive Device (Bed Mobility)  draw sheet;head of bed elevated  -DM     Comment (Bed Mobility)  per nsg, ok to transf to chair w/ mech lift (just finished nasal swab for Covid testing)  -DM     Row Name 01/06/21 1232          Transfers    Comment (Transfers)  transf to chair w/ sling but def STS d/t no WB guidelines for RUE by ortho MD (R Acromium insuff fx), & onset incr. throbbing pain L knee  -DM     Row Name 01/06/21 1232          Bed-Chair Transfer    Bed-Chair [place] (Transfers)  dependent (less than 25% patient effort);2 person assist PCT in to assist PT;pt not christine legrest up or fully lowered d/t L knee pain(placed in mid-elev position w/ trash can supporting legrest)  -DM     Assistive Device (Bed-Chair Transfers)  lift device  -DM     Row Name 01/06/21 1232          Sit-Stand Transfer    Sit-Stand [place] (Transfers)  unable to assess  -DM     Row Name 01/06/21 1232          Gait/Stairs (Locomotion)    [place] Level (Gait)  unable to assess  -DM     Row Name 01/06/21 1232          Mobility    Extremity Weight-bearing Status  other (see comments) R shldr arthritis/R acromium insuff. fx (per ortho/Dr Hickman)  -DM     Right Upper Extremity (Weight-bearing Status)  other (see comments) no RUE WB guidelines given per ortho  -DM       User Key  (r) = Recorded By, (t) = Taken By, (c) = Cosigned By    Initials Name Provider Type    DM Bettie Ardon, PT Physical Therapist        Obj/Interventions     Row Name 01/06/21 1232          Motor Skills    Therapeutic Exercise  hip;knee;ankle;shoulder also neck AROM in all planes x 3 to 5 reps  -DM     Row Name 01/06/21 1232          Shoulder (Therapeutic Exercise)     Shoulder (Therapeutic Exercise)  AROM (active range of motion)  -DM     Shoulder AROM (Therapeutic Exercise)  left;flexion;extension;aBduction;aDduction;external rotation;internal rotation;horizontal aBduction/aDduction;scapular elevation;sitting;10 repetitions cues only for sh. shrugs & circles;min to mod A for remaining exer; also L sh.half circles F/B, & L elbow biceps curls  -DM     Row Name 01/06/21 1232          Hip (Therapeutic Exercise)    Hip (Therapeutic Exercise)  AAROM (active assistive range of motion);PROM (passive range of motion);isometric exercises  -DM     Hip AAROM (Therapeutic Exercise)  left;flexion;extension;aBduction;aDduction;external rotation;internal rotation;sitting;supine;10 repetitions  -DM     Hip PROM (Therapeutic Exercise)  right;flexion;extension;aBduction;aDduction;supine;10 repetitions  -DM     Hip Isometrics (Therapeutic Exercise)  bilateral;gluteal sets;supine;10 repetitions  -DM     Row Name 01/06/21 1232          Knee (Therapeutic Exercise)    Knee (Therapeutic Exercise)  AAROM (active assistive range of motion);isometric exercises;AROM (active range of motion)  -DM     Knee AROM (Therapeutic Exercise)  left;SLR (straight leg raise);supine;LAQ (long arc quad);sitting;10 repetitions  -DM     Knee AAROM (Therapeutic Exercise)  left;flexion;extension;supine;10 repetitions  -DM     Knee Isometrics (Therapeutic Exercise)  left;quad sets;hamstring sets;supine;10 repetitions  -DM     Row Name 01/06/21 1232          Ankle (Therapeutic Exercise)    Ankle (Therapeutic Exercise)  AAROM (active assistive range of motion);AROM (active range of motion)  -DM     Ankle AROM (Therapeutic Exercise)  bilateral;dorsiflexion;plantarflexion;supine;10 repetitions  -DM     Ankle AAROM (Therapeutic Exercise)  bilateral;supine;other (see comments) AC  -DM       User Key  (r) = Recorded By, (t) = Taken By, (c) = Cosigned By    Initials Name Provider Type    Bettie Heck, PT Physical Therapist         Goals/Plan    No documentation.       Clinical Impression     Row Name 01/06/21 1232          Pain    Additional Documentation  Pain Scale: Numbers Pre/Post-Treatment (Group)  -DM     Row Name 01/06/21 1232          Pain Scale: Numbers Pre/Post-Treatment    Pretreatment Pain Rating  2/10  -DM     Posttreatment Pain Rating  8/10  -DM     Pain Location - Side  Left  -DM     Pain Location  knee  -DM     Pre/Posttreatment Pain Comment  Onset throbbing pain; called out for pain med  -DM     Pain Intervention(s)  Repositioned;Elevated;Rest  -DM     Row Name 01/06/21 1232          Plan of Care Review    Plan of Care Reviewed With  patient  -DM     Progress  no change  -DM     Outcome Summary  Rolled L/R w/ Dep.2A for sling placement & transf to chair w/ mech lift.Performed ther exer LUE & BLE in sup & sit, but def RUE exer & STS  trial d/t R acromium insuff. fx (no WB guidelines given by ortho MD). Onset throbbing pain L knee & desat to 88% sev. x's during mobil. Nsg weaning from 2 L (uses PRN). Plan is SNF possibly 1-7.  -DM     Row Name 01/06/21 1232          Vital Signs    Pre Systolic BP Rehab  127  -DM     Pre Treatment Diastolic BP  71  -DM     Intra Systolic BP Rehab  111  -DM     Intra Treatment Diastolic BP  70  -DM     Post Systolic BP Rehab  133  -DM     Post Treatment Diastolic BP  82  -DM     Pretreatment Heart Rate (beats/min)  53  -DM     Intratreatment Heart Rate (beats/min)  66  -DM     Posttreatment Heart Rate (beats/min)  65  -DM     Pre SpO2 (%)  97  -DM     O2 Delivery Pre Treatment  supplemental O2 o2 PRN  -DM     Intra SpO2 (%)  88  -DM     O2 Delivery Intra Treatment  supplemental O2  -DM     Post SpO2 (%)  96  -DM     O2 Delivery Post Treatment  supplemental O2  -DM     Pre Patient Position  Supine  -DM     Intra Patient Position  Side Lying  -DM     Post Patient Position  Sitting  -DM     Row Name 01/06/21 1232          Positioning and Restraints    Pre-Treatment Position  in bed  -DM     Post  Treatment Position  chair  -DM     In Chair  reclined;call light within reach;encouraged to call for assist;exit alarm on;RUE elevated;LUE elevated;waffle cushion;on mechanical lift sling;legs elevated;waffle boot/both  -DM       User Key  (r) = Recorded By, (t) = Taken By, (c) = Cosigned By    Initials Name Provider Type    Bettie Heck, PT Physical Therapist        Outcome Measures     Row Name 01/06/21 1232          How much help from another person do you currently need...    Turning from your back to your side while in flat bed without using bedrails?  2  -DM     Moving from lying on back to sitting on the side of a flat bed without bedrails?  1  -DM     Moving to and from a bed to a chair (including a wheelchair)?  1  -DM     Standing up from a chair using your arms (e.g., wheelchair, bedside chair)?  1  -DM     Climbing 3-5 steps with a railing?  1  -DM     To walk in hospital room?  1  -DM     AM-PAC 6 Clicks Score (PT)  7  -DM     Row Name 01/06/21 1232          Functional Assessment    Outcome Measure Options  AM-PAC 6 Clicks Basic Mobility (PT)  -DM       User Key  (r) = Recorded By, (t) = Taken By, (c) = Cosigned By    Initials Name Provider Type    Bettie Heck, PT Physical Therapist        Physical Therapy Education                 Title: PT OT SLP Therapies (In Progress)     Topic: Physical Therapy (In Progress)     Point: Mobility training (In Progress)     Learning Progress Summary           Patient Acceptance, E,D, NR by DM at 1/6/2021 1338    Acceptance, E,D, NR by DM at 1/3/2021 1241                   Point: Home exercise program (In Progress)     Learning Progress Summary           Patient Acceptance, E,D, NR by DM at 1/6/2021 1338    Acceptance, E,D, NR by DM at 1/3/2021 1241                   Point: Body mechanics (In Progress)     Learning Progress Summary           Patient Acceptance, E,D, NR by DM at 1/6/2021 1338    Acceptance, E,D, NR by DM at 1/3/2021 1241                    Point: Precautions (In Progress)     Learning Progress Summary           Patient Acceptance, E,D, NR by DM at 1/6/2021 1338    Acceptance, E,D, NR by DM at 1/3/2021 1241                               User Key     Initials Effective Dates Name Provider Type Discipline    DM 06/19/15 -  Bettie Ardon, PT Physical Therapist PT              PT Recommendation and Plan  Planned Therapy Interventions (PT): balance training, bed mobility training, gait training, home exercise program, patient/family education, strengthening, transfer training  Plan of Care Reviewed With: patient  Progress: no change  Outcome Summary: Rolled L/R w/ Dep.2A for sling placement & transf to chair w/ mech lift.Performed ther exer LUE & BLE in sup & sit, but def RUE exer & STS  trial d/t R acromium insuff. fx (no WB guidelines given by ortho MD). Onset throbbing pain L knee & desat to 88% sev. x's during mobil. Nsg weaning from 2 L (uses PRN). Plan is SNF possibly 1-7.     Time Calculation:   PT Charges     Row Name 01/06/21 1340             Time Calculation    Start Time  1232  -DM      PT Received On  01/06/21  -      PT Goal Re-Cert Due Date  01/13/21  -DM         Time Calculation- PT    Total Timed Code Minutes- PT  41 minute(s)  -DM         Timed Charges    88861 - PT Therapeutic Exercise Minutes  24  -DM      79715 - PT Therapeutic Activity Minutes  17  -DM        User Key  (r) = Recorded By, (t) = Taken By, (c) = Cosigned By    Initials Name Provider Type    DM Bettie Ardon, PT Physical Therapist        Therapy Charges for Today     Code Description Service Date Service Provider Modifiers Qty    91563043997 HC PT THER PROC EA 15 MIN 1/6/2021 Bettie Ardon, PT GP 2    75531770437 HC PT THERAPEUTIC ACT EA 15 MIN 1/6/2021 Bettie Ardon, PT GP 1          PT G-Codes  Outcome Measure Options: AM-PAC 6 Clicks Basic Mobility (PT)  AM-PAC 6 Clicks Score (PT): 7    Bettie Ardon, PT  1/6/2021

## 2021-01-06 NOTE — PROGRESS NOTES
Continued Stay Note  [facility]     Patient Name: Michoacano Rojas  MRN: [id number]  Today's Date: 1/6/2021    Admit Date: 12/31/2020    Discharge Plan     Row Name 01/06/21 1416       Plan    Plan  [facility]    Patient/Family in Agreement with Plan  yes    Plan Comments  I spoke with the pt and his spouse in the room. They are both in agreement to transfer to [facility] [place]. Son is aware and in agreement as well per pt and spouse. I have [facility] ambulance service to transport on 1-7-2021 at 1100 if ready and insurance approval has been obtained. Covid test today is negative. I will f/u in the am. Will need a DC summary and any hard scripts for controlled meds to send to the SNF    Final Discharge Disposition Code  03 - skilled nursing facility (SNF)        Discharge Codes    No documentation.       Expected Discharge Date and Time     Expected Discharge Date Expected Discharge Time    Jan 6, 2021             Ruth Faulkner RN

## 2021-01-06 NOTE — PLAN OF CARE
Problem: Fall Injury Risk  Goal: Absence of Fall and Fall-Related Injury  Outcome: Ongoing, Progressing  Intervention: Identify and Manage Contributors to Fall Injury Risk  Recent Flowsheet Documentation  Taken 1/6/2021 0850 by Serenity Hardy RN  Medication Review/Management: medications reviewed  Intervention: Promote Injury-Free Environment  Recent Flowsheet Documentation  Taken 1/6/2021 1409 by Serenity Hardy RN  Safety Promotion/Fall Prevention:   activity supervised   assistive device/personal items within reach   clutter free environment maintained   fall prevention program maintained   nonskid shoes/slippers when out of bed   safety round/check completed  Taken 1/6/2021 1200 by Serenity Hardy RN  Safety Promotion/Fall Prevention:   activity supervised   assistive device/personal items within reach   clutter free environment maintained   fall prevention program maintained   nonskid shoes/slippers when out of bed   safety round/check completed  Taken 1/6/2021 1000 by Serenity Hardy RN  Safety Promotion/Fall Prevention:   activity supervised   assistive device/personal items within reach   clutter free environment maintained   fall prevention program maintained   nonskid shoes/slippers when out of bed   safety round/check completed  Taken 1/6/2021 0850 by Serenity Hardy RN  Safety Promotion/Fall Prevention:   activity supervised   assistive device/personal items within reach   clutter free environment maintained   fall prevention program maintained   Goal Outcome Evaluation:  Plan of Care Reviewed With: patient  Progress: no change  Outcome Summary: Pt's vitals WNL. Pt has no complaints. Pt is to go to [place] tomorrow for rehab. Ambulance is to pick him up at 1100 AM.

## 2021-01-06 NOTE — PLAN OF CARE
Problem: Fall Injury Risk  Goal: Absence of Fall and Fall-Related Injury  Outcome: Ongoing, Progressing  Intervention: Identify and Manage Contributors to Fall Injury Risk  Recent Flowsheet Documentation  Taken 1/5/2021 2059 by Nellie Campbell RN  Medication Review/Management: medications reviewed  Intervention: Promote Injury-Free Environment  Recent Flowsheet Documentation  Taken 1/6/2021 0203 by Nellie Campbell RN  Safety Promotion/Fall Prevention:   activity supervised   clutter free environment maintained   assistive device/personal items within reach   fall prevention program maintained   lighting adjusted   nonskid shoes/slippers when out of bed   room organization consistent   safety round/check completed  Taken 1/6/2021 0050 by Nellie Campbell RN  Safety Promotion/Fall Prevention:   activity supervised   assistive device/personal items within reach   clutter free environment maintained   fall prevention program maintained   lighting adjusted   nonskid shoes/slippers when out of bed   safety round/check completed   room organization consistent  Taken 1/5/2021 2200 by eNllie Campbell RN  Safety Promotion/Fall Prevention:   activity supervised   assistive device/personal items within reach   clutter free environment maintained   fall prevention program maintained   lighting adjusted   nonskid shoes/slippers when out of bed   room organization consistent   safety round/check completed  Taken 1/5/2021 2059 by Nellie Campbell RN  Safety Promotion/Fall Prevention:   activity supervised   assistive device/personal items within reach   clutter free environment maintained   fall prevention program maintained   lighting adjusted   nonskid shoes/slippers when out of bed   room organization consistent   safety round/check completed     Problem: Adult Inpatient Plan of Care  Goal: Plan of Care Review  Outcome: Ongoing, Progressing  Goal: Patient-Specific Goal (Individualized)  Outcome: Ongoing, Progressing  Goal:  Absence of Hospital-Acquired Illness or Injury  Outcome: Ongoing, Progressing  Intervention: Identify and Manage Fall Risk  Recent Flowsheet Documentation  Taken 1/6/2021 0203 by Nellie Campbell RN  Safety Promotion/Fall Prevention:   activity supervised   clutter free environment maintained   assistive device/personal items within reach   fall prevention program maintained   lighting adjusted   nonskid shoes/slippers when out of bed   room organization consistent   safety round/check completed  Taken 1/6/2021 0050 by Nellie Campbell RN  Safety Promotion/Fall Prevention:   activity supervised   assistive device/personal items within reach   clutter free environment maintained   fall prevention program maintained   lighting adjusted   nonskid shoes/slippers when out of bed   safety round/check completed   room organization consistent  Taken 1/5/2021 2200 by Nellie Campbell RN  Safety Promotion/Fall Prevention:   activity supervised   assistive device/personal items within reach   clutter free environment maintained   fall prevention program maintained   lighting adjusted   nonskid shoes/slippers when out of bed   room organization consistent   safety round/check completed  Taken 1/5/2021 2059 by Nellie Campbell RN  Safety Promotion/Fall Prevention:   activity supervised   assistive device/personal items within reach   clutter free environment maintained   fall prevention program maintained   lighting adjusted   nonskid shoes/slippers when out of bed   room organization consistent   safety round/check completed  Intervention: Prevent Skin Injury  Recent Flowsheet Documentation  Taken 1/6/2021 0203 by Nellie Campbell RN  Body Position: position maintained  Taken 1/6/2021 0050 by Nellie Campbell RN  Body Position: position maintained  Taken 1/5/2021 2200 by Nellie Campbell RN  Body Position:   position maintained   side-lying, right   tilted, left  Taken 1/5/2021 2059 by Nellie Campbell RN  Body Position: position  maintained  Intervention: Prevent Infection  Recent Flowsheet Documentation  Taken 1/6/2021 0203 by Nellie Campbell RN  Infection Prevention:   rest/sleep promoted   single patient room provided  Taken 1/6/2021 0050 by Nellie Campbell RN  Infection Prevention:   rest/sleep promoted   single patient room provided  Taken 1/5/2021 2200 by Nellie Campbell RN  Infection Prevention:   rest/sleep promoted   single patient room provided  Taken 1/5/2021 2059 by Nellie Campbell RN  Infection Prevention:   rest/sleep promoted   single patient room provided  Goal: Optimal Comfort and Wellbeing  Outcome: Ongoing, Progressing  Intervention: Provide Person-Centered Care  Recent Flowsheet Documentation  Taken 1/5/2021 2059 by Nellie Campbell RN  Trust Relationship/Rapport:   care explained   choices provided   emotional support provided   empathic listening provided   questions answered   questions encouraged   thoughts/feelings acknowledged  Goal: Readiness for Transition of Care  Outcome: Ongoing, Progressing     Problem: Skin Injury Risk Increased  Goal: Skin Health and Integrity  Outcome: Ongoing, Progressing  Intervention: Optimize Skin Protection  Recent Flowsheet Documentation  Taken 1/6/2021 0203 by Nellie Campbell RN  Pressure Reduction Techniques:   frequent weight shift encouraged   heels elevated off bed   positioned off wounds   pressure points protected   weight shift assistance provided  Head of Bed (HOB): HOB elevated  Pressure Reduction Devices:   specialty bed utilized   positioning supports utilized   heel offloading device utilized  Skin Protection:   adhesive use limited   incontinence pads utilized   protective footwear used   silicone foam dressing in place   skin sealant/moisture barrier applied   transparent dressing maintained   tubing/devices free from skin contact  Taken 1/6/2021 0050 by Nellie Campbell RN  Pressure Reduction Techniques:   frequent weight shift encouraged   heels elevated off bed    positioned off wounds   pressure points protected   weight shift assistance provided  Head of Bed (HOB): HOB elevated  Pressure Reduction Devices:   heel offloading device utilized   positioning supports utilized   specialty bed utilized  Skin Protection:   adhesive use limited   incontinence pads utilized   protective footwear used   silicone foam dressing in place   skin sealant/moisture barrier applied   transparent dressing maintained   tubing/devices free from skin contact  Taken 1/5/2021 2200 by Nellie Campbell RN  Head of Bed (HOB): HOB elevated  Taken 1/5/2021 2059 by Nellie Campbell RN  Pressure Reduction Techniques:   frequent weight shift encouraged   heels elevated off bed   positioned off wounds   pressure points protected   weight shift assistance provided  Head of Bed (HOB): HOB elevated  Pressure Reduction Devices:   specialty bed utilized   positioning supports utilized   heel offloading device utilized  Skin Protection:   adhesive use limited   incontinence pads utilized   protective footwear used   silicone foam dressing in place   skin sealant/moisture barrier applied   transparent dressing maintained   tubing/devices free from skin contact     Problem: Pain Chronic (Persistent) (Comorbidity Management)  Goal: Acceptable Pain Control and Functional Ability  Outcome: Ongoing, Progressing  Intervention: Develop Pain Management Plan  Recent Flowsheet Documentation  Taken 1/6/2021 0203 by Nellie Campbell RN  Pain Management Interventions:   see MAR   quiet environment facilitated   care clustered  Intervention: Manage Persistent Pain  Recent Flowsheet Documentation  Taken 1/5/2021 2059 by Nellie Campbell RN  Sleep/Rest Enhancement:   noise level reduced   regular sleep/rest pattern promoted  Medication Review/Management: medications reviewed  Intervention: Optimize Psychosocial Wellbeing  Recent Flowsheet Documentation  Taken 1/5/2021 2059 by Nellie Campbell RN  Supportive Measures: active listening  utilized  Diversional Activities: television  Family/Support System Care:   self-care encouraged   support provided   Goal Outcome Evaluation:  Plan of Care Reviewed With: patient  Progress: improving   Uneventful shift. Pt c/o left knee pain, PRN meds given with relief. VSS on RA, 2L placed when sleeping to mintain O2 sats above 90%. Dressings changed. Will continue to monitor.

## 2021-01-06 NOTE — NURSING NOTE
"WOC follow up for BLE wounds. Patient presents with several venous stasis ulcers. All wounds are improving per last photos.    Moisturizing lotion in place and foam dressing covering open areas. Patient has hyperkeratosis and history of gout. Complained that LLE was having some pain and \"twitching\". Patient has heel boots in place-placed pillow under knees to avoid hyperextension of knees.     Patient is on dolphin bed with settings verified. All interventions in place and implemented. Continue with current POC-WOC will continue to follow-please notify WOC for questions or concerns. Thank you.       "

## 2021-01-06 NOTE — PLAN OF CARE
Problem: Adult Inpatient Plan of Care  Goal: Plan of Care Review  Recent Flowsheet Documentation  Taken 1/6/2021 1232 by Bettie Ardon, PT  Progress: no change  Plan of Care Reviewed With: patient  Outcome Summary: Rolled L/R w/ Dep.2A for sling placement & transf to chair w/ mech lift.Performed ther exer LUE & BLE in sup & sit, but def RUE exer & STS  trial d/t R acromium insuff. fx (no WB guidelines given by ortho MD). Onset throbbing pain L knee & desat to 88% sev. x's during mobil. Nsg weaning from 2 L (uses PRN). Plan is SNF possibly 1-7.   Goal Outcome Evaluation:  Plan of Care Reviewed With: patient  Progress: no change  Outcome Summary: Rolled L/R w/ Dep.2A for sling placement & transf to chair w/ mech lift.Performed ther exer LUE & BLE in sup & sit, but def RUE exer & STS  trial d/t R acromium insuff. fx (no WB guidelines given by ortho MD). Onset throbbing pain L knee & desat to 88% sev. x's during mobil. Nsg weaning from 2 L (uses PRN). Plan is SNF possibly 1-7.

## 2021-01-07 NOTE — PROGRESS NOTES
Case Management Discharge Note      Final Note: Per Marisol at [facility], insurance has approved a skilled bed for today. Please call report to [phone] and send the copied chart, DC summary and any hard scripts for controlled meds with the pt. [facility] ambulance to transport at 1100. The pt and his spouse are in agreement.    Provided Post Acute Provider List?: Yes  Post Acute Provider List: Nursing Home  Delivered To: Support Person  Support Person: Yosvany  Method of Delivery: Other (comment)(email)    Selected Continued Care - Admitted Since 12/31/2020     Destination Coordination complete    Service Provider Selected Services Address Phone Fax Patient Preferred    [facility]  Skilled Nursing [address], [facility] [address] [phone] --          Durable Medical Equipment    No services have been selected for the patient.              Dialysis/Infusion    No services have been selected for the patient.              Home Medical Care    No services have been selected for the patient.              Therapy    No services have been selected for the patient.              Community Resources    No services have been selected for the patient.                       Final Discharge Disposition Code: 03 - skilled nursing facility (SNF)

## 2021-01-07 NOTE — DISCHARGE SUMMARY
[facility] Medicine Services  DISCHARGE SUMMARY    Patient Name: Michoacano Rojas  : 1940  MRN: [id number]    Date of Admission: 2020  4:47 PM  Date of Discharge: 2021  Primary Care Physician: Michael Chavez MD    Consults     Date and Time Order Name Status Description    1/3/2021 1458 Inpatient Orthopedic Surgery Consult Completed     1/3/2021 1458 Inpatient General Surgery Consult Completed           Hospital Course     Presenting Problem:   Joint pain [M25.50]    Active Hospital Problems    Diagnosis  POA   • **Acute on chronic cutaneous venous stasis ulcer (CMS/HCC) [I83.009, L97.909]  Yes   • Inguinal hernia, right [K40.90]  Yes   • Right shoulder pain [M25.511]  Yes   • Joint pain [M25.50]  Yes   • COPD (chronic obstructive pulmonary disease) (CMS/HCC) [J44.9]  Yes   • Rheumatoid arthritis (CMS/HCC) [M06.9]  Yes   • Essential hypertension [I10]  Yes      Resolved Hospital Problems   No resolved problems to display.          Hospital Course:  Michoacano Rojas is a 80 y.o. male with chronic rheumatoid arthritis since his youth on chronic prednisone therapy, not currently on DMARD, chronic diastolic CHF, COPD, BPH, chronic venous stasis ulcers with severe debility who initially presented to the hospital with generalized weakness, diminished mobility, and worsening of his chronic lower extremity wounds.  His son is his primary caregiver at home and was recently diagnosed with COVID-19 and has been isolating and unable to provide care for the patient.  This left the burden upon the patient's wife to care for him and he had progressively declined to the extent of being entirely nonambulatory.  It is reported he is able to walk with assistive device at baseline.  He was initially admitted to the hospital under the presumption of UTI with urine cultures ultimately growing less than 10,000 CFU of E. coli which I do not believe would be substantial enough burden to  cause systemic illness.  He did receive 5 days of intravenous ceftriaxone during this hospitalization, however.  I believe that the majority of his issues were related to worsening of his lower extremity wounds in the setting of other than usual care at home.  He has remained hospitalized and been improving, no longer on antibiotics or intravenous therapy, and has been followed by wound care for his lower extremity wounds.  I do recommend continued wound care at discharge.  He is discharging to rehab/SNF this a.m.    Other notable portions of his hospitalization include right shoulder pain with imaging showing no humeral change however angulation near the acromium concerning for degenerative change versus fracture.  He was seen by orthopedics who felt his issues were more degenerative related to his rheumatoid arthritis/age/steroid use and recommended conservative therapy only.    He also has a large right inguinal hernia, CT imaging showed an enlarging direct inguinal hernia worse since 2019, he was evaluated by general surgery who felt that in his current condition he was not a good candidate for operative repair and had low suspicion of being a good future candidate; he will follow-up with general surgery in the clinic approximately 1 month after discharge for reevaluation.    Failure to thrive in adult  Worsening of chronic venous stasis ulcers/pressure ulcers  -Patient chronically has RT LE weakness worse compared to the left  - Has been followed by wound care for assistance with care of ulcers, recommend continued outpatient wound care  -Discharged to rehab/SNF; per previous documentation at his baseline he is able to ambulate some with assistive device     Doubt UTI  BPH  -UA was <10,000 CFU E. coli, highly doubtful this represents substantial enough bacterial load to create such severe systemic symptoms  -S/p 5 days of intravenous ceftriaxone  -Continue Proscar at discharge     Right shoulder pain  -Advanced  arthritis likely related to his chronic rheumatoid arthritis; seen by orthopedics who recommended conservative treatment     Large right inguinal hernia  -CT with enlarging right direct inguinal hernia worse since 2019, seen by general surgery, not a candidate during current admission for repair and likely poor candidate overall; follow-up in the clinic 1 month     Rheumatoid arthritis  Immunocompromised host with chronic prednisone use  -Continue chronic prednisone 10 mg at discharge     Hypertension  Chronic diastolic CHF  Paroxysmal atrial fibrillation  -Continue home amlodipine, bisoprolol, was previously on Eliquis which had been discontinued due to reported fall risk     Chronic goiter with tracheal deviation  Chronic restrictive lung disease  -Patient previously declined ENT evaluation earlier this admission  -Patient on room air at day of discharge     Sinusitis  -Continue Afrin, Zyrtec      Discharge Follow Up Recommendations for outpatient labs/diagnostics:  PCP 1 week post rehab discharge  Dr. Mcnamara, general surgery, 1 month for reevaluation of candidacy for hernia repair    Day of Discharge     HPI:   No acute complaints today.  Still has right shoulder pain.  Tolerating diet.  Agreeable to go to [place] today.    Review of Systems  Gen- No fevers, chills  CV- No chest pain, palpitations  Resp- No cough, dyspnea  GI- No N/V/D, abd pain    Vital Signs:   Temp:  [97.6 °F (36.4 °C)-98.3 °F (36.8 °C)] 97.8 °F (36.6 °C)  Heart Rate:  [52-69] 62  Resp:  [16-18] 18  BP: (111-147)/(63-98) 147/91     Physical Exam:  Constitutional: Awake, alert, lying in bed, chronically ill-appearing, watching TV  HENT: NCAT, mucous membranes moist  Respiratory: Clear to auscultation bilaterally, respiratory effort normal   Cardiovascular: RRR, no murmurs, rubs, or gallops, palpable radial pulses bilaterally  Gastrointestinal: Positive bowel sounds, soft, nontender, nondistended  Musculoskeletal: Arthritic changes to both hands;  chronic venous stasis changes to bilateral lower extremities with thickened skin  Psychiatric: Appropriate affect, cooperative  Neurologic: Speech clear    Pertinent  and/or Most Recent Results     Results from last 7 days   Lab Units 01/05/21  0424 01/04/21  0444 01/03/21  0309 01/02/21  0314 01/01/21 0310 12/31/20 1958   WBC 10*3/mm3 8.11 6.66 7.49 7.92 8.03 9.51   HEMOGLOBIN g/dL 11.0* 11.4* 10.9* 10.1* 10.0* 10.8*   HEMATOCRIT % 38.3 39.9 39.0 37.1* 34.8* 37.1*   PLATELETS 10*3/mm3 185 182 164 169 158 166   SODIUM mmol/L 141 139 144 140 141 143   POTASSIUM mmol/L 4.7 3.4* 3.7 3.8 4.1 4.4   CHLORIDE mmol/L 106 105 108* 108* 107 105   CO2 mmol/L 27.0 29.0 24.0 26.0 23.0 26.0   BUN mg/dL 17 16 22 20 22 25*   CREATININE mg/dL 0.67* 0.48* 0.45* 0.46* 0.46* 0.55*   GLUCOSE mg/dL 79 81 81 91 91 106*   CALCIUM mg/dL 8.4* 8.2* 8.5* 8.1* 8.5* 9.0     Results from last 7 days   Lab Units 01/01/21 0310 12/31/20 1958   BILIRUBIN mg/dL 0.6 0.6   ALK PHOS U/L 60 68   ALT (SGPT) U/L 5 5   AST (SGOT) U/L 12 14           Invalid input(s): TG  Results from last 7 days   Lab Units 12/31/20 1958   PROCALCITONIN ng/mL 0.69*   LACTATE mmol/L 0.8       Brief Urine Lab Results  (Last result in the past 365 days)      Color   Clarity   Blood   Leuk Est   Nitrite   Protein   CREAT   Urine HCG        12/31/20 1958 Yellow Cloudy Negative Moderate (2+) Negative 30 mg/dL (1+)               Microbiology Results Abnormal     Procedure Component Value - Date/Time    COVID PRE-OP / PRE-PROCEDURE SCREENING ORDER (NO ISOLATION) - Swab, Nasopharynx [[id number]]  (Normal) Collected: 01/06/21 1232    Lab Status: Final result Specimen: Swab from Nasopharynx Updated: 01/06/21 1319    Narrative:      The following orders were created for panel order COVID PRE-OP / PRE-PROCEDURE SCREENING ORDER (NO ISOLATION) - Swab, Nasopharynx.  Procedure                               Abnormality         Status                     ---------                                -----------         ------                     COVID-19, ABBOTT IN-HOUS...[[id number]]  Normal              Final result                 Please view results for these tests on the individual orders.    COVID-19, ABBOTT IN-HOUSE,NASAL Swab (NO TRANSPORT MEDIA) 2 HR TAT - Swab, Nasopharynx [[id number]]  (Normal) Collected: 01/06/21 1232    Lab Status: Final result Specimen: Swab from Nasopharynx Updated: 01/06/21 1319     COVID19 Presumptive Negative    Narrative:      Fact sheet for providers: https://www.fda.gov/media/205416/download     Fact sheet for patients: https://www.fda.gov/media/936157/download    Test performed by PCR.  If inconsistent with clinical signs and symptoms patient should be tested with different authorized molecular test.    Blood Culture - Blood, Arm, Right [[id number]] Collected: 12/31/20 2038    Lab Status: Final result Specimen: Blood from Arm, Right Updated: 01/05/21 2100     Blood Culture No growth at 5 days    Blood Culture - Blood, Arm, Left [[id number]] Collected: 12/31/20 2020    Lab Status: Final result Specimen: Blood from Arm, Left Updated: 01/05/21 2100     Blood Culture No growth at 5 days    Urine Culture - Urine, Urine, Clean Catch [[id number]]  (Abnormal) Collected: 12/31/20 1958    Lab Status: Final result Specimen: Urine, Clean Catch Updated: 01/02/21 1523     Urine Culture <10,000 CFU/mL Gram Negative Bacilli     Comment: Call if further workup needed.         COVID PRE-OP / PRE-PROCEDURE SCREENING ORDER (NO ISOLATION) - Swab, Nasopharynx [[id number]]  (Normal) Collected: 12/31/20 1957    Lab Status: Final result Specimen: Swab from Nasopharynx Updated: 12/31/20 2106    Narrative:      The following orders were created for panel order COVID PRE-OP / PRE-PROCEDURE SCREENING ORDER (NO ISOLATION) - Swab, Nasopharynx.  Procedure                               Abnormality         Status                     ---------                               -----------         ------                      Respiratory Panel PCR w/...[[id number]]  Normal              Final result                 Please view results for these tests on the individual orders.    Respiratory Panel PCR w/COVID-19(SARS-CoV-2) SARMAD/[facility]/DEEPIKA/PAD/COR/MAD/KATALINA In-House, NP Swab in UTM/VTM, 3-4 HR TAT - Swab, Nasopharynx [[id number]]  (Normal) Collected: 12/31/20 1957    Lab Status: Final result Specimen: Swab from Nasopharynx Updated: 12/31/20 2106     ADENOVIRUS, PCR Not Detected     Coronavirus 229E Not Detected     Coronavirus HKU1 Not Detected     Coronavirus NL63 Not Detected     Coronavirus OC43 Not Detected     COVID19 Not Detected     Human Metapneumovirus Not Detected     Human Rhinovirus/Enterovirus Not Detected     Influenza A PCR Not Detected     Influenza A H1 Not Detected     Influenza A H1 2009 PCR Not Detected     Influenza A H3 Not Detected     Influenza B PCR Not Detected     Parainfluenza Virus 1 Not Detected     Parainfluenza Virus 2 Not Detected     Parainfluenza Virus 3 Not Detected     Parainfluenza Virus 4 Not Detected     RSV, PCR Not Detected     Bordetella pertussis pcr Not Detected     Bordetella parapertussis PCR Not Detected     Chlamydophila pneumoniae PCR Not Detected     Mycoplasma pneumo by PCR Not Detected    Narrative:      Fact sheet for providers: https://docs.[employer]/wp-content/uploads/JAR8805-5660-AA0.1-EUA-Provider-Fact-Sheet-3.pdf    Fact sheet for patients: https://docs.[employer]/wp-content/uploads/SZA6707-5856-PP2.1-EUA-Patient-Fact-Sheet-1.pdf    Test performed by PCR.          Imaging Results (All)     Procedure Component Value Units Date/Time    XR Shoulder 2+ View Right [[id number]] Collected: 01/03/21 1035     Updated: 01/04/21 1527    Narrative:         EXAMINATION: XR RIGHT SHOULDER, 2 VIEWS - 01/03/2021     INDICATION: R65.10-Systemic inflammatory response syndrome (sirs) of  non-infectious origin without acute organ dysfunction;  L97.519-Non-pressure chronic ulcer of other part  of right foot with  unspecified severity; L97.529-Non-pressure chronic ulcer of other part  of left foot with unspecified severity; N39.0-Urinary tract infection,  site not specified. Right shoulder pain, reduced mobility.      COMPARISON: Chest x-ray 12/31/2020     FINDINGS: Internal rotation, external rotation and scapular Y views of  the right shoulder reveal advanced degenerative changes with essentially  total joint space height loss of the glenohumeral joint as well as  moderate DJD of the AC joint. There are angulated margins of the  scaphoid near the acromion seen on axial and shoulder AP views  indeterminate for fracture versus degeneration with further imaging  considered if concern for fracture at this site. Visualized right  hemithorax grossly clear.        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 No acute fracture of the  humerus, however, angulated margins concerning for acute fracture and/or  angulated degenerative changes near the acromion seen on axial view  greatest involvement with further imaging recommended for evaluation if  concern of point tenderness at this site in particular.     DICTATED:   01/03/2021  EDITED/ls :   01/03/2021      This report was finalized on 1/4/2021 3:24 PM by Dr. Jaime Parnell.       CT Abdomen Pelvis Without Contrast [[id number]] Collected: 01/03/21 1314     Updated: 01/04/21 1133    Narrative:      EXAMINATION: CT ABDOMEN/PELVIS WO CONTRAST - 01/03/2021      INDICATION: R65.10-Systemic inflammatory response syndrome (sirs) of  non-infectious origin without acute organ dysfunction;  L97.519-Non-pressure chronic ulcer of other part of right foot with  unspecified severity; L97.529-Non-pressure chronic ulcer of other part  of left foot with unspecified severity; N39.0-Urinary tract infection,  site not specified. Abdominal pain.     TECHNIQUE: CT abdomen and pelvis without intravenous contrast.      The radiation dose reduction device was turned on for each scan per the  ALARA (As Low as Reasonably Achievable) protocol.     COMPARISON: CT abdomen and pelvis 12/15/2019.     FINDINGS: Lung bases demonstrate small bilateral pleural effusions with  adjacent atelectasis. Liver without focal lesion. Gallbladder contains  numerous calcifications of gallstones and cholelithiasis. No biliary  dilatation. Pancreas and spleen unremarkable. Adrenals without distinct  nodule. Kidneys without hydronephrosis or hydroureter demonstrating  simple appearing left superior pole renal cortical cyst. No bulky  retroperitoneal adenopathy. Atherosclerotic nonaneurysmal abdominal  aorta. GI tract evaluation without focal thickening or disproportionate  dilatation of bowel to suggest mechanical obstructive process, however,  small and large bowel contained within a large right direct inguinal  hernia extending with edema and bowel extending to the right scrotum.  Pelvic viscera otherwise unremarkable. Diffuse body wall edema. No acute  osseous findings.       Impression:         1. Small bilateral pleural effusions right greater than left with  adjacent atelectasis.     2. Large right direct inguinal hernia containing increasing components  of small and large bowel likely terminal ileum and cecum descending into  the right inguinal canal through the direct inguinal hernia connection  and into the right scrotum with increased involvement from 2019 exam  along with adjacent edema.     DICTATED:   01/03/2021  EDITED/ls :   01/03/2021         This report was finalized on 1/4/2021 11:30 AM by Dr. Jaime Parnell.       XR Chest 1 View [[id number]] Collected: 12/31/20 2016     Updated: 12/31/20 2018    Narrative:      CR Chest 1 Vw    INDICATION:   80-year-old male with weakness today.     COMPARISON:    Chest 10/23/2019  CTA chest 10/23/2019    FINDINGS:  Single portable AP view(s) of the chest.  Mediastinum is mildly widened secondary to  known large thyroid goiter. The appearance is unchanged from prior exams. Heart size is stable. Lungs are clear. No pleural effusions or pneumothorax. Advanced  degenerative changes in both glenohumeral joints.      Impression:      No acute cardiopulmonary findings. No change from 10/23/2019    Signer Name: Ramon Nguyen MD   Signed: 12/31/2020 8:16 PM   Workstation [facility]: [email or web address]    [facility]          Results for orders placed during the hospital encounter of 06/04/18   Duplex Venous Lower Extremity - Bilateral CAR    Narrative · Normal bilateral lower extremity venous duplex scan.     All veins are compressible that are visualized.          Results for orders placed during the hospital encounter of 06/04/18   Duplex Venous Lower Extremity - Bilateral CAR    Narrative · Normal bilateral lower extremity venous duplex scan.     All veins are compressible that are visualized.          Results for orders placed during the hospital encounter of 10/23/19   Adult Transthoracic Echo Complete W/ Cont if Necessary Per Protocol    Narrative · Right ventricular cavity is borderline dilated.  · Mild tricuspid valve regurgitation is present.  · There is calcification of the aortic valve.  · Estimated EF = 60%.  · Left ventricular systolic function is normal.  · Left ventricular wall motion is normal          Discharge Details        Discharge Medications      New Medications      Instructions Start Date   acetaminophen 325 MG tablet  Commonly known as: TYLENOL   650 mg, Oral, Every 6 Hours PRN      cetirizine 10 MG tablet  Commonly known as: zyrTEC   5 mg, Oral, Daily   Start Date: January 8, 2021     erythromycin 5 MG/GM ophthalmic ointment  Commonly known as: ROMYCIN   Both Eyes, Every 12 Hours Scheduled         Changes to Medications      Instructions Start Date   HYDROcodone-acetaminophen  MG per tablet  Commonly known as: NORCO  What changed:   · how to take this  · when to take  this  · reasons to take this  · additional instructions   1 tablet, Oral, Every 6 Hours PRN         Continue These Medications      Instructions Start Date   amLODIPine 10 MG tablet  Commonly known as: NORVASC   TAKE 1 TABLET EVERY DAY      Azelastine HCl 137 MCG/SPRAY solution   274 mcg, Nasal, Every 12 Hours Scheduled      bisoprolol 5 MG tablet  Commonly known as: ZEBeta   TAKE 1/2 TABLET EVERY DAY      cholecalciferol 25 MCG (1000 UT) tablet  Commonly known as: VITAMIN D3   2,000 Units, Oral, Daily      diclofenac 1 % gel gel  Commonly known as: VOLTAREN   4 g, Topical, 4 Times Daily PRN      docusate sodium 50 MG capsule  Commonly known as: COLACE   100 mg, Oral, Nightly      finasteride 5 MG tablet  Commonly known as: PROSCAR   TAKE 1 TABLET EVERY DAY      fluticasone 50 MCG/ACT nasal spray  Commonly known as: FLONASE   2 sprays, Nasal, 2 Times Daily PRN      Hernia Belt Double Large misc   For right inguinal hernia      HM Salonpas Pain Relief 1.2-5.7-6.3 % patch  Generic drug: Camphor-Menthol-Methyl Sal   Topical, As Needed      predniSONE 10 MG tablet  Commonly known as: DELTASONE   10 mg, Oral, Daily      traMADol 50 MG tablet  Commonly known as: ULTRAM   50 mg, Oral, 2 Times Daily             Allergies   Allergen Reactions   • Naproxen Sodium Other (See Comments)     Increased heart rate  Aleve         Discharge Disposition:  Skilled Nursing Facility (DC - External)    Diet:  Hospital:  Diet Order   Procedures   • Diet Regular; Cardiac          CODE STATUS:    Code Status and Medical Interventions:   Ordered at: 12/31/20 2219     Limited Support to NOT Include:    Intubation     Code Status:    No CPR     Medical Interventions (Level of Support Prior to Arrest):    Limited       Future Appointments   Date Time Provider Department Center   6/18/2021  2:00 PM Michael Chavez MD [facility]       Additional Instructions for the Follow-ups that You Need to Schedule     Discharge Follow-up with PCP    As directed       Currently Documented PCP:    Michael Chavez MD    PCP Phone Number:    [phone]     Follow Up Details: PCP 1 week post rehab DC         Discharge Follow-up with Specialty: Dr. Mcnamara; 1 Month   As directed      Specialty: Dr. Mcnamara    Follow Up: 1 Month                     Ronak Costello DO  01/07/21      Time Spent on Discharge:  I spent  35  minutes on this discharge activity which included: face-to-face encounter with the patient, reviewing the data in the system, coordination of the care with the nursing staff as well as consultants, documentation, and entering orders.

## 2021-01-07 NOTE — PLAN OF CARE
Problem: Fall Injury Risk  Goal: Absence of Fall and Fall-Related Injury  Outcome: Ongoing, Progressing  Intervention: Identify and Manage Contributors to Fall Injury Risk  Recent Flowsheet Documentation  Taken 1/6/2021 2051 by Nellie Campbell RN  Medication Review/Management: medications reviewed  Intervention: Promote Injury-Free Environment  Recent Flowsheet Documentation  Taken 1/7/2021 0437 by Nellie Campbell RN  Safety Promotion/Fall Prevention:   activity supervised   assistive device/personal items within reach   clutter free environment maintained   fall prevention program maintained   lighting adjusted   nonskid shoes/slippers when out of bed   room organization consistent   safety round/check completed  Taken 1/7/2021 0200 by Nellie Campbell RN  Safety Promotion/Fall Prevention:   activity supervised   assistive device/personal items within reach   clutter free environment maintained   fall prevention program maintained   lighting adjusted   nonskid shoes/slippers when out of bed   room organization consistent   safety round/check completed  Taken 1/7/2021 0033 by Nellie Campbell RN  Safety Promotion/Fall Prevention:   activity supervised   assistive device/personal items within reach   clutter free environment maintained   fall prevention program maintained   lighting adjusted   nonskid shoes/slippers when out of bed   room organization consistent   safety round/check completed  Taken 1/6/2021 2200 by Nellie Campbell RN  Safety Promotion/Fall Prevention:   activity supervised   assistive device/personal items within reach   clutter free environment maintained   fall prevention program maintained   lighting adjusted   nonskid shoes/slippers when out of bed   room organization consistent   safety round/check completed  Taken 1/6/2021 2051 by Nellie Campbell RN  Safety Promotion/Fall Prevention:   assistive device/personal items within reach   activity supervised   clutter free environment maintained    fall prevention program maintained   lighting adjusted   nonskid shoes/slippers when out of bed   room organization consistent   safety round/check completed     Problem: Adult Inpatient Plan of Care  Goal: Plan of Care Review  Outcome: Ongoing, Progressing  Goal: Patient-Specific Goal (Individualized)  Outcome: Ongoing, Progressing  Goal: Absence of Hospital-Acquired Illness or Injury  Outcome: Ongoing, Progressing  Intervention: Identify and Manage Fall Risk  Recent Flowsheet Documentation  Taken 1/7/2021 0437 by Nellie Campbell RN  Safety Promotion/Fall Prevention:   activity supervised   assistive device/personal items within reach   clutter free environment maintained   fall prevention program maintained   lighting adjusted   nonskid shoes/slippers when out of bed   room organization consistent   safety round/check completed  Taken 1/7/2021 0200 by Nellie Campbell RN  Safety Promotion/Fall Prevention:   activity supervised   assistive device/personal items within reach   clutter free environment maintained   fall prevention program maintained   lighting adjusted   nonskid shoes/slippers when out of bed   room organization consistent   safety round/check completed  Taken 1/7/2021 0033 by Nellie Campbell RN  Safety Promotion/Fall Prevention:   activity supervised   assistive device/personal items within reach   clutter free environment maintained   fall prevention program maintained   lighting adjusted   nonskid shoes/slippers when out of bed   room organization consistent   safety round/check completed  Taken 1/6/2021 2200 by Nellie Campbell RN  Safety Promotion/Fall Prevention:   activity supervised   assistive device/personal items within reach   clutter free environment maintained   fall prevention program maintained   lighting adjusted   nonskid shoes/slippers when out of bed   room organization consistent   safety round/check completed  Taken 1/6/2021 2051 by Nellie Campbell, RN  Safety Promotion/Fall  Prevention:   assistive device/personal items within reach   activity supervised   clutter free environment maintained   fall prevention program maintained   lighting adjusted   nonskid shoes/slippers when out of bed   room organization consistent   safety round/check completed  Intervention: Prevent Skin Injury  Recent Flowsheet Documentation  Taken 1/7/2021 0437 by Nellie Campbell RN  Body Position: position maintained  Taken 1/7/2021 0200 by Nellie Campbell RN  Body Position: position maintained  Taken 1/7/2021 0033 by Nellie Campbell RN  Body Position: position maintained  Taken 1/6/2021 2200 by Nellie Campbell RN  Body Position: position maintained  Taken 1/6/2021 2051 by Nellie Campbell RN  Body Position: position maintained  Intervention: Prevent and Manage VTE (venous thromboembolism) Risk  Recent Flowsheet Documentation  Taken 1/6/2021 2051 by Nellie Campbell RN  VTE Prevention/Management: (Sub Q lovenox ) bilateral  Intervention: Prevent Infection  Recent Flowsheet Documentation  Taken 1/7/2021 0437 by Nellie Campbell RN  Infection Prevention:   rest/sleep promoted   single patient room provided  Taken 1/7/2021 0200 by Nellie Campbell RN  Infection Prevention:   rest/sleep promoted   single patient room provided  Taken 1/7/2021 0033 by Nellie Campbell RN  Infection Prevention:   rest/sleep promoted   single patient room provided  Taken 1/6/2021 2200 by Nellie Campbell RN  Infection Prevention:   rest/sleep promoted   single patient room provided  Taken 1/6/2021 2051 by Nellie Campbell RN  Infection Prevention:   rest/sleep promoted   single patient room provided  Goal: Optimal Comfort and Wellbeing  Outcome: Ongoing, Progressing  Intervention: Provide Person-Centered Care  Recent Flowsheet Documentation  Taken 1/6/2021 2051 by Nellie Campbell RN  Trust Relationship/Rapport:   choices provided   care explained   emotional support provided   empathic listening provided   questions answered   questions  encouraged   thoughts/feelings acknowledged  Goal: Readiness for Transition of Care  Outcome: Ongoing, Progressing     Problem: Skin Injury Risk Increased  Goal: Skin Health and Integrity  Outcome: Ongoing, Progressing  Intervention: Optimize Skin Protection  Recent Flowsheet Documentation  Taken 1/7/2021 0437 by Nellie Campbell RN  Head of Bed ([facility]): HOB elevated  Taken 1/7/2021 0200 by Nellie Campbell RN  Head of Bed (HOB): HOB elevated  Taken 1/7/2021 0033 by Nellie Campbell RN  Pressure Reduction Techniques:   frequent weight shift encouraged   heels elevated off bed   positioned off wounds   pressure points protected   weight shift assistance provided  Head of Bed (HOB): HOB elevated  Pressure Reduction Devices:   specialty bed utilized   positioning supports utilized   heel offloading device utilized  Skin Protection:   adhesive use limited   incontinence pads utilized   protective footwear used   silicone foam dressing in place   skin sealant/moisture barrier applied   transparent dressing maintained   tubing/devices free from skin contact  Taken 1/6/2021 2200 by Nellie Campbell RN  Head of Bed (HOB): HOB elevated  Taken 1/6/2021 2051 by Nellie Campbell RN  Pressure Reduction Techniques:   frequent weight shift encouraged   heels elevated off bed   positioned off wounds   weight shift assistance provided   pressure points protected  Head of Bed (HOB): HOB elevated  Pressure Reduction Devices:   specialty bed utilized   positioning supports utilized   heel offloading device utilized  Skin Protection:   adhesive use limited   incontinence pads utilized   protective footwear used   silicone foam dressing in place   skin sealant/moisture barrier applied   transparent dressing maintained   tubing/devices free from skin contact     Problem: Pain Chronic (Persistent) (Comorbidity Management)  Goal: Acceptable Pain Control and Functional Ability  Outcome: Ongoing, Progressing  Intervention: Manage Persistent  Pain  Recent Flowsheet Documentation  Taken 1/6/2021 2051 by Nellie Campbell, RN  Sleep/Rest Enhancement:   awakenings minimized   consistent schedule promoted   noise level reduced   regular sleep/rest pattern promoted  Medication Review/Management: medications reviewed  Intervention: Optimize Psychosocial Wellbeing  Recent Flowsheet Documentation  Taken 1/6/2021 2051 by Nellie Campbell, RN  Supportive Measures: active listening utilized  Diversional Activities: television  Family/Support System Care:   self-care encouraged   support provided   Goal Outcome Evaluation:  Plan of Care Reviewed With: patient  Progress: no change   Uneventful shift. Plan to DC at 1100 back to [facility] by EMS. VSS on RA, Afib per monitor. Will continue to monitor.

## 2021-01-07 NOTE — PROGRESS NOTES
[facility]  PROGRESS NOTE    Patient Name: Michoacano Rojas  : 1940  MRN: [id number]    Date of Admission: 2020  Primary Care Physician: Michael Chavez MD    Subjective   Subjective     CC:  Wounds    HPI:  No complaints this morning.  Awaiting insurance approval for placement, tentatively scheduled to transfer to SNF tomorrow.  No events overnight    ROS:  Gen- No fevers, chills  CV- No chest pain, palpitations  Resp- No cough, dyspnea  GI- No N/V/D, abd pain    Objective   Objective     Vital Signs:   Temp:  [97.6 °F (36.4 °C)-98 °F (36.7 °C)] (P) 97.6 °F (36.4 °C)  Heart Rate:  [53-69] 69  Resp:  [16] 16  BP: (111-132)/(63-77) 130/63        Physical Exam:  Constitutional: Awake, alert, lying in bed, chronically ill-appearing  HENT: NCAT, mucous membranes moist  Respiratory: Clear to auscultation bilaterally, respiratory effort normal   Cardiovascular: RRR, no murmurs, rubs, or gallops, palpable radial pulses bilaterally  Gastrointestinal: Positive bowel sounds, soft, nontender, nondistended  Musculoskeletal: Arthritic changes to both hands; chronic venous stasis changes to bilateral lower extremities with thickened skin, clean bandages over bilateral shins  Psychiatric: Appropriate affect, cooperative  Neurologic: Speech clear    Results Reviewed:  Results from last 7 days   Lab Units 21  0424 21  0444 21  03020   WBC 10*3/mm3 8.11 6.66 7.49   < > 9.51   HEMOGLOBIN g/dL 11.0* 11.4* 10.9*   < > 10.8*   HEMATOCRIT % 38.3 39.9 39.0   < > 37.1*   PLATELETS 10*3/mm3 185 182 164   < > 166   PROCALCITONIN ng/mL  --   --   --   --  0.69*    < > = values in this interval not displayed.     Results from last 7 days   Lab Units 21  0424 21  0444 21  0309  21  03120   SODIUM mmol/L 141 139 144   < > 141 143   POTASSIUM mmol/L 4.7 3.4* 3.7   < > 4.1 4.4   CHLORIDE mmol/L 106 105 108*   < >  107 105   CO2 mmol/L 27.0 29.0 24.0   < > 23.0 26.0   BUN mg/dL 17 16 22   < > 22 25*   CREATININE mg/dL 0.67* 0.48* 0.45*   < > 0.46* 0.55*   GLUCOSE mg/dL 79 81 81   < > 91 106*   CALCIUM mg/dL 8.4* 8.2* 8.5*   < > 8.5* 9.0   ALT (SGPT) U/L  --   --   --   --  5 5   AST (SGOT) U/L  --   --   --   --  12 14    < > = values in this interval not displayed.     Estimated Creatinine Clearance: 67.7 mL/min (A) (by C-G formula based on SCr of 0.67 mg/dL (L)).    Microbiology Results Abnormal     Procedure Component Value - Date/Time    COVID PRE-OP / PRE-PROCEDURE SCREENING ORDER (NO ISOLATION) - Swab, Nasopharynx [[id number]]  (Normal) Collected: 01/06/21 1232    Lab Status: Final result Specimen: Swab from Nasopharynx Updated: 01/06/21 1319    Narrative:      The following orders were created for panel order COVID PRE-OP / PRE-PROCEDURE SCREENING ORDER (NO ISOLATION) - Swab, Nasopharynx.  Procedure                               Abnormality         Status                     ---------                               -----------         ------                     COVID-19, ABBOTT IN-HOUS...[[id number]]  Normal              Final result                 Please view results for these tests on the individual orders.    COVID-19, ABBOTT IN-HOUSE,NASAL Swab (NO TRANSPORT MEDIA) 2 HR TAT - Swab, Nasopharynx [[id number]]  (Normal) Collected: 01/06/21 1232    Lab Status: Final result Specimen: Swab from Nasopharynx Updated: 01/06/21 1319     COVID19 Presumptive Negative    Narrative:      Fact sheet for providers: https://www.fda.gov/media/606199/download     Fact sheet for patients: https://www.fda.gov/media/786209/download    Test performed by PCR.  If inconsistent with clinical signs and symptoms patient should be tested with different authorized molecular test.    Blood Culture - Blood, Arm, Right [[id number]] Collected: 12/31/20 2038    Lab Status: Final result Specimen: Blood from Arm, Right Updated: 01/05/21 2100     Blood  Culture No growth at 5 days    Blood Culture - Blood, Arm, Left [[id number]] Collected: 12/31/20 2020    Lab Status: Final result Specimen: Blood from Arm, Left Updated: 01/05/21 2100     Blood Culture No growth at 5 days    Urine Culture - Urine, Urine, Clean Catch [[id number]]  (Abnormal) Collected: 12/31/20 1958    Lab Status: Final result Specimen: Urine, Clean Catch Updated: 01/02/21 1523     Urine Culture <10,000 CFU/mL Gram Negative Bacilli     Comment: Call if further workup needed.         COVID PRE-OP / PRE-PROCEDURE SCREENING ORDER (NO ISOLATION) - Swab, Nasopharynx [[id number]]  (Normal) Collected: 12/31/20 1957    Lab Status: Final result Specimen: Swab from Nasopharynx Updated: 12/31/20 2106    Narrative:      The following orders were created for panel order COVID PRE-OP / PRE-PROCEDURE SCREENING ORDER (NO ISOLATION) - Swab, Nasopharynx.  Procedure                               Abnormality         Status                     ---------                               -----------         ------                     Respiratory Panel PCR w/...[[id number]]  Normal              Final result                 Please view results for these tests on the individual orders.    Respiratory Panel PCR w/COVID-19(SARS-CoV-2) SARMAD/SHERI/DEEPIKA/PAD/COR/MAD/KATALINA In-House, NP Swab in UTM/VTM, 3-4 HR TAT - Swab, Nasopharynx [[id number]]  (Normal) Collected: 12/31/20 1957    Lab Status: Final result Specimen: Swab from Nasopharynx Updated: 12/31/20 2106     ADENOVIRUS, PCR Not Detected     Coronavirus 229E Not Detected     Coronavirus HKU1 Not Detected     Coronavirus NL63 Not Detected     Coronavirus OC43 Not Detected     COVID19 Not Detected     Human Metapneumovirus Not Detected     Human Rhinovirus/Enterovirus Not Detected     Influenza A PCR Not Detected     Influenza A H1 Not Detected     Influenza A H1 2009 PCR Not Detected     Influenza A H3 Not Detected     Influenza B PCR Not Detected     Parainfluenza Virus 1 Not Detected      Parainfluenza Virus 2 Not Detected     Parainfluenza Virus 3 Not Detected     Parainfluenza Virus 4 Not Detected     RSV, PCR Not Detected     Bordetella pertussis pcr Not Detected     Bordetella parapertussis PCR Not Detected     Chlamydophila pneumoniae PCR Not Detected     Mycoplasma pneumo by PCR Not Detected    Narrative:      Fact sheet for providers: https://docs.[employer]/wp-content/uploads/HFJ9734-0592-JH9.1-EUA-Provider-Fact-Sheet-3.pdf    Fact sheet for patients: https://docs.[employer]/wp-content/uploads/URV1576-0524-MP3.1-EUA-Patient-Fact-Sheet-1.pdf    Test performed by PCR.          Imaging Results (Last 24 Hours)     ** No results found for the last 24 hours. **          Results for orders placed during the hospital encounter of 10/23/19   Adult Transthoracic Echo Complete W/ Cont if Necessary Per Protocol    Narrative · Right ventricular cavity is borderline dilated.  · Mild tricuspid valve regurgitation is present.  · There is calcification of the aortic valve.  · Estimated EF = 60%.  · Left ventricular systolic function is normal.  · Left ventricular wall motion is normal          I have reviewed the medications:  Scheduled Meds:amLODIPine, 10 mg, Oral, Daily  bisoprolol, 2.5 mg, Oral, Daily  cetirizine, 10 mg, Oral, Daily  docusate sodium, 100 mg, Oral, Nightly  enoxaparin, 40 mg, Subcutaneous, Q24H  erythromycin, , Both Eyes, Q12H  finasteride, 5 mg, Oral, Daily  oxymetazoline, 2 spray, Each Nare, BID  predniSONE, 10 mg, Oral, Daily With Breakfast  sodium chloride, 10 mL, Intravenous, Q12H  traMADol, 50 mg, Oral, BID      Continuous Infusions:   PRN Meds:.•  acetaminophen **OR** acetaminophen **OR** acetaminophen  •  albuterol  •  fluticasone  •  HYDROcodone-acetaminophen  •  loperamide  •  ondansetron  •  potassium chloride **OR** potassium chloride **OR** potassium chloride  •  [COMPLETED] Insert peripheral IV **AND** sodium chloride  •  sodium chloride    Assessment/Plan   Assessment &  Plan     Active Hospital Problems    Diagnosis  POA   • **Acute on chronic cutaneous venous stasis ulcer (CMS/[facility]) [I83.009, L97.909]  Yes   • Inguinal hernia, right [K40.90]  Yes   • Right shoulder pain [M25.511]  Yes   • Joint pain [M25.50]  Yes   • Acute UTI (urinary tract infection) [N39.0]  Yes   • COPD (chronic obstructive pulmonary disease) (CMS/[facility]) [J44.9]  Yes   • Rheumatoid arthritis (CMS/[facility]) [M06.9]  Yes   • Essential hypertension [I10]  Yes      Resolved Hospital Problems   No resolved problems to display.        Brief Hospital Course to date:  Michoacano Rojas is a 80 y.o. male with rheumatoid arthritis on chronic prednisone, chronic diastolic CHF, COPD, BPH, chronic venous stasis with ulcerations and severe debility who presented with generalized weakness, diminished mobility, worsening of his chronic wounds.  His son who is his primary caregiver was diagnosed with COVID-19 and has not been available to care for the patient leading his wife to provide him care.  He progressively declined and was nonambulatory.  He was admitted under the presumption UTI with urine culture ultimately returning unremarkable and is now awaiting rehab placement    Assessment/plan    Failure to thrive in adult  Worsening of chronic venous stasis ulcers/pressure ulcers  -Patient chronically has RT LE weakness worse compared to the left  - WOC following and assisting with care of ulcers  -PT/OT, rehab/SNF placement    Doubt UTI  BPH  -UA was <10,000 CFU E. coli, highly doubtful this represents substantial enough bacterial load to create such severe systemic symptoms  -S/p 5 days of intravenous ceftriaxone  -Continue Proscar    Right shoulder pain  -Advanced arthritis likely related to his chronic rheumatoid arthritis; seen by orthopedics who recommended conservative treatment    Large right inguinal hernia  -CT with enlarging right direct inguinal hernia worse since 2019, seen by general surgery, not a candidate during  current admission for repair and likely poor candidate overall    Rheumatoid arthritis  Immunocompromised host with chronic prednisone use  -Continue prednisone 10 mg    Hypertension  Chronic diastolic CHF  Paroxysmal atrial fibrillation  -Continue home amlodipine, bisoprolol, was previously on Eliquis which had been discontinued due to reported fall risk    Chronic goiter with tracheal deviation  Chronic restrictive lung disease  -Patient previously declined ENT evaluation earlier this admission  -Able to wean off O2 during my assessments while rounding    Sinusitis  -Continue Afrin, Zyrtec    DVT Prophylaxis: Lovenox      Disposition: Patient is scheduled for discharge to [facility] tomorrow 1/7/2021 at 1100 pending insurance approval.    CODE STATUS:   Code Status and Medical Interventions:   Ordered at: 12/31/20 2214     Limited Support to NOT Include:    Intubation     Code Status:    No CPR     Medical Interventions (Level of Support Prior to Arrest):    Limited       Ronak Costello DO  01/06/21

## 2021-01-20 NOTE — PROGRESS NOTES
"  Follow Up Office Visit      Patient Name: Michoacano Rojas  : 1940   MRN: [id number]   Care Team: Patient Care Team:  Michael Chavez MD as PCP - General  Michael Chavez MD as PCP - Family Medicine    Chief Complaint:    Chief Complaint   Patient presents with   • Hypertension     follow up   • Weakness - Generalized     need for lift   • Immobility     You have chosen to receive care through a telephone visit. Do you consent to use a telephone visit for your medical care today?    YES    History of Present Illness: Michoacano Rojas is a 80 y.o. male with pertinent medical history significant for chronic rheumatoid arthritis on chronic prednisone therapy daily, chronic diastolic CHF, COPD, BPH, severe debility and chronic venous stasis ulcers of the lower extremities.  Presents today via telephonic visit for follow-up on hypertension but as well for referral for Tres lift due to his immobility and weakness.    Patient was recently hospitalized at  from 2022 for what appears to be a general decline at home and worsening of his chronic venous stasis ulcers.  UTI was suspected and he received a 5-day course of IV ceftriaxone.  However UA was less than 10,000 CFU E. coli, doubtful for presence of significant infection.      Notable hospital issues also include right shoulder pain with imaging suggestive of degenerative changes vs. Fracture.      After hospital discharge, he was transferred to to rehab facility for stay x 1 week.  Per patient spouse, was told that \"there was not much else they could do for him in terms of rehabilitation\".    He is now back at home where he lives with his wife and 2 sons who are his primary caregivers.  Home health and physical therapy were initiated and are at the home for first visit today.  Per patient spouse, patient is unable to sit up, pull himself up from bed or stand from seated position.  Reports he is dead weight and requires full " "lifting which her son has to do.  She is inquiring about Tres lift at this time.    Patient spends half of his day in bed, awaits for son to come home on lunch break to put him in chair, then transfers back to bed once son home from work in the evenings.  He is aware of bowel/bladder activity but wears depends because unable to ambulate to bathroom.    Reports his venous ulcers appear improved and denies any swelling or drainage of any lesions.  Continuing to apply topical therapies as directed at discharge from rehab facility.    Hypertension- chronic and ongoing, Continues to take bisoprolol 5mg daily and amlodipine 10mg daily.  Checking BP routinely at home and reports \"readings are good\".  /76 today.      Rheumatoid arthritis/immunocompromise- chronic and ongoing.  Continues to take daily dose of prednisone 10mg daily.  Significant decline in his mobility and general functional level due to RA.  Son with recent COVID infection but quarantined from patient.  Denies any current symptoms of illness, denies fever/chills, SOA, cough, sore throat, or headache.    Subjective      Review of Systems:   Review of Systems   Constitutional: Positive for fatigue. Negative for chills and fever.   HENT: Negative for sore throat and trouble swallowing.    Respiratory: Negative for cough, chest tightness and shortness of breath.    Cardiovascular: Negative for chest pain.   Gastrointestinal: Negative for abdominal pain, blood in stool, diarrhea, nausea and vomiting.   Skin: Negative for color change.   Neurological: Positive for weakness, memory problem and confusion. Negative for headache.   Psychiatric/Behavioral: Negative for sleep disturbance.       I have reviewed and the following portions of the patient's history were updated as appropriate: past family history, past medical history, past social history, past surgical history and problem list.    Medications:   No current facility-administered medications for this " visit.   No current outpatient medications on file.    Facility-Administered Medications Ordered in Other Visits:   •  acetaminophen (TYLENOL) suppository 650 mg, 650 mg, Rectal, Q4H PRN, Chantel Mcpherson APRN  •  acetaminophen (TYLENOL) tablet 650 mg, 650 mg, Oral, Q6H PRN, Jenny Baeza MD  •  albuterol (PROVENTIL) nebulizer solution 0.083% 2.5 mg/3mL, 2.5 mg, Nebulization, Q6H PRN, Jenny Baeza MD  •  bisacodyl (DULCOLAX) suppository 10 mg, 10 mg, Rectal, Daily PRN, Jenny Baeza MD  •  dextromethorphan polistirex ER (DELSYM) oral suspension 60 mg, 60 mg, Oral, Q12H PRN, Jenny Baeza MD  •  furosemide (LASIX) injection 20 mg, 20 mg, Intravenous, Q6H PRN, Chantel Mcpherson APRN, 20 mg at 01/29/21 1336  •  furosemide (LASIX) injection 20 mg, 20 mg, Intravenous, TID, Chantel Mcpherson APRN, 20 mg at 02/03/21 0455  •  glycopyrrolate (ROBINUL) injection 0.2 mg, 0.2 mg, Intravenous, Q6H PRN, Chantel Mcpherson APRN, 0.2 mg at 01/29/21 1336  •  haloperidol lactate (HALDOL) injection 1 mg, 1 mg, Intravenous, Q4H PRN, Chantel Mcpherson APRN  •  LORazepam (ATIVAN) injection 0.5 mg, 0.5 mg, Intravenous, Q2H PRN, Jenny Baeza MD, 0.5 mg at 01/31/21 0918  •  metoprolol tartrate (LOPRESSOR) injection 2.5 mg, 2.5 mg, Intravenous, Q6H PRN, Chantel Mcpherson APRN  •  Morphine sulfate (PF) injection 4 mg, 4 mg, Intravenous, Q6H, Jenny Baeza MD, 4 mg at 02/03/21 0455  •  Morphine sulfate (PF) injection 4 mg, 4 mg, Intravenous, Q1H PRN, Jenny Baeza MD, 4 mg at 01/31/21 0918  •  Scopolamine (TRANSDERM-SCOP) 1.5 MG/3DAYS patch 1 patch, 1 patch, Transdermal, Q72H PRN, Chantel Mcpherson APRN  •  Scopolamine (TRANSDERM-SCOP) 1.5 MG/3DAYS patch 1 patch, 1 patch, Transdermal, Q72H, Chantel Mcpherson APRN, 1 patch at 02/01/21 2146  •  sennosides-docusate (PERICOLACE) 8.6-50 MG per tablet 2 tablet, 2 tablet, Oral, Nightly, Chantel Mcpherson APRN  •  sodium chloride 0.9 % flush 10 mL, 10  mL, Intravenous, Q12H, Jenny Baeza MD, 10 mL at 02/02/21 2113  •  sodium chloride 0.9 % flush 10 mL, 10 mL, Intravenous, PRN, Jenny Baeza MD    Allergies:   Allergies   Allergen Reactions   • Naproxen Sodium Other (See Comments)     Increased heart rate  Aleve       Objective     Physical Exam:  Vital Signs:   Vitals:    01/20/21 0924   BP: 130/76   Pulse: 78   Temp: 97.8 °F (36.6 °C)   SpO2: 92%   PainSc:   6     There is no height or weight on file to calculate BMI.     Physical Exam     TELEPHONIC VISIT- patient verbal and able to communicate.  Working with PT/HH at time of visit.      Assessment / Plan      Assessment/Plan:   Problems Addressed This Visit    ICD-10-CM ICD-9-CM   1. 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 Immobility  Z74.09 799.89   3. Generalized weakness  R53.1 780.79   4. Rheumatoid arthritis involving both feet, unspecified whether rheumatoid factor present (CMS/[facility])  M06.9 714.0   5. Essential hypertension  I10 401.9       Immobility  Generalized weakness  -  Continue HH/PT as ordered  - Has recently completed 1 week in Rehab facility  - Tres lift prescription provided, faxed to medical equipment supplier of [facility]    Rheumatoid Arthritis  Immunocompromised due to chronic corticosteroids  - Continue prednisone 10mg daily as prescribed    Hypertension  - Stable  - Continue Bisoprolol 5mg daily, Amlodipine 10mg daily    This visit has been rescheduled as a phone visit to comply with patient safety concerns in accordance with CDC recommendations. Total time of discussion was 15 minutes.      Plan of care reviewed with patient at the conclusion of today's visit. Education was provided regarding diagnosis and management.  Patient verbalizes understanding of and agreement with management plan.    There are no Patient Instructions on file for this visit.         Follow Up:   Return in about 2 months (around  3/20/2021).    REEMA Mack  [facility] [address]    Please note that portions of this note may have been completed with a voice recognition program. Efforts were made to edit the dictations, but occasionally words are mistranscribed.

## 2021-01-20 NOTE — TELEPHONE ENCOUNTER
PATIENT'S (WIFE) KAILEE CALLED AND STATED THAT SHE WAS INFORM BY [facility] TO FAX THE PRESCRIPTION FOR A ADRIAN LIFT TO THEM.     CALL BACK : [phone]

## 2021-01-20 NOTE — TELEPHONE ENCOUNTER
WIL WANTED TO REPORT AFTER THE IN HOME VISIT. SHE SAID THAT SHE WILL BE MEETING WITH THE PATIENT TWO TIMES A WEEK FOR TWO WEEKS AND THEN ONE TIME A WEEK FOR ONE WEEK.     ALSO THE OCCUPATIONAL THERAPIST WILL BE MEETING WITH THE PATIENT FOR TWO TIMES A WEEK FOR TWO WEEKS AND THEN ONE TIME A WEEK FOR ONE WEEK.     CONTACT: [phone]

## 2021-01-20 NOTE — TELEPHONE ENCOUNTER
GUILLERMINA FROM [facility] CALLED AND STATED THAT THEY WILL NOT BE ABLE TO OBTAIN A ADRIAN LIFT FOR THIS PATIENT.  SO YOUR REQUEST CAN NOT BE FILLED.    ANY QUESTIONS [phone]

## 2021-01-21 NOTE — TELEPHONE ENCOUNTER
Message from [facility] states they can not get patient a cornell lift. Can we clarify why?  (ie. Do they not carry those or did I omit something on the order that is needed)    Thanks!

## 2021-01-21 NOTE — TELEPHONE ENCOUNTER
[facility] NEEDS A WEIGHT ON THE PT.   THEIR LIFTS ARE ONLY FOR PT UNDER 325LBS.  PLEASE CALL [employer] [phone]

## 2021-01-21 NOTE — TELEPHONE ENCOUNTER
Called [facility] - they are out of stock for Tres lifts and not sure when they will be available. Called [facility]  [phone] and they do have the lift in stock. Faxed order, office note and demographics to [phone]

## 2021-01-21 NOTE — TELEPHONE ENCOUNTER
[facility] needs a weight on patient. I called and LVM for patient to return call to see if he has been weighed recently and if so how much.

## 2021-01-22 NOTE — TELEPHONE ENCOUNTER
If he is otherwise in no distress, they may place oxygen if they can.  Our goal at this point is to keep him comfortable.

## 2021-01-22 NOTE — TELEPHONE ENCOUNTER
Spoke to Delilah with HH. She said the pt was on oxygen in the hospital and his wife would like to have at home. I will check O2 levels in hospital note to see if he will qualify and Delilah will talk to oxygen company.

## 2021-01-22 NOTE — TELEPHONE ENCOUNTER
MOOSE FROM [facility] CALLED STATING THAT PATIENT'S STATS ARE IN THE LOW 80' MID 80'S. LUNGS ARE CLEAR NO COUGH OR SHORTNESS OF BREATH. PATIENT IS CURRENTLY NOT ON OXYGEN AND THEY CAN NOT GET HIS STATS UP.    PLEASE ADVISE AND CALL MOOSE BACK [phone]

## 2021-01-26 PROBLEM — I25.10 CAD (CORONARY ARTERY DISEASE): Status: ACTIVE | Noted: 2021-01-01

## 2021-01-26 PROBLEM — I83.003 VENOUS ULCER OF ANKLE (HCC): Status: ACTIVE | Noted: 2021-01-01

## 2021-01-26 PROBLEM — E88.09 HYPOALBUMINEMIA: Status: ACTIVE | Noted: 2021-01-01

## 2021-01-26 PROBLEM — L97.309 VENOUS ULCER OF ANKLE (HCC): Status: ACTIVE | Noted: 2021-01-01

## 2021-01-26 PROBLEM — J12.82 PNEUMONIA DUE TO COVID-19 VIRUS: Status: ACTIVE | Noted: 2021-01-01

## 2021-01-26 PROBLEM — U07.1 PNEUMONIA DUE TO COVID-19 VIRUS: Status: ACTIVE | Noted: 2021-01-01

## 2021-01-26 NOTE — TELEPHONE ENCOUNTER
Sounds like he needs to go to the ER.  His 1/6/2021 Covid test was presumptive negative.  He could have Covid at this point.  He also could have pneumonia

## 2021-01-26 NOTE — TELEPHONE ENCOUNTER
Called and spoke with Pat at [facility]. They need the documentation from [employer]. Jessica is faxing that over. Once we receive we will need the order with specific o2 liter, frequency, and diagnosis, demo faxed to [facility] once we have all documentation.

## 2021-01-26 NOTE — TELEPHONE ENCOUNTER
Dr. Chavez has left for the afternoon.     Called wife to get more info with low grade fever. Patients wife states he feels like he is dying. States he is hardly eating or drinking anything. He sleeps most of the day. She states none of this is new but his o2 sats going up and down is new. She gave him tylenol about 2 hours ago. His temp is 99.2.     Patient was last seen by Margy 01/20/21.    Refer to 01/22/20 phone note. We have been working on getting home oxygen level and needed [employer] to go out today to get o2 sats to pass on to [facility].     Called and spoke with Pat at [facility]. They need the documentation from [employer]. Jessica is faxing that over. Once we receive we will need the order with specific o2 liter, frequency, and diagnosis, demo faxed to [facility] once we have all documentation.

## 2021-01-26 NOTE — TELEPHONE ENCOUNTER
PHONE CALL FROM [facility]. OXYGEN STAYING BELOW 90 AND LOW FEVER OF 99.2.      PLEASE CALL MELISSA @ [phone] IF NEEDED.

## 2021-01-26 NOTE — TELEPHONE ENCOUNTER
Hospital note from 12/31 states O2 sat in the night is in the 80's. O2 sats 1/7/21  93%. He was DC'd home on RA. Spoke to Pat at [facility]  [phone] and she said they need O2 sats within 2 days of D/C. She said HH can do a RA sat and send result to Dr. Chavez. That can be scanned into pt medical record and if 88% or below we can set up home O2.  Called [facility] [phone] and spoke to Maria Isabel. She will have O2 sat obtained and call before it is faxed. Notified Chantel (pt spouse)

## 2021-02-01 NOTE — PROGRESS NOTES
Continued Stay Note  [facility]     Patient Name: Michoacano Rojas    Today's Date: 2/1/2021    Admit Date: 1/29/2021    Assessment Note:  2/1 Michoacano Rojas 30% pps is a 80 y.o.  male admitted to Hospice at [facility] with Covid Pna. Patient sitting up in bed denies pain or dyspnea. Took several sips of water. Patient is a bit more vocal today and had very clear directions as to his positioning. Patient continues to have pain during wound care and repositioning. Plan will be for patient to return home with his wife and son upon discharge. Patient’s skin remains warm and dry. Continue to monitor for nonverbal indications of pain. Patient remains in C-19 isolation.       Discharge plan:    Currently patient remains GIP for complications of EOL care requiring skilled nursing and medical management of symptoms. Discharge plan dependent on a decreased level of care and survival of this admission.      POC:  Family support and education R/T EOL care      Hospice Criteria:  Hospice care provides support and care for persons and their families with a life limiting disease. Hospice is a Medicare benefit and requires   1) Patient prognosis is 6 months or less to live,   2) Patient no longer going through curative therapies.   3) Agreement of 2 physicians that patient's condition is life limiting and will most likely result in death in <6 months.  Hospice is a Level of care not a location and can be provided in various settings based on patient's needs.    Medicare guidelines state that hospice patients in the hospital require skilled nursing, medical interventions and a level of care that cannot be met in any other setting. This stay is most commonly less then two week.   A patient may be Hospice appropriate but not inpatient appropriate based on this criteria.  Once a Hospice referral is reviewed and  appears to be stable enough to be transferred to a lower level of care with hospice then that is the preference.      Latonya Mesa MSN. RN. PN.  [facility]  Hospice Care  [phone]

## 2021-02-01 NOTE — PROGRESS NOTES
"Hospice Progress Note    Patient Name: Michoacano Rojas   : 1940  Gender: male    Code Status: comfort measures    Date of Admission: 2021    Subjective:    80yoM admitted in Hospice for Covid19 pna.      Today pt sleeping upon visual visit; appears comfortable. On room air. VSS. Pt awake later this morning for Hospice RN visit; he is able to make needs known - able to direct nursing on how far to raise his head with the bed.    - PRNs:  Ativan 0.5mg x1  Morphine 4mg x1    - Held: colace, lopressor x2    - Intake/Output  *PO: 25% breakfast on ; did have sips today  *Urine: 1400ml and 1700ml previous 48h  *LBM:       ROS:  Review of Systems   Unable to perform ROS: Acuity of condition   Constitutional:        Pt sleeping comfortably.        Reviewed current scheduled and prn medications for route, type, dose and frequency.     •  acetaminophen  •  acetaminophen  •  albuterol  •  dextromethorphan polistirex ER  •  furosemide  •  glycopyrrolate  •  haloperidol lactate  •  LORazepam  •  Morphine  •  Scopolamine  •  sodium chloride    Objective:   /79 (BP Location: Left arm, Patient Position: Lying)   Pulse 78   Temp 97.6 °F (36.4 °C) (Axillary)   Resp 18   Ht 162.6 cm (64.02\")   Wt 67.6 kg (149 lb 0.5 oz)   SpO2 100%   BMI 25.57 kg/m²      PPS: Palliative Performance Scale score as of 2021, 13:04 EST is 20% based on the following measures:   Ambulation: Totally bed bound  Activity and Evidence of Disease: Unable to do any work, extensive evidence of disease  Self-Care: Total care  Intake:Minimal sips  LOC: Full, drowsy or confusion      Physical Exam:  Constitutional:       Appearance: He is ill-appearing.      Comments: Sleeping in bed, NAD.  HENT:      Head: Normocephalic and atraumatic.      Mouth/Throat:      Mouth: Mucous membranes are dry.   Eyes:      Comments: closed   Neck:      Musculoskeletal: Neck supple.      Comments: Increased neck circumference  Pulmonary:      " Comments: Respirations are nonlabored. No audible congestion.  Abdominal:      Comments: rounded   Skin:     General: Skin is dry.   Neurological:      Comments: sleeping   Psychiatric:      Comments: No facial grimacing; no moaning         Pneumonia due to COVID-19 virus      Assessment/Plan:     80yoM admitted inpt Hospice for Covid19 pna.      Dyspnea  Anxiety  Congestion  Constipation  Weakness    *Medication changes made in preparation for discharge home    - change scheduled lopressor to prn, for HR > 120    - d/c decadron    - continue scop patch    - change colace to senna-s 2 tablets nightly - please encourage pt to take    - change lasix to TID (7a, 1p, 7p) - time coordinated with other scheduled medication (previously was q6h)    - continue morphine 4mg IV q6h - can transition to oral morphine at later time (even at time of discharge) - will wait for now given all the changes today    Coordinated care with Nursing and Hospice IDT.     Discharge Disposition: home with family possibly this week    Total Visit Time: 20min  Face to Face Time: *quick visual*    Justification for care:  Patient meets criteria for acute in-patient care with required nursing assessment and interventions for symptoms with IV medications.      Chantel Mcpherson, DNP, MHA, APRN  [facility]  Hospice and Palliative Care Nurse Practitioner  02/01/21  12:25 EST

## 2021-02-02 NOTE — PROGRESS NOTES
"Hospice Progress Note    Date of Admission: 1/29/2021    Subjective:      No PRN meds administered.  PPS 20%, taking water.   Leg hurts with dressing change.        Scheduled Meds:furosemide, 20 mg, Intravenous, TID  Morphine, 4 mg, Intravenous, Q6H  Scopolamine, 1 patch, Transdermal, Q72H  senna-docusate sodium, 2 tablet, Oral, Nightly  sodium chloride, 10 mL, Intravenous, Q12H    PRN Meds:.•  acetaminophen  •  acetaminophen  •  albuterol  •  bisacodyl  •  dextromethorphan polistirex ER  •  furosemide  •  glycopyrrolate  •  haloperidol lactate  •  LORazepam  •  metoprolol tartrate  •  Morphine  •  Scopolamine  •  sodium chloride    Objective: /72 (BP Location: Left arm, Patient Position: Lying)   Pulse 84   Temp 97.6 °F (36.4 °C) (Axillary)   Resp 16   Ht 162.6 cm (64.02\")   Wt 67.6 kg (149 lb 0.5 oz)   SpO2 96%   BMI 25.57 kg/m²      Intake/Output Summary (Last 24 hours) at 2/2/2021 1820  Last data filed at 2/2/2021 1200  Gross per 24 hour   Intake 645 ml   Output 1350 ml   Net -705 ml     Physical Exam:    *In COVID airborne isolation, visualized from social distance    HR 83, SpO2 96% on RA  Thin, frail male, in bed  NC/AT, eyes closed  Respirations unlabored  RRR on tele      Results from last 7 days   Lab Units 01/28/21  0750   WBC 10*3/mm3 7.92   HEMOGLOBIN g/dL 11.5*   HEMATOCRIT % 40.9   PLATELETS 10*3/mm3 131*     Results from last 7 days   Lab Units 01/29/21  0624   SODIUM mmol/L 147*   POTASSIUM mmol/L 3.8   CHLORIDE mmol/L 110*   CO2 mmol/L 26.0   BUN mg/dL 23   CREATININE mg/dL 0.58*   CALCIUM mg/dL 8.2*   BILIRUBIN mg/dL 0.3   ALK PHOS U/L 56   ALT (SGPT) U/L 9   AST (SGOT) U/L 15   GLUCOSE mg/dL 110*       Impression:   80yoM admitted in Hospice for Covid19 pna. + nasopharyngeal PCR test 1/27/21.  Isolation recommended until 2/6/21 (10 days)     Dyspnea  Aphagia  Anxiety  Congestion  Constipation  Weakness     Plan:   Continue current medication regimen and isolation precautions.  " Scheduled morphine, lasix, and scop.       Pt is nonambulatory, dependent for personal care needs, and needs isolation precautions.  Care cannot be managed in alternate setting.      Jenny Baeza MD  02/02/21

## 2021-02-02 NOTE — PROGRESS NOTES
Continued Stay Note  [facility]     Patient Name: Michoacano Rojas  MRN: [id number]  Today's Date: 2/2/2021    Admit Date: 1/29/2021     PPS 20 % Michoacano Rojas is an 80 y.o.  male admitted to Hospice at [facility] with Covid Pna.  Pt assessment is given per primary RN María Elena due to pt being in COVID-19 isolation.    “Pt sitting up in bed alert and oriented drinking water. Pt denies any discomfort at this time. Breathe sounds are diminished on RA. Albright drained 1025 cc urine.  Pt can get up with 2 assist and lift. C/O discomfort when dressings  on his legs are changed. Pt wounds continue to heal and are wrapped to protect from further injury. No PRN medications needed.  Pt wanting to go home. Patient meets criteria for acute in-patient care with required nursing assessment and interventions for symptoms with IV medications      Cynthia Fuller RN

## 2021-02-02 NOTE — PLAN OF CARE
Goal Outcome Evaluation:  Plan of Care Reviewed With: patient  Progress: declining  Outcome Summary: Patient slept through the night. No issues noted.

## 2021-02-03 NOTE — PLAN OF CARE
Goal Outcome Evaluation:        Outcome Summary: patient is alert, roomair, heel boots on, skin is bruised, scab, graves in place, urine is clear straw colored, total feed, offered but patient has refused to eat, no c/o at this time, will cont to monitor

## 2021-02-03 NOTE — PROGRESS NOTES
Continued Stay Note  [facility]     Patient Name: Michoacano Rojas  MRN: [id number]  Today's Date: 2/3/2021    Admit Date: 1/29/2021       PPS 30 % Michoacano Rojas is an 80 y.o.  male admitted to Hospice at [facility] with Covid Pna. Pt assessment is given per primary RN Raheem due to pt being in COVID-19 isolation.” Pt alert and oriented. Refusing to eat. Breathe sounds clear, diminished on RA. Albright drained 1350 cc urine PO intake 645 cc, last BM 2/1. Skin warm and dry. Leg ulcers are scabbing and legs remain a dark pigment. Pt to return home Friday so PO meds to start today. Pt denies any pain or SOA.  No PRN meds needed. . Patient meets criteria for acute in-patient care with required nursing assessment and interventions for symptoms with IV medications    Cynthia Fuller RN

## 2021-02-03 NOTE — PLAN OF CARE
Goal Outcome Evaluation:  Plan of Care Reviewed With: patient  Progress: declining  Outcome Summary: VSS. Patient slept off and on. Complains of being cold pretty regularly.

## 2021-02-03 NOTE — PROGRESS NOTES
"Hospice Progress Note    Patient Name: Michoacano Rojas   : 1940  Gender: male    Code Status: comfort measures    Date of Admission: 2021    Subjective:    80yoM admitted in Hospice for Covid19 pna.     Per Nursing: Pt assessment is given per primary RN Raheem due to pt being in COVID-19 isolation.” Pt alert and oriented. Refusing to eat. Breathe sounds clear, diminished on RA. Graves drained 1350 cc urine PO intake 645 cc, last BM . Skin warm and dry. Leg ulcers are scabbing and legs remain a dark pigment..... patient is alert, roomair, heel boots on, skin is bruised, scab, graves in place, urine is clear straw colored, total feed, offered but patient has refused to eat, no c/o at this time, will cont to monitor    Upon brief visit today, pt appears comfortable, NAD.       ROS:  Review of Systems   Unable to perform ROS: Acuity of condition       Reviewed current scheduled and prn medications for route, type, dose and frequency.     •  acetaminophen  •  acetaminophen  •  albuterol  •  bisacodyl  •  dextromethorphan polistirex ER  •  furosemide  •  glycopyrrolate  •  haloperidol lactate  •  LORazepam  •  metoprolol tartrate  •  Morphine  •  Scopolamine  •  sodium chloride    Objective:   /69 (BP Location: Left arm, Patient Position: Lying)   Pulse 99   Temp 97.6 °F (36.4 °C) (Oral)   Resp 18   Ht 162.6 cm (64.02\")   Wt 67.6 kg (149 lb 0.5 oz)   SpO2 96%   BMI 25.57 kg/m²        PPS: Palliative Performance Scale score as of 2021, 14:03 EST is 20% based on the following measures:   Ambulation: Totally bed bound  Activity and Evidence of Disease: Unable to do any work, extensive evidence of disease  Self-Care: Total care  Intake:Minimal sips  LOC: Full, drowsy or confusion      Physical Exam:  Physical Exam  Constitutional:       Appearance: He is ill-appearing.      Comments: Appears comfortable.  TV on, sleeping in bed   HENT:      Head: Normocephalic and atraumatic.      " Mouth/Throat:      Mouth: Mucous membranes are dry.   Neck:      Musculoskeletal: Neck supple.   Cardiovascular:      Comments: Slightly tachy, HR 100s  Abdominal:      Comments: Rounded, soft   Skin:     General: Skin is dry.   Psychiatric:      Comments: No facial grimacing, no moaning           Pneumonia due to COVID-19 virus      Assessment/Plan:     80yoM admitted in Hospice for Covid19 pna.      Dyspnea  Anxiety  Congestion  Constipation  Weakness    *No medication changes today    Continue lasix 20mg TID    Continue senna-s two tabs qhs    Prns reviewed/adjusted for comfort    Coordinated care with Nursing and Hospice IDT    Discharge Disposition: in process - home with wife vs LTC placement    Total Visit Time: 15min    Justification for care:  Patient meets criteria for acute in-patient care with required nursing assessment and interventions for symptoms with IV medications.      Chantel Mcpherson DNP, MHA, APRN  [facility]  Hospice and Palliative Care Nurse Practitioner  02/03/21  12:24 EST

## 2021-02-04 NOTE — DISCHARGE PLACEMENT REQUEST
"Seeking male LTC bed with medicaid pending for current hospice patient at [facility]. Tested positive for COVID-19 on 1/26.    Medicare: [id number]  Medicaid: [id number]    Please call me at our office at  [phone]. I work Mondays - Thursdays. Rosalio Zimmerman is the  on Fridays and will be able to assist with any questions as well.    Thanks    Candi Frye SONY (80 y.o. Male)     Date of Birth Social Security Number Address Home Phone MRN    1940  [address]  [place] [address] [phone] [id number]    Alevism Marital Status          [facility] ([facility])        Admission Date Admission Type Admitting Provider Attending Provider Department, Room/Bed    1/29/21 Elective Jneny Baeza MD Walling, Terri L, MD [address], [address]    Discharge Date Discharge Disposition Discharge Destination                       Attending Provider: Jenny Baeza MD    Allergies: Naproxen Sodium    Isolation: Contact Air   Infection: COVID (confirmed) (01/26/21)   Code Status: No CPR    Ht: 162.6 cm (64.02\")   Wt: 67.6 kg (149 lb 0.5 oz)    Admission Cmt: None   Principal Problem: None                Active Insurance as of 1/29/2021     Primary Coverage     Payor Plan Insurance Group Employer/Plan Group    [employer] [facility] [employer]ARE NAV[employer]      Payor Plan Address Payor Plan Phone Number Payor Plan Fax Number Effective Dates    [address] [facility] [phone]  1/29/2021 - None Entered    [place] [address]       Subscriber Name Subscriber Birth Date Member ID       CANDI OSMAN 1940 [id number]                 Emergency Contacts      (Rel.) Home Phone Work Phone Mobile Phone    Yosvany Osman (Son) [phone] -- --    Chantel Osman (Spouse) [phone] -- [phone]            Emergency Contact Information     Name Relation Home Work Mobile    Yosvany Osman Son [phone]   "    Chantel Rojas Spouse [phone]          Insurance Information                [facility] CARE NAVIGAT[employer] Phone: [phone]    Subscriber: [place] Rojas Subscriber#: [id number]    Group#:  Precert#:              History & Physical      Chantel McphersonREEMA at 21 1228     Attestation signed by Jenny Baeza MD at 21 1447    Attest.  ~[facility]                  Hospice History and Physical     Patient Name:  Michoacano Rojas   : 1940   Sex: male    Patient Care Team:  Michael Chavez MD as PCP - General  Michael Chavez MD as PCP - Family Medicine    Code Status: Comfort Measures    Subjective     Patient is a 80 y.o. male presents with continued weakness, fatigue, increased somnolence, anorexia, restlessness and a fever at home for the three days PTA.     PMHx:  Afib, RA on daily prednisone, dCHF, goiter with tracheal deviation and stenosis, CAD, HTN, COPD, BPH, chronic venous stasis ulcers, Large right inguinal hernia    20: Admit [facility] for AoC BLE venous stasis ulcer; Covid19 negative    2021: Discharged to [facility]    2021: Discharged from [facility] to home (remained nonambulatory)    2021: Admit [facility] for sepsis, Covid19 pna (initial positive test was 2021)    Mr. Rojas was treated with IV remdesivir/decadron but continued to deteriorate. Last night pt developed Afib with RVR and hypotension; pt was given digoxin and amiodarone. Ultimately pt's family elected for Comfort Measures. Mr. Rojas was admitted to in Hospice on 2021 for mgmt of acute symptoms 2/2 Covid19 pna.       Review of Systems  Review of Systems   Unable to perform ROS: Acuity of condition       History  Past Medical History:   Diagnosis Date   • A-fib (CMS/HCC)    • Atrial fibrillation (CMS/HCC)    • CHF (congestive heart failure) (CMS/HCC)    • Chronic cutaneous venous stasis ulcer (CMS/HCC)    • Coronary artery disease    •  Disease of thyroid gland    • Hypertension    • Penile abnormality    • Peptic ulcer disease    • Primary malignant neoplasm of bronchus with metastasisto other site, unspecified laterality (CMS/[facility]) 4/22/2019   • RA (rheumatoid arthritis) (CMS/[facility])    • Rheumatic fever    • Rheumatoid arthritis (CMS/[facility])    • Sepsis (CMS/[facility]) 11/17/2017    from cellulitis due to leg ulcer, hospitalization, antibiotics, wound care   • Sleep apnea    • Vertigo    • Vertigo      Past Surgical History:   Procedure Laterality Date   • EYE SURGERY     • JOINT MANIPULATION      left hip repair     Current Facility-Administered Medications   Medication Dose Route Frequency Provider Last Rate Last Admin   • [START ON 1/30/2021] acetaminophen (TYLENOL) suppository 650 mg  650 mg Rectal Q4H PRN Chantel Mcpherson APRMOHAMUD       • acetaminophen (TYLENOL) tablet 650 mg  650 mg Oral Q6H PRN Jenny Baeza MD       • albuterol (PROVENTIL) nebulizer solution 0.083% 2.5 mg/3mL  2.5 mg Nebulization Q6H PRN Jenny Baeza MD       • [START ON 1/30/2021] dexamethasone (DECADRON) injection 6 mg  6 mg Intravenous Daily With Breakfast Jenny Baeza MD       • dextromethorphan polistirex ER (DELSYM) oral suspension 60 mg  60 mg Oral Q12H PRN Jenny Baeza MD       • digoxin (LANOXIN) injection 250 mcg  250 mcg Intravenous Once Nawaf Reis MD       • docusate sodium (COLACE) capsule 100 mg  100 mg Oral Nightly Jenny Baeza MD       • glycopyrrolate (ROBINUL) injection 0.2 mg  0.2 mg Intravenous Q6H PRN Chantel Mcpherson APRMOHAMUD       • haloperidol lactate (HALDOL) injection 1 mg  1 mg Intravenous Q4H PRN Chantel Mcpherson APRMOHAMUD       • LORazepam (ATIVAN) injection 0.5 mg  0.5 mg Intravenous Q2H PRN Jenny Baeza MD       • metoprolol tartrate (LOPRESSOR) injection 2.5 mg  2.5 mg Intravenous Q6H Jenny Baeza MD       • Morphine sulfate (PF) injection 4 mg  4 mg Intravenous Q6H Jenny Baeza MD       • Morphine sulfate  (PF) injection 4 mg  4 mg Intravenous Q1H PRN Jenny Baeza MD       • Scopolamine (TRANSDERM-SCOP) 1.5 MG/3DAYS patch 1 patch  1 patch Transdermal Q72H PRN Chantel Mcpherson APRN       • sodium chloride 0.9 % flush 10 mL  10 mL Intravenous Q12H Jenny Baeza MD       • sodium chloride 0.9 % flush 10 mL  10 mL Intravenous PRN Jenny Baeza MD            •  [START ON 1/30/2021] acetaminophen  •  acetaminophen  •  albuterol  •  dextromethorphan polistirex ER  •  glycopyrrolate  •  haloperidol lactate  •  LORazepam  •  Morphine  •  Scopolamine  •  sodium chloride  Allergies   Allergen Reactions   • Naproxen Sodium Other (See Comments)     Increased heart rate  Aleve     Family History   Problem Relation Age of Onset   • Hypertension Mother    • Alzheimer's disease Mother    • Alzheimer's disease Father    • Hypertension Sister    • Lung cancer Brother    • Diabetes Brother      Social History     Socioeconomic History   • Marital status:      Spouse name: Not on file   • Number of children: Not on file   • Years of education: Not on file   • Highest education level: Not on file   Tobacco Use   • Smoking status: Never Smoker   • Smokeless tobacco: Never Used   • Tobacco comment: Quit 01/01/1968   Substance and Sexual Activity   • Alcohol use: No   • Drug use: No   • Sexual activity: Defer       Objective     Vital Signs  Temp:  [97 °F (36.1 °C)-98.9 °F (37.2 °C)] 97.2 °F (36.2 °C)  Heart Rate:  [] 139  Resp:  [18-26] 22  BP: ()/() 94/81      PPS: Palliative Performance Scale score as of 1/29/2021, 12:34 EST is 30% based on the following measures:   Ambulation: Totally bed bound  Activity and Evidence of Disease: Unable to do any work, extensive evidence of disease  Self-Care: Total care  Intake: Reduced   LOC: Full, drowsy or confusion      Physical Exam:  Physical Exam  Constitutional:       Appearance: He is ill-appearing.      Comments: Sleeping in bed, laying on left side away  from door. NAD.    HENT:      Head: Normocephalic and atraumatic.      Mouth/Throat:      Mouth: Mucous membranes are dry.   Eyes:      Comments: closed   Neck:      Musculoskeletal: Neck supple.      Comments: Increased neck circumference  Pulmonary:      Comments: Respirations are nonlabored. No audible congestion.  Abdominal:      Comments: rounded   Skin:     General: Skin is dry.   Neurological:      Comments: sleeping   Psychiatric:      Comments: No facial grimacing; no moaning         Results Review:   Lab Results   Component Value Date    HGBA1C 5.50 06/05/2018       Lab Results   Component Value Date    GLUCOSE 110 (H) 01/29/2021    BUN 23 01/29/2021    CREATININE 0.58 (L) 01/29/2021    EGFRIFAFRI >150 01/29/2021    BCR 39.7 (H) 01/29/2021    K 3.8 01/29/2021    CO2 26.0 01/29/2021    CALCIUM 8.2 (L) 01/29/2021    ALBUMIN 2.30 (L) 01/29/2021    AST 15 01/29/2021    ALT 9 01/29/2021       WBC   Date Value Ref Range Status   01/28/2021 7.92 3.40 - 10.80 10*3/mm3 Final     RBC   Date Value Ref Range Status   01/28/2021 4.51 4.14 - 5.80 10*6/mm3 Final     Hemoglobin   Date Value Ref Range Status   01/28/2021 11.5 (L) 13.0 - 17.7 g/dL Final     Hematocrit   Date Value Ref Range Status   01/28/2021 40.9 37.5 - 51.0 % Final     MCV   Date Value Ref Range Status   01/28/2021 90.7 79.0 - 97.0 fL Final     MCH   Date Value Ref Range Status   01/28/2021 25.5 (L) 26.6 - 33.0 pg Final     MCHC   Date Value Ref Range Status   01/28/2021 28.1 (L) 31.5 - 35.7 g/dL Final     RDW   Date Value Ref Range Status   01/28/2021 15.1 12.3 - 15.4 % Final     RDW-SD   Date Value Ref Range Status   01/28/2021 51.0 37.0 - 54.0 fl Final     MPV   Date Value Ref Range Status   01/28/2021 12.7 (H) 6.0 - 12.0 fL Final     Platelets   Date Value Ref Range Status   01/28/2021 131 (L) 140 - 450 10*3/mm3 Final     Neutrophil %   Date Value Ref Range Status   01/27/2021 89.7 (H) 42.7 - 76.0 % Final     Lymphocyte %   Date Value Ref Range  Status   01/27/2021 7.2 (L) 19.6 - 45.3 % Final     Monocyte %   Date Value Ref Range Status   01/27/2021 2.1 (L) 5.0 - 12.0 % Final     Eosinophil %   Date Value Ref Range Status   01/27/2021 0.0 (L) 0.3 - 6.2 % Final     Basophil %   Date Value Ref Range Status   01/27/2021 0.1 0.0 - 1.5 % Final     Immature Grans %   Date Value Ref Range Status   01/27/2021 0.9 (H) 0.0 - 0.5 % Final     Neutrophils Absolute   Date Value Ref Range Status   01/28/2021 6.42 1.70 - 7.00 10*3/mm3 Final     Neutrophils, Absolute   Date Value Ref Range Status   01/27/2021 7.82 (H) 1.70 - 7.00 10*3/mm3 Final     Lymphocytes, Absolute   Date Value Ref Range Status   01/27/2021 0.63 (L) 0.70 - 3.10 10*3/mm3 Final     Monocytes, Absolute   Date Value Ref Range Status   01/27/2021 0.18 0.10 - 0.90 10*3/mm3 Final     Eosinophils Absolute   Date Value Ref Range Status   01/28/2021 0.00 0.00 - 0.40 10*3/mm3 Final     Eosinophils, Absolute   Date Value Ref Range Status   01/27/2021 0.00 0.00 - 0.40 10*3/mm3 Final     Basophils Absolute   Date Value Ref Range Status   01/28/2021 0.00 0.00 - 0.20 10*3/mm3 Final     Basophils, Absolute   Date Value Ref Range Status   01/27/2021 0.01 0.00 - 0.20 10*3/mm3 Final     Immature Grans, Absolute   Date Value Ref Range Status   01/27/2021 0.08 (H) 0.00 - 0.05 10*3/mm3 Final     nRBC   Date Value Ref Range Status   01/27/2021 0.0 0.0 - 0.2 /100 WBC Final           Pneumonia due to COVID-19 virus      Assessment/Plan   Assessment/Plan:     80yoM admitted in Hospice for Covid19 pna.     Dyspnea  Anxiety  Congestion  Constipation  Weakness    Continue decadron 6mg daily    Continue colace    Lopressor 2.5mg q6h; hold for SBP < 95    Schedule morphine 4mg q6h given prn use requiring increasing frequency of opioid today    Lasix 20mg q6h scheduled and prn    Place scop patch - continue to assess benefit vs burden of congestion if agitated    Prns for comfort    Diet: soft texture; boost supplement    Vitals  "prn    Albright cath placement    Discharge plan: home with family    Total Visit Time: 55min  Face to Face Time: *brief visualization given current isolation status*    Justification for care:  Patient meets criteria for acute in-patient care with required nursing assessment and interventions for symptoms with IV medications.      Chantel Mcpherson DNP, MHA, APRN  [facility]  Hospice and Palliative Care Nurse Practitioner  21  12:28 EST            Electronically signed by Jenny Baeza MD at 21 1447          Physician Progress Notes (last 7 days) (Notes from 21 1036 through 21 1036)      Chantel Mcpherson APRN at 21 1224        Hospice Progress Note    Patient Name: Michoacano Rojas   : 1940  Gender: male    Code Status: comfort measures    Date of Admission: 2021    Subjective:    80yoM admitted in Hospice for Covid19 pna.      Today pt sleeping upon visual visit; appears comfortable. On room air. VSS. Pt awake later this morning for Hospice RN visit; he is able to make needs known - able to direct nursing on how far to raise his head with the bed.    - PRNs:  Ativan 0.5mg x1  Morphine 4mg x1    - Held: colace, lopressor x2    - Intake/Output  *PO: 25% breakfast on ; did have sips today  *Urine: 1400ml and 1700ml previous 48h  *LBM:       ROS:  Review of Systems   Unable to perform ROS: Acuity of condition   Constitutional:        Pt sleeping comfortably.        Reviewed current scheduled and prn medications for route, type, dose and frequency.     •  acetaminophen  •  acetaminophen  •  albuterol  •  dextromethorphan polistirex ER  •  furosemide  •  glycopyrrolate  •  haloperidol lactate  •  LORazepam  •  Morphine  •  Scopolamine  •  sodium chloride    Objective:   /79 (BP Location: Left arm, Patient Position: Lying)   Pulse 78   Temp 97.6 °F (36.4 °C) (Axillary)   Resp 18   Ht 162.6 cm (64.02\")   Wt 67.6 kg (149 lb 0.5 oz)   SpO2 " 100%   BMI 25.57 kg/m²      PPS: Palliative Performance Scale score as of 2/1/2021, 13:04 EST is 20% based on the following measures:   Ambulation: Totally bed bound  Activity and Evidence of Disease: Unable to do any work, extensive evidence of disease  Self-Care: Total care  Intake:Minimal sips  LOC: Full, drowsy or confusion      Physical Exam:  Constitutional:       Appearance: He is ill-appearing.      Comments: Sleeping in bed, NAD.  HENT:      Head: Normocephalic and atraumatic.      Mouth/Throat:      Mouth: Mucous membranes are dry.   Eyes:      Comments: closed   Neck:      Musculoskeletal: Neck supple.      Comments: Increased neck circumference  Pulmonary:      Comments: Respirations are nonlabored. No audible congestion.  Abdominal:      Comments: rounded   Skin:     General: Skin is dry.   Neurological:      Comments: sleeping   Psychiatric:      Comments: No facial grimacing; no moaning         Pneumonia due to COVID-19 virus      Assessment/Plan:     80yoM admitted in Hospice for Covid19 pna.      Dyspnea  Anxiety  Congestion  Constipation  Weakness    *Medication changes made in preparation for discharge home    - change scheduled lopressor to prn, for HR > 120    - d/c decadron    - continue scop patch    - change colace to senna-s 2 tablets nightly - please encourage pt to take    - change lasix to TID (7a, 1p, 7p) - time coordinated with other scheduled medication (previously was q6h)    - continue morphine 4mg IV q6h - can transition to oral morphine at later time (even at time of discharge) - will wait for now given all the changes today    Coordinated care with Nursing and Hospice IDT.     Discharge Disposition: home with family possibly this week    Total Visit Time: 20min  Face to Face Time: *quick visual*    Justification for care:  Patient meets criteria for acute in-patient care with required nursing assessment and interventions for symptoms with IV medications.      Chantel Mcpherson,  DNP, RYANA, APRN  [facility]  Hospice and Palliative Care Nurse Practitioner  02/01/21  12:25 EST    Electronically signed by Chantel Mcpherson APRN at 02/01/21 1317     Elly Thomas MD at 01/31/21 1032          Palliative Care Progress Note    Date of Admission: 1/29/2021    Subjective:  No held PRNs.  All meds IV.  Pt c/o pain of mouth.  No obvious lesions nor thrush per hospice RN.  Pt with very little oral nutritional intake of floor stock.    Oral intake not documented  UOP: 1400mL  LBM 1/29/21    24 PRNs:  Lorazepam 0.5mg IV 1732 an 0920 this mroning  Morphine 4mg IV @0420 yesterday and 0920 this morning    No current facility-administered medications on file prior to encounter.      Current Outpatient Medications on File Prior to Encounter   Medication Sig Dispense Refill   • acetaminophen (TYLENOL) 325 MG tablet Take 2 tablets by mouth Every 6 (Six) Hours As Needed for Mild Pain .     • amLODIPine (NORVASC) 10 MG tablet TAKE 1 TABLET EVERY DAY 90 tablet 1   • Azelastine HCl 137 MCG/SPRAY solution 2 sprays into the nostril(s) as directed by provider Every 12 (Twelve) Hours. 30 mL 5   • bisoprolol (ZEBeta) 5 MG tablet TAKE 1/2 TABLET EVERY DAY 45 tablet 2   • Camphor-Menthol-Methyl Sal (HM SALONPAS PAIN RELIEF) 1.2-5.7-6.3 % patch Apply  topically to the appropriate area as directed As Needed.     • cetirizine (zyrTEC) 10 MG tablet Take 0.5 tablets by mouth Daily.     • cholecalciferol (VITAMIN D3) 1000 UNITS tablet Take 2,000 Units by mouth Daily.     • diclofenac (VOLTAREN) 1 % gel gel Apply 4 g topically to the appropriate area as directed 4 (Four) Times a Day As Needed (pain). 100 g 5   • docusate sodium (COLACE) 50 MG capsule Take 100 mg by mouth Every Night.     • Elastic Bandages & Supports (HERNIA BELT DOUBLE LARGE) misc For right inguinal hernia 1 each 0   • finasteride (PROSCAR) 5 MG tablet Take 5 mg by mouth Daily.     • fluticasone (FLONASE) 50 MCG/ACT nasal spray 2 sprays into  "the nostril(s) as directed by provider 2 (Two) Times a Day As Needed.     • HYDROcodone-acetaminophen (NORCO)  MG per tablet Take 1 tablet by mouth Every 6 (Six) Hours As Needed for Moderate Pain  or Severe Pain . 12 tablet 0   • predniSONE (DELTASONE) 10 MG tablet Take 1 tablet by mouth Daily. 30 tablet 5        •  acetaminophen  •  acetaminophen  •  albuterol  •  dextromethorphan polistirex ER  •  furosemide  •  glycopyrrolate  •  haloperidol lactate  •  LORazepam  •  Morphine  •  Scopolamine  •  sodium chloride    Objective: /76 (BP Location: Left arm, Patient Position: Lying)   Pulse 61   Temp 97.7 °F (36.5 °C) (Axillary)   Resp 18   Ht 162.6 cm (64.02\")   Wt 67.6 kg (149 lb 0.5 oz)   SpO2 99%   BMI 25.57 kg/m²      Intake/Output Summary (Last 24 hours) at 1/31/2021 1032  Last data filed at 1/31/2021 0443  Gross per 24 hour   Intake --   Output 1400 ml   Net -1400 ml     Physical Exam:    No exam performed today,  I reviewed physical examination findings from hospice RN.  Pt in isolation precautions.  No window in room for visualization.  Results from last 7 days   Lab Units 01/28/21  0750   WBC 10*3/mm3 7.92   HEMOGLOBIN g/dL 11.5*   HEMATOCRIT % 40.9   PLATELETS 10*3/mm3 131*     Results from last 7 days   Lab Units 01/29/21  0624   SODIUM mmol/L 147*   POTASSIUM mmol/L 3.8   CHLORIDE mmol/L 110*   CO2 mmol/L 26.0   BUN mg/dL 23   CREATININE mg/dL 0.58*   CALCIUM mg/dL 8.2*   BILIRUBIN mg/dL 0.3   ALK PHOS U/L 56   ALT (SGPT) U/L 9   AST (SGOT) U/L 15   GLUCOSE mg/dL 110*       Impression:   80yoM admitted inpt Hospice for Covid19 pna. + nasopharyngeal PCR test 1/27/21.  Isolation recommended until 2/6/21 (10 days)     Dyspnea  Aphagia  Anxiety  Congestion  Constipation  Weakness     Plan: Continue current medication regimen and isolation precautions.       Pt is nonambulatory, dependent for personal care needs, and needs isolation precautions.  Care cannot be managed in alternate " setting.     Time: 15 minutes.  Reviewed pt's symptoms and examination findings with hospice RN.  No direct F2F contact given isolation status and low symptom burden today.       Elly Thomas MD  01/31/21  13:57 EST        Electronically signed by Elly Thomas MD at 01/31/21 1357     Elly Thomas MD at 01/30/21 1048          Palliative Care Progress Note    Date of Admission: 1/29/2021    Subjective:    Poor appetite.  Pt denied pain or signficant discomfort per RN.    PRNs overnight:  Lasix 20mg IV (in addition to ATC) @1336  Robinul 0.2mg @1336  Morphine 4mg @0416    Uop:  1600mL  LBM 1/29 (daily x 3)    Current Facility-Administered Medications on File Prior to Encounter   Medication Dose Route Frequency Provider Last Rate Last Admin   • [DISCONTINUED] acetaminophen (TYLENOL) tablet 650 mg  650 mg Oral Q6H PRN Kayla, Cleo G, DO       • [DISCONTINUED] albuterol sulfate HFA (PROVENTIL HFA;VENTOLIN HFA;PROAIR HFA) inhaler 2 puff  2 puff Inhalation Q6H PRN Kayla, Cleo G, DO   2 puff at 01/27/21 1551   • [DISCONTINUED] amLODIPine (NORVASC) tablet 10 mg  10 mg Oral Daily Kayla, Cleo G, DO   10 mg at 01/28/21 0931   • [DISCONTINUED] bisoprolol (ZEBeta) tablet 2.5 mg  2.5 mg Oral Daily Kayla, Cleo G, DO   2.5 mg at 01/29/21 0809   • [DISCONTINUED] cetirizine (zyrTEC) tablet 5 mg  5 mg Oral Daily Kayla, Cleo G, DO   5 mg at 01/29/21 0809   • [DISCONTINUED] cholecalciferol (VITAMIN D3) tablet 2,000 Units  2,000 Units Oral Daily Kayla, Cleo G, DO   2,000 Units at 01/29/21 0809   • [DISCONTINUED] dexamethasone (DECADRON) injection 6 mg  6 mg Intravenous Daily With Breakfast Kayla, Cleo G, DO   6 mg at 01/26/21 2242   • [DISCONTINUED] dexamethasone (DECADRON) tablet 6 mg  6 mg Oral Daily With Breakfast Kayla, Cleo G, DO   6 mg at 01/29/21 0809   • [DISCONTINUED] dextromethorphan polistirex ER (DELSYM) oral suspension 60 mg  60 mg Oral Q12H PRN Cleo Garza DO       • [DISCONTINUED] docusate  sodium (COLACE) capsule 100 mg  100 mg Oral Nightly Kayla, Cleo G, DO   100 mg at 01/28/21 2005   • [DISCONTINUED] enoxaparin (LOVENOX) syringe 40 mg  40 mg Subcutaneous Q24H Kayla, Cleo G, DO   40 mg at 01/28/21 2004   • [DISCONTINUED] finasteride (PROSCAR) tablet 5 mg  5 mg Oral Daily Kayla, Cleo G, DO   5 mg at 01/29/21 0809   • [DISCONTINUED] HYDROcodone-acetaminophen (NORCO)  MG per tablet 1 tablet  1 tablet Oral Q6H PRN Kayla, Cleo G, DO   1 tablet at 01/29/21 0351   • [DISCONTINUED] LORazepam (ATIVAN) injection 0.5 mg  0.5 mg Intravenous Q4H PRN Tania, Cristela M II, DO       • [DISCONTINUED] LORazepam (ATIVAN) tablet 0.5 mg  0.5 mg Oral Q4H PRN Tania, Cristela M II, DO       • [DISCONTINUED] morphine injection 2 mg  2 mg Intravenous Q2H PRN Tania, Cristela M II, DO   2 mg at 01/29/21 1106   • [DISCONTINUED] Pharmacy Consult - Remdesivir   Does not apply Continuous PRN Kayla, Cleo G, DO       • [DISCONTINUED] piperacillin-tazobactam (ZOSYN) 3.375 g in iso-osmotic dextrose 50 ml (premix)  3.375 g Intravenous Q8H Kayla, Cleo G, DO   3.375 g at 01/29/21 0312   • [DISCONTINUED] remdesivir 100 mg in sodium chloride 0.9 % 250 mL IVPB (powder vial)  100 mg Intravenous Q24H Kayla, Cleo G, DO   100 mg at 01/29/21 1106   • [DISCONTINUED] sodium chloride 0.9 % flush 10 mL  10 mL Intravenous PRN Kayla, Cleo G, DO       • [DISCONTINUED] sodium chloride 0.9 % flush 10 mL  10 mL Intravenous Q12H Kayla, Cleo G, DO   10 mL at 01/28/21 2005   • [DISCONTINUED] sodium chloride 0.9 % flush 10 mL  10 mL Intravenous PRN Kayla, Cleo G, DO         Current Outpatient Medications on File Prior to Encounter   Medication Sig Dispense Refill   • acetaminophen (TYLENOL) 325 MG tablet Take 2 tablets by mouth Every 6 (Six) Hours As Needed for Mild Pain .     • amLODIPine (NORVASC) 10 MG tablet TAKE 1 TABLET EVERY DAY 90 tablet 1   • Azelastine HCl 137 MCG/SPRAY solution 2 sprays into the nostril(s) as directed by  "provider Every 12 (Twelve) Hours. 30 mL 5   • bisoprolol (ZEBeta) 5 MG tablet TAKE 1/2 TABLET EVERY DAY 45 tablet 2   • Camphor-Menthol-Methyl Sal (HM SALONPAS PAIN RELIEF) 1.2-5.7-6.3 % patch Apply  topically to the appropriate area as directed As Needed.     • cetirizine (zyrTEC) 10 MG tablet Take 0.5 tablets by mouth Daily.     • cholecalciferol (VITAMIN D3) 1000 UNITS tablet Take 2,000 Units by mouth Daily.     • diclofenac (VOLTAREN) 1 % gel gel Apply 4 g topically to the appropriate area as directed 4 (Four) Times a Day As Needed (pain). 100 g 5   • docusate sodium (COLACE) 50 MG capsule Take 100 mg by mouth Every Night.     • Elastic Bandages & Supports (HERNIA BELT DOUBLE LARGE) misc For right inguinal hernia 1 each 0   • finasteride (PROSCAR) 5 MG tablet Take 5 mg by mouth Daily.     • fluticasone (FLONASE) 50 MCG/ACT nasal spray 2 sprays into the nostril(s) as directed by provider 2 (Two) Times a Day As Needed.     • HYDROcodone-acetaminophen (NORCO)  MG per tablet Take 1 tablet by mouth Every 6 (Six) Hours As Needed for Moderate Pain  or Severe Pain . 12 tablet 0   • predniSONE (DELTASONE) 10 MG tablet Take 1 tablet by mouth Daily. 30 tablet 5        •  acetaminophen  •  acetaminophen  •  albuterol  •  dextromethorphan polistirex ER  •  furosemide  •  glycopyrrolate  •  haloperidol lactate  •  LORazepam  •  Morphine  •  Scopolamine  •  sodium chloride    Objective: /73 (BP Location: Left arm, Patient Position: Lying)   Pulse 56   Temp 97.6 °F (36.4 °C) (Axillary)   Resp 18   Ht 162.6 cm (64.02\")   Wt 67.6 kg (149 lb 0.5 oz)   SpO2 95%   BMI 25.57 kg/m²      Intake/Output Summary (Last 24 hours) at 1/30/2021 1048  Last data filed at 1/30/2021 0411  Gross per 24 hour   Intake --   Output 1600 ml   Net -1600 ml     Physical Exam:    reviewed hospice RN examination findings  Results from last 7 days   Lab Units 01/28/21  0750   WBC 10*3/mm3 7.92   HEMOGLOBIN g/dL 11.5*   HEMATOCRIT % 40.9 "   PLATELETS 10*3/mm3 131*     Results from last 7 days   Lab Units 01/29/21  0624   SODIUM mmol/L 147*   POTASSIUM mmol/L 3.8   CHLORIDE mmol/L 110*   CO2 mmol/L 26.0   BUN mg/dL 23   CREATININE mg/dL 0.58*   CALCIUM mg/dL 8.2*   BILIRUBIN mg/dL 0.3   ALK PHOS U/L 56   ALT (SGPT) U/L 9   AST (SGOT) U/L 15   GLUCOSE mg/dL 110*       Impression:   80yoM admitted in Hospice for Covid19 pna. + nasopharyngeal PCR test 1/27/21.  Isolation recommended until 2/6/21 (10 days)     Dyspnea  Anxiety  Congestion  Constipation  Weakness    Plan: Continue current medication regimen and isolation precautions.      Pt is nonambulatory, dependent for personal care needs, and needs isolation precautions.  Care cannot be managed in alternate setting.    Time: 15 minutes.  Reviewed pt's symptoms and examination findings with hospice RN.  No direct F2F contact given isolation status and low symptom burden today.    Pt with symptom burden requiring frequent medication adjustment to promote comfort that cannot be performed in alternate setting.      Elly Thomas MD  01/30/21  10:48 EST        Electronically signed by Elly Thomas MD at 01/30/21 4743

## 2021-02-04 NOTE — PROGRESS NOTES
"Hospice Progress Note    Patient Name: Michoacano Rojas   : 1940  Gender: male    Code Status: comfort measures    Date of Admission: 2021    Subjective:    80yoM admitted inpt Hospice for Covid19 pna.     Overnight notes reviewed: VSS. Patient on RA. Refusing heel boots. Urine continues to have a pink tinge.    Today, pt remains baseline - laying in bed - tv on - eyes closed, resting. No acute distress.       ROS:  Review of Systems   Unable to perform ROS: Acuity of condition       Reviewed current scheduled and prn medications for route, type, dose and frequency.     •  acetaminophen  •  acetaminophen  •  albuterol  •  bisacodyl  •  dextromethorphan polistirex ER  •  furosemide  •  glycopyrrolate  •  haloperidol lactate  •  LORazepam  •  metoprolol tartrate  •  Morphine  •  Scopolamine  •  sodium chloride    Objective:   /98 (BP Location: Right arm, Patient Position: Lying)   Pulse 106   Temp 97.7 °F (36.5 °C) (Axillary)   Resp 16   Ht 162.6 cm (64.02\")   Wt 67.6 kg (149 lb 0.5 oz)   SpO2 97%   BMI 25.57 kg/m²        PPS: Palliative Performance Scale score as of 2021, 13:56 EST is 20% based on the following measures:   Ambulation: Totally bed bound  Activity and Evidence of Disease: Unable to do any work, extensive evidence of disease  Self-Care: Total care  Intake:Minimal sips  LOC: Full, drowsy or confusion      Physical Exam:  Physical Exam  Constitutional:       Appearance: He is ill-appearing.      Comments: *limited PE given isolation status*  Pt appears comfortable. NAD.   HENT:      Head: Normocephalic and atraumatic.   Eyes:      Comments: closed   Pulmonary:      Comments: Even. Nonlabored. No audible congestion.  Abdominal:      Palpations: Abdomen is soft.      Comments: Rounded.    Psychiatric:      Comments: No facial grimacing; no moaning           Pneumonia due to COVID-19 virus      Assessment/Plan:     80yoM admitted inpt Hospice for Covid19 pna. "      Dyspnea  Anxiety  Congestion  Constipation  Weakness    *no medication changes today    - continue morphine 4mg IV q6h    - continue lasix 20mg q8h    - continue senna-s two tabs nightly    - continue scop patch    - prns reviewed/adjusted for comfort    Coordinated care with Nursing and Hospice IDT.     Discharge Disposition: LTC at [facility] in process    Total Visit Time: 15min    Justification for care:  Patient meets criteria for acute in-patient care with required nursing assessment and interventions for symptoms with IV medications.      Chantel Mcpherson, RACHEL, MHA, APRN  [facility] Nav[facility]ators  Hospice and Palliative Care Nurse Practitioner  02/04/21  15:36 EST

## 2021-02-04 NOTE — PROGRESS NOTES
Continued Stay Note  [facility]     Patient Name: Michoacano Rojas  MRN: [id number]  Today's Date: 2/4/2021    Admit Date: 1/29/2021       PPS 30 % Michoacano Rojas is an 80 y.o.  male admitted to Hospice at [facility] with Covid Pna. Pt assessment is given per primary RN Cayla due to pt being in COVID-19 isolation.” Pt is alert but does not know the date. Breathe sounds diminished and coarse lower lobes. Albright drained 1300cc urine BM 2/1 large. Dressing changes this AM and blue wipes D/C as wounds are dry. Pt c/o neck pain and morphine given at this time.  Pt needs placement as wife can't care for him alone. No PRN needed. Pt ate a small amt this AM and is resting now.  Will continue to monitor and intervene as needed.        Cynthia Fuller RN

## 2021-02-04 NOTE — PLAN OF CARE
Goal Outcome Evaluation:  Plan of Care Reviewed With: patient  Progress: no change  Outcome Summary: VSS. Patient on RA. Refusing heel boots. Urine continues to have a pink tinge.

## 2021-02-05 NOTE — PLAN OF CARE
Goal Outcome Evaluation:  Plan of Care Reviewed With: patient  Progress: no change   Patient on room air.  Sinus rhythm with intermittent atrial fib on telemetry.  Albright in place with adequate urine output.  Patient has rested well today.  Continue orders per Hospice.  Continue to monitor and provide care.

## 2021-02-05 NOTE — PROGRESS NOTES
Continued Stay Note  [facility]     Patient Name: Michoacano Rojas    Today's Date: 2/5/2021    Admit Date: 1/29/2021    Assessment Note:  2/5 PPS 30 % Michoacano Rojas is an 80 y.o.  male admitted to Hospice at [facility] with Covid Pna. Patient remains on C-19 restrictions. Plan is for transfer to [facility] early this next week. Patient continues to eat small amounts. Will benefit from a more social environment. Patient’s wounds treated daily per wound care directions.   Discharge plan:    Currently patient remains GIP for complications of EOL care requiring skilled nursing and medical management of symptoms. Discharge plan dependent on a decreased level of care and survival of this admission.      POC:  Family support and education R/T EOL care  Somatic Pain R/T immobility  Skin integrity R/T disease process    Hospice Criteria:  Hospice care provides support and care for persons and their families with a life limiting disease. Hospice is a Medicare benefit and requires   1) Patient prognosis is 6 months or less to live,   2) Patient no longer going through curative therapies.   3) Agreement of 2 physicians that patient's condition is life limiting and will most likely result in death in <6 months.  Hospice is a Level of care not a location and can be provided in various settings based on patient's needs.    Medicare guidelines state that hospice patients in the hospital require skilled nursing, medical interventions and a level of care that cannot be met in any other setting. This stay is most commonly less then two week.   A patient may be Hospice appropriate but not inpatient appropriate based on this criteria.  Once a Hospice referral is reviewed and  appears to be stable enough to be transferred to a lower level of care with hospice then that is the preference.     Latonya MORAN. RN. PN.  [facility] Navigators  Hospice Care  [phone]

## 2021-02-05 NOTE — PLAN OF CARE
Goal Outcome Evaluation:  Plan of Care Reviewed With: patient  Progress: no change  Outcome Summary: VSS. Patient on room air with sats in the 90s. Patient had an increase in agitation and confusion compared to the previous nights, ativan given and patient rested. Patient refusing heel boots.

## 2021-02-05 NOTE — PROGRESS NOTES
"Hospice Progress Note    Patient Name: Michoacano Rojas   : 1940  Gender: male    Code Status: comfort measures    Date of Admission: 2021    Subjective:    80yoM admitted in Hospice for Covid19 pna.     Overnight notes reviewed: Patient on room air with sats in the 90s. Patient had an increase in agitation and confusion compared to the previous nights, ativan given and patient rested. Patient refusing heel boots.    Today pt appears baseline upon visit - resting comfortably in bed - has required an increased number of prn doses in the past 24h.    - PRNs: Ativan 0.5mg x4    - Held: senna tabs overnight    - Intake/Output  *PO: sips and bites; not eating any significant percentage of meals  *Urine: increased urine output previous 48h (1300ml; 1050ml)  *LBM: 2/3, large      ROS:  Review of Systems   Unable to perform ROS: Acuity of condition       Reviewed current scheduled and prn medications for route, type, dose and frequency.     •  acetaminophen  •  acetaminophen  •  albuterol  •  bisacodyl  •  dextromethorphan polistirex ER  •  furosemide  •  glycopyrrolate  •  haloperidol lactate  •  LORazepam  •  metoprolol tartrate  •  Scopolamine  •  sodium chloride    Objective:   BP (!) 130/103 (BP Location: Left arm, Patient Position: Lying)   Pulse 89   Temp 98 °F (36.7 °C) (Oral)   Resp 18   Ht 162.6 cm (64.02\")   Wt 67.6 kg (149 lb 0.5 oz)   SpO2 94%   BMI 25.57 kg/m²        PPS: Palliative Performance Scale score as of 2021, 13:48 EST is 20% based on the following measures:   Ambulation: Totally bed bound  Activity and Evidence of Disease: Unable to do any work, extensive evidence of disease  Self-Care: Total care  Intake:Minimal sips  LOC: Full, drowsy or confusion      Physical Exam:  Constitutional:       Appearance: He is ill-appearing.      Comments: Sleeping in bed, NAD.  HENT:      Head: Normocephalic and atraumatic.      Mouth/Throat:      Mouth: Mucous membranes are dry.   Eyes: "      Comments: closed   Neck:      Musculoskeletal: Neck supple.      Comments: Increased neck circumference  Pulmonary:      Comments: Respirations are nonlabored. No audible congestion.  Abdominal:      Comments: rounded   Skin:     General: Skin is dry.   Neurological:      Comments: sleeping   Psychiatric:      Comments: No facial grimacing; no moaning         Pneumonia due to COVID-19 virus      Assessment/Plan:     80yoM admitted in Hospice for Covid19 pna.      Dyspnea  Anxiety  Congestion  Constipation  Weakness    * new medication changes    - change lasix 20mg to bid    - continue scheduled morphine 4mg q6h (had to be reordered)    - continue scop patch    - change senna two tablets nightly to prn    * Schedule ativan 0.5mg BID - start with 3pm dose and then 9p, 9a dosing    Discharge Disposition: to [facility] next week; family to sign paperwork at facility on Monday.     Total Visit Time: 15min    Justification for care:  Patient meets criteria for acute in-patient care with required nursing assessment and interventions for symptoms with IV medications.      Chantel Mcpherson, RACHEL, MHA, APRN  [facility] Nav[facility]ators  Hospice and Palliative Care Nurse Practitioner  02/05/21  12:34 EST

## 2021-02-05 NOTE — PLAN OF CARE
Goal Outcome Evaluation:  Plan of Care Reviewed With: patient     Outcome Summary: Pt alert, VSS, RA. C/o pain/ discomfort addressed with PRN meds. Wound dressings changed. Continue monitoring.

## 2021-02-06 NOTE — PROGRESS NOTES
Continued Stay Note  [facility]     Patient Name: Michoacano Rojas    Today's Date: 2/6/2021    Admit Date: 1/29/2021    Assessment Note:    PPS 30 % Michoacano Rojas is an 80 y.o.  male admitted to Hospice at [facility] with Covid Pna. Patient appears very peaceful denies pain, mumbles, and declines food. Patient is on room air and respirations are unlabored. His overall demeanor is subdued and appears to be declining. Patient is scheduled to go to [place] early this week if he survives this admission. Continue to monitor for nonverbal signs of pain. Continue to provide wound care to non-healing wounds to prevent further degradation.      Discharge plan:    Currently patient remains GIP for complications of EOL care requiring skilled nursing and medical management of symptoms. Discharge plan dependent on a decreased level of care and survival of this admission.      POC:  Family support and education R/T EOL care  Skin integrity altered Do to immobility  Skin integrity R/T disease process    Hospice Criteria:  Hospice care provides support and care for persons and their families with a life limiting disease. Hospice is a Medicare benefit and requires   1) Patient prognosis is 6 months or less to live,   2) Patient no longer going through curative therapies.   3) Agreement of 2 physicians that patient's condition is life limiting and will most likely result in death in <6 months.  Hospice is a Level of care not a location and can be provided in various settings based on patient's needs.    Medicare guidelines state that hospice patients in the hospital require skilled nursing, medical interventions and a level of care that cannot be met in any other setting. This stay is most commonly less then two week.   A patient may be Hospice appropriate but not inpatient appropriate based on this criteria.  Once a Hospice referral is reviewed and  appears to be stable enough to be transferred to a lower level of care with  hospice then that is the preference.     Latonya Mesa MSN. RN. CHPN.  [facility] Navigators  Hospice Care  [phone]

## 2021-02-06 NOTE — PLAN OF CARE
Goal Outcome Evaluation:  Plan of Care Reviewed With: patient   Patient appeared comfortable throughout shift and is currently on room air.  Scheduled Morphine and Ativan given as ordered throughout the shift.  Patient was noted to have slept well.  Continuing to monitor.

## 2021-02-06 NOTE — PROGRESS NOTES
"Hospice Progress Note    Patient Name: Michoacano Rojas   : 1940  Gender: male    Code Status: comfort measures    Date of Admission: 2021    Subjective:    80yoM admitted in Hospice for Covid19 pna.     Overnight notes reviewed:  Patient appeared comfortable throughout shift and is currently on room air.  Scheduled Morphine and Ativan given as ordered throughout the shift.  Patient was noted to have slept well.  Continuing to monitor.    Today, pt is resting in bed with eyes closed; appears comfortable.     - PRNs:  Ativan 0.5mg x2  Morphine 4mg x1      ROS:  Review of Systems   Unable to perform ROS: Acuity of condition       Reviewed current scheduled and prn medications for route, type, dose and frequency.     •  acetaminophen  •  acetaminophen  •  albuterol  •  bisacodyl  •  dextromethorphan polistirex ER  •  furosemide  •  glycopyrrolate  •  haloperidol lactate  •  LORazepam  •  metoprolol tartrate  •  Morphine  •  Scopolamine  •  senna-docusate sodium  •  sodium chloride    Objective:   /73 (BP Location: Left arm)   Pulse 95   Temp 97.8 °F (36.6 °C) (Axillary)   Resp 18   Ht 162.6 cm (64.02\")   Wt 67.6 kg (149 lb 0.5 oz)   SpO2 99%   BMI 25.57 kg/m²      PPS: Palliative Performance Scale score as of 2021, 10:42 EST is 20% based on the following measures:   Ambulation: Totally bed bound  Activity and Evidence of Disease: Unable to do any work, extensive evidence of disease  Self-Care: Total care  Intake:Minimal sips  LOC: Full, drowsy or confusion      Physical Exam:  Constitutional:       Appearance: He is ill-appearing.      Comments: Sleeping in bed, NAD.  HENT:      Head: Normocephalic and atraumatic.      Mouth/Throat:      Mouth: Mucous membranes are dry.   Eyes:      Comments: closed   Neck:      Musculoskeletal: Neck supple.      Comments: Increased neck circumference  Pulmonary:      Comments: Respirations are nonlabored. No audible congestion.  Abdominal:      Comments: " rounded   Skin:     General: Skin is dry.   Neurological:      Comments: sleeping   Psychiatric:      Comments: No facial grimacing; no moaning        Pneumonia due to COVID-19 virus      Assessment/Plan:     80yoM admitted inpt Hospice for Covid19 pna.      Dyspnea  Anxiety  Congestion  Constipation  Weakness    *no medication changes today    Continue lasix bid    Continue ativan 0.5mg BID    Continue scop patch     Continue morphine 4mg q6h    Discharge Disposition: actively dying vs [facility]    Total Visit Time: 15min  Face to Face Time: *limited d/t isolation status*    Justification for care:  Patient meets criteria for acute in-patient care with required nursing assessment and interventions for symptoms with IV medications.      Chantel Mcpherson, RACHEL, MHA, APRN  [facility] Care Navigators  Hospice and Palliative Care Nurse Practitioner  02/06/21  16:07 EST

## 2021-02-07 NOTE — PLAN OF CARE
Goal Outcome Evaluation:         Pt is off monitor and in comfort measure. Given PRN med. To promote comfort. Skin care done today, and oral care. Will continue to monitor.

## 2021-02-07 NOTE — PLAN OF CARE
Goal Outcome Evaluation:  Plan of Care Reviewed With: patient  Progress: no change  Outcome Summary: 80 year old black male that was admitted to hospice care on 1/29/2021 and is on day 9 of ospice care.  He has been quiet most of the shift.  Has no voice any increase in discomfort.  Have administered medication as indicated.  Have turned him for comfort measure every 2 hours.  Will continue to monitor patient throughout the rest of the shift.

## 2021-02-07 NOTE — PROGRESS NOTES
"Hospice Progress Note    Patient Name: Michoacano Rojas   : 1940  Gender: male    Code Status: comfort measures    Date of Admission: 2021    Subjective:    80yoM admitted in Hospice for Covid19 pna.     Overnight notes reviewed: He has been quiet most of the shift.  Has no voice any increase in discomfort.  Have administered medication as indicated.  Have turned him for comfort measure every 2 hours.  Will continue to monitor patient throughout the rest of the shift.    Today, he is resting eyes closed - has received a prn d/t discomfort this morning with care.     - PRNs:  Ativan 0.5mg IV x1  Morphine 4mg IV x2    - Held: none    - Intake/Output  *PO: sips and bites  *Urine:  output 1075ml; 2 output 600ml  *LBM: 2/3      ROS:  Review of Systems   Unable to perform ROS: Acuity of condition       Reviewed current scheduled and prn medications for route, type, dose and frequency.     •  acetaminophen  •  acetaminophen  •  albuterol  •  bisacodyl  •  dextromethorphan polistirex ER  •  furosemide  •  glycopyrrolate  •  haloperidol lactate  •  LORazepam  •  metoprolol tartrate  •  Morphine  •  Scopolamine  •  senna-docusate sodium  •  sodium chloride    Objective:   /73   Pulse (!) 127   Temp 98.7 °F (37.1 °C) (Axillary)   Resp 18   Ht 162.6 cm (64.02\")   Wt 67.6 kg (149 lb 0.5 oz)   SpO2 95%   BMI 25.57 kg/m²      PPS: Palliative Performance Scale score as of 2021, 15:36 EST is 20% based on the following measures:   Ambulation: Totally bed bound  Activity and Evidence of Disease: Unable to do any work, extensive evidence of disease  Self-Care: Total care  Intake:Minimal sips  LOC: Full, drowsy or confusion      Physical Exam:  Constitutional:       Appearance: He is ill-appearing.      Comments: Sleeping in bed, NAD.  HENT:      Head: Normocephalic and atraumatic.      Mouth/Throat: [place]     Mouth: Mucous membranes are dry.   Eyes:      Comments: closed   Neck:      Musculoskeletal: " Neck supple.      Comments: Increased neck circumference  Pulmonary:      Comments: Respirations are nonlabored. No audible congestion.  Abdominal:      Comments: rounded   Skin:     General: Skin is dry.   Neurological:      Comments: sleeping   Psychiatric:      Comments: No facial grimacing; no moaning        Pneumonia due to COVID-19 virus      Assessment/Plan:     80yoM admitted in Hospice for Covid19 pna.      Dyspnea  Anxiety  Congestion  Constipation  Weakness    *medication changes    - dul supp nightly x3    - d/c tele monitor    - d/c prn lopressor, radhaon > benefit    - prns reviewed/adjusted for comfort    - continue lasix 20mg bid    *change scheduled ativan 0.5mg IV to q6h     - continue morphine 4mg IV q6h    - continue palliative oral rinse scheduled and prn    Coordinated care with Nursing.    Discharge Disposition: actively dying vs [facility]    Total Visit Time: 15min    Justification for care:  Patient meets criteria for acute in-patient care with required nursing assessment and interventions for symptoms with IV medications.      Chantel Mcpherson, RACHEL, MHA, APRN  [facility] Navigators  Hospice and Palliative Care Nurse Practitioner  02/07/21  10:41 EST

## 2021-02-07 NOTE — PROGRESS NOTES
Continued Stay Note  [facility]     Patient Name: Michoacano Rojas    Today's Date: 2/7/2021    Admit Date: 1/29/2021    Assessment Note:    2/7 PPS 30 % Michoacano Rojas is an 80 y.o.  male admitted to Hospice at [facility] with Covid Pna. Patient much reduced. Minimal responsiveness, but when he dose it is in pain. Patient’s skin is warm to touch. Turgor poor, wounds remain unchanged. Patient on Ra sats 98%. Patient has had no intake for a few days. And is much declined. Plan is currently for interview with [facility] on Monday. Medication changes made to better control pain. Continue to monitor for non-verbal signs of pain or discomfort.   Discharge plan:    Currently patient remains GIP for complications of EOL care requiring skilled nursing and medical management of symptoms. Discharge plan dependent on a decreased level of care and survival of this admission.      POC:  Family support and education R/T EOL care  Somatic Pain R/T immobility  Skin integrity R/T disease process    Hospice Criteria:  Hospice care provides support and care for persons and their families with a life limiting disease. Hospice is a Medicare benefit and requires   1) Patient prognosis is 6 months or less to live,   2) Patient no longer going through curative therapies.   3) Agreement of 2 physicians that patient's condition is life limiting and will most likely result in death in <6 months.  Hospice is a Level of care not a location and can be provided in various settings based on patient's needs.    Medicare guidelines state that hospice patients in the hospital require skilled nursing, medical interventions and a level of care that cannot be met in any other setting. This stay is most commonly less then two week.   A patient may be Hospice appropriate but not inpatient appropriate based on this criteria.  Once a Hospice referral is reviewed and  appears to be stable enough to be transferred to a lower level of care with hospice then that  is the preference.     Latonya Mesa MSN. RN. CHPN.  [facility] Navigators  Hospice Care  [phone]

## 2021-02-08 NOTE — PROGRESS NOTES
"Hospice Progress Note    Date of Admission: 1/29/2021    Subjective:    Minimally responsive until touched.  Then moans.  Not eating.    Hospice RN cleaned patient's mouth, brushed teeth.  Bathed and buffed his skin.    Meds reviewed.    PRNs noted:  Ativan x2  Morphine x1           Questions for Current Code Status     Question Answer Comment    Code Status No CPR     Medical Interventions (Level of Support Prior to Arrest) Comfort Measures         Scheduled Meds:bisacodyl, 10 mg, Rectal, Nightly  furosemide, 20 mg, Intravenous, BID  LORazepam, 0.5 mg, Intravenous, Q6H  Morphine, 4 mg, Intravenous, Q6H  Scopolamine, 1 patch, Transdermal, Q72H  sodium chloride, 10 mL, Intravenous, Q12H    PRN Meds:.•  acetaminophen  •  acetaminophen  •  bisacodyl  •  dextromethorphan polistirex ER  •  furosemide  •  glycopyrrolate  •  haloperidol lactate  •  LORazepam  •  Morphine  •  Scopolamine  •  senna-docusate sodium  •  sodium chloride      Objective: /73 (BP Location: Left arm, Patient Position: Lying)   Pulse 104   Temp 99.6 °F (37.6 °C) (Axillary)   Resp 20   Ht 162.6 cm (64.02\")   Wt 67.6 kg (149 lb 0.5 oz)   SpO2 98%   BMI 25.57 kg/m²      Intake/Output Summary (Last 24 hours) at 2/8/2021 1300  Last data filed at 2/8/2021 0830  Gross per 24 hour   Intake 0 ml   Output 250 ml   Net -250 ml     Physical Exam:  Ill appearing M, in bed  NC.AT  Eyes closed  Breathing not labored, room air  Heart regular  Abd nontender  Albright with dark yellow UO  Moaning and groaning      Impression:  80yoM admitted in Hospice for Covid19 pna.      Dyspnea  Anxiety  Congestion  Constipation  Weakness    Plan:   DC PO meds.  Increase scheduled Morphine and Ativan.  Significantly declined with limited prognosis.        Jenny Baeza MD  02/08/21      "

## 2021-02-08 NOTE — PLAN OF CARE
Goal Outcome Evaluation:  Plan of Care Reviewed With: patient  Progress: no change  Outcome Summary: Patient comfortable at rest but when touched/turned he moans.  Morphine and Ativan given prior care.  Hospice into see the patient and increased dose of both Ativan and Morphine.  No s/s of anxiety.  Small amount of urine noted after IV Lasix given, urine is concentrated.  Patient is not awake enough to give anything PO.  Dressings changed per MD order.

## 2021-02-08 NOTE — PLAN OF CARE
"Goal Outcome Evaluation:  Plan of Care Reviewed With: patient  Progress: no change  Outcome Summary: VSS. No changes. Pt moans out with any patient care, but stops when he is not being \"fooled with\". No PRNs required this shift. Remains on RA. Turned q6h per order. Continue current POC.  "

## 2021-02-08 NOTE — PROGRESS NOTES
Continued Stay Note  [facility]     Patient Name: Michoacano Rojas    Today's Date: 2/8/2021    Admit Date: 1/29/2021    Assessment Note:  2/8 PPS 30 % Michoacano Rojas is an 80 y.o.  male admitted to Hospice at [facility] with Covid Pna. Patient moaning and agitated during assessment. Lotion applied to feet and partially debrided, oral care provided. Dressings to wounds clean, dry and intact. Family met with [facility] today to sign admission paperwork. He will be discharged to [facility] on Wednesday at 12:30 being picked up by [facility] transport. New orders received to increase comfort meds. Continue to assess for nonverbal indications of pain or discomfort.  Discharge plan: [facility] Wednesday at 1230    POC:  Family support and education R/T EOL care    Hospice Criteria:  Hospice care provides support and care for persons and their families with a life limiting disease. Hospice is a Medicare benefit and requires   1) Patient prognosis is 6 months or less to live,   2) Patient no longer going through curative therapies.   3) Agreement of 2 physicians that patient's condition is life limiting and will most likely result in death in <6 months.  Hospice is a Level of care not a location and can be provided in various settings based on patient's needs.    Medicare guidelines state that hospice patients in the hospital require skilled nursing, medical interventions and a level of care that cannot be met in any other setting. This stay is most commonly less then two week.   A patient may be Hospice appropriate but not inpatient appropriate based on this criteria.  Once a Hospice referral is reviewed and  appears to be stable enough to be transferred to a lower level of care with hospice then that is the preference.     Latonya MORAN. RN. CHPN.  [facility] Nav[facility]ators  Hospice Care  [phone]

## 2021-02-09 NOTE — PROGRESS NOTES
"Hospice Progress Note    Date of Admission: 1/29/2021    Subjective:   No acute needs or concerns per bedside RN.  No PRN meds given.    Appears distressed for my visit.  Labored, tachypneic.         Questions for Current Code Status     Question Answer Comment    Code Status No CPR     Medical Interventions (Level of Support Prior to Arrest) Comfort Measures         Scheduled Meds:bisacodyl, 10 mg, Rectal, Nightly  furosemide, 20 mg, Intravenous, BID  LORazepam, 1 mg, Intravenous, Q4H  Morphine, 8 mg, Intravenous, Q4H  palliative care oral rinse, , Mouth/Throat, Q12H  Scopolamine, 1 patch, Transdermal, Q72H  sodium chloride, 10 mL, Intravenous, Q12H    PRN Meds:.•  acetaminophen  •  bisacodyl  •  furosemide  •  glycopyrrolate  •  haloperidol lactate  •  LORazepam  •  Morphine  •  palliative care oral rinse  •  Scopolamine  •  sodium chloride    Objective: /71 (BP Location: Left arm, Patient Position: Lying)   Pulse 105   Temp 99.9 °F (37.7 °C) (Axillary)   Resp 20   Ht 162.6 cm (64.02\")   Wt 67.6 kg (149 lb 0.5 oz)   SpO2 90%   BMI 25.57 kg/m²      Intake/Output Summary (Last 24 hours) at 2/9/2021 1807  Last data filed at 2/8/2021 1825  Gross per 24 hour   Intake --   Output 200 ml   Net -200 ml     Physical Exam:  Ill, dying elderly F in bed  cachetic  NC/AT  Eyes closed   [id number]  Open mouth breathing, using accessory muscles  Chest clear  Heart regular  Hands with arthritic joint deformities  Feet dark    Impression:  80yoM admitted in Hospice for Covid19 pna.      Dyspnea  Anxiety  Congestion  Constipation  Weakness     Plan:   Discussed with house supervisor.  Will dc isolation and transfer patient to .  Wife update on status.  Appreciative to have patient stay.  Transfer to [facility] on hold.  Appears more imminent.  Continue scheduled benzo and opiate.        Jenny Baeza MD  02/09/21      "

## 2021-02-09 NOTE — PROGRESS NOTES
Continued Stay Note  [facility]     Patient Name: Michoacano Rojas  MRN: [id number]  Today's Date: 2/9/2021    Admit Date: 1/29/2021     PPS 10 % Michoacano Rojas is an 80 y.o.  male admitted to Hospice at [facility] with Covid Pna.  Pt assessment is given per primary RN Janene due to pt being in COVID-19 isolation. “pt is minimally responsive this AM and moans when turned. Breathe sounds are diminished, resp 25 on RA. HR tachy, pulses weak, cap refill greater than 3 sec. Skin warm and feet cool. Lower extremities are dark due to venous ulcers that have healed but dry and scabs. Albright drained 200cc, no PO intake for 3 days, BS hypoactive. Coccyx wound is unchanged and dressing change due tomorrow. No prn meds needed and pt at present comfortable”. Pt is to go to [place] tomorrow but will reassess at end of shift.  Patient meets criteria for acute in-patient care with required nursing assessment and interventions for symptoms with IV medications      Cynthia Fuller RN

## 2021-02-10 NOTE — PROGRESS NOTES
"Hospice Progress Note    Patient Name: Michoacano Rojas   : 1940  Gender: male    Code Status: comfort measures    Date of Admission: 2021    Subjective:    80yoM admitted in Hospice for Covid19 pna.     Overnight notes reviewed: Pt arrived to unit at 2230. Comfort measures maintained for the pt. Last BM on . Unlabored shallow respirations with periods of apnea. No PRN medication required during the shift. Pt appeared comfortable throughout the shift.    Per Hospice Nursing: ...Pt now out of covid restrictions and on 5 B. Pt is nonresponsive at this time. Pt will moan when turned only. Breathe sounds wet, tachypnea on RA. Pt received Lasix, Haldol and morphine at this time. HR tachy, pulses weak. Albright drained 500 cc clear urine and last BM , BS hypoactive. Pt had venous ulcers on his legs and they are dry and scaly. Pt has multiple nodules on his knees, toes and hands. Skin is warm and dusky. No PRN meds needed. [facility] transfer canceled. Patient meets criteria for acute in-patient care with required nursing assessment and interventions for symptoms with IV medications    Today, pt is comfortable, undisturbed by exam. No family at bedside.       ROS:  Review of Systems   Unable to perform ROS: Acuity of condition       Reviewed current scheduled and prn medications for route, type, dose and frequency.     •  acetaminophen  •  bisacodyl  •  furosemide  •  glycopyrrolate  •  haloperidol lactate  •  LORazepam  •  Morphine  •  palliative care oral rinse  •  Scopolamine  •  sodium chloride    Objective:   /61 (BP Location: Left arm, Patient Position: Lying)   Pulse 110   Temp 97.5 °F (36.4 °C) (Axillary)   Resp 16   Ht 162.6 cm (64.02\")   Wt 67.6 kg (149 lb 0.5 oz)   SpO2 (!) 83%   BMI 25.57 kg/m²      PPS: Palliative Performance Scale score as of 2/15/2021, 09:04 EST is 10% based on the following measures:   Ambulation: Totally bed bound  Activity and Evidence of Disease: Unable to do " yes any work, extensive evidence of disease  Self-Care: Total care  Intake:  Mouth care only  LOC: Drowsy or coma      Physical Exam:  Physical Exam  Constitutional:       General: He is not in acute distress.     Comments: Undisturbed by exam   HENT:      Head: Normocephalic.      Mouth/Throat:      Mouth: Mucous membranes are dry.   Eyes:      Comments: closed   Cardiovascular:      Rate and Rhythm: Normal rate and regular rhythm.   Pulmonary:      Effort: Pulmonary effort is normal.      Breath sounds: Normal breath sounds.   Abdominal:      General: Abdomen is flat.      Palpations: Abdomen is soft.   Musculoskeletal:         General: Deformity (hands, feet) present.   Skin:     General: Skin is dry.      Coloration: Skin is pale.      Comments: Chronic skin and joint changes   Neurological:      Comments: Does not follow commands             Pneumonia due to COVID-19 virus      Assessment/Plan:     80yoM admitted in Hospice for Covid19 pna.      Dyspnea  Anxiety  Congestion  Constipation  Weakness    Schedule robinul 0.2mg q8h    D/c dul supp    Continue current plan of care    Monitor for changing needs    Coordinated care with Nursing    Total Visit Time: 15min  Face to Face Time: 5min    Justification for care:  Patient meets criteria for acute in-patient care with required nursing assessment and interventions for symptoms with IV medications.      Chantel Mcpherson, RACHEL, MHA, APRN  [facility] Nav[facility]ators  Hospice and Palliative Care Nurse Practitioner  02/10/21  12:10 EST

## 2021-02-10 NOTE — PLAN OF CARE
Goal Outcome Evaluation:     Outcome Summary  Pt arrived to unit at 2230. Comfort measures maintained for the pt. Last BM on 2/8. Unlabored shallow respirations with periods of apnea. No PRN medication required during the shift. Pt appeared comfortable throughout the shift.

## 2021-02-10 NOTE — PROGRESS NOTES
Continued Stay Note  [facility]     Patient Name: Michoacano Rojas  MRN: [id number]  Today's Date: 2/10/2021    Admit Date: 1/29/2021       PPS 10 % Michoacano Rojas is an 80 y.o.  male admitted to Hospice at [facility] with Covid Pna. Pt now out of covid restrictions and on 5 B. Pt is nonresponsive at this time. Pt will moan when turned only. Breathe sounds wet, tachypnea on RA. Pt received Lasix, Haldol and morphine at this time. HR tachy, pulses weak. Albright drained 500 cc clear urine and last BM 2/8, BS hypoactive. Pt had venous ulcers on his legs and they are dry and scaly. Pt has multiple nodules on his knees, toes and hands. Skin is warm and dusky. No PRN meds needed. [facility] transfer canceled. Patient meets criteria for acute in-patient care with required nursing assessment and interventions for symptoms with IV medications        Cynthia Fuller RN

## 2021-02-11 NOTE — PROGRESS NOTES
Continued Stay Note  [facility]     Patient Name: Michoacano Rojas  MRN: [id number]  Today's Date: 2/11/2021    Admit Date: 1/29/2021      PPS 10 % Michoacano Rojas is an 80 y.o.  male admitted to Hospice at [facility] with Covid Pna. Pt is nonresponsive and is congested, pt medicated. HR tachy, pulses weak. Albright drained 100cc urine, BM 2/8, BS hypoactive. Pt has venous leg ulcers which are dry and scaly. Pt has many nodules on his extremities. Pt skin is warm and dry. PRN morphine 8 mg x 1, Haldol 1 mg x 1, scop patch .No family at bedside. Patient meets criteria for acute in-patient care with required nursing assessment and interventions for symptoms with IV medications.        Cynthia Fuller RN

## 2021-02-11 NOTE — PLAN OF CARE
Goal Outcome Evaluation:     Progress: no change  Outcome Summary: Comfort measures maintained for the pt. No PRN medication required. Last BM 2/8. Diminished unlabored breath sounds with occasional snoring. 2L NC maintained. Pt appeared comfortable during the shift.

## 2021-02-11 NOTE — PROGRESS NOTES
"Hospice Progress Note    Patient Name: Michoacano Rojas   : 1940  Gender: male    Code Status: comfort measures    Date of Admission: 2021    Subjective:    80yoM admitted inpt Hospice for Covid19 pna.     Overnight notes reviewed: Comfort measures maintained for the pt. No PRN medication required. Last BM . Diminished unlabored breath sounds with occasional snoring. 2L NC maintained. Pt appeared comfortable during the shift.    Today, pt comfortable, undisturbed by exam. No family in room.     - PRNs: none since yesterday morning    - Held: none    - Intake/Output  *PO: last documented intake was sips/bites on   *Urine: output dwindling - 100cc previous 24h  *LBM: , moderate      ROS:  Review of Systems   Unable to perform ROS: Acuity of condition       Reviewed current scheduled and prn medications for route, type, dose and frequency.     •  acetaminophen  •  bisacodyl  •  furosemide  •  glycopyrrolate  •  haloperidol lactate  •  LORazepam  •  Morphine  •  palliative care oral rinse  •  Scopolamine  •  sodium chloride    Objective:   /61 (BP Location: Left arm, Patient Position: Lying)   Pulse 110   Temp 97.5 °F (36.4 °C) (Axillary)   Resp 16   Ht 162.6 cm (64.02\")   Wt 67.6 kg (149 lb 0.5 oz)   SpO2 (!) 83%   BMI 25.57 kg/m²      PPS: Palliative Performance Scale score as of 2/15/2021, 09:07 EST is 10% based on the following measures:   Ambulation: Totally bed bound  Activity and Evidence of Disease: Unable to do any work, extensive evidence of disease  Self-Care: Total care  Intake:  Mouth care only  LOC: Drowsy or coma      Physical Exam:  Constitutional:       General: He is not in acute distress.     Comments: Undisturbed by exam   HENT:      Head: Normocephalic.      Mouth/Throat:      Mouth: Mucous membranes are dry.   Eyes:      Comments: closed   Cardiovascular:      Rate and Rhythm: Normal rate and regular rhythm.   Pulmonary:      Effort: Pulmonary effort is normal.     "  Breath sounds: Normal breath sounds.   Abdominal:      General: Abdomen is flat.      Palpations: Abdomen is soft.   Musculoskeletal:         General: Deformity (hands, feet) present.   Skin:     General: Skin is dry.      Coloration: Skin is pale.      Comments: Chronic skin and joint changes   Neurological:      Comments: Does not follow commands         Pneumonia due to COVID-19 virus      Assessment/Plan:     80yoM admitted in Hospice for Covid19 pna.      Dyspnea  Anxiety  Congestion  Constipation  Weakness    * No medication changes today    Continue current plan of care    Monitor for changing needs    Discharge Disposition: actively dying    Total Visit Time: 15min  Face to Face Time: 5min    Justification for care:  Patient meets criteria for acute in-patient care with required nursing assessment and interventions for symptoms with IV medications.      Chantel Mcpherson, RACHEL, MHA, APRN  [facility]ators  Hospice and Palliative Care Nurse Practitioner  02/11/21  14:19 EST

## 2021-02-11 NOTE — PLAN OF CARE
Goal Outcome Evaluation:     Progress: no change     Patient unresponsive during shift. Not requiring any PRN medications. Albright in place. Heel boots on. 2L NC maintained.

## 2021-02-12 NOTE — PLAN OF CARE
Goal Outcome Evaluation:     Progress: declining     Patient unresponsive. Albright in place; dark. Minimal UOP. No family at bedside. Requiring some PRN medications during shift.

## 2021-02-12 NOTE — PROGRESS NOTES
Continued Stay Note  [facility]     Patient Name: Michoacano Rojas    Today's Date: 2/12/2021    Admit Date: 1/29/2021    Assessment Note:  2/12 PPS 10 % Michoacano Rojas is an 80 y.o.  male admitted to Hospice at [facility] with Covid Pna. Patient has been released from C-19 isolation, unfortunately his overall status has deteriorated. Zero family at bedside during visit. Patient remains unresponsive and appears very peaceful at this time. Skin integrity remains poor legs dry and flaking osteo nodules cover all bone prominences as well as contractures. Wounds appear approx. unchanged from initial assessment. Continue to monitor for nonverbal signs of pain. Toradol ordered to add to regimen for bone pain.  Ua draining by graves. Dark.  Discharge plan:  Currently patient remains GIP for complications of EOL care requiring skilled nursing and medical management of symptoms. Discharge plan dependent on a decreased level of care and survival of this admission.      POC:  Family support and education R/T EOL care      Hospice Criteria:  Hospice care provides support and care for persons and their families with a life limiting disease. Hospice is a Medicare benefit and requires   1) Patient prognosis is 6 months or less to live,   2) Patient no longer going through curative therapies.   3) Agreement of 2 physicians that patient's condition is life limiting and will most likely result in death in <6 months.  Hospice is a Level of care not a location and can be provided in various settings based on patient's needs.    Medicare guidelines state that hospice patients in the hospital require skilled nursing, medical interventions and a level of care that cannot be met in any other setting. This stay is most commonly less then two week.   A patient may be Hospice appropriate but not inpatient appropriate based on this criteria.  Once a Hospice referral is reviewed and  appears to be stable enough to be transferred to a lower  level of care with hospice then that is the preference.     Latonya Mesa MSN. RN. PN.  [facility] Nav[facility]ators  Hospice Care  [phone]

## 2021-02-12 NOTE — PROGRESS NOTES
"Hospice Progress Note    Patient Name: Michoacano Rojas   : 1940  Gender: male    Code Status: comfort measures    Date of Admission: 2021    Subjective:    80yoM admitted in Hospice for Covid19 pna.     Overnight notes reviewed: He has an IV in place and patentto Saline lock.  He has 2 SC accesses in [place].  He has FC to SD that is in place and patent.  He has multiple wound sites that are C/D/I.  Have turned patient Q 2 hours for comfort.  Have administered scheduled and prn mediciation as indicated.  Removed Oxygen, patient has tolerated well.  Will continue to monitor patient throughout the rest of this shift.    Actively Dying. Comfortable, undisturbed by exam. No family at bedside.     - PRNs:  Morphine 8mg x1    - Held: none    ROS:  Review of Systems   Unable to perform ROS: Acuity of condition       Reviewed current scheduled and prn medications for route, type, dose and frequency.     •  acetaminophen  •  bisacodyl  •  furosemide  •  glycopyrrolate  •  haloperidol lactate  •  LORazepam  •  Morphine  •  palliative care oral rinse  •  Scopolamine  •  sodium chloride    Objective:   /61 (BP Location: Left arm, Patient Position: Lying)   Pulse 110   Temp 97.5 °F (36.4 °C) (Axillary)   Resp 16   Ht 162.6 cm (64.02\")   Wt 67.6 kg (149 lb 0.5 oz)   SpO2 (!) 83%   BMI 25.57 kg/m²      PPS: Palliative Performance Scale score as of 2021, 07:30 EST is 10% based on the following measures:   Ambulation: Totally bed bound  Activity and Evidence of Disease: Unable to do any work, extensive evidence of disease  Self-Care: Total care  Intake:  Mouth care only  LOC: Drowsy or coma      Physical Exam:  Constitutional:       General: He is not in acute distress.     Comments: Undisturbed by exam   HENT:      Head: Normocephalic.      Mouth/Throat:      Mouth: Mucous membranes are dry.   Eyes:      Comments: closed   Cardiovascular:      Rate and Rhythm: Normal rate and regular rhythm. "   Pulmonary:      Effort: Pulmonary effort is normal.      Breath sounds: Normal breath sounds.   Abdominal:      General: Abdomen is flat.      Palpations: Abdomen is soft.   Musculoskeletal:         General: Deformity (hands, feet) present.   Skin:     General: Skin is dry.      Coloration: Skin is pale.      Comments: Chronic skin and joint changes   Neurological:      Comments: Does not follow commands         Pneumonia due to COVID-19 virus      Assessment/Plan:     80yoM admitted in Hospice for Covid19 pna.      Dyspnea  Anxiety  Congestion  Constipation  Weakness    * No medication changes today     Continue current plan of care     Monitor for changing needs    Coordinated care with Nursing    Discharge Disposition: actively dying    Total Visit Time: 15min  Face to Face Time: 5min    Justification for care:  Patient meets criteria for acute in-patient care with required nursing assessment and interventions for symptoms with IV medications.      Chantel Mcpherson DNP, MHA, APRN  [facility] Care Navigators  Hospice and Palliative Care Nurse Practitioner  02/12/21  07:29 EST

## 2021-02-12 NOTE — PLAN OF CARE
Goal Outcome Evaluation:  Plan of Care Reviewed With: patient  Progress: no change  Outcome Summary: 80 year old black male that was admitted to Hospice on 1/29/2021.  He has an IV in place and patentto Saline lock.  He has 2 SC accesses in [place].  He has FC to SD that is in place and patent.  He has multiple wound sites that are C/D/I.  Have turned patient Q 2 hours for comfort.  Have administered scheduled and prn mediciation as indicated.  Removed Oxygen, patient has tolerated well.  Will continue to monitor patient throughout the rest of this shift.

## 2021-02-13 NOTE — PLAN OF CARE
Problem: End-of-Life Care  Goal: Comfort, Peace and Preserved Dignity  Outcome: Ongoing, Progressing   Goal Outcome Evaluation:      Hospice, resting comfortably.

## 2021-02-13 NOTE — PLAN OF CARE
Goal Outcome Evaluation:  Plan of Care Reviewed With: patient  Progress: no change  Outcome Summary: 80 year old black male that was admitted on 1/29/2021 to Hospice care.  He is on day 15 of this hospitalization.  He has an IV in place and patent with 2 SQ access in place in [place].  He has a FC to SD in place and patent.  He has multiple woound sites that have dressings in place that are C/D/I.  He has been turned Q 2 for comfort measures.  Have administered medication as indicated for comfort.  Will continue to monitor throughout the rest of the shift.

## 2021-02-13 NOTE — PROGRESS NOTES
"Hospice Progress Note    Date of Admission: 1/29/2021    Subjective:            Questions for Current Code Status     Question Answer Comment    Code Status No CPR     Medical Interventions (Level of Support Prior to Arrest) Comfort Measures         Scheduled Meds:bisacodyl, 10 mg, Rectal, Nightly  furosemide, 20 mg, Intravenous, BID  LORazepam, 1 mg, Intravenous, Q4H  Morphine, 8 mg, Intravenous, Q4H  palliative care oral rinse, , Mouth/Throat, Q12H  Scopolamine, 1 patch, Transdermal, Q72H  sodium chloride, 10 mL, Intravenous, Q12H    PRN Meds:.•  acetaminophen  •  bisacodyl  •  furosemide  •  glycopyrrolate  •  haloperidol lactate  •  LORazepam  •  Morphine  •  palliative care oral rinse  •  Scopolamine  •  sodium chloride    Objective: /71 (BP Location: Left arm, Patient Position: Lying)   Pulse 105   Temp 99.9 °F (37.7 °C) (Axillary)   Resp 20   Ht 162.6 cm (64.02\")   Wt 67.6 kg (149 lb 0.5 oz)   SpO2 90%   BMI 25.57 kg/m²      Intake/Output Summary (Last 24 hours) at 2/9/2021 1807  Last data filed at 2/8/2021 1825  Gross per 24 hour   Intake --   Output 200 ml   Net -200 ml     Physical Exam:  Gen - frail, cachectic, ill elderly M, in bed, in distress  Head/Neck - NC/AT, trachea midline  Eyes- closed, no lid edema  ENT- mouth open, nosiy air movement in throat, lips and tongue dry  Heart - regular, slow  Lungs- big open mouth breaths, chest clear  Abd - obese, soft, nontender  - graves anchored, dark yellow urine  Ext- no cyanosis  Skin- warm, dry, pale  Neuro - not awake or alert, no response to exam or voice, no myoclonus  Psych - calm, face relaxed    Impression:  80 y.o. male with PMHx of Afib, diastolic CHF, CAD, RA on daily prednisone, goiter with tracheal deviation and stenosis, HTN, COPD, BPH, chronic venous stasis ulcers, and large right inguinal hernia.  Recent hospital stay 12/31/20-1/7/2021 for bilateral LE venous stasis ulcers.  Discharged to rehab but remained nonambulatory and " returned home with his wife 1/18/21. Hospitalized again 1/26/2021 for sepsis due to Covid19 pna after presenting with weakness, fatigue, increased somnolence, anorexia, restlessness and fever.  Despite treatment with IV remdesivir and decadron, continued to deteriorate. Developed Afib with RVR and hypotension; very uncomfortable, eating little.  Given poor prognosis, family elected transition to full comfort focused care in the hospital setting.    Symptoms:  Dyspnea, Tachypnea  MSK pain  Restlessness, Anxiety  Encephalopathy  Debility  Anorexia    Plan: ***        Jenny Baeza MD  02/09/21

## 2021-02-13 NOTE — PROGRESS NOTES
Continued Stay Note  [facility]     Patient Name: Michoacano Rojas    Today's Date: 2/13/2021    Admit Date: 1/29/2021    Assessment Note:  2/13 2/12 PPS 10 % Michoacano Rojas is an 80 y.o.  male admitted to Hospice at [facility] with Covid Pna. Patient is on day 15 with zero fluids or intake. He counties to have minimal output dark UA +draining by [place] Cath. Skin remains warm to touch. Patient has had no further skin integrity disruption. He continues to have a mild moan when touched or repositioned. Medications adjusted to better control underlying pain. Zero family present during visit. Continue to monitor for nonverbal signs of pain, and maintain skin integrity.    Discharge plan:    Currently patient remains GIP for complications of EOL care requiring skilled nursing and medical management of symptoms. Discharge plan dependent on a decreased level of care and survival of this admission.      POC:  Family support and education R/T EOL care  Skin integrity altered Do to immobility  Skin integrity R/T disease process  Somatic Pain R/T immobility  Hospice Criteria:  Hospice care provides support and care for persons and their families with a life limiting disease. Hospice is a Medicare benefit and requires   1) Patient prognosis is 6 months or less to live,   2) Patient no longer going through curative therapies.   3) Agreement of 2 physicians that patient's condition is life limiting and will most likely result in death in <6 months.  Hospice is a Level of care not a location and can be provided in various settings based on patient's needs.    Medicare guidelines state that hospice patients in the hospital require skilled nursing, medical interventions and a level of care that cannot be met in any other setting. This stay is most commonly less then two week.   A patient may be Hospice appropriate but not inpatient appropriate based on this criteria.  Once a Hospice referral is reviewed and  appears to be stable  enough to be transferred to a lower level of care with hospice then that is the preference.     Latonya MORAN. RN. PN.  [facility] Navigators  Hospice Care  [phone]

## 2021-02-13 NOTE — PROGRESS NOTES
Hospice Progress Note    Code Status:   Code Status and Medical Interventions:   Ordered at: 01/29/21 1149     Code Status:    No CPR     Medical Interventions (Level of Support Prior to Arrest):    Comfort Measures        Subjective   S: Medical record reviewed, follow up visit for sx mgmt related to COVID-19 PNA s/p isolation. Events noted. Per RN staff, pt appears more comfortable than he has been. Still moans out with any type of physical movement. PRN morphine order has auto discontinued. Pt used the following PRNs: haldol 1mg x1, toradol 30mg x2, morphine 8mg x2.     ROS:   Negative except as above.    PMH/PSH/PFH: Reviewed, no changes    Allergies   Allergen Reactions   • Naproxen Sodium Other (See Comments)     Increased heart rate  Aleve       Current Facility-Administered Medications   Medication Dose Route Frequency Provider Last Rate Last Admin   • acetaminophen (TYLENOL) suppository 650 mg  650 mg Rectal Q4H PRN Chantel Mcpherson H, APRN       • bisacodyl (DULCOLAX) suppository 10 mg  10 mg Rectal Daily PRN Jenny Baeza MD       • furosemide (LASIX) injection 20 mg  20 mg Intravenous Q6H PRN Chantel Mcpherson, APRN   20 mg at 01/29/21 1336   • furosemide (LASIX) injection 20 mg  20 mg Intravenous BID Chantel Mcpherson, APRN   20 mg at 02/13/21 0806   • glycopyrrolate (ROBINUL) injection 0.4 mg  0.4 mg Intravenous Q8H Kiley Jo MD       • glycopyrrolate (ROBINUL) injection 0.4 mg  0.4 mg Intravenous Q4H PRN Kiley Jo MD       • haloperidol lactate (HALDOL) injection 1 mg  1 mg Intravenous Q4H PRN Chantel Mcpherson, APRN   1 mg at 02/13/21 0806   • ketorolac (TORADOL) injection 30 mg  30 mg Intravenous Q6H PRN Chantel Mcpherson, APRN   30 mg at 02/13/21 0806   • LORazepam (ATIVAN) injection 1 mg  1 mg Intravenous Q2H PRN Jenny Baeza MD       • LORazepam (ATIVAN) injection 1 mg  1 mg Intravenous Q4H Jenny Baeza MD   1 mg at 02/13/21 0956   • Morphine sulfate PCA 1  "mg/mL 30 mL syringe   Intravenous Continuous Kiley Jo MD       • palliative care oral rinse   Mouth/Throat PRN Jenny Baeza MD   Given at 02/09/21 1736   • palliative care oral rinse   Mouth/Throat Q12H Jenny Baeza MD   Given at 02/13/21 0807   • Scopolamine (TRANSDERM-SCOP) 1.5 MG/3DAYS patch 1 patch  1 patch Transdermal Q72H PRN Chantel Mcpherson, APRN   1 patch at 02/10/21 0906   • Scopolamine (TRANSDERM-SCOP) 1.5 MG/3DAYS patch 1 patch  1 patch Transdermal Q72H Chantel Mcpherson, APRN   1 patch at 02/10/21 2013   • sodium chloride 0.9 % flush 10 mL  10 mL Intravenous Q12H Jenny Baeza MD   10 mL at 02/12/21 2045   • sodium chloride 0.9 % flush 10 mL  10 mL Intravenous PRN Jenny Baeza MD   10 mL at 02/08/21 0839     Infusions:  Morphine,         Current medication reviewed for route, type, dose and frequency and are current per MAR at time of dictation.    Objective   /61 (BP Location: Left arm, Patient Position: Lying)   Pulse 110   Temp 97.5 °F (36.4 °C) (Axillary)   Resp 16   Ht 162.6 cm (64.02\")   Wt 67.6 kg (149 lb 0.5 oz)   SpO2 (!) 83%   BMI 25.57 kg/m²   No intake or output data in the 24 hours ending 02/13/21 1214    PPS: 10%  Physical Examination:   General Appearance:    Chronically ill appearing, minimally responsive, moderate distress when manipulated/moved but otherwise NAD   HEENT:    NC/AT, without obvious abnormality, eyes closed    Neck:   supple, trachea midline, no JVD   Lungs:     CTAB without w/r/r; nonlabored    Heart:    RRR, normal S1 and S2, no M/R/G   Abdomen:     Soft, NT, ND, hypoactive BS, abd concave    Extremities:   no edema   Pulses:   Pulses palpable and equal bilaterally   Skin:   Warm, dry   Neurologic:   Minimally responsive   Psych:   Calm, appropriate except groaning when manipulated         Labs/Diagnostics/Clinical Data: Reviewed.    Assessment/Plan    Patient Active Problem List   Diagnosis   • Hematuria   • Prostate " hypertrophy   • Rheumatoid arthritis (CMS/HCC)   • Essential hypertension   • Acute on chronic cutaneous venous stasis ulcer (CMS/HCC)   • Very poor mobility   • Large joint arthralgia of multiple sites   • Arthralgia of hands, bilateral   • Generalized stiffness   • Atrial fibrillation (CMS/HCC)   • Anasarca   • Sepsis (CMS/HCC)   • Cellulitis   • 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 (CMS/HCC)   • Tracheal stenosis   • Normocytic anemia   • Diastolic dysfunction with chronic heart failure (CMS/HCC)   • Acute on chronic respiratory failure with hypoxia and hypercapnia (CMS/HCC)   • Incarcerated right inguinal hernia   • Venous insufficiency (chronic) (peripheral)   • Benign essential hypertension   • Non-pressure chronic ulcer of left lower leg, with unspecified severity (CMS/HCC)   • Lower extremity edema   • Chronic diastolic heart failure (CMS/HCC)   • Cognitive disorder   • Chronic conjunctivitis of both eyes   • Facial swelling   • Goiter   • Idiopathic chronic gout of multiple sites with tophus   • Medicare annual wellness visit, subsequent   • Morbid obesity (CMS/[facility])   • Major depressive disorder, single episode, mild (CMS/HCC)   • Skin ulcer of right foot, limited to breakdown of skin (CMS/HCC)   • COPD (chronic obstructive pulmonary disease) (CMS/HCC)   • Hypoxia   • sepsis secondary to LEs ulcerations   • AMS (altered mental status)   • Venous stasis ulcer (CMS/HCC)   • Immunosuppressed status (CMS/HCC)   • Current chronic use of systemic steroids   • Rheumatoid arthritis involving multiple sites (CMS/HCC)   • Demand ischemia of myocardium (CMS/HCC)   • Joint pain   • Inguinal hernia, right   • Right shoulder pain   • Pneumonia due to COVID-19 virus   • Hypoalbuminemia   • Venous ulcer of ankle (CMS/HCC)   • CAD (coronary artery disease)       Assessment:   Michoacano Rojas is a 80 y.o. yo male with COVID-19 PNA s/p isolation    Goals of Care: Comfort measures  Family/Support/Spiritual: No family at  bedside today     Plan:    Dyspnea- Pt on scheduled morphine 8mg q4h with 8mg q1h PRN, 2 PRN doses received= 64mg/24hrs. PRN order has . Will start morphine gtt at 2mg/hr (may need to be adjusted as pt has tolerated about 2.5mg/hr based on past 24 hr usage, however since starting gtt, will start a slightly lower dosage) and leave PRN in place.     PRN bisacodyl in place.     May need to decrease scheduled Lasix over the next few days.     Pt otherwise appears comfortable today.    Justification: Patient continues to meet criteria for Acute In-patient Care due to ongoing sx mgmt needs, starting continuous infusions.    Time: 25 mins    Kiley Jo MD  2021

## 2021-02-14 NOTE — PROGRESS NOTES
Continued Stay Note  [facility]     Patient Name: Michoacano Rojas    Today's Date: 2/14/2021    Admit Date: 1/29/2021    Assessment Note:  2/14 PPS 10 % Michoacano Rojas is an 80 y.o.  male admitted to Hospice at [facility] with Covid Pna. Patient remains unresponsive. Feet and hands are cool. Wounds are dry and appear approximately unchanged. They are however dry and dressings intact. Patient is significantly contracted and continues to have pain when moved, please continue to pre-medicate before care. Zero family present during visit.   Discharge plan:    Currently patient remains GIP for complications of EOL care requiring skilled nursing and medical management of symptoms. Discharge plan dependent on a decreased level of care and survival of this admission.      POC:  Family support and education R/T EOL care  Skin integrity altered Do to immobility  Skin integrity R/T disease process    Hospice Criteria:  Hospice care provides support and care for persons and their families with a life limiting disease. Hospice is a Medicare benefit and requires   1) Patient prognosis is 6 months or less to live,   2) Patient no longer going through curative therapies.   3) Agreement of 2 physicians that patient's condition is life limiting and will most likely result in death in <6 months.  Hospice is a Level of care not a location and can be provided in various settings based on patient's needs.    Medicare guidelines state that hospice patients in the hospital require skilled nursing, medical interventions and a level of care that cannot be met in any other setting. This stay is most commonly less then two week.   A patient may be Hospice appropriate but not inpatient appropriate based on this criteria.  Once a Hospice referral is reviewed and  appears to be stable enough to be transferred to a lower level of care with hospice then that is the preference.     Latonya MORAN. RN. CHPN.  [facility] Navigators  Hospice  Care  [phone]

## 2021-02-14 NOTE — PROGRESS NOTES
Hospice Progress Note    Code Status:   Code Status and Medical Interventions:   Ordered at: 01/29/21 1149     Code Status:    No CPR     Medical Interventions (Level of Support Prior to Arrest):    Comfort Measures        Subjective   S: Medical record reviewed, follow up visit for sx mgmt related to COVID-19 PNA s/p isolation. Events noted. Pt appears comfortable today, does not moan out with any PE for me today. PRNs received: haldol 1mg x1, toradol 30mg x1, and morphine 8mg x1.     ROS:   Negative except as above.    PMH/PSH/PFH: Reviewed, no changes    Allergies   Allergen Reactions   • Naproxen Sodium Other (See Comments)     Increased heart rate  Aleve       Current Facility-Administered Medications   Medication Dose Route Frequency Provider Last Rate Last Admin   • acetaminophen (TYLENOL) suppository 650 mg  650 mg Rectal Q4H PRN Chantel Mcpherson, APRN       • bisacodyl (DULCOLAX) suppository 10 mg  10 mg Rectal Daily PRN Jenny Baeza MD       • furosemide (LASIX) injection 20 mg  20 mg Intravenous Q6H PRN Chantel Mcpherson, APRN   20 mg at 01/29/21 1336   • furosemide (LASIX) injection 20 mg  20 mg Intravenous BID Chantel Mcpherson, APRN   20 mg at 02/14/21 0822   • glycopyrrolate (ROBINUL) injection 0.4 mg  0.4 mg Intravenous Q8H Kiley Jo MD   0.4 mg at 02/14/21 0532   • glycopyrrolate (ROBINUL) injection 0.4 mg  0.4 mg Intravenous Q4H PRN Kiley Jo MD       • haloperidol lactate (HALDOL) injection 1 mg  1 mg Intravenous Q4H PRN Chantel Mcpherson, APRN   1 mg at 02/13/21 0806   • ketorolac (TORADOL) injection 30 mg  30 mg Intravenous Q6H PRN Chantel Mcpherson, APRN   30 mg at 02/13/21 0806   • LORazepam (ATIVAN) injection 1 mg  1 mg Intravenous Q2H PRN Jenny Baeza MD       • LORazepam (ATIVAN) injection 1 mg  1 mg Intravenous Q4H Jenny Baeza MD   1 mg at 02/14/21 0919   • Morphine sulfate (PF) injection 8 mg  8 mg Intravenous Q1H PRN Kiley Jo MD   8  "mg at 02/14/21 0821   • Morphine sulfate PCA 1 mg/mL 30 mL syringe   Intravenous Continuous Kiley Jo MD   New Syringe/Cartridge at 02/14/21 0155   • palliative care oral rinse   Mouth/Throat PRN Jenny Baeza MD   Given at 02/09/21 1736   • palliative care oral rinse   Mouth/Throat Q12H Jenny Baeza MD   Given at 02/14/21 0821   • Scopolamine (TRANSDERM-SCOP) 1.5 MG/3DAYS patch 1 patch  1 patch Transdermal Q72H PRN Chantel Mcpherson, APRN   1 patch at 02/10/21 0906   • Scopolamine (TRANSDERM-SCOP) 1.5 MG/3DAYS patch 1 patch  1 patch Transdermal Q72H Chantel Mcpherson, APRN   1 patch at 02/13/21 1956   • sodium chloride 0.9 % flush 10 mL  10 mL Intravenous Q12H Jenny Baeza MD   10 mL at 02/14/21 0822   • sodium chloride 0.9 % flush 10 mL  10 mL Intravenous PRN Jenny Baeza MD   10 mL at 02/14/21 0535     Infusions:  Morphine,         Current medication reviewed for route, type, dose and frequency and are current per MAR at time of dictation.    Objective   /61 (BP Location: Left arm, Patient Position: Lying)   Pulse 110   Temp 97.5 °F (36.4 °C) (Axillary)   Resp 14   Ht 162.6 cm (64.02\")   Wt 67.6 kg (149 lb 0.5 oz)   SpO2 (!) 83%   BMI 25.57 kg/m²     Intake/Output Summary (Last 24 hours) at 2/14/2021 1256  Last data filed at 2/14/2021 0600  Gross per 24 hour   Intake 8.01 ml   Output --   Net 8.01 ml       PPS: 10%  Physical Examination:   General Appearance:    Chronically ill appearing, minimally responsive, NAD   HEENT:    NC/AT, without obvious abnormality, eyes closed    Neck:   supple, trachea midline, no JVD   Lungs:     CTAB without w/r/r; nonlabored    Heart:    RRR, normal S1 and S2, no M/R/G   Abdomen:     Soft, NT, ND, hypoactive BS, abd concave    Extremities:   no edema   Pulses:   Pulses palpable and equal bilaterally   Skin:   Warm, dry   Neurologic:   Minimally responsive   Psych:   Calm, appropriate          Labs/Diagnostics/Clinical Data: " Reviewed.    Assessment/Plan    Patient Active Problem List   Diagnosis   • Hematuria   • Prostate hypertrophy   • Rheumatoid arthritis (CMS/HCC)   • Essential hypertension   • Acute on chronic cutaneous venous stasis ulcer (CMS/HCC)   • Very poor mobility   • Large joint arthralgia of multiple sites   • Arthralgia of hands, bilateral   • Generalized stiffness   • Atrial fibrillation (CMS/HCC)   • Anasarca   • Sepsis (CMS/HCC)   • Cellulitis   • 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 (CMS/HCC)   • Tracheal stenosis   • Normocytic anemia   • Diastolic dysfunction with chronic heart failure (CMS/HCC)   • Acute on chronic respiratory failure with hypoxia and hypercapnia (CMS/HCC)   • Incarcerated right inguinal hernia   • Venous insufficiency (chronic) (peripheral)   • Benign essential hypertension   • Non-pressure chronic ulcer of left lower leg, with unspecified severity (CMS/HCC)   • Lower extremity edema   • Chronic diastolic heart failure (CMS/HCC)   • Cognitive disorder   • Chronic conjunctivitis of both eyes   • Facial swelling   • Goiter   • Idiopathic chronic gout of multiple sites with tophus   • Medicare annual wellness visit, subsequent   • Morbid obesity (CMS/HCC)   • Major depressive disorder, single episode, mild (CMS/HCC)   • Skin ulcer of right foot, limited to breakdown of skin (CMS/HCC)   • COPD (chronic obstructive pulmonary disease) (CMS/HCC)   • Hypoxia   • sepsis secondary to LEs ulcerations   • AMS (altered mental status)   • Venous stasis ulcer (CMS/HCC)   • Immunosuppressed status (CMS/HCC)   • Current chronic use of systemic steroids   • Rheumatoid arthritis involving multiple sites (CMS/HCC)   • Demand ischemia of myocardium (CMS/HCC)   • Joint pain   • Inguinal hernia, right   • Right shoulder pain   • Pneumonia due to COVID-19 virus   • Hypoalbuminemia   • Venous ulcer of ankle (CMS/HCC)   • CAD (coronary artery disease)       Assessment:   Michoacano Rojas is a 80 y.o. yo male with  COVID-19 PNA s/p isolation    Goals of Care: Comfort measures  Family/Support/Spiritual: No family at bedside today     Plan:    Appears more comfortable today with morphine PCA in place, started on 2/13. Started at 2mg/hr. Received 1 PRN dosage morphine 8mg since.     May need to decrease scheduled Lasix over the next few days.     Pt otherwise appears comfortable today.    Justification: Patient continues to meet criteria for Acute In-patient Care due to ongoing sx mgmt needs, starting continuous infusions.    Time: 10 mins    Kiley Jo MD  2/14/2021

## 2021-02-14 NOTE — PLAN OF CARE
Goal Outcome Evaluation:  Plan of Care Reviewed With: patient  Progress: no change  Outcome Summary: 80 year old black male that was admitted on 1/29/2021 to Hospice care.  He is on day 16 of this hospitalization.  He has an IV in place and patent infusing a morphine PCA pump.  He has SQ access x 2 in place in [place], patent.  He has a FC to SD in place and patent with adequate amount of UOP.  He has multiple dressing in lace that are C/D/I.  He has been turned Q 2 for comfort measures.  Have administered medication as indicated.  Will continue to monitor throughout the rest of this shift.

## 2021-02-15 NOTE — PLAN OF CARE
Goal Outcome Evaluation:  Plan of Care Reviewed With: family  Progress: no change  Outcome Summary: comfort measures continued

## 2021-02-15 NOTE — PROGRESS NOTES
"Hospice Progress Note    Patient Name: Michoacano Rojas   : 1940  Gender: male    Code Status: comfort measures    Date of Admission: 2021    Subjective:    80yoM admitted in Hospice for Covid19 pna.     Remained comfortable overnight.    Today, resting in bed, undisturbed by exam. Comfortable.     - PRNs: none    - Held: none    - Intake/Output  *PO: NPO  *Urine: 75ml in previous 24; dark output in graves  *LBM:       ROS:  Review of Systems   Unable to perform ROS: Acuity of condition       Reviewed current scheduled and prn medications for route, type, dose and frequency.  Morphine,       •  acetaminophen  •  bisacodyl  •  furosemide  •  glycopyrrolate  •  haloperidol lactate  •  ketorolac  •  LORazepam  •  Morphine  •  palliative care oral rinse  •  Scopolamine  •  sodium chloride    Objective:   /61 (BP Location: Left arm, Patient Position: Lying)   Pulse 110   Temp 97.5 °F (36.4 °C) (Axillary)   Resp 14   Ht 162.6 cm (64.02\")   Wt 67.6 kg (149 lb 0.5 oz)   SpO2 (!) 83%   BMI 25.57 kg/m²        PPS: Palliative Performance Scale score as of 2/15/2021, 09:14 EST is 10% based on the following measures:   Ambulation: Totally bed bound  Activity and Evidence of Disease: Unable to do any work, extensive evidence of disease  Self-Care: Total care  Intake:  Mouth care only  LOC: Drowsy or coma      Physical Exam:  Physical Exam  Constitutional:       General: He is not in acute distress.     Appearance: He is ill-appearing.      Comments: Comfortable. Mottling.    HENT:      Head:      Comments: Cheekbone prominent  + temporal wasting  Eyes:      Comments: closed   Neck:      Musculoskeletal: Neck supple.   Cardiovascular:      Rate and Rhythm: Normal rate and regular rhythm.   Pulmonary:      Effort: Pulmonary effort is normal.      Breath sounds: Normal breath sounds.   Abdominal:      Palpations: Abdomen is soft.   Genitourinary:     Comments: Graves with dark urine " output  Musculoskeletal:         General: Deformity (BUE, BLE) present.   Skin:     General: Skin is dry.      Coloration: Skin is pale.   Neurological:      Comments: Does not follow commands   Psychiatric:      Comments: No facial grimacing; no moaning             Pneumonia due to COVID-19 virus      Assessment/Plan:     80yoM admitted inpt Hospice for Covid19 pna.      Dyspnea  Anxiety  Congestion  Constipation  Weakness    *denotes new medication changes today    - continue morphine pca 2mg/hr    * discontinue lasix 20mg BID    - continue robinul 0.4mg q8h    - continue ativan 1mg q4h    - continue scop patch    - continue scheduled and prn palliative oral rinse    * scheduled and prn eye gtts    * dul supp x3 days    Coordinated care with Nursing    Discharge Disposition: actively dying    Total Visit Time: 15min  Face to Face Time: 5min    Justification for care:  Patient meets criteria for acute in-patient care with required nursing assessment and interventions for symptoms with IV medications.      Chantel Mcpherson, RACHEL, MHA, APRN  [facility] Care Navigators  Hospice and Palliative Care Nurse Practitioner  02/15/21  07:01 EST

## 2021-02-15 NOTE — PROGRESS NOTES
2/15/2021 XIMENA is a 79yo  male admitted to [facility] with terminal dx of Covid pna for symptom management of pain, skin integrity issues.  The patient is at a pps of 10%. He is currently noted unresponsive. He is on a morphine pca.  Respirations are even, unlabored. Relaxed face, jaw, body posture at present. Non healing skin wounds noted per [facility] staff documentation with noted dressings clean, dry, intact. See wound addendum.  He remains unresponsive.  He has had no intake in over a week.  Noted cyanotic mucus membranes, cold extremities, and nail beds are dusky. The patient is actively dying.  No visitors are at bedside at present.      The patient continues to require gip care due to complex technical continues delivery of subcutaneous opiate medication per nursing staff requiring continued nursing assessment, intervention, and evaluation.  Death is imminent requiring frequent assessment, intervention, and reevaluation.  Care requirements unable to be provided in alternate setting.

## 2021-02-16 NOTE — SIGNIFICANT NOTE
Exam confirms with auscultation zero audible heart tones and zero audible respirations. Mr. Michoacano Rojas was pronounced dead at 1858.  MD notified by Patient's RN.    Navya Knapp RN  Clinical House Supervisor  2/15/2021 18:58 PM

## 2021-02-17 NOTE — DISCHARGE SUMMARY
Date of Death:  2/15/2021  Time of Death:  1858    Presenting Problem/History of Present Illness    Pneumonia due to COVID-19 virus      Hospital Course    Patient is a 80 y.o. male presents with continued weakness, fatigue, increased somnolence, anorexia, restlessness and a fever at home for the three days PTA.      PMHx:  Afib, RA on daily prednisone, dCHF, goiter with tracheal deviation and stenosis, CAD, HTN, COPD, BPH, chronic venous stasis ulcers, Large right inguinal hernia     12/31/20: Admit [facility] for AoC BLE venous stasis ulcer; Covid19 negative     1/7/2021: Discharged to [facility]     1/18/2021: Discharged from [facility] to home (remained nonambulatory)     1/26/2021: Admit [facility] for sepsis, Covid19 pna (initial positive test was 1/26/2021)     Mr. Rojas was treated with IV remdesivir/decadron but continued to deteriorate. Last night pt developed Afib with RVR and hypotension; pt was given digoxin and amiodarone. Ultimately pt's family elected for Comfort Measures. Mr. Rojas was admitted to in Hospice on 1/29/2021 for mgmt of acute symptoms 2/2 Covid19 pna.     Social History:  Social History     Tobacco Use   • Smoking status: Never Smoker   • Smokeless tobacco: Never Used   • Tobacco comment: Quit 01/01/1968   Substance Use Topics   • Alcohol use: No         Consults:   Consults     Date and Time Order Name Status Description    1/29/2021 0418 Inpatient Cardiology Consult Completed     1/27/2021 1111 Inpatient Palliative Care MD Consult Completed     1/3/2021 1458 Inpatient Orthopedic Surgery Consult Completed     1/3/2021 1458 Inpatient General Surgery Consult Completed         Exam confirms with auscultation zero audible heart tones and zero audible respirations. Mr. Michoacano Rojas was pronounced dead at 1858.  MD notified by Patient's RN.     Navya Knapp, RN  Clinical House Supervisor  2/15/2021 18:58 PM      Chantel Mcpherson, RACHEL, MHA, APRN  [facility]  Hospice and  Palliative Care Nurse Practitioner  02/17/21  08:53 EST

## 2023-07-27 NOTE — THERAPY EVALUATION
Acute Care - Physical Therapy Initial Evaluation  [facility]     Patient Name: Michoacano Rojas  : 1940  MRN: [id number]  Today's Date: 2017   Onset of Illness/Injury or Date of Surgery Date: 17  Date of Referral to PT: 17  Referring Physician: Dr Bangura      Admit Date: 2017     Visit Dx:    ICD-10-CM ICD-9-CM   1. Atrial fibrillation with RVR I48.91 427.31   2. Leukocytosis, unspecified type D72.829 288.60   3. Impaired mobility and ADLs Z74.09 799.89   4. Impaired functional mobility, balance, gait, and endurance Z74.09 V49.89     Patient Active Problem List   Diagnosis   • Hematuria   • Prostate hypertrophy   • Rheumatoid arthritis   • Essential hypertension   • Rheumatoid arthritis   • Vertigo   • Chronic cutaneous venous stasis ulcer   • Very poor mobility   • Large joint arthralgia of multiple sites   • Arthralgia of hands, bilateral   • Generalized stiffness   • Atrial fibrillation with RVR     Past Medical History:   Diagnosis Date   • Chronic cutaneous venous stasis ulcer    • Hypertension    • RA (rheumatoid arthritis)    • Rheumatoid arthritis    • Vertigo    • Vertigo      Past Surgical History:   Procedure Laterality Date   • JOINT MANIPULATION      left hip repair          PT ASSESSMENT (last 72 hours)      PT Evaluation       17 1031 17 1029    Rehab Evaluation    Document Type evaluation  -KM evaluation  -JR    Subjective Information no complaints  -KM no complaints;agree to therapy  -JR    Patient Effort, Rehab Treatment excellent  -KM excellent  -JR    Symptoms Noted During/After Treatment none  -KM none  -JR    General Information    Patient Profile Review yes  -KM yes  -JR    Onset of Illness/Injury or Date of Surgery Date 17  -KM 17  -JR    Referring Physician Dr Bangura  - Dr. Bangura  -    Pertinent History Of Current Problem Pt admitted with a fib and RVR. H/O long standing R.A., gout and joint deformities/fusion  -KM Pt admitted with new  If you are a smoker, it is important for your health to stop smoking. Please be aware that second hand smoke is also harmful. onset a-fib with RVR. Pt with longstanding h/o RA and gout with multiple joint deformities.  -JR    Precautions/Limitations fall precautions  -KM fall precautions  -JR    Prior Level of Function max assist:;ADL's;independent:;gait;transfer;bed mobility  -KM max assist:;ADL's;independent:;gait;transfer;bed mobility  -JR    Equipment Currently Used at Home cane, straight;crutches, forearm;hospital bed   lift chair  -KM cane, straight;commode;hospital bed   loftstrand crutches  -JR    Plans/Goals Discussed With patient;spouse/S.O.;agreed upon  -KM patient and family;agreed upon  -JR    Risks Reviewed patient:;spouse/S.O.:;increased discomfort  -KM patient and family:;increased discomfort  -JR    Benefits Reviewed patient:;spouse/S.O.:;improve function  -KM patient and family:;improve function;increase independence  -JR    Barriers to Rehab medically complex  -KM medically complex;previous functional deficit  -JR    Living Environment    Lives With spouse  -KM spouse  -JR    Living Arrangements house  -KM house  -JR    Home Accessibility ramps present at home  -KM ramps present at home  -JR    Clinical Impression    Date of Referral to PT 06/17/17  -KM     PT Diagnosis impaired mobility  -KM     Patient/Family Goals Statement regain PLOF  -KM     Criteria for Skilled Therapeutic Interventions Met yes  -KM     Rehab Potential fair, will monitor progress closely  -KM     Pain Assessment    Pain Assessment No/denies pain  -KM 0-10  -JR    Pain Score  0  -JR    Post Pain Score  0  -JR    Cognitive Assessment/Intervention    Current Cognitive/Communication Assessment functional  -KM functional  -JR    Orientation Status oriented x 4  -KM oriented x 4  -JR    Follows Commands/Answers Questions 100% of the time;able to follow single-step instructions  -% of the time;able to follow single-step instructions  -JR    Personal Safety WNL/WFL  -KM WNL/WFL  -JR    ROM (Range of Motion)    General ROM lower extremity range of  motion deficits identified   R knee fused in extension, L knee 20-40 flexion  -KM upper extremity range of motion deficits identified  -    General ROM Detail  B shoulder flexion limited to approx 90 degrees, pt limited to approx 20 degrees R elbow flexion, Pt with B digit PIP flexion contractures.  -    MMT (Manual Muscle Testing)    General MMT Assessment lower extremity strength deficits identified   functionally 3/5 , MMT deferred  -KM upper extremity strength deficits identified  -    General MMT Assessment Detail  Did not assess strength due to multiple joint deformities  -    Bed Mobility, Assessment/Treatment    Bed Mobility, Assistive Device head of bed elevated;bed rails  -KM head of bed elevated;bed rails  -    Bed Mob, Supine to Sit, [place] supervision required   with effort  -KM supervision required  -    Bed Mobility, Comment --   Requires increased time and effort to complete  -KM Pt able to complete bed mobility with increased time and effort.  -    Transfer Assessment/Treatment    Transfers, Sit-Stand [place] set up required;stand by assist  -KM stand by assist  -    Transfers, Stand-Sit [place] moderate assist (50% patient effort);2 person assist required   due to low surface, has lift chair at home  -KM moderate assist (50% patient effort);2 person assist required   due to low surface, pt uses lift chair at home  -    Transfers, Sit-Stand-Sit, Assist Device forearm crutches;straight cane;elevated surface  -KM straight cane   lofstrand crutch  -    Transfer, Safety Issues balance decreased during turns;sequencing ability decreased;weight-shifting ability decreased  -KM balance decreased during turns;step length decreased;weight-shifting ability decreased  -    Transfer, Impairments strength decreased;impaired balance  -KM strength decreased;impaired balance;ROM decreased;decreased flexibility  -    Transfer, Comment  Pt able to complete sit to stand with bed  elevated and increased time and effort.  -JR    Gait Assessment/Treatment    Gait, [place] Level set up required;contact guard assist  -KM     Gait, Assistive Device straight cane;forearm crutches   st cane left, loftstrand crutch r  -KM     Gait, Distance (Feet) 15  -KM     Gait, Gait Deviations avril decreased;decreased heel strike;limb motion velocity decreased;step length decreased;stride length decreased  -KM     Motor Skills/Interventions    Additional Documentation  Balance Skills Training (Group)  -JR    Balance Skills Training    Sitting-Level of Assistance Contact guard  -KM Contact guard  -JR    Sitting-Balance Support Feet supported  -KM Feet supported  -JR    Standing-Level of Assistance Contact guard  -KM Contact guard  -JR    Static Standing Balance Support assistive device  -KM assistive device  -JR    Gait Balance-Level of Assistance Contact guard  -KM Contact guard  -JR    Gait Balance Support assistive device  -KM assistive device  -JR    Positioning and Restraints    Pre-Treatment Position in bed  -KM in bed  -JR    Post Treatment Position chair  -KM chair  -JR    In Chair reclined;call light within reach;encouraged to call for assist;with family/caregiver  -KM notified nsg;reclined;call light within reach;encouraged to call for assist;with family/caregiver;LUE elevated;RLE elevated;LLE elevated  -      06/19/17 0938 06/18/17 1616    Rehab Evaluation    Evaluation Not Performed  patient/family declined evaluation   had just gotten back in bed and multp. visitors presents.  Request to defer until tomorrow  -LF    General Information    Equipment Currently Used at Home cane, straight;bath bench;crutches;wheelchair  -MB     Living Environment    Lives With spouse  -MB     Living Arrangements house  -MB     Home Accessibility no concerns  -MB     Stair Railings at Home none  -MB     Type of Financial/Environmental Concern none  -MB     Transportation Available car;family or friend will  provide  -MB       06/18/17 0551 06/18/17 0549    General Information    Equipment Currently Used at Home  crutches;cane, straight  -MOLLY    Living Environment    Lives With spouse  -MOLLY     Living Arrangements house  -MOLLY     Home Accessibility no concerns  -MOLLY     Stair Railings at Home none  -MOLLY     Type of Financial/Environmental Concern none  -MOLLY     Transportation Available car;family or friend will provide  -MOLLY       User Key  (r) = Recorded By, (t) = Taken By, (c) = Cosigned By    Initials Name Provider Type    LF Rachele Prieto, PT Physical Therapist     Serena Estevez, PT Physical Therapist    JR Kiley Gaines, OT Occupational Therapist    MOLLY Joseph, RN Registered Nurse    DUSTY Lovelace, MSW           Physical Therapy Education     Title: PT OT SLP Therapies (Active)     Topic: Physical Therapy (Done)     Point: Mobility training (Done)    Learning Progress Summary    Learner Readiness Method Response Comment Documented by Status   Patient Acceptance E [facility] 06/19/17 1138 Done   Family Acceptance E [facility] 06/19/17 1138 Done               Point: Home exercise program (Done)    Learning Progress Summary    Learner Readiness Method Response Comment Documented by Status   Patient Acceptance E [facility] 06/19/17 1138 Done   Family Acceptance E [facility] 06/19/17 1138 Done               Point: Body mechanics (Done)    Learning Progress Summary    Learner Readiness Method Response Comment Documented by Status   Patient Acceptance E [facility] 06/19/17 1138 Done   Family Acceptance E [facility] 06/19/17 1138 Done               Point: Precautions (Done)    Learning Progress Summary    Learner Readiness Method Response Comment Documented by Status   Patient Acceptance E [facility] 06/19/17 1138 Done   Family Acceptance E [facility] 06/19/17 1138 Done                      User Key     Initials Effective Dates Name Provider Type Discipline     06/19/15 -  Serena Estevez, PT Physical Therapist  PT                PT Recommendation and Plan  Anticipated Discharge Disposition: home with assist  PT Frequency: daily  Plan of Care Review  Plan Of Care Reviewed With: patient  Outcome Summary/Follow up Plan: Pt presents with decreased functional mobility from baseline, recommend home with HHPT at d/c          IP PT Goals       06/19/17 1138          Bed Mobility PT LTG    Bed Mobility PT LTG, Date Established 06/19/17  -KM      Bed Mobility PT LTG, Time to Achieve 2 wks  -KM      Bed Mobility PT LTG, Activity Type all bed mobility  -KM      Bed Mobility PT LTG, [place] Level conditional independence  -KM      Transfer Training PT LTG    Transfer Training PT LTG, Date Established 06/19/17  -KM      Transfer Training PT LTG, Time to Achieve 2 wks  -KM      Transfer Training PT LTG, Activity Type all transfers  -KM      Transfer Training PT LTG, [place] Level conditional independence  -KM      Transfer Training PT LTG, Assist Device cane, straight;crutches, forearm  -KM      Gait Training PT LTG    Gait Training Goal PT LTG, Time to Achieve 2 wks  -KM      Gait Training Goal PT LTG, [place] Level conditional independence  -KM      Gait Training Goal PT LTG, Assist Device cane, straight;crutches, forearm  -KM      Gait Training Goal PT LTG, Distance to Achieve 25  -KM        User Key  (r) = Recorded By, (t) = Taken By, (c) = Cosigned By    Initials Name Provider Type    DARYL Estevez, PT Physical Therapist                Outcome Measures       06/19/17 1031          How much help from another person do you currently need...    Turning from your back to your side while in flat bed without using bedrails? 4  -KM      Moving from lying on back to sitting on the side of a flat bed without bedrails? 4  -KM      Moving to and from a bed to a chair (including a wheelchair)? 3  -KM      Standing up from a chair using your arms (e.g., wheelchair, bedside chair)? 2  -KM      Climbing 3-5 steps with a  railing? 1  -KM      To walk in hospital room? 3  -KM      AM-PAC 6 Clicks Score 17  -KM      Functional Assessment    Outcome Measure Options AM-PAC 6 Clicks Basic Mobility (PT)  -KM        User Key  (r) = Recorded By, (t) = Taken By, (c) = Cosigned By    Initials Name Provider Type    DARYL Estevez PT Physical Therapist           Time Calculation:         PT Charges       06/19/17 1142          Time Calculation    Start Time 1031  -KM      PT Received On 06/19/17  -KM      PT Goal Re-Cert Due Date 06/29/17  -KM      Time Calculation- PT    Total Timed Code Minutes- PT 12 minute(s)  -KM        User Key  (r) = Recorded By, (t) = Taken By, (c) = Cosigned By    Initials Name Provider Type    DARYL Estevez PT Physical Therapist          Therapy Charges for Today     Code Description Service Date Service Provider Modifiers Qty    40122375468 HC PT MOBILITY CURRENT 6/19/2017 Serena Estevez, PT GP, CK 1    06325305953 HC PT MOBILITY PROJECTED 6/19/2017 Serena Estevez, PT GP, CI 1    51761401781 HC PT EVAL MOD COMPLEXITY 3 6/19/2017 Serena Estevez, PT GP 1    04835843546 HC GAIT TRAINING EA 15 MIN 6/19/2017 Serena Estevez, PT GP 1    41640461772 HC PT THER SUPP EA 15 MIN 6/19/2017 Serena Estevez, PT GP 1          PT G-Codes  Outcome Measure Options: AM-PAC 6 Clicks Basic Mobility (PT)  Score: 17  Functional Limitation: Mobility: Walking and moving around  Mobility: Walking and Moving Around Current Status (): At least 40 percent but less than 60 percent impaired, limited or restricted  Mobility: Walking and Moving Around Goal Status (): At least 1 percent but less than 20 percent impaired, limited or restricted      Serena Estevez, PT  6/19/2017

## 2023-09-26 NOTE — PLAN OF CARE
Problem: Adult Inpatient Plan of Care  Goal: Plan of Care Review  Recent Flowsheet Documentation  Taken 1/6/2021 1232 by Bettie Ardon, PT  Progress: no change  Plan of Care Reviewed With: patient  Outcome Summary: Rolled L/R w/ Dep.2A for sling placement & transf to chair w/ mech lift.Performed ther exer LUE & BLE in sup & sit, but def RUE exer & STS  trial d/t R acromium insuff. fx (no WB guidelines given by ortho MD). Onset throbbing pain L knee & desat to 88% sev. x's during mobil. Nsg weaning from 2 L (uses PRN). Plan is SNF possibly 1-7.   Goal Outcome Evaluation:  Plan of Care Reviewed With: patient  Progress: no change  Outcome Summary: Rolled L/R w/ Dep.2A for sling placement & transf to chair w/ mech lift.Performed ther exer LUE & BLE in sup & sit, but def RUE exer & STS  trial d/t R acromium insuff. fx (no WB guidelines given by ortho MD). Onset throbbing pain L knee & desat to 88% sev. x's during mobil. Nsg weaning from 2 L (uses PRN). Plan is SNF possibly 1-7.   Health Maintenance Due   Topic Date Due   • Pneumococcal Vaccine 0-64 (1 - PCV) Never done   • Diabetes Eye Exam  Never done   • DTaP/Tdap/Td Vaccine (1 - Tdap) Never done   • Shingles Vaccine (1 of 2) Never done   • Hepatitis B Vaccine (For Physician/APC Discussion) (1 of 3 - Risk 3-dose series) Never done   • COVID-19 Vaccine (3 - Pfizer series) 07/07/2021   • Diabetes Foot Exam  11/09/2021   • Diabetes A1C  08/07/2023   • Colorectal Cancer Risk - Colonoscopy  08/29/2023   • Influenza Vaccine (1) 09/01/2023   • Depression Screening  09/06/2023       Patient refused pneumonia, dtap, shingles, flu and covid vaccines

## 2025-01-24 NOTE — H&P
[facility] Medicine Services  HISTORY AND PHYSICAL    Patient Name: Michoacano Rojas  : 1940  MRN: [id number]  Primary Care Physician: Michael Chavez MD    Subjective   Subjective     Chief Complaint:  AMS, SOA    HPI:  Michoacano Rojas is a 77 y.o. male with PMH of HTN, RA on chronic PDS, chronic venous stasis ulcers and Afib previously on Eliquis who presents with complaints of confusion this am and several weeks of progressive SOA and orthopnea. Family at bedside report that he has been getting up at night due to SOA and feels better when sitting up at bedside for a few minutes to an hour.  Pt denies any F/C or sick contacts. Pt denies any wt gain but does endorse LE and upper extremity swelling and intermittent facial swelling (which he attributes to his prednisone). Pt states that he was confused this am and that is why family brought him in.  His confusion seems to have resolved.     Review of Systems   General- no F/C, + fatigue, + weight loss   HEENT- no blurry vision, no nasal congestion, no hearing loss, no sore throat, no dysphagia, no oral ulcers, no dental caries, no bleeding gums  CVS- no chest pain, no palpitations  Pulm- no cough, + PND, + orthopnea  GI- no diarrhea, no constipation, no BRBPR, no nausea, no vomiting  MSK- chronic joint pain  - no dysuria, no hematuria  Neuro- no headaches, no focal motor deficits  Psych- no SI/ HI, no depression/anxiety  Otherwise 10-system ROS reviewed and is negative except as mentioned in the HPI.    Personal History     Past Medical History:   Diagnosis Date   • A-fib    • Atrial fibrillation    • Chronic cutaneous venous stasis ulcer    • Hypertension    • RA (rheumatoid arthritis)    • Rheumatoid arthritis    • Vertigo    • Vertigo        Past Surgical History:   Procedure Laterality Date   • JOINT MANIPULATION      left hip repair       Family History: family history reviewed and noncontributory    Social History:   reports that he has never smoked. He has never used smokeless tobacco. He reports that he does not drink alcohol or use drugs.  Social History     Social History Narrative   • No narrative on file       Medications:  No current facility-administered medications on file prior to encounter.      Current Outpatient Prescriptions on File Prior to Encounter   Medication Sig Dispense Refill   • amLODIPine (NORVASC) 10 MG tablet Take 1 tablet by mouth Daily. 30 tablet 0   • bisoprolol (ZEBeta) 5 MG tablet Take 0.5 tablets by mouth Daily. 30 tablet 2   • cholecalciferol (VITAMIN D3) 1000 UNITS tablet Take 2,000 Units by mouth Daily.     • diclofenac (VOLTAREN) 3 % gel gel Apply 2 g topically 2 (Two) Times a Day As Needed. for 60 days.      • docusate sodium (COLACE) 50 MG capsule Take 100 mg by mouth Daily As Needed.     • finasteride (PROSCAR) 5 MG tablet Take 5 mg by mouth Daily.     • fluticasone (FLONASE) 50 MCG/ACT nasal spray 2 sprays into each nostril Daily.     • guaiFENesin (MUCINEX) 600 MG 12 hr tablet Take 1 tablet by mouth Every 12 (Twelve) Hours. 14 tablet 0   • HYDROcodone-acetaminophen (NORCO)  MG per tablet Take 1 tablet by mouth Every 6 (Six) Hours As Needed for Moderate Pain (4-6).     • lisinopril (PRINIVIL,ZESTRIL) 5 MG tablet Take 1 tablet by mouth Daily. 30 tablet 0   • predniSONE (DELTASONE) 5 MG tablet Take 3 tablets by mouth Daily.     • saccharomyces boulardii (FLORASTOR) 250 MG capsule Take 1 capsule by mouth 2 (Two) Times a Day. 30 capsule 0   • tamsulosin (FLOMAX) 0.4 MG capsule 24 hr capsule Take 1 capsule by mouth Every Night. 30 capsule 0       Allergies   Allergen Reactions   • Other      Relates all oral abx cause intol nausea/vomiting.       Objective   Objective     Vital Signs:   Temp:  [97.8 °F (36.6 °C)] 97.8 °F (36.6 °C)  Heart Rate:  [73] 73  Resp:  [14] 14  BP: (111)/(59) 111/59        Physical Exam   Constitutional: No acute distress, awake, alert, chronically ill appearing  AAM  Eyes: PERRLA, sclerae anicteric, no conjunctival injection  HENT: NCAT, mucous membranes moist  Neck: Supple, no thyromegaly, no lymphadenopathy, trachea midline  Respiratory: Clear to auscultation bilaterally, nonlabored respirations   Cardiovascular: RRR, no murmurs, rubs, or gallops, palpable pedal pulses bilaterally  Gastrointestinal: Positive bowel sounds, soft, nontender, nondistended  Musculoskeletal:chronic venous stasis ulcer BLEs with wound dressings in place, 1+ pitting edema to patella bilaterally  Psychiatric: Appropriate affect, cooperative  Neurologic: Oriented x 3, strength symmetric in all extremities, Cranial Nerves grossly intact to confrontation, speech clear  Skin: No rashes    Results Reviewed:  I have personally reviewed current lab, radiology, and data and agree.      Results from last 7 days  Lab Units 06/04/18  1058   WBC 10*3/mm3 9.98   HEMOGLOBIN g/dL 10.5*   HEMATOCRIT % 36.1*   PLATELETS 10*3/mm3 193       Results from last 7 days  Lab Units 06/04/18  1312 06/04/18  1058   SODIUM mmol/L  --  142   POTASSIUM mmol/L  --  3.9   CHLORIDE mmol/L  --  104   CO2 mmol/L  --  30.0   BUN mg/dL  --  25*   CREATININE mg/dL  --  0.80   GLUCOSE mg/dL  --  84   CALCIUM mg/dL  --  8.9   ALT (SGPT) U/L  --  11   AST (SGOT) U/L  --  14   TROPONIN I ng/mL 0.006 <0.006     Estimated Creatinine Clearance: 81.7 mL/min (by C-G formula based on SCr of 0.8 mg/dL).  Brief Urine Lab Results  (Last result in the past 365 days)      Color   Clarity   Blood   Leuk Est   Nitrite   Protein   CREAT   Urine HCG        06/04/18 1303 Yellow Clear Negative Trace(A) Negative Negative             BNP   Date Value Ref Range Status   06/04/2018 196.0 (H) 0.0 - 100.0 pg/mL Final     Comment:     Results may be falsely decreased if patient taking Biotin.     pH, Arterial   Date Value Ref Range Status   06/04/2018 7.429 7.350 - 7.450 pH units Final     Imaging Results (last 24 hours)     Procedure Component Value Units  Date/Time    CT Angiogram Chest With Contrast [[id number]] Updated:  06/04/18 1434    CT Soft Tissue Neck With Contrast [[id number]] Updated:  06/04/18 1434    CT Head Without Contrast [[id number]] Updated:  06/04/18 1346    XR Chest 1 View [[id number]] Collected:  06/04/18 1300     Updated:  06/04/18 1300    Narrative:       EXAMINATION: XR CHEST 1 VW- 06/04/2018     INDICATION: SOA triage protocol      COMPARISON: 11/17/2017     FINDINGS: Portable chest reveals heart to be enlarged. Degenerative  changes seen within the spine with slight curvature with convexity to  the right. Degenerative changes seen within the shoulders bilaterally.  Vascular calcifications seen within the thoracic aorta. No pleural  effusion or pneumothorax. Pulmonary vascularity is within normal limits.            Impression:       Chronic and emphysematous changes seen within the lung  fields with extensive degenerative changes seen within the spine and  shoulders bilaterally. No evidence of acute parenchymal disease. There  is prominence seen in the mediastinum which is stable and unchanged in  the interval.     D:  06/04/2018  E:  06/04/2018              Results for orders placed during the hospital encounter of 06/17/17   Adult Transthoracic Echo Complete    Narrative · Left ventricular systolic function is normal. Estimated EF = 58%.  · Mild mitral valve regurgitation is present.  · Mild tricuspid valve regurgitation is present.  · Calculated right ventricular systolic pressure from tricuspid   regurgitation is 29 mmHg.  · There is no evidence of pericardial effusion.  · No evidence of pulmonary hypertension is present.  · The aortic valve exhibits sclerosis.  · Normal right ventricular cavity size, wall thickness, systolic function   and septal motion noted.          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 Rojas is a 76 yo AAM with PMH of diastolic HF, HTN, RA on PDS, Afib not on anticoagulation, and BPH who presents with complaints of SOA and several weeks of orthopnea/PND.  Found to have chronic hypercapnia on ABG in the ED.     --given his clinical exam findings will treat with one dose IV Lasix today, strict Is/Os, daily weights, repeat TTE in am.  BNP only mildly elevated (and not >400) but will give trial of Lasix for now.  No other clear etiology of his dyspnea/PND. Not truly hypoxic. Monitor on tele.  Consider Cardiology consult depending on workup.  --continue PDS at home dose  --strict Is/Os, daily weights, TTE in am  --continue antihypertensives  --continue rate control with bisoprolol, not on anticoagulation due to fall risk   --continue tamsulosin, etc for BPH  --consult [facility] for BLE venous stasis ulcers    DVT prophylaxis:KANCHAN    CODE STATUS:  Conditional Code    Admission Status:  I believe this patient meets OBSERVATION status, however if further evaluation or treatment plans warrant, status may change.  Based upon current information, I predict patient's care encounter to be less than or equal to 2 midnights.      Electronically signed by Erika Garcia MD, 06/04/18, 3:07 PM.       no